# Patient Record
Sex: MALE | Race: WHITE | NOT HISPANIC OR LATINO | Employment: OTHER | URBAN - METROPOLITAN AREA
[De-identification: names, ages, dates, MRNs, and addresses within clinical notes are randomized per-mention and may not be internally consistent; named-entity substitution may affect disease eponyms.]

---

## 2017-02-23 ENCOUNTER — OFFICE VISIT (OUTPATIENT)
Dept: MEDICAL GROUP | Facility: PHYSICIAN GROUP | Age: 72
End: 2017-02-23
Payer: MEDICARE

## 2017-02-23 VITALS
BODY MASS INDEX: 22.88 KG/M2 | RESPIRATION RATE: 12 BRPM | TEMPERATURE: 98.8 F | HEART RATE: 48 BPM | OXYGEN SATURATION: 98 % | SYSTOLIC BLOOD PRESSURE: 118 MMHG | DIASTOLIC BLOOD PRESSURE: 68 MMHG | WEIGHT: 145.8 LBS | HEIGHT: 67 IN

## 2017-02-23 DIAGNOSIS — Z13.21 ENCOUNTER FOR VITAMIN DEFICIENCY SCREENING: ICD-10-CM

## 2017-02-23 DIAGNOSIS — E11.00 TYPE 2 DIABETES MELLITUS WITH HYPEROSMOLARITY WITHOUT COMA, WITHOUT LONG-TERM CURRENT USE OF INSULIN (HCC): ICD-10-CM

## 2017-02-23 DIAGNOSIS — E78.5 HYPERLIPIDEMIA, UNSPECIFIED HYPERLIPIDEMIA TYPE: Chronic | ICD-10-CM

## 2017-02-23 DIAGNOSIS — F41.9 ANXIETY: ICD-10-CM

## 2017-02-23 DIAGNOSIS — Z79.01 CHRONIC ANTICOAGULATION: ICD-10-CM

## 2017-02-23 DIAGNOSIS — G31.84 MILD COGNITIVE IMPAIRMENT: ICD-10-CM

## 2017-02-23 DIAGNOSIS — M21.961 DEFORMITY OF BOTH FEET: ICD-10-CM

## 2017-02-23 DIAGNOSIS — M21.962 DEFORMITY OF BOTH FEET: ICD-10-CM

## 2017-02-23 DIAGNOSIS — R42 VERTIGO: ICD-10-CM

## 2017-02-23 PROBLEM — E11.9 TYPE II DIABETES MELLITUS (HCC): Status: ACTIVE | Noted: 2017-02-23

## 2017-02-23 PROCEDURE — G8432 DEP SCR NOT DOC, RNG: HCPCS | Performed by: NURSE PRACTITIONER

## 2017-02-23 PROCEDURE — G8419 CALC BMI OUT NRM PARAM NOF/U: HCPCS | Performed by: NURSE PRACTITIONER

## 2017-02-23 PROCEDURE — G8484 FLU IMMUNIZE NO ADMIN: HCPCS | Performed by: NURSE PRACTITIONER

## 2017-02-23 PROCEDURE — 3017F COLORECTAL CA SCREEN DOC REV: CPT | Mod: 8P | Performed by: NURSE PRACTITIONER

## 2017-02-23 PROCEDURE — 4040F PNEUMOC VAC/ADMIN/RCVD: CPT | Mod: 8P | Performed by: NURSE PRACTITIONER

## 2017-02-23 PROCEDURE — 1036F TOBACCO NON-USER: CPT | Performed by: NURSE PRACTITIONER

## 2017-02-23 PROCEDURE — 99204 OFFICE O/P NEW MOD 45 MIN: CPT | Performed by: NURSE PRACTITIONER

## 2017-02-23 PROCEDURE — 1101F PT FALLS ASSESS-DOCD LE1/YR: CPT | Performed by: NURSE PRACTITIONER

## 2017-02-23 PROCEDURE — 3046F HEMOGLOBIN A1C LEVEL >9.0%: CPT | Mod: 8P | Performed by: NURSE PRACTITIONER

## 2017-02-23 RX ORDER — BLOOD-GLUCOSE METER
EACH MISCELLANEOUS
COMMUNITY
End: 2020-05-08

## 2017-02-23 RX ORDER — NORTRIPTYLINE HYDROCHLORIDE 10 MG/1
10 CAPSULE ORAL NIGHTLY
COMMUNITY
End: 2017-03-24 | Stop reason: SDUPTHER

## 2017-02-23 RX ORDER — FLUOROURACIL 20 MG/ML
SOLUTION TOPICAL
COMMUNITY
End: 2018-03-12 | Stop reason: CLARIF

## 2017-02-23 RX ORDER — CALCIUM CARB/VITAMIN D3/VIT K1 500-100-40
TABLET,CHEWABLE ORAL
COMMUNITY
End: 2017-07-18 | Stop reason: SDUPTHER

## 2017-02-23 RX ORDER — ATORVASTATIN CALCIUM 40 MG/1
40 TABLET, FILM COATED ORAL NIGHTLY
COMMUNITY
End: 2017-03-24 | Stop reason: SDUPTHER

## 2017-02-23 RX ORDER — LISINOPRIL 10 MG/1
10 TABLET ORAL DAILY
COMMUNITY
End: 2017-09-23

## 2017-02-23 RX ORDER — WARFARIN SODIUM 2.5 MG/1
2.5 TABLET ORAL DAILY
COMMUNITY
End: 2017-03-06 | Stop reason: SDUPTHER

## 2017-02-23 RX ORDER — GABAPENTIN 100 MG/1
100 CAPSULE ORAL 3 TIMES DAILY
COMMUNITY
End: 2017-12-19 | Stop reason: SDUPTHER

## 2017-02-23 ASSESSMENT — PATIENT HEALTH QUESTIONNAIRE - PHQ9: CLINICAL INTERPRETATION OF PHQ2 SCORE: 0

## 2017-02-23 NOTE — Clinical Note
[facility]  PRADEEP Batista  [address]  [place] [address]  Fax: [phone]   Authorization for Release/Disclosure of   Protected Health Information   Name: LESLEY HIGGINS : 1945 SSN: [id number]   Address[address] [address] Phone:    [phone] (home)    I authorize the entity listed below to release/disclose the PHI below to:   [facility]/PRADEEP Batista and PRADEEP Batista   Provider or Entity Name: [facility]:   Vadim TUCKER MD     Address   City, [facility], [facility]   Phone: [phone]      Fax:     Reason for request: continuity of care   Information to be released:    [  ] LAST COLONOSCOPY,  including any PATH REPORT and follow-up  [  ] LAST FIT/COLOGUARD RESULT [  ] LAST DEXA  [  ] LAST MAMMOGRAM  [  ] LAST PAP  [  ] LAST LABS [  ] RETINA EXAM REPORT  [  ] IMMUNIZATION RECORDS  [*] Release all info      [  ] Check here and initial the line next to each item to release ALL health information INCLUDING  _____ Care and treatment for drug and / or alcohol abuse  _____ HIV testing, infection status, or AIDS  _____ Genetic Testing    DATES OF SERVICE OR TIME PERIOD TO BE DISCLOSED: ___All Records___  I understand and acknowledge that:  * This Authorization may be revoked at any time by you in writing, except if your health information has already been used or disclosed.  * Your health information that will be used or disclosed as a result of you signing this authorization could be re-disclosed by the recipient. If this occurs, your re-disclosed health information may no longer be protected by State or Federal laws.  * You may refuse to sign this Authorization. Your refusal will not affect your ability to obtain treatment.  * This Authorization becomes effective upon signing and will  on (date) __________.      If no date is indicated, this Authorization will  one (1) year from the signature  date.    Name: Carlos Shakira    Signature:   Date:     2/23/2017       PLEASE FAX REQUESTED RECORDS BACK TO: [phone]

## 2017-02-23 NOTE — Clinical Note
[facility]  PRADEEP Batista  [address]  [place] [address]  Fax: [phone]   Authorization for Release/Disclosure of   Protected Health Information   Name: LESLEY HIGGINS : 1945 SSN: [id number]   [address] [place] [address] Phone:    [phone] (home)    I authorize the entity listed below to release/disclose the PHI below to:   [facility]/PRADEEP Batista and PRADEEP Batista   Provider or Entity Name: Meliton Palm      Address   City, [facility], [facility]   Phone:      Fax:     Reason for request: continuity of care   Information to be released:    [  ] LAST COLONOSCOPY,  including any PATH REPORT and follow-up  [  ] LAST FIT/COLOGUARD RESULT [  ] LAST DEXA  [  ] LAST MAMMOGRAM  [  ] LAST PAP  [  ] LAST LABS [  ] RETINA EXAM REPORT  [  ] IMMUNIZATION RECORDS  [*] Release all info      [  ] Check here and initial the line next to each item to release ALL health information INCLUDING  _____ Care and treatment for drug and / or alcohol abuse  _____ HIV testing, infection status, or AIDS  _____ Genetic Testing    DATES OF SERVICE OR TIME PERIOD TO BE DISCLOSED: ___All Records___  I understand and acknowledge that:  * This Authorization may be revoked at any time by you in writing, except if your health information has already been used or disclosed.  * Your health information that will be used or disclosed as a result of you signing this authorization could be re-disclosed by the recipient. If this occurs, your re-disclosed health information may no longer be protected by State or Federal laws.  * You may refuse to sign this Authorization. Your refusal will not affect your ability to obtain treatment.  * This Authorization becomes effective upon signing and will  on (date) __________.      If no date is indicated, this Authorization will  one (1) year from the signature date.    Name: Lesley Higgins Duran  #[id number]    Signature:   Date:     2/23/2017       PLEASE FAX REQUESTED RECORDS BACK TO: [phone]

## 2017-02-23 NOTE — ASSESSMENT & PLAN NOTE
Chronic Condition: Patient had type 2 diabetes mellitus for several years. He checks blood sugar at home and it averages 120's to 150's. He Denies any tremors, anxiety, weakness, blurry vision, fatigue, irritability or palpitations. Patient denies any drowsiness, dry skin, polyphagia or polydipsia, polyuria or nausea. Patient admits to headaches.  Last eye exam 2 years ago  Denies visual blurring, double vision, eye pain and floaters  Last monofilament foot exam: 5 years ago Denies foot pain, numbness, calluses, ulcers

## 2017-02-23 NOTE — ASSESSMENT & PLAN NOTE
Chronic condition: Patient is treated for chronic anxiety with citalopram. Denies any recent panic attacks, suicidal or homicidal ideation.

## 2017-02-23 NOTE — ASSESSMENT & PLAN NOTE
Difficulty with balance and walking. Patient was previously seen by podiatrist in [place]. His wife states that it was because he had poor shoes growing up and developed deformities. He is also diabetic with diabetic neuropathy.

## 2017-02-23 NOTE — ASSESSMENT & PLAN NOTE
Began in 2014. Forgets little things, losing stuff. Trouble following and taking directions. Forgetting what he's going after. Believes wife is saying things that she never said.  Episodes occur daily.  Exacerbated by stress due to recent move.

## 2017-02-23 NOTE — Clinical Note
[facility]  PRADEEP Batista  [address]  [place] [address]  Fax: [phone]   Authorization for Release/Disclosure of   Protected Health Information   Name: LESLEY HIGGINS : 1945 SSN: [id number]   Address: [address] [address] Phone:    [phone] (home)    I authorize the entity listed below to release/disclose the PHI below to:   [facility]/PRADEEP Batista and PRADEEP Batista   Provider or Entity Name: Keerthi Clark   [facility]      Address   City, [facility], [facility]   Phone:[phone]      Fax: [phone]     Reason for request: continuity of care   Information to be released:    [  ] LAST COLONOSCOPY,  including any PATH REPORT and follow-up  [  ] LAST FIT/COLOGUARD RESULT [  ] LAST DEXA  [  ] LAST MAMMOGRAM  [  ] LAST PAP  [  ] LAST LABS [  ] RETINA EXAM REPORT  [  ] IMMUNIZATION RECORDS  [*] Release all info      [  ] Check here and initial the line next to each item to release ALL health information INCLUDING  _____ Care and treatment for drug and / or alcohol abuse  _____ HIV testing, infection status, or AIDS  _____ Genetic Testing    DATES OF SERVICE OR TIME PERIOD TO BE DISCLOSED: ___All Records__________  I understand and acknowledge that:  * This Authorization may be revoked at any time by you in writing, except if your health information has already been used or disclosed.  * Your health information that will be used or disclosed as a result of you signing this authorization could be re-disclosed by the recipient. If this occurs, your re-disclosed health information may no longer be protected by State or Federal laws.  * You may refuse to sign this Authorization. Your refusal will not affect your ability to obtain treatment.  * This Authorization becomes effective upon signing and will  on (date) __________.      If no date is indicated, this Authorization will  one (1) year from the  signature date.    Name: Carlos Rosenberg    Signature:   Date:     2/23/2017       PLEASE FAX REQUESTED RECORDS BACK TO: [phone]

## 2017-02-23 NOTE — ASSESSMENT & PLAN NOTE
Chronic Condition: Patient has hyperlipidemia and is currently taking atorvastatin. He denies any chest pain, diaphoresis, shortness of breath, headaches, blurred vision or myalgias.

## 2017-02-23 NOTE — MR AVS SNAPSHOT
"        Carlos Bernadines   2017 2:40 PM   Office Visit   MRN: [id number]    Department:  [facility]   Dept Phone:  [phone]    Description:  Male : 1945   Provider:  PRADEEP Batista           Reason for Visit     Establish Care     Foot Problem           Allergies as of 2017     Allergen Noted Reactions    Vicodin [Hydrocodone-Acetaminophen] 2017   Vomiting      You were diagnosed with     Hyperlipidemia, unspecified hyperlipidemia type   [3776766]       Anxiety   [782453]       Type 2 diabetes mellitus with hyperosmolarity without coma, without long-term current use of insulin (CMS-[facility])   [3060057]       Vertigo   [648615]       Mild cognitive impairment   [748591]       Chronic anticoagulation   [376117]       Deformity of both feet   [007700]       Encounter for vitamin deficiency screening   [684345]         Vital Signs     Blood Pressure Pulse Temperature Respirations Height Weight    118/68 mmHg 48 37.1 °C (98.8 °F) 12 1.709 m (5' 7.28\") 66.134 kg (145 lb 12.8 oz)    Body Mass Index Oxygen Saturation Smoking Status             22.64 kg/m2 98% Never Smoker          Basic Information     Date Of Birth Sex Race Ethnicity Preferred Language    1945 Male White Non- English      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   2017 - Present      Health Maintenance     Patient has no pending health maintenance at this time      Current Immunizations     No immunizations on file.      Below and/or attached are the medications your provider expects you to take. Review all of your home " "medications and newly ordered medications with your provider and/or pharmacist. Follow medication instructions as directed by your provider and/or pharmacist. Please keep your medication list with you and share with your provider. Update the information when medications are discontinued, doses are changed, or new medications (including over-the-counter products) are added; and carry medication information at all times in the event of emergency situations     Allergies:  VICODIN - Vomiting               Medications  Valid as of: February 23, 2017 -  4:06 PM    Generic Name Brand Name Tablet Size Instructions for use    Atorvastatin Calcium (Tab) LIPITOR 40 MG Take 40 mg by mouth every evening.        Blood Glucose Monitoring Suppl (Kit) ONETOUCH VERIO FLEX SYSTEM W/DEVICE by Does not apply route.        Citalopram Hydrobromide   Take  by mouth.        Fluorouracil (Solution) Fluorouracil 2 % by Apply externally route.        Gabapentin (Cap) NEURONTIN 100 MG Take 100 mg by mouth 3 times a day.        Insulin Regular Human (Solution) HUMULIN R 100 Unit/mL Inject  as instructed 3 times a day before meals.        Insulin Syringe-Needle U-100 (Misc) INSULIN SYRINGE .3CC/31GX5/16\" 31G X 5/16\" 0.3 ML by Does not apply route.        Lisinopril (Tab) PRINIVIL 10 MG Take 10 mg by mouth every day.        MetFORMIN HCl (Tab) GLUCOPHAGE 500 MG Take 500 mg by mouth 2 times a day, with meals.        Nortriptyline HCl (Cap) PAMELOR 10 MG Take 10 mg by mouth every evening.        Warfarin Sodium (Tab) COUMADIN 2.5 MG Take 2.5 mg by mouth every day.        .                 Medicines prescribed today were sent to:     [facility] [address] [place] [address] AT [address] [address]    Phone: [phone] Fax: [phone]    Open 24 Hours?: No      Medication refill instructions:       If your prescription bottle indicates you have medication refills left, it is " not necessary to call your provider’s office. Please contact your pharmacy and they will refill your medication.    If your prescription bottle indicates you do not have any refills left, you may request refills at any time through one of the following ways: The online Quick TV system (except Urgent Care), by calling your provider’s office, or by asking your pharmacy to contact your provider’s office with a refill request. Medication refills are processed only during regular business hours and may not be available until the next business day. Your provider may request additional information or to have a follow-up visit with you prior to refilling your medication.   *Please Note: Medication refills are assigned a new Rx number when refilled electronically. Your pharmacy may indicate that no refills were authorized even though a new prescription for the same medication is available at the pharmacy. Please request the medicine by name with the pharmacy before contacting your provider for a refill.        Your To Do List     Future Labs/Procedures Complete By Expires    COMP METABOLIC PANEL  As directed 2/24/2018    HEMOGLOBIN A1C  As directed 2/24/2018    LIPID PROFILE  As directed 2/24/2018    VITAMIN D,25 HYDROXY  As directed 2/24/2018      Referral     A referral request has been sent to our patient care coordination department. Please allow 3-5 business days for us to process this request and contact you either by phone or mail. If you do not hear from us by the 5th business day, please call us at [phone].           Quick TV Access Code: [email or web address]  Expires: 3/25/2017  4:06 PM    Quick TV  A secure, online tool to manage your health information     [employer]’s Quick TV® is a secure, online tool that connects you to your personalized health information from the privacy of your home -- day or night - making it very easy for you to manage your healthcare. Once the activation process is completed, you can  even access your medical information using the LucidMedia shandra, which is available for free in the Apple Shandra store or Google Play store.     LucidMedia provides the following levels of access (as shown below):   My Chart Features   [facility] Primary [facility] Doctor [facility]  Specialists [facility]  Urgent  Care Non-[facility]  Primary Care  Doctor   Email your healthcare team securely and privately 24/7 X X X    Manage appointments: schedule your next appointment; view details of past/upcoming appointments X      Request prescription refills. X      View recent personal medical records, including lab and immunizations X X X X   View health record, including health history, allergies, medications X X X X   Read reports about your outpatient visits, procedures, consult and ER notes X X X X   See your discharge summary, which is a recap of your hospital and/or ER visit that includes your diagnosis, lab results, and care plan. X X       How to register for LucidMedia:  1. Go to  [email or web address].  2. Click on the Sign Up Now box, which takes you to the New Member Sign Up page. You will need to provide the following information:  a. Enter your LucidMedia Access Code exactly as it appears at the top of this page. (You will not need to use this code after you’ve completed the sign-up process. If you do not sign up before the expiration date, you must request a new code.)   b. Enter your date of birth.   c. Enter your home email address.   d. Click Submit, and follow the next screen’s instructions.  3. Create a LucidMedia ID. This will be your LucidMedia login ID and cannot be changed, so think of one that is secure and easy to remember.  4. Create a LucidMedia password. You can change your password at any time.  5. Enter your Password Reset Question and Answer. This can be used at a later time if you forget your password.   6. Enter your e-mail address. This allows you to receive e-mail notifications when new information is available in  MyCadbox.  7. Click Sign Up. You can now view your health information.    For assistance activating your MyCadbox account, call [phone]

## 2017-02-24 NOTE — PROGRESS NOTES
Chief Complaint   Patient presents with   • Establish Care   • Foot Problem       HPI:    Carlos Rosenberg is a 71 y.o. male here to establish care and to discuss the following:    Hyperlipidemia  Chronic Condition: Patient has hyperlipidemia and is currently taking atorvastatin. He denies any chest pain, diaphoresis, shortness of breath, headaches, blurred vision or myalgias.      Type II diabetes mellitus (CMS-[facility])  Chronic Condition: Patient had type 2 diabetes mellitus for several years. He checks blood sugar at home and it averages 120's to 150's. He Denies any tremors, anxiety, weakness, blurry vision, fatigue, irritability or palpitations. Patient denies any drowsiness, dry skin, polyphagia or polydipsia, polyuria or nausea. Patient admits to headaches.  Last eye exam 2 years ago  Denies visual blurring, double vision, eye pain and floaters  Last monofilament foot exam: 5 years ago Denies foot pain, numbness, calluses, ulcers        Mild cognitive impairment  Began in 2014. Forgets little things, losing stuff. Trouble following and taking directions. Forgetting what he's going after. Believes wife is saying things that she never said.  Episodes occur daily.  Exacerbated by stress due to recent move.    Anxiety  Chronic condition: Patient is treated for chronic anxiety with citalopram. Denies any recent panic attacks, suicidal or homicidal ideation.          Chronic anticoagulation  On warfarin for artificial valve.    Deformity of both feet  Difficulty with balance and walking. Patient was previously seen by podiatrist in [place]. His wife states that it was because he had poor shoes growing up and developed deformities. He is also diabetic with diabetic neuropathy.        Current medicines (including changes today)  Current Outpatient Prescriptions   Medication Sig Dispense Refill   • metformin (GLUCOPHAGE) 500 MG Tab Take 500 mg by mouth 2 times a day, with meals.     • CITALOPRAM HYDROBROMIDE PO Take  by  "mouth.     • insulin regular (HUMULIN R) 100 Unit/mL Solution Inject  as instructed 3 times a day before meals.     • atorvastatin (LIPITOR) 40 MG Tab Take 40 mg by mouth every evening.     • Fluorouracil 2 % Solution by Apply externally route.     • warfarin (COUMADIN) 2.5 MG Tab Take 2.5 mg by mouth every day.     • nortriptyline (PAMELOR) 10 MG Cap Take 10 mg by mouth every evening.     • Blood Glucose Monitoring Suppl (ONETOUCH VERIO FLEX SYSTEM) W/DEVICE Kit by Does not apply route.     • Insulin Syringe-Needle U-100 (INSULIN SYRINGE .3CC/31GX5/16\") 31G X 5/16\" 0.3 ML Misc by Does not apply route.     • lisinopril (PRINIVIL) 10 MG Tab Take 10 mg by mouth every day.     • gabapentin (NEURONTIN) 100 MG Cap Take 100 mg by mouth 3 times a day.       No current facility-administered medications for this visit.     He  has a past medical history of Neck fracture (CMS-HCC) (2016); Diverticulitis (2015); Mild cognitive impairment (2014); Anxiety (2014); Diabetes (CMS-HCC); Hypertension; Diverticulosis; Talipes cavus; Hyperlipidemia; Stroke (CMS-HCC); and Hearing loss of both ears.  He  has past surgical history that includes mitral valve replacement (1999); inguinal hernia repair (1984); and tonsillectomy.  Social History   Substance Use Topics   • Smoking status: Never Smoker    • Smokeless tobacco: Never Used   • Alcohol Use: No     Social History     Social History Narrative   • No narrative on file     History reviewed. No pertinent family history.  Family Status   Relation Status Death Age   • Mother Other          ROS    Review of Systems   Constitutional: Negative for fever, chills, weight loss and malaise/fatigue.   HENT: Negative for ear pain, nosebleeds, congestion, sore throat and neck pain.    Eyes: Negative for blurred vision.   Respiratory: Negative for cough, sputum production, shortness of breath and wheezing.    Cardiovascular: Negative for chest pain, palpitations,  and leg swelling. Positive for " "change in color lower extremities  Gastrointestinal: Negative for heartburn, nausea, vomiting, diarrhea and abdominal pain.   Genitourinary: Negative for dysuria, urgency and frequency.   Musculoskeletal: Negative for myalgias, back pain and joint pain.   Skin: Negative for rash and itching.   Neurological: Negative for dizziness, tingling, tremors,  focal weakness and headaches. Positive for tingling both feet and positive for memory loss  Endo/Heme/Allergies: Does not bruise/bleed easily.   Psychiatric/Behavioral: Negative for depression, anxiety, suicidal ideas, insomnia and memory loss.      All other systems reviewed and are negative except as in HPI.         Objective:     Blood pressure 118/68, pulse 48, temperature 37.1 °C (98.8 °F), resp. rate 12, height 1.709 m (5' 7.28\"), weight 66.134 kg (145 lb 12.8 oz), SpO2 98 %. Body mass index is 22.64 kg/(m^2).  Physical Exam:  Constitutional: Alert, no distress.  Skin: Warm, dry, good turgor, no rashes in visible areas.  Eye: Equal, round and reactive, conjunctiva clear, lids normal.  ENMT: Lips without lesions, good dentition, oropharynx clear. Ear canals are clear, TMs within normal limits bilaterally.   Neck: Trachea midline, no masses, no thyromegaly. No cervical or supraclavicular lymphadenopathy.  Respiratory: Unlabored respiratory effort, lungs clear to auscultation, no wheezes, no ronchi.  Cardiovascular: Normal S1, S2, no murmur, no edema. Both lower extremities purple in color, normal temp. 2+ pedal pulses bilaterally  Psych: Alert and oriented x3, normal affect and mood.  MS: Ambulates independently with steady gait. Has full range of motion of all extremities and spine.  Neuro: Cranial nerves intact except decreased hearing left side. Positive romberg. DTRs within normal limits.  Monofilament testing with a 10 gram force: sensation intact: decreased bilaterally  Visual Inspection: Feet without maceration, ulcers, fissures. Calluses both feet  Pedal " pulses: intact bilaterally      Assessment and Plan:   The following treatment plan was discussed   1. Hyperlipidemia, unspecified hyperlipidemia type  LIPID PROFILE    Labs ordered.   2. Anxiety      Continue current medication.   3. Type 2 diabetes mellitus with hyperosmolarity without coma, without long-term current use of insulin (HCC)  COMP METABOLIC PANEL    HEMOGLOBIN A1C    Continue current medication.   4. Vertigo      Patient declined medication   5. Mild cognitive impairment  REFERRAL TO NEUROLOGY    Referral to memory clinic   6. Chronic anticoagulation  REFERRAL TO ANTICOAGULATION MONITORING    Referral to Coumadin clinic   7. Deformity of both feet  REFERRAL TO PODIATRY    Referral to podiatry   8. Encounter for vitamin deficiency screening  VITAMIN D,25 HYDROXY    Labs ordered.     Records requested.  Followup: Return in about 4 weeks (around 3/23/2017) for Lab Review.   Please note that this dictation was created using voice recognition software. I have made every reasonable attempt to correct obvious errors, but I expect that there are errors of grammar and possibly content that I did not discover before finalizing the note.

## 2017-03-06 ENCOUNTER — TELEPHONE (OUTPATIENT)
Dept: VASCULAR LAB | Facility: MEDICAL CENTER | Age: 72
End: 2017-03-06

## 2017-03-06 DIAGNOSIS — Z79.01 CHRONIC ANTICOAGULATION: ICD-10-CM

## 2017-03-06 RX ORDER — WARFARIN SODIUM 2.5 MG/1
TABLET ORAL
Qty: 45 TAB | Refills: 3 | Status: SHIPPED | OUTPATIENT
Start: 2017-03-06 | End: 2017-03-31

## 2017-03-06 RX ORDER — WARFARIN SODIUM 2.5 MG/1
2.5 TABLET ORAL DAILY
Qty: 30 TAB | Refills: 0 | Status: CANCELLED | OUTPATIENT
Start: 2017-03-06

## 2017-03-07 RX ORDER — CITALOPRAM HYDROBROMIDE 10 MG/1
10 TABLET ORAL EVERY MORNING
Qty: 90 TAB | Refills: 1 | Status: SHIPPED | OUTPATIENT
Start: 2017-03-07 | End: 2017-07-21 | Stop reason: SDUPTHER

## 2017-03-07 NOTE — TELEPHONE ENCOUNTER
Was the patient seen in the last year in this department? Yes 02/23/2017    Does patient have an active prescription for medications requested? No     Received Request Via: Patient

## 2017-03-10 ENCOUNTER — ANTICOAGULATION VISIT (OUTPATIENT)
Dept: MEDICAL GROUP | Facility: PHYSICIAN GROUP | Age: 72
End: 2017-03-10
Payer: MEDICARE

## 2017-03-10 DIAGNOSIS — Z95.2 HISTORY OF MITRAL VALVE REPLACEMENT WITH MECHANICAL VALVE: ICD-10-CM

## 2017-03-10 LAB — INR PPP: 2.6 (ref 2–3.5)

## 2017-03-10 PROCEDURE — 99999 PR NO CHARGE: CPT | Performed by: NURSE PRACTITIONER

## 2017-03-10 PROCEDURE — 85610 PROTHROMBIN TIME: CPT | Performed by: NURSE PRACTITIONER

## 2017-03-10 PROCEDURE — 4040F PNEUMOC VAC/ADMIN/RCVD: CPT | Mod: 8P | Performed by: NURSE PRACTITIONER

## 2017-03-10 PROCEDURE — G8419 CALC BMI OUT NRM PARAM NOF/U: HCPCS | Performed by: NURSE PRACTITIONER

## 2017-03-10 NOTE — PROGRESS NOTES
"Anticoagulation Summary as of 3/10/2017     INR goal 2.5-3.5   Selected INR 2.6 (3/10/2017)   Maintenance plan 7.5 mg (2.5 mg x 3) on Wed, Sat; 5 mg (2.5 mg x 2) all other days   Weekly total 40 mg   Plan last modified Casey Birmingham PHARMD (3/10/2017)   Next INR check 3/31/2017   Target end date Indefinite    Indications   History of mitral valve replacement with mechanical valve [Z95.2] [Z95.2]         Anticoagulation Episode Summary     INR check location     Preferred lab     Send INR reminders to     Comments       Anticoagulation Care Providers     Provider Role Specialty Phone number    PRADEEP Batista Referring Family Medicine [phone]    [facility] Anticoagulation Services Responsible  [phone]        Current Outpatient Prescriptions on File Prior to Visit   Medication Sig Dispense Refill   • citalopram (CELEXA) 10 MG tablet Take 1 Tab by mouth every morning. 90 Tab 1   • warfarin (COUMADIN) 2.5 MG Tab Take 1 tab po q day or as directed by clinic 45 Tab 3   • metformin (GLUCOPHAGE) 500 MG Tab Take 500 mg by mouth 2 times a day, with meals.     • insulin regular (HUMULIN R) 100 Unit/mL Solution Inject  as instructed 3 times a day before meals.     • atorvastatin (LIPITOR) 40 MG Tab Take 40 mg by mouth every evening.     • Fluorouracil 2 % Solution by Apply externally route.     • nortriptyline (PAMELOR) 10 MG Cap Take 10 mg by mouth every evening.     • Blood Glucose Monitoring Suppl (ONETOUCH VERIO FLEX SYSTEM) W/DEVICE Kit by Does not apply route.     • Insulin Syringe-Needle U-100 (INSULIN SYRINGE .3CC/31GX5/16\") 31G X 5/16\" 0.3 ML Misc by Does not apply route.     • lisinopril (PRINIVIL) 10 MG Tab Take 10 mg by mouth every day.     • gabapentin (NEURONTIN) 100 MG Cap Take 100 mg by mouth 3 times a day.       No current facility-administered medications on file prior to visit.     Past Medical History   Diagnosis Date   • Neck fracture (CMS-HCC) 2016   • Diverticulitis 2015   • Mild " cognitive impairment 2014   • Anxiety 2014   • Diabetes (CMS-HCC)    • Hypertension    • Diverticulosis    • Talipes cavus    • Hyperlipidemia    • Stroke (CMS-[facility])    • Hearing loss of both ears      Anticoagulation Patient Findings    Pt is new to warfarin and new to RCC.  Discussed indication for warfarin therapy and INR goal range. Explained our services, hours of operation, warfarin therapy, potential SE, potential DI.. Discussed diet at length, with an emphasis on foods rich in vitamin K.  Discussed monitoring parameters, such as blood in urine, blood in stool, discussed what to do if a dose is missed, or suspected as missed.  Emphasized importance of compliance.  Discussed lifestyle choices of ETOH & smoking and its impact on therapy.    Patient new to clinic from [place], previously managed by [facility].  Request for medical records sent by PCP awaiting arrival.  History of mechanical valve in the mitral position, he is unsure of model.  Denies history of VTE, stroke, or atrial fibrillation.  Verified current warfarin regimen.  Med rec completed.  INR therapeutic today at 2.6.  Pt is to continue with current warfarin dosing regimen.  Follow up in 3 weeks.    Casey Birmingham, PHARMD     CC Dr Michael Bloch

## 2017-03-10 NOTE — MR AVS SNAPSHOT
Carlos Shakira   3/10/2017 9:15 AM   Anticoagulation Visit   MRN: [id number]    Department:  [facility]   Dept Phone:  [phone]    Description:  Male : 1945   Provider:  Casey Birmingham, MARYD           Allergies as of 3/10/2017     Allergen Noted Reactions    Vicodin [Hydrocodone-Acetaminophen] 2017   Vomiting      You were diagnosed with     History of mitral valve replacement with mechanical valve   [630572]         Vital Signs     Smoking Status                   Never Smoker            Basic Information     Date Of Birth Sex Race Ethnicity Preferred Language    1945 Male White Non- English      Your appointments     Mar 31, 2017  9:30 AM   Anti-Coag Routine with [place] PHARMACIST   [facility]    [address] [address] [phone]            May 18, 2017  9:00 AM   New Patient with Inna Cartagena M.D.   [facility] (--)    [address], [address]  [facility] [address] [phone]           Please bring Photo ID, Insurance Cards, All Medication Bottles and copies of any legal documents (such as Living Will, Power of ) If speaking a language besides English please bring an adult . Please arrive 30 minutes prior for check in and registration. You will be receiving a confirmation call a few days before your appointment from our automated call confirmation system.              Problem List              ICD-10-CM Priority Class Noted - Resolved    Hyperlipidemia E78.5   2017 - Present    Anxiety F41.9   2017 - Present    Type II diabetes mellitus (CMS-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   2017 - Present    History of mitral valve replacement with mechanical valve [Z95.2]  Z95.2   3/10/2017 - Present      Health Maintenance        Date Due Completion Dates    A1C SCREENING 4/12/1946 ---    RETINAL SCREENING 10/12/1961 ---    FASTING LIPID PROFILE 10/12/1961 ---    URINE ACR / MICROALBUMIN 10/12/1961 ---    SERUM CREATININE 10/12/1961 ---    IMM DTaP/Tdap/Td Vaccine (1 - Tdap) 10/12/1962 ---    COLONOSCOPY 10/12/1993 ---    IMM ZOSTER VACCINE 10/12/2003 ---    IMM PNEUMOCOCCAL 65+ (ADULT) LOW/MEDIUM RISK SERIES (1 of 2 - PCV13) 10/12/2008 ---    IMM INFLUENZA (1) 9/1/2016 ---    DIABETES MONOFILAMENT / LE EXAM 2/23/2018 2/23/2017            Results     POCT Protime      Component    INR    2.6    Comment:     144 580-11 1/2018 ic valid                        Current Immunizations     No immunizations on file.      Below and/or attached are the medications your provider expects you to take. Review all of your home medications and newly ordered medications with your provider and/or pharmacist. Follow medication instructions as directed by your provider and/or pharmacist. Please keep your medication list with you and share with your provider. Update the information when medications are discontinued, doses are changed, or new medications (including over-the-counter products) are added; and carry medication information at all times in the event of emergency situations     Allergies:  VICODIN - Vomiting               Medications  Valid as of: March 10, 2017 -  9:38 AM    Generic Name Brand Name Tablet Size Instructions for use    Atorvastatin Calcium (Tab) LIPITOR 40 MG Take 40 mg by mouth every evening.        Blood Glucose Monitoring Suppl (Kit) ONETOUCH VERIO FLEX SYSTEM W/DEVICE by Does not apply route.        Citalopram Hydrobromide (Tab) CELEXA 10 MG Take 1 Tab by mouth every morning.        Fluorouracil (Solution) Fluorouracil 2 % by Apply externally route.        Gabapentin (Cap) NEURONTIN 100 MG Take 100 mg by mouth 3 times a day.        Insulin Regular Human (Solution) HUMULIN R  "100 Unit/mL Inject  as instructed 3 times a day before meals.        Insulin Syringe-Needle U-100 (Misc) INSULIN SYRINGE .3CC/31GX5/16\" 31G X 5/16\" 0.3 ML by Does not apply route.        Lisinopril (Tab) PRINIVIL 10 MG Take 10 mg by mouth every day.        MetFORMIN HCl (Tab) GLUCOPHAGE 500 MG Take 500 mg by mouth 2 times a day, with meals.        Nortriptyline HCl (Cap) PAMELOR 10 MG Take 10 mg by mouth every evening.        Warfarin Sodium (Tab) COUMADIN 2.5 MG Take 1 tab po q day or as directed by clinic        .                 Medicines prescribed today were sent to:     [email or web address] [id number] [place] [address], [address] AT [address] [address]    Phone: [phone] Fax: [phone]    Open 24 Hours?: No      Medication refill instructions:       If your prescription bottle indicates you have medication refills left, it is not necessary to call your provider’s office. Please contact your pharmacy and they will refill your medication.    If your prescription bottle indicates you do not have any refills left, you may request refills at any time through one of the following ways: The online [employer] system (except Urgent Care), by calling your provider’s office, or by asking your pharmacy to contact your provider’s office with a refill request. Medication refills are processed only during regular business hours and may not be available until the next business day. Your provider may request additional information or to have a follow-up visit with you prior to refilling your medication.   *Please Note: Medication refills are assigned a new Rx number when refilled electronically. Your pharmacy may indicate that no refills were authorized even though a new prescription for the same medication is available at the pharmacy. Please request the medicine by name with the pharmacy before contacting your provider for a refill.        Warfarin Dosing Calendar   "   March 2017 Details    Sun Mon Tue Wed Thu Fri Sat        1               2               3               4                 5               6               7               8               9               10   2.6   5 mg   See details      11      7.5 mg           12      5 mg         13      5 mg         14      5 mg         15      7.5 mg         16      5 mg         17      5 mg         18      7.5 mg           19      5 mg         20      5 mg         21      5 mg         22      7.5 mg         23      5 mg         24      5 mg         25      7.5 mg           26      5 mg         27      5 mg         28      5 mg         29      7.5 mg         30      5 mg         31      5 mg           Date Details   03/10 This INR check   INR: 2.6   144 580-11 1/2018 ic valid       Date of next INR:  3/31/2017         How to take your warfarin dose     To take:  5 mg Take 2 of the 2.5 mg tablets.    To take:  7.5 mg Take 3 of the 2.5 mg tablets.              [employer] Access Code: [email or web address]  Expires: 3/25/2017  4:06 PM    [employer]  A secure, online tool to manage your health information     [employer]’s [employer]® is a secure, online tool that connects you to your personalized health information from the privacy of your home -- day or night - making it very easy for you to manage your healthcare. Once the activation process is completed, you can even access your medical information using the [employer] shandra, which is available for free in the Apple Shandra store or Google Play store.     [employer] provides the following levels of access (as shown below):   My Chart Features   [facility] Doctor [facility]  Urgent  Care Non-[facility]  Primary Care  Doctor   Email your healthcare team securely and privately 24/7 X X X    Manage appointments: schedule your next appointment; view details of past/upcoming appointments X      Request prescription refills. X      View recent personal medical records, including lab  and immunizations X X X X   View health record, including health history, allergies, medications X X X X   Read reports about your outpatient visits, procedures, consult and ER notes X X X X   See your discharge summary, which is a recap of your hospital and/or ER visit that includes your diagnosis, lab results, and care plan. X X       How to register for [employer]:  1. Go to  [email or web address].  2. Click on the Sign Up Now box, which takes you to the New Member Sign Up page. You will need to provide the following information:  a. Enter your [employer] Access Code exactly as it appears at the top of this page. (You will not need to use this code after you’ve completed the sign-up process. If you do not sign up before the expiration date, you must request a new code.)   b. Enter your date of birth.   c. Enter your home email address.   d. Click Submit, and follow the next screen’s instructions.  3. Create a [employer] ID. This will be your [employer] login ID and cannot be changed, so think of one that is secure and easy to remember.  4. Create a [employer] password. You can change your password at any time.  5. Enter your Password Reset Question and Answer. This can be used at a later time if you forget your password.   6. Enter your e-mail address. This allows you to receive e-mail notifications when new information is available in [employer].  7. Click Sign Up. You can now view your health information.    For assistance activating your [employer] account, call [phone]

## 2017-03-13 ENCOUNTER — TELEPHONE (OUTPATIENT)
Dept: VASCULAR LAB | Facility: MEDICAL CENTER | Age: 72
End: 2017-03-13

## 2017-03-13 NOTE — TELEPHONE ENCOUNTER
Initial anticoagulant clinic note reviewed.  Pending any further recommendations from cardiology or PCP we will continue with indefinite anti-coag regulation with warfarin and a goal INR 2.5-3.5 for mechanical mitral valve replacement.    We will defer all other cardiovascular care including any potential echocardiographic surveillance of valve function to PCP and/or cardiology    Michael J. Bloch, MD  Anticoagulation Center    Cc: Karina Fontaine

## 2017-03-24 RX ORDER — ATORVASTATIN CALCIUM 40 MG/1
40 TABLET, FILM COATED ORAL EVERY EVENING
Qty: 90 TAB | Refills: 0 | Status: SHIPPED | OUTPATIENT
Start: 2017-03-24 | End: 2017-07-21 | Stop reason: SDUPTHER

## 2017-03-24 RX ORDER — NORTRIPTYLINE HYDROCHLORIDE 10 MG/1
10 CAPSULE ORAL EVERY EVENING
Qty: 90 CAP | Refills: 0 | Status: SHIPPED | OUTPATIENT
Start: 2017-03-24 | End: 2017-10-26 | Stop reason: SDUPTHER

## 2017-03-31 ENCOUNTER — ANTICOAGULATION VISIT (OUTPATIENT)
Dept: MEDICAL GROUP | Facility: PHYSICIAN GROUP | Age: 72
End: 2017-03-31
Payer: MEDICARE

## 2017-03-31 VITALS — SYSTOLIC BLOOD PRESSURE: 134 MMHG | DIASTOLIC BLOOD PRESSURE: 86 MMHG | HEART RATE: 43 BPM

## 2017-03-31 DIAGNOSIS — Z95.2 HISTORY OF MITRAL VALVE REPLACEMENT WITH MECHANICAL VALVE: ICD-10-CM

## 2017-03-31 LAB — INR PPP: 3.8 (ref 2–3.5)

## 2017-03-31 PROCEDURE — 85610 PROTHROMBIN TIME: CPT | Performed by: FAMILY MEDICINE

## 2017-03-31 PROCEDURE — 4040F PNEUMOC VAC/ADMIN/RCVD: CPT | Mod: 8P | Performed by: FAMILY MEDICINE

## 2017-03-31 PROCEDURE — G8420 CALC BMI NORM PARAMETERS: HCPCS | Performed by: FAMILY MEDICINE

## 2017-03-31 PROCEDURE — 99999 PR NO CHARGE: CPT | Performed by: FAMILY MEDICINE

## 2017-03-31 RX ORDER — WARFARIN SODIUM 2.5 MG/1
TABLET ORAL
Qty: 270 TAB | Refills: 1 | Status: SHIPPED | OUTPATIENT
Start: 2017-03-31 | End: 2017-12-20 | Stop reason: SDUPTHER

## 2017-03-31 NOTE — PROGRESS NOTES
Anticoagulation Summary as of 3/31/2017     INR goal 2.5-3.5   Selected INR 3.8! (3/31/2017)   Maintenance plan 7.5 mg (2.5 mg x 3) on Wed, Sat; 5 mg (2.5 mg x 2) all other days   Weekly total 40 mg   Plan last modified aCsey Birmingham, KARO (3/10/2017)   Next INR check 4/14/2017   Target end date Indefinite    Indications   History of mitral valve replacement with mechanical valve [Z95.2] [Z95.2]         Anticoagulation Episode Summary     INR check location     Preferred lab     Send INR reminders to     Comments       Anticoagulation Care Providers     Provider Role Specialty Phone number    PRADEEP Batista Referring Family Medicine [phone]    [facility] Anticoagulation Services Responsible  [phone]        Anticoagulation Patient Findings   Negatives Missed Doses, Extra Doses, Medication Changes, Antibiotic Use, Diet Changes, Dental/Other Procedures, Hospitalization, Bleeding Gums, Nose Bleeds, Blood in Urine, Blood in Stool, Any Bruising, Other Complaints        Patient is supratherapeutic today with INR of 3.8.  Pt denies any unusual s/s of bleeding, bruising, clotting or any changes to diet or medications.  Instructed patient to take 2.5mg X 1, then continue with current warfarin dosing regimen.  Follow up in 2 weeks.    Casey Birmingham, MARYD

## 2017-03-31 NOTE — MR AVS SNAPSHOT
Carlos Bernadines   3/31/2017 9:30 AM   Anticoagulation Visit   MRN: [id number]    Department:  [facility]   Dept Phone:  [phone]    Description:  Male : 1945   Provider:  Casey Birmingham PHARMD           Allergies as of 3/31/2017     Allergen Noted Reactions    Vicodin [Hydrocodone-Acetaminophen] 2017   Vomiting      You were diagnosed with     History of mitral valve replacement with mechanical valve   [470833]         Vital Signs     Blood Pressure Pulse Smoking Status             134/86 mmHg 43 Never Smoker          Basic Information     Date Of Birth Sex Race Ethnicity Preferred Language    1945 Male White Non- English      Your appointments     Mar 31, 2017  9:30 AM   Anti-Coag Routine with Casey Birmingham PHARMD   [facility]    [address] [id number] [phone]            2017  9:15 AM   Anti-Coag Routine with [place] PHARMACIST   [facility]    [address] [id number] [phone]            May 18, 2017  9:00 AM   New Patient with Inna Cartagena M.D.   [facility] (--)    [address], [address]  [facility] [id number] [phone]           Please bring Photo ID, Insurance Cards, All Medication Bottles and copies of any legal documents (such as Living Will, Power of ) If speaking a language besides English please bring an adult . Please arrive 30 minutes prior for check in and registration. You will be receiving a confirmation call a few days before your appointment from our automated call confirmation system.              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   2/23/2017 - Present    History of mitral valve replacement with mechanical valve [Z95.2] Z95.2   3/10/2017 - Present      Health Maintenance        Date Due Completion Dates    A1C SCREENING 4/12/1946 ---    RETINAL SCREENING 10/12/1961 ---    FASTING LIPID PROFILE 10/12/1961 ---    URINE ACR / MICROALBUMIN 10/12/1961 ---    SERUM CREATININE 10/12/1961 ---    IMM DTaP/Tdap/Td Vaccine (1 - Tdap) 10/12/1962 ---    COLONOSCOPY 10/12/1993 ---    IMM ZOSTER VACCINE 10/12/2003 ---    IMM PNEUMOCOCCAL 65+ (ADULT) LOW/MEDIUM RISK SERIES (1 of 2 - PCV13) 10/12/2008 ---    IMM INFLUENZA (1) 9/1/2016 ---    DIABETES MONOFILAMENT / LE EXAM 2/23/2018 2/23/2017            Results     POCT Protime      Component    INR    3.8    Comment:     [id number] 2/2018 ic valid                        Current Immunizations     No immunizations on file.      Below and/or attached are the medications your provider expects you to take. Review all of your home medications and newly ordered medications with your provider and/or pharmacist. Follow medication instructions as directed by your provider and/or pharmacist. Please keep your medication list with you and share with your provider. Update the information when medications are discontinued, doses are changed, or new medications (including over-the-counter products) are added; and carry medication information at all times in the event of emergency situations     Allergies:  VICODIN - Vomiting               Medications  Valid as of: March 31, 2017 -  9:24 AM    Generic Name Brand Name Tablet Size Instructions for use    Atorvastatin Calcium (Tab) LIPITOR 40 MG Take 1 Tab by mouth every evening.        Blood Glucose Monitoring Suppl (Kit) ONETOUCH VERIO FLEX SYSTEM W/DEVICE by Does not apply route.        Citalopram Hydrobromide (Tab) CELEXA 10 MG Take 1 Tab by mouth  "every morning.        Fluorouracil (Solution) Fluorouracil 2 % by Apply externally route.        Gabapentin (Cap) NEURONTIN 100 MG Take 100 mg by mouth 3 times a day.        Insulin Regular Human (Solution) HUMULIN R 100 Unit/mL Injected instructed 3 times a day before meals        Insulin Syringe-Needle U-100 (Misc) INSULIN SYRINGE .3CC/31GX5/16\" 31G X 5/16\" 0.3 ML by Does not apply route.        Lisinopril (Tab) PRINIVIL 10 MG Take 10 mg by mouth every day.        MetFORMIN HCl (Tab) GLUCOPHAGE 500 MG Take 500 mg by mouth 2 times a day, with meals.        Nortriptyline HCl (Cap) PAMELOR 10 MG Take 1 Cap by mouth every evening.        Warfarin Sodium (Tab) COUMADIN 2.5 MG Take 1 tab po q day or as directed by clinic        .                 Medicines prescribed today were sent to:     [id number] - [place], [place] - [address] AT [address]    [address] [address]    Phone: [phone] Fax: [phone]    Open 24 Hours?: No    [id number] - [place], [place] - [address] [facility] [address]    [address] [facility] [place] [address]    Phone: [phone] Fax: [phone]    Open 24 Hours?: No      Medication refill instructions:       If your prescription bottle indicates you have medication refills left, it is not necessary to call your provider’s office. Please contact your pharmacy and they will refill your medication.    If your prescription bottle indicates you do not have any refills left, you may request refills at any time through one of the following ways: The online Scodix system (except Urgent Care), by calling your provider’s office, or by asking your pharmacy to contact your provider’s office with a refill request. Medication refills are processed only during regular business hours and may not be available until the next business day. Your provider may request additional information or to have a follow-up visit with you prior " to refilling your medication.   *Please Note: Medication refills are assigned a new Rx number when refilled electronically. Your pharmacy may indicate that no refills were authorized even though a new prescription for the same medication is available at the pharmacy. Please request the medicine by name with the pharmacy before contacting your provider for a refill.        Warfarin Dosing Calendar   March 2017 Details    Sun Mon Tue Wed Thu Fri Sat        1               2               3               4                 5               6               7               8               9               10               11                 12               13               14               15               16               17               18                 19               20               21               22               23               24               25                 26               27               28               29               30               31   3.8   2.5 mg   See details        Date Details   03/31 This INR check   INR: 3.8   [id number] 2/2018 ic valid               How to take your warfarin dose     To take:  2.5 mg Take 1 of the 2.5 mg tablets.           Warfarin Dosing Calendar   April 2017 Details    Sun Mon Tue Wed Thu Fri Sat           1      7.5 mg           2      5 mg         3      5 mg         4      5 mg         5      7.5 mg         6      5 mg         7      5 mg         8      7.5 mg           9      5 mg         10      5 mg         11      5 mg         12      7.5 mg         13      5 mg         14      5 mg         15                 16               17               18               19               20               21               22                 23               24               25               26               27               28               29                 30                      Date Details   No additional details    Date of next INR:  4/14/2017         How to take your  warfarin dose     To take:  5 mg Take 2 of the 2.5 mg tablets.    To take:  7.5 mg Take 3 of the 2.5 mg tablets.              Topguest Access Code: [email or web address]  Expires: 4/30/2017  9:06 AM    Topguest  A secure, online tool to manage your health information     [employer]’s Topguest® is a secure, online tool that connects you to your personalized health information from the privacy of your home -- day or night - making it very easy for you to manage your healthcare. Once the activation process is completed, you can even access your medical information using the Topguest shandra, which is available for free in the Apple Shandra store or Google Play store.     Topguest provides the following levels of access (as shown below):   My Chart Features   [facility] Doctor [facility]  Urgent  Care Non-[facility]  Primary Care  Doctor   Email your healthcare team securely and privately 24/7 X X X    Manage appointments: schedule your next appointment; view details of past/upcoming appointments X      Request prescription refills. X      View recent personal medical records, including lab and immunizations X X X X   View health record, including health history, allergies, medications X X X X   Read reports about your outpatient visits, procedures, consult and ER notes X X X X   See your discharge summary, which is a recap of your hospital and/or ER visit that includes your diagnosis, lab results, and care plan. X X       How to register for Topguest:  1. Go to  [email or web address].  2. Click on the Sign Up Now box, which takes you to the New Member Sign Up page. You will need to provide the following information:  a. Enter your Topguest Access Code exactly as it appears at the top of this page. (You will not need to use this code after you’ve completed the sign-up process. If you do not sign up before the expiration date, you must request a new code.)   b. Enter your date of birth.   c. Enter your home  email address.   d. Click Submit, and follow the next screen’s instructions.  3. Create a Preview Networkst ID. This will be your Kudan login ID and cannot be changed, so think of one that is secure and easy to remember.  4. Create a Preview Networkst password. You can change your password at any time.  5. Enter your Password Reset Question and Answer. This can be used at a later time if you forget your password.   6. Enter your e-mail address. This allows you to receive e-mail notifications when new information is available in Kudan.  7. Click Sign Up. You can now view your health information.    For assistance activating your Kudan account, call [phone]

## 2017-04-12 DIAGNOSIS — Z79.4 UNCONTROLLED TYPE 2 DIABETES MELLITUS WITH HYPEROSMOLARITY WITHOUT COMA, WITH LONG-TERM CURRENT USE OF INSULIN (HCC): ICD-10-CM

## 2017-04-12 DIAGNOSIS — E11.00 UNCONTROLLED TYPE 2 DIABETES MELLITUS WITH HYPEROSMOLARITY WITHOUT COMA, WITH LONG-TERM CURRENT USE OF INSULIN (HCC): ICD-10-CM

## 2017-04-12 RX ORDER — BLOOD-GLUCOSE METER
EACH MISCELLANEOUS
OUTPATIENT
Start: 2017-04-12

## 2017-04-12 NOTE — TELEPHONE ENCOUNTER
1. Caller Name: Comfort/wife                                         Call Back Number: [phone] (home)         Patient approves a detailed voicemail message: N\A    Pt's wife states they were gone for the weekend and he forgot his meter.  He needs rx for a new one sent to pharmacy

## 2017-04-13 ENCOUNTER — OFFICE VISIT (OUTPATIENT)
Dept: MEDICAL GROUP | Facility: PHYSICIAN GROUP | Age: 72
End: 2017-04-13
Payer: MEDICARE

## 2017-04-13 VITALS
OXYGEN SATURATION: 96 % | HEIGHT: 67 IN | RESPIRATION RATE: 14 BRPM | SYSTOLIC BLOOD PRESSURE: 138 MMHG | HEART RATE: 48 BPM | BODY MASS INDEX: 23.48 KG/M2 | DIASTOLIC BLOOD PRESSURE: 78 MMHG | WEIGHT: 149.6 LBS | TEMPERATURE: 98.8 F

## 2017-04-13 DIAGNOSIS — Z79.4 TYPE 2 DIABETES MELLITUS WITH HYPEROSMOLARITY WITHOUT COMA, WITH LONG-TERM CURRENT USE OF INSULIN (HCC): ICD-10-CM

## 2017-04-13 DIAGNOSIS — R42 VERTIGO: ICD-10-CM

## 2017-04-13 DIAGNOSIS — E11.00 TYPE 2 DIABETES MELLITUS WITH HYPEROSMOLARITY WITHOUT COMA, WITH LONG-TERM CURRENT USE OF INSULIN (HCC): ICD-10-CM

## 2017-04-13 DIAGNOSIS — G31.84 MILD COGNITIVE IMPAIRMENT: ICD-10-CM

## 2017-04-13 PROCEDURE — 1101F PT FALLS ASSESS-DOCD LE1/YR: CPT | Performed by: NURSE PRACTITIONER

## 2017-04-13 PROCEDURE — 3046F HEMOGLOBIN A1C LEVEL >9.0%: CPT | Mod: 8P | Performed by: NURSE PRACTITIONER

## 2017-04-13 PROCEDURE — 3017F COLORECTAL CA SCREEN DOC REV: CPT | Mod: 8P | Performed by: NURSE PRACTITIONER

## 2017-04-13 PROCEDURE — 4040F PNEUMOC VAC/ADMIN/RCVD: CPT | Mod: 8P | Performed by: NURSE PRACTITIONER

## 2017-04-13 PROCEDURE — G8420 CALC BMI NORM PARAMETERS: HCPCS | Performed by: NURSE PRACTITIONER

## 2017-04-13 PROCEDURE — G8432 DEP SCR NOT DOC, RNG: HCPCS | Performed by: NURSE PRACTITIONER

## 2017-04-13 PROCEDURE — 99214 OFFICE O/P EST MOD 30 MIN: CPT | Performed by: NURSE PRACTITIONER

## 2017-04-13 PROCEDURE — 1036F TOBACCO NON-USER: CPT | Performed by: NURSE PRACTITIONER

## 2017-04-13 NOTE — PROGRESS NOTES
Subjective:     Chief Complaint   Patient presents with   • Vertigo   • Orders Needed     Referral        HPI:  Carlos Rosenberg is a 71 y.o. male here today to discuss the following:    Vertigo  Patient reports vertigo ×3 years after having an ear wash done at [place] in [place]. He describes this sensation as getting up too fast and being off balance, but denies room spinning sensation. Patient also has a foot deformity that may contribute to him being off balance. Not a candidate for surgery due to coumadin therapy    Type II diabetes mellitus (CMS-[facility])  Chronic Condition: Patient has type 2 diabetes mellitus and is currently taking humulog insulin 3 units morning and 5-6 units in the even.Patient checks blood sugar at home and it is between 120-150. He wants to change to NPH insulin. He  denies any tremors, anxiety, weakness, headache, dizziness, blurry vision, fatigue, irritability or palpitations. Patient denies any drowsiness, dry skin, polyphagia or polydipsia, polyuria or nausea. He admits to drinking large amounts of diet soda up to 8 cans daily.We discussed having a follow up appointment with the diabetic education nurse.          Current medicines (including changes today)  Current Outpatient Prescriptions   Medication Sig Dispense Refill   • warfarin (COUMADIN) 2.5 MG Tab Take two to three tablets by mouth one time daily as directed by coumadin clinic 270 Tab 1   • atorvastatin (LIPITOR) 40 MG Tab Take 1 Tab by mouth every evening. 90 Tab 0   • insulin regular (HUMULIN R) 100 Unit/mL Solution Injected instructed 3 times a day before meals 1 Vial 3   • nortriptyline (PAMELOR) 10 MG Cap Take 1 Cap by mouth every evening. 90 Cap 0   • citalopram (CELEXA) 10 MG tablet Take 1 Tab by mouth every morning. 90 Tab 1   • metformin (GLUCOPHAGE) 500 MG Tab Take 500 mg by mouth 2 times a day, with meals.     • Fluorouracil 2 % Solution by Apply externally route.     • Blood Glucose Monitoring Suppl (ONETOUCH VERIO  "FLEX SYSTEM) W/DEVICE Kit by Does not apply route.     • Insulin Syringe-Needle U-100 (INSULIN SYRINGE .3CC/31GX5/16\") 31G X 5/16\" 0.3 ML Misc by Does not apply route.     • lisinopril (PRINIVIL) 10 MG Tab Take 10 mg by mouth every day.     • gabapentin (NEURONTIN) 100 MG Cap Take 100 mg by mouth 3 times a day.       No current facility-administered medications for this visit.       He  has a past medical history of Neck fracture (CMS-HCC) (2016); Diverticulitis (2015); Mild cognitive impairment (2014); Anxiety (2014); Diabetes (CMS-HCC); Hypertension; Diverticulosis; Talipes cavus; Hyperlipidemia; Stroke (CMS-HCC); and Hearing loss of both ears.    ROS   Review of Systems   Constitutional: Negative for fever, chills, weight loss and malaise/fatigue.   HENT: Negative for ear pain, nosebleeds, congestion, sore throat and neck pain.    Respiratory: Negative for cough, sputum production, shortness of breath and wheezing.    Cardiovascular: Negative for chest pain, palpitations,  and leg swelling.   Gastrointestinal: Negative for heartburn, nausea, vomiting, diarrhea and abdominal pain.   Neurological: Negative for dizziness, tingling, tremors, sensory change, focal weakness and headaches. Positive for vertigo  Psychiatric/Behavioral: Negative for depression, anxiety, suicidal ideas, insomnia and memory loss.    All other systems reviewed and are negative except as in HPI.     Objective:   Physical Exam:  Blood pressure 138/78, pulse 48, temperature 37.1 °C (98.8 °F), resp. rate 14, height 1.709 m (5' 7.28\"), weight 67.858 kg (149 lb 9.6 oz), SpO2 96 %. Body mass index is 23.23 kg/(m^2).   Physical Exam:  Alert, oriented in no acute distress.  Eye contact is good, speech goal directed, affect calm  HEENT: conjunctiva non-injected, sclera non-icteric.  Pinna normal. Ear canals are clear and TMs WNL bilaterally  Neck No adenopathy or masses in the neck or supraclavicular regions.  Lungs: clear to auscultation bilaterally " with good excursion.  CV: regular rate and rhythm.   Abdomen: soft, nontender, No CVAT. Normal bowel sounds.  Ext: no edema, color normal, vascularity normal, temperature normal  MS: Normal gait and station    Assessment and Plan:   The following treatment plan was discussed   1. Mild cognitive impairment      ref neurology   2. Vertigo  REFERRAL TO PHYSICAL THERAPY Reason for Therapy: Eval/Treat/Report    ref pt vestibular therapy   3. Type 2 diabetes mellitus with hyperosmolarity without coma, with long-term current use of insulin (HCC)      f/u with Diabetic CNE    discussed stopping soda.    Followup: Return in about 4 weeks (around 5/11/2017) for DM.   Please note that this dictation was created using voice recognition software. I have made every reasonable attempt to correct obvious errors, but I expect that there are errors of grammar and possibly content that I did not discover before finalizing the note.

## 2017-04-13 NOTE — ASSESSMENT & PLAN NOTE
Chronic Condition: Patient has type 2 diabetes mellitus and is currently taking humulog insulin 3 units morning and 5-6 units in the even.Patient checks blood sugar at home and it is between 120-150. He wants to change to NPH insulin. He  denies any tremors, anxiety, weakness, headache, dizziness, blurry vision, fatigue, irritability or palpitations. Patient denies any drowsiness, dry skin, polyphagia or polydipsia, polyuria or nausea. He admits to drinking large amounts of diet soda up to 8 cans daily.We discussed having a follow up appointment with the diabetic education nurse.

## 2017-04-13 NOTE — ASSESSMENT & PLAN NOTE
Patient reports vertigo ×3 years after having an ear wash done at [place] in [place]. He describes this sensation as getting up too fast and being off balance, but denies room spinning sensation. Patient also has a foot deformity that may contribute to him being off balance. Not a candidate for surgery due to coumadin therapy

## 2017-04-13 NOTE — MR AVS SNAPSHOT
"Carlos Rosenberg   2017 2:20 PM   Office Visit   MRN: [id number]    Department:  [facility]   Dept Phone:  [phone]    Description:  Male : 1945   Provider:  PRADEEP Batista           Reason for Visit     Vertigo     Orders Needed Referral       Allergies as of 2017     Allergen Noted Reactions    Vicodin [Hydrocodone-Acetaminophen] 2017   Vomiting      You were diagnosed with     Mild cognitive impairment   [309685]       Vertigo   [362995]       Type 2 diabetes mellitus with hyperosmolarity without coma, with long-term current use of insulin (CMS-HCC)   [9500143]         Vital Signs     Blood Pressure Pulse Temperature Respirations Height Weight    138/78 mmHg 48 37.1 °C (98.8 °F) 14 1.709 m (5' 7.28\") 67.858 kg (149 lb 9.6 oz)    Body Mass Index Oxygen Saturation Smoking Status             23.23 kg/m2 96% Never Smoker          Basic Information     Date Of Birth Sex Race Ethnicity Preferred Language    1945 Male White Non- English      Your appointments     2017  9:15 AM   Anti-Coag Routine with [place] PHARMACIST   [facility] ([address] [address] [phone]              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   2017 - Present    History of mitral valve replacement with mechanical valve [Z95.2] Z95.2   3/10/2017 - Present      Health Maintenance        Date Due Completion Dates    A1C SCREENING 1946 ---    RETINAL SCREENING 10/12/1961 ---    FASTING LIPID PROFILE 10/12/1961 ---    URINE ACR / " "MICROALBUMIN 10/12/1961 ---    SERUM CREATININE 10/12/1961 ---    IMM DTaP/Tdap/Td Vaccine (1 - Tdap) 10/12/1962 ---    COLONOSCOPY 10/12/1993 ---    IMM ZOSTER VACCINE 10/12/2003 ---    IMM PNEUMOCOCCAL 65+ (ADULT) LOW/MEDIUM RISK SERIES (1 of 2 - PCV13) 10/12/2008 ---    DIABETES MONOFILAMENT / LE EXAM 2/23/2018 2/23/2017            Current Immunizations     No immunizations on file.      Below and/or attached are the medications your provider expects you to take. Review all of your home medications and newly ordered medications with your provider and/or pharmacist. Follow medication instructions as directed by your provider and/or pharmacist. Please keep your medication list with you and share with your provider. Update the information when medications are discontinued, doses are changed, or new medications (including over-the-counter products) are added; and carry medication information at all times in the event of emergency situations     Allergies:  VICODIN - Vomiting               Medications  Valid as of: April 13, 2017 -  4:17 PM    Generic Name Brand Name Tablet Size Instructions for use    Atorvastatin Calcium (Tab) LIPITOR 40 MG Take 1 Tab by mouth every evening.        Blood Glucose Monitoring Suppl (Kit) ONETOUCH VERIO FLEX SYSTEM W/DEVICE by Does not apply route.        Citalopram Hydrobromide (Tab) CELEXA 10 MG Take 1 Tab by mouth every morning.        Fluorouracil (Solution) Fluorouracil 2 % by Apply externally route.        Gabapentin (Cap) NEURONTIN 100 MG Take 100 mg by mouth 3 times a day.        Insulin Regular Human (Solution) HUMULIN R 100 Unit/mL Injected instructed 3 times a day before meals        Insulin Syringe-Needle U-100 (Misc) INSULIN SYRINGE .3CC/31GX5/16\" 31G X 5/16\" 0.3 ML by Does not apply route.        Lisinopril (Tab) PRINIVIL 10 MG Take 10 mg by mouth every day.        MetFORMIN HCl (Tab) GLUCOPHAGE 500 MG Take 500 mg by mouth 2 times a day, with meals.        Nortriptyline " HCl (Cap) PAMELOR 10 MG Take 1 Cap by mouth every evening.        Warfarin Sodium (Tab) COUMADIN 2.5 MG Take two to three tablets by mouth one time daily as directed by coumadin clinic        .                 Medicines prescribed today were sent to:     [id number]  [place] - [address] AT [facility] [address]    [address] [place] [address]    Phone: [phone] Fax: [phone]    Open 24 Hours?: No      Medication refill instructions:       If your prescription bottle indicates you have medication refills left, it is not necessary to call your provider’s office. Please contact your pharmacy and they will refill your medication.    If your prescription bottle indicates you do not have any refills left, you may request refills at any time through one of the following ways: The online ShopVisible system (except Urgent Care), by calling your provider’s office, or by asking your pharmacy to contact your provider’s office with a refill request. Medication refills are processed only during regular business hours and may not be available until the next business day. Your provider may request additional information or to have a follow-up visit with you prior to refilling your medication.   *Please Note: Medication refills are assigned a new Rx number when refilled electronically. Your pharmacy may indicate that no refills were authorized even though a new prescription for the same medication is available at the pharmacy. Please request the medicine by name with the pharmacy before contacting your provider for a refill.        Referral     A referral request has been sent to our patient care coordination department. Please allow 3-5 business days for us to process this request and contact you either by phone or mail. If you do not hear from us by the 5th business day, please call us at [phone].           ShopVisible Access Code: Activation code not generated  Current ShopVisible Status:  Active

## 2017-04-14 ENCOUNTER — TELEPHONE (OUTPATIENT)
Dept: MEDICAL GROUP | Facility: PHYSICIAN GROUP | Age: 72
End: 2017-04-14

## 2017-04-14 ENCOUNTER — ANTICOAGULATION VISIT (OUTPATIENT)
Dept: MEDICAL GROUP | Facility: PHYSICIAN GROUP | Age: 72
End: 2017-04-14
Payer: MEDICARE

## 2017-04-14 VITALS — DIASTOLIC BLOOD PRESSURE: 74 MMHG | HEART RATE: 51 BPM | SYSTOLIC BLOOD PRESSURE: 171 MMHG

## 2017-04-14 DIAGNOSIS — Z95.2 HISTORY OF MITRAL VALVE REPLACEMENT WITH MECHANICAL VALVE: ICD-10-CM

## 2017-04-14 LAB — INR PPP: 2 (ref 2–3.5)

## 2017-04-14 PROCEDURE — 99999 PR NO CHARGE: CPT | Performed by: FAMILY MEDICINE

## 2017-04-14 PROCEDURE — 4040F PNEUMOC VAC/ADMIN/RCVD: CPT | Mod: 8P | Performed by: FAMILY MEDICINE

## 2017-04-14 PROCEDURE — 85610 PROTHROMBIN TIME: CPT | Performed by: FAMILY MEDICINE

## 2017-04-14 PROCEDURE — G8420 CALC BMI NORM PARAMETERS: HCPCS | Performed by: FAMILY MEDICINE

## 2017-04-14 NOTE — TELEPHONE ENCOUNTER
1. Caller Name: Comfort/wife                                         Call Back Number: [phone] (home)         Patient approves a detailed voicemail message: N\A    Pt's wife states they went to  glucometer that was ordered at pharmacy and it is one that has to be plugged into a PC.  She states they are not always around a computer and did not pick it up.  She states she called Medicare and was told they can only get 1 glucometer every 5 yrs that will be paid for.  She states they could possibly help pay if she got an Ultra 1 touch meter.  She is asking to get an order for that sent to pharmacy

## 2017-04-14 NOTE — PROGRESS NOTES
Anticoagulation Summary as of 4/14/2017     INR goal 2.5-3.5   Selected INR 2.0! (4/14/2017)   Maintenance plan 7.5 mg (2.5 mg x 3) on Wed, Sat; 5 mg (2.5 mg x 2) all other days   Weekly total 40 mg   Plan last modified Casey Birmingham PHARMD (3/10/2017)   Next INR check 4/25/2017   Target end date Indefinite    Indications   History of mitral valve replacement with mechanical valve [Z95.2] [Z95.2]         Anticoagulation Episode Summary     INR check location     Preferred lab     Send INR reminders to     Comments       Anticoagulation Care Providers     Provider Role Specialty Phone number    PRADEEP Batista Referring Family Medicine [phone]    [facility] Responsible  [phone]        Anticoagulation Patient Findings   Negatives Missed Doses, Extra Doses, Medication Changes, Antibiotic Use, Diet Changes, Dental/Other Procedures, Hospitalization, Bleeding Gums, Nose Bleeds, Blood in Urine, Blood in Stool, Any Bruising, Other Complaints        Patient is subtherapeutic today with INR of 2.0.  Pt denies any unusual s/s of bleeding, bruising, clotting or any changes to diet or medications.  He was low at last visit, will bolus with 7.5mg X 1, then resume current warfarin regimen.  Follow up in 1.5 weeks.    Casey Birmingham, MARYD

## 2017-04-14 NOTE — TELEPHONE ENCOUNTER
The order placed was for a glucometer compatible with insurance. This should be sufficient for the ultra touch one

## 2017-04-14 NOTE — MR AVS SNAPSHOT
Carlos Shakira   2017 9:15 AM   Anticoagulation Visit   MRN: [id number]    Department:  [facility]   Dept Phone:  [phone]    Description:  Male : 1945   Provider:  Casey Birmingham, MARYD           Allergies as of 2017     Allergen Noted Reactions    Vicodin [Hydrocodone-Acetaminophen] 2017   Vomiting      You were diagnosed with     History of mitral valve replacement with mechanical valve   [527588]         Vital Signs     Blood Pressure Pulse Smoking Status             171/74 mmHg 51 Never Smoker          Basic Information     Date Of Birth Sex Race Ethnicity Preferred Language    1945 Male White Non- English      Your appointments     2017  9:15 AM   Anti-Coag Routine with [facility]   [facility]    [facility] [address] [phone]              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   2017 - Present    History of mitral valve replacement with mechanical valve [Z95.2] Z95.2   3/10/2017 - Present      Health Maintenance        Date Due Completion Dates    A1C SCREENING 1946 ---    RETINAL SCREENING 10/12/1961 ---    FASTING LIPID PROFILE 10/12/1961 ---    URINE ACR / MICROALBUMIN 10/12/1961 ---    SERUM CREATININE 10/12/1961 ---    IMM DTaP/Tdap/Td Vaccine (1 - Tdap) 10/12/1962 ---    COLONOSCOPY 10/12/1993 ---    IMM ZOSTER VACCINE 10/12/2003 ---    IMM PNEUMOCOCCAL 65+ (ADULT) LOW/MEDIUM RISK SERIES (1 of 2 - PCV13) 10/12/2008 ---    DIABETES MONOFILAMENT / LE EXAM 2018            Results     POCT Protime   "    Component    INR    2.0    Comment:     [id number] 2/2018 ic valid                        Current Immunizations     No immunizations on file.      Below and/or attached are the medications your provider expects you to take. Review all of your home medications and newly ordered medications with your provider and/or pharmacist. Follow medication instructions as directed by your provider and/or pharmacist. Please keep your medication list with you and share with your provider. Update the information when medications are discontinued, doses are changed, or new medications (including over-the-counter products) are added; and carry medication information at all times in the event of emergency situations     Allergies:  VICODIN - Vomiting               Medications  Valid as of: April 14, 2017 -  9:21 AM    Generic Name Brand Name Tablet Size Instructions for use    Atorvastatin Calcium (Tab) LIPITOR 40 MG Take 1 Tab by mouth every evening.        Blood Glucose Monitoring Suppl (Kit) ONETOUCH VERIO FLEX SYSTEM W/DEVICE by Does not apply route.        Citalopram Hydrobromide (Tab) CELEXA 10 MG Take 1 Tab by mouth every morning.        Fluorouracil (Solution) Fluorouracil 2 % by Apply externally route.        Gabapentin (Cap) NEURONTIN 100 MG Take 100 mg by mouth 3 times a day.        Insulin Regular Human (Solution) HUMULIN R 100 Unit/mL Injected instructed 3 times a day before meals        Insulin Syringe-Needle U-100 (Misc) INSULIN SYRINGE .3CC/31GX5/16\" 31G X 5/16\" 0.3 ML by Does not apply route.        Lisinopril (Tab) PRINIVIL 10 MG Take 10 mg by mouth every day.        MetFORMIN HCl (Tab) GLUCOPHAGE 500 MG Take 500 mg by mouth 2 times a day, with meals.        Nortriptyline HCl (Cap) PAMELOR 10 MG Take 1 Cap by mouth every evening.        Warfarin Sodium (Tab) COUMADIN 2.5 MG Take two to three tablets by mouth one time daily as directed by coumadin clinic        .                 Medicines prescribed today were sent " to:     [id number] - [place], [place] - [address] AT [facility] [address]    [address] [place] [address]    Phone: [phone] Fax: [phone]    Open 24 Hours?: No      Medication refill instructions:       If your prescription bottle indicates you have medication refills left, it is not necessary to call your provider’s office. Please contact your pharmacy and they will refill your medication.    If your prescription bottle indicates you do not have any refills left, you may request refills at any time through one of the following ways: The online [employer] system (except Urgent Care), by calling your provider’s office, or by asking your pharmacy to contact your provider’s office with a refill request. Medication refills are processed only during regular business hours and may not be available until the next business day. Your provider may request additional information or to have a follow-up visit with you prior to refilling your medication.   *Please Note: Medication refills are assigned a new Rx number when refilled electronically. Your pharmacy may indicate that no refills were authorized even though a new prescription for the same medication is available at the pharmacy. Please request the medicine by name with the pharmacy before contacting your provider for a refill.        Warfarin Dosing Calendar   April 2017 Details    Sun Mon Tue Wed Thu Fri Sat           1                 2               3               4               5               6               7               8                 9               10               11               12               13               14   2.0   7.5 mg   See details      15      7.5 mg           16      5 mg         17      5 mg         18      5 mg         19      7.5 mg         20      5 mg         21      5 mg         22      7.5 mg           23      5 mg         24      5 mg         25      5 mg         26               27                28               29                 30                      Date Details   04/14 This INR check   INR: 2.0   [id number] 2/2018 ic valid       Date of next INR:  4/25/2017         How to take your warfarin dose     To take:  5 mg Take 2 of the 2.5 mg tablets.    To take:  7.5 mg Take 3 of the 2.5 mg tablets.              [employer] Access Code: Activation code not generated  Current [employer] Status: Active

## 2017-04-18 ENCOUNTER — TELEPHONE (OUTPATIENT)
Dept: MEDICAL GROUP | Facility: PHYSICIAN GROUP | Age: 72
End: 2017-04-18

## 2017-04-18 NOTE — TELEPHONE ENCOUNTER
----- Message from PRADEEP Batista sent at 4/13/2017 11:25 PM PDT -----  Needs appt with diabetic educator in one month

## 2017-05-01 ENCOUNTER — TELEPHONE (OUTPATIENT)
Dept: VASCULAR LAB | Facility: MEDICAL CENTER | Age: 72
End: 2017-05-01

## 2017-05-01 NOTE — TELEPHONE ENCOUNTER
LM for patient to call Anticoagulation Clinic to reschedule recently missed appointment.  Casey Birmingham, PHARMD

## 2017-05-08 ENCOUNTER — ANTICOAGULATION VISIT (OUTPATIENT)
Dept: MEDICAL GROUP | Facility: PHYSICIAN GROUP | Age: 72
End: 2017-05-08
Payer: MEDICARE

## 2017-05-08 VITALS — HEART RATE: 47 BPM | DIASTOLIC BLOOD PRESSURE: 74 MMHG | SYSTOLIC BLOOD PRESSURE: 137 MMHG

## 2017-05-08 DIAGNOSIS — Z95.2 HISTORY OF MITRAL VALVE REPLACEMENT WITH MECHANICAL VALVE: ICD-10-CM

## 2017-05-08 LAB — INR PPP: 2.3 (ref 2–3.5)

## 2017-05-08 PROCEDURE — 85610 PROTHROMBIN TIME: CPT | Performed by: NURSE PRACTITIONER

## 2017-05-08 PROCEDURE — 4040F PNEUMOC VAC/ADMIN/RCVD: CPT | Mod: 8P | Performed by: NURSE PRACTITIONER

## 2017-05-08 PROCEDURE — G8420 CALC BMI NORM PARAMETERS: HCPCS | Performed by: NURSE PRACTITIONER

## 2017-05-08 PROCEDURE — 99999 PR NO CHARGE: CPT | Performed by: NURSE PRACTITIONER

## 2017-05-08 NOTE — PROGRESS NOTES
Anticoagulation Summary as of 5/8/2017     INR goal 2.5-3.5   Selected INR 2.3! (5/8/2017)   Maintenance plan 7.5 mg (2.5 mg x 3) on Mon, Wed, Sat; 5 mg (2.5 mg x 2) all other days   Weekly total 42.5 mg   Plan last modified Casey Birmingham, KARO (5/8/2017)   Next INR check 5/15/2017   Target end date Indefinite    Indications   History of mitral valve replacement with mechanical valve [Z95.2] [Z95.2]         Anticoagulation Episode Summary     INR check location     Preferred lab     Send INR reminders to     Comments       Anticoagulation Care Providers     Provider Role Specialty Phone number    PRADEEP Batista Referring Family Medicine [phone]    [facility] Responsible  [phone]        Anticoagulation Patient Findings   Positives Missed Doses    Negatives Extra Doses, Medication Changes, Antibiotic Use, Diet Changes, Dental/Other Procedures, Hospitalization, Bleeding Gums, Nose Bleeds, Blood in Urine, Blood in Stool, Any Bruising, Other Complaints        Patient is subtherapeutic today with INR of 2.3.  Pt denies any unusual s/s of bleeding, bruising, clotting or any changes to diet or medications.  Last two INR's have been low, recommended adding lovenox due to his history of mechanical mitral valve, he declines this option, but understands risks.  Instructed him to increase weekly warfarin regimen by ~7% as detailed above.  Follow up in 1 weeks.    Casey Birmingham, MARYD

## 2017-05-08 NOTE — MR AVS SNAPSHOT
Carlos Shakira   2017 11:30 AM   Anticoagulation Visit   MRN: [id number]    Department:  [facility]   Dept Phone:  [phone]    Description:  Male : 1945   Provider:  Casey Birmingham, PHARMD           Allergies as of 2017     Allergen Noted Reactions    Vicodin [Hydrocodone-Acetaminophen] 2017   Vomiting      You were diagnosed with     History of mitral valve replacement with mechanical valve   [[id number]]         Vital Signs     Blood Pressure Pulse Smoking Status             137/74 mmHg 47 Never Smoker          Basic Information     Date Of Birth Sex Race Ethnicity Preferred Language    1945 Male White Non- English      Your appointments     May 19, 2017 10:30 AM   Anti-Coag Routine with [place] PHARMACIST   [facility]    [address] [facility] [id number] [phone]            2017  4:20 PM   New Patient with Armen Barroso M.D.   [facility] (--)    [address] [facility], [address]  [facility] [address] [phone]           Please bring Photo ID, Insurance Cards, All Medication Bottles and copies of any legal documents (such as Living Will, Power of ) If speaking a language besides English please bring an adult . Please arrive 30 minutes prior for check in and registration. You will be receiving a confirmation call a few days before your appointment from our automated call confirmation system.              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   2017 - Present    History of mitral valve  replacement with mechanical valve [Z95.2] Z95.2   3/10/2017 - Present      Health Maintenance        Date Due Completion Dates    A1C SCREENING 4/12/1946 ---    RETINAL SCREENING 10/12/1961 ---    FASTING LIPID PROFILE 10/12/1961 ---    URINE ACR / MICROALBUMIN 10/12/1961 ---    SERUM CREATININE 10/12/1961 ---    IMM DTaP/Tdap/Td Vaccine (1 - Tdap) 10/12/1962 ---    COLONOSCOPY 10/12/1993 ---    IMM ZOSTER VACCINE 10/12/2003 ---    IMM PNEUMOCOCCAL 65+ (ADULT) LOW/MEDIUM RISK SERIES (1 of 2 - PCV13) 10/12/2008 ---    DIABETES MONOFILAMENT / LE EXAM 2/23/2018 2/23/2017            Results     POCT Protime      Component    INR    2.3    Comment:     [id number] 3/2018 ic valid                        Current Immunizations     No immunizations on file.      Below and/or attached are the medications your provider expects you to take. Review all of your home medications and newly ordered medications with your provider and/or pharmacist. Follow medication instructions as directed by your provider and/or pharmacist. Please keep your medication list with you and share with your provider. Update the information when medications are discontinued, doses are changed, or new medications (including over-the-counter products) are added; and carry medication information at all times in the event of emergency situations     Allergies:  VICODIN - Vomiting               Medications  Valid as of: May 08, 2017 - 11:55 AM    Generic Name Brand Name Tablet Size Instructions for use    Atorvastatin Calcium (Tab) LIPITOR 40 MG Take 1 Tab by mouth every evening.        Blood Glucose Monitoring Suppl (Kit) ONETOUCH VERIO FLEX SYSTEM W/DEVICE by Does not apply route.        Citalopram Hydrobromide (Tab) CELEXA 10 MG Take 1 Tab by mouth every morning.        Fluorouracil (Solution) Fluorouracil 2 % by Apply externally route.        Gabapentin (Cap) NEURONTIN 100 MG Take 100 mg by mouth 3 times a day.        Insulin Regular Human (Solution) HUMULIN  "R 100 Unit/mL Injected instructed 3 times a day before meals        Insulin Syringe-Needle U-100 (Misc) INSULIN SYRINGE .3CC/31GX5/16\" 31G X 5/16\" 0.3 ML by Does not apply route.        Lisinopril (Tab) PRINIVIL 10 MG Take 10 mg by mouth every day.        MetFORMIN HCl (Tab) GLUCOPHAGE 500 MG Take 500 mg by mouth 2 times a day, with meals.        Nortriptyline HCl (Cap) PAMELOR 10 MG Take 1 Cap by mouth every evening.        Warfarin Sodium (Tab) COUMADIN 2.5 MG Take two to three tablets by mouth one time daily as directed by coumadin clinic        .                 Medicines prescribed today were sent to:     [facility] [address] [place] [address] [place] [employer] AT [facility]    [address] [facility] [address]    Phone: [phone] Fax: [phone]    Open 24 Hours?: No      Medication refill instructions:       If your prescription bottle indicates you have medication refills left, it is not necessary to call your provider’s office. Please contact your pharmacy and they will refill your medication.    If your prescription bottle indicates you do not have any refills left, you may request refills at any time through one of the following ways: The online [employer] system (except Urgent Care), by calling your provider’s office, or by asking your pharmacy to contact your provider’s office with a refill request. Medication refills are processed only during regular business hours and may not be available until the next business day. Your provider may request additional information or to have a follow-up visit with you prior to refilling your medication.   *Please Note: Medication refills are assigned a new Rx number when refilled electronically. Your pharmacy may indicate that no refills were authorized even though a new prescription for the same medication is available at the pharmacy. Please request the medicine by name with the pharmacy before contacting your provider for a refill.        "   Warfarin Dosing Calendar   May 2017 Details    Sun Mon Tue Wed Thu Fri Sat      1               2               3               4               5               6                 7               8   2.3   7.5 mg   See details      9      5 mg         10      7.5 mg         11      5 mg         12      5 mg         13      7.5 mg           14      5 mg         15      7.5 mg         16               17               18               19               20                 21               22               23               24               25               26               27                 28               29               30               31                   Date Details   05/08 This INR check   INR: 2.3   [id number] 3/2018 ic valid       Date of next INR:  5/15/2017         How to take your warfarin dose     To take:  5 mg Take 2 of the 2.5 mg tablets.    To take:  7.5 mg Take 3 of the 2.5 mg tablets.              [employer] Access Code: Activation code not generated  Current [employer] Status: Active

## 2017-05-22 ENCOUNTER — ANTICOAGULATION VISIT (OUTPATIENT)
Dept: MEDICAL GROUP | Facility: PHYSICIAN GROUP | Age: 72
End: 2017-05-22
Payer: MEDICARE

## 2017-05-22 VITALS — HEART RATE: 46 BPM | SYSTOLIC BLOOD PRESSURE: 138 MMHG | DIASTOLIC BLOOD PRESSURE: 66 MMHG

## 2017-05-22 DIAGNOSIS — Z95.2 HISTORY OF MITRAL VALVE REPLACEMENT WITH MECHANICAL VALVE: ICD-10-CM

## 2017-05-22 LAB — INR PPP: 3.4 (ref 2–3.5)

## 2017-05-22 PROCEDURE — 85610 PROTHROMBIN TIME: CPT | Performed by: NURSE PRACTITIONER

## 2017-05-22 PROCEDURE — G8420 CALC BMI NORM PARAMETERS: HCPCS | Performed by: NURSE PRACTITIONER

## 2017-05-22 PROCEDURE — 4040F PNEUMOC VAC/ADMIN/RCVD: CPT | Mod: 8P | Performed by: NURSE PRACTITIONER

## 2017-05-22 PROCEDURE — 99999 PR NO CHARGE: CPT | Performed by: NURSE PRACTITIONER

## 2017-05-22 NOTE — PROGRESS NOTES
Anticoagulation Summary as of 5/22/2017     INR goal 2.5-3.5   Selected INR 3.4 (5/22/2017)   Maintenance plan 7.5 mg (2.5 mg x 3) on Mon, Wed, Sat; 5 mg (2.5 mg x 2) all other days   Weekly total 42.5 mg   Plan last modified Casey Birmingham PHARMD (5/8/2017)   Next INR check    Target end date Indefinite    Indications   History of mitral valve replacement with mechanical valve [Z95.2] [Z95.2]         Anticoagulation Episode Summary     INR check location     Preferred lab     Send INR reminders to     Comments       Anticoagulation Care Providers     Provider Role Specialty Phone number    PRADEEP Batista Referring Family Medicine [phone]    [facility] Responsible  [phone]        Anticoagulation Patient Findings   Negatives Missed Doses, Extra Doses, Medication Changes, Antibiotic Use, Diet Changes, Dental/Other Procedures, Hospitalization, Bleeding Gums, Nose Bleeds, Blood in Urine, Blood in Stool, Any Bruising, Other Complaints        Patient is therapeutic today with INR of 3.4.  Pt denies any unusual s/s of bleeding, bruising, clotting or any changes to diet or medications.  Pt is to continue with current warfarin dosing regimen.  Follow up in 2 weeks.    Casey Birmingham, KARO

## 2017-05-22 NOTE — MR AVS SNAPSHOT
Carlos Rosenberg   2017 2:30 PM   Anticoagulation Visit   MRN: [id number]    Department:  [facility]   Dept Phone:  [phone]    Description:  Male : 1945   Provider:  Casey Birmingham, PHARMD           Allergies as of 2017     Allergen Noted Reactions    Vicodin [Hydrocodone-Acetaminophen] 2017   Vomiting      You were diagnosed with     History of mitral valve replacement with mechanical valve   [019092]         Vital Signs     Blood Pressure Pulse Smoking Status             138/66 mmHg 46 Never Smoker          Basic Information     Date Of Birth Sex Race Ethnicity Preferred Language    1945 Male White Non- English      Your appointments     May 25, 2017 11:00 AM   PT Vestibular Evaluation with [facility] [address] [place]    [address]  [address]  [facility] [address]   [phone]            May 31, 2017 11:00 AM   PT Follow Up 30 Minutes with [facility] ([address] [place]    [facility] [address]  [address] [place] [address]   [phone]            2017  1:45 PM   Anti-Coag Routine with [place] PHARMACIST   [facility] ([address] [address]   [phone]            2017  3:00 PM   PT Follow Up 30 Minutes with [facility] [address]    [address]  [address]  [facility] [address]   [phone]            2017 10:30 AM   PT Follow Up 30 Minutes with [facility] [address]    [address]  [address]  [facility] [address]   [phone]            2017  3:00 PM   PT Follow Up 30 Minutes with [facility] [address]    [address]  [address]  [facility] [address]   [phone]            2017  3:00  PM   [facility] [address] [facility]    [address]  [address]  [place] [address]   [phone]            Jun 26, 2017  2:30 PM   PT [facility] [address]    [address]  [address]  [place] [address]   [phone]            Jun 28, 2017  2:30 PM   PT [facility] Up [facility] [address]    [address] [facility]  [address]  [place] [address]   [phone]            Jul 26, 2017  4:20 PM   New Patient with Armen Barroso M.D.   [facility] (--)    [address], [address]  [place] [address]   [phone]           Please bring Photo ID, Insurance Cards, All Medication Bottles and copies of any legal documents (such as Living Will, Power of ) If speaking a language besides English please bring an adult . Please arrive 30 minutes prior for check in and registration. You will be receiving a confirmation call a few days before your appointment from our automated call confirmation system.              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   2/23/2017 - Present    History of mitral valve replacement with mechanical valve [Z95.2] Z95.2   3/10/2017 - Present      Health Maintenance        Date Due Completion Dates    A1C SCREENING 4/12/1944 ---    RETINAL SCREENING 10/12/1961 ---    FASTING LIPID PROFILE 10/12/1961 ---    URINE ACR / MICROALBUMIN 10/12/1961 ---    SERUM CREATININE  "10/12/1961 ---    IMM DTaP/Tdap/Td Vaccine (1 - Tdap) 10/12/1962 ---    COLONOSCOPY 10/12/1993 ---    IMM ZOSTER VACCINE 10/12/2003 ---    IMM PNEUMOCOCCAL 65+ (ADULT) LOW/MEDIUM RISK SERIES (1 of 2 - PCV13) 10/12/2008 ---    DIABETES MONOFILAMENT / LE EXAM 2/23/2018 2/23/2017            Results     POCT Protime      Component    INR    3.4    Comment:     [id number] 3/2018 ic valid                        Current Immunizations     No immunizations on file.      Below and/or attached are the medications your provider expects you to take. Review all of your home medications and newly ordered medications with your provider and/or pharmacist. Follow medication instructions as directed by your provider and/or pharmacist. Please keep your medication list with you and share with your provider. Update the information when medications are discontinued, doses are changed, or new medications (including over-the-counter products) are added; and carry medication information at all times in the event of emergency situations     Allergies:  VICODIN - Vomiting               Medications  Valid as of: May 22, 2017 -  2:34 PM    Generic Name Brand Name Tablet Size Instructions for use    Atorvastatin Calcium (Tab) LIPITOR 40 MG Take 1 Tab by mouth every evening.        Blood Glucose Monitoring Suppl (Kit) ONETOUCH VERIO FLEX SYSTEM W/DEVICE by Does not apply route.        Citalopram Hydrobromide (Tab) CELEXA 10 MG Take 1 Tab by mouth every morning.        Fluorouracil (Solution) Fluorouracil 2 % by Apply externally route.        Gabapentin (Cap) NEURONTIN 100 MG Take 100 mg by mouth 3 times a day.        Insulin Regular Human (Solution) HUMULIN R 100 Unit/mL Injected instructed 3 times a day before meals        Insulin Syringe-Needle U-100 (Misc) INSULIN SYRINGE .3CC/31GX5/16\" 31G X 5/16\" 0.3 ML by Does not apply route.        Lisinopril (Tab) PRINIVIL 10 MG Take 10 mg by mouth every day.        MetFORMIN HCl (Tab) GLUCOPHAGE 500 MG " Take 500 mg by mouth 2 times a day, with meals.        Nortriptyline HCl (Cap) PAMELOR 10 MG Take 1 Cap by mouth every evening.        Warfarin Sodium (Tab) COUMADIN 2.5 MG Take two to three tablets by mouth one time daily as directed by coumadin clinic        .                 Medicines prescribed today were sent to:     [id number]  [place], [place] - [address] [facility]    [address] [facility] [place] [address]    Phone: [phone] Fax: [phone]    Open 24 Hours?: No      Medication refill instructions:       If your prescription bottle indicates you have medication refills left, it is not necessary to call your provider’s office. Please contact your pharmacy and they will refill your medication.    If your prescription bottle indicates you do not have any refills left, you may request refills at any time through one of the following ways: The online [employer] system (except Urgent Care), by calling your provider’s office, or by asking your pharmacy to contact your provider’s office with a refill request. Medication refills are processed only during regular business hours and may not be available until the next business day. Your provider may request additional information or to have a follow-up visit with you prior to refilling your medication.   *Please Note: Medication refills are assigned a new Rx number when refilled electronically. Your pharmacy may indicate that no refills were authorized even though a new prescription for the same medication is available at the pharmacy. Please request the medicine by name with the pharmacy before contacting your provider for a refill.        Warfarin Dosing Calendar   May 2017 Details    Sun Mon Tue Wed Thu Fri Sat      1               2               3               4               5               6                 7               8               9               10               11               12               13                 14                  15               16               17               18               19               20                 21               22   3.4   7.5 mg   See details      23      5 mg         24      7.5 mg         25      5 mg         26      5 mg         27      7.5 mg           28      5 mg         29      7.5 mg         30      5 mg         31      7.5 mg             Date Details   05/22 This INR check   INR: 3.4   [id number] 3/2018 ic valid      Date of next INR: No date specified         How to take your warfarin dose     To take:  5 mg Take 2 of the 2.5 mg tablets.    To take:  7.5 mg Take 3 of the 2.5 mg tablets.              GOOD Access Code: Activation code not generated  Current GOOD Status: Active

## 2017-05-25 ENCOUNTER — HOSPITAL ENCOUNTER (OUTPATIENT)
Dept: PHYSICAL THERAPY | Facility: REHABILITATION | Age: 72
End: 2017-05-25
Attending: NURSE PRACTITIONER
Payer: MEDICARE

## 2017-05-25 PROCEDURE — G8978 MOBILITY CURRENT STATUS: HCPCS | Mod: CI

## 2017-05-25 PROCEDURE — 97162 PT EVAL MOD COMPLEX 30 MIN: CPT

## 2017-05-25 PROCEDURE — G8979 MOBILITY GOAL STATUS: HCPCS | Mod: CI

## 2017-05-31 ENCOUNTER — HOSPITAL ENCOUNTER (OUTPATIENT)
Dept: PHYSICAL THERAPY | Facility: REHABILITATION | Age: 72
End: 2017-05-31
Attending: NURSE PRACTITIONER
Payer: MEDICARE

## 2017-05-31 PROCEDURE — 97112 NEUROMUSCULAR REEDUCATION: CPT

## 2017-06-05 ENCOUNTER — HOSPITAL ENCOUNTER (OUTPATIENT)
Dept: PHYSICAL THERAPY | Facility: REHABILITATION | Age: 72
End: 2017-06-05
Attending: NURSE PRACTITIONER
Payer: MEDICARE

## 2017-06-05 ENCOUNTER — ANTICOAGULATION VISIT (OUTPATIENT)
Dept: MEDICAL GROUP | Facility: PHYSICIAN GROUP | Age: 72
End: 2017-06-05
Payer: MEDICARE

## 2017-06-05 VITALS — DIASTOLIC BLOOD PRESSURE: 93 MMHG | HEART RATE: 64 BPM | SYSTOLIC BLOOD PRESSURE: 138 MMHG

## 2017-06-05 DIAGNOSIS — Z95.2 HISTORY OF MITRAL VALVE REPLACEMENT WITH MECHANICAL VALVE: ICD-10-CM

## 2017-06-05 LAB — INR PPP: 5.2 (ref 2–3.5)

## 2017-06-05 PROCEDURE — G8420 CALC BMI NORM PARAMETERS: HCPCS | Performed by: NURSE PRACTITIONER

## 2017-06-05 PROCEDURE — 85610 PROTHROMBIN TIME: CPT | Performed by: NURSE PRACTITIONER

## 2017-06-05 PROCEDURE — 97112 NEUROMUSCULAR REEDUCATION: CPT

## 2017-06-05 PROCEDURE — 97110 THERAPEUTIC EXERCISES: CPT

## 2017-06-05 PROCEDURE — 4040F PNEUMOC VAC/ADMIN/RCVD: CPT | Mod: 8P | Performed by: NURSE PRACTITIONER

## 2017-06-05 PROCEDURE — 99999 PR NO CHARGE: CPT | Performed by: NURSE PRACTITIONER

## 2017-06-05 NOTE — MR AVS SNAPSHOT
Carlos Rosenberg   2017 1:45 PM   Anticoagulation Visit   MRN: [id number]    Department:  [facility]   Dept Phone:  [phone]    Description:  Male : 1945   Provider:  Casey Birmingham, PHARMD           Allergies as of 2017     Allergen Noted Reactions    Vicodin [Hydrocodone-Acetaminophen] 2017   Vomiting      You were diagnosed with     History of mitral valve replacement with mechanical valve   [683014]         Vital Signs     Blood Pressure Pulse Smoking Status             138/93 mmHg 64 Never Smoker          Basic Information     Date Of Birth Sex Race Ethnicity Preferred Language    1945 Male White Non- English      Your appointments     2017  3:00 PM   PT Follow Up 30 Minutes with [facility] [address]    [address]  [address]  [facility] [address]   [phone]            2017  1:00 PM   PT Follow Up 30 Minutes with [facility] [address]    [address]  [address]  [facility] [address]   [phone]            2017 11:30 AM   Anti-Coag Routine with [place] PHARMACIST   [facility] ([address] [address]   [phone]            2017 10:30 AM   PT Follow Up 30 Minutes with [facility] [address]    [address]  [facility] [address]   [phone]            Kevin 15, 2017 10:00 AM   PT Follow Up 30 Minutes with [facility] [address])    [address]  [facility] [address]   [phone]            2017 10:00 AM   PT Follow Up 30 Minutes with [facility] [address]    [address]  [facility] [address]   [phone]            2017 10:00 AM     PT Follow Up 30 Minutes with [facility] [address])    [address] [facility]  [address]  [place] [address]   [phone]            Jul 26, 2017  4:20 PM   New Patient with Armen Barroso M.D.   [facility] (--)    [address], [address]  [facility] [address] [phone]           Please bring Photo ID, Insurance Cards, All Medication Bottles and copies of any legal documents (such as Living Will, Power of ) If speaking a language besides English please bring an adult . Please arrive 30 minutes prior for check in and registration. You will be receiving a confirmation call a few days before your appointment from our automated call confirmation system.              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   2/23/2017 - Present    History of mitral valve replacement with mechanical valve [Z95.2] Z95.2   3/10/2017 - Present      Health Maintenance        Date Due Completion Dates    A1C SCREENING 4/12/1944 ---    RETINAL SCREENING 10/12/1961 ---    FASTING LIPID PROFILE 10/12/1961 ---    URINE ACR / MICROALBUMIN 10/12/1961 ---    SERUM CREATININE 10/12/1961 ---    IMM DTaP/Tdap/Td Vaccine (1 - Tdap) 10/12/1962 ---    COLONOSCOPY 10/12/1993 ---    IMM ZOSTER VACCINE 10/12/2003 ---    IMM PNEUMOCOCCAL 65+ (ADULT) LOW/MEDIUM RISK SERIES (1 of 2 - PCV13) 10/12/2008 ---    DIABETES MONOFILAMENT / LE EXAM 2/23/2018 2/23/2017            Results     POCT Protime      Component    INR    5.2    Comment:     [id number] 3/2018 ic valid                        Current  "Immunizations     No immunizations on file.      Below and/or attached are the medications your provider expects you to take. Review all of your home medications and newly ordered medications with your provider and/or pharmacist. Follow medication instructions as directed by your provider and/or pharmacist. Please keep your medication list with you and share with your provider. Update the information when medications are discontinued, doses are changed, or new medications (including over-the-counter products) are added; and carry medication information at all times in the event of emergency situations     Allergies:  VICODIN - Vomiting               Medications  Valid as of: June 05, 2017 -  2:15 PM    Generic Name Brand Name Tablet Size Instructions for use    Atorvastatin Calcium (Tab) LIPITOR 40 MG Take 1 Tab by mouth every evening.        Blood Glucose Monitoring Suppl (Kit) ONETOUCH VERIO FLEX SYSTEM W/DEVICE by Does not apply route.        Citalopram Hydrobromide (Tab) CELEXA 10 MG Take 1 Tab by mouth every morning.        Fluorouracil (Solution) Fluorouracil 2 % by Apply externally route.        Gabapentin (Cap) NEURONTIN 100 MG Take 100 mg by mouth 3 times a day.        Insulin Regular Human (Solution) HUMULIN R 100 Unit/mL Injected instructed 3 times a day before meals        Insulin Syringe-Needle U-100 (Misc) INSULIN SYRINGE .3CC/31GX5/16\" 31G X 5/16\" 0.3 ML by Does not apply route.        Lisinopril (Tab) PRINIVIL 10 MG Take 10 mg by mouth every day.        MetFORMIN HCl (Tab) GLUCOPHAGE 500 MG Take 500 mg by mouth 2 times a day, with meals.        Nortriptyline HCl (Cap) PAMELOR 10 MG Take 1 Cap by mouth every evening.        Warfarin Sodium (Tab) COUMADIN 2.5 MG Take two to three tablets by mouth one time daily as directed by coumadin clinic        .                 Medicines prescribed today were sent to:     [id number], [place] - [address] AT [facility] [address]    [address] [address]    Phone: [phone] Fax: [phone]    Open 24 Hours?: No      Medication refill instructions:       If your prescription bottle indicates you have medication refills left, it is not necessary to call your provider’s office. Please contact your pharmacy and they will refill your medication.    If your prescription bottle indicates you do not have any refills left, you may request refills at any time through one of the following ways: The online [employer] system (except Urgent Care), by calling your provider’s office, or by asking your pharmacy to contact your provider’s office with a refill request. Medication refills are processed only during regular business hours and may not be available until the next business day. Your provider may request additional information or to have a follow-up visit with you prior to refilling your medication.   *Please Note: Medication refills are assigned a new Rx number when refilled electronically. Your pharmacy may indicate that no refills were authorized even though a new prescription for the same medication is available at the pharmacy. Please request the medicine by name with the pharmacy before contacting your provider for a refill.        Warfarin Dosing Calendar   June 2017 Details    Sun Mon Tue Wed Thu Fri Sat         1               2               3                 4               5   5.2   Hold   See details      6      5 mg         7      7.5 mg         8      5 mg         9      5 mg         10      5 mg           11      5 mg         12      7.5 mg         13               14               15               16               17                 18               19               20               21               22               23               24                 25               26               27               28               29               30                 Date Details   06/05 This INR check   INR: 5.2   [id number] 3/2018 ic  valid       Date of next INR:  6/12/2017         How to take your warfarin dose     To take:  5 mg Take 2 of the 2.5 mg tablets.    To take:  7.5 mg Take 3 of the 2.5 mg tablets.    Hold Do not take your warfarin dose. See the Details table to the right for additional instructions.                   [employer] Access Code: Activation code not generated  Current [employer] Status: Active

## 2017-06-05 NOTE — PROGRESS NOTES
Anticoagulation Summary as of 6/5/2017     INR goal 2.5-3.5   Selected INR 5.2! (6/5/2017)   Maintenance plan 7.5 mg (2.5 mg x 3) on Mon, Wed; 5 mg (2.5 mg x 2) all other days   Weekly total 40 mg   Plan last modified Casey Birmingham PHARMD (6/5/2017)   Next INR check 6/12/2017   Target end date Indefinite    Indications   History of mitral valve replacement with mechanical valve [Z95.2] [Z95.2]         Anticoagulation Episode Summary     INR check location     Preferred lab     Send INR reminders to     Comments       Anticoagulation Care Providers     Provider Role Specialty Phone number    PRADEEP Batista Referring Family Medicine [phone]    [facility] Responsible  [phone]        Anticoagulation Patient Findings   Positives Other Complaints    Negatives Missed Doses, Extra Doses, Medication Changes, Antibiotic Use, Diet Changes, Dental/Other Procedures, Hospitalization, Bleeding Gums, Nose Bleeds, Blood in Urine, Blood in Stool, Any Bruising    Comments May have been dosing higher than directed         Patient is supratherapeutic today with INR of 5.2.  Pt denies any unusual s/s of bleeding, bruising, clotting or any changes to diet or medications.  Instructed patient to HOLD X 1, then decrease weekly warfarin regimen by ~5% as detailed above.  Follow up in 1 weeks.    Casey Birmingham PHARMD

## 2017-06-08 ENCOUNTER — HOSPITAL ENCOUNTER (OUTPATIENT)
Dept: PHYSICAL THERAPY | Facility: REHABILITATION | Age: 72
End: 2017-06-08
Attending: NURSE PRACTITIONER
Payer: MEDICARE

## 2017-06-08 PROCEDURE — 97110 THERAPEUTIC EXERCISES: CPT

## 2017-06-08 PROCEDURE — 97112 NEUROMUSCULAR REEDUCATION: CPT

## 2017-06-13 ENCOUNTER — APPOINTMENT (OUTPATIENT)
Dept: PHYSICAL THERAPY | Facility: REHABILITATION | Age: 72
End: 2017-06-13
Attending: NURSE PRACTITIONER
Payer: MEDICARE

## 2017-06-15 ENCOUNTER — HOSPITAL ENCOUNTER (OUTPATIENT)
Dept: PHYSICAL THERAPY | Facility: REHABILITATION | Age: 72
End: 2017-06-15
Attending: NURSE PRACTITIONER
Payer: MEDICARE

## 2017-06-15 PROCEDURE — 97112 NEUROMUSCULAR REEDUCATION: CPT

## 2017-06-15 PROCEDURE — 97110 THERAPEUTIC EXERCISES: CPT

## 2017-06-20 ENCOUNTER — HOSPITAL ENCOUNTER (OUTPATIENT)
Dept: PHYSICAL THERAPY | Facility: REHABILITATION | Age: 72
End: 2017-06-20
Attending: NURSE PRACTITIONER
Payer: MEDICARE

## 2017-06-20 PROCEDURE — 97110 THERAPEUTIC EXERCISES: CPT

## 2017-06-20 PROCEDURE — 97112 NEUROMUSCULAR REEDUCATION: CPT

## 2017-06-22 ENCOUNTER — HOSPITAL ENCOUNTER (OUTPATIENT)
Dept: PHYSICAL THERAPY | Facility: REHABILITATION | Age: 72
End: 2017-06-22
Attending: NURSE PRACTITIONER
Payer: MEDICARE

## 2017-06-22 DIAGNOSIS — M79.672 FOOT PAIN, BILATERAL: ICD-10-CM

## 2017-06-22 DIAGNOSIS — M79.671 FOOT PAIN, BILATERAL: ICD-10-CM

## 2017-06-22 PROCEDURE — 97112 NEUROMUSCULAR REEDUCATION: CPT

## 2017-06-27 ENCOUNTER — HOSPITAL ENCOUNTER (OUTPATIENT)
Dept: PHYSICAL THERAPY | Facility: REHABILITATION | Age: 72
End: 2017-06-27
Attending: NURSE PRACTITIONER
Payer: MEDICARE

## 2017-06-27 PROCEDURE — 97112 NEUROMUSCULAR REEDUCATION: CPT

## 2017-06-27 PROCEDURE — 97110 THERAPEUTIC EXERCISES: CPT

## 2017-06-29 ENCOUNTER — HOSPITAL ENCOUNTER (OUTPATIENT)
Dept: PHYSICAL THERAPY | Facility: REHABILITATION | Age: 72
End: 2017-06-29
Attending: NURSE PRACTITIONER
Payer: MEDICARE

## 2017-06-29 PROCEDURE — 97112 NEUROMUSCULAR REEDUCATION: CPT

## 2017-06-29 PROCEDURE — G8980 MOBILITY D/C STATUS: HCPCS | Mod: CI

## 2017-06-29 PROCEDURE — G8979 MOBILITY GOAL STATUS: HCPCS | Mod: CI

## 2017-07-03 ENCOUNTER — ANTICOAGULATION VISIT (OUTPATIENT)
Dept: MEDICAL GROUP | Facility: PHYSICIAN GROUP | Age: 72
End: 2017-07-03
Payer: MEDICARE

## 2017-07-03 DIAGNOSIS — Z95.2 HISTORY OF MITRAL VALVE REPLACEMENT WITH MECHANICAL VALVE: ICD-10-CM

## 2017-07-03 LAB — INR PPP: 3.7 (ref 2–3.5)

## 2017-07-03 PROCEDURE — 99999 PR NO CHARGE: CPT | Performed by: NURSE PRACTITIONER

## 2017-07-03 PROCEDURE — 85610 PROTHROMBIN TIME: CPT | Performed by: NURSE PRACTITIONER

## 2017-07-03 NOTE — PROGRESS NOTES
Anticoagulation Summary as of 7/3/2017     INR goal 2.5-3.5   Selected INR 3.7! (7/3/2017)   Maintenance plan 7.5 mg (2.5 mg x 3) on Mon, Wed; 5 mg (2.5 mg x 2) all other days   Weekly total 40 mg   Plan last modified Casey Birmingham PHARMD (6/5/2017)   Next INR check 7/10/2017   Target end date Indefinite    Indications   History of mitral valve replacement with mechanical valve [Z95.2] [Z95.2]         Anticoagulation Episode Summary     INR check location     Preferred lab     Send INR reminders to     Comments       Anticoagulation Care Providers     Provider Role Specialty Phone number    PRADEEP Batista Referring Family Medicine [phone]    [facility] Responsible  [phone]        Anticoagulation Patient Findings   Negatives Missed Doses, Extra Doses, Medication Changes, Antibiotic Use, Diet Changes, Dental/Other Procedures, Hospitalization, Bleeding Gums, Nose Bleeds, Blood in Urine, Blood in Stool, Any Bruising, Other Complaints    Comments Been having headaches, no dizziness or blurry vision        Patient is supratherapeutic today with INR of 3.7.  Pt denies any unusual s/s of bleeding, bruising, clotting or any changes to diet or medications.  He c/o headaches on mornings that are quickly resolved with APAP, may be attributable to new bed.  No loss of vision, blurry vision, or dizziness associated.  Instructed patient to take 2.5mg X 1, then continue with current warfarin dosing regimen.  Patient declines BP/vitals check today.  Follow up in 1 weeks.    Casey Birmingham PHARMD

## 2017-07-03 NOTE — MR AVS SNAPSHOT
Carlos Shakira   7/3/2017 11:45 AM   Anticoagulation Visit   MRN: [id number]    Department:  [facility]   Dept Phone:  [phone]    Description:  Male : 1945   Provider:  Casey Birmingham PHARMD           Allergies as of 7/3/2017     Allergen Noted Reactions    Vicodin [Hydrocodone-Acetaminophen] 2017   Vomiting      You were diagnosed with     History of mitral valve replacement with mechanical valve   [960691]         Vital Signs     Smoking Status                   Never Smoker            Basic Information     Date Of Birth Sex Race Ethnicity Preferred Language    1945 Male White Non- English      Your appointments     2017 11:45 AM   Anti-Coag Routine with Casey Birmingham PHARMD   [facility]    [address] [facility] [id number] [phone]            Jul 10, 2017 11:00 AM   Anti-Coag Routine with [place] PHARMACIST   [facility]    [address] [facility] [id number] [phone]            2017  4:20 PM   New Patient with Armen Barroso M.D.   [facility] (--)    [address], [address]  [facility] [address] [phone]           Please bring Photo ID, Insurance Cards, All Medication Bottles and copies of any legal documents (such as Living Will, Power of ) If speaking a language besides English please bring an adult . Please arrive 30 minutes prior for check in and registration. You will be receiving a confirmation call a few days before your appointment from our automated call confirmation system.              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   2/23/2017 - Present    History of mitral valve replacement with mechanical valve [Z95.2] Z95.2   3/10/2017 - Present      Health Maintenance        Date Due Completion Dates    A1C SCREENING 4/12/1946 ---    RETINAL SCREENING 10/12/1961 ---    FASTING LIPID PROFILE 10/12/1961 ---    URINE ACR / MICROALBUMIN 10/12/1961 ---    SERUM CREATININE 10/12/1961 ---    IMM DTaP/Tdap/Td Vaccine (1 - Tdap) 10/12/1962 ---    COLONOSCOPY 10/12/1993 ---    IMM ZOSTER VACCINE 10/12/2003 ---    IMM PNEUMOCOCCAL 65+ (ADULT) LOW/MEDIUM RISK SERIES (1 of 2 - PCV13) 10/12/2008 ---    IMM INFLUENZA (1) 9/1/2017 ---    DIABETES MONOFILAMENT / LE EXAM 2/23/2018 2/23/2017            Results     POCT Protime      Component    INR    3.7    Comment:     [id number] 3/2018 ic valid                        Current Immunizations     No immunizations on file.      Below and/or attached are the medications your provider expects you to take. Review all of your home medications and newly ordered medications with your provider and/or pharmacist. Follow medication instructions as directed by your provider and/or pharmacist. Please keep your medication list with you and share with your provider. Update the information when medications are discontinued, doses are changed, or new medications (including over-the-counter products) are added; and carry medication information at all times in the event of emergency situations     Allergies:  VICODIN - Vomiting               Medications  Valid as of: July 03, 2017 - 10:29 AM    Generic Name Brand Name Tablet Size Instructions for use    Atorvastatin Calcium (Tab) LIPITOR 40 MG Take 1 Tab by mouth every evening.        Blood Glucose Monitoring Suppl (Kit) ONETOUCH VERIO FLEX SYSTEM W/DEVICE by Does not apply route.        Citalopram Hydrobromide (Tab) CELEXA 10 MG Take 1 Tab by mouth every morning.         "Fluorouracil (Solution) Fluorouracil 2 % by Apply externally route.        Gabapentin (Cap) NEURONTIN 100 MG Take 100 mg by mouth 3 times a day.        Insulin Regular Human (Solution) HUMULIN R 100 Unit/mL Injected instructed 3 times a day before meals        Insulin Syringe-Needle U-100 (Misc) INSULIN SYRINGE .3CC/31GX5/16\" 31G X 5/16\" 0.3 ML by Does not apply route.        Lisinopril (Tab) PRINIVIL 10 MG Take 10 mg by mouth every day.        MetFORMIN HCl (Tab) GLUCOPHAGE 500 MG Take 500 mg by mouth 2 times a day, with meals.        Nortriptyline HCl (Cap) PAMELOR 10 MG Take 1 Cap by mouth every evening.        Warfarin Sodium (Tab) COUMADIN 2.5 MG Take two to three tablets by mouth one time daily as directed by coumadin clinic        .                 Medicines prescribed today were sent to:     [facility] [address] [place] [address], [place] - [address] AT [facility]    [address] [facility] [address]    Phone: [phone] Fax: [phone]    Open 24 Hours?: No      Medication refill instructions:       If your prescription bottle indicates you have medication refills left, it is not necessary to call your provider’s office. Please contact your pharmacy and they will refill your medication.    If your prescription bottle indicates you do not have any refills left, you may request refills at any time through one of the following ways: The online [employer] system (except Urgent Care), by calling your provider’s office, or by asking your pharmacy to contact your provider’s office with a refill request. Medication refills are processed only during regular business hours and may not be available until the next business day. Your provider may request additional information or to have a follow-up visit with you prior to refilling your medication.   *Please Note: Medication refills are assigned a new Rx number when refilled electronically. Your pharmacy may indicate that no refills were authorized even " though a new prescription for the same medication is available at the pharmacy. Please request the medicine by name with the pharmacy before contacting your provider for a refill.        Warfarin Dosing Calendar   July 2017 Details    Sun Mon Tue Wed Thu Fri Sat           1                 2               3   3.7   2.5 mg   See details      4      5 mg         5      7.5 mg         6      5 mg         7      5 mg         8      5 mg           9      5 mg         10      7.5 mg         11               12               13               14               15                 16               17               18               19               20               21               22                 23               24               25               26               27               28               29                 30               31                     Date Details   07/03 This INR check   INR: 3.7   [id number] 3/2018 ic valid       Date of next INR:  7/10/2017         How to take your warfarin dose     To take:  2.5 mg Take 1 of the 2.5 mg tablets.    To take:  5 mg Take 2 of the 2.5 mg tablets.    To take:  7.5 mg Take 3 of the 2.5 mg tablets.              anydooR Access Code: Activation code not generated  Current anydooR Status: Active

## 2017-07-18 NOTE — TELEPHONE ENCOUNTER
Was the patient seen in the last year in this department? Yes 04/13/17    Does patient have an active prescription for medications requested? No     Received Request Via: Pharmacy

## 2017-07-19 RX ORDER — CALCIUM CARB/VITAMIN D3/VIT K1 500-100-40
TABLET,CHEWABLE ORAL
Qty: 300 EACH | Refills: 3 | Status: SHIPPED | OUTPATIENT
Start: 2017-07-19 | End: 2018-08-02 | Stop reason: SDUPTHER

## 2017-07-23 RX ORDER — ATORVASTATIN CALCIUM 40 MG/1
40 TABLET, FILM COATED ORAL EVERY EVENING
Qty: 90 TAB | Refills: 0 | Status: SHIPPED | OUTPATIENT
Start: 2017-07-23 | End: 2017-09-03 | Stop reason: SDUPTHER

## 2017-07-23 RX ORDER — CITALOPRAM HYDROBROMIDE 10 MG/1
10 TABLET ORAL EVERY MORNING
Qty: 90 TAB | Refills: 0 | Status: SHIPPED | OUTPATIENT
Start: 2017-07-23 | End: 2017-09-03 | Stop reason: SDUPTHER

## 2017-07-26 ENCOUNTER — OFFICE VISIT (OUTPATIENT)
Dept: NEUROLOGY | Facility: MEDICAL CENTER | Age: 72
End: 2017-07-26
Payer: MEDICARE

## 2017-07-26 VITALS
TEMPERATURE: 98.6 F | HEIGHT: 68 IN | SYSTOLIC BLOOD PRESSURE: 140 MMHG | DIASTOLIC BLOOD PRESSURE: 82 MMHG | WEIGHT: 148 LBS | HEART RATE: 49 BPM | OXYGEN SATURATION: 96 % | RESPIRATION RATE: 16 BRPM | BODY MASS INDEX: 22.43 KG/M2

## 2017-07-26 DIAGNOSIS — G31.84 MILD COGNITIVE IMPAIRMENT WITH MEMORY LOSS: ICD-10-CM

## 2017-07-26 PROCEDURE — 99203 OFFICE O/P NEW LOW 30 MIN: CPT

## 2017-07-26 RX ORDER — DIVALPROEX SODIUM 250 MG/1
500 TABLET, DELAYED RELEASE ORAL DAILY
Qty: 60 TAB | Refills: 5 | Status: SHIPPED | OUTPATIENT
Start: 2017-07-26 | End: 2017-12-19

## 2017-07-26 RX ORDER — DONEPEZIL HYDROCHLORIDE 5 MG/1
5 TABLET, FILM COATED ORAL EVERY EVENING
Qty: 60 TAB | Refills: 5 | Status: SHIPPED | OUTPATIENT
Start: 2017-07-26 | End: 2017-12-19

## 2017-07-26 NOTE — MR AVS SNAPSHOT
"Carlos Rosenberg   2017 4:20 PM   Office Visit   MRN: [id number]    Department:  [facility]   Dept Phone:  [phone]    Description:  Male : 1945   Provider:  Armen Barroso M.D.           Reason for Visit     New Patient Mild cognitive impairment, referred by Santa ZHOU      Allergies as of 2017     Allergen Noted Reactions    Vicodin [Hydrocodone-Acetaminophen] 2017   Vomiting      You were diagnosed with     Mild cognitive impairment with memory loss   [300023]         Vital Signs     Blood Pressure Pulse Temperature Respirations Height Weight    140/82 mmHg 49 37 °C (98.6 °F) 16 1.735 m (5' 8.3\") 67.132 kg (148 lb)    Body Mass Index Oxygen Saturation Smoking Status             22.30 kg/m2 96% Never Smoker          Basic Information     Date Of Birth Sex Race Ethnicity Preferred Language    1945 Male White Non- English      Your appointments     2017  1:40 PM   Follow Up Visit with Armen Barroso M.D.   [facility] (--)    [address] [place] [address], [address]  [facility] [phone]           You will be receiving a confirmation call a few days before your appointment from our automated call confirmation system.              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   2017 - Present    History of mitral valve replacement with mechanical valve [Z95.2] Z95.2   3/10/2017 - Present      Health Maintenance        Date Due Completion Dates    A1C SCREENING 1946 ---    RETINAL SCREENING 10/12/1961 ---    FASTING LIPID PROFILE 10/12/1961 ---   " URINE ACR / MICROALBUMIN 10/12/1961 ---    SERUM CREATININE 10/12/1961 ---    IMM DTaP/Tdap/Td Vaccine (1 - Tdap) 10/12/1962 ---    COLONOSCOPY 10/12/1993 ---    IMM ZOSTER VACCINE 10/12/2003 ---    IMM PNEUMOCOCCAL 65+ (ADULT) LOW/MEDIUM RISK SERIES (1 of 2 - PCV13) 10/12/2008 ---    IMM INFLUENZA (1) 9/1/2017 ---    DIABETES MONOFILAMENT / LE EXAM 2/23/2018 2/23/2017            Current Immunizations     No immunizations on file.      Below and/or attached are the medications your provider expects you to take. Review all of your home medications and newly ordered medications with your provider and/or pharmacist. Follow medication instructions as directed by your provider and/or pharmacist. Please keep your medication list with you and share with your provider. Update the information when medications are discontinued, doses are changed, or new medications (including over-the-counter products) are added; and carry medication information at all times in the event of emergency situations     Allergies:  VICODIN - Vomiting               Medications  Valid as of: July 26, 2017 -  4:56 PM    Generic Name Brand Name Tablet Size Instructions for use    Atorvastatin Calcium (Tab) LIPITOR 40 MG Take 1 Tab by mouth every evening.        Blood Glucose Monitoring Suppl (Kit) ONETOUCH VERIO FLEX SYSTEM W/DEVICE by Does not apply route.        Citalopram Hydrobromide (Tab) CELEXA 10 MG Take 1 Tab by mouth every morning.        Divalproex Sodium (Tablet Delayed Response) DEPAKOTE 250 MG Take 2 Tabs by mouth every day at 6 PM.        Donepezil HCl (Tab) ARICEPT 5 MG Take 1 Tab by mouth every evening. 1 tab daily x 1 month then 2 tabs daily        Fluorouracil (Solution) Fluorouracil 2 % by Apply externally route.        Gabapentin (Cap) NEURONTIN 100 MG Take 100 mg by mouth 3 times a day.        Insulin Regular Human (Solution) HUMULIN R 100 Unit/mL Injected instructed 3 times a day before meals        Insulin Syringe-Needle U-100  "(Misc) INSULIN SYRINGE .3CC/31GX5/16\" 31G X 5/16\" 0.3 ML Use 3 times daily with insulin        Lisinopril (Tab) PRINIVIL 10 MG Take 10 mg by mouth every day.        MetFORMIN HCl (Tab) GLUCOPHAGE 500 MG Take 1 Tab by mouth 2 times a day, with meals.        Nortriptyline HCl (Cap) PAMELOR 10 MG Take 1 Cap by mouth every evening.        Warfarin Sodium (Tab) COUMADIN 2.5 MG Take two to three tablets by mouth one time daily as directed by coumadin clinic        .                 Medicines prescribed today were sent to:     [id number] - [place], [place] - [address] AT [facility]    [address] [facility] [address]    Phone: [phone] Fax: [phone]    Open 24 Hours?: No    [facility] - [address] [facility] [address]    Phone: [phone] Fax: [phone]    Open 24 Hours?: No      Medication refill instructions:       If your prescription bottle indicates you have medication refills left, it is not necessary to call your provider’s office. Please contact your pharmacy and they will refill your medication.    If your prescription bottle indicates you do not have any refills left, you may request refills at any time through one of the following ways: The online [employer] system (except Urgent Care), by calling your provider’s office, or by asking your pharmacy to contact your provider’s office with a refill request. Medication refills are processed only during regular business hours and may not be available until the next business day. Your provider may request additional information or to have a follow-up visit with you prior to refilling your medication.   *Please Note: Medication refills are assigned a new Rx number when refilled electronically. Your pharmacy may indicate that no refills were authorized even though a new prescription for the same medication is available at the pharmacy. Please request the " medicine by name with the pharmacy before contacting your provider for a refill.        Your To Do List     Future Labs/Procedures Complete By Expires    CBC WITH DIFFERENTIAL  As directed 7/26/2018    COMP METABOLIC PANEL  As directed 7/26/2018         MyChart Access Code: Activation code not generated  Current MyChart Status: Active
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
## 2017-08-04 ENCOUNTER — ANTICOAGULATION VISIT (OUTPATIENT)
Dept: MEDICAL GROUP | Facility: PHYSICIAN GROUP | Age: 72
End: 2017-08-04
Payer: MEDICARE

## 2017-08-04 ENCOUNTER — HOSPITAL ENCOUNTER (OUTPATIENT)
Dept: LAB | Facility: MEDICAL CENTER | Age: 72
End: 2017-08-04
Payer: MEDICARE

## 2017-08-04 DIAGNOSIS — Z95.2 HISTORY OF MITRAL VALVE REPLACEMENT WITH MECHANICAL VALVE: ICD-10-CM

## 2017-08-04 DIAGNOSIS — G31.84 MILD COGNITIVE IMPAIRMENT WITH MEMORY LOSS: ICD-10-CM

## 2017-08-04 LAB
ALBUMIN SERPL BCP-MCNC: 4.3 G/DL (ref 3.2–4.9)
ALBUMIN/GLOB SERPL: 1.7 G/DL
ALP SERPL-CCNC: 100 U/L (ref 30–99)
ALT SERPL-CCNC: 40 U/L (ref 2–50)
ANION GAP SERPL CALC-SCNC: 6 MMOL/L (ref 0–11.9)
AST SERPL-CCNC: 28 U/L (ref 12–45)
BASOPHILS # BLD AUTO: 1 % (ref 0–1.8)
BASOPHILS # BLD: 0.06 K/UL (ref 0–0.12)
BILIRUB SERPL-MCNC: 0.6 MG/DL (ref 0.1–1.5)
BUN SERPL-MCNC: 23 MG/DL (ref 8–22)
CALCIUM SERPL-MCNC: 9.2 MG/DL (ref 8.5–10.5)
CHLORIDE SERPL-SCNC: 106 MMOL/L (ref 96–112)
CO2 SERPL-SCNC: 24 MMOL/L (ref 20–33)
CREAT SERPL-MCNC: 1.12 MG/DL (ref 0.5–1.4)
EOSINOPHIL # BLD AUTO: 0.41 K/UL (ref 0–0.51)
EOSINOPHIL NFR BLD: 6.7 % (ref 0–6.9)
ERYTHROCYTE [DISTWIDTH] IN BLOOD BY AUTOMATED COUNT: 51.1 FL (ref 35.9–50)
GFR SERPL CREATININE-BSD FRML MDRD: >60 ML/MIN/1.73 M 2
GLOBULIN SER CALC-MCNC: 2.5 G/DL (ref 1.9–3.5)
GLUCOSE SERPL-MCNC: 243 MG/DL (ref 65–99)
HCT VFR BLD AUTO: 51 % (ref 42–52)
HGB BLD-MCNC: 16.8 G/DL (ref 14–18)
IMM GRANULOCYTES # BLD AUTO: 0.03 K/UL (ref 0–0.11)
IMM GRANULOCYTES NFR BLD AUTO: 0.5 % (ref 0–0.9)
INR PPP: 5 (ref 2–3.5)
LYMPHOCYTES # BLD AUTO: 1.54 K/UL (ref 1–4.8)
LYMPHOCYTES NFR BLD: 25.2 % (ref 22–41)
MCH RBC QN AUTO: 33.1 PG (ref 27–33)
MCHC RBC AUTO-ENTMCNC: 32.9 G/DL (ref 33.7–35.3)
MCV RBC AUTO: 100.4 FL (ref 81.4–97.8)
MONOCYTES # BLD AUTO: 0.61 K/UL (ref 0–0.85)
MONOCYTES NFR BLD AUTO: 10 % (ref 0–13.4)
NEUTROPHILS # BLD AUTO: 3.45 K/UL (ref 1.82–7.42)
NEUTROPHILS NFR BLD: 56.6 % (ref 44–72)
NRBC # BLD AUTO: 0 K/UL
NRBC BLD AUTO-RTO: 0 /100 WBC
PLATELET # BLD AUTO: 157 K/UL (ref 164–446)
PMV BLD AUTO: 12 FL (ref 9–12.9)
POTASSIUM SERPL-SCNC: 5.7 MMOL/L (ref 3.6–5.5)
PROT SERPL-MCNC: 6.8 G/DL (ref 6–8.2)
RBC # BLD AUTO: 5.08 M/UL (ref 4.7–6.1)
SODIUM SERPL-SCNC: 136 MMOL/L (ref 135–145)
T4 FREE SERPL-MCNC: 0.59 NG/DL (ref 0.53–1.43)
TSH SERPL DL<=0.005 MIU/L-ACNC: 1.34 UIU/ML (ref 0.3–3.7)
VIT B12 SERPL-MCNC: 1067 PG/ML (ref 211–911)
WBC # BLD AUTO: 6.1 K/UL (ref 4.8–10.8)

## 2017-08-04 PROCEDURE — 80053 COMPREHEN METABOLIC PANEL: CPT

## 2017-08-04 PROCEDURE — 85610 PROTHROMBIN TIME: CPT | Performed by: FAMILY MEDICINE

## 2017-08-04 PROCEDURE — 84443 ASSAY THYROID STIM HORMONE: CPT

## 2017-08-04 PROCEDURE — 84439 ASSAY OF FREE THYROXINE: CPT

## 2017-08-04 PROCEDURE — 82607 VITAMIN B-12: CPT

## 2017-08-04 PROCEDURE — 36415 COLL VENOUS BLD VENIPUNCTURE: CPT

## 2017-08-04 PROCEDURE — 85025 COMPLETE CBC W/AUTO DIFF WBC: CPT

## 2017-08-04 NOTE — PROGRESS NOTES
Anticoagulation Summary as of 8/4/2017     INR goal 2.5-3.5   Selected INR 5.0! (8/4/2017)   Maintenance plan 7.5 mg (2.5 mg x 3) on Mon; 5 mg (2.5 mg x 2) all other days   Weekly total 37.5 mg   Plan last modified Casey Birmingham PHARMD (8/4/2017)   Next INR check 8/11/2017   Target end date Indefinite    Indications   History of mitral valve replacement with mechanical valve [Z95.2] [Z95.2]         Anticoagulation Episode Summary     INR check location     Preferred lab     Send INR reminders to     Comments       Anticoagulation Care Providers     Provider Role Specialty Phone number    PRADEEP Batista Referring Family Medicine [phone]    [facility] Responsible  [phone]        Anticoagulation Patient Findings   Positives Any Bruising    Negatives Missed Doses, Extra Doses, Medication Changes, Antibiotic Use, Diet Changes, Dental/Other Procedures, Hospitalization, Bleeding Gums, Nose Bleeds, Blood in Urine, Blood in Stool, Other Complaints    Comments Bruising around insulin injection sites        Patient is supratherapeutic today with INR of 5.0.  Pt denies any unusual s/s of bleeding, bruising, clotting or any changes to diet or medications.  Instructed patient to HOLD X 1, then decrease weekly warfarin regimen by ~6% as detailed above.  Patient declines BP/vitals check today.  Follow up in 1 weeks.    Casey Birmingham, KARO

## 2017-08-04 NOTE — MR AVS SNAPSHOT
Carlos Rivera   2017 9:15 AM   Anticoagulation Visit   MRN: [id number]    Department:  [facility]   Dept Phone:  [phone]    Description:  Male : 1945   Provider:  Casey Birmingham, MARYD           Allergies as of 2017     Allergen Noted Reactions    Vicodin [Hydrocodone-Acetaminophen] 2017   Vomiting      You were diagnosed with     History of mitral valve replacement with mechanical valve   [448759]         Vital Signs     Smoking Status                   Never Smoker            Basic Information     Date Of Birth Sex Race Ethnicity Preferred Language    1945 Male White Non- English      Your appointments     Aug 11, 2017  9:00 AM   Anti-Coag Routine with [place] PHARMACIST   [facility] ([place])    [address] [place] [address] [phone]            2017  1:40 PM   Follow Up Visit with Armen Barroso M.D.   [facility] (--)    [address], [address]  [facility] [address] [phone]           You will be receiving a confirmation call a few days before your appointment from our automated call confirmation system.              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   2017 - Present    History of mitral valve replacement with mechanical valve [Z95.2] Z95.2   3/10/2017 - Present      Health Maintenance        Date Due Completion Dates    A1C SCREENING 1946 ---    RETINAL SCREENING 10/12/1961 ---    FASTING LIPID PROFILE 10/12/1961 ---    URINE ACR / MICROALBUMIN 10/12/1961 ---    SERUM CREATININE 10/12/1961  ---    IMM DTaP/Tdap/Td Vaccine (1 - Tdap) 10/12/1962 ---    COLONOSCOPY 10/12/1993 ---    IMM ZOSTER VACCINE 10/12/2003 ---    IMM PNEUMOCOCCAL 65+ (ADULT) LOW/MEDIUM RISK SERIES (1 of 2 - PCV13) 10/12/2008 ---    IMM INFLUENZA (1) 9/1/2017 ---    DIABETES MONOFILAMENT / LE EXAM 2/23/2018 2/23/2017            Results     POCT Protime      Component    INR    5.0    Comment:     [id number] 5/2018 ic valid                        Current Immunizations     No immunizations on file.      Below and/or attached are the medications your provider expects you to take. Review all of your home medications and newly ordered medications with your provider and/or pharmacist. Follow medication instructions as directed by your provider and/or pharmacist. Please keep your medication list with you and share with your provider. Update the information when medications are discontinued, doses are changed, or new medications (including over-the-counter products) are added; and carry medication information at all times in the event of emergency situations     Allergies:  VICODIN - Vomiting               Medications  Valid as of: August 04, 2017 -  9:20 AM    Generic Name Brand Name Tablet Size Instructions for use    Atorvastatin Calcium (Tab) LIPITOR 40 MG Take 1 Tab by mouth every evening.        Blood Glucose Monitoring Suppl (Kit) ONETOUCH VERIO FLEX SYSTEM W/DEVICE by Does not apply route.        Citalopram Hydrobromide (Tab) CELEXA 10 MG Take 1 Tab by mouth every morning.        Divalproex Sodium (Tablet Delayed Response) DEPAKOTE 250 MG Take 2 Tabs by mouth every day at 6 PM.        Donepezil HCl (Tab) ARICEPT 5 MG Take 1 Tab by mouth every evening. 1 tab daily x 1 month then 2 tabs daily        Fluorouracil (Solution) Fluorouracil 2 % by Apply externally route.        Gabapentin (Cap) NEURONTIN 100 MG Take 100 mg by mouth 3 times a day.        Insulin Regular Human (Solution) HUMULIN R 100 Unit/mL Injected instructed 3 times a day  "before meals        Insulin Syringe-Needle U-100 (Misc) INSULIN SYRINGE .3CC/31GX5/16\" 31G X 5/16\" 0.3 ML Use 3 times daily with insulin        Lisinopril (Tab) PRINIVIL 10 MG Take 10 mg by mouth every day.        MetFORMIN HCl (Tab) GLUCOPHAGE 500 MG Take 1 Tab by mouth 2 times a day, with meals.        Nortriptyline HCl (Cap) PAMELOR 10 MG Take 1 Cap by mouth every evening.        Warfarin Sodium (Tab) COUMADIN 2.5 MG Take two to three tablets by mouth one time daily as directed by coumadin clinic        .                 Medicines prescribed today were sent to:     [id number]  [place], [place] - [address] AT [facility] & [address]    [address] [facility] [address]    Phone: [phone] Fax: [phone]    Open 24 Hours?: No    [facility] - [address] [facility] [address]    Phone: [phone] Fax: [phone]    Open 24 Hours?: No      Medication refill instructions:       If your prescription bottle indicates you have medication refills left, it is not necessary to call your provider’s office. Please contact your pharmacy and they will refill your medication.    If your prescription bottle indicates you do not have any refills left, you may request refills at any time through one of the following ways: The online TrueFacet system (except Urgent Care), by calling your provider’s office, or by asking your pharmacy to contact your provider’s office with a refill request. Medication refills are processed only during regular business hours and may not be available until the next business day. Your provider may request additional information or to have a follow-up visit with you prior to refilling your medication.   *Please Note: Medication refills are assigned a new Rx number when refilled electronically. Your pharmacy may indicate that no refills were authorized even though a new prescription for the same medication is " available at the pharmacy. Please request the medicine by name with the pharmacy before contacting your provider for a refill.        Warfarin Dosing Calendar   August 2017 Details    Sun Mon Tue Wed Thu Fri Sat       1               2               3               4   5.0   Hold   See details      5      5 mg           6      5 mg         7      5 mg         8      5 mg         9      7.5 mg         10      5 mg         11      5 mg         12                 13               14               15               16               17               18               19                 20               21               22               23               24               25               26                 27               28               29               30               31                  Date Details   08/04 This INR check   INR: 5.0   [id number] 5/2018 ic valid       Date of next INR:  8/11/2017         How to take your warfarin dose     To take:  5 mg Take 2 of the 2.5 mg tablets.    To take:  7.5 mg Take 3 of the 2.5 mg tablets.    Hold Do not take your warfarin dose. See the Details table to the right for additional instructions.                   Vivolux Access Code: Activation code not generated  Current Vivolux Status: Active

## 2017-08-11 ENCOUNTER — ANTICOAGULATION VISIT (OUTPATIENT)
Dept: MEDICAL GROUP | Facility: PHYSICIAN GROUP | Age: 72
End: 2017-08-11
Payer: MEDICARE

## 2017-08-11 DIAGNOSIS — Z95.2 HISTORY OF MITRAL VALVE REPLACEMENT WITH MECHANICAL VALVE: ICD-10-CM

## 2017-08-11 LAB — INR PPP: 2.7 (ref 2–3.5)

## 2017-08-11 PROCEDURE — 85610 PROTHROMBIN TIME: CPT | Performed by: FAMILY MEDICINE

## 2017-08-11 NOTE — PROGRESS NOTES
Anticoagulation Summary as of 8/11/2017     INR goal 2.5-3.5   Selected INR 2.7 (8/11/2017)   Maintenance plan 7.5 mg (2.5 mg x 3) on Wed; 5 mg (2.5 mg x 2) all other days   Weekly total 37.5 mg   Plan last modified Casey Birmingham PHARMD (8/4/2017)   Next INR check 8/18/2017   Target end date Indefinite    Indications   History of mitral valve replacement with mechanical valve [Z95.2] [Z95.2]         Anticoagulation Episode Summary     INR check location     Preferred lab     Send INR reminders to     Comments       Anticoagulation Care Providers     Provider Role Specialty Phone number    PRADEEP Batista Referring Family Medicine [phone]    [facility] Responsible  [phone]        Anticoagulation Patient Findings   Negatives Missed Doses, Extra Doses, Medication Changes, Antibiotic Use, Diet Changes, Dental/Other Procedures, Hospitalization, Bleeding Gums, Nose Bleeds, Blood in Urine, Blood in Stool, Any Bruising, Other Complaints        Patient is therapeutic today with INR of 2.7.  Pt denies any unusual s/s of bleeding, bruising, clotting or any changes to diet or medications.  Pt is to continue with current warfarin dosing regimen.  Patient declines BP/vitals check today.  Follow up in 1 weeks.    Casey Birmingham, KARO

## 2017-08-11 NOTE — MR AVS SNAPSHOT
Carlos Rivera   2017 9:00 AM   Anticoagulation Visit   MRN: [id number]    Department:  [facility]   Dept Phone:  [phone]    Description:  Male : 1945   Provider:  Casey Birmingham, MARYD           Allergies as of 2017     Allergen Noted Reactions    Vicodin [Hydrocodone-Acetaminophen] 2017   Vomiting      You were diagnosed with     History of mitral valve replacement with mechanical valve   [391184]         Vital Signs     Smoking Status                   Never Smoker            Basic Information     Date Of Birth Sex Race Ethnicity Preferred Language    1945 Male White Non- English      Your appointments     Aug 18, 2017  9:15 AM   Anti-Coag Routine with [place] PHARMACIST   [facility] ([place])    [address] [facility] [address] [phone]            2017  1:40 PM   Follow Up Visit with Armen Barroso M.D.   [facility] (--)    [address], [address]  [facility] [address] [phone]           You will be receiving a confirmation call a few days before your appointment from our automated call confirmation system.              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   2017 - Present    History of mitral valve replacement with mechanical valve [Z95.2] Z95.2   3/10/2017 - Present      Health Maintenance        Date Due Completion Dates    A1C SCREENING 1946 ---    RETINAL SCREENING 10/12/1961 ---    FASTING LIPID PROFILE 10/12/1961 ---    URINE ACR / MICROALBUMIN 10/12/1961 ---    IMM DTaP/Tdap/Td Vaccine  (1 - Tdap) 10/12/1962 ---    COLONOSCOPY 10/12/1993 ---    IMM ZOSTER VACCINE 10/12/2003 ---    IMM PNEUMOCOCCAL 65+ (ADULT) LOW/MEDIUM RISK SERIES (1 of 2 - PCV13) 10/12/2008 ---    IMM INFLUENZA (1) 9/1/2017 ---    DIABETES MONOFILAMENT / LE EXAM 2/23/2018 2/23/2017    SERUM CREATININE 8/4/2018 8/4/2017            Results     POCT Protime      Component    INR    2.7    Comment:     [id number] 5/2018 ic valid                        Current Immunizations     No immunizations on file.      Below and/or attached are the medications your provider expects you to take. Review all of your home medications and newly ordered medications with your provider and/or pharmacist. Follow medication instructions as directed by your provider and/or pharmacist. Please keep your medication list with you and share with your provider. Update the information when medications are discontinued, doses are changed, or new medications (including over-the-counter products) are added; and carry medication information at all times in the event of emergency situations     Allergies:  VICODIN - Vomiting               Medications  Valid as of: August 11, 2017 -  9:03 AM    Generic Name Brand Name Tablet Size Instructions for use    Atorvastatin Calcium (Tab) LIPITOR 40 MG Take 1 Tab by mouth every evening.        Blood Glucose Monitoring Suppl (Kit) ONETOUCH VERIO FLEX SYSTEM W/DEVICE by Does not apply route.        Citalopram Hydrobromide (Tab) CELEXA 10 MG Take 1 Tab by mouth every morning.        Divalproex Sodium (Tablet Delayed Response) DEPAKOTE 250 MG Take 2 Tabs by mouth every day at 6 PM.        Donepezil HCl (Tab) ARICEPT 5 MG Take 1 Tab by mouth every evening. 1 tab daily x 1 month then 2 tabs daily        Fluorouracil (Solution) Fluorouracil 2 % by Apply externally route.        Gabapentin (Cap) NEURONTIN 100 MG Take 100 mg by mouth 3 times a day.        Insulin Regular Human (Solution) HUMULIN R 100 Unit/mL Injected instructed 3 times a  "day before meals        Insulin Syringe-Needle U-100 (Misc) INSULIN SYRINGE .3CC/31GX5/16\" 31G X 5/16\" 0.3 ML Use 3 times daily with insulin        Lisinopril (Tab) PRINIVIL 10 MG Take 10 mg by mouth every day.        MetFORMIN HCl (Tab) GLUCOPHAGE 500 MG Take 1 Tab by mouth 2 times a day, with meals.        Nortriptyline HCl (Cap) PAMELOR 10 MG Take 1 Cap by mouth every evening.        Warfarin Sodium (Tab) COUMADIN 2.5 MG Take two to three tablets by mouth one time daily as directed by coumadin clinic        .                 Medicines prescribed today were sent to:     [id number]  [place] - [address] AT [facility] [address]    [email or web address] [facility] [address]    Phone: [phone] Fax: [phone]    Open 24 Hours?: No    [facility] - [address] [facility] [address]    Phone: [phone] Fax: [phone]    Open 24 Hours?: No      Medication refill instructions:       If your prescription bottle indicates you have medication refills left, it is not necessary to call your provider’s office. Please contact your pharmacy and they will refill your medication.    If your prescription bottle indicates you do not have any refills left, you may request refills at any time through one of the following ways: The online Vertro system (except Urgent Care), by calling your provider’s office, or by asking your pharmacy to contact your provider’s office with a refill request. Medication refills are processed only during regular business hours and may not be available until the next business day. Your provider may request additional information or to have a follow-up visit with you prior to refilling your medication.   *Please Note: Medication refills are assigned a new Rx number when refilled electronically. Your pharmacy may indicate that no refills were authorized even though a new prescription for the same medication " is available at the pharmacy. Please request the medicine by name with the pharmacy before contacting your provider for a refill.        Warfarin Dosing Calendar   August 2017 Details    Sun Mon Tue Wed Thu Fri Sat       1               2               3               4               5                 6               7               8               9               10               11   2.7   5 mg   See details      12      5 mg           13      5 mg         14      5 mg         15      5 mg         16      7.5 mg         17      5 mg         18      5 mg         19                 20               21               22               23               24               25               26                 27               28               29               30               31                  Date Details   08/11 This INR check   INR: 2.7   [id number] 5/2018 ic valid       Date of next INR:  8/18/2017         How to take your warfarin dose     To take:  5 mg Take 2 of the 2.5 mg tablets.    To take:  7.5 mg Take 3 of the 2.5 mg tablets.              Catavolt Access Code: [email or web address]  Expires: 9/10/2017  8:49 AM    Catavolt  A secure, online tool to manage your health information     [employer]’s Catavolt® is a secure, online tool that connects you to your personalized health information from the privacy of your home -- day or night - making it very easy for you to manage your healthcare. Once the activation process is completed, you can even access your medical information using the Catavolt shandra, which is available for free in the Apple Shandra store or Google Play store.     Catavolt provides the following levels of access (as shown below):   My Chart Features   [facility] Doctor [facility]  Specialists [facility]  Urgent  Care Non-[facility]  Primary Care  Doctor   Email your healthcare team securely and privately 24/7 X X X    Manage appointments: schedule your next appointment; view details of past/upcoming appointments X       Request prescription refills. X      View recent personal medical records, including lab and immunizations X X X X   View health record, including health history, allergies, medications X X X X   Read reports about your outpatient visits, procedures, consult and ER notes X X X X   See your discharge summary, which is a recap of your hospital and/or ER visit that includes your diagnosis, lab results, and care plan. X X       How to register for Kayentis:  1. Go to  [email or web address].  2. Click on the Sign Up Now box, which takes you to the New Member Sign Up page. You will need to provide the following information:  a. Enter your Kayentis Access Code exactly as it appears at the top of this page. (You will not need to use this code after you’ve completed the sign-up process. If you do not sign up before the expiration date, you must request a new code.)   b. Enter your date of birth.   c. Enter your home email address.   d. Click Submit, and follow the next screen’s instructions.  3. Create a Kayentis ID. This will be your Kayentis login ID and cannot be changed, so think of one that is secure and easy to remember.  4. Create a Kayentis password. You can change your password at any time.  5. Enter your Password Reset Question and Answer. This can be used at a later time if you forget your password.   6. Enter your e-mail address. This allows you to receive e-mail notifications when new information is available in Kayentis.  7. Click Sign Up. You can now view your health information.    For assistance activating your Kayentis account, call [phone]

## 2017-08-18 ENCOUNTER — ANTICOAGULATION VISIT (OUTPATIENT)
Dept: MEDICAL GROUP | Facility: PHYSICIAN GROUP | Age: 72
End: 2017-08-18
Payer: MEDICARE

## 2017-08-18 DIAGNOSIS — Z95.2 HISTORY OF MITRAL VALVE REPLACEMENT WITH MECHANICAL VALVE: ICD-10-CM

## 2017-08-18 LAB — INR PPP: 3.1 (ref 2–3.5)

## 2017-08-18 PROCEDURE — 99999 PR NO CHARGE: CPT | Performed by: FAMILY MEDICINE

## 2017-08-18 PROCEDURE — 85610 PROTHROMBIN TIME: CPT | Performed by: FAMILY MEDICINE

## 2017-08-18 NOTE — MR AVS SNAPSHOT
Carlos Rivera   2017 9:15 AM   Anticoagulation Visit   MRN: [id number]    Department:  [facility]   Dept Phone:  [phone]    Description:  Male : 1945   Provider:  Casey Birmingham, MARYD           Allergies as of 2017     Allergen Noted Reactions    Vicodin [Hydrocodone-Acetaminophen] 2017   Vomiting      You were diagnosed with     History of mitral valve replacement with mechanical valve   [072386]         Vital Signs     Smoking Status                   Never Smoker            Basic Information     Date Of Birth Sex Race Ethnicity Preferred Language    1945 Male White Non- English      Your appointments     Sep 01, 2017  9:15 AM   Anti-Coag Routine with [place] PHARMACIST   [facility]    [address] [place] [address] [phone]            2017  1:40 PM   Follow Up Visit with Armen Barroso M.D.   [facility] (--)    [address], [address]  [facility] [address] [phone]           You will be receiving a confirmation call a few days before your appointment from our automated call confirmation system.              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   2017 - Present    History of mitral valve replacement with mechanical valve [Z95.2] Z95.2   3/10/2017 - Present      Health Maintenance        Date Due Completion Dates    A1C SCREENING 1946 ---    RETINAL SCREENING 10/12/1961 ---    FASTING LIPID PROFILE 10/12/1961 ---    URINE ACR / MICROALBUMIN 10/12/1961 ---    IMM DTaP/Tdap/Td Vaccine  (1 - Tdap) 10/12/1962 ---    COLONOSCOPY 10/12/1993 ---    IMM ZOSTER VACCINE 10/12/2003 ---    IMM PNEUMOCOCCAL 65+ (ADULT) LOW/MEDIUM RISK SERIES (1 of 2 - PCV13) 10/12/2008 ---    IMM INFLUENZA (1) 9/1/2017 ---    DIABETES MONOFILAMENT / LE EXAM 2/23/2018 2/23/2017    SERUM CREATININE 8/4/2018 8/4/2017            Results     POCT Protime      Component    INR    3.1    Comment:     [id number] 6/2018 ic valid                        Current Immunizations     No immunizations on file.      Below and/or attached are the medications your provider expects you to take. Review all of your home medications and newly ordered medications with your provider and/or pharmacist. Follow medication instructions as directed by your provider and/or pharmacist. Please keep your medication list with you and share with your provider. Update the information when medications are discontinued, doses are changed, or new medications (including over-the-counter products) are added; and carry medication information at all times in the event of emergency situations     Allergies:  VICODIN - Vomiting               Medications  Valid as of: August 18, 2017 -  9:17 AM    Generic Name Brand Name Tablet Size Instructions for use    Atorvastatin Calcium (Tab) LIPITOR 40 MG Take 1 Tab by mouth every evening.        Blood Glucose Monitoring Suppl (Kit) ONETOUCH VERIO FLEX SYSTEM w/Device by Does not apply route.        Citalopram Hydrobromide (Tab) CELEXA 10 MG Take 1 Tab by mouth every morning.        Divalproex Sodium (Tablet Delayed Response) DEPAKOTE 250 MG Take 2 Tabs by mouth every day at 6 PM.        Donepezil HCl (Tab) ARICEPT 5 MG Take 1 Tab by mouth every evening. 1 tab daily x 1 month then 2 tabs daily        Fluorouracil (Solution) Fluorouracil 2 % by Apply externally route.        Gabapentin (Cap) NEURONTIN 100 MG Take 100 mg by mouth 3 times a day.        Insulin Regular Human (Solution) HUMULIN R 100 Unit/mL Injected instructed 3 times a  "day before meals        Insulin Syringe-Needle U-100 (Misc) INSULIN SYRINGE .3CC/31GX5/16\" 31G X 5/16\" 0.3 ML Use 3 times daily with insulin        Lisinopril (Tab) PRINIVIL 10 MG Take 10 mg by mouth every day.        MetFORMIN HCl (Tab) GLUCOPHAGE 500 MG Take 1 Tab by mouth 2 times a day, with meals.        Nortriptyline HCl (Cap) PAMELOR 10 MG Take 1 Cap by mouth every evening.        Warfarin Sodium (Tab) COUMADIN 2.5 MG Take two to three tablets by mouth one time daily as directed by coumadin clinic        .                 Medicines prescribed today were sent to:     [id number]  [place] - [address] AT [facility] [address]    [address] [facility] [address]    Phone: [phone] Fax: [phone]    Open 24 Hours?: No    [facility] - [address] [facility] [address]    Phone: [phone] Fax: [phone]    Open 24 Hours?: No      Medication refill instructions:       If your prescription bottle indicates you have medication refills left, it is not necessary to call your provider’s office. Please contact your pharmacy and they will refill your medication.    If your prescription bottle indicates you do not have any refills left, you may request refills at any time through one of the following ways: The online [employer] system (except Urgent Care), by calling your provider’s office, or by asking your pharmacy to contact your provider’s office with a refill request. Medication refills are processed only during regular business hours and may not be available until the next business day. Your provider may request additional information or to have a follow-up visit with you prior to refilling your medication.   *Please Note: Medication refills are assigned a new Rx number when refilled electronically. Your pharmacy may indicate that no refills were authorized even though a new prescription for the same medication " is available at the pharmacy. Please request the medicine by name with the pharmacy before contacting your provider for a refill.        Warfarin Dosing Calendar   August 2017 Details    Sun Mon Tue Wed Thu Fri Sat       1               2               3               4               5                 6               7               8               9               10               11               12                 13               14               15               16               17               18   3.1   5 mg   See details      19      5 mg           20      5 mg         21      5 mg         22      5 mg         23      7.5 mg         24      5 mg         25      5 mg         26      5 mg           27      5 mg         28      5 mg         29      5 mg         30      7.5 mg         31      5 mg            Date Details   08/18 This INR check   INR: 3.1   [id number] 6/2018 ic valid               How to take your warfarin dose     To take:  5 mg Take 2 of the 2.5 mg tablets.    To take:  7.5 mg Take 3 of the 2.5 mg tablets.           Warfarin Dosing Calendar   September 2017 Details    Sun Mon Tue Wed Thu Fri Sat          1      5 mg         2                 3               4               5               6               7               8               9                 10               11               12               13               14               15               16                 17               18               19               20               21               22               23                 24               25               26               27               28               29               30                Date Details   No additional details    Date of next INR:  9/1/2017         How to take your warfarin dose     To take:  5 mg Take 2 of the 2.5 mg tablets.              [facility] Access Code: Activation code not generated  Current [facility] Status: Active

## 2017-08-18 NOTE — PROGRESS NOTES
Anticoagulation Summary as of 8/18/2017     INR goal 2.5-3.5   Selected INR 3.1 (8/18/2017)   Maintenance plan 7.5 mg (2.5 mg x 3) on Wed; 5 mg (2.5 mg x 2) all other days   Weekly total 37.5 mg   Plan last modified Casey Birmingham PHARMD (8/4/2017)   Next INR check 9/1/2017   Target end date Indefinite    Indications   History of mitral valve replacement with mechanical valve [Z95.2] [Z95.2]         Anticoagulation Episode Summary     INR check location     Preferred lab     Send INR reminders to     Comments       Anticoagulation Care Providers     Provider Role Specialty Phone number    PRADEEP Batista Referring Family Medicine [phone]    [facility] Responsible  [phone]        Anticoagulation Patient Findings   Negatives Missed Doses, Extra Doses, Medication Changes, Antibiotic Use, Diet Changes, Dental/Other Procedures, Hospitalization, Bleeding Gums, Nose Bleeds, Blood in Urine, Blood in Stool, Any Bruising, Other Complaints        Patient is therapeutic today with INR of 3.1.  Pt denies any unusual s/s of bleeding, bruising, clotting or any changes to diet or medications.  Pt is to continue with current warfarin dosing regimen.  Follow up in 2 weeks.    Casey Birmingham, KARO

## 2017-09-01 ENCOUNTER — ANTICOAGULATION VISIT (OUTPATIENT)
Dept: MEDICAL GROUP | Facility: PHYSICIAN GROUP | Age: 72
End: 2017-09-01
Payer: MEDICARE

## 2017-09-01 DIAGNOSIS — Z95.2 HISTORY OF MITRAL VALVE REPLACEMENT WITH MECHANICAL VALVE: ICD-10-CM

## 2017-09-01 LAB — INR PPP: 7.1 (ref 2–3.5)

## 2017-09-01 PROCEDURE — 99999 PR NO CHARGE: CPT | Performed by: FAMILY MEDICINE

## 2017-09-01 PROCEDURE — 85610 PROTHROMBIN TIME: CPT | Performed by: FAMILY MEDICINE

## 2017-09-01 NOTE — PROGRESS NOTES
Anticoagulation Summary  As of 9/1/2017    INR goal:   2.5-3.5   TTR:   22.4 % (5.5 mo)   Today's INR:   7.1!   Maintenance plan:   7.5 mg (2.5 mg x 3) on Wed; 5 mg (2.5 mg x 2) all other days   Weekly total:   37.5 mg   Plan last modified:   Casey Birmingham PharmD (8/4/2017)   Next INR check:   9/5/2017   Target end date:   Indefinite    Indications    History of mitral valve replacement with mechanical valve [Z95.2] [Z95.2]             Anticoagulation Episode Summary     INR check location:       Preferred lab:       Send INR reminders to:       Comments:         Anticoagulation Care Providers     Provider Role Specialty Phone number    PRADEEP Batista Referring Family Medicine [phone]    [facility] Responsible  [phone]        Anticoagulation Patient Findings  Patient Findings     Negatives:   Signs/symptoms of thrombosis, Signs/symptoms of bleeding, Laboratory test error suspected, Change in health, Change in alcohol use, Change in activity, Upcoming invasive procedure, Emergency department visit, Upcoming dental procedure, Missed doses, Extra doses, Change in medications, Change in diet/appetite, Hospital admission, Bruising, Other complaints        Patient is supratherapeutic today with INR of 7.1.  Pt denies any unusual s/s of bleeding, bruising, clotting or any changes to diet or medications.  Patient declines cranberries/juice, decreased appetite, extra doses, or decrease in VitK rich foods.  He has not had any bruising/bleeding out of the ordinary.  Instructed him to seek emergency care for any signs/symptoms of bleeding/bruising.   He will HOLD X 2, 2.5mg X 1, then resume current warfarin regimen.  Patient declines BP/vitals check today.  Follow up in 4 days.    Casey Birmingham, Adriane

## 2017-09-05 ENCOUNTER — ANTICOAGULATION VISIT (OUTPATIENT)
Dept: MEDICAL GROUP | Facility: PHYSICIAN GROUP | Age: 72
End: 2017-09-05
Payer: MEDICARE

## 2017-09-05 DIAGNOSIS — Z95.2 HISTORY OF MITRAL VALVE REPLACEMENT WITH MECHANICAL VALVE: ICD-10-CM

## 2017-09-05 LAB — INR PPP: 1.3 (ref 2–3.5)

## 2017-09-05 PROCEDURE — 99999 PR NO CHARGE: CPT | Performed by: INTERNAL MEDICINE

## 2017-09-05 PROCEDURE — 85610 PROTHROMBIN TIME: CPT | Performed by: INTERNAL MEDICINE

## 2017-09-05 RX ORDER — ATORVASTATIN CALCIUM 40 MG/1
TABLET, FILM COATED ORAL
Qty: 90 TAB | Refills: 0 | Status: SHIPPED | OUTPATIENT
Start: 2017-09-05 | End: 2017-11-12 | Stop reason: SDUPTHER

## 2017-09-05 RX ORDER — CITALOPRAM HYDROBROMIDE 10 MG/1
TABLET ORAL
Qty: 90 TAB | Refills: 0 | Status: SHIPPED | OUTPATIENT
Start: 2017-09-05 | End: 2017-12-14 | Stop reason: SDUPTHER

## 2017-09-05 NOTE — PROGRESS NOTES
Anticoagulation Summary  As of 9/5/2017    INR goal:   2.5-3.5   TTR:   22.3 % (5.6 mo)   Today's INR:   1.3!   Maintenance plan:   7.5 mg (2.5 mg x 3) on Wed; 5 mg (2.5 mg x 2) all other days   Weekly total:   37.5 mg   Plan last modified:   Casey Birmingham PharmD (8/4/2017)   Next INR check:   9/8/2017   Target end date:   Indefinite    Indications    History of mitral valve replacement with mechanical valve [Z95.2] [Z95.2]             Anticoagulation Episode Summary     INR check location:       Preferred lab:       Send INR reminders to:       Comments:         Anticoagulation Care Providers     Provider Role Specialty Phone number    PRADEEP Batista Referring Family Medicine [phone]    [facility] Responsible  [phone]        Anticoagulation Patient Findings  Patient Findings     Negatives:   Signs/symptoms of thrombosis, Signs/symptoms of bleeding, Laboratory test error suspected, Change in health, Change in alcohol use, Change in activity, Upcoming invasive procedure, Emergency department visit, Upcoming dental procedure, Missed doses, Extra doses, Change in medications, Change in diet/appetite, Hospital admission, Bruising, Other complaints        Patient is subtherapeutic today with INR of 1.3.  Pt denies any unusual s/s of bleeding, bruising, clotting or any changes to diet or medications.  No lovenox as INR's recently have been labile.  He held one more dose than instructed.  Will have him bolus with 7.5mg X 1, then resume current warfarin regimen.  Patient declines BP/vitals check today.  Follow up in 3 days.    Casey Birmingham PharmD

## 2017-09-08 ENCOUNTER — ANTICOAGULATION VISIT (OUTPATIENT)
Dept: MEDICAL GROUP | Facility: PHYSICIAN GROUP | Age: 72
End: 2017-09-08
Payer: MEDICARE

## 2017-09-08 DIAGNOSIS — Z95.2 HISTORY OF MITRAL VALVE REPLACEMENT WITH MECHANICAL VALVE: ICD-10-CM

## 2017-09-08 LAB — INR PPP: 5.2 (ref 2–3.5)

## 2017-09-08 PROCEDURE — 85610 PROTHROMBIN TIME: CPT | Performed by: FAMILY MEDICINE

## 2017-09-08 PROCEDURE — 99999 PR NO CHARGE: CPT | Performed by: FAMILY MEDICINE

## 2017-09-12 ENCOUNTER — TELEPHONE (OUTPATIENT)
Dept: MEDICAL GROUP | Facility: PHYSICIAN GROUP | Age: 72
End: 2017-09-12

## 2017-09-12 NOTE — TELEPHONE ENCOUNTER
ESTABLISHED PATIENT PRE-VISIT PLANNING     Note: Patient will not be contacted if there is no indication to call.     1.  Reviewed notes from the last few office visits within the medical group: Yes    2.  If any orders were placed at last visit or intended to be done for this visit (i.e. 6 mos follow-up), do we have Results/Consult Notes?        •  Labs - Labs ordered, completed and results are in chart.       •  Imaging - Imaging was not ordered at last office visit.       •  Referrals - Referral ordered, patient has NOT been seen.    3. Is this appointment scheduled as a Hospital Follow-Up? No    4.  Immunizations were updated in Epic using WebIZ?: No WebIZ record       •  Web Iz Recommendations:     5.  Patient is due for the following Health Maintenance Topics:   Health Maintenance Due   Topic Date Due   • Annual Wellness Visit  1945   • A1C SCREENING  04/12/1946   • RETINAL SCREENING  10/12/1961   • FASTING LIPID PROFILE  10/12/1961   • URINE ACR / MICROALBUMIN  10/12/1961   • IMM DTaP/Tdap/Td Vaccine (1 - Tdap) 10/12/1962   • COLONOSCOPY  10/12/1993   • IMM ZOSTER VACCINE  10/12/2003   • IMM PNEUMOCOCCAL 65+ (ADULT) LOW/MEDIUM RISK SERIES (1 of 2 - PCV13) 10/12/2008   • IMM INFLUENZA (1) 09/01/2017

## 2017-09-13 ENCOUNTER — OFFICE VISIT (OUTPATIENT)
Dept: MEDICAL GROUP | Facility: PHYSICIAN GROUP | Age: 72
End: 2017-09-13
Payer: MEDICARE

## 2017-09-13 VITALS
TEMPERATURE: 97.9 F | OXYGEN SATURATION: 96 % | HEART RATE: 61 BPM | BODY MASS INDEX: 21.49 KG/M2 | DIASTOLIC BLOOD PRESSURE: 80 MMHG | WEIGHT: 141.8 LBS | RESPIRATION RATE: 16 BRPM | SYSTOLIC BLOOD PRESSURE: 130 MMHG | HEIGHT: 68 IN

## 2017-09-13 DIAGNOSIS — Z95.2 HISTORY OF MITRAL VALVE REPLACEMENT WITH MECHANICAL VALVE: ICD-10-CM

## 2017-09-13 DIAGNOSIS — E11.00 TYPE 2 DIABETES MELLITUS WITH HYPEROSMOLARITY WITHOUT COMA, WITHOUT LONG-TERM CURRENT USE OF INSULIN (HCC): ICD-10-CM

## 2017-09-13 DIAGNOSIS — Z23 NEED FOR VACCINATION: ICD-10-CM

## 2017-09-13 DIAGNOSIS — Z12.5 SCREENING PSA (PROSTATE SPECIFIC ANTIGEN): ICD-10-CM

## 2017-09-13 PROCEDURE — 90670 PCV13 VACCINE IM: CPT | Performed by: NURSE PRACTITIONER

## 2017-09-13 PROCEDURE — 99214 OFFICE O/P EST MOD 30 MIN: CPT | Mod: 25 | Performed by: NURSE PRACTITIONER

## 2017-09-13 PROCEDURE — G0008 ADMIN INFLUENZA VIRUS VAC: HCPCS | Performed by: NURSE PRACTITIONER

## 2017-09-13 PROCEDURE — G0009 ADMIN PNEUMOCOCCAL VACCINE: HCPCS | Performed by: NURSE PRACTITIONER

## 2017-09-13 PROCEDURE — 90662 IIV NO PRSV INCREASED AG IM: CPT | Performed by: NURSE PRACTITIONER

## 2017-09-13 RX ORDER — FEXOFENADINE HCL 180 MG/1
180 TABLET ORAL DAILY
COMMUNITY
End: 2020-06-15 | Stop reason: CLARIF

## 2017-09-13 RX ORDER — GLIPIZIDE 5 MG/1
5 TABLET ORAL 2 TIMES DAILY
COMMUNITY
End: 2017-12-20 | Stop reason: SDUPTHER

## 2017-09-15 ENCOUNTER — ANTICOAGULATION VISIT (OUTPATIENT)
Dept: MEDICAL GROUP | Facility: PHYSICIAN GROUP | Age: 72
End: 2017-09-15
Payer: MEDICARE

## 2017-09-15 DIAGNOSIS — Z95.2 HISTORY OF MITRAL VALVE REPLACEMENT WITH MECHANICAL VALVE: ICD-10-CM

## 2017-09-15 LAB — INR PPP: 3.6 (ref 2–3.5)

## 2017-09-15 PROCEDURE — 85610 PROTHROMBIN TIME: CPT | Performed by: FAMILY MEDICINE

## 2017-09-15 PROCEDURE — 99211 OFF/OP EST MAY X REQ PHY/QHP: CPT | Performed by: FAMILY MEDICINE

## 2017-09-15 NOTE — PROGRESS NOTES
Anticoagulation Summary  As of 9/15/2017    INR goal:   2.5-3.5   TTR:   21.5 % (6 mo)   Today's INR:   3.6!   Maintenance plan:   5 mg (2.5 mg x 2) every day   Weekly total:   35 mg   Plan last modified:   Casey Birmingham PharmD (9/8/2017)   Next INR check:   9/25/2017   Target end date:   Indefinite    Indications    History of mitral valve replacement with mechanical valve [Z95.2] [Z95.2]             Anticoagulation Episode Summary     INR check location:       Preferred lab:       Send INR reminders to:       Comments:         Anticoagulation Care Providers     Provider Role Specialty Phone number    PRADEEP Batista Referring Family Medicine [phone]    [facility] Responsible  [phone]        Anticoagulation Patient Findings  Patient Findings     Negatives:   Signs/symptoms of thrombosis, Signs/symptoms of bleeding, Laboratory test error suspected, Change in health, Change in alcohol use, Change in activity, Upcoming invasive procedure, Emergency department visit, Upcoming dental procedure, Missed doses, Extra doses, Change in medications, Change in diet/appetite, Hospital admission, Bruising, Other complaints        HPI:   Carlos Rivera seen in clinic today, on anticoagulation therapy with warfarin for mechanical mitral valve.  Changes to current medical/health status since last appt: NONE  Denies signs/symptoms of bleeding and/or thrombosis since the last appt.    Denies any interval changes to diet  Denies any interval changes to medications since last appt.   Denies any complications or cost restrictions with current therapy.      Vitals:  Patient declines BP/vitals check today.      Asssessment:  INR slightly supratherapeutic at 3.6      Plan:  Instructed patient to take 2.5mg X 1, then resume current warfarin regimen.     Follow up in 1.5 weeks.    Casey Birmingham PharmD

## 2017-09-18 NOTE — PROGRESS NOTES
Subjective:     Chief Complaint   Patient presents with   • Follow-Up     DM/ Neurology    • Immunizations     FLU/Pneumonia        HPI:  Carlos Rivera is a 71 y.o. male here today to discuss the following:    Type II diabetes mellitus (CMS-[facility])  Chronic Condition: Patient had type 2 diabetes mellitus for several years.He checks blood sugar at home and it averages 120-150. He denies any tremors, anxiety, weakness, headache, dizziness, blurry vision, fatigue, irritability or palpitations. Patient denies any drowsiness, dry skin, polyphagia or polydipsia, polyuria or nausea. He is going to make an appointment to follow up with DM RN.      History of mitral valve replacement with mechanical valve [Z95.2]  Patient has a mechanical heart valve and is on anticoagulation therapy. He has not had a cardiology evaluation for an undetermined period of time. He denies chest pain, shortness of breath, headaches or dizziness.         Current medicines (including changes today)  Current Outpatient Prescriptions   Medication Sig Dispense Refill   • fexofenadine (ALLEGRA ALLERGY) 180 MG tablet Take 180 mg by mouth every day.     • glipiZIDE (GLUCOTROL) 5 MG Tab Take 5 mg by mouth 2 times a day.     • Homeopathic Products ([employer] LEG CRAMPS PAIN RELIEF PO) Take  by mouth.     • Blood Glucose Monitoring Suppl Supplies Misc Test strips order: Test strips compatible with meter. Sig: use daily and prn ssx high or low sugar #100 RF x 3 100 Each 3   • atorvastatin (LIPITOR) 40 MG Tab Take 1 tablet by mouth  every evening 90 Tab 0   • metformin (GLUCOPHAGE) 500 MG Tab Take 1 tablet by mouth two  times daily with meals 180 Tab 0   • citalopram (CELEXA) 10 MG tablet Take 1 tablet by mouth  every morning 90 Tab 0   • donepezil (ARICEPT) 5 MG Tab Take 1 Tab by mouth every evening. 1 tab daily x 1 month then 2 tabs daily 60 Tab 5   • divalproex (DEPAKOTE) 250 MG Tablet Delayed Response Take 2 Tabs by mouth every day at 6 PM. 60 Tab 5   • Insulin  "Syringe-Needle U-100 (INSULIN SYRINGE .3CC/31GX5/16\") 31G X 5/16\" 0.3 ML Misc Use 3 times daily with insulin 300 Each 3   • warfarin (COUMADIN) 2.5 MG Tab Take two to three tablets by mouth one time daily as directed by coumadin clinic 270 Tab 1   • insulin regular (HUMULIN R) 100 Unit/mL Solution Injected instructed 3 times a day before meals 1 Vial 3   • nortriptyline (PAMELOR) 10 MG Cap Take 1 Cap by mouth every evening. 90 Cap 0   • Fluorouracil 2 % Solution by Apply externally route.     • Blood Glucose Monitoring Suppl (ONETOUCH VERIO FLEX SYSTEM) W/DEVICE Kit by Does not apply route.     • lisinopril (PRINIVIL) 10 MG Tab Take 10 mg by mouth every day.     • gabapentin (NEURONTIN) 100 MG Cap Take 100 mg by mouth 3 times a day.       No current facility-administered medications for this visit.        He  has a past medical history of Anxiety (2014); Diabetes (CMS-HCC); Diverticulitis (2015); Diverticulosis; Hearing loss of both ears; Hyperlipidemia; Hypertension; Mild cognitive impairment (2014); Neck fracture (CMS-HCC) (2016); Stroke (CMS-HCC); and Talipes cavus.    ROS   Review of Systems   Constitutional: Negative for fever, chills, weight loss and malaise/fatigue.   HENT: Negative for ear pain, nosebleeds, congestion, sore throat and neck pain.    Respiratory: Negative for cough, sputum production, shortness of breath and wheezing.    Cardiovascular: Negative for chest pain, palpitations,  and leg swelling.   Gastrointestinal: Negative for heartburn, nausea, vomiting, diarrhea and abdominal pain.   Neurological: Negative for dizziness, tingling, tremors, sensory change, focal weakness and headaches.   Psychiatric/Behavioral: Negative for depression, anxiety, suicidal ideas, insomnia and memory loss.    All other systems reviewed and are negative except as in HPI.     Objective:   Physical Exam:  Blood pressure 130/80, pulse 61, temperature 36.6 °C (97.9 °F), resp. rate 16, height 1.735 m (5' 8.3\"), weight " 64.3 kg (141 lb 12.8 oz), SpO2 96 %. Body mass index is 21.37 kg/m².   Physical Exam:  Alert, oriented in no acute distress.  Eye contact is good, speech goal directed, affect calm  HEENT: conjunctiva non-injected, sclera non-icteric.  Pinna normal.   Neck No adenopathy or masses in the neck or supraclavicular regions.  Lungs: clear to auscultation bilaterally with good excursion.  CV: regular rate and rhythm.   Abdomen: soft, nontender, No CVAT. Normal bowel sounds.  Ext: no edema, color normal, vascularity normal, temperature normal  MS: Normal gait and station    Assessment and Plan:   The following treatment plan was discussed   1. Need for vaccination  INFLUENZA VACCINE, HIGH DOSE (65+ ONLY)    Pneumococcal Conjugate Vaccine 13-Valent <4yo IM    influenza and pneumococcal vaccine given today   2. History of mitral valve replacement with mechanical valve [Z95.2]  REFERRAL TO CARDIOLOGY    cardiology referral for evaluation   3. Type 2 diabetes mellitus with hyperosmolarity without coma, without long-term current use of insulin (CMS-[facility])  COMP METABOLIC PANEL    HEMOGLOBIN A1C    LIPID PROFILE    MICROALBUMIN CREAT RATIO URINE (LAB COLLECT)    Blood Glucose Monitoring Suppl Supplies Misc    followup with diabetic educator   4. Screening PSA (prostate specific antigen)  PROSTATE SPECIFIC AG SCREENING    labs ordered       Followup: Return in about 3 months (around 12/13/2017) for DM.   Please note that this dictation was created using voice recognition software. I have made every reasonable attempt to correct obvious errors, but I expect that there are errors of grammar and possibly content that I did not discover before finalizing the note.

## 2017-09-18 NOTE — ASSESSMENT & PLAN NOTE
Patient has a mechanical heart valve and is on anticoagulation therapy. He has not had a cardiology evaluation for an undetermined period of time. He denies chest pain, shortness of breath, headaches or dizziness.

## 2017-09-18 NOTE — ASSESSMENT & PLAN NOTE
Chronic Condition: Patient had type 2 diabetes mellitus for several years.He checks blood sugar at home and it averages 120-150. He denies any tremors, anxiety, weakness, headache, dizziness, blurry vision, fatigue, irritability or palpitations. Patient denies any drowsiness, dry skin, polyphagia or polydipsia, polyuria or nausea. He is going to make an appointment to follow up with DM RN.

## 2017-09-19 ENCOUNTER — HOSPITAL ENCOUNTER (OUTPATIENT)
Dept: LAB | Facility: MEDICAL CENTER | Age: 72
End: 2017-09-19
Payer: MEDICARE

## 2017-09-19 ENCOUNTER — HOSPITAL ENCOUNTER (OUTPATIENT)
Dept: LAB | Facility: MEDICAL CENTER | Age: 72
End: 2017-09-19
Attending: NURSE PRACTITIONER
Payer: MEDICARE

## 2017-09-19 DIAGNOSIS — E11.00 TYPE 2 DIABETES MELLITUS WITH HYPEROSMOLARITY WITHOUT COMA, WITHOUT LONG-TERM CURRENT USE OF INSULIN (HCC): ICD-10-CM

## 2017-09-19 DIAGNOSIS — Z13.21 ENCOUNTER FOR VITAMIN DEFICIENCY SCREENING: ICD-10-CM

## 2017-09-19 DIAGNOSIS — Z12.5 SCREENING PSA (PROSTATE SPECIFIC ANTIGEN): ICD-10-CM

## 2017-09-19 LAB
25(OH)D3 SERPL-MCNC: 33 NG/ML (ref 30–100)
ALBUMIN SERPL BCP-MCNC: 4.5 G/DL (ref 3.2–4.9)
ALBUMIN/GLOB SERPL: 1.6 G/DL
ALP SERPL-CCNC: 86 U/L (ref 30–99)
ALT SERPL-CCNC: 51 U/L (ref 2–50)
ANION GAP SERPL CALC-SCNC: 8 MMOL/L (ref 0–11.9)
AST SERPL-CCNC: 41 U/L (ref 12–45)
BILIRUB SERPL-MCNC: 0.8 MG/DL (ref 0.1–1.5)
BUN SERPL-MCNC: 26 MG/DL (ref 8–22)
CALCIUM SERPL-MCNC: 9.6 MG/DL (ref 8.5–10.5)
CHLORIDE SERPL-SCNC: 104 MMOL/L (ref 96–112)
CHOLEST SERPL-MCNC: 118 MG/DL (ref 100–199)
CO2 SERPL-SCNC: 26 MMOL/L (ref 20–33)
CREAT SERPL-MCNC: 1.02 MG/DL (ref 0.5–1.4)
CREAT UR-MCNC: 70.7 MG/DL
EST. AVERAGE GLUCOSE BLD GHB EST-MCNC: 203 MG/DL
GFR SERPL CREATININE-BSD FRML MDRD: >60 ML/MIN/1.73 M 2
GLOBULIN SER CALC-MCNC: 2.9 G/DL (ref 1.9–3.5)
GLUCOSE SERPL-MCNC: 75 MG/DL (ref 65–99)
HBA1C MFR BLD: 8.7 % (ref 0–5.6)
HDLC SERPL-MCNC: 46 MG/DL
LDLC SERPL CALC-MCNC: 47 MG/DL
MICROALBUMIN UR-MCNC: 35.3 MG/DL
MICROALBUMIN/CREAT UR: 499 MG/G (ref 0–30)
POTASSIUM SERPL-SCNC: 4.9 MMOL/L (ref 3.6–5.5)
PROT SERPL-MCNC: 7.4 G/DL (ref 6–8.2)
PSA SERPL-MCNC: 2.55 NG/ML (ref 0–4)
SODIUM SERPL-SCNC: 138 MMOL/L (ref 135–145)
TRIGL SERPL-MCNC: 125 MG/DL (ref 0–149)

## 2017-09-19 PROCEDURE — 82306 VITAMIN D 25 HYDROXY: CPT | Mod: GA

## 2017-09-19 PROCEDURE — 36415 COLL VENOUS BLD VENIPUNCTURE: CPT | Mod: GA

## 2017-09-19 PROCEDURE — 80061 LIPID PANEL: CPT

## 2017-09-19 PROCEDURE — 82043 UR ALBUMIN QUANTITATIVE: CPT

## 2017-09-19 PROCEDURE — 83036 HEMOGLOBIN GLYCOSYLATED A1C: CPT | Mod: GA

## 2017-09-19 PROCEDURE — 84153 ASSAY OF PSA TOTAL: CPT | Mod: GA

## 2017-09-19 PROCEDURE — 82570 ASSAY OF URINE CREATININE: CPT

## 2017-09-19 PROCEDURE — 80053 COMPREHEN METABOLIC PANEL: CPT

## 2017-09-23 DIAGNOSIS — E11.00 TYPE 2 DIABETES MELLITUS WITH HYPEROSMOLARITY WITHOUT COMA, WITHOUT LONG-TERM CURRENT USE OF INSULIN (HCC): ICD-10-CM

## 2017-09-23 RX ORDER — LISINOPRIL 20 MG/1
20 TABLET ORAL DAILY
Qty: 90 TAB | Refills: 1 | Status: SHIPPED | OUTPATIENT
Start: 2017-09-23 | End: 2017-12-20 | Stop reason: SDUPTHER

## 2017-09-25 ENCOUNTER — ANTICOAGULATION VISIT (OUTPATIENT)
Dept: MEDICAL GROUP | Facility: PHYSICIAN GROUP | Age: 72
End: 2017-09-25
Payer: MEDICARE

## 2017-09-25 VITALS — SYSTOLIC BLOOD PRESSURE: 134 MMHG | DIASTOLIC BLOOD PRESSURE: 85 MMHG | HEART RATE: 54 BPM

## 2017-09-25 DIAGNOSIS — Z95.2 HISTORY OF MITRAL VALVE REPLACEMENT WITH MECHANICAL VALVE: ICD-10-CM

## 2017-09-25 LAB — INR PPP: 3.7 (ref 2–3.5)

## 2017-09-25 PROCEDURE — 85610 PROTHROMBIN TIME: CPT | Performed by: NURSE PRACTITIONER

## 2017-09-25 NOTE — PROGRESS NOTES
Anticoagulation Summary  As of 9/25/2017    INR goal:   2.5-3.5   TTR:   20.4 % (6.3 mo)   Today's INR:   3.7!   Maintenance plan:   2.5 mg (2.5 mg x 1) on Mon; 5 mg (2.5 mg x 2) all other days   Weekly total:   32.5 mg   Plan last modified:   Casey Birmingham PharmD (9/25/2017)   Next INR check:   10/6/2017   Target end date:   Indefinite    Indications    History of mitral valve replacement with mechanical valve [Z95.2] [Z95.2]             Anticoagulation Episode Summary     INR check location:       Preferred lab:       Send INR reminders to:       Comments:         Anticoagulation Care Providers     Provider Role Specialty Phone number    PRADEEP Batista Referring Family Medicine [phone]    [facility] Responsible  [phone]        Anticoagulation Patient Findings  Patient Findings     Negatives:   Signs/symptoms of thrombosis, Signs/symptoms of bleeding, Laboratory test error suspected, Change in health, Change in alcohol use, Change in activity, Upcoming invasive procedure, Emergency department visit, Upcoming dental procedure, Missed doses, Extra doses, Change in medications, Change in diet/appetite, Hospital admission, Bruising, Other complaints        HPI:   Carlos Rivera seen in clinic today, on anticoagulation therapy with warfarin for mechanical valve replacement.  Changes to current medical/health status since last appt: NONE  Denies signs/symptoms of bleeding and/or thrombosis since the last appt.    Denies any interval changes to diet  Denies any interval changes to medications since last appt.   Denies any complications or cost restrictions with current therapy.      Vitals:  /85  HR 54      Asssessment:  INR supratherapeutic at 3.7      Plan:  Instructed patient to decrease weekly warfarin regimen by ~7% as detailed above     Follow up in 2 weeks.    Casey Birmingham, BethanyD

## 2017-09-29 ENCOUNTER — TELEPHONE (OUTPATIENT)
Dept: CARDIOLOGY | Facility: MEDICAL CENTER | Age: 72
End: 2017-09-29

## 2017-10-06 ENCOUNTER — OFFICE VISIT (OUTPATIENT)
Dept: CARDIOLOGY | Facility: MEDICAL CENTER | Age: 72
End: 2017-10-06
Payer: MEDICARE

## 2017-10-06 ENCOUNTER — ANTICOAGULATION VISIT (OUTPATIENT)
Dept: MEDICAL GROUP | Facility: PHYSICIAN GROUP | Age: 72
End: 2017-10-06
Payer: MEDICARE

## 2017-10-06 VITALS
BODY MASS INDEX: 21.37 KG/M2 | SYSTOLIC BLOOD PRESSURE: 130 MMHG | OXYGEN SATURATION: 96 % | DIASTOLIC BLOOD PRESSURE: 80 MMHG | WEIGHT: 141 LBS | HEIGHT: 68 IN | HEART RATE: 62 BPM

## 2017-10-06 DIAGNOSIS — Z95.2 HISTORY OF MITRAL VALVE REPLACEMENT WITH MECHANICAL VALVE: ICD-10-CM

## 2017-10-06 DIAGNOSIS — E78.5 HYPERLIPIDEMIA, UNSPECIFIED HYPERLIPIDEMIA TYPE: ICD-10-CM

## 2017-10-06 DIAGNOSIS — E11.00 TYPE 2 DIABETES MELLITUS WITH HYPEROSMOLARITY WITHOUT COMA, WITHOUT LONG-TERM CURRENT USE OF INSULIN (HCC): ICD-10-CM

## 2017-10-06 DIAGNOSIS — Z95.2 HISTORY OF MITRAL VALVE REPLACEMENT: ICD-10-CM

## 2017-10-06 LAB
EKG IMPRESSION: NORMAL
INR PPP: 2.4 (ref 2–3.5)

## 2017-10-06 PROCEDURE — 99204 OFFICE O/P NEW MOD 45 MIN: CPT | Performed by: INTERNAL MEDICINE

## 2017-10-06 PROCEDURE — 99999 PR NO CHARGE: CPT | Performed by: FAMILY MEDICINE

## 2017-10-06 PROCEDURE — 93000 ELECTROCARDIOGRAM COMPLETE: CPT | Performed by: INTERNAL MEDICINE

## 2017-10-06 PROCEDURE — 85610 PROTHROMBIN TIME: CPT | Performed by: FAMILY MEDICINE

## 2017-10-06 RX ORDER — AMOXICILLIN 500 MG/1
2000 TABLET, FILM COATED ORAL PRN
Qty: 8 TAB | Refills: 1 | Status: SHIPPED | OUTPATIENT
Start: 2017-10-06 | End: 2017-12-19

## 2017-10-06 ASSESSMENT — ENCOUNTER SYMPTOMS
BRUISES/BLEEDS EASILY: 1
PALPITATIONS: 0
ORTHOPNEA: 0
CHILLS: 0
IRREGULAR HEARTBEAT: 0
BACK PAIN: 1
HEMATEMESIS: 0
HEMATOCHEZIA: 0
CLAUDICATION: 0
FEVER: 0
COUGH: 0
HEMOPTYSIS: 0
NIGHT SWEATS: 0
DIZZINESS: 1
DYSPNEA ON EXERTION: 0

## 2017-10-06 NOTE — PROGRESS NOTES
Anticoagulation Summary  As of 10/6/2017    INR goal:   2.5-3.5   TTR:   23.4 % (6.7 mo)   Today's INR:   2.4!   Maintenance plan:   2.5 mg (2.5 mg x 1) on Mon; 5 mg (2.5 mg x 2) all other days   Weekly total:   32.5 mg   Plan last modified:   Casey Birmingham PharmD (9/25/2017)   Next INR check:   10/13/2017   Target end date:   Indefinite    Indications    History of mitral valve replacement with mechanical valve [Z95.2] [Z95.2]             Anticoagulation Episode Summary     INR check location:       Preferred lab:       Send INR reminders to:       Comments:         Anticoagulation Care Providers     Provider Role Specialty Phone number    PRADEEP Batista Referring Family Medicine [phone]    [facility] Responsible  [phone]        Anticoagulation Patient Findings  Patient Findings     Negatives:   Signs/symptoms of thrombosis, Signs/symptoms of bleeding, Laboratory test error suspected, Change in health, Change in alcohol use, Change in activity, Upcoming invasive procedure, Emergency department visit, Upcoming dental procedure, Missed doses, Extra doses, Change in medications, Change in diet/appetite, Hospital admission, Bruising, Other complaints        HPI:   Carlos Rivera seen in clinic today, on anticoagulation therapy with warfarin for hx of mechanical mitral valve replacement  Changes to current medical/health status since last appt: NONE  Denies signs/symptoms of bleeding and/or thrombosis since the last appt.    Denies any interval changes to diet  Denies any interval changes to medications since last appt.   Denies any complications or cost restrictions with current therapy.      Vitals:  Patient declines BP/vitals check today.      Asssessment:  INR slighly subtherapeutic at 2.4      Plan:  Instructed patient to bolus with 7.5mg X 1, then resume current warfarin regimen.     Follow up in 1 weeks.    Casey Birmingham, Adriane

## 2017-10-06 NOTE — LETTER
Stephon Ramos MD  [facility]  [facility], [address]  [address], [facility] [address]  [place] [address]  Phone: [phone]  Fax: [phone]                 To:   PRADEEP Batista  [address]  [address]  [place] [address]      Dear PRADEEP Batista,    I had the pleasure of seeing your patient Carlos Rivera ( - 1945) today in clinic.  As you may recall he is a 71-year-old male with a history of mechanical mitral valve, and hypertension.  He is currently asymptomatic and seems to be doing well from a cardiac standpoint.  I have ordered an echocardiogram to evaluate his current mitral valve function, and we'll see him back in 6 months. Thank you for your consultation, and I look forward to providing your patients with excellent cardiovascular care.  Please feel free to contact me at any time if you have any questions.      Best,  Stephon Ramos MD  [facility]

## 2017-10-06 NOTE — PROGRESS NOTES
Cardiology Initial Consultation Note    Date of note:    10/6/2017    Primary Care Provider: PRADEEP Batista  Referring Provider: Karina Fontaine A*    Patient Name: Carlos Rivera     YOB: 1945  MRN:              [id number]    Chief Complaint   Patient presents with   • Mechanical mitral valve, mitral regurgitation.         History of Present Illness: Carlos Rivera is a 71 y.o.-year-old male whose current medical problems include type II diabetes, and mechanical mitral valve placed 1999 on coumadin who is here for cardiac consultation for history of mitral valve prolapse/mitral regurgitation    Fall of 1998 was found to have murmur on exam and mitral valve prolapse with severe mitral regurgitation via Dr. Oscar.  Had valve replaced in 9/1999. Has not needed any replacement, has had no endocarditis, and no bleeding complications.  No previous TIA/CVA. He reports no history of arrhythmias.     Moved from [place] 1 year ago. Lost 10 pounds in the last year, lost appetite.      Does not walk to exercise due to bone spur on foot.    Stopped bike riding due to back and neck pain, was doing long distance bike rides up until a year ago.       Review of Systems   Constitution: Negative for chills, fever and night sweats.   Cardiovascular: Negative for chest pain, claudication, dyspnea on exertion, irregular heartbeat, leg swelling, orthopnea and palpitations.   Respiratory: Negative for cough and hemoptysis.    Hematologic/Lymphatic: Bruises/bleeds easily.   Musculoskeletal: Positive for back pain.   Gastrointestinal: Negative for hematemesis, hematochezia, hemorrhoids and melena.   Neurological: Positive for dizziness (vertigo).         All other systems reviewed and negative.      Past Medical History:   Diagnosis Date   • Neck fracture (CMS-[facility]) 2016   • Diverticulitis 2015   • Mild cognitive impairment 2014   • Anxiety 2014   • Diabetes (CMS-HCC)    • Diverticulosis   "  • Hearing loss of both ears    • Hyperlipidemia    • Hypertension    • Stroke (CMS-HCC)    • Talipes cavus          Past Surgical History:   Procedure Laterality Date   • MITRAL VALVE REPLACEMENT  1999   • INGUINAL HERNIA REPAIR  1984   • TONSILLECTOMY           Current Outpatient Prescriptions   Medication Sig Dispense Refill   • lisinopril (PRINIVIL) 20 MG Tab Take 1 Tab by mouth every day. 90 Tab 1   • fexofenadine (ALLEGRA ALLERGY) 180 MG tablet Take 180 mg by mouth every day.     • glipiZIDE (GLUCOTROL) 5 MG Tab Take 5 mg by mouth 2 times a day.     • Homeopathic Products ([employer] LEG CRAMPS PAIN RELIEF PO) Take  by mouth.     • Blood Glucose Monitoring Suppl Supplies Misc Test strips order: Test strips compatible with meter. Sig: use daily and prn ssx high or low sugar #100 RF x 3 100 Each 3   • atorvastatin (LIPITOR) 40 MG Tab Take 1 tablet by mouth  every evening 90 Tab 0   • metformin (GLUCOPHAGE) 500 MG Tab Take 1 tablet by mouth two  times daily with meals 180 Tab 0   • citalopram (CELEXA) 10 MG tablet Take 1 tablet by mouth  every morning 90 Tab 0   • donepezil (ARICEPT) 5 MG Tab Take 1 Tab by mouth every evening. 1 tab daily x 1 month then 2 tabs daily 60 Tab 5   • divalproex (DEPAKOTE) 250 MG Tablet Delayed Response Take 2 Tabs by mouth every day at 6 PM. 60 Tab 5   • Insulin Syringe-Needle U-100 (INSULIN SYRINGE .3CC/31GX5/16\") 31G X 5/16\" 0.3 ML Misc Use 3 times daily with insulin 300 Each 3   • warfarin (COUMADIN) 2.5 MG Tab Take two to three tablets by mouth one time daily as directed by coumadin clinic 270 Tab 1   • insulin regular (HUMULIN R) 100 Unit/mL Solution Injected instructed 3 times a day before meals 1 Vial 3   • nortriptyline (PAMELOR) 10 MG Cap Take 1 Cap by mouth every evening. 90 Cap 0   • Fluorouracil 2 % Solution by Apply externally route.     • Blood Glucose Monitoring Suppl (ONETOUCH VERIO FLEX SYSTEM) W/DEVICE Kit by Does not apply route.     • gabapentin (NEURONTIN) 100 MG Cap " "Take 100 mg by mouth 3 times a day.       No current facility-administered medications for this visit.          Allergies   Allergen Reactions   • Vicodin [Hydrocodone-Acetaminophen] Vomiting         Family History   Problem Relation Age of Onset   • Heart Attack Father    • Heart Attack Paternal Uncle          Social History     Social History   • Marital status:      Spouse name: N/A   • Number of children: N/A   • Years of education: N/A     Occupational History   • Not on file.     Social History Main Topics   • Smoking status: Never Smoker   • Smokeless tobacco: Never Used   • Alcohol use No   • Drug use: No   • Sexual activity: Yes      Comment:      Other Topics Concern   • Not on file     Social History Narrative   • No narrative on file         Physical Exam:  Ambulatory Vitals  Blood pressure 130/80, pulse 62, height 1.735 m (5' 8.3\"), weight 64 kg (141 lb), SpO2 96 %.   Oxygen Therapy:  Pulse Oximetry: 96 %  BP Readings from Last 4 Encounters:   10/06/17 130/80   09/25/17 134/85   09/13/17 130/80   07/26/17 140/82       Weight/BMI: Body mass index is 21.25 kg/m².  Wt Readings from Last 4 Encounters:   10/06/17 64 kg (141 lb)   09/13/17 64.3 kg (141 lb 12.8 oz)   07/26/17 67.1 kg (148 lb)   04/13/17 67.9 kg (149 lb 9.6 oz)         General: Well appearing and in no apparent distress  Eyes: nl conjunctiva  ENT: OP clear  Neck: JVP 5 cm H2O, no carotid bruits  Lungs: normal respiratory effort, CTAB  Heart: RRR, Mechanical S1, no murmurs, no rubs or gallops, no edema bilateral lower extremities. No LV/RV heave on cardiac palpatation. 2+ bilateral radial pulses.  2+ bilateral dp pulses. Well healed sternotomy scar.   Abdomen: soft, non tender, non distended, no masses, normal bowel sounds.  No HSM.  Extremities/MSK: no clubbing, no cyanosis  Neurological: No focal sensory deficits  Psychiatric: Appropriate affect, A/O x 3  Skin: Warm extremities      Lab Data Review:  Lab Results   Component Value " Date/Time    CHOLSTRLTOT 118 2017 08:39 AM    LDL 47 2017 08:39 AM    HDL 46 2017 08:39 AM    TRIGLYCERIDE 125 2017 08:39 AM       Lab Results   Component Value Date/Time    SODIUM 138 2017 08:39 AM    POTASSIUM 4.9 2017 08:39 AM    CHLORIDE 104 2017 08:39 AM    CO2 26 2017 08:39 AM    GLUCOSE 75 2017 08:39 AM    BUN 26 (H) 2017 08:39 AM    CREATININE 1.02 2017 08:39 AM     CrCl cannot be calculated (Patient's most recent lab result is older than the maximum 7 days allowed.).  Lab Results   Component Value Date/Time    ALKPHOSPHAT 86 2017 08:39 AM    ASTSGOT 41 2017 08:39 AM    ALTSGPT 51 (H) 2017 08:39 AM    TBILIRUBIN 0.8 2017 08:39 AM      Lab Results   Component Value Date/Time    WBC 6.1 2017 09:42 AM     Lab Results   Component Value Date/Time    HBA1C 8.7 (H) 2017 08:39 AM     No components found for: TROP      Cardiac Imaging and Procedures Review:    EKG dated today : My personal interpretation is NSR, non-specific IVCD, LAD, non-specific TW changes in inferior and lateral leads.      Medical Decision Makin. History of mitral valve replacement  Currently asymptomatic.   -endocarditis ppx, Rx'd amoxicillin.   -ED precautions discussed  -TTE  -continue coumadin, goal INR 2.5-3.5  -requested OSH records from previous cardiologist.       2. Hyperlipidemia, unspecified hyperlipidemia type  -continue lipitor, LDL at goal    3. Type 2 diabetes mellitus with hyperosmolarity without coma, without long-term current use of insulin (CMS-[facility])  Poorly controlled  -f/u with PCP. Goal HgbA1c at least <8      Return in about 6 months (around 2018).      Stephon Ramos MD  [facility]  [facility], [address]  [address], [place] [address]  [place] [address]  Phone: [phone]  Fax: [phone]

## 2017-10-16 ENCOUNTER — ANTICOAGULATION VISIT (OUTPATIENT)
Dept: MEDICAL GROUP | Facility: PHYSICIAN GROUP | Age: 72
End: 2017-10-16
Payer: MEDICARE

## 2017-10-16 DIAGNOSIS — Z95.2 HISTORY OF MITRAL VALVE REPLACEMENT WITH MECHANICAL VALVE: ICD-10-CM

## 2017-10-16 LAB — INR PPP: 3.7 (ref 2–3.5)

## 2017-10-16 PROCEDURE — 99999 PR NO CHARGE: CPT | Performed by: FAMILY MEDICINE

## 2017-10-16 PROCEDURE — 85610 PROTHROMBIN TIME: CPT | Performed by: FAMILY MEDICINE

## 2017-10-16 NOTE — PROGRESS NOTES
Anticoagulation Summary  As of 10/16/2017    INR goal:   2.5-3.5   TTR:   26.0 % (7 mo)   Today's INR:   3.7!   Maintenance plan:   2.5 mg (2.5 mg x 1) on Mon, Fri; 5 mg (2.5 mg x 2) all other days   Weekly total:   30 mg   Plan last modified:   Casey Birmingham PharmD (10/16/2017)   Next INR check:   10/23/2017   Target end date:   Indefinite    Indications    History of mitral valve replacement with mechanical valve [Z95.2] [Z95.2]             Anticoagulation Episode Summary     INR check location:       Preferred lab:       Send INR reminders to:       Comments:         Anticoagulation Care Providers     Provider Role Specialty Phone number    PRADEEP Batista Referring Family Medicine [phone]    [facility] Responsible  [phone]        Anticoagulation Patient Findings  Patient Findings     Negatives:   Signs/symptoms of thrombosis, Signs/symptoms of bleeding, Laboratory test error suspected, Change in health, Change in alcohol use, Change in activity, Upcoming invasive procedure, Emergency department visit, Upcoming dental procedure, Missed doses, Extra doses, Change in medications, Change in diet/appetite, Hospital admission, Bruising, Other complaints        HPI:   Carlos Rivera seen in clinic today, on anticoagulation therapy with warfarin for mechanical mitral valve replacement  Changes to current medical/health status since last appt: NONE  Denies signs/symptoms of bleeding and/or thrombosis since the last appt.    Denies any interval changes to diet  Denies any interval changes to medications since last appt.   Denies any complications or cost restrictions with current therapy.      Vitals:  Patient declines BP/vitals check today.      Asssessment:  INR slightly supratherapeutic at 3.7      Plan:  Instructed patient to decrease weekly warfarin regimen as detailed above.     Follow up in 1 weeks.    Casey Birmingham, Adriane

## 2017-10-23 ENCOUNTER — ANTICOAGULATION VISIT (OUTPATIENT)
Dept: MEDICAL GROUP | Facility: PHYSICIAN GROUP | Age: 72
End: 2017-10-23
Payer: MEDICARE

## 2017-10-23 VITALS — HEART RATE: 53 BPM | DIASTOLIC BLOOD PRESSURE: 103 MMHG | SYSTOLIC BLOOD PRESSURE: 158 MMHG

## 2017-10-23 DIAGNOSIS — Z95.2 HISTORY OF MITRAL VALVE REPLACEMENT WITH MECHANICAL VALVE: ICD-10-CM

## 2017-10-23 LAB — INR PPP: 4.7 (ref 2–3.5)

## 2017-10-23 PROCEDURE — 85610 PROTHROMBIN TIME: CPT | Performed by: NURSE PRACTITIONER

## 2017-10-23 PROCEDURE — 99211 OFF/OP EST MAY X REQ PHY/QHP: CPT | Performed by: NURSE PRACTITIONER

## 2017-10-23 NOTE — PROGRESS NOTES
Anticoagulation Summary  As of 10/23/2017    INR goal:   2.5-3.5   TTR:   25.1 % (7.2 mo)   Today's INR:   4.7!   Maintenance plan:   2.5 mg (2.5 mg x 1) on Mon, Wed; 5 mg (2.5 mg x 2) all other days   Weekly total:   30 mg   Plan last modified:   Casey Birmingham, Adriane (10/16/2017)   Next INR check:   10/30/2017   Target end date:   Indefinite    Indications    History of mitral valve replacement with mechanical valve [Z95.2] [Z95.2]             Anticoagulation Episode Summary     INR check location:       Preferred lab:       Send INR reminders to:       Comments:         Anticoagulation Care Providers     Provider Role Specialty Phone number    PRADEEP Batista Referring Family Medicine [phone]    [facility] Responsible  [phone]        Anticoagulation Patient Findings    HPI:   Carlos Rivera seen in clinic today, on anticoagulation therapy with warfarin for mechanical mitral valve  Changes to current medical/health status since last appt: NONE  Denies signs/symptoms of bleeding and/or thrombosis since the last appt.    Denies any interval changes to diet  Denies any interval changes to medications since last appt.   Denies any complications or cost restrictions with current therapy.      Vitals:  /103  HR 53      Asssessment:  INR supratherapeutic at 4.7      Plan:  Instructed patient to HOLD X 1, then resume current warfarin regimen.     Follow up in 1 weeks.    Casey Birmingham, Adriane

## 2017-10-26 NOTE — TELEPHONE ENCOUNTER
Was the patient seen in the last year in this department? Yes 09/13/17  Does patient have an active prescription for medications requested? No     Received Request Via: Pharmacy

## 2017-10-30 ENCOUNTER — ANTICOAGULATION VISIT (OUTPATIENT)
Dept: MEDICAL GROUP | Facility: PHYSICIAN GROUP | Age: 72
End: 2017-10-30
Payer: MEDICARE

## 2017-10-30 DIAGNOSIS — Z95.2 HISTORY OF MITRAL VALVE REPLACEMENT WITH MECHANICAL VALVE: ICD-10-CM

## 2017-10-30 LAB — INR PPP: 3.6 (ref 2–3.5)

## 2017-10-30 PROCEDURE — 85610 PROTHROMBIN TIME: CPT | Performed by: FAMILY MEDICINE

## 2017-10-30 RX ORDER — NORTRIPTYLINE HYDROCHLORIDE 10 MG/1
10 CAPSULE ORAL EVERY EVENING
Qty: 90 CAP | Refills: 1 | Status: SHIPPED | OUTPATIENT
Start: 2017-10-30 | End: 2018-02-26 | Stop reason: SINTOL

## 2017-10-30 NOTE — PROGRESS NOTES
Anticoagulation Summary  As of 10/30/2017    INR goal:   2.5-3.5   TTR:   24.4 % (7.5 mo)   Today's INR:   3.6!   Maintenance plan:   2.5 mg (2.5 mg x 1) on Mon, Wed; 5 mg (2.5 mg x 2) all other days   Weekly total:   30 mg   Plan last modified:   Casey Birmingham PharmD (10/16/2017)   Next INR check:   11/6/2017   Target end date:   Indefinite    Indications    History of mitral valve replacement with mechanical valve [Z95.2] [Z95.2]             Anticoagulation Episode Summary     INR check location:       Preferred lab:       Send INR reminders to:       Comments:         Anticoagulation Care Providers     Provider Role Specialty Phone number    PRADEEP Batista Referring Family Medicine [phone]    [facility] Responsible  [phone]        Anticoagulation Patient Findings  Patient Findings     Negatives:   Signs/symptoms of thrombosis, Signs/symptoms of bleeding, Laboratory test error suspected, Change in health, Change in alcohol use, Change in activity, Upcoming invasive procedure, Emergency department visit, Upcoming dental procedure, Missed doses, Extra doses, Change in medications, Change in diet/appetite, Hospital admission, Bruising, Other complaints        HPI:   Carlos Rivera seen in clinic today, on anticoagulation therapy with warfarin for hx of mechanical mitral valve  Changes to current medical/health status since last appt: NONE  Denies signs/symptoms of bleeding and/or thrombosis since the last appt.    Denies any interval changes to diet  Denies any interval changes to medications since last appt.   Denies any complications or cost restrictions with current therapy.      Vitals:  patient declines BP/vitals check today      Asssessment:  INR slightly supratherapeutic at 3.6      Plan:  Instructed patient to begin decreased weekly warfarin regimen as detailed above.     Follow up in 1 weeks.    Casey Birmingham, Adriane

## 2017-11-06 ENCOUNTER — ANTICOAGULATION VISIT (OUTPATIENT)
Dept: MEDICAL GROUP | Facility: PHYSICIAN GROUP | Age: 72
End: 2017-11-06
Payer: MEDICARE

## 2017-11-06 DIAGNOSIS — Z95.2 HISTORY OF MITRAL VALVE REPLACEMENT WITH MECHANICAL VALVE: ICD-10-CM

## 2017-11-06 LAB — INR PPP: 4.1 (ref 2–3.5)

## 2017-11-06 PROCEDURE — 99211 OFF/OP EST MAY X REQ PHY/QHP: CPT | Performed by: FAMILY MEDICINE

## 2017-11-06 PROCEDURE — 85610 PROTHROMBIN TIME: CPT | Performed by: FAMILY MEDICINE

## 2017-11-06 NOTE — PROGRESS NOTES
Anticoagulation Summary  As of 11/6/2017    INR goal:   2.5-3.5   TTR:   23.6 % (7.7 mo)   Today's INR:   4.1!   Maintenance plan:   2.5 mg (2.5 mg x 1) on Mon, Wed, Fri; 5 mg (2.5 mg x 2) all other days   Weekly total:   27.5 mg   Plan last modified:   Casey Birmingham, PharmD (11/6/2017)   Next INR check:   11/13/2017   Target end date:   Indefinite    Indications    History of mitral valve replacement with mechanical valve [Z95.2] [Z95.2]             Anticoagulation Episode Summary     INR check location:       Preferred lab:       Send INR reminders to:       Comments:         Anticoagulation Care Providers     Provider Role Specialty Phone number    PRADEEP Batista Referring Family Medicine [phone]    [facility] Responsible  [phone]        Anticoagulation Patient Findings  Patient Findings     Negatives:   Signs/symptoms of thrombosis, Signs/symptoms of bleeding, Laboratory test error suspected, Change in health, Change in alcohol use, Change in activity, Upcoming invasive procedure, Emergency department visit, Upcoming dental procedure, Missed doses, Extra doses, Change in medications, Change in diet/appetite, Hospital admission, Bruising, Other complaints        HPI:   Carlos Rivera seen in clinic today, on anticoagulation therapy with warfarin for hx of mitral mechanical valve  Changes to current medical/health status since last appt: NONE  Denies signs/symptoms of bleeding and/or thrombosis since the last appt.    Denies any interval changes to diet  Denies any interval changes to medications since last appt.   Denies any complications or cost restrictions with current therapy.      Vitals:  patient declines BP/vitals check today      Asssessment:  INR supratherapeutic at 4.1.  Thoroughly discussed diet, exercise, current medications.      Plan:  Instructed patient to HOLD X 1, then decrease weekly warfarin regimen.     Follow up in 1 weeks.    Casey Birmingham  PharmD

## 2017-11-13 RX ORDER — ATORVASTATIN CALCIUM 40 MG/1
TABLET, FILM COATED ORAL
Qty: 90 TAB | Refills: 1 | Status: SHIPPED | OUTPATIENT
Start: 2017-11-13 | End: 2018-04-24 | Stop reason: SDUPTHER

## 2017-11-13 NOTE — TELEPHONE ENCOUNTER
Was the patient seen in the last year in this department? Yes     Does patient have an active prescription for medications requested? No     Received Request Via: Pharmacy      Pt met protocol?: Yes    LAST OV 09/13/2017      Lab Results  Component Value Date/Time   HBA1C 8.7 (H) 09/19/2017 0839     Lab Results   Component Value Date/Time    HBA1C 8.7 (H) 09/19/2017 08:39 AM        Lab Results  Component Value Date/Time   AVGLUC 203 09/19/2017 0839     Glucose   Date Value Ref Range Status   09/19/2017 75 65 - 99 mg/dL Final         Lab Results  Component Value Date/Time   CHOLSTRLTOT 118 09/19/2017 0839       Lab Results  Component Value Date/Time   TRIGLYCERIDE 125 09/19/2017 0839       Lab Results  Component Value Date/Time   HDL 46 09/19/2017 0839       Lab Results  Component Value Date/Time   LDL 47 09/19/2017 0839     Lab Results   Component Value Date/Time    HDL 46 09/19/2017 08:39 AM

## 2017-11-14 ENCOUNTER — ANTICOAGULATION VISIT (OUTPATIENT)
Dept: MEDICAL GROUP | Facility: PHYSICIAN GROUP | Age: 72
End: 2017-11-14
Payer: MEDICARE

## 2017-11-14 DIAGNOSIS — Z95.2 HISTORY OF MITRAL VALVE REPLACEMENT WITH MECHANICAL VALVE: ICD-10-CM

## 2017-11-14 LAB — INR PPP: 2.6 (ref 2–3.5)

## 2017-11-14 PROCEDURE — 85610 PROTHROMBIN TIME: CPT | Performed by: INTERNAL MEDICINE

## 2017-11-14 PROCEDURE — 99999 PR NO CHARGE: CPT | Performed by: INTERNAL MEDICINE

## 2017-11-14 NOTE — TELEPHONE ENCOUNTER
Pt has upcoming appt w/ DM specialist. Will send 3 months of metformin to pharmacy.    Lab Results   Component Value Date/Time    CHOLSTRLTOT 118 09/19/2017 08:39 AM    LDL 47 09/19/2017 08:39 AM    HDL 46 09/19/2017 08:39 AM    TRIGLYCERIDE 125 09/19/2017 08:39 AM       Lab Results   Component Value Date/Time    SODIUM 138 09/19/2017 08:39 AM    POTASSIUM 4.9 09/19/2017 08:39 AM    CHLORIDE 104 09/19/2017 08:39 AM    CO2 26 09/19/2017 08:39 AM    GLUCOSE 75 09/19/2017 08:39 AM    BUN 26 (H) 09/19/2017 08:39 AM    CREATININE 1.02 09/19/2017 08:39 AM     Lab Results   Component Value Date/Time    ALKPHOSPHAT 86 09/19/2017 08:39 AM    ASTSGOT 41 09/19/2017 08:39 AM    ALTSGPT 51 (H) 09/19/2017 08:39 AM    TBILIRUBIN 0.8 09/19/2017 08:39 AM

## 2017-11-14 NOTE — PROGRESS NOTES
Anticoagulation Summary  As of 11/14/2017    INR goal:   2.5-3.5   TTR:   24.9 % (8 mo)   Today's INR:   2.6   Maintenance plan:   2.5 mg (2.5 mg x 1) on Mon, Wed, Fri; 5 mg (2.5 mg x 2) all other days   Weekly total:   27.5 mg   Plan last modified:   Casey Birmingham, PharmD (11/6/2017)   Next INR check:   11/20/2017   Target end date:   Indefinite    Indications    History of mitral valve replacement with mechanical valve [Z95.2] [Z95.2]             Anticoagulation Episode Summary     INR check location:       Preferred lab:       Send INR reminders to:       Comments:         Anticoagulation Care Providers     Provider Role Specialty Phone number    PRADEEP Batista Referring Family Medicine [phone]    [facility] Responsible  [phone]        Anticoagulation Patient Findings  Patient Findings     Negatives:   Signs/symptoms of thrombosis, Signs/symptoms of bleeding, Laboratory test error suspected, Change in health, Change in alcohol use, Change in activity, Upcoming invasive procedure, Emergency department visit, Upcoming dental procedure, Missed doses, Extra doses, Change in medications, Change in diet/appetite, Hospital admission, Bruising, Other complaints        HPI:   Carlos Rivera seen in clinic today, on anticoagulation therapy with warfarin for prevention of stroke secondary to mechanical mitral valve replacement  NO changes to current medical/health status since last appt  Denies signs/symptoms of bleeding and/or thrombosis since the last appt.    Denies any interval changes to diet or medications since last appt.   Denies any complications or cost restrictions with current therapy.      Vitals:  Patient declines BP/vitals check today     Asssessment:  INR therapeutic at 2.6, which will decrease patients risk of stroke and/or bleeding event.      Plan:  Pt is to continue with current warfarin dosing regimen in order to maintain therapeutic level.     Follow up:   Will have patient return to clinic in one week to ensure INR maintains in therapeutic range as recent INR's have been labile.    Casey Birmingham, PharmD

## 2017-11-17 ENCOUNTER — PATIENT MESSAGE (OUTPATIENT)
Dept: MEDICAL GROUP | Facility: PHYSICIAN GROUP | Age: 72
End: 2017-11-17

## 2017-11-17 DIAGNOSIS — F41.9 ANXIETY: ICD-10-CM

## 2017-11-20 ENCOUNTER — ANTICOAGULATION VISIT (OUTPATIENT)
Dept: MEDICAL GROUP | Facility: PHYSICIAN GROUP | Age: 72
End: 2017-11-20
Payer: MEDICARE

## 2017-11-20 DIAGNOSIS — Z95.2 HISTORY OF MITRAL VALVE REPLACEMENT WITH MECHANICAL VALVE: ICD-10-CM

## 2017-11-20 DIAGNOSIS — Z79.01 CHRONIC ANTICOAGULATION: ICD-10-CM

## 2017-11-20 LAB — INR PPP: 2.6 (ref 2–3.5)

## 2017-11-20 PROCEDURE — 99999 PR NO CHARGE: CPT | Performed by: FAMILY MEDICINE

## 2017-11-20 PROCEDURE — 85610 PROTHROMBIN TIME: CPT | Performed by: FAMILY MEDICINE

## 2017-11-20 NOTE — PROGRESS NOTES
Anticoagulation Summary  As of 11/20/2017    INR goal:   2.5-3.5   TTR:   26.6 % (8.2 mo)   Today's INR:   2.6   Maintenance plan:   2.5 mg (2.5 mg x 1) on Mon, Wed, Fri; 5 mg (2.5 mg x 2) all other days   Weekly total:   27.5 mg   Plan last modified:   Casey Birmingham, PharmD (11/6/2017)   Next INR check:   12/4/2017   Target end date:   Indefinite    Indications    Chronic anticoagulation [Z79.01]  History of mitral valve replacement with mechanical valve [Z95.2] [Z95.2]             Anticoagulation Episode Summary     INR check location:       Preferred lab:       Send INR reminders to:       Comments:         Anticoagulation Care Providers     Provider Role Specialty Phone number    PRADEEP Batista Referring Family Medicine [phone]    [facility] Responsible  [phone]        Anticoagulation Patient Findings  Patient Findings     Negatives:   Signs/symptoms of thrombosis, Signs/symptoms of bleeding, Laboratory test error suspected, Change in health, Change in alcohol use, Change in activity, Upcoming invasive procedure, Emergency department visit, Upcoming dental procedure, Missed doses, Extra doses, Change in medications, Change in diet/appetite, Hospital admission, Bruising, Other complaints        HPI:   Carlos Rivera seen in clinic today, on anticoagulation therapy with warfarin for stroke prevention due to history of mechanical mitral valve replacement.    Previous INR  2.6 on 11-14-17    Does patient have any changes to current medical/health status since last appt (Y/N):  NO  Does patient have any signs/symptoms of bleeding and/or thrombosis since the last appt (Y/N):  NO  Does patient have any interval changes to diet or medications since last appt (Y/N):  NO  Are there any complications or cost restrictions with current therapy (Y/N):  NO      Vitals:   Patient declines BP/vitals check at today's visit    Asssessment:      INR therapeutic at 2.6, therefore  decreasing his risk of stroke and/or bleeding event from anticoagulation.   Reason(s) for out of range INR today:  N/A      Plan:  Pt is to continue with current warfarin dosing regimen in order to maintain therapeutic INR and prevent future stroke and/or bleeding/bruising event.     Follow up:  Because warfarin is a high risk medication and current CHEST guidelines recommend regular monitoring intervals (few days up to 12 weeks), will have patient return to clinic in 2 weeks to recheck INR.    Casey Birmingham, PharmD

## 2017-11-22 ENCOUNTER — APPOINTMENT (OUTPATIENT)
Dept: NEUROLOGY | Facility: MEDICAL CENTER | Age: 72
End: 2017-11-22
Payer: MEDICARE

## 2017-11-27 ENCOUNTER — PATIENT MESSAGE (OUTPATIENT)
Dept: MEDICAL GROUP | Facility: PHYSICIAN GROUP | Age: 72
End: 2017-11-27

## 2017-11-28 ENCOUNTER — TELEPHONE (OUTPATIENT)
Dept: MEDICAL GROUP | Facility: PHYSICIAN GROUP | Age: 72
End: 2017-11-28

## 2017-11-28 NOTE — TELEPHONE ENCOUNTER
Please call wife and give her this info  [facility]   [phone]

## 2017-11-28 NOTE — TELEPHONE ENCOUNTER
From: Carlos Rivera  To: Mi Loza M.D.  Sent: 11/27/2017 7:01 PM PST  Subject: Non-Urgent Medical Question    Still haven't received referral. Could you call my wife Comfort's phone since I have hearing issues and she will make sure I get the phone. [phone]. I need to learn how to deal with my memory and other health issues. It makes me frustrated and angry. And we argue. She's getting help and I need to do the same.  ----- Message -----  From: Mi Loza M.D.  Sent: 11/22/2017 10:23 AM PST  To: Carlos Rivera  Subject: RE: Non-Urgent Medical Question  Hi Mr. Wymna. I put in the referral to behavioral health. They will be calling to get you an appointment soon. Please let us know if we can help in the mean time. Please continue the lisinopril for your blood pressure.     Mi Loza MD (working with [facility])    ----- Message -----   From: Carlos Rivera   Sent: 11/17/2017 9:18 AM PST   To: PRADEEP Batista  Subject: Non-Urgent Medical Question    Good Morning! Am I supposed to be taking lisinitol (Sp)? Also, I need a referral for mental health. I need some guidance. Thanks. [phone].

## 2017-11-28 NOTE — TELEPHONE ENCOUNTER
Regarding: RE: Non-Urgent Medical Question  Contact: [phone]  ----- Message from Mi Loza M.D. sent at 11/28/2017 12:42 PM PST -----       ----- Message sent from Mi Loza M.D. to Carlos Rivera at 11/28/2017 12:42 PM -----   Hi Mr. Wyman!  The referral has been processed and they are ready to schedule you.  You or your wife may call [facility] at  [phone].  Hope you have a great day.    Mi Loza MD    ----- Message -----     From: Carlos Rivera     Sent: 11/27/2017  7:01 PM PST       To: Mi Loza M.D.  Subject: Non-Urgent Medical Question    Still haven't received referral. Could you call my wife Comfort's phone since I have hearing issues and she will make sure I get the phone. [phone]. I need to learn how to deal with my memory and other health issues. It makes me frustrated and angry.  And we argue. She's getting help and I need to do the same.  ----- Message -----  From: Mi Loza M.D.  Sent: 11/22/2017 10:23 AM PST  To: Carlos Mooreis  Subject: RE: Non-Urgent Medical Question  Hi Mr. Wyman.  I put in the referral to behavioral health.  They will be calling to get you an appointment soon.  Please let us know if we can help in the mean time.  Please continue the lisinopril for your blood pressure.      Mi Loza MD (working with [employer])    ----- Message -----     From: Carlos Rivera     Sent: 11/17/2017  9:18 AM PST       To: PRADEEP Batista  Subject: Non-Urgent Medical Question    Good Morning! Am I supposed to be taking lisinitol (Sp)? Also, I need a referral for mental health. I need some guidance.  Thanks. [phone].

## 2017-12-04 ENCOUNTER — ANTICOAGULATION VISIT (OUTPATIENT)
Dept: MEDICAL GROUP | Facility: PHYSICIAN GROUP | Age: 72
End: 2017-12-04
Payer: MEDICARE

## 2017-12-04 DIAGNOSIS — Z95.2 HISTORY OF MITRAL VALVE REPLACEMENT WITH MECHANICAL VALVE: ICD-10-CM

## 2017-12-04 DIAGNOSIS — Z79.01 CHRONIC ANTICOAGULATION: ICD-10-CM

## 2017-12-04 LAB — INR PPP: 1.7 (ref 2–3.5)

## 2017-12-04 PROCEDURE — 99211 OFF/OP EST MAY X REQ PHY/QHP: CPT | Performed by: FAMILY MEDICINE

## 2017-12-04 PROCEDURE — 85610 PROTHROMBIN TIME: CPT | Performed by: FAMILY MEDICINE

## 2017-12-04 NOTE — PROGRESS NOTES
Anticoagulation Summary  As of 12/4/2017    INR goal:   2.5-3.5   TTR:   25.8 % (8.6 mo)   Today's INR:   1.7!   Maintenance plan:   2.5 mg (2.5 mg x 1) on Mon, Wed, Fri; 5 mg (2.5 mg x 2) all other days   Weekly total:   27.5 mg   Plan last modified:   Casey Birmingham, PharmD (11/6/2017)   Next INR check:   12/11/2017   Target end date:   Indefinite    Indications    Chronic anticoagulation [Z79.01]  History of mitral valve replacement with mechanical valve [Z95.2] [Z95.2]             Anticoagulation Episode Summary     INR check location:       Preferred lab:       Send INR reminders to:       Comments:         Anticoagulation Care Providers     Provider Role Specialty Phone number    PRADEEP Batista Referring Family Medicine [phone]    [facility] Responsible  [phone]        Anticoagulation Patient Findings  Patient Findings     Negatives:   Signs/symptoms of thrombosis, Signs/symptoms of bleeding, Laboratory test error suspected, Change in health, Change in alcohol use, Change in activity, Upcoming invasive procedure, Emergency department visit, Upcoming dental procedure, Missed doses, Extra doses, Change in medications, Change in diet/appetite, Hospital admission, Bruising, Other complaints        HPI:   Carlos Rivera seen in clinic today, on anticoagulation therapy with warfarin for stroke prevention due to history of mechanical mitral valve  CHADS-VASc = n/a  (adjusted ischemic stroke risk:  n/a)    Previous INR  2.6 on 11-20-17    Does patient have any changes to current medical/health status since last appt (Y/N):  NO  Does patient have any signs/symptoms of bleeding and/or thrombosis since the last appt (Y/N):  NO  Does patient have any interval changes to diet or medications since last appt (Y/N):  NO  Are there any complications or cost restrictions with current therapy (Y/N):  NO      Vitals:  Patient declines BP/vitals check at today's  visit.    Asssessment:      INR subtherapeutic at 1.7, therefore putting patient at higher risk of stroke due to mechanical mitral valve.   Reason(s) for out of range INR today:  No identifiable causes for low INR.      Plan:  Instructed patient to bolus with 7.5mg X 1, then resume current warfarin regimen.  His INR's have been quite labile over the past 3+ months, will possibly consider lovenox at next visit if INR is still low and patient is agreeable.     Follow up:  Because warfarin is a high risk medication and current CHEST guidelines recommend regular monitoring intervals (few days up to 12 weeks), will have patient return to clinic in 1 weeks to recheck INR.    Casey Birmingham, PharmD

## 2017-12-11 ENCOUNTER — ANTICOAGULATION VISIT (OUTPATIENT)
Dept: MEDICAL GROUP | Facility: PHYSICIAN GROUP | Age: 72
End: 2017-12-11
Payer: MEDICARE

## 2017-12-11 DIAGNOSIS — Z79.01 CHRONIC ANTICOAGULATION: ICD-10-CM

## 2017-12-11 DIAGNOSIS — Z95.2 HISTORY OF MITRAL VALVE REPLACEMENT WITH MECHANICAL VALVE: ICD-10-CM

## 2017-12-11 LAB — INR PPP: 1.8 (ref 2–3.5)

## 2017-12-11 PROCEDURE — 99211 OFF/OP EST MAY X REQ PHY/QHP: CPT | Performed by: NURSE PRACTITIONER

## 2017-12-11 PROCEDURE — 85610 PROTHROMBIN TIME: CPT | Performed by: NURSE PRACTITIONER

## 2017-12-11 NOTE — PROGRESS NOTES
Anticoagulation Summary  As of 12/11/2017    INR goal:   2.5-3.5   TTR:   25.1 % (8.9 mo)   Today's INR:   1.8!   Maintenance plan:   2.5 mg (2.5 mg x 1) on Mon, Wed, Fri; 5 mg (2.5 mg x 2) all other days   Weekly total:   27.5 mg   Plan last modified:   Casey Birmingham, PharmD (11/6/2017)   Next INR check:   12/18/2017   Target end date:   Indefinite    Indications    Chronic anticoagulation [Z79.01]  History of mitral valve replacement with mechanical valve [Z95.2] [Z95.2]             Anticoagulation Episode Summary     INR check location:       Preferred lab:       Send INR reminders to:       Comments:         Anticoagulation Care Providers     Provider Role Specialty Phone number    PRADEEP Batista Referring Family Medicine [phone]    [facility] Respons[facility]  [phone]        Anticoagulation Patient Findings  Patient Findings     Positives:   Missed doses    Negatives:   Signs/symptoms of thrombosis, Signs/symptoms of bleeding, Laboratory test error suspected, Change in health, Change in alcohol use, Change in activity, Upcoming invasive procedure, Emergency department visit, Upcoming dental procedure, Extra doses, Change in medications, Change in diet/appetite, Hospital admission, Bruising, Other complaints        HPI:   Carlos Rivera seen in clinic today, on anticoagulation therapy with warfarin for stroke prevention due to history of mechanical mitral valve replacement    Patients previous INR was subtherapeutic at 1.7 on 12-4-17, at which time a one time bolus dose of warfarin was ordered.  He is here today to recheck INR to ensure it has returned to therapeutic range, thus decreasing risk of clotting and/or bleeding complications due to warfarin.    CHADS-VASc = n/a  (unadjusted ischemic stroke risk/year:  n/a)    Does patient have any changes to current medical/health status since last appt (Y/N):  NO  Does patient have any signs/symptoms of bleeding and/or  thrombosis since the last appt (Y/N):  NO  Does patient have any interval changes to diet or medications since last appt (Y/N):  NO  Are there any complications or cost restrictions with current therapy (Y/N):  NO      Vitals:  Patient declines BP/vitals check at today's visit     Asssessment:      INR remains subtherapeutic at 1.8, therefore putting patient at higher risk of stroke secondary to mechanical mitral valve   Reason(s) for out of range INR today:  Missed dose yesterday      Plan:  Patient declines lovenox, therefore will bolus with 7.5mg X 2, then resume current warfarin regimen as detailed above in order to return INR to therapeutic range and prevent future stroke.     Follow up:  Because warfarin is a high risk medication and current CHEST guidelines recommend regular monitoring intervals (few days up to 12 weeks), will have patient return to clinic in 1 weeks to recheck INR.    Casey Birmingham, PharmD     Detail Level: Generalized Detail Level: Zone

## 2017-12-14 RX ORDER — CITALOPRAM HYDROBROMIDE 10 MG/1
TABLET ORAL
Qty: 90 TAB | Refills: 1 | Status: SHIPPED | OUTPATIENT
Start: 2017-12-14 | End: 2018-07-19 | Stop reason: SDUPTHER

## 2017-12-14 NOTE — TELEPHONE ENCOUNTER
Was the patient seen in the last year in this department? Yes     Does patient have an active prescription for medications requested? No     Received Request Via: Patient      Pt met protocol?: Yes    OV 9/17

## 2017-12-19 ENCOUNTER — OFFICE VISIT (OUTPATIENT)
Dept: MEDICAL GROUP | Facility: PHYSICIAN GROUP | Age: 72
End: 2017-12-19
Payer: MEDICARE

## 2017-12-19 VITALS
OXYGEN SATURATION: 95 % | HEIGHT: 68 IN | BODY MASS INDEX: 20.92 KG/M2 | WEIGHT: 138 LBS | DIASTOLIC BLOOD PRESSURE: 82 MMHG | TEMPERATURE: 97.6 F | HEART RATE: 82 BPM | RESPIRATION RATE: 14 BRPM | SYSTOLIC BLOOD PRESSURE: 130 MMHG

## 2017-12-19 DIAGNOSIS — H61.23 BILATERAL IMPACTED CERUMEN: ICD-10-CM

## 2017-12-19 PROCEDURE — 99999 PR NO CHARGE: CPT | Performed by: NURSE PRACTITIONER

## 2017-12-19 PROCEDURE — 99213 OFFICE O/P EST LOW 20 MIN: CPT | Performed by: NURSE PRACTITIONER

## 2017-12-19 RX ORDER — GABAPENTIN 100 MG/1
100 CAPSULE ORAL 3 TIMES DAILY
Qty: 90 CAP | Refills: 0 | Status: SHIPPED | OUTPATIENT
Start: 2017-12-19 | End: 2017-12-20 | Stop reason: SDUPTHER

## 2017-12-19 NOTE — ASSESSMENT & PLAN NOTE
Patient is accompanied by his wife who reports that he went to a  [facility] center 2 weeks ago and patient was then able to get hearing aids due to cerumen impaction.  She states that vertigo worsened when he was using ear drops provided at the hearing aid center.  She also reports she's had vertigo for 4-5 years and had a fall recently due to vertigo. Denies any head injury when he fell.

## 2017-12-20 ENCOUNTER — OFFICE VISIT (OUTPATIENT)
Dept: BEHAVIORAL HEALTH | Facility: PHYSICIAN GROUP | Age: 72
End: 2017-12-20
Payer: MEDICARE

## 2017-12-20 DIAGNOSIS — E11.00 TYPE 2 DIABETES MELLITUS WITH HYPEROSMOLARITY WITHOUT COMA, WITHOUT LONG-TERM CURRENT USE OF INSULIN (HCC): ICD-10-CM

## 2017-12-20 DIAGNOSIS — Z95.2 HISTORY OF MITRAL VALVE REPLACEMENT WITH MECHANICAL VALVE: ICD-10-CM

## 2017-12-20 DIAGNOSIS — F32.1 MODERATE SINGLE CURRENT EPISODE OF MAJOR DEPRESSIVE DISORDER (HCC): ICD-10-CM

## 2017-12-20 PROBLEM — F32.0 MILD SINGLE CURRENT EPISODE OF MAJOR DEPRESSIVE DISORDER (HCC): Status: ACTIVE | Noted: 2017-12-20

## 2017-12-20 PROCEDURE — 90791 PSYCH DIAGNOSTIC EVALUATION: CPT | Performed by: SOCIAL WORKER

## 2017-12-20 RX ORDER — GABAPENTIN 100 MG/1
100 CAPSULE ORAL 3 TIMES DAILY
Qty: 90 CAP | Refills: 1 | Status: SHIPPED | OUTPATIENT
Start: 2017-12-20 | End: 2018-02-26 | Stop reason: SINTOL

## 2017-12-20 NOTE — LETTER
"December 20, 2017        Carlos Rivera      Current Outpatient Prescriptions:   •  gabapentin, 100 mg, Oral, TID  •  citalopram, TAKE 1 TABLET BY MOUTH  EVERY MORNING, 12/19/2017  •  atorvastatin, TAKE 1 TABLET BY MOUTH  EVERY EVENING, 12/19/2017  •  metformin, TAKE 1 TABLET BY MOUTH TWO  TIMES DAILY WITH MEALS, 12/19/2017  •  nortriptyline, 10 mg, Oral, Q EVENING  •  lisinopril, 20 mg, Oral, DAILY, 12/19/2017  •  fexofenadine, 180 mg, Oral, DAILY, 12/19/2017  •  glipiZIDE, 5 mg, Oral, BID, 12/19/2017  •  Homeopathic Products ([employer] LEG CRAMPS PAIN RELIEF PO), Take  by mouth., 12/19/2017  •  Blood Glucose Monitoring Suppl, Test strips order: Test strips compatible with meter. Sig: use daily and prn ssx high or low sugar #100 RF x 3, 12/19/2017  •  INSULIN SYRINGE .3CC/31GX5/16\", Use 3 times daily with insulin, 12/19/2017  •  warfarin, Take two to three tablets by mouth one time daily as directed by coumadin clinic, 12/19/2017  •  insulin regular, Injected instructed 3 times a day before meals, 12/19/2017  •  Fluorouracil, by Apply externally route., 12/19/2017  •  ONETOUCH VERIO FLEX SYSTEM, by Does not apply route., 12/19/2017                                    "

## 2017-12-20 NOTE — TELEPHONE ENCOUNTER
Patient requests change in pharmacy.       [facility] - [place], [place]  [facility] [address]  [address]  [facility] [address]  Phone: [phone] Fax: [phone]

## 2017-12-20 NOTE — BH THERAPY
[facility]  INITIAL ASSESSMENT    Name: Carlos Rivera  MRN: [id number]  : 1945  Age: 72 y.o.  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    Carlos Rivera is a 72 y.o., White male referred for assessment by Mi Loza M.D..  Primary presenting issue includes   Chief Complaint   Patient presents with   • Depression   .     FAMILY/SOCIAL HISTORY  Current living situation/household members: Mr. Ayers lives with his wife of 48 years and their two cats.   Relevant family history/structure/dynamics: Reports having been raised by parents in a secure and nurturing environment with two sisters and two brothers, one brother is .  He has been  48 years and has three adult children  Living in [place], [place], and [place], two sons and a daughter.  Also has several grandchildren.  Current family/social stressors: Reports conflicted relationship with his wife past year since he retired. Also notes he and his wife have limited social life since moving to [place] which is quite different from their social life in [place] where they had long time friends.  Quality/quantity of current family and/or social support: Patient wife and a couple the patient has known for years are primary support.   Does patient/parent report a family history of behavioral health issues, diagnoses, or treatment? No  Family History   Problem Relation Age of Onset   • Heart Attack Father    • Heart Attack Paternal Uncle         BEHAVIORAL HEALTH TREATMENT HISTORY  Does patient/parent report a history of prior  behavioral health treatment for patient? No:    History of untreated behavioral health issues identified? No    MEDICAL HISTORY  Primary care behavioral health screenings: Patient Health Questionaire                                     If depressive symptoms identified deferred to follow up visit unless specifically addressed in assesment and plan.    Interpretation of PHQ-9 Total Score   Score Severity   1-4 No Depression   5-9 Mild Depression   10-14 Moderate Depression   15-19 Moderately Severe Depression   20-27 Severe Depression       Past medical/surgical history:   Past Medical History:   Diagnosis Date   • Anxiety 2014   • Depression    • Diabetes (CMS-HCC)    • Diverticulitis 2015   • Diverticulosis    • Hearing loss of both ears    • Hyperlipidemia    • Hypertension    • Mild cognitive impairment 2014   • Neck fracture (CMS-HCC) 2016   • Stroke (CMS-HCC)    • Talipes cavus       Past Surgical History:   Procedure Laterality Date   • MITRAL VALVE REPLACEMENT  1999   • INGUINAL HERNIA REPAIR  1984   • TONSILLECTOMY          Medication Allergies:  Vicodin [hydrocodone-acetaminophen]   Medical history provided by patient during current evaluation: Reports having fallen September 2016 incurring injury to his neck.     Patient reports last physical exam: Not reported  Does patient/parent report any history of or current developmental concerns? No  Does patient/parent report nutritional concerns? No  Does patient/parent report change in appetite or weight loss/gain? Yes  Does patient/parent report history of eating disorder symptoms? No  Does patient/parent report dental problem? No  Does patient/parent report physical pain? No   Indicate if pain is acute or chronic, and location: n/a   Pain scale rating:       Does patient/parent report functional impact of medical, developmental, or pain issues?   yes    EDUCATIONAL/LEARNING HISTORY  Is patient currently enrolled in a school/educational program?   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  School performance/functioning: No problems  History of Special Education/repeated grades/learning issues: no  Preferred learning style: not reported  Current learning needs (large print, language barrier, etc):  None      EMPLOYMENT/RESOURCES  Is the patient currently employed? No he retired one year ago.  Does the patient/parent report adequate financial resources? Yes though does add he worries about finances.   Does patient identify impact of presenting issue on work functioning? No  Work or income-related stressors:  n/a     HISTORY:  Does patient report current or past enlistment? Patient was in the [employer] for 24 years.    [If yes, complete below items]  Does patient report history of exposure to combat? No  Does patient report history of  sexual trauma? No  Does patient report other -related stressors? No    SPIRITUAL/CULTURAL/IDENTITY:  What are the patient’s/family’s spiritual beliefs or practices? He is not currently affiliated with a specific Islam though says he believes in God and would like to find a Islam he could attend here.  What is the patient’s cultural or ethnic background/identity?   How does the patient identify their sexual orientation? heterosexual  How does the patient identify their gender? male  Does the patient identify any spiritual/cultural/identity factors as relevant to the presenting issue? No    LEGAL HISTORY  Has the patient ever been involved with juvenile, adult, or family legal systems? No   [If yes, trigger section below:]  Does patient report ever being a victim of a crime?  No  Does patient report involvement in any current legal issues?  No  Does patient report ever being arrested or committing a crime? No  Does patient report any current agency (parole/probation/CPS/) involvement? No    ABUSE/NEGLECT/TRAUMA SCREENING  Does patient report feeling  “unsafe” in his/her home, or afraid of anyone? No  Does patient report any history of physical, sexual, or emotional abuse? No  Does parent or significant other report any of the above? No  Is there evidence of neglect by self? No  Is there evidence of neglect by a caregiver? No  Does the patient/parent report any history of CPS/APS/police involvement related to suspected abuse/neglect or domestic violence? No  Does the patient/parent report any other history of potentially traumatic life events? No  Based on the information provided during the current assessment, is a mandated report of suspected abuse/neglect being made?  No     SAFETY ASSESSMENT - SELF  Does patient acknowledge current or past symptoms of dangerousness to self? No  Does parent/significant other report patient has current or past symptoms of dangerousness to self? No      Recent change in frequency/specificity/intensity of suicidal thoughts or self-harm behavior? No  Current access to firearms, medications, or other identified means of suicide/self-harm? No  If yes, willing to restrict access to means of suicide/self-harm? n/a  Protective factors present: Future-oriented, Hopefulness, Optimism, Positive coping skills, Positive self-efficacy, Reasons for living identified by patient: Patient says he enjoys life and is dedicated to his wife., Spiritual beliefs/practices and Strong family connections    Current Suicide Risk: Not applicable  Crisis Safety Plan completed and copy given to patient: No    SAFETY ASSESSMENT - OTHERS  Does paor past symptoms of aggressive behavior or risk to others? No  Does parent/significant othtient acknowledge current or past symptoms of aggressive behavior or risk to others? No  Does parent/significant other report patient has current or past symptoms of aggressive behavior or risk to others? No    Recent change in frequency/specificity/intensity of thoughts or threats to harm others? No  Current access to  firearms/other identified means of harm? No  If yes, willing to restrict access to weapons/means of harm? n/a  Protective factors present: Moral/spiritual prohibition, Well-developed sense of empathy and Stable relationships    Current Homicide Risk:  Not applicable  Crisis Safety Plan completed and copy given to patient? No  Based on information provided during the current assessment, is a mandated “duty to warn” being exercised? No    SUBSTANCE USE/ADDICTION HISTORY  [] Not applicable - patient 10 years of age or younger    Is there a family history of substance use/addiction? No  Does patient acknowledge or parent/significant other report use of/dependence on substances? No  Last time patient used alcohol: n/a  Within the past week? No  Last time patient used marijuana: n/a  Within the past month? No  Any other street drugs ever tried even once? No  Any use of prescription medications/pills without a prescription, or for reasons others than originally prescribed?  No  Any other addictive behavior reported (gambling, shopping, sex)? No     Drug History:  Amphetamine:    Cannibis:      Cocaine:      Ecstasy:      Hallucinogen:      Inhalant:       Opiate:      Other:      Sedative:           What consequences does the patient associate with any of the above substance use and or addictive behaviors? None    Patient’s motivation/readiness for change: n/a     [] Patient denies use of any substance/addictive behaviors    STRENGTHS/ASSETS  Strengths Identified by interviewer: Self-awareness, Family suppport, Social support, Stable relationships and Optimism  Strengths Identified by patient: Hard worker, reliable, enjoys life    MENTAL STATUS/OBSERVATIONS   Participation: Active verbal participation and Open to feedback  Grooming: Neat  Orientation:Alert and Fully Oriented   Behavior: Calm  Eye contact: Good   Mood:Depressed  Affect:Constricted  Thought process: Logical  Thought content:  Within normal limits  Speech:  Rate within normal limits and Volume within normal limits  Perception: Within normal limits  Memory: intermittent memory lapses  Insight: Adequate  Judgment:  Adequate  Other:    Family/couple interaction observations: n/a      RESULTS OF SCREENING MEASURES:  [] Not applicable  Measure:   Score:     Measure:   Score:       CLINICAL FORMULATION:   Patient is a 72 year old male who appears to be of stated age.  Describes increased symptoms of depression past year since he retired and he and his wife moved to [place].  He also injured his neck when he had a fall September 2016 resulting is his loss of ability to engage in activities he formerly enjoyed such as bicycling and sailing.  Reports erratic sleep with decreased energy level and diminished appetite.  He has lost 15 pounds since moving to [place] a year ago, significant as he has slight build.  Reports diminished concentration with bouts of anxiety, increased agitation and symptoms of anhedonia.  Memory is also of concern to patient stating he notices increased difficulty.   Denies current and history of suicidal ideation and denies thought of self-harm.  Denies thoughts of harm to others. Stream of mental activity is logical, relevant, coherent, and is future oriented.  Mood and affect are stable.       DIAGNOSTIC IMPRESSION(S):  1. Mild single current episode of major depressive disorder (CMS-HCC)          IDENTIFIED NEEDS/PLAN:  [If any of these marked, trigger DISPOSITION list]  Mood/anxiety   Psychoeducation and practice on ACT concepts: letting go of unproductive thought fusion, defining and acting on values, mindfulness, acceptance, and cultivating psychological flexibility.  Refer to [facility]: Outpatient Therapy    Does patient express agreement with the above plan? Yes     Referral appointment(s) scheduled? Yes       Corinne G. Taylor, L.C.S.W.

## 2017-12-20 NOTE — PROGRESS NOTES
"Subjective:     Chief Complaint   Patient presents with   • Vertigo   • Cerumen Impaction   • Medication Refill     gabapentin, fluoracil       HPI:  Carlos Rivera is a 72 y.o. male here today to discuss the following:    Bilateral impacted cerumen  Patient is accompanied by his wife who reports that he went to a  Hearing Aid center 2 weeks ago and patient was then able to get hearing aids due to cerumen impaction.  She states that vertigo worsened when he was using ear drops provided at the hearing aid center.  She also reports she's had vertigo for 4-5 years and had a fall recently due to vertigo. Denies any head injury when he fell.         Current medicines (including changes today)  Current Outpatient Prescriptions   Medication Sig Dispense Refill   • citalopram (CELEXA) 10 MG tablet TAKE 1 TABLET BY MOUTH  EVERY MORNING 90 Tab 1   • atorvastatin (LIPITOR) 40 MG Tab TAKE 1 TABLET BY MOUTH  EVERY EVENING 90 Tab 1   • metformin (GLUCOPHAGE) 500 MG Tab TAKE 1 TABLET BY MOUTH TWO  TIMES DAILY WITH MEALS 180 Tab 0   • lisinopril (PRINIVIL) 20 MG Tab Take 1 Tab by mouth every day. 90 Tab 1   • fexofenadine (ALLEGRA ALLERGY) 180 MG tablet Take 180 mg by mouth every day.     • glipiZIDE (GLUCOTROL) 5 MG Tab Take 5 mg by mouth 2 times a day.     • Homeopathic Products ([employer] LEG CRAMPS PAIN RELIEF PO) Take  by mouth.     • Blood Glucose Monitoring Suppl Supplies Misc Test strips order: Test strips compatible with meter. Sig: use daily and prn ssx high or low sugar #100 RF x 3 100 Each 3   • Insulin Syringe-Needle U-100 (INSULIN SYRINGE .3CC/31GX5/16\") 31G X 5/16\" 0.3 ML Misc Use 3 times daily with insulin 300 Each 3   • warfarin (COUMADIN) 2.5 MG Tab Take two to three tablets by mouth one time daily as directed by coumadin clinic 270 Tab 1   • insulin regular (HUMULIN R) 100 Unit/mL Solution Injected instructed 3 times a day before meals 1 Vial 3   • Fluorouracil 2 % Solution by Apply externally route.     • Blood " "Glucose Monitoring Suppl (ONETOUCH VERIO FLEX SYSTEM) W/DEVICE Kit by Does not apply route.     • gabapentin (NEURONTIN) 100 MG Cap Take 100 mg by mouth 3 times a day.     • nortriptyline (PAMELOR) 10 MG Cap Take 1 Cap by mouth every evening. 90 Cap 1     No current facility-administered medications for this visit.        He  has a past medical history of Anxiety (2014); Diabetes (CMS-HCC); Diverticulitis (2015); Diverticulosis; Hearing loss of both ears; Hyperlipidemia; Hypertension; Mild cognitive impairment (2014); Neck fracture (CMS-HCC) (2016); Stroke (CMS-HCC); and Talipes cavus.    ROS  Review of Systems   Constitutional: Negative for fever, chills, weight loss and malaise/fatigue.   HENT: Negative for ear pain, nosebleeds, congestion, sore throat and neck pain.  positive for bilateral cerumen impaction and decreased hearing  Respiratory: Negative for cough, sputum production, shortness of breath and wheezing.    Cardiovascular: Negative for chest pain, palpitations,  and leg swelling.   Gastrointestinal: Negative for heartburn, nausea, vomiting, diarrhea and abdominal pain.   Neurological: Negative for tingling, tremors, sensory change, focal weakness and headaches. Positive for vertigo  Psychiatric/Behavioral: Negative for depression, anxiety, suicidal ideas, insomnia and memory loss.    All other systems reviewed and are negative except as in HPI.   Objective:   Physical Exam:  Blood pressure 130/82, pulse 82, temperature 36.4 °C (97.6 °F), resp. rate 14, height 1.735 m (5' 8.3\"), weight 62.6 kg (138 lb), SpO2 95 %. Body mass index is 20.8 kg/m².   Physical Exam:  Alert, oriented in no acute distress.  Eye contact is good, speech goal directed, affect calm  HEENT: conjunctiva non-injected, sclera non-icteric.  Pinna normal. Bilateral cerumen impaction  Neck No adenopathy or masses in the neck or supraclavicular regions.  Lungs: clear to auscultation bilaterally with good excursion.  CV: regular rate and " rhythm.   Abdomen: soft, nontender, No CVAT. Normal bowel sounds.  Ext: no edema, color normal, vascularity normal, temperature normal  MS: Normal gait and station    Health Maintenance Due   Topic Date Due   • Annual Wellness Visit  1945   • RETINAL SCREENING  10/12/1961   • IMM DTaP/Tdap/Td Vaccine (1 - Tdap) 10/12/1962   • COLONOSCOPY  10/12/1993   • IMM ZOSTER VACCINE  10/12/2003     Assessment and Plan:   The following treatment plan was discussed   1. Bilateral impacted cerumen  EAR IRRIGATION (MA ONLY)   following ear irrigation. Ear canals are clear bilaterally. TMs within normal limits bilaterally and hearing improved. Patient denied any dizziness and was advised to follow-up if vertigo reoccurs.    Followup: Return if symptoms worsen or fail to improve.   Please note that this dictation was created using voice recognition software. I have made every reasonable attempt to correct obvious errors, but I expect that there are errors of grammar and possibly content that I did not discover before finalizing the note.

## 2017-12-21 NOTE — TELEPHONE ENCOUNTER
Was the patient seen in the last year in this department? Yes     Does patient have an active prescription for medications requested? No     Received Request Via: Pharmacy      Pt met protocol?: Yes, labs 9/17 a1c 8.7, inr 12/11/17 1.8, ov 12/19/17

## 2017-12-21 NOTE — TELEPHONE ENCOUNTER
Was the patient seen in the last year in this department? Yes     Does patient have an active prescription for medications requested? No     Received Request Via: Pharmacy      Pt met protocol?: Yes, labs 9/17 a1c 8.4, ov 12/19/17 bp 130/82, and pharmacy change see ma notes

## 2017-12-22 RX ORDER — GLIPIZIDE 5 MG/1
5 TABLET ORAL 2 TIMES DAILY
Qty: 180 TAB | Refills: 0 | Status: SHIPPED | OUTPATIENT
Start: 2017-12-22 | End: 2018-02-26

## 2017-12-22 RX ORDER — LISINOPRIL 20 MG/1
20 TABLET ORAL DAILY
Qty: 90 TAB | Refills: 0 | Status: SHIPPED | OUTPATIENT
Start: 2017-12-22 | End: 2018-03-12

## 2017-12-22 RX ORDER — WARFARIN SODIUM 2.5 MG/1
TABLET ORAL
Qty: 270 TAB | Refills: 0 | Status: SHIPPED | OUTPATIENT
Start: 2017-12-22 | End: 2018-03-07 | Stop reason: SDUPTHER

## 2017-12-22 NOTE — TELEPHONE ENCOUNTER
Has upcoming appt w/ PCP. Will send 3 months of fills to pharmacy. Last [facility] through [facility] was low but pt to have upcoming appt.

## 2017-12-28 ENCOUNTER — APPOINTMENT (OUTPATIENT)
Dept: NEUROLOGY | Facility: MEDICAL CENTER | Age: 72
End: 2017-12-28
Payer: MEDICARE

## 2018-01-03 ENCOUNTER — TELEPHONE (OUTPATIENT)
Dept: MEDICAL GROUP | Facility: PHYSICIAN GROUP | Age: 73
End: 2018-01-03

## 2018-01-03 DIAGNOSIS — R42 VERTIGO: ICD-10-CM

## 2018-01-04 NOTE — TELEPHONE ENCOUNTER
1. Caller Name: pt                                         Call Back Number: [phone] (home)         Patient approves a detailed voicemail message: N\A    2. SPECIFIC Action To Be Taken: Referral pending, please sign.    3. Diagnosis/Clinical Reason for Request: Vertigo    4. Specialty & Provider Name/Lab/Imaging Location: ENT    5. Is appointment scheduled for requested order/referral: no    Patient informed they will receive a return phone call from the office ONLY if there are any questions before processing their request. Advised to call back if they haven't received a call from the referral department in 5 days.

## 2018-01-05 ENCOUNTER — ANTICOAGULATION VISIT (OUTPATIENT)
Dept: MEDICAL GROUP | Facility: PHYSICIAN GROUP | Age: 73
End: 2018-01-05
Payer: MEDICARE

## 2018-01-05 ENCOUNTER — OFFICE VISIT (OUTPATIENT)
Dept: BEHAVIORAL HEALTH | Facility: PHYSICIAN GROUP | Age: 73
End: 2018-01-05
Payer: MEDICARE

## 2018-01-05 DIAGNOSIS — Z79.01 CHRONIC ANTICOAGULATION: ICD-10-CM

## 2018-01-05 DIAGNOSIS — Z95.2 HISTORY OF MITRAL VALVE REPLACEMENT WITH MECHANICAL VALVE: ICD-10-CM

## 2018-01-05 DIAGNOSIS — F32.0 MILD SINGLE CURRENT EPISODE OF MAJOR DEPRESSIVE DISORDER (HCC): ICD-10-CM

## 2018-01-05 LAB — INR PPP: 2.1 (ref 2–3.5)

## 2018-01-05 PROCEDURE — 99999 PR NO CHARGE: CPT | Performed by: FAMILY MEDICINE

## 2018-01-05 PROCEDURE — 85610 PROTHROMBIN TIME: CPT | Performed by: FAMILY MEDICINE

## 2018-01-05 PROCEDURE — 90834 PSYTX W PT 45 MINUTES: CPT | Performed by: SOCIAL WORKER

## 2018-01-05 NOTE — PROGRESS NOTES
Anticoagulation Summary  As of 1/5/2018    INR goal:   2.5-3.5   TTR:   23.0 % (9.7 mo)   Today's INR:   2.1!   Maintenance plan:   2.5 mg (2.5 mg x 1) on Wed, Fri; 5 mg (2.5 mg x 2) all other days   Weekly total:   30 mg   Plan last modified:   Casey Birmingham, PharmD (1/5/2018)   Next INR check:   1/12/2018   Target end date:   Indefinite    Indications    Chronic anticoagulation [Z79.01]  History of mitral valve replacement with mechanical valve [Z95.2] [Z95.2]             Anticoagulation Episode Summary     INR check location:       Preferred lab:       Send INR reminders to:       Comments:         Anticoagulation Care Providers     Provider Role Specialty Phone number    PRADEEP Batista Referring Family Medicine [phone]    [facility] Responsible  [phone]        Anticoagulation Patient Findings    HPI:   Carlos Rivera seen in clinic today, on anticoagulation therapy with warfarin for stroke due to history of MVR.    Patient's previous INR was subtherapeutic at 1.8 on 12-11-17, at which time patient was instructed to bolus with two doses of warfarin, then resume current warfarin regimen.  He returns to clinic today to recheck INR to ensure it is therapeutic and thus preventing possible clotting and/or bleeding/bruising complications.  Patient missed previously scheduled appointment.    CHADS-VASc = n/a  (unadjusted ischemic stroke risk/year:  n/a)    Does patient have any changes to current medical/health status since last appt (Y/N):  NO  Does patient have any signs/symptoms of bleeding and/or thrombosis since the last appt (Y/N):  NO  Does patient have any interval changes to diet or medications since last appt (Y/N):  NO  Are there any complications or cost restrictions with current therapy (Y/N):  NO      Vitals:  Patient declines BP/vitals check at today's visit.    Asssessment:      INR remains subtherapeutic at 2.1, therefore putting patient at increased risk  of stroke due to MVR.   Reason(s) for out of range INR today:  No identifiable causes for low INR, patient able to verbalize current warfarin regimen.      Plan:  Instructed patient to bolus with 5mg X 1, then increase weekly warfarin regimen by ~9% as detailed above.  Discussed lovenox bridge due to several low INR's, patient declines at this time despite risks.     Follow up:  Because warfarin is a high risk medication and current CHEST guidelines recommend regular monitoring intervals (few days up to 12 weeks), will have patient return to clinic in 1 weeks to recheck INR.    Casey Birmingham, PharmD

## 2018-01-12 ENCOUNTER — ANTICOAGULATION VISIT (OUTPATIENT)
Dept: MEDICAL GROUP | Facility: PHYSICIAN GROUP | Age: 73
End: 2018-01-12
Payer: MEDICARE

## 2018-01-12 VITALS — DIASTOLIC BLOOD PRESSURE: 74 MMHG | HEART RATE: 41 BPM | SYSTOLIC BLOOD PRESSURE: 149 MMHG

## 2018-01-12 DIAGNOSIS — Z95.2 HISTORY OF MITRAL VALVE REPLACEMENT WITH MECHANICAL VALVE: ICD-10-CM

## 2018-01-12 DIAGNOSIS — Z79.01 CHRONIC ANTICOAGULATION: ICD-10-CM

## 2018-01-12 LAB — INR PPP: 3.1 (ref 2–3.5)

## 2018-01-12 PROCEDURE — 85610 PROTHROMBIN TIME: CPT | Performed by: FAMILY MEDICINE

## 2018-01-12 PROCEDURE — 99999 PR NO CHARGE: CPT | Performed by: FAMILY MEDICINE

## 2018-01-12 NOTE — PROGRESS NOTES
Anticoagulation Summary  As of 1/12/2018    INR goal:   2.5-3.5   TTR:   23.8 % (9.9 mo)   Today's INR:   3.1   Maintenance plan:   2.5 mg (2.5 mg x 1) on Wed, Fri; 5 mg (2.5 mg x 2) all other days   Weekly total:   30 mg   Plan last modified:   Casey Birmingham, PharmD (1/5/2018)   Next INR check:   1/19/2018   Target end date:   Indefinite    Indications    Chronic anticoagulation [Z79.01]  History of mitral valve replacement with mechanical valve [Z95.2] [Z95.2]             Anticoagulation Episode Summary     INR check location:       Preferred lab:       Send INR reminders to:       Comments:         Anticoagulation Care Providers     Provider Role Specialty Phone number    PRADEEP Batista Referring Family Medicine [phone]    [facility] Responsible  [phone]        Anticoagulation Patient Findings  Patient Findings     Negatives:   Signs/symptoms of thrombosis, Signs/symptoms of bleeding, Laboratory test error suspected, Change in health, Change in alcohol use, Change in activity, Upcoming invasive procedure, Emergency department visit, Upcoming dental procedure, Missed doses, Extra doses, Change in medications, Change in diet/appetite, Hospital admission, Bruising, Other complaints        HPI:   Carlos Rivera seen in clinic today, on anticoagulation therapy with warfarin for stroke prevention due to history of MVR.    Patient's previous INR was subtherapeutic at 2.1 on 1-5-18, at which time patient was instructed to bolus with one dose of warfarin, then increase weekly warfarin regimen.  He returns to clinic today to recheck INR to ensure it is therapeutic and thus preventing possible clotting and/or bleeding/bruising complications.    CHADS-VASc = n/a  (unadjusted ischemic stroke risk/year:  n/a)    Does patient have any changes to current medical/health status since last appt (Y/N):  NO  Does patient have any signs/symptoms of bleeding and/or thrombosis since the  "last appt (Y/N):  NO  Does patient have any interval changes to diet or medications since last appt (Y/N):  NO  Are there any complications or cost restrictions with current therapy (Y/N):  NO      Vitals:  /74  HR 41    Weight  declines   Height   5' 6\"     Asssessment:      INR therapeutic at 3.1, therefore decreasing risk of stroke and/or bleeding complications.   Reason(s) for out of range INR today:  n/a      Plan:  Pt is to continue with current warfarin dosing regimen as this dose has now returned patient's INR to therapeutic range.     Follow up:  Because warfarin is a high risk medication and current CHEST guidelines recommend regular monitoring intervals (few days up to 12 weeks), will have patient return to clinic in 1 weeks to recheck INR.    Casey Birmingham, PharmD    "

## 2018-01-16 ENCOUNTER — OFFICE VISIT (OUTPATIENT)
Dept: BEHAVIORAL HEALTH | Facility: PHYSICIAN GROUP | Age: 73
End: 2018-01-16
Payer: MEDICARE

## 2018-01-16 DIAGNOSIS — F32.A DEPRESSION, UNSPECIFIED DEPRESSION TYPE: ICD-10-CM

## 2018-01-16 PROCEDURE — 90834 PSYTX W PT 45 MINUTES: CPT | Performed by: SOCIAL WORKER

## 2018-01-17 NOTE — BH THERAPY
[facility]  Therapy Progress Note    Patient Name: Carlos Rivera  Patient MRN: [id number]  Today's Date: 1/16/2018     Type of session:Individual psychotherapy  Length of session: 45 minutes  Persons in attendance:Patient    Subjective/New Info: Patient presents with report of continued depressive symptoms including poor sleep, difficulty with concentration, and diminished energy.  He yawn intermittently during session and complains of feeling tired with no motivation.  Discussed his frustration with finances indicating his wife has taken care of their banking and bills for many years and he doesn't believe he has a grasp on what their current financial situation is.  Identifies income sources and notes when he asks for small amounts of money for miscellaneous spending he is often told they don't have the money and if he tried to pursue where the money has gone he says it becomes an argument.  Encouraged him to learn about what their financial situation is and discussed various ways he can do this.     Objective/Observations:   Participation: Active verbal participation, Attentive, Engaged and Open to feedback   Grooming: Casual   Cognition: Fully Oriented, somewhat drowsy.    Eye contact: Good   Mood: Depressed   Affect: Blunted   Thought process: Goal-directed   Speech: Rate within normal limits and Volume within normal limits   Other:     Diagnoses:   1. Mild single current episode of major depressive disorder (CMS-[facility])         Current risk:   SUICIDE: Not applicable   Homicide: Not applicable   Self-harm: Not applicable   Relapse: Not applicable   Other:    Safety Plan reviewed? Not Indicated   If evidence of imminent risk is present, intervention/plan:     Therapeutic Intervention(s): Goal-setting, Interpersonal effectiveness skills and Stressors assessed    Treatment Goal(s)/Objective(s) addressed: Identify and assign activities congruent with the client’s goals and values.   Progress toward  Treatment Goals: No change    Plan:   Process identified goals and values toward improving self-concept and resolving conflict..    - Next appointment scheduled:  1/16/2018  - Patient is in agreement with the above plan:  YES    Corinne G. Taylor, L.C.S.W.  1/16/2018

## 2018-01-17 NOTE — BH THERAPY
[facility]  Therapy Progress Note    Patient Name: Carlos Rivera  Patient MRN: [id number]  Today's Date: 1/16/2018     Type of session:Individual psychotherapy  Length of session: 45 minutes  Persons in attendance:Patient    Subjective/New Info: Patient reports feeling motivated to pursue previously discussed interest in his and wife's finances.  Says problems have recently surfaced indicating discrepancy between what he has believed was their situation and what is apparently in place.  Indicates reluctance to confront his wife as he believes it would be additionally problematic.  Discussed ways he can access the information he needs.  Discussed his brother selling him a car that patient is eager to receive.  Brother lives in [place] and they are figuring best way to get the car to the patient.  Also discussed his worry over having his sail boat still in [place] and trying to figure how to get the boat over to [place] so he may be able to use it when the weather warms.  Patient describes factors contributing to his depression since he and his wife moved to [place] and their friends and social life are back in [place].  He is encouraged to take charge of the facets he is able including going to the gym he joined and says he enjoys, communicating with his brother, and pursuing information related to finances.   Objective/Observations:   Participation: Active verbal participation, Attentive and Open to feedback   Grooming: Casual   Cognition: Drowsy/Somnolent   Eye contact: Limited   Mood: Depressed   Affect: Blunted   Thought process: Goal-directed   Speech: Rate within normal limits and Volume within normal limits   Other:     Diagnoses:   1. Depression, unspecified depression type         Current risk:   SUICIDE: Not applicable   Homicide: Not applicable   Self-harm: Not applicable   Relapse: Not applicable   Other:    Safety Plan reviewed? Not Indicated   If evidence of imminent risk is present,  intervention/plan:     Therapeutic Intervention(s): Conflict clarification, Interpersonal effectiveness skills and Problem-solving    Treatment Goal(s)/Objective(s) addressed: Discussed activities congruent with the client’s goals and values.   -esteem.    Progress toward Treatment Goals: No change    Plan:  Process identified goals and values toward improving self-concept and resolving conflict..     - Next appointment scheduled:  1/30/2018  - Patient is in agreement with the above plan:  YES    Corinne G. Taylor, L.C.SANAYA  1/16/2018

## 2018-01-19 ENCOUNTER — ANTICOAGULATION VISIT (OUTPATIENT)
Dept: MEDICAL GROUP | Facility: PHYSICIAN GROUP | Age: 73
End: 2018-01-19
Payer: MEDICARE

## 2018-01-19 VITALS — HEART RATE: 51 BPM | SYSTOLIC BLOOD PRESSURE: 154 MMHG | DIASTOLIC BLOOD PRESSURE: 74 MMHG

## 2018-01-19 DIAGNOSIS — Z79.01 CHRONIC ANTICOAGULATION: ICD-10-CM

## 2018-01-19 DIAGNOSIS — Z95.2 HISTORY OF MITRAL VALVE REPLACEMENT WITH MECHANICAL VALVE: ICD-10-CM

## 2018-01-19 LAB — INR PPP: 2.5 (ref 2–3.5)

## 2018-01-19 PROCEDURE — 99999 PR NO CHARGE: CPT | Performed by: FAMILY MEDICINE

## 2018-01-19 PROCEDURE — 85610 PROTHROMBIN TIME: CPT | Performed by: FAMILY MEDICINE

## 2018-01-19 NOTE — PROGRESS NOTES
Anticoagulation Summary  As of 1/19/2018    INR goal:   2.5-3.5   TTR:   25.6 % (10.2 mo)   Today's INR:   2.5   Maintenance plan:   2.5 mg (2.5 mg x 1) on Wed, Fri; 5 mg (2.5 mg x 2) all other days   Weekly total:   30 mg   Plan last modified:   Casey Birmingham, PharmD (1/5/2018)   Next INR check:   2/2/2018   Target end date:   Indefinite    Indications    Chronic anticoagulation [Z79.01]  History of mitral valve replacement with mechanical valve [Z95.2] [Z95.2]             Anticoagulation Episode Summary     INR check location:       Preferred lab:       Send INR reminders to:       Comments:         Anticoagulation Care Providers     Provider Role Specialty Phone number    PRADEEP Batista Referring Family Medicine [phone]    [facility] Responsible  [phone]        Anticoagulation Patient Findings     HPI:   Carlos Rivera seen in clinic today, on anticoagulation therapy with warfarin for stroke prevention due to history of mitral valve replacement with mechanical valve.    Patient's previous INR was therapeutic at 3.1 on 1/12/18, at which time patient was instructed to continue current regimen.  He returns to clinic today to recheck INR to ensure it is therapeutic and thus preventing possible clotting and/or bleeding/bruising complications.    CHADS-VASc = n/a  (unadjusted ischemic stroke risk/year:  n/a, which is n/a)    Does patient have any changes to current medical/health status since last appt (Y/N):  no  Does patient have any signs/symptoms of bleeding and/or thrombosis since the last appt (Y/N):  no  Does patient have any interval changes to diet or medications since last appt (Y/N):  no  Are there any complications or cost restrictions with current therapy (Y/N):  no      Vitals:  /77  HR 49    Second check /74 HR 51   Weight  declined   Height   5'8''     Asssessment:      INR therapeutic at 2.5, therefore decreased risk of stroke.   Reason(s)  for out of range INR today:  n/a      Plan:  Pt is to continue with current warfarin dosing regimen as this dose has now returned patient's INR to therapeutic range.     Follow up:  Because warfarin is a high risk medication and current CHEST guidelines recommend regular monitoring intervals (few days up to 12 weeks), will have patient return to clinic in 2 weeks to recheck INR.    SignedA[place], Pharmacy Intern    Casey Birmingham, PharmD

## 2018-01-30 ENCOUNTER — OFFICE VISIT (OUTPATIENT)
Dept: BEHAVIORAL HEALTH | Facility: PHYSICIAN GROUP | Age: 73
End: 2018-01-30
Payer: MEDICARE

## 2018-01-30 DIAGNOSIS — F33.0 MILD EPISODE OF RECURRENT MAJOR DEPRESSIVE DISORDER (HCC): ICD-10-CM

## 2018-01-30 PROCEDURE — 90834 PSYTX W PT 45 MINUTES: CPT | Performed by: SOCIAL WORKER

## 2018-01-31 NOTE — BH THERAPY
[facility]  Therapy Progress Note    Patient Name: Carlos Rivera  Patient MRN: [id number]  Today's Date: 1/31/2018     Type of session:Individual psychotherapy  Length of session: 45 minutes  Persons in attendance:Patient    Subjective/New Info: Patient presents with report he has taken steps to clarify his financial picture talking with his pension representative.  Says he believes his wife is on board with cutting back on spending and working on budget.  Discussed his plans for bringing belongings from [place] to [place] so he will be able to work on and use his boat and his camper. States he and wife were in a camping group in [place] and have gone to meetings in [place] with similar group and he hopes they can join the group for camping trips during spring and summer.  He has spoken of feeling sad and upset about not having access to his boat which has been a source of malik for many years.  He has the boat in storage in the [place] area and this causes him some stress in wondering how and when he can bring the boat to [place].  Discussed he and his wife formulating a plan to accomplish the task and he seems to think that would be a positive project for them to consider together.  Carlos has indicated that he and his wife have not felt settled since moving to this area and we discussed how they have left items that significantly contributed to their leisure time and pleasant life experiences and that bringing them to [place] might enhance their experience here.     Objective/Observations:   Participation: Active verbal participation, Attentive and Open to feedback   Grooming: Casual and Neat   Cognition: Fully Oriented   Eye contact: Good   Mood: Euthymic   Affect: Full range   Thought process: Goal-directed   Speech: Rate within normal limits and Volume within normal limits   Other:     Diagnoses:   1. Mild episode of recurrent major depressive disorder (CMS-HCC)         Current risk:   SUICIDE:  Low   Homicide: Not applicable   Self-harm: Not applicable   Relapse: Not applicable   Other:    Safety Plan reviewed? Not indicated   If evidence of imminent risk is present, intervention/plan:     Therapeutic Intervention(s): Goal-setting, Interpersonal effectiveness skills and Problem-solving  Treatment Goal(s)/Objective(s) addressed:     Progress toward Treatment Goals: Mild improvement   Discussed activities congruent with the client’s goals and values.       Plan:  Process identified goals and values toward improving self-concept and resolving conflict.     - Next appointment scheduled:  2/13/2018  - Patient is in agreement with the above plan:  YES    Corinne G. Taylor, L.C.S.W.  1/31/2018

## 2018-02-02 ENCOUNTER — ANTICOAGULATION VISIT (OUTPATIENT)
Dept: MEDICAL GROUP | Facility: PHYSICIAN GROUP | Age: 73
End: 2018-02-02
Payer: MEDICARE

## 2018-02-02 VITALS — SYSTOLIC BLOOD PRESSURE: 135 MMHG | HEART RATE: 47 BPM | DIASTOLIC BLOOD PRESSURE: 97 MMHG

## 2018-02-02 DIAGNOSIS — Z95.2 HISTORY OF MITRAL VALVE REPLACEMENT WITH MECHANICAL VALVE: ICD-10-CM

## 2018-02-02 DIAGNOSIS — Z79.01 CHRONIC ANTICOAGULATION: ICD-10-CM

## 2018-02-02 LAB — INR PPP: 3.4 (ref 2–3.5)

## 2018-02-02 PROCEDURE — 99999 PR NO CHARGE: CPT | Performed by: INTERNAL MEDICINE

## 2018-02-02 PROCEDURE — 85610 PROTHROMBIN TIME: CPT | Performed by: INTERNAL MEDICINE

## 2018-02-02 NOTE — PROGRESS NOTES
Anticoagulation Summary  As of 2/2/2018    INR goal:   2.5-3.5   TTR:   28.9 % (10.6 mo)   Today's INR:   3.4   Maintenance plan:   2.5 mg (2.5 mg x 1) on Wed, Fri; 5 mg (2.5 mg x 2) all other days   Weekly total:   30 mg   Plan last modified:   Casey Birmingham, PharmD (1/5/2018)   Next INR check:   2/23/2018   Target end date:   Indefinite    Indications    Chronic anticoagulation [Z79.01]  History of mitral valve replacement with mechanical valve [Z95.2] [Z95.2]             Anticoagulation Episode Summary     INR check location:       Preferred lab:       Send INR reminders to:       Comments:         Anticoagulation Care Providers     Provider Role Specialty Phone number    PRADEEP Batista Referring Family Medicine [phone]    [facility] Responsible  [phone]        Anticoagulation Patient Findings  Patient Findings     Negatives:   Signs/symptoms of thrombosis, Signs/symptoms of bleeding, Laboratory test error suspected, Change in health, Change in alcohol use, Change in activity, Upcoming invasive procedure, Emergency department visit, Upcoming dental procedure, Missed doses, Extra doses, Change in medications, Change in diet/appetite, Hospital admission, Bruising, Other complaints        HPI:   Carlos Rivera seen in clinic today, on anticoagulation therapy with warfarin for stroke due to history of MVR.    Patient's previous INR was therapeutic at 2.5 on 1-19-18, at which time patient was instructed to continue current warfarin regimen.  Carlos returns to clinic today to recheck INR to ensure it is therapeutic and thus preventing possible clotting and/or bleeding/bruising complications.    CHADS-VASc = n/a  (unadjusted ischemic stroke risk/year:  n/a)    Does patient have any changes to current medical/health status since last appt (Y/N):  No  Does patient have any signs/symptoms of bleeding and/or thrombosis since the last appt (Y/N):  NO  Does patient have any  "interval changes to diet or medications since last appt (Y/N):  NO  Are there any complications or cost restrictions with current therapy (Y/N):  NO      Vitals:  /97  HR 47    Weight  declines   Height   5' 8\"     Asssessment:      INR remains therapeutic at 3.4, therefore decreasing risk of stroke and/or bleeding complications.   Reason(s) for out of range INR today:  n/a      Plan:  Pt is to continue with current warfarin dosing regimen in order to maintain INR in therapeutic range.     Follow up:  Because warfarin is a high risk medication and current CHEST guidelines recommend regular monitoring intervals (few days up to 12 weeks), will have patient return to clinic in 3 weeks to recheck INR.    Casey Birmingham, PharmD    "

## 2018-02-13 ENCOUNTER — OFFICE VISIT (OUTPATIENT)
Dept: BEHAVIORAL HEALTH | Facility: PHYSICIAN GROUP | Age: 73
End: 2018-02-13
Payer: MEDICARE

## 2018-02-13 DIAGNOSIS — F32.A DEPRESSION, UNSPECIFIED DEPRESSION TYPE: ICD-10-CM

## 2018-02-13 PROCEDURE — 90834 PSYTX W PT 45 MINUTES: CPT | Performed by: SOCIAL WORKER

## 2018-02-14 NOTE — BH THERAPY
[facility]  Therapy Progress Note    Patient Name: Carlos Rivera  Patient MRN: [id number]  Today's Date: 2/14/2018     Type of session:Individual psychotherapy  Length of session: 45 minutes  Persons in attendance:Patient    Subjective/New Info: Met with patient who informs he and his wife have discussed going to [place] to retrieve their camper and also their sail boat noting they believe it craig be comforting to have these item where they can be used readily as theses are items that have provided numerous joyful and memorable occasions. We have discussed patient's having difficulty with embracing their move to [place] and have discussed how having these significant possessions still being stored in [place] contribute to his reluctance to engage in pleasurable activities in [place].  Discussed patient's finances and he reports having made arrangement for his [employer] pension to be deposited in an account that is in patient's name only.   Near end of session patient reports having recent episodes of anger outbursts and informs that his wife asked patient to bring up the topic in session.  Discussed distress tolerance techniques with plan to explore and practice next week.     Objective/Observations:   Participation: Active verbal participation, Engaged and Open to feedback   Grooming: Neat   Cognition: Fully Oriented   Eye contact: Good   Mood: Euthymic   Affect: Full range   Thought process: Goal-directed   Speech: Volume within normal limits   Other:     Diagnoses:   1. Depression, unspecified depression type         Current risk:   SUICIDE: Not applicable   Homicide: Not applicable   Self-harm: Not applicable   Relapse: Not applicable   Other:    Safety Plan reviewed? Not Indicated   If evidence of imminent risk is present, intervention/plan:     Therapeutic Intervention(s): Conflict resolution skills, Distress tolerance skills, Interpersonal effectiveness skills and Leisure and recreation  skills    Treatment Goal(s)/Objective(s) addressed: Identify and engage in activities that would support treatment goals by being consistent with one’s values.  Progress toward Treatment Goals: Mild improvement    Plan:  Psychoeducation and practice on utilization of mindfulness meditation to improve emotional regulation, cognition, and distress tolerance.    - Next appointment scheduled:  2/27/2018  - Patient is in agreement with the above plan:  YES    Corinne G. Taylor, L.C.SANAYA  2/14/2018

## 2018-02-23 ENCOUNTER — ANTICOAGULATION VISIT (OUTPATIENT)
Dept: MEDICAL GROUP | Facility: PHYSICIAN GROUP | Age: 73
End: 2018-02-23
Payer: MEDICARE

## 2018-02-23 DIAGNOSIS — Z79.01 CHRONIC ANTICOAGULATION: ICD-10-CM

## 2018-02-23 DIAGNOSIS — Z95.2 HISTORY OF MITRAL VALVE REPLACEMENT WITH MECHANICAL VALVE: ICD-10-CM

## 2018-02-23 DIAGNOSIS — E11.00 TYPE 2 DIABETES MELLITUS WITH HYPEROSMOLARITY WITHOUT COMA, WITHOUT LONG-TERM CURRENT USE OF INSULIN (HCC): ICD-10-CM

## 2018-02-23 LAB — INR PPP: 1.1 (ref 2–3.5)

## 2018-02-23 PROCEDURE — 99211 OFF/OP EST MAY X REQ PHY/QHP: CPT | Performed by: FAMILY MEDICINE

## 2018-02-23 PROCEDURE — 85610 PROTHROMBIN TIME: CPT | Performed by: FAMILY MEDICINE

## 2018-02-23 NOTE — TELEPHONE ENCOUNTER
Was the patient seen in the last year in this department? Yes     Does patient have an active prescription for medications requested? No     Received Request Via: Pharmacy      Pt met protocol?: Yes, 12/17

## 2018-02-23 NOTE — PROGRESS NOTES
Anticoagulation Summary  As of 2/23/2018    INR goal:   2.5-3.5   TTR:   29.5 % (11.3 mo)   Today's INR:   1.1!   Maintenance plan:   2.5 mg (2.5 mg x 1) on Wed, Fri; 5 mg (2.5 mg x 2) all other days   Weekly total:   30 mg   Plan last modified:   Casey Birmingham, PharmD (1/5/2018)   Next INR check:   2/26/2018   Target end date:   Indefinite    Indications    Chronic anticoagulation [Z79.01]  History of mitral valve replacement with mechanical valve [Z95.2] [Z95.2]             Anticoagulation Episode Summary     INR check location:       Preferred lab:       Send INR reminders to:       Comments:         Anticoagulation Care Providers     Provider Role Specialty Phone number    PRADEEP Batista Referring Family Medicine [phone]    [facility] Responsible  [phone]        Anticoagulation Patient Findings  Patient Findings     Positives:   Missed doses    Negatives:   Signs/symptoms of thrombosis, Signs/symptoms of bleeding, Laboratory test error suspected, Change in health, Change in alcohol use, Change in activity, Upcoming invasive procedure, Emergency department visit, Upcoming dental procedure, Extra doses, Change in medications, Change in diet/appetite, Hospital admission, Bruising, Other complaints        HPI:   Carlos Rivera seen in clinic today, on anticoagulation therapy with warfarin for stroke prevention due to history of mechanical MVR.    Patient's previous INR was therapeutic at 3.4 on 2-2-18, at which time patient was instructed to continue with current warfarin regimen.  He returns to clinic today to recheck INR to ensure it is therapeutic and thus preventing possible clotting and/or bleeding/bruising complications.    CHADS-VASc = n/a  (unadjusted ischemic stroke risk/year:  n/a)    Does patient have any changes to current medical/health status since last appt (Y/N):  NO  Does patient have any signs/symptoms of bleeding and/or thrombosis since the last  "appt (Y/N):  NO  Does patient have any interval changes to diet or medications since last appt (Y/N):  NO  Are there any complications or cost restrictions with current therapy (Y/N):  NO      Vitals:  /85  HR 81    Weight  declines   Height   5' 8\"     Asssessment:      INR critically subtherapeutic at 1.1, therefore increasing patient's risk of stroke due to mechanical MVR   Reason(s) for out of range INR today:  Possibly multiple missed doses      Plan:  Instructed patient to bolus with 7.5mg X 2, then resume current warfarin regimen.  He will also begin lovenox 60mg two times daily until INR >2.5.  Prescription for injections sent to preferred pharmacy.     Follow up:  Because warfarin is a high risk medication and current CHEST guidelines recommend regular monitoring intervals (few days up to 12 weeks), will have patient return to clinic in 3 days to recheck INR.    Casey Birmingham, PharmD    "

## 2018-02-23 NOTE — TELEPHONE ENCOUNTER
Was the patient seen in the last year in this department? Yes     Does patient have an active prescription for medications requested? Yes pharmacy states pt tests twice a day    Received Request Via: Pharmacy

## 2018-02-24 NOTE — TELEPHONE ENCOUNTER
Patient has recently been seen by PCP within the last 6 months per protocol (12/17). Will refill DM supplies for 12 months.  Lab Results   Component Value Date/Time    HBA1C 8.7 (H) 09/19/2017 08:39 AM      Lab Results   Component Value Date/Time    MALBCRT 499 (H) 09/19/2017 08:38 AM    MICROALBUR 35.3 09/19/2017 08:38 AM      Lab Results   Component Value Date/Time    ALKPHOSPHAT 86 09/19/2017 08:39 AM    ASTSGOT 41 09/19/2017 08:39 AM    ALTSGPT 51 (H) 09/19/2017 08:39 AM    TBILIRUBIN 0.8 09/19/2017 08:39 AM

## 2018-02-26 ENCOUNTER — OFFICE VISIT (OUTPATIENT)
Dept: MEDICAL GROUP | Facility: PHYSICIAN GROUP | Age: 73
End: 2018-02-26
Payer: MEDICARE

## 2018-02-26 ENCOUNTER — ANTICOAGULATION VISIT (OUTPATIENT)
Dept: MEDICAL GROUP | Facility: PHYSICIAN GROUP | Age: 73
End: 2018-02-26
Payer: MEDICARE

## 2018-02-26 VITALS
DIASTOLIC BLOOD PRESSURE: 72 MMHG | SYSTOLIC BLOOD PRESSURE: 100 MMHG | HEART RATE: 65 BPM | BODY MASS INDEX: 21.13 KG/M2 | RESPIRATION RATE: 14 BRPM | WEIGHT: 139.4 LBS | OXYGEN SATURATION: 95 % | TEMPERATURE: 97.9 F | HEIGHT: 68 IN

## 2018-02-26 DIAGNOSIS — M79.672 FOOT PAIN, BILATERAL: ICD-10-CM

## 2018-02-26 DIAGNOSIS — M79.671 FOOT PAIN, BILATERAL: ICD-10-CM

## 2018-02-26 DIAGNOSIS — F32.0 MILD SINGLE CURRENT EPISODE OF MAJOR DEPRESSIVE DISORDER (HCC): ICD-10-CM

## 2018-02-26 DIAGNOSIS — Z95.2 HISTORY OF MITRAL VALVE REPLACEMENT WITH MECHANICAL VALVE: ICD-10-CM

## 2018-02-26 DIAGNOSIS — Z79.01 CHRONIC ANTICOAGULATION: ICD-10-CM

## 2018-02-26 DIAGNOSIS — R26.89 SHUFFLING GAIT: ICD-10-CM

## 2018-02-26 DIAGNOSIS — E11.00 TYPE 2 DIABETES MELLITUS WITH HYPEROSMOLARITY WITHOUT COMA, WITHOUT LONG-TERM CURRENT USE OF INSULIN (HCC): ICD-10-CM

## 2018-02-26 PROBLEM — G62.9 NEUROPATHY: Status: ACTIVE | Noted: 2018-02-26

## 2018-02-26 LAB — INR PPP: 1.5 (ref 2–3.5)

## 2018-02-26 PROCEDURE — 99204 OFFICE O/P NEW MOD 45 MIN: CPT | Performed by: INTERNAL MEDICINE

## 2018-02-26 PROCEDURE — 85610 PROTHROMBIN TIME: CPT | Performed by: INTERNAL MEDICINE

## 2018-02-26 PROCEDURE — 99999 PR NO CHARGE: CPT | Performed by: INTERNAL MEDICINE

## 2018-02-26 RX ORDER — INSULIN GLARGINE 100 [IU]/ML
7 INJECTION, SOLUTION SUBCUTANEOUS
Qty: 10 ML | Refills: 2 | Status: SHIPPED | OUTPATIENT
Start: 2018-02-26 | End: 2018-03-12

## 2018-02-26 ASSESSMENT — PATIENT HEALTH QUESTIONNAIRE - PHQ9: CLINICAL INTERPRETATION OF PHQ2 SCORE: 0

## 2018-02-26 NOTE — PATIENT INSTRUCTIONS
· Please cut your atorvastatin in half from 40 to 20 mg  · Please stop the glipizide and humulin insulin and start glargine (long acting insulin) 7 units at bedtime. Follow up in 2 weeks with me and the diabetes nurse

## 2018-02-26 NOTE — PROGRESS NOTES
Anticoagulation Summary  As of 2/26/2018    INR goal:   2.5-3.5   TTR:   29.2 % (11.4 mo)   Today's INR:   1.5!   Maintenance plan:   2.5 mg (2.5 mg x 1) on Wed, Fri; 5 mg (2.5 mg x 2) all other days   Weekly total:   30 mg   Plan last modified:   Casey Birmingham, PharmD (1/5/2018)   Next INR check:   3/2/2018   Target end date:   Indefinite    Indications    Chronic anticoagulation [Z79.01]  History of mitral valve replacement with mechanical valve [Z95.2] [Z95.2]             Anticoagulation Episode Summary     INR check location:       Preferred lab:       Send INR reminders to:       Comments:         Anticoagulation Care Providers     Provider Role Specialty Phone number    PRADEEP Batista Referring Family Medicine [phone]    [facility] Responsible  [phone]        Anticoagulation Patient Findings    HPI:   Carlos Rivera seen in clinic today, on anticoagulation therapy with warfarin for stroke prevention due to history of MVR in mitral position.    Patient's previous INR was subtherapeutic at 1.1 on 2-23-18, at which time patient was instructed to bolus with two doses, then resume current warfarin regimen.  He returns to clinic today to recheck INR to ensure it is therapeutic and thus preventing possible clotting and/or bleeding/bruising complications.    CHADS-VASc = n/a  (unadjusted ischemic stroke risk/year:  n/a)    Does patient have any changes to current medical/health status since last appt (Y/N):  NO  Does patient have any signs/symptoms of bleeding and/or thrombosis since the last appt (Y/N):  NO  Does patient have any interval changes to diet or medications since last appt (Y/N):  NO  Are there any complications or cost restrictions with current therapy (Y/N):  NO      Vitals:  Patient has appt with PCP today, vitals from that visit as follows:   BP  100/72  HR 65   RR  14    Asssessment:      INR remains subtherapeutic at 1.5, therefore increasing patients  risk of stroke due to MVR   Reason(s) for out of range INR today:  Patient denies missed doses and is able to verify dosing over the last several days.  No other identifiable causes for continued low INR.      Plan:  Instructed patient to bolus with 7.5mg x 2, then 5mg daily until next INR.  He will continue with lovenox 60mg two times daily until INR >2.5 as well.     Follow up:  Because warfarin is a high risk medication and current CHEST guidelines recommend regular monitoring intervals (few days up to 12 weeks), will have patient return to clinic in 4 days to recheck INR.    Casey Birmingham, PharmD

## 2018-02-26 NOTE — PROGRESS NOTES
PRIMARY CARE CLINIC NEW PATIENT H&P  Chief Complaint   Patient presents with   • Foot Problem     Referral Needed    • Diabetes     History of Present Illness     Presents with his wife to establish care and discuss the following:     Type II diabetes mellitus (CMS-[facility])  Taking humulin 5u qHS and 2u qAM. Sometimes will take more in the mornings if he eats a bigger breakfast. Was originally started on NPH at [place] about 5-6 years ago. Also taking metformin and glipizide. His fasting blood sugars are up to 140s. Had donuts yesterday and fasting sugar today was in the 200s. After dinner blood sugars are usually 130-135. Has diabetic neuropathy.     Neuropathy (CMS-HCC)  Started on gabapentin 100 mg tid but was falling asleep and self discontinued it with improvement of mentation. Has shuffling gait. Didn't tolerate nortriptyline either. Would rather put up with the pain than deal with the side affects of gabapentin and nortriptyline. Has been having pain in his feet for a year and a half. Describes the pain as walking on hot coals.      Mild single current episode of major depressive disorder (CMS-HCC)  On Celexa 10 mg daily and seeing behavioral health.     History of mitral valve replacement with mechanical valve [Z95.2]  On coumadin.     Shuffling gait  Has a shuffling gait and has appointment to be evaluated by Dr. Cartagena in neurology next week.     Foot pain, bilateral  Has been having severe bilateral foot pain since the last 2 years per wife. Worse with ambulation and feels like he's walking on hot coals. Has inserts with podiatry but hasn't had any relief. Was treated with notriptyline and gabapentin for presumed neuropathy but didn't tolerate the side affects so he stopped both of those.       Current Outpatient Prescriptions   Medication Sig Dispense Refill   • insulin glargine (LANTUS) 100 UNIT/ML Solution Inject 7 Units as instructed every bedtime. 10 mL 2   • citalopram (CELEXA) 10 MG tablet TAKE 1 TABLET  "BY MOUTH  EVERY MORNING 90 Tab 1   • atorvastatin (LIPITOR) 40 MG Tab TAKE 1 TABLET BY MOUTH  EVERY EVENING 90 Tab 1   • fexofenadine (ALLEGRA ALLERGY) 180 MG tablet Take 180 mg by mouth every day.     • Homeopathic Products ([employer] LEG CRAMPS PAIN RELIEF PO) Take  by mouth.     • Insulin Syringe-Needle U-100 (INSULIN SYRINGE .3CC/31GX5/16\") 31G X 5/16\" 0.3 ML Misc Use 3 times daily with insulin 300 Each 3   • Fluorouracil 2 % Solution by Apply externally route.     • Blood Glucose Monitoring Suppl (ONETOUCH VERIO FLEX SYSTEM) W/DEVICE Kit by Does not apply route.     • Blood Glucose Monitoring Suppl Supplies Misc Test strips order: Test strips compatible with meter. Sig: use daily and prn ssx high or low sugar 200 Each 3   • enoxaparin (LOVENOX) 60 MG/0.6ML Solution inj Inject 60 mg as instructed every 12 hours. 10 Syringe 1   • lisinopril (PRINIVIL) 20 MG Tab Take 1 Tab by mouth every day. 90 Tab 0   • metformin (GLUCOPHAGE) 500 MG Tab TAKE 1 TABLET BY MOUTH TWO  TIMES DAILY WITH MEALS 180 Tab 0   • warfarin (COUMADIN) 2.5 MG Tab Take two to three tablets by mouth one time daily as directed by coumadin clinic 270 Tab 0     No current facility-administered medications for this visit.        Past Medical History:   Diagnosis Date   • Chronic anticoagulation 2/23/2017   • History of mitral valve replacement with mechanical valve [Z95.2] 3/10/2017   • Hyperlipidemia    • Type II diabetes mellitus (CMS-[facility]) 2/23/2017     Past Surgical History:   Procedure Laterality Date   • MITRAL VALVE REPLACEMENT  1999   • INGUINAL HERNIA REPAIR Bilateral 1984   • TONSILLECTOMY       Social History   Substance Use Topics   • Smoking status: Never Smoker   • Smokeless tobacco: Never Used   • Alcohol use No     Social History     Social History Narrative    Retired from [employer]      Family History   Problem Relation Age of Onset   • Heart Attack Father 58   • Diabetes Father    • Heart Attack Paternal Uncle      Family Status   Relation " "Status   • Mother    • Father     58   • Paternal Uncle      Allergies: Vicodin [hydrocodone-acetaminophen]    ROS  Constitutional: Negative for fatigue/generalized weakness.   HEENT: Negative for  vision changes, hearing changes    Respiratory: Negative for shortness of breath  Cardiovascular: Negative for chest pain, palpitations  Gastrointestinal: Negative for blood in stool, constipation, diarrhea  Genitourinary: Negative for dysuria, polyuria  Musculoskeletal: Negative for myalgias, back pain, and joint pain. Positive for bilateral foot pain as per HPI  Skin: Negative for rash  Neurological: Negative for numbness, tingling  Psychiatric/Behavioral: Negative for depression, anxiety     Objective   Blood pressure 100/72, pulse 65, temperature 36.6 °C (97.9 °F), resp. rate 14, height 1.735 m (5' 8.3\"), weight 63.2 kg (139 lb 6.4 oz), SpO2 95 %. Body mass index is 21.01 kg/m².    General: Alert, oriented. In no acute distress   HEET: EOMI, PERRL, conjunctiva non-injected, sclera non-icteric.  Nares patent with no significant congestion or drainage.  Elvia pinnae, external auditory canals, TM pearly gray with normal light reflex bilaterally.Oral mucous membranes pink and moist with no lesions.  Neck: supple with no cervical, subclavicular lymphadenopathy, JVD, palpable thyroid nodules   Lungs: clear to auscultation bilaterally with good excursion.  CV: regular rate and rhythm.  Abdomen soft, non-distended, non-tender with normal bowel sounds. No hepatosplenomegaly, no masses palpated  Skin: no lesions. Warm, dry   Psychiatric: appropriate mood and affect   Extremities: bluish discoloration of left foot but strong dorsalis pedis pulse (weaker on right). Bilateral feet warm and monofilament exam within normal limits    Assessment and Plan   The following treatment plan was discussed     1. Foot pain, bilateral  He's been presumed to have neuropathy in the past but I am not as convinced that he has " neuropathy has much as claudication or vascular problem. Will obtain MARGUERITE with exercise. Referred to podiatry since he's been following there for inserts. Also seeing Dr. Cartagena in neurology soon who's insight is also apprecaited.   - REFERRAL TO PODIATRY  - -MARGUERITE MULTI LEVEL WITH EXERCISE; Future    2. Type 2 diabetes mellitus with hyperosmolarity without coma, without long-term current use of insulin (CMS-HCC)  Presents for follow up of diabetes mellitus. He is on an unusual regimen with humulin insulin and remains on glipizide. Will discontinue glipizide 5 mg bid and continue metformin 500 mg bid and change his insulin to glargine starting with 7u at night. Will need follow up in 2 weeks with me and diabetes nurse to follow up.     A1c:   Lab Results   Component Value Date/Time    HBA1C 8.7 (H) 09/19/2017 08:39 AM      Glucose monitoring   Fasting sugars: up to 140s   Post-prandial sugars: 130-135  Diabetic complications: none  ACR Albumin/Creatinine Ratio goal <30; on ACEi   HTN: Blood pressure goal <130/<80. Currently Rx ACE/ARB: Yes  Hyperlipidemia: Cholesterol goal LDL <100, total/HDL <5 . Currently Rx Statin: Yes; will cut dose in half of atorvastatin from 40 mg to 20 mg daily  Last eye exam POCT retinal exam today   Last monofilament foot exam: today     - POCT Retinal Eye Exam  - insulin glargine (LANTUS) 100 UNIT/ML Solution; Inject 7 Units as instructed every bedtime.  Dispense: 10 mL; Refill: 2    3. Mild single current episode of major depressive disorder (CMS-HCC)  On celexa and following with behavioral health.     4. History of mitral valve replacement with mechanical valve [Z95.2]  Anticoagulated on coumadin.     5. Shuffling gait  Has appointment to establish care with neurologist Dr. Osiel juarez, will follow up his consultation.     6. Hyperlipidemia  Well within goal on atorvastatin 40 mg daily, will cut dose in 1/2 to 20 mg.     Lab Results   Component Value Date/Time    CHOLSTRLTOT 118 09/19/2017  08:39 AM    LDL 47 09/19/2017 08:39 AM    HDL 46 09/19/2017 08:39 AM    TRIGLYCERIDE 125 09/19/2017 08:39 AM       Return in about 2 weeks (around 3/12/2018).    Health Maintenance      Health Maintenance Due   Topic Date Due   • Annual Wellness Visit  1945   • RETINAL SCREENING  10/12/1961       Declan Eaton MD  Internal Medicine  [facility]

## 2018-02-27 ENCOUNTER — OFFICE VISIT (OUTPATIENT)
Dept: BEHAVIORAL HEALTH | Facility: PHYSICIAN GROUP | Age: 73
End: 2018-02-27
Payer: MEDICARE

## 2018-02-27 DIAGNOSIS — F32.A DEPRESSION, UNSPECIFIED DEPRESSION TYPE: ICD-10-CM

## 2018-02-27 PROCEDURE — 90834 PSYTX W PT 45 MINUTES: CPT | Performed by: SOCIAL WORKER

## 2018-02-28 NOTE — BH THERAPY
[facility]  Therapy Progress Note    Patient Name: Carlos Rivera  Patient MRN: [id number]  Today's Date: 2/28/2018     Type of session:Individual psychotherapy  Length of session: 45 minutes  Persons in attendance:Patient    Subjective/New Info: Met with patient who reports improved mood due in part to having made changes in financial access to his pension. States he feels better knowing that he will be able to have access to that money without having to go through his wife for each purchase or spending he engages in.  Also notes med changes that have him feeling less tired.  Discussed his plans for retrieving his boat and camper from [facility].  States his son has agreed to go with him and help with fixing the boat trailer prior to bringing the boat to [place].  Patient is looking forward to that meeting and bringing the boat here. Also discussed he and wife Comfort having met a couple they like at a meeting for a camping group in [place].  States they were in a similar group in [place] that would plan camping trips and he and his wife are planning to go on outings with this group.      Objective/Observations:   Participation: Active verbal participation, Attentive, Engaged and Open to feedback   Grooming: Casual and Neat   Cognition: Alert and Fully Oriented   Eye contact: Good   Mood: Euthymic   Affect: Blunted   Thought process: Goal-directed   Speech: Rate within normal limits and Volume within normal limits   Other:     Diagnoses:   1. Depression, unspecified depression type         Current risk:   SUICIDE: Not applicable   Homicide: Not applicable   Self-harm: Not applicable   Relapse: Not applicable   Other:    Safety Plan reviewed? Not Indicated   If evidence of imminent risk is present, intervention/plan:     Therapeutic Intervention(s): Conflict resolution skills, Distress tolerance skills, Goal-setting, Interpersonal effectiveness skills and Leisure and recreation skills  Therapeutic  Intervention(s): Conflict resolution skills, Distress tolerance skills, Interpersonal effectiveness skills and Leisure and recreation skills    Treatment Goal(s)/Objective(s) addressed: Identify and engage in activities that would support treatment goals by being consistent with one’s values.      Progress toward Treatment Goals: Mild improvement    Plan:  Learn and implement problem solving strategies for realistically addressing worries.  Specifically defining a problem, generating options for addressing it, evaluating pros and cons, selecting and implementing an option and then evaluating and refining the action.   - Next appointment scheduled:  3/13/2018  - Patient is in agreement with the above plan:  YES    Corinne G. Taylor, L.C.S.W.  2/28/2018

## 2018-03-05 ENCOUNTER — ANTICOAGULATION VISIT (OUTPATIENT)
Dept: MEDICAL GROUP | Facility: PHYSICIAN GROUP | Age: 73
End: 2018-03-05
Payer: MEDICARE

## 2018-03-05 VITALS
WEIGHT: 141 LBS | HEIGHT: 68 IN | HEART RATE: 60 BPM | BODY MASS INDEX: 21.37 KG/M2 | DIASTOLIC BLOOD PRESSURE: 78 MMHG | SYSTOLIC BLOOD PRESSURE: 145 MMHG

## 2018-03-05 DIAGNOSIS — Z95.2 HISTORY OF MITRAL VALVE REPLACEMENT WITH MECHANICAL VALVE: ICD-10-CM

## 2018-03-05 DIAGNOSIS — Z79.01 CHRONIC ANTICOAGULATION: ICD-10-CM

## 2018-03-05 LAB — INR PPP: 2.9 (ref 2–3.5)

## 2018-03-05 PROCEDURE — 85610 PROTHROMBIN TIME: CPT | Performed by: INTERNAL MEDICINE

## 2018-03-05 PROCEDURE — 99999 PR NO CHARGE: CPT | Performed by: INTERNAL MEDICINE

## 2018-03-05 NOTE — PROGRESS NOTES
Anticoagulation Summary  As of 3/5/2018    INR goal:   2.5-3.5   TTR:   29.2 % (11.7 mo)   Today's INR:   2.9   Maintenance plan:   2.5 mg (2.5 mg x 1) on Wed; 5 mg (2.5 mg x 2) all other days   Weekly total:   32.5 mg   Plan last modified:   Chase Baldwin, PharmD (3/5/2018)   Next INR check:   3/12/2018   Target end date:   Indefinite    Indications    Chronic anticoagulation [Z79.01]  History of mitral valve replacement with mechanical valve [Z95.2] [Z95.2]             Anticoagulation Episode Summary     INR check location:       Preferred lab:       Send INR reminders to:       Comments:         Anticoagulation Care Providers     Provider Role Specialty Phone number    PRADEEP Batista Referring Family Medicine [phone]    [facility]  [phone]        Anticoagulation Patient Findings      HPI:  Carlos Rivera seen in clinic today, on anticoagulation therapy with warfar for Mitral valve replacement.   Reason for today's visit (per our collaborative practice policy) is because their last INR was 1.5 on 2/26/18. Intervention at the last visit: Started Lovenox.   Changes to current medical/health status since last appt: none  No signs/symptoms of bleeding and/or thrombosis since the last appt.    No interval changes to diet or any interval changes to medications since last appt.   No complications or cost restrictions with current therapy.   BP recorded in vitals.  Confirmed dosing regimen.     A/P   INR  therapeutic.     Stop Lovenox,  Begin increased regimen of warfarin.     Follow up appointment in 1 weeks to reduce risk of adverse events related to this high risk medication, Warfarin.    Purpose of next visit:    They have a TTR of 30% which is not at target (TTR target/goal is 100%) and requires close follow up to prevent a adverse event (the lower the TTR the higher risk of clots, strokes, or bleeding).     Other info:  Pt educated to contact our clinic  with any changes in medications or s/s of bleeding or thrombosis  CHEST guidelines recommend frequent INR monitoring at regular intervals (a few days up to a max of 12 weeks) to ensure they are on the proper dose of warfarin and not having any complications from therapy. INRs can dramatically change over a short time period due to diet, medications, and medical conditions.     Chase Baldwin, PharmD

## 2018-03-07 DIAGNOSIS — Z95.2 HISTORY OF MITRAL VALVE REPLACEMENT WITH MECHANICAL VALVE: ICD-10-CM

## 2018-03-08 RX ORDER — WARFARIN SODIUM 2.5 MG/1
TABLET ORAL
Qty: 270 TAB | Refills: 0 | Status: SHIPPED | OUTPATIENT
Start: 2018-03-08 | End: 2018-08-15 | Stop reason: SDUPTHER

## 2018-03-12 ENCOUNTER — ANTICOAGULATION VISIT (OUTPATIENT)
Dept: MEDICAL GROUP | Facility: PHYSICIAN GROUP | Age: 73
End: 2018-03-12
Payer: MEDICARE

## 2018-03-12 ENCOUNTER — OFFICE VISIT (OUTPATIENT)
Dept: MEDICAL GROUP | Facility: PHYSICIAN GROUP | Age: 73
End: 2018-03-12
Payer: MEDICARE

## 2018-03-12 VITALS
HEART RATE: 59 BPM | BODY MASS INDEX: 20.85 KG/M2 | DIASTOLIC BLOOD PRESSURE: 66 MMHG | WEIGHT: 137.6 LBS | TEMPERATURE: 36.6 F | OXYGEN SATURATION: 95 % | SYSTOLIC BLOOD PRESSURE: 162 MMHG

## 2018-03-12 DIAGNOSIS — Z95.2 HISTORY OF MITRAL VALVE REPLACEMENT WITH MECHANICAL VALVE: ICD-10-CM

## 2018-03-12 DIAGNOSIS — Z79.01 CHRONIC ANTICOAGULATION: ICD-10-CM

## 2018-03-12 DIAGNOSIS — E11.00 TYPE 2 DIABETES MELLITUS WITH HYPEROSMOLARITY WITHOUT COMA, WITHOUT LONG-TERM CURRENT USE OF INSULIN (HCC): ICD-10-CM

## 2018-03-12 LAB
HBA1C MFR BLD: 9.7 % (ref ?–5.8)
INR PPP: 1.6 (ref 2–3.5)
INT CON NEG: NORMAL
INT CON POS: NORMAL

## 2018-03-12 PROCEDURE — 99215 OFFICE O/P EST HI 40 MIN: CPT | Performed by: INTERNAL MEDICINE

## 2018-03-12 PROCEDURE — 99999 PR NO CHARGE: CPT | Performed by: INTERNAL MEDICINE

## 2018-03-12 PROCEDURE — 83036 HEMOGLOBIN GLYCOSYLATED A1C: CPT | Performed by: INTERNAL MEDICINE

## 2018-03-12 PROCEDURE — 85610 PROTHROMBIN TIME: CPT | Performed by: INTERNAL MEDICINE

## 2018-03-12 RX ORDER — CALCIUM CARB/VITAMIN D3/VIT K1 500-100-40
TABLET,CHEWABLE ORAL
Qty: 100 EACH | Refills: 3 | Status: SHIPPED | OUTPATIENT
Start: 2018-03-12 | End: 2018-06-04

## 2018-03-12 NOTE — PROGRESS NOTES
"PRIMARY CARE CLINIC MD-DIABETES RN VISIT  Chief Complaint   Patient presents with   • Diabetes       History of Present Illness     Carlos presents with his wife for follow up of the following:     Type II diabetes mellitus (CMS-[facility])  Although I had asked him to stop the humulin at his last visit on 2/26/2018 there was some confusion and he continued this regimen. His wife does think that she has removed glipizide from his medications. For the last week and a half his fasting blood sugars have been 159-161.     Current Outpatient Prescriptions   Medication Sig Dispense Refill   • Insulin NPH, Human,, Isophane, 100 UNIT/ML Suspension Pen-injector Inject 10 Units as instructed every bedtime. 10 mL 11   • Insulin Syringe-Needle U-100 (INSULIN SYRINGE .3CC/31GX5/16\") 31G X 5/16\" 0.3 ML Misc Use daily with insulin 100 Each 3   • warfarin (COUMADIN) 2.5 MG Tab Take two to three tablets by mouth one time daily as directed by coumadin clinic 270 Tab 0   • metformin (GLUCOPHAGE) 500 MG Tab TAKE 1 TABLET BY MOUTH TWO  TIMES DAILY WITH MEALS 180 Tab 0   • citalopram (CELEXA) 10 MG tablet TAKE 1 TABLET BY MOUTH  EVERY MORNING 90 Tab 1   • atorvastatin (LIPITOR) 40 MG Tab TAKE 1 TABLET BY MOUTH  EVERY EVENING 90 Tab 1   • Homeopathic Products ([employer] LEG CRAMPS PAIN RELIEF PO) Take  by mouth.     • Blood Glucose Monitoring Suppl (ONETOUCH VERIO FLEX SYSTEM) W/DEVICE Kit by Does not apply route.     • Blood Glucose Monitoring Suppl Supplies Misc Test strips order: Test strips compatible with meter. Sig: use daily and prn ssx high or low sugar 200 Each 3   • fexofenadine (ALLEGRA ALLERGY) 180 MG tablet Take 180 mg by mouth every day.     • Insulin Syringe-Needle U-100 (INSULIN SYRINGE .3CC/31GX5/16\") 31G X 5/16\" 0.3 ML Misc Use 3 times daily with insulin 300 Each 3     No current facility-administered medications for this visit.      Past Medical History:   Diagnosis Date   • Chronic anticoagulation 2/23/2017   • History of mitral " valve replacement with mechanical valve [Z95.2] 3/10/2017   • Hyperlipidemia    • Type II diabetes mellitus (CMS-HCC) 2017     Past Surgical History:   Procedure Laterality Date   • MITRAL VALVE REPLACEMENT     • INGUINAL HERNIA REPAIR Bilateral    • TONSILLECTOMY       Social History   Substance Use Topics   • Smoking status: Never Smoker   • Smokeless tobacco: Never Used   • Alcohol use No     Social History     Social History Narrative    Retired from [employer]      Family History   Problem Relation Age of Onset   • Heart Attack Father 58   • Diabetes Father    • Heart Attack Paternal Uncle      Family Status   Relation Status   • Mother    • Father     58   • Paternal Uncle      Allergies: Vicodin [hydrocodone-acetaminophen]    ROS  As per HPI above. All other systems reviewed and negative.        Objective   Blood pressure (!) 162/66, pulse (!) 59, temperature (!) 2.6 °C (36.6 °F), weight 62.4 kg (137 lb 9.6 oz), SpO2 95 %. Body mass index is 20.85 kg/m².    General: alert and oriented, pleasant, cooperative  HEENT: Normocephalic, atraumatic.   Cardiovascular: regular rate and rhythm, normal S1/S2  Pulmonary: lungs clear to auscultation bilaterally  Psychiatric: appropriate mood and affect. Good insight and appropriate judgment     Assessment and Plan   The following treatment plan was discussed     1. Type 2 diabetes mellitus with hyperosmolarity without coma, without long-term current use of insulin (CMS-[facility])  Presents for follow up of diabetes mellitus. There seems to have been a great deal of confusion since his last visit. Today he and his wife met with the diabetes RN who asked him to stop the regular insulin and start NPH 10 units nightly. Also advised to take a protein snack at bedtime and given blood sugar log to send to her in one week.     A1c:   Lab Results   Component Value Date/Time    HBA1C 9.7 2018 01:24 PM      Glucose monitoring   Fasting sugars: 159-161  Diabetic  "complications: none  ACR Albumin/Creatinine Ratio goal <30   HTN: Blood pressure goal <130/<80. Currently Rx ACE/ARB: No. Hypertensive in office per wife report, will continue to assess   Hyperlipidemia: Cholesterol goal LDL <100, total/HDL <5. Currently Rx Statin: Yes  Last eye exam has referral pending for ophthalmology    Last monofilament foot exam: 2/26/2018    - POCT Hemoglobin A1C  - Insulin NPH, Human,, Isophane, 100 UNIT/ML Suspension Pen-injector; Inject 10 Units as instructed every bedtime.  Dispense: 10 mL; Refill: 11  - Insulin Syringe-Needle U-100 (INSULIN SYRINGE .3CC/31GX5/16\") 31G X 5/16\" 0.3 ML Misc; Use daily with insulin  Dispense: 100 Each; Refill: 3      Healthcare maintenance     Health Maintenance Due   Topic Date Due   • Annual Wellness Visit  1945   • RETINAL SCREENING  10/12/1963       Return in about 4 weeks (around 4/9/2018) for MD and diabetes RN.    Declan Eaton MD  Internal Medicine  [facility]                   "

## 2018-03-12 NOTE — PROGRESS NOTES
Anticoagulation Summary  As of 3/12/2018    INR goal:   2.5-3.5   TTR:   29.2 % (11.9 mo)   Today's INR:   1.6!   Maintenance plan:   2.5 mg (2.5 mg x 1) on Wed; 5 mg (2.5 mg x 2) all other days   Weekly total:   32.5 mg   Plan last modified:   Chase Baldwin, PharmD (3/5/2018)   Next INR check:   3/19/2018   Target end date:   Indefinite    Indications    Chronic anticoagulation [Z79.01]  History of mitral valve replacement with mechanical valve [Z95.2] [Z95.2]             Anticoagulation Episode Summary     INR check location:       Preferred lab:       Send INR reminders to:       Comments:         Anticoagulation Care Providers     Provider Role Specialty Phone number    PRADEEP Batista Referring Family Medicine [phone]    [facility] Responsible  [phone]        Anticoagulation Patient Findings  Patient Findings     Negatives:   Signs/symptoms of thrombosis, Signs/symptoms of bleeding, Laboratory test error suspected, Change in health, Change in alcohol use, Change in activity, Upcoming invasive procedure, Emergency department visit, Upcoming dental procedure, Missed doses, Extra doses, Change in medications, Change in diet/appetite, Hospital admission, Bruising, Other complaints        HPI:   Carlos Rivera seen in clinic today, on anticoagulation therapy with warfairn for stroke preventionn due to history of  mitral valve replacement.    Patient's previous INR was therapeutic at 2.9 on 3-5-18, at which time patient was instructed to increase weekly warfarin regimen.  He returns to clinic today to recheck INR to ensure it is therapeutic and thus preventing possible clotting and/or bleeding/bruising complications.    CHADS-VASc = n/a  (unadjusted ischemic stroke risk/year:  n/a)    Does patient have any changes to current medical/health status since last appt (Y/N):  NO  Does patient have any signs/symptoms of bleeding and/or thrombosis since the last appt  "(Y/N):  NO  Does patient have any interval changes to diet or medications since last appt (Y/N):  NO  Are there any complications or cost restrictions with current therapy (Y/N):  NO      Vitals:  See vitals check from PCP/Diabetic educator visit (done just prior to this encounter)    Weight  137 lbs   Height   5' 8\"     Asssessment:      INR subtherapeutic at 1.6, therefore increasing patient's risk of stroke due to mechanical mitral valve.   Reason(s) for out of range INR today:  No identifiable causes for low INR, denies missed doses, no increases in VitK intake, no changes to medications or supplements.      Plan:  Instructed patient to bolus with 7.5mg X 2, then resume current warfarin regimen.  He will also take lovenox 60mg SQ two times daily for four days.  Patient unable to return until next week, with bolus dosing, should be therapeutic by week's end.     Follow up:  Because warfarin is a high risk medication and current CHEST guidelines recommend regular monitoring intervals (few days up to 12 weeks), will have patient return to clinic in 1 weeks to recheck INR.    Casey Birmingham, PharmD    "

## 2018-03-12 NOTE — PATIENT INSTRUCTIONS
· Please start taking NPH 10 units at bedtime. Continue recording your blood sugars (fasting) and bring them to your next visit   · Continue metformin

## 2018-03-12 NOTE — PROGRESS NOTES
"RN-CDE Note    Subjective:     Health changes since last visit/interval Hx: Carlos reports he takes clear Humulin 2-3 units am and 4-5 units at bedtime.  It is clear, so likely regular insulin.  He did not know about the Lantus but is concerned about cost.  He is quite confused.  His wife is present and clarifying information    Medications (including changes made today)  Current Outpatient Prescriptions   Medication Sig Dispense Refill   • warfarin (COUMADIN) 2.5 MG Tab Take two to three tablets by mouth one time daily as directed by coumadin clinic 270 Tab 0   • metformin (GLUCOPHAGE) 500 MG Tab TAKE 1 TABLET BY MOUTH TWO  TIMES DAILY WITH MEALS 180 Tab 0   • citalopram (CELEXA) 10 MG tablet TAKE 1 TABLET BY MOUTH  EVERY MORNING 90 Tab 1   • atorvastatin (LIPITOR) 40 MG Tab TAKE 1 TABLET BY MOUTH  EVERY EVENING 90 Tab 1   • Homeopathic Products ([employer] LEG CRAMPS PAIN RELIEF PO) Take  by mouth.     • Blood Glucose Monitoring Suppl (ONETOUCH VERIO FLEX SYSTEM) W/DEVICE Kit by Does not apply route.     • insulin glargine (LANTUS) 100 UNIT/ML Solution Inject 7 Units as instructed every bedtime. 10 mL 2   • Blood Glucose Monitoring Suppl Supplies Misc Test strips order: Test strips compatible with meter. Sig: use daily and prn ssx high or low sugar 200 Each 3   • fexofenadine (ALLEGRA ALLERGY) 180 MG tablet Take 180 mg by mouth every day.     • Insulin Syringe-Needle U-100 (INSULIN SYRINGE .3CC/31GX5/16\") 31G X 5/16\" 0.3 ML Misc Use 3 times daily with insulin 300 Each 3     No current facility-administered medications for this visit.        Taking daily ASA: No  Taking above medications as prescribed: Yes  Patient Denies side effects of medication.    Exercise: sporadic irregular exercise, <half hour walking weekly  Diet: meals per day on average: 3     Health Maintenance:   Health Maintenance Topics with due status: Overdue       Topic Date Due    Annual Wellness Visit 1945    RETINAL SCREENING 10/12/1963 "         DM:   Last A1c:   Lab Results   Component Value Date/Time    HBA1C 9.7 03/12/2018 01:24 PM      A1c goal: < 8    Glucose monitoring frequency: 2-3x/day  fasting range: 160-200  patient forgot to bring HBG readings  Hypoglycemic episodes: yes - unsure     Last Retinal Exam: Due  Daily Foot Exam: yes  Routine Dental Exams: yes    Lab Results   Component Value Date/Time    MALBCRT 499 (H) 09/19/2017 08:38 AM    MICROALBUR 35.3 09/19/2017 08:38 AM        ACR Albumin/Creatinine Ratio goal <30 no    Diabetic complications: albinurea    HTN:   Blood pressure goal <140/<80 no.   Currently Rx ACE/ARB: Yes    Dyslipidemia:    Lab Results   Component Value Date/Time    CHOLSTRLTOT 118 09/19/2017 08:39 AM    LDL 47 09/19/2017 08:39 AM    HDL 46 09/19/2017 08:39 AM    TRIGLYCERIDE 125 09/19/2017 08:39 AM       Lab Results   Component Value Date/Time    SODIUM 138 09/19/2017 08:39 AM    POTASSIUM 4.9 09/19/2017 08:39 AM    CHLORIDE 104 09/19/2017 08:39 AM    CO2 26 09/19/2017 08:39 AM    GLUCOSE 75 09/19/2017 08:39 AM    BUN 26 (H) 09/19/2017 08:39 AM    CREATININE 1.02 09/19/2017 08:39 AM     Lab Results   Component Value Date/Time    ALKPHOSPHAT 86 09/19/2017 08:39 AM    ASTSGOT 41 09/19/2017 08:39 AM    ALTSGPT 51 (H) 09/19/2017 08:39 AM    TBILIRUBIN 0.8 09/19/2017 08:39 AM        Currently Rx Statin: Yes      He  reports that he has never smoked. He has never used smokeless tobacco.    Objective:     Exam:  Monofilament: not done      Plan:     Discussed All medications, side effects and compliance (discussed carefully)  Annual eye examinations at Ophthalmology  Diabetic diet discussed in detail, written exchange diet given  Glycohemoglobin and other lab monitoring  Home glucose monitoring emphasized. Alternating times  Patient educated on Interpretation of lab results, Blood Sugar Goals, Exercise, Nutrition, Site Rotation, Self-injection of insulin    Recommended medication changes: stop Regular insulin, start NPH  10 units at bedtime with a protein snack, send FSBG in 1-week for insulin dose adjustments

## 2018-03-12 NOTE — ASSESSMENT & PLAN NOTE
Although I had asked him to stop the humulin at his last visit on 2/26/2018 there was some confusion and he continued this regimen. His wife does think that she has removed glipizide from his medications. For the last week and a half his fasting blood sugars have been 159-161.

## 2018-03-13 ENCOUNTER — APPOINTMENT (OUTPATIENT)
Dept: BEHAVIORAL HEALTH | Facility: PHYSICIAN GROUP | Age: 73
End: 2018-03-13
Payer: MEDICARE

## 2018-03-14 NOTE — TELEPHONE ENCOUNTER
Patient has recently been seen by PCP within the last 6 months per protocol (3/18). Will refill medications for 6 months.  Lab Results   Component Value Date/Time    HBA1C 9.7 03/12/2018 01:24 PM      Lab Results   Component Value Date/Time    MALBCRT 499 (H) 09/19/2017 08:38 AM    MICROALBUR 35.3 09/19/2017 08:38 AM      Lab Results   Component Value Date/Time    ALKPHOSPHAT 86 09/19/2017 08:39 AM    ASTSGOT 41 09/19/2017 08:39 AM    ALTSGPT 51 (H) 09/19/2017 08:39 AM    TBILIRUBIN 0.8 09/19/2017 08:39 AM

## 2018-03-16 DIAGNOSIS — E11.00 TYPE 2 DIABETES MELLITUS WITH HYPEROSMOLARITY WITHOUT COMA, WITHOUT LONG-TERM CURRENT USE OF INSULIN (HCC): ICD-10-CM

## 2018-03-16 NOTE — TELEPHONE ENCOUNTER
Dr Eaton- This med was d/c'd a few days ago by Loida Ferrell RN per physician decision, no note listed Please refill as you see fit.

## 2018-03-19 ENCOUNTER — ANTICOAGULATION VISIT (OUTPATIENT)
Dept: MEDICAL GROUP | Facility: PHYSICIAN GROUP | Age: 73
End: 2018-03-19
Payer: MEDICARE

## 2018-03-19 ENCOUNTER — OFFICE VISIT (OUTPATIENT)
Dept: BEHAVIORAL HEALTH | Facility: PHYSICIAN GROUP | Age: 73
End: 2018-03-19
Payer: MEDICARE

## 2018-03-19 DIAGNOSIS — Z95.2 HISTORY OF MITRAL VALVE REPLACEMENT WITH MECHANICAL VALVE: ICD-10-CM

## 2018-03-19 DIAGNOSIS — Z79.01 CHRONIC ANTICOAGULATION: ICD-10-CM

## 2018-03-19 DIAGNOSIS — F33.0 MILD EPISODE OF RECURRENT MAJOR DEPRESSIVE DISORDER (HCC): ICD-10-CM

## 2018-03-19 LAB — INR PPP: 2.2 (ref 2–3.5)

## 2018-03-19 PROCEDURE — 85610 PROTHROMBIN TIME: CPT | Performed by: NURSE PRACTITIONER

## 2018-03-19 PROCEDURE — 99211 OFF/OP EST MAY X REQ PHY/QHP: CPT | Performed by: NURSE PRACTITIONER

## 2018-03-19 PROCEDURE — 90834 PSYTX W PT 45 MINUTES: CPT | Performed by: SOCIAL WORKER

## 2018-03-19 RX ORDER — LISINOPRIL 20 MG/1
20 TABLET ORAL DAILY
Qty: 90 TAB | Refills: 1 | OUTPATIENT
Start: 2018-03-19

## 2018-03-19 NOTE — PROGRESS NOTES
"Anticoagulation Summary  As of 3/19/2018    INR goal:   2.5-3.5   TTR:   28.7 % (1 y)   Today's INR:   2.2!   Maintenance plan:   7.5 mg (2.5 mg x 3) on Tue, Thu; 5 mg (2.5 mg x 2) all other days   Weekly total:   40 mg   Plan last modified:   Casey Birmingham, PharmD (3/19/2018)   Next INR check:   3/23/2018   Target end date:   Indefinite    Indications    Chronic anticoagulation [Z79.01]  History of mitral valve replacement with mechanical valve [Z95.2] [Z95.2]             Anticoagulation Episode Summary     INR check location:       Preferred lab:       Send INR reminders to:       Comments:         Anticoagulation Care Providers     Provider Role Specialty Phone number    PRADEEP Batista Referring Family Medicine [phone]    [facility] Responsible  [phone]        Anticoagulation Patient Findings    HPI:   Carlos Rivera seen in clinic today, on anticoagulation therapy with warfarin for stroke prevention due to history of mechanical mitral valve replacment.    Patient's previous INR was subtherapeutic at 1.6 on 3-12-18, at which time patient was instructed to bolus with two doses of warfarin, then resume current warfarin regimen and begin bridge with lovenox injections.  He returns to clinic today to recheck INR to ensure it is therapeutic and thus preventing possible clotting and/or bleeding/bruising complications.    CHADS-VASc = n/a  (unadjusted ischemic stroke risk/year:  n/a)    Does patient have any changes to current medical/health status since last appt (Y/N):  No  Does patient have any signs/symptoms of bleeding and/or thrombosis since the last appt (Y/N):  NO  Does patient have any interval changes to diet or medications since last appt (Y/N):  NO  Are there any complications or cost restrictions with current therapy (Y/N):  NO      Vitals:  BP check declined at today's visit    Weight  declines   Height   5' 8\"     Asssessment:      INR remains " subtherapeutic at 2.2, therefore increasing patient's risk of stroke due to mechanical mitral valve replacement.   Reason(s) for out of range INR today:  Still no cause for low INR, patient is able to verbalized instructions from the past week, also confirms use of lovenox injections.      Plan:  Instructed patient to bolus with 7.5mg X 1, then increase weekly warfarin regimen as detailed above in order to bring INR to therapeutic range.  Patient does not wish to continue with lovenox bridge, confident bolus dosing will get his INR to therapeutic range.     Follow up:  Because warfarin is a high risk medication and current CHEST guidelines recommend regular monitoring intervals (few days up to 12 weeks), will have patient return to clinic in 4 days to recheck INR.    Casey Birmingham, PharmD

## 2018-03-21 NOTE — BH THERAPY
[facility]  Therapy Progress Note    Patient Name: Carlos Rivera  Patient MRN: [id number]  Today's Date: 3/21/2018     Type of session:Individual psychotherapy  Length of session: 45 minutes  Persons in attendance:Patient    Subjective/New Info: Patient report feeling better since taking control of a partial annuity payment so he has access to some money for spending that he does not need to wonder about as wife manages their finances.  Per patient he has anxiety and frustration due to not having funds that he can rely on as his for spending.  Discussed patient's feeling isolated since his move to [place] and Carlos says he and his wife have made a couple friends and he is feeling encouraged.  Notes he has been going to the gym and feels better about himself as a result.  Continues to formulate plan to retrieve his boat and camper from [place].     Objective/Observations:   Participation: Active verbal participation, Attentive and Open to feedback   Grooming: Casual and Neat   Cognition: Alert and Fully Oriented   Eye contact: Good   Mood: Neutral   Affect: Blunted   Thought process: Goal-directed   Speech: Rate within normal limits and Volume within normal limits   Other:     Diagnoses:   1. Mild episode of recurrent major depressive disorder (CMS-HCC)         Current risk:   SUICIDE: Not applicable   Homicide: Not applicable   Self-harm: Not applicable   Relapse: Not applicable   Other:    Safety Plan reviewed? Not Indicated   If evidence of imminent risk is present, intervention/plan:     Therapeutic Intervention(s): Conflict resolution skills, Distress tolerance skills, Interpersonal effectiveness skills and Leisure and recreation skills    Treatment Goal(s)/Objective(s) addressed: Identify and engage in activities that would support treatment goals by being consistent with one’s values.       Progress toward Treatment Goals: Mild improvement    Plan:  Learn and implement problem solving  strategies for realistically addressing worries.  Specifically defining a problem, generating options for addressing it, evaluating pros and cons, selecting and implementing an option and then evaluating and refining the action.  - Next appointment scheduled:  4/4/2018  - Patient is in agreement with the above plan:  YES    Corinne G. Taylor, L.C.S.W.  3/21/2018

## 2018-03-22 ENCOUNTER — HOSPITAL ENCOUNTER (OUTPATIENT)
Dept: RADIOLOGY | Facility: MEDICAL CENTER | Age: 73
End: 2018-03-22
Attending: INTERNAL MEDICINE
Payer: MEDICARE

## 2018-03-22 DIAGNOSIS — M79.671 FOOT PAIN, BILATERAL: ICD-10-CM

## 2018-03-22 DIAGNOSIS — M79.672 FOOT PAIN, BILATERAL: ICD-10-CM

## 2018-03-22 PROCEDURE — 93922 UPR/L XTREMITY ART 2 LEVELS: CPT | Mod: 26 | Performed by: SURGERY

## 2018-03-22 PROCEDURE — 93922 UPR/L XTREMITY ART 2 LEVELS: CPT

## 2018-03-23 ENCOUNTER — TELEPHONE (OUTPATIENT)
Dept: MEDICAL GROUP | Facility: PHYSICIAN GROUP | Age: 73
End: 2018-03-23

## 2018-03-23 ENCOUNTER — ANTICOAGULATION VISIT (OUTPATIENT)
Dept: MEDICAL GROUP | Facility: PHYSICIAN GROUP | Age: 73
End: 2018-03-23
Payer: MEDICARE

## 2018-03-23 DIAGNOSIS — Z95.2 HISTORY OF MITRAL VALVE REPLACEMENT WITH MECHANICAL VALVE: ICD-10-CM

## 2018-03-23 DIAGNOSIS — I73.9 PERIPHERAL VASCULAR DISEASE (HCC): ICD-10-CM

## 2018-03-23 DIAGNOSIS — Z79.01 CHRONIC ANTICOAGULATION: ICD-10-CM

## 2018-03-23 LAB — INR PPP: 1.3 (ref 2–3.5)

## 2018-03-23 PROCEDURE — 99211 OFF/OP EST MAY X REQ PHY/QHP: CPT | Performed by: FAMILY MEDICINE

## 2018-03-23 PROCEDURE — 85610 PROTHROMBIN TIME: CPT | Performed by: FAMILY MEDICINE

## 2018-03-23 NOTE — PROGRESS NOTES
"Anticoagulation Summary  As of 3/23/2018    INR goal:   2.5-3.5   TTR:   28.4 % (1 y)   Today's INR:   1.3!   Maintenance plan:   7.5 mg (2.5 mg x 3) on Tue, Thu; 5 mg (2.5 mg x 2) all other days   Weekly total:   40 mg   Plan last modified:   Casey Birmingham, PharmD (3/19/2018)   Next INR check:   3/26/2018   Target end date:   Indefinite    Indications    Chronic anticoagulation [Z79.01]  History of mitral valve replacement with mechanical valve [Z95.2] [Z95.2]             Anticoagulation Episode Summary     INR check location:       Preferred lab:       Send INR reminders to:       Comments:         Anticoagulation Care Providers     Provider Role Specialty Phone number    PRADEEP Batista Referring Family Medicine [phone]    [facility] Responsible  [phone]        Anticoagulation Patient Findings    HPI:   Carlos Rivera seen in clinic today, on anticoagulation therapy with warfarin for stroke prevention due to history of mechanical mitral valve replacement.    Patient's previous INR was subtherapeutic at 2.2 on 3-19-18, at which time patient was instructed to bolus with one dose, then increase weekly warfarin regimen.  He returns to clinic today to recheck INR to ensure it is therapeutic and thus preventing possible clotting and/or bleeding/bruising complications.    CHADS-VASc = n/a  (unadjusted ischemic stroke risk/year:  n/a)    Does patient have any changes to current medical/health status since last appt (Y/N):  NO  Does patient have any signs/symptoms of bleeding and/or thrombosis since the last appt (Y/N):  NO  Does patient have any interval changes to diet or medications since last appt (Y/N):  NO  Are there any complications or cost restrictions with current therapy (Y/N):  NO      Vitals:  /78  HR 40  Patient declines recheck    Weight  declines   Height   5' 8\"     Asssessment:      INR remains subtherapeutic at 1.3, therefore increasing patient's " risk of stroke due to mechanical mitral valve   Reason(s) for out of range INR today:  No identifiable causes for low INR, patient is able to verify dosing from last four days, no missed doses, denies increase in VitK intake or changes to other medications      Plan:  Instructed patient to bolus with 10mg X 2, 7.5mg X 1, then recheck INR.  He will also begin lovenox 60mg two times daily until INR >2.5.     Follow up:  Because warfarin is a high risk medication and current CHEST guidelines recommend regular monitoring intervals (few days up to 12 weeks), will have patient return to clinic in 3 days to recheck INR.    Casey Birmingham, PharmD

## 2018-03-24 NOTE — TELEPHONE ENCOUNTER
----- Message from Declan Eaton M.D. sent at 3/23/2018  3:43 PM PDT -----  Please let Carlos know that his leg study shows a blood flow problem which could be explaining his foot pain. I have referred him to a vascular surgeon for further management.

## 2018-03-26 ENCOUNTER — ANTICOAGULATION VISIT (OUTPATIENT)
Dept: MEDICAL GROUP | Facility: PHYSICIAN GROUP | Age: 73
End: 2018-03-26
Payer: MEDICARE

## 2018-03-26 ENCOUNTER — TELEPHONE (OUTPATIENT)
Dept: HEALTH INFORMATION MANAGEMENT | Facility: MEDICAL CENTER | Age: 73
End: 2018-03-26

## 2018-03-26 DIAGNOSIS — Z95.2 HISTORY OF MITRAL VALVE REPLACEMENT WITH MECHANICAL VALVE: ICD-10-CM

## 2018-03-26 DIAGNOSIS — Z79.01 CHRONIC ANTICOAGULATION: ICD-10-CM

## 2018-03-26 LAB — INR PPP: 2.2 (ref 2–3.5)

## 2018-03-26 PROCEDURE — 85610 PROTHROMBIN TIME: CPT | Performed by: INTERNAL MEDICINE

## 2018-03-26 PROCEDURE — 99211 OFF/OP EST MAY X REQ PHY/QHP: CPT | Performed by: INTERNAL MEDICINE

## 2018-03-26 NOTE — TELEPHONE ENCOUNTER
"Mail received from [employer]:  \"Good Morning.  Could you call Mike using my phone when you have a chance. He's really having issues adjusting to the new insulin. He has an appointment today for an ultrasound so we will be away from the phone from 9 until about 11 a.m. [phone]. Thanks Comfort Harrington \"      Attempted to return call, no answer.  Left voice mail requesting FSBG results and details of problems  " LM for patient to call on home number.

## 2018-03-26 NOTE — PROGRESS NOTES
"Anticoagulation Summary  As of 3/26/2018    INR goal:   2.5-3.5   TTR:   28.1 % (1 y)   Today's INR:   2.2!   Maintenance plan:   7.5 mg (2.5 mg x 3) on Tue, Thu; 5 mg (2.5 mg x 2) all other days   Weekly total:   40 mg   Plan last modified:   Casey Birmingham, PharmD (3/19/2018)   Next INR check:   3/30/2018   Target end date:   Indefinite    Indications    Chronic anticoagulation [Z79.01]  History of mitral valve replacement with mechanical valve [Z95.2] [Z95.2]             Anticoagulation Episode Summary     INR check location:       Preferred lab:       Send INR reminders to:       Comments:         Anticoagulation Care Providers     Provider Role Specialty Phone number    PRADEEP Batista Referring Family Medicine [phone]    [facility] Responsible  [phone]        Anticoagulation Patient Findings    HPI:   Carlos Rivera seen in clinic today, on anticoagulation therapy with warfarin for stroke prevention due to history of mechanical mitral valve.    Patient's previous INR was subtherapeutic at 1.3 on 3-23-18, at which time patient was instructed to bolus with two doses, then increase weekly warfarin regimen.  He returns to clinic today to recheck INR to ensure it is therapeutic and thus preventing possible clotting and/or bleeding/bruising complications.    CHADS-VASc = n/a  (unadjusted ischemic stroke risk/year:  n/a)    Does patient have any changes to current medical/health status since last appt (Y/N):  NO  Does patient have any signs/symptoms of bleeding and/or thrombosis since the last appt (Y/N):  NO  Does patient have any interval changes to diet or medications since last appt (Y/N):  NO  Are there any complications or cost restrictions with current therapy (Y/N):  NO      Vitals:  BP check declined, he does not wish to remove his sweater for test    Weight  declines   Height   5' 9\"     Asssessment:      INR remains subtherapeutic at 2.2, therefore increasing " patient's risk of stroke due to mechanical mitral valve   Reason(s) for out of range INR today:  Patient took lower doses than instructed over the last three days      Plan:  Instructed patient to bolus with 7.5mg X 2, then resume current warfarin regime in order to bring INR back to therapeutic range.  He wants to bridge with lovenox for short time possible, he agrees to continue with injections for the next two days.  He understands the risks of stop if INR is still subtherapeutic.     Follow up:  Because warfarin is a high risk medication and current CHEST guidelines recommend regular monitoring intervals (few days up to 12 weeks), will have patient return to clinic in 4 days to recheck INR.    Casey Birmingham, PharmD

## 2018-03-28 DIAGNOSIS — E11.00 TYPE 2 DIABETES MELLITUS WITH HYPEROSMOLARITY WITHOUT COMA, WITHOUT LONG-TERM CURRENT USE OF INSULIN (HCC): ICD-10-CM

## 2018-03-28 NOTE — TELEPHONE ENCOUNTER
Dr Romie Claros was d/c'd during 3/12/18 OV from [facility]. Not seeing a note listing why. Please refill as you see fit.

## 2018-03-29 ENCOUNTER — OFFICE VISIT (OUTPATIENT)
Dept: NEUROLOGY | Facility: MEDICAL CENTER | Age: 73
End: 2018-03-29
Payer: MEDICARE

## 2018-03-29 VITALS
TEMPERATURE: 97.7 F | OXYGEN SATURATION: 97 % | RESPIRATION RATE: 16 BRPM | BODY MASS INDEX: 20.65 KG/M2 | HEIGHT: 68 IN | WEIGHT: 136.24 LBS | HEART RATE: 42 BPM | SYSTOLIC BLOOD PRESSURE: 158 MMHG | DIASTOLIC BLOOD PRESSURE: 80 MMHG

## 2018-03-29 DIAGNOSIS — R26.81 UNSTEADINESS: ICD-10-CM

## 2018-03-29 DIAGNOSIS — G20.A1 PARKINSON'S DISEASE: ICD-10-CM

## 2018-03-29 DIAGNOSIS — G21.4 VASCULAR PARKINSONISM (HCC): ICD-10-CM

## 2018-03-29 DIAGNOSIS — I63.50 CEREBROVASCULAR ACCIDENT (CVA) DUE TO STENOSIS OF CEREBRAL ARTERY (HCC): ICD-10-CM

## 2018-03-29 DIAGNOSIS — F02.80 DEMENTIA ASSOCIATED WITH OTHER UNDERLYING DISEASE WITHOUT BEHAVIORAL DISTURBANCE (HCC): ICD-10-CM

## 2018-03-29 DIAGNOSIS — R26.89 SHUFFLING GAIT: ICD-10-CM

## 2018-03-29 PROBLEM — G20.C PARKINSONISM (HCC): Status: ACTIVE | Noted: 2018-03-29

## 2018-03-29 PROBLEM — I63.9 STROKE (HCC): Status: ACTIVE | Noted: 2018-03-29

## 2018-03-29 PROBLEM — F03.90 DEMENTIA (HCC): Status: ACTIVE | Noted: 2018-03-29

## 2018-03-29 PROCEDURE — 99214 OFFICE O/P EST MOD 30 MIN: CPT | Performed by: PSYCHIATRY & NEUROLOGY

## 2018-03-29 RX ORDER — LISINOPRIL 20 MG/1
20 TABLET ORAL DAILY
Qty: 90 TAB | Refills: 1 | Status: SHIPPED | OUTPATIENT
Start: 2018-03-29 | End: 2018-07-19 | Stop reason: SDUPTHER

## 2018-03-29 RX ORDER — MEMANTINE HYDROCHLORIDE 5 MG/1
5 TABLET ORAL 2 TIMES DAILY
Qty: 60 TAB | Refills: 3 | Status: SHIPPED | OUTPATIENT
Start: 2018-03-29 | End: 2018-12-13 | Stop reason: SDUPTHER

## 2018-03-29 NOTE — PATIENT INSTRUCTIONS
Could not tolerate Depakote 500mg and Aricept last time-- prescribed by Dr Edwards  Recall 1/3    IMPRESSION:    1. Unsteadiness, frequent falls since 4 years ago-- since 2014-- progressive- could not tandem  2. One fall last year and cervical spine injury  3. 4 years ago, in [place], ear infection and   4. Cognitive decline for years-- Dementia  5. DM, HTN    PLAN/RECOMMENDATIONS:    The balance issue is the major issue-- which could be due to multiple factors  Such as stroke, parkinson, vascular parkinson ( not typical parkinson disease), dementia, cervical myelopathy    MRI of brain was recently ordered by by his ENT doctor  Will offer blood tests and carotid duplex  We will start Namenda 5mg two times per day    Peripheral artery duplex  Conclusions   No prior study is available for comparison.    mild-to-moderate PVD at rest in both legs based on waveforms      SIGNATURE:  Inna Cartagena    CC:  Declan Eaton M.D.
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
## 2018-03-30 ENCOUNTER — ANTICOAGULATION VISIT (OUTPATIENT)
Dept: MEDICAL GROUP | Facility: PHYSICIAN GROUP | Age: 73
End: 2018-03-30
Payer: MEDICARE

## 2018-03-30 DIAGNOSIS — Z95.2 HISTORY OF MITRAL VALVE REPLACEMENT WITH MECHANICAL VALVE: ICD-10-CM

## 2018-03-30 DIAGNOSIS — Z79.01 CHRONIC ANTICOAGULATION: ICD-10-CM

## 2018-03-30 LAB — INR PPP: 2.8 (ref 2–3.5)

## 2018-03-30 PROCEDURE — 85610 PROTHROMBIN TIME: CPT | Performed by: INTERNAL MEDICINE

## 2018-03-30 NOTE — PROGRESS NOTES
Anticoagulation Summary  As of 3/30/2018    INR goal:   2.5-3.5   TTR:   28.4 % (1 y)   Today's INR:   2.8   Maintenance plan:   7.5 mg (2.5 mg x 3) on Mon, Wed, Fri; 5 mg (2.5 mg x 2) all other days   Weekly total:   42.5 mg   Plan last modified:   Casey Birmingham, PharmD (3/30/2018)   Next INR check:   4/2/2018   Target end date:   Indefinite    Indications    Chronic anticoagulation [Z79.01]  History of mitral valve replacement with mechanical valve [Z95.2] [Z95.2]             Anticoagulation Episode Summary     INR check location:       Preferred lab:       Send INR reminders to:       Comments:         Anticoagulation Care Providers     Provider Role Specialty Phone number    PRADEEP Batista Referring Family Medicine [phone]    [facility] Responsible  [phone]        Anticoagulation Patient Findings  Patient Findings     Negatives:   Signs/symptoms of thrombosis, Signs/symptoms of bleeding, Laboratory test error suspected, Change in health, Change in alcohol use, Change in activity, Upcoming invasive procedure, Emergency department visit, Upcoming dental procedure, Missed doses, Extra doses, Change in medications, Change in diet/appetite, Hospital admission, Bruising, Other complaints        HPI:   Carlos Rivera seen in clinic today, on anticoagulation therapy with warfarin for stroke prevention due to history of mechanical mitral valve replacement.    Patient's previous INR was subtherapeutic at 2.2 on 3-26-18, at which time patient was instructed to increase weekly warfarin regimen and continue with lovenox injections.  He returns to clinic today to recheck INR to ensure it is therapeutic and thus preventing possible clotting and/or bleeding/bruising complications.    CHADS-VASc = n/a  (unadjusted ischemic stroke risk/year:  n/a)    Does patient have any changes to current medical/health status since last appt (Y/N):  NO  Does patient have any signs/symptoms of  "bleeding and/or thrombosis since the last appt (Y/N):  NO  Does patient have any interval changes to diet or medications since last appt (Y/N):  No  Are there any complications or cost restrictions with current therapy (Y/N):  NO      Vitals:  BP check declined today, patient states he is a bit agitated from a busy morning and would prefer not to see a \"bad reading\"   Weight  136 lbs   Height   5' 8\"     Asssessment:      INR therapeutic at 2.8, therefore decreasing patient's risk of stroke and/or bleeding complications.   Reason(s) for out of range INR today:  n/a      Plan:  Instructed patient to increase weekly warfarin regimen as detailed above in order to maintain INR in therapeutic range.     Follow up:  Because warfarin is a high risk medication and current CHEST guidelines recommend regular monitoring intervals (few days up to 12 weeks), will have patient return to clinic in 3 days to recheck INR.    Casey Birmingham, PharmD    "

## 2018-03-31 ENCOUNTER — PATIENT MESSAGE (OUTPATIENT)
Dept: NEUROLOGY | Facility: MEDICAL CENTER | Age: 73
End: 2018-03-31

## 2018-04-02 ENCOUNTER — ANTICOAGULATION VISIT (OUTPATIENT)
Dept: MEDICAL GROUP | Facility: PHYSICIAN GROUP | Age: 73
End: 2018-04-02
Payer: MEDICARE

## 2018-04-02 DIAGNOSIS — Z95.2 HISTORY OF MITRAL VALVE REPLACEMENT WITH MECHANICAL VALVE: ICD-10-CM

## 2018-04-02 DIAGNOSIS — Z79.01 CHRONIC ANTICOAGULATION: ICD-10-CM

## 2018-04-02 LAB — INR PPP: 4.3 (ref 2–3.5)

## 2018-04-02 PROCEDURE — 99211 OFF/OP EST MAY X REQ PHY/QHP: CPT | Performed by: INTERNAL MEDICINE

## 2018-04-02 PROCEDURE — 85610 PROTHROMBIN TIME: CPT | Performed by: INTERNAL MEDICINE

## 2018-04-02 NOTE — PROGRESS NOTES
Anticoagulation Summary  As of 4/2/2018    INR goal:   2.5-3.5   TTR:   28.5 % (1 y)   Today's INR:   4.3!   Maintenance plan:   7.5 mg (2.5 mg x 3) on Mon, Wed, Fri; 5 mg (2.5 mg x 2) all other days   Weekly total:   42.5 mg   Plan last modified:   Casey Birmingham, PharmD (3/30/2018)   Next INR check:   4/9/2018   Target end date:   Indefinite    Indications    Chronic anticoagulation [Z79.01]  History of mitral valve replacement with mechanical valve [Z95.2] [Z95.2]             Anticoagulation Episode Summary     INR check location:       Preferred lab:       Send INR reminders to:       Comments:         Anticoagulation Care Providers     Provider Role Specialty Phone number    Declan Eaton M.D. Referring Internal Medicine [phone]    [facility] Responsible  [phone]        Anticoagulation Patient Findings  Patient Findings     Negatives:   Signs/symptoms of thrombosis, Signs/symptoms of bleeding, Laboratory test error suspected, Change in health, Change in alcohol use, Change in activity, Upcoming invasive procedure, Emergency department visit, Upcoming dental procedure, Missed doses, Extra doses, Change in medications, Change in diet/appetite, Hospital admission, Bruising, Other complaints        HPI:   Carlos Rivera seen in clinic today, on anticoagulation therapy with warfarin for stroke prevention due to history of mechanical mitral valve replacement.    Patient's previous INR was therapeutic at 2.8 on 3-30-18, at which time patient was instructed to increase weekly warfarin regimen.  He returns to clinic today to recheck INR to ensure it is therapeutic and thus preventing possible clotting and/or bleeding/bruising complications.    CHADS-VASc = n/a  (unadjusted ischemic stroke risk/year:  n/a)    Does patient have any changes to current medical/health status since last appt (Y/N):  NO  Does patient have any signs/symptoms of bleeding and/or thrombosis since the last appt  "(Y/N):  NO  Does patient have any interval changes to diet or medications since last appt (Y/N):  NO  Are there any complications or cost restrictions with current therapy (Y/N):  NO      Vitals:  BP check declined, does not wish to remove layers today    Weight  138 lbs   Height   5' 8\"     Asssessment:      INR supratherapeutic at 4.3, therefore increasing patient's risk of bleeding/bruising complications   Reason(s) for out of range INR today:  Took incorrect dose of the last few days      Plan:  Instructed patient to decrease today's dose to 5mg, then resume current warfarin regimen in order to bring INR back to therapeutic range.     Follow up:  Because warfarin is a high risk medication and current CHEST guidelines recommend regular monitoring intervals (few days up to 12 weeks), will have patient return to clinic in 1 weeks to recheck INR.    Casey Birmingham, PharmD    "

## 2018-04-04 ENCOUNTER — OFFICE VISIT (OUTPATIENT)
Dept: BEHAVIORAL HEALTH | Facility: PHYSICIAN GROUP | Age: 73
End: 2018-04-04
Payer: MEDICARE

## 2018-04-04 DIAGNOSIS — F33.0 MILD EPISODE OF RECURRENT MAJOR DEPRESSIVE DISORDER (HCC): ICD-10-CM

## 2018-04-04 PROCEDURE — 90834 PSYTX W PT 45 MINUTES: CPT | Performed by: SOCIAL WORKER

## 2018-04-05 NOTE — BH THERAPY
[facility]  Therapy Progress Note    Patient Name: Carlos Rivera  Patient MRN: [id number]  Today's Date: 4/5/2018     Type of session:Individual psychotherapy  Length of session: 45 minutes  Persons in attendance:Patient    Subjective/New Info: Patient reports feeling frustrated with himself about increasingly sedentary lifestyle noting history of riding his bike 16 miles each way to [place]  Patient tends to ruminate on the past and his activities including sailing, bicycling, camping, and working.  Discussed patient being in a different phase in his life with opportunities to enjoy some same and similar activities.  Discussed his inclination to dismiss shift from riding his bike to using the stationery bikes at the gym as he reports his doctor has recommended his not riding due to balance instability.  Also discussed communicating with his wife and his adult children about his goals of retrieving his boat and his camper from [place].  Also discussed options for patient and wife to consider financial counselor to help budget and manage funds as this is also an ongoing stressor for patient.  We discussed plan of action and will see if patient follows through and or determine what the barriers might be from accomplishing or moving toward goals.      Objective/Observations:   Participation: Active verbal participation, Attentive, Engaged and Open to feedback   Grooming: Casual and Neat   Cognition: Alert and Fully Oriented   Eye contact: Good   Mood: Depressed   Affect: Blunted   Thought process: Goal-directed   Speech: Rate within normal limits and Volume within normal limits   Other:     Diagnoses:   1. Mild episode of recurrent major depressive disorder (CMS-[facility])         Current risk:   SUICIDE: Not applicable   Homicide: Not applicable   Self-harm: Not applicable   Relapse: Not applicable   Other:    Safety Plan reviewed? Not Indicated   If evidence of imminent risk is  present, intervention/plan:     Therapeutic Intervention(s): Conflict clarification, Conflict resolution skills, Goal-setting, Interpersonal effectiveness skills, Stressors assessed and Supportive psychotherapy    Treatment Goal(s)/Objective(s) addressed: Identify and engage in activities that would support treatment goals by being consistent with one’s values.     Progress toward Treatment Goals: Mild improvement    Plan:  Learn and implement problem solving strategies for realistically addressing worries.  Specifically defining a problem, generating options for addressing it, evaluating pros and cons, selecting and implementing an option and then evaluating and refining the action.       - Next appointment scheduled:  5/21/2018  - Patient is in agreement with the above plan:  YES    Corinne G. Taylor, L.C.S.W.  4/5/2018

## 2018-04-06 ENCOUNTER — TELEPHONE (OUTPATIENT)
Dept: HEALTH INFORMATION MANAGEMENT | Facility: MEDICAL CENTER | Age: 73
End: 2018-04-06

## 2018-04-06 NOTE — TELEPHONE ENCOUNTER
Patient sends [facility] results:    Fastin-264  Lunch: 174-239  Dinner: none  Bed: 290-327  Hypoglycemia: none    Current diabetes medications:  NPH 10 units bedtime, metformin    Recommendations: continue NPH 10 units at bedtime, add NPH 10 units every morning, call [facility] in 1-week

## 2018-04-09 ENCOUNTER — ANTICOAGULATION VISIT (OUTPATIENT)
Dept: MEDICAL GROUP | Facility: PHYSICIAN GROUP | Age: 73
End: 2018-04-09
Payer: MEDICARE

## 2018-04-09 VITALS — DIASTOLIC BLOOD PRESSURE: 69 MMHG | SYSTOLIC BLOOD PRESSURE: 147 MMHG | HEART RATE: 46 BPM

## 2018-04-09 DIAGNOSIS — Z79.01 CHRONIC ANTICOAGULATION: ICD-10-CM

## 2018-04-09 DIAGNOSIS — Z95.2 HISTORY OF MITRAL VALVE REPLACEMENT WITH MECHANICAL VALVE: ICD-10-CM

## 2018-04-09 LAB — INR PPP: 2.8 (ref 2–3.5)

## 2018-04-09 PROCEDURE — 85610 PROTHROMBIN TIME: CPT | Performed by: INTERNAL MEDICINE

## 2018-04-09 NOTE — PROGRESS NOTES
OP Anticoagulation Service Note    Date: 4/9/2018  Vitals:    04/09/18 1011   BP: 147/69   Pulse: (!) 46       Anticoagulation Summary  As of 4/9/2018    INR goal:   2.5-3.5   TTR:   --   Today's INR:   2.8   Maintenance plan:   7.5 mg (2.5 mg x 3) on Mon, Wed, Fri; 5 mg (2.5 mg x 2) all other days   Weekly total:   42.5 mg   Plan last modified:   Casey Birmingham, PharmD (3/30/2018)   Next INR check:   4/16/2018   Target end date:   Indefinite    Indications    Chronic anticoagulation [Z79.01]  History of mitral valve replacement with mechanical valve [Z95.2] [Z95.2]             Anticoagulation Episode Summary     INR check location:       Preferred lab:       Send INR reminders to:       Comments:         Anticoagulation Care Providers     Provider Role Specialty Phone number    Declan Eaton M.D. Referring Internal Medicine [phone]    [facility] Responsible  [phone]        Anticoagulation Patient Findings      HPI:   Carlos Rivera seen in clinic today, on anticoagulation therapy with warfarin (a high risk medication) for mitral valve replacement    Pt is here today to evaluate anticoagulation therapy    Previous INR was  4.3 on 4/2/18    Pt was instructed to decrease dose x 1 day then resume normal regimen.    Confirmed warfarin dosing regimen, denies missed or extra doses of coumadin.   Diet has been consistent with foods rich in vitamin K: Yes  Changes in ETOH:  No  Changes in smoking status: No  Changes in medication: No   Cost restriction: No  S/s of bleeding:  No  Signs/symptoms  thrombosis since the last appt: No    A/P   INR  therapeutic today. Will require close follow up as previous INR was supra-therapeutic   Will continue current regimen.     Pt educated to contact our clinic with any changes in medications or s/s of bleeding or thrombosis. Pt is aware to seek immediate medical attention for falls, head injury or deep cuts    Follow up appointment in 1 week(s) to reduce  risk of adverse events from warfarin.  Darnell Gardner Pharm.D.  Pharmacy Practice Resident, PGY1

## 2018-04-16 ENCOUNTER — ANTICOAGULATION VISIT (OUTPATIENT)
Dept: MEDICAL GROUP | Facility: PHYSICIAN GROUP | Age: 73
End: 2018-04-16
Payer: MEDICARE

## 2018-04-16 VITALS
DIASTOLIC BLOOD PRESSURE: 76 MMHG | BODY MASS INDEX: 21.19 KG/M2 | WEIGHT: 139.38 LBS | SYSTOLIC BLOOD PRESSURE: 132 MMHG | HEART RATE: 42 BPM

## 2018-04-16 DIAGNOSIS — Z79.01 CHRONIC ANTICOAGULATION: ICD-10-CM

## 2018-04-16 LAB — INR PPP: 5 (ref 2–3.5)

## 2018-04-16 PROCEDURE — 99211 OFF/OP EST MAY X REQ PHY/QHP: CPT | Performed by: INTERNAL MEDICINE

## 2018-04-16 PROCEDURE — 85610 PROTHROMBIN TIME: CPT | Performed by: INTERNAL MEDICINE

## 2018-04-16 NOTE — PROGRESS NOTES
Anticoagulation Summary  As of 4/16/2018    INR goal:   2.5-3.5   TTR:   28.9 % (1.1 y)   Today's INR:   5.0!   Maintenance plan:   7.5 mg (2.5 mg x 3) on Fri; 5 mg (2.5 mg x 2) all other days   Weekly total:   37.5 mg   Plan last modified:   Casey Birmingham, PharmD (4/16/2018)   Next INR check:   4/23/2018   Target end date:   Indefinite    Indications    Chronic anticoagulation [Z79.01]  History of mitral valve replacement with mechanical valve [Z95.2] [Z95.2]             Anticoagulation Episode Summary     INR check location:       Preferred lab:       Send INR reminders to:       Comments:         Anticoagulation Care Providers     Provider Role Specialty Phone number    Declan Eaton M.D. Referring Internal Medicine [phone]    [facility] Responsible  [phone]        Anticoagulation Patient Findings  Patient Findings     Negatives:   Signs/symptoms of thrombosis, Signs/symptoms of bleeding, Laboratory test error suspected, Change in health, Change in alcohol use, Change in activity, Upcoming invasive procedure, Emergency department visit, Upcoming dental procedure, Missed doses, Extra doses, Change in medications, Change in diet/appetite, Hospital admission, Bruising, Other complaints        HPI:   Carlos Rivera seen in clinic today, on anticoagulation therapy with warfarin for stroke prevention due to history of mechanical mitral valve.    Patient's previous INR was therapeutic at 2.8 on 4-9-18, at which time patient was instructed to continue with current warfarin regimen.  He returns to clinic today to recheck INR to ensure it is therapeutic and thus preventing possible clotting and/or bleeding/bruising complications.    CHADS-VASc = n/a  (unadjusted ischemic stroke risk/year:  n/a)    Does patient have any changes to current medical/health status since last appt (Y/N):  NO  Does patient have any signs/symptoms of bleeding and/or thrombosis since the last appt (Y/N):  NO  Does  "patient have any interval changes to diet or medications since last appt (Y/N):  NO  Are there any complications or cost restrictions with current therapy (Y/N):  NO      Vitals:  /76  HR 42    Weight  139.6 lbs   Height   5' 8\"     Asssessment:      INR supratherapeutic at 5.0, therefore increasing patient's risk of bleeding/bruising complications   Reason(s) for out of range INR today:  Current dosing regimen likely too high for patient      Plan:  Instructed patient to HOLD X 1, then decrease weekly warfarin regimen as detailed above in order to bring INR back to therapeutic range.     Follow up:  Because warfarin is a high risk medication and current CHEST guidelines recommend regular monitoring intervals (few days up to 12 weeks), will have patient return to clinic in 1 weeks to recheck INR.    Casey Birmingham, PharmD    "

## 2018-04-23 ENCOUNTER — ANTICOAGULATION VISIT (OUTPATIENT)
Dept: MEDICAL GROUP | Facility: PHYSICIAN GROUP | Age: 73
End: 2018-04-23
Payer: MEDICARE

## 2018-04-23 VITALS
BODY MASS INDEX: 20.95 KG/M2 | HEART RATE: 46 BPM | SYSTOLIC BLOOD PRESSURE: 152 MMHG | WEIGHT: 137.79 LBS | DIASTOLIC BLOOD PRESSURE: 76 MMHG

## 2018-04-23 DIAGNOSIS — Z95.2 HISTORY OF MITRAL VALVE REPLACEMENT WITH MECHANICAL VALVE: ICD-10-CM

## 2018-04-23 DIAGNOSIS — Z79.01 CHRONIC ANTICOAGULATION: ICD-10-CM

## 2018-04-23 LAB — INR PPP: 2.3 (ref 2–3.5)

## 2018-04-23 PROCEDURE — 99211 OFF/OP EST MAY X REQ PHY/QHP: CPT | Performed by: INTERNAL MEDICINE

## 2018-04-23 PROCEDURE — 85610 PROTHROMBIN TIME: CPT | Performed by: INTERNAL MEDICINE

## 2018-04-23 NOTE — PROGRESS NOTES
Anticoagulation Summary  As of 4/23/2018    INR goal:   2.5-3.5   TTR:   29.0 % (1.1 y)   Today's INR:   2.3!   Maintenance plan:   7.5 mg (2.5 mg x 3) on Fri; 5 mg (2.5 mg x 2) all other days   Weekly total:   37.5 mg   Plan last modified:   Casey Birmingham, PharmD (4/16/2018)   Next INR check:   4/30/2018   Target end date:   Indefinite    Indications    Chronic anticoagulation [Z79.01]  History of mitral valve replacement with mechanical valve [Z95.2] [Z95.2]             Anticoagulation Episode Summary     INR check location:       Preferred lab:       Send INR reminders to:       Comments:         Anticoagulation Care Providers     Provider Role Specialty Phone number    Declan Eaton M.D. Referring Internal Medicine [phone]    [facility] Responsible  [phone]        Anticoagulation Patient Findings  Patient Findings     Negatives:   Signs/symptoms of thrombosis, Signs/symptoms of bleeding, Laboratory test error suspected, Change in health, Change in alcohol use, Change in activity, Upcoming invasive procedure, Emergency department visit, Upcoming dental procedure, Missed doses, Extra doses, Change in medications, Change in diet/appetite, Hospital admission, Bruising, Other complaints        HPI:   Carlos Rivera seen in clinic today, on anticoagulation therapy with warfarin for chronic anticoagulation due to history of mitral valve replacement with mechanical valve    Patient's previous INR was supratherapeutic at 5.0 on 4/16/2018, at which time patient was instructed to HOLD one dose and then continue usual regimen.  He returns to clinic today to recheck INR to ensure it is therapeutic and thus preventing possible clotting and/or bleeding/bruising complications.    CHADS-VASc = N/A  (unadjusted ischemic stroke risk/year:  N/A)    Does patient have any changes to current medical/health status since last appt (Y/N):  N  Does patient have any signs/symptoms of bleeding and/or  thrombosis since the last appt (Y/N):  N  Does patient have any interval changes to diet or medications since last appt (Y/N):  N  Are there any complications or cost restrictions with current therapy (Y/N):  N      Vitals:  /76  HR 46    Weight  62.5 kg   Height    1.727m    Asssessment:      INR subtherapeutic at 2.3, therefore patient is at increased risk of stroke   Reason(s) for out of range INR today:  Weekly warfarin dosing is insufficient      Plan:  BOLUS today of one additional tablet for a total of 7.5 mg; if therapeutic next week, make this change permanent (7.5 mg on Monday and Friday)     Follow up:  Because warfarin is a high risk medication and current CHEST guidelines recommend regular monitoring intervals (few days up to 12 weeks), will have patient return to clinic in 1 weeks to recheck INR.    Sarah Gautam, pharmacy Intern  Casey Birmingham, PharmD

## 2018-04-24 NOTE — TELEPHONE ENCOUNTER
Was the patient seen in the last year in this department? Yes     Does patient have an active prescription for medications requested? No     Received Request Via: Pharmacy.

## 2018-04-25 NOTE — TELEPHONE ENCOUNTER
Was the patient seen in the last year in this department? Yes     Does patient have an active prescription for medications requested? No     Received Request Via: Pharmacy      Pt met protocol?: Yes    LAST OV 03/12/2018    Lab Results  Component Value Date/Time   HBA1C 9.7 03/12/2018 1324       Lab Results  Component Value Date/Time   CHOLSTRLTOT 118 09/19/2017 0839       Lab Results  Component Value Date/Time   TRIGLYCERIDE 125 09/19/2017 0839       Lab Results  Component Value Date/Time   HDL 46 09/19/2017 0839       Lab Results  Component Value Date/Time   LDL 47 09/19/2017 0839

## 2018-04-26 RX ORDER — ATORVASTATIN CALCIUM 40 MG/1
TABLET, FILM COATED ORAL
Qty: 90 TAB | Refills: 1 | Status: SHIPPED | OUTPATIENT
Start: 2018-04-26 | End: 2018-10-03 | Stop reason: SDUPTHER

## 2018-04-26 NOTE — TELEPHONE ENCOUNTER
Pt has had OV within the 12 month protocol and lipid panel is current. 6 month supply sent to pharmacy.   Lab Results   Component Value Date/Time    CHOLSTRLTOT 118 09/19/2017 08:39 AM    LDL 47 09/19/2017 08:39 AM    HDL 46 09/19/2017 08:39 AM    TRIGLYCERIDE 125 09/19/2017 08:39 AM       Lab Results   Component Value Date/Time    SODIUM 138 09/19/2017 08:39 AM    POTASSIUM 4.9 09/19/2017 08:39 AM    CHLORIDE 104 09/19/2017 08:39 AM    CO2 26 09/19/2017 08:39 AM    GLUCOSE 75 09/19/2017 08:39 AM    BUN 26 (H) 09/19/2017 08:39 AM    CREATININE 1.02 09/19/2017 08:39 AM     Lab Results   Component Value Date/Time    ALKPHOSPHAT 86 09/19/2017 08:39 AM    ASTSGOT 41 09/19/2017 08:39 AM    ALTSGPT 51 (H) 09/19/2017 08:39 AM    TBILIRUBIN 0.8 09/19/2017 08:39 AM

## 2018-05-01 ENCOUNTER — APPOINTMENT (OUTPATIENT)
Dept: MEDICAL GROUP | Facility: PHYSICIAN GROUP | Age: 73
End: 2018-05-01
Payer: MEDICARE

## 2018-05-07 ENCOUNTER — HOSPITAL ENCOUNTER (OUTPATIENT)
Dept: RADIOLOGY | Facility: MEDICAL CENTER | Age: 73
End: 2018-05-07
Attending: SURGERY
Payer: MEDICARE

## 2018-05-07 ENCOUNTER — HOSPITAL ENCOUNTER (OUTPATIENT)
Dept: RADIOLOGY | Facility: MEDICAL CENTER | Age: 73
End: 2018-05-07
Attending: PSYCHIATRY & NEUROLOGY
Payer: MEDICARE

## 2018-05-07 DIAGNOSIS — G20.A1 PARKINSON'S DISEASE: ICD-10-CM

## 2018-05-07 DIAGNOSIS — G21.4 VASCULAR PARKINSONISM (HCC): ICD-10-CM

## 2018-05-07 DIAGNOSIS — I70.211 ATHEROSCLEROSIS OF NATIVE ARTERY OF RIGHT LOWER EXTREMITY WITH INTERMITTENT CLAUDICATION (HCC): ICD-10-CM

## 2018-05-07 DIAGNOSIS — R26.89 SHUFFLING GAIT: ICD-10-CM

## 2018-05-07 DIAGNOSIS — R26.81 UNSTEADINESS: ICD-10-CM

## 2018-05-07 DIAGNOSIS — I63.50 CEREBROVASCULAR ACCIDENT (CVA) DUE TO STENOSIS OF CEREBRAL ARTERY (HCC): ICD-10-CM

## 2018-05-07 DIAGNOSIS — F02.80 DEMENTIA ASSOCIATED WITH OTHER UNDERLYING DISEASE WITHOUT BEHAVIORAL DISTURBANCE (HCC): ICD-10-CM

## 2018-05-07 PROCEDURE — 72141 MRI NECK SPINE W/O DYE: CPT

## 2018-05-07 PROCEDURE — 72148 MRI LUMBAR SPINE W/O DYE: CPT

## 2018-05-07 PROCEDURE — 70551 MRI BRAIN STEM W/O DYE: CPT

## 2018-05-08 ENCOUNTER — TELEPHONE (OUTPATIENT)
Dept: MEDICAL GROUP | Facility: PHYSICIAN GROUP | Age: 73
End: 2018-05-08

## 2018-05-08 ENCOUNTER — APPOINTMENT (OUTPATIENT)
Dept: MEDICAL GROUP | Facility: PHYSICIAN GROUP | Age: 73
End: 2018-05-08
Payer: MEDICARE

## 2018-05-08 DIAGNOSIS — Z79.4 TYPE 2 DIABETES MELLITUS WITH HYPEROSMOLARITY WITHOUT COMA, WITH LONG-TERM CURRENT USE OF INSULIN (HCC): ICD-10-CM

## 2018-05-08 DIAGNOSIS — E11.9 TYPE 2 DIABETES MELLITUS WITHOUT COMPLICATION, UNSPECIFIED WHETHER LONG TERM INSULIN USE (HCC): ICD-10-CM

## 2018-05-08 DIAGNOSIS — E11.00 TYPE 2 DIABETES MELLITUS WITH HYPEROSMOLARITY WITHOUT COMA, WITH LONG-TERM CURRENT USE OF INSULIN (HCC): ICD-10-CM

## 2018-05-08 NOTE — TELEPHONE ENCOUNTER
1. Caller Name:Carlos Rivera         Patient approves a detailed voicemail message: no, He would like a letter sent to his mailing address.    2. SPECIFIC Action To Be Taken: Referral pending, please sign.    3. Diagnosis/Clinical Reason for Request: Type 2 diabedes    4. Specialty & Provider Name/Lab/Imaging Location: [facility]    5. Is appointment scheduled for requested order/referral: no    Patient informed they will receive a return phone call from the office ONLY if there are any questions before processing their request. Advised to call back if they haven't received a call from the referral department in 5 days.

## 2018-05-15 ENCOUNTER — ANTICOAGULATION VISIT (OUTPATIENT)
Dept: MEDICAL GROUP | Facility: PHYSICIAN GROUP | Age: 73
End: 2018-05-15
Payer: MEDICARE

## 2018-05-15 DIAGNOSIS — Z95.2 HISTORY OF MITRAL VALVE REPLACEMENT WITH MECHANICAL VALVE: ICD-10-CM

## 2018-05-15 DIAGNOSIS — Z79.01 CHRONIC ANTICOAGULATION: ICD-10-CM

## 2018-05-15 LAB — INR PPP: 1.7 (ref 2–3.5)

## 2018-05-15 PROCEDURE — 99211 OFF/OP EST MAY X REQ PHY/QHP: CPT | Performed by: NURSE PRACTITIONER

## 2018-05-15 PROCEDURE — 85610 PROTHROMBIN TIME: CPT | Performed by: NURSE PRACTITIONER

## 2018-05-15 NOTE — PROGRESS NOTES
Anticoagulation Summary  As of 5/15/2018    INR goal:   2.5-3.5   TTR:   27.5 % (1.2 y)   Today's INR:   1.7!   Warfarin maintenance plan:   7.5 mg (2.5 mg x 3) on Fri; 5 mg (2.5 mg x 2) all other days   Weekly warfarin total:   37.5 mg   Plan last modified:   Casey Birmingham, PharmD (4/16/2018)   Next INR check:   5/18/2018   Target end date:   Indefinite    Indications    Chronic anticoagulation [Z79.01]  History of mitral valve replacement with mechanical valve [Z95.2] [Z95.2]             Anticoagulation Episode Summary     INR check location:       Preferred lab:       Send INR reminders to:       Comments:         Anticoagulation Care Providers     Provider Role Specialty Phone number    Declan Eaton M.D. Referring Internal Medicine [phone]    [facility] Responsible  [phone]        Anticoagulation Patient Findings  Patient Findings     Negatives:   Signs/symptoms of thrombosis, Signs/symptoms of bleeding, Laboratory test error suspected, Change in health, Change in alcohol use, Change in activity, Upcoming invasive procedure, Emergency department visit, Upcoming dental procedure, Missed doses, Extra doses, Change in medications, Change in diet/appetite, Hospital admission, Bruising, Other complaints        HPI:   Carlos Rivera seen in clinic today, on anticoagulation therapy with warfarin for stroke prevention due to history of mechanical mitral valve replacement.    Patient's previous INR was subtherapeutic at 2.3 on 4-23-18, at which time patient was instructed to increase weekly warfarin regimen.  He returns to clinic today to recheck INR to ensure it is therapeutic and thus preventing possible clotting and/or bleeding/bruising complications.    CHADS-VASc = n/a  (unadjusted ischemic stroke risk/year:  n/a)    Does patient have any changes to current medical/health status since last appt (Y/N):  NO  Does patient have any signs/symptoms of bleeding and/or thrombosis since the  "last appt (Y/N):  NO  Does patient have any interval changes to diet or medications since last appt (Y/N):  NO  Are there any complications or cost restrictions with current therapy (Y/N):  NO      Vitals:  BP check declined today    Weight  Scale unavailable   Height   5' 8\"     Asssessment:      INR subtherapeutic at 1.7, therefore increasing patient's risk of stroke due to mechanical mitral valve   Reason(s) for out of range INR today:  No identifiable causes for low INR, patient has hx of labile INR's and has not had follow up in several weeks.      Plan:  Instructed patient to bolus with 7.5mg X 2, then resume current warfarin regimen in order to bring INR back to therapeutic range.     Follow up:  Because warfarin is a high risk medication and current CHEST guidelines recommend regular monitoring intervals (few days up to 12 weeks), will have patient return to clinic in 3 days to recheck INR.    Casey Birmingham, PharmD    "

## 2018-05-18 ENCOUNTER — ANTICOAGULATION VISIT (OUTPATIENT)
Dept: MEDICAL GROUP | Facility: PHYSICIAN GROUP | Age: 73
End: 2018-05-18
Payer: MEDICARE

## 2018-05-18 DIAGNOSIS — Z95.2 HISTORY OF MITRAL VALVE REPLACEMENT WITH MECHANICAL VALVE: ICD-10-CM

## 2018-05-18 DIAGNOSIS — Z79.01 CHRONIC ANTICOAGULATION: ICD-10-CM

## 2018-05-18 LAB — INR PPP: 2.4 (ref 2–3.5)

## 2018-05-18 PROCEDURE — 85610 PROTHROMBIN TIME: CPT | Performed by: FAMILY MEDICINE

## 2018-05-18 PROCEDURE — 99211 OFF/OP EST MAY X REQ PHY/QHP: CPT | Performed by: FAMILY MEDICINE

## 2018-05-18 NOTE — PROGRESS NOTES
Anticoagulation Summary  As of 5/18/2018    INR goal:   2.5-3.5   TTR:   27.3 % (1.2 y)   Today's INR:   2.4!   Warfarin maintenance plan:   7.5 mg (2.5 mg x 3) on Mon, Wed, Fri; 5 mg (2.5 mg x 2) all other days   Weekly warfarin total:   42.5 mg   Plan last modified:   Casey Birmingham, PharmD (5/18/2018)   Next INR check:   5/25/2018   Target end date:   Indefinite    Indications    Chronic anticoagulation [Z79.01]  History of mitral valve replacement with mechanical valve [Z95.2] [Z95.2]             Anticoagulation Episode Summary     INR check location:       Preferred lab:       Send INR reminders to:       Comments:         Anticoagulation Care Providers     Provider Role Specialty Phone number    Declan Eaton M.D. Referring Internal Medicine [phone]    [facility] Responsible  [phone]        Anticoagulation Patient Findings  Patient Findings     Negatives:   Signs/symptoms of thrombosis, Signs/symptoms of bleeding, Laboratory test error suspected, Change in health, Change in alcohol use, Change in activity, Upcoming invasive procedure, Emergency department visit, Upcoming dental procedure, Missed doses, Extra doses, Change in medications, Change in diet/appetite, Hospital admission, Bruising, Other complaints        HPI:   Carlos Rivera seen in clinic today, on anticoagulation therapy with warfarin for stroke prevention due to history of mechanical mitral valve.    Patient's previous INR was subtherapeutic at 1.7 on 5-15-18, at which time patient was instructed to bolus with two doses, then resume current warfarin regimen.  He returns to clinic today to recheck INR to ensure it is therapeutic and thus preventing possible clotting and/or bleeding/bruising complications.    CHADS-VASc = n/a  (unadjusted ischemic stroke risk/year:  n/a)    Does patient have any changes to current medical/health status since last appt (Y/N):  NO  Does patient have any signs/symptoms of bleeding  "and/or thrombosis since the last appt (Y/N):  NO  Does patient have any interval changes to diet or medications since last appt (Y/N):  NO  Are there any complications or cost restrictions with current therapy (Y/N):  NO      Vitals:  BP check declined at today's visit   Weight  declines   Height   5' 8\"     Asssessment:      INR slightly subtherapeutic at 2.4, therefore increasing patient's risk of stroke due to mechanical valve.   Reason(s) for out of range INR today:  No identifiable causes.      Plan:  Instructed patient to increase weekly warfarin regimen by ~13% to account for bolus dosing this week in order to bring INR back to therapeutic range.  Patient does not wish to dose lovenox despite risks.     Follow up:  Because warfarin is a high risk medication and current CHEST guidelines recommend regular monitoring intervals (few days up to 12 weeks), will have patient return to clinic in 1 weeks to recheck INR.    Casey Birmingham, PharmD    "

## 2018-05-21 ENCOUNTER — OFFICE VISIT (OUTPATIENT)
Dept: BEHAVIORAL HEALTH | Facility: PHYSICIAN GROUP | Age: 73
End: 2018-05-21
Payer: MEDICARE

## 2018-05-21 DIAGNOSIS — F33.0 MILD EPISODE OF RECURRENT MAJOR DEPRESSIVE DISORDER (HCC): ICD-10-CM

## 2018-05-21 PROCEDURE — 90834 PSYTX W PT 45 MINUTES: CPT | Performed by: SOCIAL WORKER

## 2018-05-23 NOTE — BH THERAPY
[facility]  Therapy Progress Note    Patient Name: Carlos Rivera  Patient MRN: [id number]  Today's Date: 5/23/2018     Type of session:Individual psychotherapy  Length of session: 45 minutes  Persons in attendance:Patient    Subjective/New Info: Met with patient who continues to express his displeasure with doctor's reported recommendation that he not ride his bike due to his impaired balance.  Discussed patient's going to the gym for exercise which he has noted in the past did help his mood and he felt in overall better physical and mental status.  States his son who lives in [place] and his family are in the process of selling their home with plan to move to [facility] and patient is pleased about that.  States son had agreed to accompany patient to transport patient's boat from [facility] to [place].  Discussed patient's progress in pursuing social contacts and interests since moving here a year ago.  States he and his wife Comfort know a couple they were in a camping travel group with and have gotten together since moving.  Also discussed patient interest in contacting [employer] in [place] which he belonged to in [place].  Patient's mood does appear to be improved.  He expresses interest in working on his car for [employer] and is animated describing the vehicle his brother gave Carlos for his birthday.      Objective/Observations:   Participation: Active verbal participation, Attentive and Open to feedback   Grooming: Casual and Neat   Cognition: Fully Oriented   Eye contact: Fair   Mood: Neutral   Affect: Constricted   Thought process: Goal-directed   Speech: Rate within normal limits and Volume within normal limits   Other:     Diagnoses:   1. Mild episode of recurrent major depressive disorder (HCC)         Current risk:   SUICIDE: Low   Homicide: Low   Self-harm: Low   Relapse: Low   Other:    Safety Plan reviewed? Not Indicated   If evidence of imminent risk is present, intervention/plan:      Therapeutic Intervention(s): Distress tolerance skills, Goal-setting, Interpersonal effectiveness skills, Leisure and recreation skills and Positive behavior reinforced    Treatment Goal(s)/Objective(s) addressed: Identify and engage in activities that would support treatment goals by being consistent with one’s values.        Progress toward Treatment Goals: Mild improvement    Plan:  Identify and engage in activities that would improve self-image and help progression toward eliminating problematic behavior while being consistent with one’s values  - Next appointment scheduled:  6/13/2018  - Patient is in agreement with the above plan:  YES    Corinne G. Taylor, L.C.S.W.  5/23/2018

## 2018-05-25 ENCOUNTER — ANTICOAGULATION VISIT (OUTPATIENT)
Dept: MEDICAL GROUP | Facility: PHYSICIAN GROUP | Age: 73
End: 2018-05-25
Payer: MEDICARE

## 2018-05-25 VITALS — HEART RATE: 49 BPM | DIASTOLIC BLOOD PRESSURE: 81 MMHG | SYSTOLIC BLOOD PRESSURE: 152 MMHG

## 2018-05-25 DIAGNOSIS — Z95.2 HISTORY OF MITRAL VALVE REPLACEMENT WITH MECHANICAL VALVE: ICD-10-CM

## 2018-05-25 DIAGNOSIS — Z79.01 CHRONIC ANTICOAGULATION: Primary | ICD-10-CM

## 2018-05-25 LAB — INR PPP: 2.1 (ref 2–3.5)

## 2018-05-25 PROCEDURE — 99211 OFF/OP EST MAY X REQ PHY/QHP: CPT | Performed by: INTERNAL MEDICINE

## 2018-05-25 PROCEDURE — 85610 PROTHROMBIN TIME: CPT | Performed by: INTERNAL MEDICINE

## 2018-05-25 NOTE — PROGRESS NOTES
Anticoagulation Summary  As of 5/25/2018    INR goal:   2.5-3.5   TTR:   26.9 % (1.2 y)   Today's INR:   2.1!   Warfarin maintenance plan:   7.5 mg (2.5 mg x 3) on Mon, Fri; 5 mg (2.5 mg x 2) all other days   Weekly warfarin total:   40 mg   Plan last modified:   Casey Birmingham, PharmD (5/25/2018)   Next INR check:   5/29/2018   Target end date:   Indefinite    Indications    Chronic anticoagulation [Z79.01]  History of mitral valve replacement with mechanical valve [Z95.2] [Z95.2]             Anticoagulation Episode Summary     INR check location:       Preferred lab:       Send INR reminders to:       Comments:         Anticoagulation Care Providers     Provider Role Specialty Phone number    Declan Eaton M.D. Referring Internal Medicine [phone]    [facility] Responsible  [phone]        Anticoagulation Patient Findings  Patient Findings     Negatives:   Signs/symptoms of thrombosis, Signs/symptoms of bleeding, Laboratory test error suspected, Change in health, Change in alcohol use, Change in activity, Upcoming invasive procedure, Emergency department visit, Upcoming dental procedure, Missed doses, Extra doses, Change in medications, Change in diet/appetite, Hospital admission, Bruising, Other complaints        HPI:   Carlos Rivera seen in clinic today, on anticoagulation therapy with warfarin for stroke prevention due to history of mechanical mitral valve replacement.    Patient's previous INR was subtherapeutic at 2.4 on 5-18-18, at which time patient was instructed to continue with current warfarin regimen.  He returns to clinic today to recheck INR to ensure it is therapeutic and thus preventing possible clotting and/or bleeding/bruising complications.    CHADS-VASc = n/a  (unadjusted ischemic stroke risk/year:  n/a)    Does patient have any changes to current medical/health status since last appt (Y/N):  NO  Does patient have any signs/symptoms of bleeding and/or thrombosis  "since the last appt (Y/N):  NO  Does patient have any interval changes to diet or medications since last appt (Y/N):  NO  Are there any complications or cost restrictions with current therapy (Y/N):  NO      Vitals:  /81  HR 49  He is unsure if he is still taking lisinopril, asked that he checks home medications and reports back at next visit.    Weight  declines   Height   5' 8\"     Asssessment:      INR remains therapeutic at 2.1, therefore increasing patient's risk of stroke due to mechanical mitral valve.   Reason(s) for out of range INR today:  Did not take dosing as directed at last visit.  Patient continues to have problems with memory and dosing medications correctly.    Plan:  Instructed patient to increase weekly warfarin to previously discussed dose.  He will also begin lovenox 60mg SQ two times daily until INR >2.5.  He is familiar with these injections and comfortable dosing.     Follow up:  Because warfarin is a high risk medication and current CHEST guidelines recommend regular monitoring intervals (few days up to 12 weeks), will have patient return to clinic in 4 days to recheck INR.    Casey Birmingham, PharmD    "

## 2018-05-29 ENCOUNTER — ANTICOAGULATION VISIT (OUTPATIENT)
Dept: MEDICAL GROUP | Facility: PHYSICIAN GROUP | Age: 73
End: 2018-05-29
Payer: MEDICARE

## 2018-05-29 DIAGNOSIS — Z79.01 CHRONIC ANTICOAGULATION: ICD-10-CM

## 2018-05-29 DIAGNOSIS — Z95.2 HISTORY OF MITRAL VALVE REPLACEMENT WITH MECHANICAL VALVE: ICD-10-CM

## 2018-05-29 LAB — INR PPP: 2.8 (ref 2–3.5)

## 2018-05-29 PROCEDURE — 85610 PROTHROMBIN TIME: CPT | Performed by: NURSE PRACTITIONER

## 2018-05-30 ENCOUNTER — TELEPHONE (OUTPATIENT)
Dept: MEDICAL GROUP | Facility: PHYSICIAN GROUP | Age: 73
End: 2018-05-30

## 2018-05-30 NOTE — TELEPHONE ENCOUNTER
requested last office note regarding ER visit from date:05/30/18.  This was faxed  5/30/2018 4:17 PM to  fax #: [phone].

## 2018-06-01 ENCOUNTER — TELEPHONE (OUTPATIENT)
Dept: MEDICAL GROUP | Facility: PHYSICIAN GROUP | Age: 73
End: 2018-06-01

## 2018-06-01 ENCOUNTER — OFFICE VISIT (OUTPATIENT)
Dept: MEDICAL GROUP | Facility: PHYSICIAN GROUP | Age: 73
End: 2018-06-01
Payer: MEDICARE

## 2018-06-01 VITALS
WEIGHT: 141 LBS | RESPIRATION RATE: 16 BRPM | HEART RATE: 46 BPM | TEMPERATURE: 98.2 F | DIASTOLIC BLOOD PRESSURE: 58 MMHG | SYSTOLIC BLOOD PRESSURE: 120 MMHG | HEIGHT: 68 IN | OXYGEN SATURATION: 95 % | BODY MASS INDEX: 21.37 KG/M2

## 2018-06-01 DIAGNOSIS — S22.32XA CLOSED FRACTURE OF ONE RIB OF LEFT SIDE, INITIAL ENCOUNTER: ICD-10-CM

## 2018-06-01 PROCEDURE — 99214 OFFICE O/P EST MOD 30 MIN: CPT | Performed by: INTERNAL MEDICINE

## 2018-06-01 RX ORDER — OXYCODONE HYDROCHLORIDE AND ACETAMINOPHEN 5; 325 MG/1; MG/1
TABLET ORAL
Qty: 10 TAB | Refills: 0 | Status: SHIPPED | OUTPATIENT
Start: 2018-06-01 | End: 2018-06-18

## 2018-06-01 RX ORDER — LIDOCAINE 50 MG/G
1 PATCH TOPICAL EVERY 24 HOURS
Qty: 10 PATCH | Refills: 3 | Status: SHIPPED | OUTPATIENT
Start: 2018-06-01 | End: 2018-07-17

## 2018-06-01 RX ORDER — OXYCODONE AND ACETAMINOPHEN 2.5; 325 MG/1; MG/1
1 TABLET ORAL EVERY 4 HOURS PRN
COMMUNITY
End: 2018-06-01 | Stop reason: SDUPTHER

## 2018-06-01 RX ORDER — OXYCODONE AND ACETAMINOPHEN 2.5; 325 MG/1; MG/1
1 TABLET ORAL EVERY 6 HOURS PRN
Qty: 10 TAB | Refills: 0 | Status: SHIPPED | OUTPATIENT
Start: 2018-06-01 | End: 2018-06-01

## 2018-06-01 NOTE — ASSESSMENT & PLAN NOTE
Presented to [facility] ED on 5/30/2018 after he fell onto his left after tripping on his concrete patio. His sandal caught the edge of the concrete slab. He didn't hit his head. His wife saw the fall who corroborates that he didn't hit his head. Imaging there demonstrates a left lateral rib fracture and he was prescribed 12 tablets of percocet. He tried quarter and then a half tablet which didn't work. He's been now taking full tablets every 5.5 - 6 hours; his wife makes him wait that long because she doesn't want to increase the frequency. Having some dizziness since he hasn't been up and around. He has 4 tablets of Percocet remaining. According to his wife he is splinting and has trouble breathing without Percocet. She says that she monitors him after giving him pain medication and ensures that he is sitting or resting and not ambulating.

## 2018-06-01 NOTE — TELEPHONE ENCOUNTER
Patient's pharmacy called and stated that they cannot do the 2.5-325 mg of percocet.  They are asking for a new rx for a different dosage.  Patient will be coming over here to .  Thank you..

## 2018-06-01 NOTE — PROGRESS NOTES
PRIMARY CARE CLINIC FOLLOW UP VISIT  Chief Complaint   Patient presents with   • Rib Pain     Req Percocet Refill      History of Present Illness     Fracture of rib of left side  Presented to [facility] ED on 5/30/2018 after he fell onto his left after tripping on his concrete patio. His sandal caught the edge of the concrete slab. He didn't hit his head. His wife saw the fall who corroborates that he didn't hit his head. Imaging there demonstrates a left lateral rib fracture and he was prescribed 12 tablets of percocet. He tried quarter and then a half tablet which didn't work. He's been now taking full tablets every 5.5 - 6 hours; his wife makes him wait that long because she doesn't want to increase the frequency. Having some dizziness since he hasn't been up and around. He has 4 tablets of Percocet remaining. According to his wife he is splinting and has trouble breathing without Percocet. She says that she monitors him after giving him pain medication and ensures that he is sitting or resting and not ambulating.     Current Outpatient Prescriptions   Medication Sig Dispense Refill   • lidocaine (LIDODERM) 5 % Patch Apply 1 Patch to skin as directed every 24 hours. 10 Patch 3   • oxycodone-acetaminophen (PERCOCET) 2.5-325 MG per tablet Take 1 Tab by mouth every 6 hours as needed for Severe Pain for up to 30 days. 10 Tab 0   • atorvastatin (LIPITOR) 40 MG Tab TAKE 1 TABLET BY MOUTH  EVERY EVENING 90 Tab 1   • lisinopril (PRINIVIL) 20 MG Tab Take 1 Tab by mouth every day. 90 Tab 1   • memantine (NAMENDA) 5 MG Tab Take 1 Tab by mouth 2 times a day. 60 Tab 3   • enoxaparin (LOVENOX) 60 MG/0.6ML Solution inj Inject 60 mg as instructed every 12 hours. 10 Syringe 1   • metFORMIN (GLUCOPHAGE) 500 MG Tab TAKE 1 TABLET BY MOUTH TWO  TIMES DAILY WITH MEALS 180 Tab 1   • Insulin NPH, Human,, Isophane, 100 UNIT/ML Suspension Pen-injector Inject 10 Units as instructed every bedtime. 10 mL 11   • Insulin Syringe-Needle U-100  "(INSULIN SYRINGE .3CC/31GX5/16\") 31G X 5/16\" 0.3 ML Misc Use daily with insulin 100 Each 3   • warfarin (COUMADIN) 2.5 MG Tab Take two to three tablets by mouth one time daily as directed by coumadin clinic 270 Tab 0   • Blood Glucose Monitoring Suppl Supplies Misc Test strips order: Test strips compatible with meter. Sig: use daily and prn ssx high or low sugar 200 Each 3   • citalopram (CELEXA) 10 MG tablet TAKE 1 TABLET BY MOUTH  EVERY MORNING 90 Tab 1   • fexofenadine (ALLEGRA ALLERGY) 180 MG tablet Take 180 mg by mouth every day.     • Homeopathic Products ([employer] LEG CRAMPS PAIN RELIEF PO) Take  by mouth.     • Insulin Syringe-Needle U-100 (INSULIN SYRINGE .3CC/31GX5/16\") 31G X 5/16\" 0.3 ML Misc Use 3 times daily with insulin 300 Each 3   • Blood Glucose Monitoring Suppl (ONETOUCH VERIO FLEX SYSTEM) W/DEVICE Kit by Does not apply route.       No current facility-administered medications for this visit.      Past Medical History:   Diagnosis Date   • Chronic anticoagulation 2017   • History of mitral valve replacement with mechanical valve [Z95.2] 3/10/2017   • Hyperlipidemia    • Type II diabetes mellitus (CMS-HCC) ([facility]) 2017     Past Surgical History:   Procedure Laterality Date   • MITRAL VALVE REPLACEMENT     • INGUINAL HERNIA REPAIR Bilateral    • TONSILLECTOMY       Social History   Substance Use Topics   • Smoking status: Never Smoker   • Smokeless tobacco: Never Used   • Alcohol use No     Social History     Social History Narrative    Retired from [employer]      Family History   Problem Relation Age of Onset   • Heart Attack Father 58   • Diabetes Father    • Heart Attack Paternal Uncle      Family Status   Relation Status   • Mother    • Father     58   • Paternal Uncle      Allergies: Vicodin [hydrocodone-acetaminophen]    ROS  As per HPI above. All other systems reviewed and negative.        Objective   Blood pressure 120/58, pulse (!) 46, temperature 36.8 °C (98.2 °F), " "resp. rate 16, height 1.727 m (5' 8\"), weight 64 kg (141 lb), SpO2 95 %. Body mass index is 21.44 kg/m².    General: alert and oriented, pleasant, cooperative. Grimacing in pain occasionally   HEENT: Normocephalic, atraumatic.   MSK: pain to palpation over left lateral ribs but without bruising   Skin: warm and dry, no lesions or rashes  Psychiatric: appropriate mood and affect. Good insight and appropriate judgment     Assessment and Plan   The following treatment plan was discussed     1. Closed fracture of one rib of left side, initial encounter  Had extensive discussion with patient and his wife about the side affects of controlled pain medications especially given his dementia and being on warfarin. His wife is well aware and watches him carefully after he receives his pain medications. We also discussed localized therapy with lidocaine patches, heat, and topical pain relief to limit systemic pain medication. He and his wife are amenable to using the lidocaine patches in hopes of limiting or eliminating his use of percocet. He will follow up with me on 6/4/2018 and I will determine how he did over the weekend then.   - lidocaine (LIDODERM) 5 % Patch; Apply 1 Patch to skin as directed every 24 hours.  Dispense: 10 Patch; Refill: 3  - oxycodone-acetaminophen (PERCOCET) 2.5-325 MG per tablet; Take 1 Tab by mouth every 6 hours as needed for Severe Pain for up to 30 days.  Dispense: 10 Tab; Refill: 0  - CONSENT FOR OPIATE PRESCRIPTION      Healthcare maintenance     Health Maintenance Due   Topic Date Due   • Annual Wellness Visit  1945   • RETINAL SCREENING  10/12/1961       Return in about 3 days (around 6/4/2018).    Declan Eaton MD  Internal Medicine  [facility]                   "

## 2018-06-04 ENCOUNTER — OFFICE VISIT (OUTPATIENT)
Dept: MEDICAL GROUP | Facility: PHYSICIAN GROUP | Age: 73
End: 2018-06-04
Payer: MEDICARE

## 2018-06-04 ENCOUNTER — ANTICOAGULATION VISIT (OUTPATIENT)
Dept: MEDICAL GROUP | Facility: PHYSICIAN GROUP | Age: 73
End: 2018-06-04
Payer: MEDICARE

## 2018-06-04 ENCOUNTER — TELEPHONE (OUTPATIENT)
Dept: MEDICAL GROUP | Facility: PHYSICIAN GROUP | Age: 73
End: 2018-06-04

## 2018-06-04 VITALS
TEMPERATURE: 98.6 F | OXYGEN SATURATION: 95 % | SYSTOLIC BLOOD PRESSURE: 122 MMHG | HEIGHT: 68 IN | DIASTOLIC BLOOD PRESSURE: 70 MMHG | BODY MASS INDEX: 21.07 KG/M2 | RESPIRATION RATE: 18 BRPM | WEIGHT: 139 LBS | HEART RATE: 47 BPM

## 2018-06-04 DIAGNOSIS — Z95.2 HISTORY OF MITRAL VALVE REPLACEMENT WITH MECHANICAL VALVE: ICD-10-CM

## 2018-06-04 DIAGNOSIS — S22.32XA CLOSED FRACTURE OF ONE RIB OF LEFT SIDE, INITIAL ENCOUNTER: ICD-10-CM

## 2018-06-04 DIAGNOSIS — Z79.01 CHRONIC ANTICOAGULATION: ICD-10-CM

## 2018-06-04 DIAGNOSIS — E11.00 TYPE 2 DIABETES MELLITUS WITH HYPEROSMOLARITY WITHOUT COMA, WITHOUT LONG-TERM CURRENT USE OF INSULIN (HCC): ICD-10-CM

## 2018-06-04 LAB — INR PPP: 1.5 (ref 2–3.5)

## 2018-06-04 PROCEDURE — 99214 OFFICE O/P EST MOD 30 MIN: CPT | Performed by: INTERNAL MEDICINE

## 2018-06-04 PROCEDURE — 99999 PR NO CHARGE: CPT | Performed by: INTERNAL MEDICINE

## 2018-06-04 PROCEDURE — 85610 PROTHROMBIN TIME: CPT | Performed by: INTERNAL MEDICINE

## 2018-06-04 NOTE — PROGRESS NOTES
"RN-CDE Note    Subjective:     Health changes since last visit/interval Hx: Carlos returns to follow up with initiating NPH 10 units.  He was increased to 10 units twice daily on 4/6/18, but has not sent blood sugars since then.  It is too soon for HbA1c- ordered to have drawn next week.  He recently sustained rib fracture from a fall.    Medications (including changes made today)  Current Outpatient Prescriptions   Medication Sig Dispense Refill   • oxyCODONE-acetaminophen (PERCOCET) 5-325 MG Tab Take 1/2 tab as needed every 6 to 8 hours for severe pain 10 Tab 0   • atorvastatin (LIPITOR) 40 MG Tab TAKE 1 TABLET BY MOUTH  EVERY EVENING 90 Tab 1   • lisinopril (PRINIVIL) 20 MG Tab Take 1 Tab by mouth every day. 90 Tab 1   • enoxaparin (LOVENOX) 60 MG/0.6ML Solution inj Inject 60 mg as instructed every 12 hours. 10 Syringe 1   • metFORMIN (GLUCOPHAGE) 500 MG Tab TAKE 1 TABLET BY MOUTH TWO  TIMES DAILY WITH MEALS (Patient taking differently: 500 mg 2 times a day. TAKE 1 TABLET BY MOUTH TWO  TIMES DAILY WITH MEALS) 180 Tab 1   • Insulin NPH, Human,, Isophane, 100 UNIT/ML Suspension Pen-injector Inject 10 Units as instructed every bedtime. 10 mL 11   • warfarin (COUMADIN) 2.5 MG Tab Take two to three tablets by mouth one time daily as directed by coumadin clinic 270 Tab 0   • Blood Glucose Monitoring Suppl Supplies Misc Test strips order: Test strips compatible with meter. Sig: use daily and prn ssx high or low sugar 200 Each 3   • citalopram (CELEXA) 10 MG tablet TAKE 1 TABLET BY MOUTH  EVERY MORNING 90 Tab 1   • Insulin Syringe-Needle U-100 (INSULIN SYRINGE .3CC/31GX5/16\") 31G X 5/16\" 0.3 ML Misc Use 3 times daily with insulin 300 Each 3   • lidocaine (LIDODERM) 5 % Patch Apply 1 Patch to skin as directed every 24 hours. 10 Patch 3   • memantine (NAMENDA) 5 MG Tab Take 1 Tab by mouth 2 times a day. 60 Tab 3   • fexofenadine (ALLEGRA ALLERGY) 180 MG tablet Take 180 mg by mouth every day.     • Homeopathic Products " ([employer] LEG CRAMPS PAIN RELIEF PO) Take  by mouth.     • Blood Glucose Monitoring Suppl (ONETOUCH VERIO FLEX SYSTEM) W/DEVICE Kit by Does not apply route.       No current facility-administered medications for this visit.        Taking daily ASA: Not Indicated  Taking above medications as prescribed: Yes  Patient Denies side effects of medication.    Exercise: sporadic irregular exercise, <half hour walking weekly  Diet: meals per day on average: 1-2 + snacks    Health Maintenance:   Health Maintenance Topics with due status: Overdue       Topic Date Due    Annual Wellness Visit 1945    RETINAL SCREENING 10/12/1963         DM:   Last A1c:   Lab Results   Component Value Date/Time    HBA1C 9.7 03/12/2018 01:24 PM      A1c goal: < 7    Glucose monitoring frequency: fasting only  fasting range: 160-190's  patient forgot to bring HBG readings  Hypoglycemic episodes: no     Last Retinal Exam: having difficulty getting appointment  Daily Foot Exam: no  Routine Dental Exams: yes    Lab Results   Component Value Date/Time    MALBCRT 499 (H) 09/19/2017 08:38 AM    MICROALBUR 35.3 09/19/2017 08:38 AM        ACR Albumin/Creatinine Ratio goal <30 no    Diabetic complications: albinurea    HTN:   Blood pressure goal <140/<80 yes.   Currently Rx ACE/ARB: Yes    Dyslipidemia:    Lab Results   Component Value Date/Time    CHOLSTRLTOT 118 09/19/2017 08:39 AM    LDL 47 09/19/2017 08:39 AM    HDL 46 09/19/2017 08:39 AM    TRIGLYCERIDE 125 09/19/2017 08:39 AM       Lab Results   Component Value Date/Time    SODIUM 138 09/19/2017 08:39 AM    POTASSIUM 4.9 09/19/2017 08:39 AM    CHLORIDE 104 09/19/2017 08:39 AM    CO2 26 09/19/2017 08:39 AM    GLUCOSE 75 09/19/2017 08:39 AM    BUN 26 (H) 09/19/2017 08:39 AM    CREATININE 1.02 09/19/2017 08:39 AM     Lab Results   Component Value Date/Time    ALKPHOSPHAT 86 09/19/2017 08:39 AM    ASTSGOT 41 09/19/2017 08:39 AM    ALTSGPT 51 (H) 09/19/2017 08:39 AM    TBILIRUBIN 0.8 09/19/2017 08:39 AM         Currently Rx Statin: Yes      He  reports that he has never smoked. He has never used smokeless tobacco.    Objective:     Exam:  Monofilament: not done      Plan:     Discussed All medications, side effects and compliance (discussed carefully)  Annual eye examinations at Ophthalmology  Diabetic diet discussed in detail, written exchange diet given  Glycohemoglobin and other lab monitoring  Home glucose monitoring emphasized  Labs immediately prior to next visit. Labs -- see laboratory orders  Patient educated on Blood Sugar Goals, Complications of Diabetes Mellitus, Exercise, Nutrition, use of trulicity pen    Recommended medication changes: start Trulicity

## 2018-06-04 NOTE — PROGRESS NOTES
Anticoagulation Summary  As of 6/4/2018    INR goal:   2.5-3.5   TTR:   27.0 % (1.2 y)   Today's INR:   1.5!   Warfarin maintenance plan:   7.5 mg (2.5 mg x 3) on Mon, Fri; 5 mg (2.5 mg x 2) all other days   Weekly warfarin total:   40 mg   Plan last modified:   Casey Birmingham, PharmD (5/25/2018)   Next INR check:   6/8/2018   Target end date:   Indefinite    Indications    Chronic anticoagulation [Z79.01]  History of mitral valve replacement with mechanical valve [Z95.2] [Z95.2]             Anticoagulation Episode Summary     INR check location:       Preferred lab:       Send INR reminders to:       Comments:         Anticoagulation Care Providers     Provider Role Specialty Phone number    Declan Eaton M.D. Referring Internal Medicine [phone]    [facility] Anticoagulation Services Responsible  [phone]        Anticoagulation Patient Findings  Patient Findings     Positives:   Missed doses    Negatives:   Signs/symptoms of thrombosis, Signs/symptoms of bleeding, Laboratory test error suspected, Change in health, Change in alcohol use, Change in activity, Upcoming invasive procedure, Emergency department visit, Upcoming dental procedure, Extra doses, Change in medications, Change in diet/appetite, Hospital admission, Bruising, Other complaints    Comments:   Likely missed or under-dosed multiple days        HPI:   Carlos Rivera seen in clinic today, on anticoagulation therapy with warfarin for stroke prevention due to history of mechanical mitral valve replacement.    Patient's previous INR was therapeutic at 2.8 on 5-29-18, at which time patient was instructed to increase weekly warfarin regimen.  He returns to clinic today to recheck INR to ensure it is therapeutic and thus preventing possible clotting and/or bleeding/bruising complications.    CHADS-VASc = n/a  (unadjusted ischemic stroke risk/year:  n/a,)    Does patient have any changes to current medical/health status since last appt (Y/N):  ROBERTO  "resulting in broken ribs   Does patient have any signs/symptoms of bleeding and/or thrombosis since the last appt (Y/N):  NO  Does patient have any interval changes to diet or medications since last appt (Y/N):  NO  Are there any complications or cost restrictions with current therapy (Y/N):  NO      Vitals:  BP check declined at today's visit    Weight  declines   Height   5' 8\"     Asssessment:      INR subtherapeutic at 1.5, therefore increasing patient's risk of stroke due to mechanical valve.   Reason(s) for out of range INR today:  Patient has experienced decrease in cognitive function and is missing doses and forgetting dosing schedules.  Wife has agreed to handle warfarin dosing, seven day pill reminder given to be used solely for warfarin dosing.      Plan:  Instructed patient to bolus with 10mg X 1, 7.5mg X 1, then resume current warfarin regimen.  He will also begin lovenox 60mg injections as bridge to therapeutic INR, which he has left at home from previous subtherapeutic INR.     Follow up:  Because warfarin is a high risk medication and current CHEST guidelines recommend regular monitoring intervals (few days up to 12 weeks), will have patient return to clinic in 4 days to recheck INR.    Casey Birmingham, PharmD    "

## 2018-06-04 NOTE — TELEPHONE ENCOUNTER
DOCUMENTATION OF PAR STATUS:    1. Name of Medication & Dose: lidocaine 5% patch    2. Name of Prescription Coverage Company & phone #: [employer]      3. Date Prior Auth Submitted: 06/04/18    4. What information was given to obtain insurance decision? Cover My Meds    5. Prior Auth Status? Pending    6. Patient Notified: no

## 2018-06-04 NOTE — PROGRESS NOTES
PRIMARY CARE CLINIC FOLLOW UP VISIT  Chief Complaint   Patient presents with   • Diabetes     History of Present Illness     Presents with his wife for the following:     Type II diabetes mellitus (CMS-HCC)  Here for DM follow up with me and the diabetes RN. His NPH had been increased to 10u bid 4/2018 but he hasn't been testing his blood sugars.     Fracture of rib of left side  Last seen by me on 6/1/2018 for left sided rib fracture for which he was initially seen at [facility]. He says he is still having pain although it is improving. He is taking 1/2 percocet tablet every 6-8 hours. Says a lidocaine patch was tried but only helped a little. He has also noted which position changes help. He does have some issues with constipation which is why he's also trying to limit his percocet use.     Current Outpatient Prescriptions   Medication Sig Dispense Refill   • Dulaglutide (TRULICITY) 0.75 MG/0.5ML Solution Pen-injector Inject 0.75 mg as instructed every 7 days. 4 PEN 11   • oxyCODONE-acetaminophen (PERCOCET) 5-325 MG Tab Take 1/2 tab as needed every 6 to 8 hours for severe pain 10 Tab 0   • atorvastatin (LIPITOR) 40 MG Tab TAKE 1 TABLET BY MOUTH  EVERY EVENING 90 Tab 1   • lisinopril (PRINIVIL) 20 MG Tab Take 1 Tab by mouth every day. 90 Tab 1   • enoxaparin (LOVENOX) 60 MG/0.6ML Solution inj Inject 60 mg as instructed every 12 hours. 10 Syringe 1   • metFORMIN (GLUCOPHAGE) 500 MG Tab TAKE 1 TABLET BY MOUTH TWO  TIMES DAILY WITH MEALS (Patient taking differently: 500 mg 2 times a day. TAKE 1 TABLET BY MOUTH TWO  TIMES DAILY WITH MEALS) 180 Tab 1   • Insulin NPH, Human,, Isophane, 100 UNIT/ML Suspension Pen-injector Inject 10 Units as instructed every bedtime. 10 mL 11   • warfarin (COUMADIN) 2.5 MG Tab Take two to three tablets by mouth one time daily as directed by coumadin clinic 270 Tab 0   • Blood Glucose Monitoring Suppl Supplies Misc Test strips order: Test strips compatible with meter. Sig: use daily and prn ssx  "high or low sugar 200 Each 3   • citalopram (CELEXA) 10 MG tablet TAKE 1 TABLET BY MOUTH  EVERY MORNING 90 Tab 1   • Insulin Syringe-Needle U-100 (INSULIN SYRINGE .3CC/31GX5/16\") 31G X 5/16\" 0.3 ML Misc Use 3 times daily with insulin 300 Each 3   • lidocaine (LIDODERM) 5 % Patch Apply 1 Patch to skin as directed every 24 hours. 10 Patch 3   • memantine (NAMENDA) 5 MG Tab Take 1 Tab by mouth 2 times a day. 60 Tab 3   • fexofenadine (ALLEGRA ALLERGY) 180 MG tablet Take 180 mg by mouth every day.     • Homeopathic Products ([employer] LEG CRAMPS PAIN RELIEF PO) Take  by mouth.     • Blood Glucose Monitoring Suppl (ONETOUCH VERIO FLEX SYSTEM) W/DEVICE Kit by Does not apply route.       No current facility-administered medications for this visit.      Past Medical History:   Diagnosis Date   • Chronic anticoagulation 2017   • History of mitral valve replacement with mechanical valve [Z95.2] 3/10/2017   • Hyperlipidemia    • Type II diabetes mellitus (CMS-HCC) ([facility]) 2017     Past Surgical History:   Procedure Laterality Date   • MITRAL VALVE REPLACEMENT     • INGUINAL HERNIA REPAIR Bilateral    • TONSILLECTOMY       Social History   Substance Use Topics   • Smoking status: Never Smoker   • Smokeless tobacco: Never Used   • Alcohol use No     Social History     Social History Narrative    Retired from [employer]      Family History   Problem Relation Age of Onset   • Heart Attack Father 58   • Diabetes Father    • Heart Attack Paternal Uncle      Family Status   Relation Status   • Mother    • Father     58   • Paternal Uncle      Allergies: Vicodin [hydrocodone-acetaminophen]    ROS  As per HPI above. All other systems reviewed and negative.        Objective   Blood pressure 122/70, pulse (!) 47, temperature 37 °C (98.6 °F), resp. rate 18, height 1.727 m (5' 8\"), weight 63 kg (139 lb), SpO2 95 %. Body mass index is 21.13 kg/m².    General: alert and oriented, pleasant, cooperative  HEENT: " Normocephalic, atraumatic. No thyroid masses. Oropharynx clear without exudate or injection.   Cardiovascular: regular rate and rhythm, normal S1/S2  Pulmonary: lungs clear to auscultation bilaterally  MSK: left lower quadrant bruising and palpation over left lateral rib  Skin: warm and dry, no lesions or rashes  Psychiatric: appropriate mood and affect. Good insight and appropriate judgment     Assessment and Plan   The following treatment plan was discussed     1. Type 2 diabetes mellitus with hyperosmolarity without coma, without long-term current use of insulin (HCC)  Given his dementia it may be too complicated by Carlos to manage insulin. Diabetes RN provided him with a blood sugar log for completion and is hoping to transition him to Trulicity to simplify his regimen. She advised him not to have the Trulicity filled if it's too expensive.   - COMP METABOLIC PANEL; Future  - HEMOGLOBIN A1C; Future  - MICROALBUMIN CREAT RATIO URINE; Future  - LIPID PROFILE; Future  - REFERRAL TO OPHTHALMOLOGY    2. Closed fracture of one rib of left side, initial encounter  His pain is improving and has limited controlled substance use; using 1/2 tab of percocet about every 8 hours or so. Advised him to try doc/senna.       Healthcare maintenance     Health Maintenance Due   Topic Date Due   • Annual Wellness Visit  1945   • RETINAL SCREENING  10/12/1963       Return in about 3 months (around 9/4/2018) for diabetes RN and me .    Declan Eaton MD  Internal Medicine  [facility]

## 2018-06-04 NOTE — ASSESSMENT & PLAN NOTE
Last seen by me on 6/1/2018 for left sided rib fracture for which he was initially seen at [facility]. He says he is still having pain although it is improving. He is taking 1/2 percocet tablet every 6-8 hours. Says a lidocaine patch was tried but only helped a little. He has also noted which position changes help. He does have some issues with constipation which is why he's also trying to limit his percocet use.

## 2018-06-04 NOTE — ASSESSMENT & PLAN NOTE
Here for DM follow up with me and the diabetes RN. His NPH had been increased to 10u bid 4/2018 but he hasn't been testing his blood sugars.

## 2018-06-05 ENCOUNTER — PATIENT MESSAGE (OUTPATIENT)
Dept: MEDICAL GROUP | Facility: PHYSICIAN GROUP | Age: 73
End: 2018-06-05

## 2018-06-05 DIAGNOSIS — N28.1 RENAL CYST, RIGHT: ICD-10-CM

## 2018-06-05 NOTE — TELEPHONE ENCOUNTER
FINAL PRIOR AUTHORIZATION STATUS:    1.  Name of Medication & Dose: Lidocaine patch     2. Prior Auth Status: Denied.  Reason: the use is not supported by the FDA or by one of the Medicare approved references     3. Action Taken: Pharmacy Notified: no Patient Notified: no      Denial scanned into media

## 2018-06-07 ENCOUNTER — TELEPHONE (OUTPATIENT)
Dept: MEDICAL GROUP | Facility: PHYSICIAN GROUP | Age: 73
End: 2018-06-07

## 2018-06-07 NOTE — TELEPHONE ENCOUNTER
VOICEMAIL  1. Caller Name: Comfort/wife                      Call Back Number: [phone]      2. Message: states pt's Trulicity needs a prior auth    3. Patient approves office to leave a detailed voicemail/MyChart message: N\A

## 2018-06-08 ENCOUNTER — ANTICOAGULATION VISIT (OUTPATIENT)
Dept: VASCULAR LAB | Facility: MEDICAL CENTER | Age: 73
End: 2018-06-08
Attending: INTERNAL MEDICINE
Payer: MEDICARE

## 2018-06-08 DIAGNOSIS — Z95.2 HISTORY OF MITRAL VALVE REPLACEMENT WITH MECHANICAL VALVE: ICD-10-CM

## 2018-06-08 DIAGNOSIS — Z79.01 CHRONIC ANTICOAGULATION: ICD-10-CM

## 2018-06-08 LAB
INR BLD: 1.5 (ref 0.9–1.2)
INR PPP: 1.5 (ref 2–3.5)

## 2018-06-08 PROCEDURE — 99212 OFFICE O/P EST SF 10 MIN: CPT

## 2018-06-08 PROCEDURE — 85610 PROTHROMBIN TIME: CPT

## 2018-06-08 NOTE — PROGRESS NOTES
Anticoagulation Summary  As of 6/8/2018    INR goal:   2.5-3.5   TTR:   26.7 % (1.2 y)   Today's INR:   1.5!   Warfarin maintenance plan:   7.5 mg (2.5 mg x 3) on Mon, Fri; 5 mg (2.5 mg x 2) all other days   Weekly warfarin total:   40 mg   Plan last modified:   Casey Birmingham, PharmD (5/25/2018)   Next INR check:   6/11/2018   Target end date:   Indefinite    Indications    Chronic anticoagulation [Z79.01]  History of mitral valve replacement with mechanical valve [Z95.2] [Z95.2]             Anticoagulation Episode Summary     INR check location:       Preferred lab:       Send INR reminders to:       Comments:         Anticoagulation Care Providers     Provider Role Specialty Phone number    Declan Eaton M.D. Referring Internal Medicine [phone]    [facility] Anticoagulation Services Responsible  [phone]        Anticoagulation Patient Findings      HPI:  Carlos Rivera seen in clinic today, on anticoagulation therapy with warfarin for mechanical valve.   Changes to current medical/health status since last appt: none  Denies signs/symptoms of bleeding and/or thrombosis since the last appt.    Denies any interval changes to diet  Denies any interval changes to medications since last appt.   Denies any complications or cost restrictions with current therapy.   Confirmed dosing regimen.     A/P   INR  SUB-therapeutic.   Bolus 10 mg daily.  Continue enoxaparin.    Follow up appointment in 3 days.   Chase Baldwin, PharmD

## 2018-06-11 ENCOUNTER — APPOINTMENT (OUTPATIENT)
Dept: VASCULAR LAB | Facility: MEDICAL CENTER | Age: 73
End: 2018-06-11
Attending: INTERNAL MEDICINE
Payer: MEDICARE

## 2018-06-12 ENCOUNTER — TELEPHONE (OUTPATIENT)
Dept: HEALTH INFORMATION MANAGEMENT | Facility: MEDICAL CENTER | Age: 73
End: 2018-06-12

## 2018-06-12 ENCOUNTER — ANTICOAGULATION VISIT (OUTPATIENT)
Dept: VASCULAR LAB | Facility: MEDICAL CENTER | Age: 73
End: 2018-06-12
Attending: INTERNAL MEDICINE
Payer: MEDICARE

## 2018-06-12 VITALS — HEART RATE: 50 BPM | DIASTOLIC BLOOD PRESSURE: 71 MMHG | SYSTOLIC BLOOD PRESSURE: 140 MMHG

## 2018-06-12 DIAGNOSIS — Z95.2 HISTORY OF MITRAL VALVE REPLACEMENT WITH MECHANICAL VALVE: ICD-10-CM

## 2018-06-12 DIAGNOSIS — Z79.01 CHRONIC ANTICOAGULATION: ICD-10-CM

## 2018-06-12 LAB — INR PPP: 2.8 (ref 2–3.5)

## 2018-06-12 PROCEDURE — 85610 PROTHROMBIN TIME: CPT

## 2018-06-12 PROCEDURE — 99211 OFF/OP EST MAY X REQ PHY/QHP: CPT

## 2018-06-12 NOTE — PROGRESS NOTES
Anticoagulation Summary  As of 6/12/2018    INR goal:   2.5-3.5   TTR:   26.7 % (1.2 y)   Today's INR:   2.8   Warfarin maintenance plan:   7.5 mg (2.5 mg x 3) on Mon, Wed, Fri; 5 mg (2.5 mg x 2) all other days   Weekly warfarin total:   42.5 mg   Plan last modified:   Chase Baldwin, PharmD (6/12/2018)   Next INR check:   6/19/2018   Target end date:   Indefinite    Indications    Chronic anticoagulation [Z79.01]  History of mitral valve replacement with mechanical valve [Z95.2] [Z95.2]             Anticoagulation Episode Summary     INR check location:       Preferred lab:       Send INR reminders to:       Comments:         Anticoagulation Care Providers     Provider Role Specialty Phone number    Declan Eaton M.D. Referring Internal Medicine [phone]    [facility] Anticoagulation Services Responsible  [phone]        Anticoagulation Patient Findings      HPI:  Carlos Rivera seen in clinic today, on anticoagulation therapy with warfarin for mechanical valve.   Changes to current medical/health status since last appt: none.  Denies signs/symptoms of bleeding and/or thrombosis since the last appt.    Denies any interval changes to diet  Denies any interval changes to medications since last appt.   Denies any complications or cost restrictions with current therapy.   BP recorded in vitals.  Confirmed dosing regimen.     A/P   INR  therapeutic.   Begin increased regimen.  D/C enoxaparin.     Follow up appointment in 1 week(s).    Chase Baldwin, PharmD

## 2018-06-13 ENCOUNTER — TELEPHONE (OUTPATIENT)
Dept: VASCULAR LAB | Facility: MEDICAL CENTER | Age: 73
End: 2018-06-13

## 2018-06-13 ENCOUNTER — OFFICE VISIT (OUTPATIENT)
Dept: BEHAVIORAL HEALTH | Facility: PHYSICIAN GROUP | Age: 73
End: 2018-06-13
Payer: MEDICARE

## 2018-06-13 DIAGNOSIS — F33.0 MILD EPISODE OF RECURRENT MAJOR DEPRESSIVE DISORDER (HCC): ICD-10-CM

## 2018-06-13 LAB — INR BLD: 2.8 (ref 0.9–1.2)

## 2018-06-13 PROCEDURE — 90834 PSYTX W PT 45 MINUTES: CPT | Performed by: SOCIAL WORKER

## 2018-06-13 NOTE — BH THERAPY
[facility]  Therapy Progress Note    Patient Name: Carlos Rivera  Patient MRN: [id number]  Today's Date: 6/13/2018     Type of session:Individual psychotherapy  Length of session: 45 minutes  Persons in attendance:Patient    Subjective/New Info: Patient presents for scheduled appointment with report of having fallen recently whereby he fractured a rib and is still sore and healing from the incident.  As per previous sessions patient seems to enjoy talking about his past experiences of sailing and of riding his bike long distances.  Reviews plan to go to [place] to retrieve his boat that is being stored until he is able to go with his son to bring it to [place].  Patient ruminates about his displeasure at not being able to ride his bike and about the negative effects of his having a more sedentary lifestyle since moving to [place].  He rejects the idea of using equipment at the gym he joined stating he just does not get the same pleasure as when he rode his bike.  We discussed how things change over time and the benefits of trying to utilize current options when they will serve his purpose.  Indicates he is receptive and will focus of activities he is able to do and not focus on longing for his past abilities.     Objective/Observations:   Participation: Active verbal participation, Attentive and Open to feedback   Grooming: Casual and Neat   Cognition: Fully Oriented   Eye contact: fair   Mood: Neutral   Affect: Blunted and Congruent with content   Thought process: Goal-directed   Speech: Rate within normal limits and Volume within normal limits   Other:     Diagnoses:   1. Mild episode of recurrent major depressive disorder (HCC)         Current risk:   SUICIDE: Low   Homicide: Low   Self-harm: Low   Relapse: Low   Other:    Safety Plan reviewed? Not Indicated   If evidence of imminent risk is present, intervention/plan:     Therapeutic Intervention(s): Cognitive modification, Distress tolerance skills,  Goal-setting and Self-care skills    Treatment Goal(s)/Objective(s) addressed: Identify and engage in activities that would support treatment goals by being consistent with one’s values.         Progress toward Treatment Goals: No change    Plan:  Identify and engage in activities that would improve self-image and help progression toward eliminating problematic behavior while being consistent with one’s values  - Next appointment scheduled:  7/9/2018  - Patient is in agreement with the above plan:  YES    Corinne G. Taylor, L.C.S.W.  6/13/2018

## 2018-06-13 NOTE — TELEPHONE ENCOUNTER
[facility]    Received message from [facility] that pt has upcoming lumbar puncture.  Called pt, he states the date is not set for the procedure.  Pt will require Lovenox bridge.  [facility] will provide directions for bridging when pt notifies the clinic of the procedure date.  Pt has bridged in the past and is familiar with the process. This note was faxed to [facility] as an update to allow them to schedule pt as they see fit.      Chase Baldwin, PharmD

## 2018-06-14 ENCOUNTER — HOSPITAL ENCOUNTER (OUTPATIENT)
Dept: LAB | Facility: MEDICAL CENTER | Age: 73
End: 2018-06-14
Attending: INTERNAL MEDICINE
Payer: MEDICARE

## 2018-06-14 DIAGNOSIS — E11.00 TYPE 2 DIABETES MELLITUS WITH HYPEROSMOLARITY WITHOUT COMA, WITHOUT LONG-TERM CURRENT USE OF INSULIN (HCC): ICD-10-CM

## 2018-06-14 LAB
ALBUMIN SERPL BCP-MCNC: 4.6 G/DL (ref 3.2–4.9)
ALBUMIN/GLOB SERPL: 1.6 G/DL
ALP SERPL-CCNC: 84 U/L (ref 30–99)
ALT SERPL-CCNC: 60 U/L (ref 2–50)
ANION GAP SERPL CALC-SCNC: 10 MMOL/L (ref 0–11.9)
AST SERPL-CCNC: 37 U/L (ref 12–45)
BILIRUB SERPL-MCNC: 0.7 MG/DL (ref 0.1–1.5)
BUN SERPL-MCNC: 30 MG/DL (ref 8–22)
CALCIUM SERPL-MCNC: 9.7 MG/DL (ref 8.5–10.5)
CHLORIDE SERPL-SCNC: 102 MMOL/L (ref 96–112)
CHOLEST SERPL-MCNC: 142 MG/DL (ref 100–199)
CO2 SERPL-SCNC: 27 MMOL/L (ref 20–33)
CREAT SERPL-MCNC: 1.16 MG/DL (ref 0.5–1.4)
CREAT UR-MCNC: 79.3 MG/DL
EST. AVERAGE GLUCOSE BLD GHB EST-MCNC: 249 MG/DL
GLOBULIN SER CALC-MCNC: 2.8 G/DL (ref 1.9–3.5)
GLUCOSE SERPL-MCNC: 195 MG/DL (ref 65–99)
HBA1C MFR BLD: 10.3 % (ref 0–5.6)
HDLC SERPL-MCNC: 47 MG/DL
LDLC SERPL CALC-MCNC: 59 MG/DL
MICROALBUMIN UR-MCNC: 69.7 MG/DL
MICROALBUMIN/CREAT UR: 879 MG/G (ref 0–30)
POTASSIUM SERPL-SCNC: 4.8 MMOL/L (ref 3.6–5.5)
PROT SERPL-MCNC: 7.4 G/DL (ref 6–8.2)
SODIUM SERPL-SCNC: 139 MMOL/L (ref 135–145)
TRIGL SERPL-MCNC: 178 MG/DL (ref 0–149)

## 2018-06-14 PROCEDURE — 82043 UR ALBUMIN QUANTITATIVE: CPT

## 2018-06-14 PROCEDURE — 80061 LIPID PANEL: CPT

## 2018-06-14 PROCEDURE — 36415 COLL VENOUS BLD VENIPUNCTURE: CPT

## 2018-06-14 PROCEDURE — 80053 COMPREHEN METABOLIC PANEL: CPT

## 2018-06-14 PROCEDURE — 83036 HEMOGLOBIN GLYCOSYLATED A1C: CPT | Mod: GA

## 2018-06-14 PROCEDURE — 82570 ASSAY OF URINE CREATININE: CPT

## 2018-06-15 ENCOUNTER — TELEPHONE (OUTPATIENT)
Dept: MEDICAL GROUP | Facility: PHYSICIAN GROUP | Age: 73
End: 2018-06-15

## 2018-06-15 ENCOUNTER — TELEPHONE (OUTPATIENT)
Dept: HEALTH INFORMATION MANAGEMENT | Facility: MEDICAL CENTER | Age: 73
End: 2018-06-15

## 2018-06-15 NOTE — TELEPHONE ENCOUNTER
----- Message from Declan Eaton M.D. sent at 6/15/2018 10:19 AM PDT -----  He needs follow up with me and diabetes RN 9/2018

## 2018-06-15 NOTE — TELEPHONE ENCOUNTER
Patient sends FSBG results:    Fastin-230  Lunch: 180, 251  Dinner: 180-316  Bed: 175-220  Hypoglycemia: none  Current diabetes medications: NPH 10 units twice daily.  Patient's wife has gone back and forth with insurance to get Trulicity covered.  Carlos has not started it yet due to high cost  Recommendations: Increase NPH to 12 units twice daily and document FSBG.  I will meet with Carlos Monday and switch Trulicity to [facility] for better insurance coverage

## 2018-06-18 ENCOUNTER — OFFICE VISIT (OUTPATIENT)
Dept: MEDICAL GROUP | Facility: PHYSICIAN GROUP | Age: 73
End: 2018-06-18
Payer: MEDICARE

## 2018-06-18 VITALS
TEMPERATURE: 97.9 F | SYSTOLIC BLOOD PRESSURE: 132 MMHG | DIASTOLIC BLOOD PRESSURE: 60 MMHG | RESPIRATION RATE: 16 BRPM | WEIGHT: 135.8 LBS | BODY MASS INDEX: 20.65 KG/M2 | OXYGEN SATURATION: 95 % | HEART RATE: 47 BPM

## 2018-06-18 DIAGNOSIS — S22.32XA CLOSED FRACTURE OF ONE RIB OF LEFT SIDE, INITIAL ENCOUNTER: ICD-10-CM

## 2018-06-18 DIAGNOSIS — E11.00 TYPE 2 DIABETES MELLITUS WITH HYPEROSMOLARITY WITHOUT COMA, WITHOUT LONG-TERM CURRENT USE OF INSULIN (HCC): ICD-10-CM

## 2018-06-18 DIAGNOSIS — G31.84 MILD COGNITIVE IMPAIRMENT: ICD-10-CM

## 2018-06-18 PROCEDURE — 99214 OFFICE O/P EST MOD 30 MIN: CPT | Performed by: INTERNAL MEDICINE

## 2018-06-18 NOTE — ASSESSMENT & PLAN NOTE
His wife is with him today and quite frustrated with trying to care for Carlos given his memory issues.

## 2018-06-18 NOTE — ASSESSMENT & PLAN NOTE
He says his blood sugars (unclear if fasting or not) are high 100s to low 200s. He had recently increased his NPH 10u to 12u bid. His retinal eye exam is in 2 days. He is inquiring about calibrating his one touch ultra again since he isn't quite sure if it's working properly.

## 2018-06-18 NOTE — PROGRESS NOTES
RN-GAVIN Note    Subjective:     Health changes since last visit/interval Hx: Carlos follows up with Type II DM.  He is currently taking NPH 12 units twice daily and metformin.  His wife reports she was able to get Tulicity approved but has not filled it due to high cost.  He has failed both glipizide and metformin, so has met the requirement for step therapy.  Discussed insurance.  Trulicity and Bydureon are covered by [employer] (Trulicity 53.00/month or same for 90 days through [facility], Bydureon is 24.00/90 days through [facility].  He increased his NPH to 12 units twice daily    Medications (including changes made today)  Current Outpatient Prescriptions   Medication Sig Dispense Refill   • enoxaparin (LOVENOX) 60 MG/0.6ML Solution inj Inject 60 mg as instructed every 12 hours. 10 Syringe 1   • Dulaglutide (TRULICITY) 0.75 MG/0.5ML Solution Pen-injector Inject 0.75 mg as instructed every 7 days. 4 PEN 11   • lidocaine (LIDODERM) 5 % Patch Apply 1 Patch to skin as directed every 24 hours. 10 Patch 3   • oxyCODONE-acetaminophen (PERCOCET) 5-325 MG Tab Take 1/2 tab as needed every 6 to 8 hours for severe pain 10 Tab 0   • atorvastatin (LIPITOR) 40 MG Tab TAKE 1 TABLET BY MOUTH  EVERY EVENING 90 Tab 1   • lisinopril (PRINIVIL) 20 MG Tab Take 1 Tab by mouth every day. 90 Tab 1   • memantine (NAMENDA) 5 MG Tab Take 1 Tab by mouth 2 times a day. 60 Tab 3   • metFORMIN (GLUCOPHAGE) 500 MG Tab TAKE 1 TABLET BY MOUTH TWO  TIMES DAILY WITH MEALS (Patient taking differently: 500 mg 2 times a day. TAKE 1 TABLET BY MOUTH TWO  TIMES DAILY WITH MEALS) 180 Tab 1   • Insulin NPH, Human,, Isophane, 100 UNIT/ML Suspension Pen-injector Inject 10 Units as instructed every bedtime. 10 mL 11   • warfarin (COUMADIN) 2.5 MG Tab Take two to three tablets by mouth one time daily as directed by coumadin clinic 270 Tab 0   • Blood Glucose Monitoring Suppl Supplies Misc Test strips order: Test strips compatible with meter. Sig: use  "daily and prn ssx high or low sugar 200 Each 3   • citalopram (CELEXA) 10 MG tablet TAKE 1 TABLET BY MOUTH  EVERY MORNING 90 Tab 1   • fexofenadine (ALLEGRA ALLERGY) 180 MG tablet Take 180 mg by mouth every day.     • Homeopathic Products ([employer] LEG CRAMPS PAIN RELIEF PO) Take  by mouth.     • Insulin Syringe-Needle U-100 (INSULIN SYRINGE .3CC/31GX5/16\") 31G X 5/16\" 0.3 ML Misc Use 3 times daily with insulin 300 Each 3   • Blood Glucose Monitoring Suppl (ONETOUCH VERIO FLEX SYSTEM) W/DEVICE Kit by Does not apply route.       No current facility-administered medications for this visit.        Taking daily ASA: No  Taking above medications as prescribed: Yes  Patient Denies side effects of medication.    Exercise: sporadic irregular exercise, <half hour walking weekly  Diet: meals per day on average: 3 + snacks    Health Maintenance:   Health Maintenance Topics with due status: Overdue       Topic Date Due    Annual Wellness Visit 1945    RETINAL SCREENING 10/12/1963         DM:   Last A1c:   Lab Results   Component Value Date/Time    HBA1C 10.3 (H) 06/14/2018 06:23 AM      A1c goal: < 8    Glucose monitoring frequency: daily  fasting range: unknown  patient forgot to bring HBG readings  Hypoglycemic episodes: no     Last Retinal Exam: scheduled  Daily Foot Exam: yes  Routine Dental Exams: yes    Lab Results   Component Value Date/Time    MALBCRT 879 (H) 06/14/2018 06:22 AM    MICROALBUR 69.7 06/14/2018 06:22 AM        ACR Albumin/Creatinine Ratio goal <30 no    Diabetic complications: cerebrovascular disease    HTN:   Blood pressure goal <140/<80 yes.   Currently Rx ACE/ARB: Yes    Dyslipidemia:    Lab Results   Component Value Date/Time    CHOLSTRLTOT 142 06/14/2018 06:23 AM    LDL 59 06/14/2018 06:23 AM    HDL 47 06/14/2018 06:23 AM    TRIGLYCERIDE 178 (H) 06/14/2018 06:23 AM       Lab Results   Component Value Date/Time    SODIUM 139 06/14/2018 06:23 AM    POTASSIUM 4.8 06/14/2018 06:23 AM    CHLORIDE 102 " 06/14/2018 06:23 AM    CO2 27 06/14/2018 06:23 AM    GLUCOSE 195 (H) 06/14/2018 06:23 AM    BUN 30 (H) 06/14/2018 06:23 AM    CREATININE 1.16 06/14/2018 06:23 AM     Lab Results   Component Value Date/Time    ALKPHOSPHAT 84 06/14/2018 06:23 AM    ASTSGOT 37 06/14/2018 06:23 AM    ALTSGPT 60 (H) 06/14/2018 06:23 AM    TBILIRUBIN 0.7 06/14/2018 06:23 AM        Currently Rx Statin: Yes      He  reports that he has never smoked. He has never used smokeless tobacco.    Objective:     Exam:  Monofilament: not done      Plan:     Discussed All medications, side effects and compliance (discussed carefully)  Annual eye examinations at Ophthalmology  Diabetic diet discussed in detail, written exchange diet given  Glycohemoglobin and other lab monitoring  Home glucose monitoring emphasized. Document and send FSBG  Patient educated on Interpretation of lab results, Blood Sugar Goals, Exercise, Insulin Adjustments, Nutrition, use of Bydureon pen    Recommended medication changes: send prescriptions for both Trulicity and Bydureon so patient's wife can compare prices, increase NPH to 14 units twice daily

## 2018-06-18 NOTE — ASSESSMENT & PLAN NOTE
He says his pain is improving. He has stopped taking opiates. Carlos says that the opiates make him feel cloudy. His wife says he hasn't taken any opiates for ten days.

## 2018-06-18 NOTE — PROGRESS NOTES
PRIMARY CARE CLINIC FOLLOW UP VISIT  Chief Complaint   Patient presents with   • Diabetes     History of Present Illness     Fracture of rib of left side  He says his pain is improving. He has stopped taking opiates. Carlos says that the opiates make him feel cloudy. His wife says he hasn't taken any opiates for ten days.     Type II diabetes mellitus (CMS-HCC)  He says his blood sugars (unclear if fasting or not) are high 100s to low 200s. He had recently increased his NPH 10u to 12u bid. His retinal eye exam is in 2 days. He is inquiring about calibrating his one touch ultra again since he isn't quite sure if it's working properly.     Mild cognitive impairment  His wife is with him today and quite frustrated with trying to care for Carlos given his memory issues.     Current Outpatient Prescriptions   Medication Sig Dispense Refill   • Exenatide ER (BYDUREON BCISE) 2 MG/0.85ML Auto-injector Inject 2 mg as instructed every 7 days. 4 PEN 11   • Dulaglutide (TRULICITY) 0.75 MG/0.5ML Solution Pen-injector Inject 0.75 mg as instructed every 7 days. 4 PEN 11   • Blood Glucose Monitoring Suppl Device Meter: Dispense One Touch Ultra. Sig. Use as directed for blood sugar monitoring. #1. NR. 1 Device 0   • Blood Glucose Monitoring Suppl Supplies Misc Test strips order: Test strips for One Touch Ultra 2 meter. Sig: use twice daily and prn ssx high or low sugar #100 RF x 3 180 Strip 3   • enoxaparin (LOVENOX) 60 MG/0.6ML Solution inj Inject 60 mg as instructed every 12 hours. 10 Syringe 1   • Dulaglutide (TRULICITY) 0.75 MG/0.5ML Solution Pen-injector Inject 0.75 mg as instructed every 7 days. 4 PEN 11   • lidocaine (LIDODERM) 5 % Patch Apply 1 Patch to skin as directed every 24 hours. 10 Patch 3   • oxyCODONE-acetaminophen (PERCOCET) 5-325 MG Tab Take 1/2 tab as needed every 6 to 8 hours for severe pain 10 Tab 0   • atorvastatin (LIPITOR) 40 MG Tab TAKE 1 TABLET BY MOUTH  EVERY EVENING 90 Tab 1   • lisinopril (PRINIVIL)  "20 MG Tab Take 1 Tab by mouth every day. 90 Tab 1   • memantine (NAMENDA) 5 MG Tab Take 1 Tab by mouth 2 times a day. 60 Tab 3   • metFORMIN (GLUCOPHAGE) 500 MG Tab TAKE 1 TABLET BY MOUTH TWO  TIMES DAILY WITH MEALS (Patient taking differently: 500 mg 2 times a day. TAKE 1 TABLET BY MOUTH TWO  TIMES DAILY WITH MEALS) 180 Tab 1   • Insulin NPH, Human,, Isophane, 100 UNIT/ML Suspension Pen-injector Inject 10 Units as instructed every bedtime. 10 mL 11   • warfarin (COUMADIN) 2.5 MG Tab Take two to three tablets by mouth one time daily as directed by coumadin clinic 270 Tab 0   • Blood Glucose Monitoring Suppl Supplies Misc Test strips order: Test strips compatible with meter. Sig: use daily and prn ssx high or low sugar 200 Each 3   • citalopram (CELEXA) 10 MG tablet TAKE 1 TABLET BY MOUTH  EVERY MORNING 90 Tab 1   • fexofenadine (ALLEGRA ALLERGY) 180 MG tablet Take 180 mg by mouth every day.     • Homeopathic Products ([employer] LEG CRAMPS PAIN RELIEF PO) Take  by mouth.     • Insulin Syringe-Needle U-100 (INSULIN SYRINGE .3CC/31GX5/16\") 31G X 5/16\" 0.3 ML Misc Use 3 times daily with insulin 300 Each 3   • Blood Glucose Monitoring Suppl (ONETOUCH VERIO FLEX SYSTEM) W/DEVICE Kit by Does not apply route.       No current facility-administered medications for this visit.      Past Medical History:   Diagnosis Date   • Chronic anticoagulation 2/23/2017   • History of mitral valve replacement with mechanical valve [Z95.2] 3/10/2017   • Hyperlipidemia    • Type II diabetes mellitus (CMS-HCC) (HCC) 2/23/2017     Past Surgical History:   Procedure Laterality Date   • MITRAL VALVE REPLACEMENT  1999   • INGUINAL HERNIA REPAIR Bilateral 1984   • TONSILLECTOMY       Social History   Substance Use Topics   • Smoking status: Never Smoker   • Smokeless tobacco: Never Used   • Alcohol use No     Social History     Social History Narrative    Retired from [employer]      Family History   Problem Relation Age of Onset   • Heart Attack Father 58 "   • Diabetes Father    • Heart Attack Paternal Uncle      Family Status   Relation Status   • Mother    • Father     58   • Paternal Uncle      Allergies: Vicodin [hydrocodone-acetaminophen]    ROS  As per HPI above. All other systems reviewed and negative.        Objective   Blood pressure 132/60, pulse (!) 47, temperature 36.6 °C (97.9 °F), resp. rate 16, weight 61.6 kg (135 lb 12.8 oz), SpO2 95 %. Body mass index is 20.65 kg/m².    General: alert and oriented, pleasant, cooperative  HEENT: Normocephalic, atraumatic. No thyroid masses. Oropharynx clear without exudate or injection.   Cardiovascular: regular rate and rhythm, normal S1/S2  Pulmonary: lungs clear to auscultation bilaterally  MSK: some pain to palpation of left lower ribs   Skin: warm and dry, no lesions or rashes  Psychiatric: appropriate mood and affect. Good insight and appropriate judgment     Assessment and Plan   The following treatment plan was discussed     1. Type 2 diabetes mellitus with hyperosmolarity without coma, without long-term current use of insulin (HCC)  It is unclear what Gypsy sugars are exactly since he isn't bringing in blood sugar logs.  They discussed blood sugar logging with the diabetes RN again today but it is clear that Carlos' memory deficits are playing a huge role in difficulty trying to manage him; his wife comes to all visits but is under the strain of being his caregiver. They will discuss the cost of both Bydureon and Trulicity with their pharmacy and pick one or the other with hopes to get him off of a complicated insulin titration regimen. For now per discussion with the diabetes RN will increase his NPH from 12u bid to 14u bid and she asked him to call with his blood sugars. His retinal eye exam is in two days and I asked them to have that office fax their exam results to us.   - Exenatide ER (BYDUREON BCISE) 2 MG/0.85ML Auto-injector; Inject 2 mg as instructed every 7 days.  Dispense: 4 PEN;  Refill: 11  - Dulaglutide (TRULICITY) 0.75 MG/0.5ML Solution Pen-injector; Inject 0.75 mg as instructed every 7 days.  Dispense: 4 PEN; Refill: 11  - Blood Glucose Monitoring Suppl Device; Meter: Dispense One Touch Ultra. Sig. Use as directed for blood sugar monitoring. #1. NR.  Dispense: 1 Device; Refill: 0  - Blood Glucose Monitoring Suppl Supplies Misc; Test strips order: Test strips for One Touch Ultra 2 meter. Sig: use twice daily and prn ssx high or low sugar #100 RF x 3  Dispense: 180 Strip; Refill: 3    2. Closed fracture of one rib of left side, initial encounter  Symptoms improving, thankfully he is off of opiates now.     3. Mild cognitive impairment  Will refer to SW to see if there are any other services that can be of assistance to Carlos and his wife.   - REFERRAL TO COMPLEX CARE MANAGEMENT Services Requested: Care Manager to Evaluate and Recommend      Healthcare maintenance     Health Maintenance Due   Topic Date Due   • Annual Wellness Visit  1945   • RETINAL SCREENING  10/12/1963       Return in about 2 months (around 8/18/2018) for diabetes RN .    Declan Eaton MD  Internal Medicine  [facility]

## 2018-06-19 ENCOUNTER — PATIENT OUTREACH (OUTPATIENT)
Dept: HEALTH INFORMATION MANAGEMENT | Facility: OTHER | Age: 73
End: 2018-06-19

## 2018-06-25 ENCOUNTER — TELEPHONE (OUTPATIENT)
Dept: HEALTH INFORMATION MANAGEMENT | Facility: MEDICAL CENTER | Age: 73
End: 2018-06-25

## 2018-06-25 NOTE — TELEPHONE ENCOUNTER
Comfort sends a few days of FSBG results, scanned into encounter, on NPH 14 units.  FSBG improving.  She reports Trulicity not approved, but plans to  Bydureon.  Left V./M requesting Carlos start the Bydureon as soon as he picks it up from the pharmacy.  Reminded to take NPH even if he wakes up with FSBG=90.

## 2018-06-26 ENCOUNTER — HOSPITAL ENCOUNTER (OUTPATIENT)
Dept: RADIOLOGY | Facility: MEDICAL CENTER | Age: 73
End: 2018-06-26
Attending: INTERNAL MEDICINE
Payer: MEDICARE

## 2018-06-26 ENCOUNTER — HOSPITAL ENCOUNTER (OUTPATIENT)
Dept: RADIOLOGY | Facility: MEDICAL CENTER | Age: 73
End: 2018-06-26
Attending: PHYSICIAN ASSISTANT
Payer: MEDICARE

## 2018-06-26 DIAGNOSIS — R26.89 OTHER ABNORMALITIES OF GAIT AND MOBILITY: ICD-10-CM

## 2018-06-26 DIAGNOSIS — N28.1 RENAL CYST, RIGHT: ICD-10-CM

## 2018-06-26 PROCEDURE — 70553 MRI BRAIN STEM W/O & W/DYE: CPT

## 2018-06-26 PROCEDURE — A9579 GAD-BASE MR CONTRAST NOS,1ML: HCPCS | Performed by: PHYSICIAN ASSISTANT

## 2018-06-26 PROCEDURE — 76775 US EXAM ABDO BACK WALL LIM: CPT

## 2018-06-26 PROCEDURE — 700117 HCHG RX CONTRAST REV CODE 255: Performed by: PHYSICIAN ASSISTANT

## 2018-06-26 RX ADMIN — GADODIAMIDE 15 ML: 287 INJECTION INTRAVENOUS at 11:30

## 2018-06-29 ENCOUNTER — TELEPHONE (OUTPATIENT)
Dept: HEALTH INFORMATION MANAGEMENT | Facility: MEDICAL CENTER | Age: 73
End: 2018-06-29

## 2018-06-29 NOTE — TELEPHONE ENCOUNTER
Comfort sends email stating she picked up Bydureon Bcise from the pharmacy, but they were not going to start it until they hear from me.  I called and instructed them to start immediately today, and give a dose every Friday.  Requested they send FSBG after second dose for insulin dose adjustment

## 2018-07-02 ENCOUNTER — ANTICOAGULATION VISIT (OUTPATIENT)
Dept: VASCULAR LAB | Facility: MEDICAL CENTER | Age: 73
End: 2018-07-02
Attending: INTERNAL MEDICINE
Payer: MEDICARE

## 2018-07-02 ENCOUNTER — PATIENT OUTREACH (OUTPATIENT)
Dept: HEALTH INFORMATION MANAGEMENT | Facility: OTHER | Age: 73
End: 2018-07-02

## 2018-07-02 DIAGNOSIS — Z79.01 CHRONIC ANTICOAGULATION: ICD-10-CM

## 2018-07-02 DIAGNOSIS — Z95.2 HISTORY OF MITRAL VALVE REPLACEMENT WITH MECHANICAL VALVE: ICD-10-CM

## 2018-07-02 LAB — INR PPP: 6.6 (ref 2–3.5)

## 2018-07-02 PROCEDURE — 99212 OFFICE O/P EST SF 10 MIN: CPT

## 2018-07-02 PROCEDURE — 85610 PROTHROMBIN TIME: CPT

## 2018-07-02 NOTE — PROGRESS NOTES
Outcome: Left Message    Please transfer to Patient Outreach Team at [phone] when patient returns call.    Attempt # 1

## 2018-07-02 NOTE — PROGRESS NOTES
Anticoagulation Summary  As of 7/2/2018    INR goal:   2.5-3.5   TTR:   26.4 % (1.3 y)   Today's INR:   6.6!   Warfarin maintenance plan:   7.5 mg (2.5 mg x 3) on Mon, Wed, Fri; 5 mg (2.5 mg x 2) all other days   Weekly warfarin total:   42.5 mg   Plan last modified:   Chase Baldwin, PharmD (6/12/2018)   Next INR check:   7/5/2018   Target end date:   Indefinite    Indications    Chronic anticoagulation [Z79.01]  History of mitral valve replacement with mechanical valve [Z95.2] [Z95.2]             Anticoagulation Episode Summary     INR check location:       Preferred lab:       Send INR reminders to:       Comments:         Anticoagulation Care Providers     Provider Role Specialty Phone number    Declan Eaton M.D. Referring Internal Medicine [phone]    [facility] Responsible  [phone]        Anticoagulation Patient Findings  Patient Findings     Negatives:   Signs/symptoms of thrombosis, Signs/symptoms of bleeding, Laboratory test error suspected, Change in health, Change in alcohol use, Change in activity, Upcoming invasive procedure, Emergency department visit, Upcoming dental procedure, Missed doses, Extra doses, Change in medications, Change in diet/appetite, Hospital admission, Bruising, Other complaints          HPI:  Carlos Rivera seen in clinic today, on anticoagulation therapy with warfarin for mechanical valve.  Changes to current medical/health status since last appt: none reported, but patient seems to be a poor historian.  Patient confirmed current dosing regimen.  Denies signs/symptoms of bleeding and/or thrombosis since the last appt.    Denies any interval changes to diet.  Denies any interval changes to medications since last appt.   Denies any complications or cost restrictions with current therapy.   No missed or extra doses reported.    A/P   INR is SUPRA-therapeutic today at 6.6.    Patient will HOLD dose x 2 doses, then continue with current dosing regimen.  Patient will follow up again in 4 days.    Next appt: Thursday July 5, 2018  11:45am    Kandy Madrigal PharmD

## 2018-07-03 LAB — INR BLD: 6.6 (ref 0.9–1.2)

## 2018-07-05 ENCOUNTER — ANTICOAGULATION VISIT (OUTPATIENT)
Dept: VASCULAR LAB | Facility: MEDICAL CENTER | Age: 73
End: 2018-07-05
Attending: INTERNAL MEDICINE
Payer: MEDICARE

## 2018-07-05 VITALS — SYSTOLIC BLOOD PRESSURE: 135 MMHG | DIASTOLIC BLOOD PRESSURE: 68 MMHG | HEART RATE: 44 BPM

## 2018-07-05 DIAGNOSIS — Z79.01 CHRONIC ANTICOAGULATION: ICD-10-CM

## 2018-07-05 DIAGNOSIS — Z95.2 HISTORY OF MITRAL VALVE REPLACEMENT WITH MECHANICAL VALVE: ICD-10-CM

## 2018-07-05 LAB — INR PPP: 1.5 (ref 2–3.5)

## 2018-07-05 PROCEDURE — 99212 OFFICE O/P EST SF 10 MIN: CPT | Performed by: NURSE PRACTITIONER

## 2018-07-05 PROCEDURE — 85610 PROTHROMBIN TIME: CPT

## 2018-07-05 NOTE — PROGRESS NOTES
Anticoagulation Summary  As of 7/5/2018    INR goal:   2.5-3.5   TTR:   26.3 % (1.3 y)   Today's INR:   1.5!   Warfarin maintenance plan:   7.5 mg (2.5 mg x 3) on Mon, Wed, Fri; 5 mg (2.5 mg x 2) all other days   Weekly warfarin total:   42.5 mg   Plan last modified:   Chase Baldwin, PharmD (6/12/2018)   Next INR check:   7/9/2018   Target end date:   Indefinite    Indications    Chronic anticoagulation [Z79.01]  History of mitral valve replacement with mechanical valve [Z95.2] [Z95.2]             Anticoagulation Episode Summary     INR check location:       Preferred lab:       Send INR reminders to:       Comments:         Anticoagulation Care Providers     Provider Role Specialty Phone number    Declan Eaton M.D. Referring Internal Medicine [phone]    [facility] Responsible  [phone]        Anticoagulation Patient Findings      HPI:  Carlos Rivera seen in clinic today for follow up on anticoagulation therapy in the presence of MVR. Denies any changes to current medical/health status since last appointment. Denies any medication or diet changes. No current symptoms of bleeding or thrombosis reported. He accidentally held last night's dose instead of taking 7.5 mg.    A/P:   INR subtherapeutic. Will given a loading dose tonight then have pt resume previous regimen. BP recorded in vitals.    Follow up appointment on Monday.    Next Appointment: Monday, July 9, 2018 at 9:15 am at [place].     Susan ZHOU

## 2018-07-06 ENCOUNTER — TELEPHONE (OUTPATIENT)
Dept: MEDICAL GROUP | Facility: PHYSICIAN GROUP | Age: 73
End: 2018-07-06

## 2018-07-06 LAB — INR BLD: 1.5 (ref 0.9–1.2)

## 2018-07-06 NOTE — TELEPHONE ENCOUNTER
Future Appointments       Provider Department Center    7/9/2018 9:15 AM [facility] [place] [facility]    7/9/2018 2:00 PM [facility]    7/17/2018 1:15 PM Declan Eaton M.D.; [facility]  [place] [facility]        ANNUAL WELLNESS VISIT PRE-VISIT PLANNING WITHOUT OUTREACH    1.  Reviewed note from last office visit with PCP: YES 06/18/18    2.  If any orders were placed at last visit, do we have Results/Consult Notes?        •  Labs - Labs ordered, completed on 07/05/18 and results are in chart..       •  Imaging - Imaging ordered, completed and results are in chart.       •  Referrals - Referral ordered, patient has NOT been seen.    3.  Immunizations were updated in [employer] using Web[employer]?: Yes       •  Web[employer] Recommendations: FLU, TD and SHINGRIX (Shingles)        •  Is patient due for Tdap? NO       •  Is patient due for Shingles? YES. Patient was notified of copay/out of pocket cost.  script needed for pharmacy     4.  Patient is due for the following Health Maintenance Topics:   Health Maintenance Due   Topic Date Due   • Annual Wellness Visit  1945       5.  Reviewed/Updated the following with patient:       •   Preferred Pharmacy? YES       •   Preferred Lab? YES       •   Preferred Communication? YES       •   Allergies? YES       •   Medications? NO Pt. Could not remember        •   Social History? YES. Was Abstract Encounter opened and chart updated? YES       •   Family History (document living status of immediate family members and if + hx of  cancer, diabetes, hypertension, hyperlipidemia, heart attack, stroke) YES. Was Abstract Encounter opened and chart updated? YES    6.  Care Team Updated:       •   DME Company (gait device, O2, CPAP, etc.): YES       •   Other Specialists (eye doctor, derm, GYN, cardiology, endo, etc): YES    7.  Patient has the following Care Path diagnoses on Problem  List:  DIABETES      8.  MDX printed and highlighted for Provider? NO INS MCR     9.  Patient was advised: “This is a free wellness visit. The provider will screen for medical conditions to help you stay healthy. If you have other concerns to address you may be asked to discuss these at a separate visit or there may be an additional fee.”     10.  Patient was informed to arrive 15 min prior to their scheduled appointment and bring in their medication bottles.    Bumps on head that he would like to have looked at     Pt. Medications need to be reviewed during visit

## 2018-07-09 ENCOUNTER — ANTICOAGULATION VISIT (OUTPATIENT)
Dept: MEDICAL GROUP | Facility: PHYSICIAN GROUP | Age: 73
End: 2018-07-09
Payer: MEDICARE

## 2018-07-09 ENCOUNTER — OFFICE VISIT (OUTPATIENT)
Dept: BEHAVIORAL HEALTH | Facility: PHYSICIAN GROUP | Age: 73
End: 2018-07-09
Payer: MEDICARE

## 2018-07-09 DIAGNOSIS — F33.0 MILD EPISODE OF RECURRENT MAJOR DEPRESSIVE DISORDER (HCC): ICD-10-CM

## 2018-07-09 DIAGNOSIS — Z95.2 HISTORY OF MITRAL VALVE REPLACEMENT WITH MECHANICAL VALVE: ICD-10-CM

## 2018-07-09 DIAGNOSIS — Z79.01 CHRONIC ANTICOAGULATION: Primary | ICD-10-CM

## 2018-07-09 LAB — INR PPP: 1.1 (ref 2–3.5)

## 2018-07-09 PROCEDURE — 85610 PROTHROMBIN TIME: CPT | Performed by: INTERNAL MEDICINE

## 2018-07-09 PROCEDURE — 90834 PSYTX W PT 45 MINUTES: CPT | Performed by: SOCIAL WORKER

## 2018-07-09 PROCEDURE — 99211 OFF/OP EST MAY X REQ PHY/QHP: CPT | Performed by: INTERNAL MEDICINE

## 2018-07-09 NOTE — PROGRESS NOTES
Anticoagulation Summary  As of 7/9/2018    INR goal:   2.5-3.5   TTR:   26.1 % (1.3 y)   Today's INR:   1.1!   Warfarin maintenance plan:   7.5 mg (2.5 mg x 3) on Mon, Wed, Fri; 5 mg (2.5 mg x 2) all other days   Weekly warfarin total:   42.5 mg   Plan last modified:   Chase Baldwin, PharmD (6/12/2018)   Next INR check:   7/13/2018   Target end date:   Indefinite    Indications    Chronic anticoagulation [Z79.01]  History of mitral valve replacement with mechanical valve [Z95.2] [Z95.2]             Anticoagulation Episode Summary     INR check location:       Preferred lab:       Send INR reminders to:       Comments:         Anticoagulation Care Providers     Provider Role Specialty Phone number    Declan Eaton M.D. Referring Internal Medicine [phone]    [facility] Responsible  [phone]        Anticoagulation Patient Findings  Patient Findings     Positives:   Missed doses    Negatives:   Signs/symptoms of thrombosis, Signs/symptoms of bleeding, Laboratory test error suspected, Change in health, Change in alcohol use, Change in activity, Upcoming invasive procedure, Emergency department visit, Upcoming dental procedure, Extra doses, Change in medications, Change in diet/appetite, Hospital admission, Bruising, Other complaints        HPI:   Carlos Rivera seen in clinic today, on anticoagulation therapy with wafarin for stroke prevention due to history of mechanical mitral valve replacement.    Patient's previous INR was subtherapeutic at 1.5 on 7-5-18, at which time patient was instructed to bolus with one dose, then resume current warfarin regimen.  He returns to clinic today to recheck INR to ensure it is therapeutic and thus preventing possible clotting and/or bleeding/bruising complications.    CHADS-VASc = n/a  (unadjusted ischemic stroke risk/year:  n/a)    Does patient have any changes to current medical/health status since last appt (Y/N):  NO  Does patient have any  "signs/symptoms of bleeding and/or thrombosis since the last appt (Y/N):  NO  Does patient have any interval changes to diet or medications since last appt (Y/N):  No  Are there any complications or cost restrictions with current therapy (Y/N):  NO      Vitals:  BP declined today    Weight  declines   Height   5' 8\"     Asssessment:      INR remains subtherapeutic at 1.1, therefore increasing patient's risk of stroke due to mechanical valve.   Reason(s) for out of range INR today:  Missed doses      Plan:  Instructed patient to bolus with 7.5mg X 2, then resume current warfarin regimen.  He will also begin lovenox 60mg two times daily to protect against stroke while INR is this low.  Patient's cognitive function definitely influencing results as he is forgetting dosing and misplacing dosing calendars.  Provided pill reminder case at previous visits, encouraged him to fill this with warfarin ONLY right when he gets home based on dosing calendar so that things are set for the entire week.  He will try to implement this today.     Follow up:  Because warfarin is a high risk medication and current CHEST guidelines recommend regular monitoring intervals (few days up to 12 weeks), will have patient return to clinic in 4 days to recheck INR.    Casey Birmingham, PharmD    "

## 2018-07-11 NOTE — BH THERAPY
[facility]  Therapy Progress Note    Patient Name: Carlos Rivera  Patient MRN: [id number]  Today's Date: 7/11/2018     Type of session:Individual psychotherapy  Length of session: 45 minutes  Persons in attendance:Patient    Subjective/New Info:   Patient presents on time for scheduled appointment.   As per previous sessions patient seems to enjoy talking about his past experiences of sailing and of riding his bike long distances.  Patient ruminates about his displeasure at not being able to ride his bike and about the negative effects of his having a more sedentary lifestyle since moving to [place].  Carlos acknowledges his physical condition is not conducive to engaging in the activities he longs to recapture and agrees to work on coming to terms with current status and introducing new or modified activities.  Indicates he is receptive and will focus of activities he is able to do,    Discussed son and family moving to [facility] and he is looking forward to having them nearby.  Discussed his wife joining next session as he believes it would be beneficial for her to be included in discussion of patient’s introducing activities that would support his interest in exploring their relatively new environment having moved from the [facility].   Reports slight improvement in mood and affect supports that and is brighter.  He appears to be gaining interest in working on projects at home and in getting out in the community to explore resources.      Objective/Observations:   Participation: Active verbal participation, Engaged and Open to feedback   Grooming: Casual and Neat   Cognition: Fully Oriented   Eye contact: Fair   Mood: Depressed   Affect: Blunted and Congruent with content   Thought process: Goal-directed   Speech: Rate within normal limits and Volume within normal limits   Other:     Diagnoses:   1. Mild episode of recurrent major depressive disorder (HCC)         Current risk:   SUICIDE:  Low   Homicide: Low   Self-harm: Low   Relapse: Low   Other:    Safety Plan reviewed? Not Indicated   If evidence of imminent risk is present, intervention/plan:     Therapeutic Intervention(s): Cognitive modification, Goal-setting, Interpersonal effectiveness skills, Leisure and recreation skills and Supportive psychotherapy    Treatment Goal(s)/Objective(s) addressed: Identify and engage in activities that would support treatment goals by being consistent with one’s values.          Progress toward Treatment Goals: Mild improvement    Plan:  - Next appointment scheduled:  7/20/2018  - Patient is in agreement with the above plan:  YES    Corinne G. Taylor, L.C.SANAYA  7/11/2018

## 2018-07-13 ENCOUNTER — ANTICOAGULATION VISIT (OUTPATIENT)
Dept: MEDICAL GROUP | Facility: PHYSICIAN GROUP | Age: 73
End: 2018-07-13
Payer: MEDICARE

## 2018-07-13 VITALS — HEART RATE: 45 BPM | DIASTOLIC BLOOD PRESSURE: 67 MMHG | SYSTOLIC BLOOD PRESSURE: 138 MMHG

## 2018-07-13 DIAGNOSIS — Z95.2 HISTORY OF MITRAL VALVE REPLACEMENT WITH MECHANICAL VALVE: ICD-10-CM

## 2018-07-13 DIAGNOSIS — Z79.01 CHRONIC ANTICOAGULATION: Primary | ICD-10-CM

## 2018-07-13 LAB — INR PPP: 2.5 (ref 2–3.5)

## 2018-07-13 PROCEDURE — 85610 PROTHROMBIN TIME: CPT | Performed by: FAMILY MEDICINE

## 2018-07-13 PROCEDURE — 99999 PR NO CHARGE: CPT | Performed by: FAMILY MEDICINE

## 2018-07-13 NOTE — PROGRESS NOTES
Anticoagulation Summary  As of 7/13/2018    INR goal:   2.5-3.5   TTR:   25.9 % (1.3 y)   Today's INR:   2.5   Warfarin maintenance plan:   7.5 mg (2.5 mg x 3) on Mon, Wed, Fri; 5 mg (2.5 mg x 2) all other days   Weekly warfarin total:   42.5 mg   Plan last modified:   Chase Baldwin, PharmD (6/12/2018)   Next INR check:   7/20/2018   Target end date:   Indefinite    Indications    Chronic anticoagulation [Z79.01]  History of mitral valve replacement with mechanical valve [Z95.2] [Z95.2]             Anticoagulation Episode Summary     INR check location:       Preferred lab:       Send INR reminders to:       Comments:         Anticoagulation Care Providers     Provider Role Specialty Phone number    Declan Eaton M.D. Referring Internal Medicine [phone]    [facility] Responsible  [phone]        Anticoagulation Patient Findings  Patient Findings     Negatives:   Signs/symptoms of thrombosis, Signs/symptoms of bleeding, Laboratory test error suspected, Change in health, Change in alcohol use, Change in activity, Upcoming invasive procedure, Emergency department visit, Upcoming dental procedure, Missed doses, Extra doses, Change in medications, Change in diet/appetite, Hospital admission, Bruising, Other complaints        HPI:   Carlos Rivera seen in clinic today, on anticoagulation therapy with warfarin for stroke prevention due to history of mechanical mitral valve replacement.    Patient's previous INR was subtherapeutic at 1.1 on 7-9-18, at which time patient was instructed to bolus with two doses, then resume current warfarin regimen, as well as begin lovenox bridge due to stroke risk.  He returns to clinic today to recheck INR to ensure it is therapeutic and thus preventing possible clotting and/or bleeding/bruising complications.    CHADS-VASc = n/a  (unadjusted ischemic stroke risk/year:  n/a)    Does patient have any changes to current medical/health status since last appt  "(Y/N):  NO  Does patient have any signs/symptoms of bleeding and/or thrombosis since the last appt (Y/N):  NO  Does patient have any interval changes to diet or medications since last appt (Y/N):  NO  Are there any complications or cost restrictions with current therapy (Y/N):  NO      Vitals:  /67  HR 45    Weight  declines   Height   5' 8\"     Asssessment:      INR therapeutic at 2.5, therefore decreasing patient's risk of stroke and/or bleeding complications.   Reason(s) for out of range INR today:  n/a      Plan:  Instructed patient to restart previously instructed weekly warfarin regimen in order to maintain INR in therapeutic range.  He will also discontinue lovenox as INR is now therapeutic.     Follow up:  Because warfarin is a high risk medication and current CHEST guidelines recommend regular monitoring intervals (few days up to 12 weeks), will have patient return to clinic in 1 weeks to recheck INR.    Casey Birmingham, PharmD      "

## 2018-07-17 ENCOUNTER — OFFICE VISIT (OUTPATIENT)
Dept: MEDICAL GROUP | Facility: PHYSICIAN GROUP | Age: 73
End: 2018-07-17
Payer: MEDICARE

## 2018-07-17 VITALS
RESPIRATION RATE: 16 BRPM | BODY MASS INDEX: 21.19 KG/M2 | SYSTOLIC BLOOD PRESSURE: 132 MMHG | OXYGEN SATURATION: 98 % | TEMPERATURE: 98.5 F | WEIGHT: 135 LBS | HEART RATE: 50 BPM | HEIGHT: 67 IN | DIASTOLIC BLOOD PRESSURE: 84 MMHG

## 2018-07-17 DIAGNOSIS — F41.9 ANXIETY: ICD-10-CM

## 2018-07-17 DIAGNOSIS — Z95.2 HISTORY OF MITRAL VALVE REPLACEMENT WITH MECHANICAL VALVE: ICD-10-CM

## 2018-07-17 DIAGNOSIS — E11.00 TYPE 2 DIABETES MELLITUS WITH HYPEROSMOLARITY WITHOUT COMA, WITHOUT LONG-TERM CURRENT USE OF INSULIN (HCC): ICD-10-CM

## 2018-07-17 DIAGNOSIS — E78.5 HYPERLIPIDEMIA, UNSPECIFIED HYPERLIPIDEMIA TYPE: ICD-10-CM

## 2018-07-17 DIAGNOSIS — F02.80 DEMENTIA ASSOCIATED WITH OTHER UNDERLYING DISEASE WITHOUT BEHAVIORAL DISTURBANCE (HCC): ICD-10-CM

## 2018-07-17 PROCEDURE — G0439 PPPS, SUBSEQ VISIT: HCPCS | Performed by: INTERNAL MEDICINE

## 2018-07-17 ASSESSMENT — ENCOUNTER SYMPTOMS: GENERAL WELL-BEING: GOOD

## 2018-07-17 ASSESSMENT — PATIENT HEALTH QUESTIONNAIRE - PHQ9: CLINICAL INTERPRETATION OF PHQ2 SCORE: 0

## 2018-07-17 ASSESSMENT — ACTIVITIES OF DAILY LIVING (ADL): BATHING_REQUIRES_ASSISTANCE: 0

## 2018-07-17 NOTE — PATIENT INSTRUCTIONS
· Call Loida Ferrell (diabetes nurse) with your blood sugars so she can continue to wean your insulin

## 2018-07-17 NOTE — ASSESSMENT & PLAN NOTE
Still having issues with forgetfullness. He has had falls in the past but started working with a therapist a few days ago to address his balance issues. He has started riding his bike again but not on main streets. Rides on afternoons and weekends when traffic is less. [facility] [address]. Following with Dr. Cartagena in neurology.

## 2018-07-17 NOTE — ASSESSMENT & PLAN NOTE
Lab Results   Component Value Date/Time    CHOLSTRLTOT 142 06/14/2018 06:23 AM    LDL 59 06/14/2018 06:23 AM    HDL 47 06/14/2018 06:23 AM    TRIGLYCERIDE 178 (H) 06/14/2018 06:23 AM     Controlled on atorvastatin 40 mg daily.

## 2018-07-17 NOTE — PROGRESS NOTES
Chief Complaint   Patient presents with   • Annual Wellness Visit         HPI:  Carlos is a 72 y.o. here for Medicare Annual Wellness Visit        Patient Active Problem List    Diagnosis Date Noted   • Dementia 03/29/2018   • Stroke (HCC) 03/29/2018   • Mild single current episode of major depressive disorder (HCC) 12/20/2017   • History of mitral valve replacement with mechanical valve [Z95.2] 03/10/2017   • Hyperlipidemia 02/23/2017   • Anxiety 02/23/2017   • Type II diabetes mellitus (HCC) 02/23/2017   • Mild cognitive impairment 02/23/2017   • Chronic anticoagulation 02/23/2017       Current Outpatient Prescriptions   Medication Sig Dispense Refill   • enoxaparin (LOVENOX) 60 MG/0.6ML Solution inj Inject 60 mg as instructed every 12 hours. 10 Syringe 1   • Exenatide ER (BYDUREON BCISE) 2 MG/0.85ML Auto-injector Inject 2 mg as instructed every 7 days. 4 PEN 11   • Blood Glucose Monitoring Suppl Device Meter: Dispense One Touch Ultra. Sig. Use as directed for blood sugar monitoring. #1. NR. 1 Device 0   • Blood Glucose Monitoring Suppl Supplies Misc Test strips order: Test strips for One Touch Ultra 2 meter. Sig: use twice daily and prn ssx high or low sugar #100 RF x 3 180 Strip 3   • Dulaglutide (TRULICITY) 0.75 MG/0.5ML Solution Pen-injector Inject 0.75 mg as instructed every 7 days. 4 PEN 11   • atorvastatin (LIPITOR) 40 MG Tab TAKE 1 TABLET BY MOUTH  EVERY EVENING 90 Tab 1   • lisinopril (PRINIVIL) 20 MG Tab Take 1 Tab by mouth every day. 90 Tab 1   • memantine (NAMENDA) 5 MG Tab Take 1 Tab by mouth 2 times a day. 60 Tab 3   • metFORMIN (GLUCOPHAGE) 500 MG Tab TAKE 1 TABLET BY MOUTH TWO  TIMES DAILY WITH MEALS (Patient taking differently: 500 mg 2 times a day. TAKE 1 TABLET BY MOUTH TWO  TIMES DAILY WITH MEALS) 180 Tab 1   • Insulin NPH, Human,, Isophane, 100 UNIT/ML Suspension Pen-injector Inject 10 Units as instructed every bedtime. 10 mL 11   • warfarin (COUMADIN) 2.5 MG Tab Take two to three tablets by  "mouth one time daily as directed by coumadin clinic 270 Tab 0   • Blood Glucose Monitoring Suppl Supplies Misc Test strips order: Test strips compatible with meter. Sig: use daily and prn ssx high or low sugar 200 Each 3   • citalopram (CELEXA) 10 MG tablet TAKE 1 TABLET BY MOUTH  EVERY MORNING 90 Tab 1   • fexofenadine (ALLEGRA ALLERGY) 180 MG tablet Take 180 mg by mouth every day.     • Homeopathic Products ([employer] LEG CRAMPS PAIN RELIEF PO) Take  by mouth.     • Insulin Syringe-Needle U-100 (INSULIN SYRINGE .3CC/31GX5/16\") 31G X 5/16\" 0.3 ML Misc Use 3 times daily with insulin 300 Each 3   • Blood Glucose Monitoring Suppl (ONETOUCH VERIO FLEX SYSTEM) W/DEVICE Kit by Does not apply route.       No current facility-administered medications for this visit.         Patient is taking medications as noted in medication list.  Current supplements as per medication list.     Allergies: Vicodin [hydrocodone-acetaminophen]    Current social contact/activities: goes to the gym everyday     Is patient current with immunizations? Yes.    He  reports that he has never smoked. He has never used smokeless tobacco. He reports that he does not drink alcohol or use drugs.  Counseling given: Not Answered        DPA/Advanced directive: Patient has Advanced Directive, but it is not on file. Instructed to bring in a copy to scan into their chart.    ROS:    Gait: Uses no assistive device   Ostomy: No   Other tubes: No   Amputations: No   Chronic oxygen use No   Last eye exam 07/2017   Wears hearing aids: No   : Denies any urinary leakage during the last 6 months      Screening:        Depression Screening    Little interest or pleasure in doing things?  0 - not at all  Feeling down, depressed, or hopeless? 0 - not at all  Patient Health Questionnaire Score: 0    If depressive symptoms identified deferred to follow up visit unless specifically addressed in assessment and plan.    Interpretation of PHQ-9 Total Score   Score Severity   1-4 " No Depression   5-9 Mild Depression   10-14 Moderate Depression   15-19 Moderately Severe Depression   20-27 Severe Depression    Screening for Cognitive Impairment    Three Minute Recall (leader, season, table)  3/3    Johnny clock face with all 12 numbers and set the hands to show 10 past 11.  Yes    If cognitive concerns identified, deferred for follow up unless specifically addressed in assessment and plan.    Fall Risk Assessment    Has the patient had two or more falls in the last year or any fall with injury in the last year?  Yes  If fall risk identified, deferred for follow up unless specifically addressed in assessment and plan.    Safety Assessment    Throw rugs on floor.  No  Handrails on all stairs.  Yes  Good lighting in all hallways.  Yes  Difficulty hearing.  No  Patient counseled about all safety risks that were identified.    Functional Assessment ADLs    Are there any barriers preventing you from cooking for yourself or meeting nutritional needs?  No.    Are there any barriers preventing you from driving safely or obtaining transportation?  No.    Are there any barriers preventing you from using a telephone or calling for help?  No.    Are there any barriers preventing you from shopping?  No.    Are there any barriers preventing you from taking care of your own finances?  No.    Are there any barriers preventing you from managing your medications?  No.    Are there any barriers preventing you from showering, bathing or dressing yourself?  No.    Are you currently engaging in any exercise or physical activity?  Yes.  Goes to the gym  What is your perception of your health?  Good.    Health Maintenance Summary                Annual Wellness Visit Overdue 1945     IMM INFLUENZA Next Due 9/1/2018      Done 9/13/2017 Imm Admin: Influenza Vaccine Adult HD     Patient has more history with this topic...    A1C SCREENING Next Due 12/14/2018      Done 6/14/2018 HEMOGLOBIN A1C      Patient has more  history with this topic...    DIABETES MONOFILAMENT / LE EXAM Next Due 2/26/2019      Done 2/26/2018      Patient has more history with this topic...    FASTING LIPID PROFILE Next Due 6/14/2019      Done 6/14/2018 LIPID PROFILE      Patient has more history with this topic...    URINE ACR / MICROALBUMIN Next Due 6/14/2019      Done 6/14/2018 MICROALBUMIN CREAT RATIO URINE      Patient has more history with this topic...    SERUM CREATININE Next Due 6/14/2019      Done 6/14/2018 COMP METABOLIC PANEL      Patient has more history with this topic...    RETINAL SCREENING Next Due 6/20/2019      Done 6/20/2018 REFERRAL FOR RETINAL SCREENING EXAM    IMM DTaP/Tdap/Td Vaccine Next Due 9/19/2020      Done 9/19/2010 Imm Admin: Tdap Vaccine     Patient has more history with this topic...    COLONOSCOPY Next Due 11/11/2025      Done 11/11/2015 see Care Everywhere from [place]          Patient Care Team:  Declan Eaton M.D. as PCP - General (Internal Medicine)  Loida Ferrell R.N. as Registered Nurse (Diabetic Education)  [facility]    Social History   Substance Use Topics   • Smoking status: Never Smoker   • Smokeless tobacco: Never Used   • Alcohol use No     Family History   Problem Relation Age of Onset   • Heart Attack Father 58   • Diabetes Father    • Heart Attack Paternal Uncle    • No Known Problems Sister    • No Known Problems Brother    • No Known Problems Maternal Grandmother    • No Known Problems Maternal Grandfather    • No Known Problems Paternal Grandmother    • Heart Attack Paternal Grandfather    • No Known Problems Sister    • No Known Problems Brother      He  has a past medical history of Chronic anticoagulation (2/23/2017); History of mitral valve replacement with mechanical valve [Z95.2] (3/10/2017); Hyperlipidemia; and Type II diabetes mellitus (HCC) (2/23/2017).   Past Surgical History:   Procedure Laterality Date   • MITRAL VALVE REPLACEMENT  1999   • INGUINAL HERNIA REPAIR Bilateral  "1984   • TONSILLECTOMY             Exam:     Blood pressure 132/84, pulse (!) 50, temperature 36.9 °C (98.5 °F), resp. rate 16, height 1.702 m (5' 7\"), weight 61.2 kg (135 lb), SpO2 98 %. Body mass index is 21.14 kg/m².    Hearing fair.    Dentition fair  Alert, oriented in no acute distress.  Eye contact is good, speech goal directed, affect calm      Assessment and Plan. The following treatment and monitoring plan is recommended:    Dementia  Still having issues with forgetfullness. He has had falls in the past but started working with a therapist a few days ago to address his balance issues. He has started riding his bike again but not on main streets. Rides on afternoons and weekends when traffic is less. On [address]. Following with Dr. Cartagena in neurology.     History of mitral valve replacement with mechanical valve [Z95.2]  Following with coumadin clinic.     Type II diabetes mellitus (CMS-[facility])  Following with me and diabetes RN. We had tried to get him off of insulin and onto Trulicity or Bydureon; both prescriptions had been sent to his pharmacy to see which would be covered. He isn't sure which one he is taking; Trulicity or Bydureon. It seems from review of diabetes RN notes they picked up Bydureon. He says his fasting sugars have come down to 120 or so but he's on the metformin, Bydureon, and insulin still.     Hyperlipidemia  Lab Results   Component Value Date/Time    CHOLSTRLTOT 142 06/14/2018 06:23 AM    LDL 59 06/14/2018 06:23 AM    HDL 47 06/14/2018 06:23 AM    TRIGLYCERIDE 178 (H) 06/14/2018 06:23 AM     Controlled on atorvastatin 40 mg daily.     Anxiety  On citalopram 10 mg daily.       Services suggested: No services needed at this time  Health Care Screening recommendations as per orders if indicated.  Referrals offered: PT/OT/Nutrition counseling/Behavioral Health/Smoking cessation as per orders if indicated.    Discussion today about general wellness and lifestyle habits:    · Prevent falls and " reduce trip hazards; Cautioned about securing or removing rugs.  · Have a working fire alarm and carbon monoxide detector;   · Engage in regular physical activity and social activities       Follow-up: Return in about 3 months (around 10/17/2018) for diabetes RN visit .     Declan Eaton M.D.

## 2018-07-17 NOTE — ASSESSMENT & PLAN NOTE
Following with me and diabetes RN. We had tried to get him off of insulin and onto Trulicity or Bydureon; both prescriptions had been sent to his pharmacy to see which would be covered. He isn't sure which one he is taking; Trulicity or Bydureon. It seems from review of diabetes RN notes they picked up Bydureon. He says his fasting sugars have come down to 120 or so but he's on the metformin, Bydureon, and insulin still.

## 2018-07-19 DIAGNOSIS — E11.00 TYPE 2 DIABETES MELLITUS WITH HYPEROSMOLARITY WITHOUT COMA, WITHOUT LONG-TERM CURRENT USE OF INSULIN (HCC): ICD-10-CM

## 2018-07-19 RX ORDER — CITALOPRAM HYDROBROMIDE 10 MG/1
TABLET ORAL
Qty: 90 TAB | Refills: 1 | Status: SHIPPED | OUTPATIENT
Start: 2018-07-19 | End: 2019-02-02 | Stop reason: SDUPTHER

## 2018-07-19 RX ORDER — LISINOPRIL 20 MG/1
TABLET ORAL
Qty: 90 TAB | Refills: 1 | Status: SHIPPED | OUTPATIENT
Start: 2018-07-19 | End: 2019-02-02 | Stop reason: SDUPTHER

## 2018-07-19 NOTE — TELEPHONE ENCOUNTER
Was the patient seen in the last year in this department? Yes     Does patient have an active prescription for medications requested? No     Received Request Via: Pharmacy [facility]

## 2018-07-19 NOTE — TELEPHONE ENCOUNTER
Refill X 6 months, sent to pharmacy.Pt. Seen in the last 6 months per protocol.   Lab Results   Component Value Date/Time    SODIUM 139 06/14/2018 06:23 AM    POTASSIUM 4.8 06/14/2018 06:23 AM    CHLORIDE 102 06/14/2018 06:23 AM    CO2 27 06/14/2018 06:23 AM    GLUCOSE 195 (H) 06/14/2018 06:23 AM    BUN 30 (H) 06/14/2018 06:23 AM    CREATININE 1.16 06/14/2018 06:23 AM

## 2018-07-20 ENCOUNTER — ANTICOAGULATION VISIT (OUTPATIENT)
Dept: MEDICAL GROUP | Facility: PHYSICIAN GROUP | Age: 73
End: 2018-07-20
Payer: MEDICARE

## 2018-07-20 ENCOUNTER — APPOINTMENT (OUTPATIENT)
Dept: BEHAVIORAL HEALTH | Facility: PHYSICIAN GROUP | Age: 73
End: 2018-07-20
Payer: MEDICARE

## 2018-07-20 DIAGNOSIS — Z95.2 HISTORY OF MITRAL VALVE REPLACEMENT WITH MECHANICAL VALVE: ICD-10-CM

## 2018-07-20 DIAGNOSIS — Z79.01 CHRONIC ANTICOAGULATION: ICD-10-CM

## 2018-07-20 LAB — INR PPP: 6.5 (ref 2–3.5)

## 2018-07-20 PROCEDURE — 99211 OFF/OP EST MAY X REQ PHY/QHP: CPT | Performed by: FAMILY MEDICINE

## 2018-07-20 PROCEDURE — 85610 PROTHROMBIN TIME: CPT | Performed by: FAMILY MEDICINE

## 2018-07-20 NOTE — PROGRESS NOTES
Anticoagulation Summary  As of 7/20/2018    INR goal:   2.5-3.5   TTR:   25.8 % (1.3 y)   Today's INR:   6.5!   Warfarin maintenance plan:   7.5 mg (2.5 mg x 3) on Mon, Wed, Fri; 5 mg (2.5 mg x 2) all other days   Weekly warfarin total:   42.5 mg   Plan last modified:   Chase Baldwin, PharmD (6/12/2018)   Next INR check:   7/23/2018   Target end date:   Indefinite    Indications    Chronic anticoagulation [Z79.01]  History of mitral valve replacement with mechanical valve [Z95.2] [Z95.2]             Anticoagulation Episode Summary     INR check location:       Preferred lab:       Send INR reminders to:       Comments:         Anticoagulation Care Providers     Provider Role Specialty Phone number    Declan Eaton M.D. Referring Internal Medicine [phone]    [facility] Responsible  [phone]        Anticoagulation Patient Findings  Patient Findings     Negatives:   Signs/symptoms of thrombosis, Signs/symptoms of bleeding, Laboratory test error suspected, Change in health, Change in alcohol use, Change in activity, Upcoming invasive procedure, Emergency department visit, Upcoming dental procedure, Missed doses, Extra doses, Change in medications, Change in diet/appetite, Hospital admission, Bruising, Other complaints        HPI:   Carlos Rivera seen in clinic today, on anticoagulation therapy with warfarin for stroke prevention due to history of mechanical mitral valve replacement.    Patient's previous INR was therapeutic at 2.5 on 7-13-18, at which time patient was instructed to continue with current warfarin regimen.  He returns to clinic today to recheck INR to ensure it is therapeutic and thus preventing possible clotting and/or bleeding/bruising complications.    CHADS-VASc = n/a  (unadjusted ischemic stroke risk/year:  n/a)    Does patient have any changes to current medical/health status since last appt (Y/N):  NO  Does patient have any signs/symptoms of bleeding and/or  "thrombosis since the last appt (Y/N):  NO  Does patient have any interval changes to diet or medications since last appt (Y/N):  NO  Are there any complications or cost restrictions with current therapy (Y/N):  NO      Vitals:  BP check declined at today's visit    Weight  declines   Height   5' 7\"     Asssessment:      INR critically supratherapeutic at 6.5, therefore increasing patient's risk of bleeding complications due to warfarin.   Reason(s) for out of range INR today:  Patient has been more consistent with dosing, weekly dose may be too high.      Plan:  Instructed patient to HOLD X 2, then resume current warfarin regimen in order to maintain INR in therapeutic range.  Praised patient for starting log book for his medications, this should give us more consistent results going forward.     Follow up:  Because warfarin is a high risk medication and current CHEST guidelines recommend regular monitoring intervals (few days up to 12 weeks), will have patient return to clinic in 3 days to recheck INR.    Casey Birmingham, PharmD    "

## 2018-07-23 ENCOUNTER — ANTICOAGULATION VISIT (OUTPATIENT)
Dept: MEDICAL GROUP | Facility: PHYSICIAN GROUP | Age: 73
End: 2018-07-23
Payer: MEDICARE

## 2018-07-23 DIAGNOSIS — Z95.2 HISTORY OF MITRAL VALVE REPLACEMENT WITH MECHANICAL VALVE: ICD-10-CM

## 2018-07-23 DIAGNOSIS — Z79.01 CHRONIC ANTICOAGULATION: Primary | ICD-10-CM

## 2018-07-23 LAB — INR PPP: 1.3 (ref 2–3.5)

## 2018-07-23 PROCEDURE — 85610 PROTHROMBIN TIME: CPT | Performed by: NURSE PRACTITIONER

## 2018-07-23 PROCEDURE — 99211 OFF/OP EST MAY X REQ PHY/QHP: CPT | Performed by: NURSE PRACTITIONER

## 2018-07-23 NOTE — PROGRESS NOTES
Anticoagulation Summary  As of 7/23/2018    INR goal:   2.5-3.5   TTR:   25.8 % (1.3 y)   Today's INR:   1.3!   Warfarin maintenance plan:   7.5 mg (2.5 mg x 3) on Mon, Wed, Fri; 5 mg (2.5 mg x 2) all other days   Weekly warfarin total:   42.5 mg   Plan last modified:   Chase Baldwin, PharmD (6/12/2018)   Next INR check:   7/27/2018   Target end date:   Indefinite    Indications    Chronic anticoagulation [Z79.01]  History of mitral valve replacement with mechanical valve [Z95.2] [Z95.2]             Anticoagulation Episode Summary     INR check location:       Preferred lab:       Send INR reminders to:       Comments:         Anticoagulation Care Providers     Provider Role Specialty Phone number    Declan Eaton M.D. Referring Internal Medicine [phone]    [facility] Responsible  [phone]        Anticoagulation Patient Findings  Patient Findings     Negatives:   Signs/symptoms of thrombosis, Signs/symptoms of bleeding, Laboratory test error suspected, Change in health, Change in alcohol use, Change in activity, Upcoming invasive procedure, Emergency department visit, Upcoming dental procedure, Missed doses, Extra doses, Change in medications, Change in diet/appetite, Hospital admission, Bruising, Other complaints        HPI:   Carlos Wyman seen in clinic today, on anticoagulation therapy with warfarin for stroke prevention due to history of mechanical mitral valve replacement.    Patient's previous INR was supratherapeutic at 6.5 on 7-20-18, at which time patient was instructed to hold two doses, then resume current warfarin regimen.  He returns to clinic today to recheck INR to ensure it is therapeutic and thus preventing possible clotting and/or bleeding/bruising complications.    CHADS-VASc = n/a  (unadjusted ischemic stroke risk/year:  n/a)    Does patient have any changes to current medical/health status since last appt (Y/N):  NO  Does patient have any signs/symptoms of  "bleeding and/or thrombosis since the last appt (Y/N):  NO  Does patient have any interval changes to diet or medications since last appt (Y/N):  NO  Are there any complications or cost restrictions with current therapy (Y/N):  NO      Vitals:  BP check declined at today's visit    Weight  declines   Height   5' 7\"     Asssessment:      INR subtherapeutic at 1.3, therefore increasing patient's risk of stroke due to mechanical mitral valve.   Reason(s) for out of range INR today:  Held doses from supratherapeutic INR last week.      Plan:  Instructed patient to begin regimen of 5mg daily until next INR check in order to trend INR's to therapeutic.  Will not bridge at this point due to critically high INR just three days ago.       Follow up:  Because warfarin is a high risk medication and current CHEST guidelines recommend regular monitoring intervals (few days up to 12 weeks), will have patient return to clinic in 4 days to recheck INR.    Casey Birmingham, PharmD    "

## 2018-07-27 ENCOUNTER — ANTICOAGULATION VISIT (OUTPATIENT)
Dept: MEDICAL GROUP | Facility: PHYSICIAN GROUP | Age: 73
End: 2018-07-27
Payer: MEDICARE

## 2018-07-27 VITALS — SYSTOLIC BLOOD PRESSURE: 148 MMHG | HEART RATE: 46 BPM | DIASTOLIC BLOOD PRESSURE: 77 MMHG

## 2018-07-27 DIAGNOSIS — Z79.01 CHRONIC ANTICOAGULATION: ICD-10-CM

## 2018-07-27 DIAGNOSIS — Z95.2 HISTORY OF MITRAL VALVE REPLACEMENT WITH MECHANICAL VALVE: ICD-10-CM

## 2018-07-27 LAB — INR PPP: 1.8 (ref 2–3.5)

## 2018-07-27 PROCEDURE — 85610 PROTHROMBIN TIME: CPT | Performed by: INTERNAL MEDICINE

## 2018-07-27 PROCEDURE — 99211 OFF/OP EST MAY X REQ PHY/QHP: CPT | Performed by: INTERNAL MEDICINE

## 2018-07-27 NOTE — PROGRESS NOTES
Anticoagulation Summary  As of 7/27/2018    INR goal:   2.5-3.5   TTR:   25.6 % (1.4 y)   Today's INR:   1.8!   Warfarin maintenance plan:   7.5 mg (2.5 mg x 3) on Mon, Wed, Fri; 5 mg (2.5 mg x 2) all other days   Weekly warfarin total:   42.5 mg   Plan last modified:   Chase Baldwin, PharmD (6/12/2018)   Next INR check:   7/30/2018   Target end date:   Indefinite    Indications    Chronic anticoagulation [Z79.01]  History of mitral valve replacement with mechanical valve [Z95.2] [Z95.2]             Anticoagulation Episode Summary     INR check location:       Preferred lab:       Send INR reminders to:       Comments:         Anticoagulation Care Providers     Provider Role Specialty Phone number    Declan Eaton M.D. Referring Internal Medicine [phone]    [facility] Responsible  [phone]        Anticoagulation Patient Findings  Patient Findings     Negatives:   Signs/symptoms of thrombosis, Signs/symptoms of bleeding, Laboratory test error suspected, Change in health, Change in alcohol use, Change in activity, Upcoming invasive procedure, Emergency department visit, Upcoming dental procedure, Missed doses, Extra doses, Change in medications, Change in diet/appetite, Hospital admission, Bruising, Other complaints        HPI:   Carlos Rivera seen in clinic today, on anticoagulation therapy with warfarin for stroke prevention due to history of mechanical mitral valve replacement.    Patient's previous INR was subtherapeutic at 1.3 on 6-25-18, at which time patient was instructed to take 5mg warfarin daily.  He returns to clinic today to recheck INR to ensure it is therapeutic and thus preventing possible clotting and/or bleeding/bruising complications.    CHADS-VASc = n/a  (unadjusted ischemic stroke risk/year:  n/a)    Does patient have any changes to current medical/health status since last appt (Y/N):  NO  Does patient have any signs/symptoms of bleeding and/or thrombosis since the  "last appt (Y/N):  NO  Does patient have any interval changes to diet or medications since last appt (Y/N):  NO  Are there any complications or cost restrictions with current therapy (Y/N):  NO      Vitals:  /77  HR 46    Weight  declines   Height   5' 7\"     Asssessment:      INR subtherapeutic at 1.8, therefore increasing patient's risk of stroke due to mechanical valve.   Reason(s) for out of range INR today:  Decrease in dosing over this week      Plan:  Instructed patient to bolus with 7.5mg X 1, then resume current dose of 5mg daily in order to bring INR to therapeutic range.     Follow up:  Because warfarin is a high risk medication and current CHEST guidelines recommend regular monitoring intervals (few days up to 12 weeks), will have patient return to clinic in 3 days to recheck INR.    Casey Birmingham, PharmD    "

## 2018-07-28 ENCOUNTER — PATIENT OUTREACH (OUTPATIENT)
Dept: HEALTH INFORMATION MANAGEMENT | Facility: OTHER | Age: 73
End: 2018-07-28

## 2018-07-30 ENCOUNTER — ANTICOAGULATION VISIT (OUTPATIENT)
Dept: MEDICAL GROUP | Facility: PHYSICIAN GROUP | Age: 73
End: 2018-07-30
Payer: MEDICARE

## 2018-07-30 DIAGNOSIS — Z95.2 HISTORY OF MITRAL VALVE REPLACEMENT WITH MECHANICAL VALVE: ICD-10-CM

## 2018-07-30 DIAGNOSIS — Z79.01 CHRONIC ANTICOAGULATION: Primary | ICD-10-CM

## 2018-07-30 LAB — INR PPP: 1.6 (ref 2–3.5)

## 2018-07-30 PROCEDURE — 99211 OFF/OP EST MAY X REQ PHY/QHP: CPT | Performed by: INTERNAL MEDICINE

## 2018-07-30 PROCEDURE — 85610 PROTHROMBIN TIME: CPT | Performed by: INTERNAL MEDICINE

## 2018-07-30 NOTE — PROGRESS NOTES
Anticoagulation Summary  As of 7/30/2018    INR goal:   2.5-3.5   TTR:   25.4 % (1.4 y)   Today's INR:   1.6!   Warfarin maintenance plan:   7.5 mg (2.5 mg x 3) on Mon, Wed, Fri; 5 mg (2.5 mg x 2) all other days   Weekly warfarin total:   42.5 mg   Plan last modified:   Chase Baldwin, PharmD (6/12/2018)   Next INR check:   8/3/2018   Target end date:   Indefinite    Indications    Chronic anticoagulation [Z79.01]  History of mitral valve replacement with mechanical valve [Z95.2] [Z95.2]             Anticoagulation Episode Summary     INR check location:       Preferred lab:       Send INR reminders to:       Comments:         Anticoagulation Care Providers     Provider Role Specialty Phone number    Declan Eaton M.D. Referring Internal Medicine [phone]    [facility] Responsible  [phone]        Anticoagulation Patient Findings  Patient Findings     Negatives:   Signs/symptoms of thrombosis, Signs/symptoms of bleeding, Laboratory test error suspected, Change in health, Change in alcohol use, Change in activity, Upcoming invasive procedure, Emergency department visit, Upcoming dental procedure, Missed doses, Extra doses, Change in medications, Change in diet/appetite, Hospital admission, Bruising, Other complaints        HPI:   Carlos Rivera seen in clinic today, on anticoagulation therapy with warfairn for stroke prevention due to history of mechanical mitral valve replacement.    Patient's previous INR was subtherapeutic at 1.8 on 7-27-18, at which time patient was instructed to continue with current warfarin regimen.  He returns to clinic today to recheck INR to ensure it is therapeutic and thus preventing possible clotting and/or bleeding/bruising complications.    CHADS-VASc = n/a  (unadjusted ischemic stroke risk/year:  n/a)    Does patient have any changes to current medical/health status since last appt (Y/N):  NO  Does patient have any signs/symptoms of bleeding and/or  "thrombosis since the last appt (Y/N):  NO  Does patient have any interval changes to diet or medications since last appt (Y/N):  Started new multivitamin with 80mcg vitamin K  Are there any complications or cost restrictions with current therapy (Y/N):  NO      Vitals:  BP check declined at today's visit    Weight  declines   Height   5' 7\"     Asssessment:      INR remains subtherapeutic at 1.6, therefore increasing patient's risk of stroke   Reason(s) for out of range INR today:  Started multivitamin with K this past weekend.      Plan:  Instructed patient to increase weekly warfarin regimen as detailed in calendar in order to bring INR back to therapeutic range.    He has some lovenox injections left at home, he is unsure of amount.  Asked that he use the lovenox until next meeting when INR hopefully >2.5.     Follow up:  Because warfarin is a high risk medication and current CHEST guidelines recommend regular monitoring intervals (few days up to 12 weeks), will have patient return to clinic in 4 days to recheck INR.    Casey Birmingham, PharmD    "

## 2018-08-02 ENCOUNTER — TELEPHONE (OUTPATIENT)
Dept: MEDICAL GROUP | Facility: PHYSICIAN GROUP | Age: 73
End: 2018-08-02

## 2018-08-02 DIAGNOSIS — E11.00 TYPE 2 DIABETES MELLITUS WITH HYPEROSMOLARITY WITHOUT COMA, WITHOUT LONG-TERM CURRENT USE OF INSULIN (HCC): ICD-10-CM

## 2018-08-02 NOTE — TELEPHONE ENCOUNTER
Was the patient seen in the last year in this department? Yes    Does patient have an active prescription for medications requested? No     Received Request Via: Pharmacy      Pt met protocol?: Yes, pt asking for humulin r, rx brought up says nph didn't see r, labs 6/18 a1c  10.3 ov 7/18

## 2018-08-02 NOTE — TELEPHONE ENCOUNTER
Referral had already been placed to social work. They do not qualify for complex care management but letter was sent 6/19/2018 listing names and numbers of other groups that could help them. We can resend this letter or they can pick it up, send through [employer], etc.

## 2018-08-02 NOTE — TELEPHONE ENCOUNTER
Was the patient seen in the last year in this department? Yes    Does patient have an active prescription for medications requested? No     Received Request Via: Pharmacy      Pt met protocol?: Yes, labs 6/18 a1c 10.3, ov 7/18 no appt

## 2018-08-02 NOTE — TELEPHONE ENCOUNTER
----- Message from Carlos  Armen sent at 8/1/2018  9:44 PM PDT -----  Regarding: Non-Urgent Medical Question  Contact: [phone]  Yves Eaton. This is Comfort. Some time ago when I was in your office with Mike we discussed getting a .  I am in desperate need of help. My therapist and I are working on me learning how to deal with Mike. The more I back off the more agressive he becomes. Could you please get a referral for us? We need time in the same office. Thanks, Comfort Harrington

## 2018-08-03 ENCOUNTER — ANTICOAGULATION VISIT (OUTPATIENT)
Dept: MEDICAL GROUP | Facility: PHYSICIAN GROUP | Age: 73
End: 2018-08-03
Payer: MEDICARE

## 2018-08-03 DIAGNOSIS — Z95.2 HISTORY OF MITRAL VALVE REPLACEMENT WITH MECHANICAL VALVE: ICD-10-CM

## 2018-08-03 DIAGNOSIS — Z79.01 CHRONIC ANTICOAGULATION: ICD-10-CM

## 2018-08-03 LAB — INR PPP: 4.5 (ref 2–3.5)

## 2018-08-03 PROCEDURE — 99211 OFF/OP EST MAY X REQ PHY/QHP: CPT | Performed by: FAMILY MEDICINE

## 2018-08-03 PROCEDURE — 85610 PROTHROMBIN TIME: CPT | Performed by: FAMILY MEDICINE

## 2018-08-03 NOTE — PROGRESS NOTES
Anticoagulation Summary  As of 8/3/2018    INR goal:   2.5-3.5   TTR:   25.5 % (1.4 y)   Today's INR:   4.5!   Warfarin maintenance plan:   7.5 mg (2.5 mg x 3) on Mon, Wed, Fri; 5 mg (2.5 mg x 2) all other days   Weekly warfarin total:   42.5 mg   Plan last modified:   Chase Baldwin, PharmD (6/12/2018)   Next INR check:   8/10/2018   Target end date:   Indefinite    Indications    Chronic anticoagulation [Z79.01]  History of mitral valve replacement with mechanical valve [Z95.2] [Z95.2]             Anticoagulation Episode Summary     INR check location:       Preferred lab:       Send INR reminders to:       Comments:         Anticoagulation Care Providers     Provider Role Specialty Phone number    Declan Eaton M.D. Referring Internal Medicine [phone]    [facility] Responsible  [phone]        Anticoagulation Patient Findings  Patient Findings     Negatives:   Signs/symptoms of thrombosis, Signs/symptoms of bleeding, Laboratory test error suspected, Change in health, Change in alcohol use, Change in activity, Upcoming invasive procedure, Emergency department visit, Upcoming dental procedure, Missed doses, Extra doses, Change in medications, Change in diet/appetite, Hospital admission, Bruising, Other complaints        HPI:   Carlos Rivera seen in clinic today, on anticoagulation therapy with warfarin for stroke prevention due to history of mechanical mitral valve replacement.    Patient's previous INR was subtherapeutic at 1.6 on 7-30-18, at which time patient was instructed to bolus with one dose, then resume current warfarin regimen.  He returns to clinic today to recheck INR to ensure it is therapeutic and thus preventing possible clotting and/or bleeding/bruising complications.    CHADS-VASc = n/a  (unadjusted ischemic stroke risk/year:  n/a)    Does patient have any changes to current medical/health status since last appt (Y/N):  NO  Does patient have any signs/symptoms of  "bleeding and/or thrombosis since the last appt (Y/N):  NO  Does patient have any interval changes to diet or medications since last appt (Y/N):  NO  Are there any complications or cost restrictions with current therapy (Y/N):  NO      Vitals:  BP check declined at today's visit    Weight  declines   Height   5'7\"     Asssessment:      INR supratherapeutic at 4.5, therefore increasing patient's risk of bleeding complications due to warfarin.   Reason(s) for out of range INR today:  Bolus doses this week.      Plan:  Instructed patient to decrease today's dose to 2.5mg, then resume current warfarin regimen in order to bring INR to therapeutic range.     Follow up:  Because warfarin is a high risk medication and current CHEST guidelines recommend regular monitoring intervals (few days up to 12 weeks), will have patient return to clinic in 1 weeks to recheck INR.    Casey Birmingham, PharmD    "

## 2018-08-04 RX ORDER — CALCIUM CARB/VITAMIN D3/VIT K1 500-100-40
TABLET,CHEWABLE ORAL
Qty: 300 EACH | Refills: 3 | Status: SHIPPED | OUTPATIENT
Start: 2018-08-04 | End: 2020-05-08

## 2018-08-04 NOTE — TELEPHONE ENCOUNTER
Patient has recently been seen by PCP within the last 6 months per protocol (7/18). Will refill DM supplies for 12 months.  Lab Results   Component Value Date/Time    HBA1C 10.3 (H) 06/14/2018 06:23 AM      Lab Results   Component Value Date/Time    MALBCRT 879 (H) 06/14/2018 06:22 AM    MICROALBUR 69.7 06/14/2018 06:22 AM      Lab Results   Component Value Date/Time    ALKPHOSPHAT 84 06/14/2018 06:23 AM    ASTSGOT 37 06/14/2018 06:23 AM    ALTSGPT 60 (H) 06/14/2018 06:23 AM    TBILIRUBIN 0.7 06/14/2018 06:23 AM

## 2018-08-04 NOTE — TELEPHONE ENCOUNTER
MA's- Please advise pt that he needs to make an appt with a diabetic nurse in the next couple of months and please explain to him that he is no longer on Humulin R anymore and was discussed that he should be taking Humulin N. 3 months sent to pharmacy.

## 2018-08-08 NOTE — TELEPHONE ENCOUNTER
Well, after his most recent visit with me I had reminded him to call Loida with his blood sugars which he needs to do as she was expecting to hear from him. Once he calls her and lets her know what his blood sugars are then she will be able to help them over the phone with the diabetes medications.

## 2018-08-10 ENCOUNTER — ANTICOAGULATION VISIT (OUTPATIENT)
Dept: MEDICAL GROUP | Facility: PHYSICIAN GROUP | Age: 73
End: 2018-08-10
Payer: MEDICARE

## 2018-08-10 VITALS — SYSTOLIC BLOOD PRESSURE: 140 MMHG | DIASTOLIC BLOOD PRESSURE: 71 MMHG | HEART RATE: 54 BPM

## 2018-08-10 DIAGNOSIS — Z79.01 CHRONIC ANTICOAGULATION: ICD-10-CM

## 2018-08-10 DIAGNOSIS — Z95.2 HISTORY OF MITRAL VALVE REPLACEMENT WITH MECHANICAL VALVE: ICD-10-CM

## 2018-08-10 LAB — INR PPP: 1.4 (ref 2–3.5)

## 2018-08-10 PROCEDURE — 99211 OFF/OP EST MAY X REQ PHY/QHP: CPT | Performed by: FAMILY MEDICINE

## 2018-08-10 PROCEDURE — 85610 PROTHROMBIN TIME: CPT | Performed by: FAMILY MEDICINE

## 2018-08-10 NOTE — PROGRESS NOTES
Anticoagulation Summary  As of 8/10/2018    INR goal:   2.5-3.5   TTR:   25.6 % (1.4 y)   Today's INR:   1.4!   Warfarin maintenance plan:   7.5 mg (2.5 mg x 3) on Mon, Wed, Fri; 5 mg (2.5 mg x 2) all other days   Weekly warfarin total:   42.5 mg   Plan last modified:   Chase Baldwin, PharmD (6/12/2018)   Next INR check:   8/13/2018   Target end date:   Indefinite    Indications    Chronic anticoagulation [Z79.01]  History of mitral valve replacement with mechanical valve [Z95.2] [Z95.2]             Anticoagulation Episode Summary     INR check location:       Preferred lab:       Send INR reminders to:       Comments:         Anticoagulation Care Providers     Provider Role Specialty Phone number    Declan Eaton M.D. Referring Internal Medicine [phone]    [facility] Responsible  [phone]        Anticoagulation Patient Findings  Patient Findings     Positives:   Missed doses    Negatives:   Signs/symptoms of thrombosis, Signs/symptoms of bleeding, Laboratory test error suspected, Change in health, Change in alcohol use, Change in activity, Upcoming invasive procedure, Emergency department visit, Upcoming dental procedure, Extra doses, Change in medications, Change in diet/appetite, Hospital admission, Bruising, Other complaints        HPI:   Carlos Rivera seen in clinic today, on anticoagulation therapy with warfarin for stroke prevention due to history of mechanical mitral valve replacement.    Patient's previous INR was supratherapeutic at 4.5 on 8-3-18, at which time patient was instructed to decrease one dose, then resume current warfarin regimen.  He returns to clinic today to recheck INR to ensure it is therapeutic and thus preventing possible clotting and/or bleeding/bruising complications.    CHADS-VASc = n/a  (unadjusted ischemic stroke risk/year:  n/a)    Does patient have any changes to current medical/health status since last appt (Y/N):  No  Does patient have any  "signs/symptoms of bleeding and/or thrombosis since the last appt (Y/N):  NO  Does patient have any interval changes to diet or medications since last appt (Y/N):  Missed dose this week  Are there any complications or cost restrictions with current therapy (Y/N):  NO      Vitals:  /71  HR 54    Weight  declines   Height   5' 7\"     Asssessment:      INR subtherapeutic at 1.4, therefore increasing patient's risk of stroke due to mechanical mitral valve.   Reason(s) for out of range INR today:  Missed dose two days ago      Plan:  Instructed patient to take 7.5mg daily until next INR.  He states he has several lovenox injections left at home, which he was encouraged to dose as instructed until next INR check.     Follow up:  Because warfarin is a high risk medication and current CHEST guidelines recommend regular monitoring intervals (few days up to 12 weeks), will have patient return to clinic in 3 days to recheck INR.    Casey Birmingham, PharmD    "

## 2018-08-13 ENCOUNTER — ANTICOAGULATION VISIT (OUTPATIENT)
Dept: MEDICAL GROUP | Facility: PHYSICIAN GROUP | Age: 73
End: 2018-08-13
Payer: MEDICARE

## 2018-08-13 VITALS — DIASTOLIC BLOOD PRESSURE: 93 MMHG | HEART RATE: 54 BPM | SYSTOLIC BLOOD PRESSURE: 154 MMHG

## 2018-08-13 DIAGNOSIS — Z79.01 CHRONIC ANTICOAGULATION: Primary | ICD-10-CM

## 2018-08-13 DIAGNOSIS — Z95.2 HISTORY OF MITRAL VALVE REPLACEMENT WITH MECHANICAL VALVE: ICD-10-CM

## 2018-08-13 LAB — INR PPP: 2.8 (ref 2–3.5)

## 2018-08-13 PROCEDURE — 99999 PR NO CHARGE: CPT | Performed by: INTERNAL MEDICINE

## 2018-08-13 PROCEDURE — 85610 PROTHROMBIN TIME: CPT | Performed by: INTERNAL MEDICINE

## 2018-08-13 NOTE — PROGRESS NOTES
Anticoagulation Summary  As of 8/13/2018    INR goal:   2.5-3.5   TTR:   25.6 % (1.4 y)   Today's INR:   2.8   Warfarin maintenance plan:   7.5 mg (2.5 mg x 3) on Mon, Wed, Fri; 5 mg (2.5 mg x 2) all other days   Weekly warfarin total:   42.5 mg   Plan last modified:   Chase Baldwin, PharmD (6/12/2018)   Next INR check:   8/20/2018   Target end date:   Indefinite    Indications    Chronic anticoagulation [Z79.01]  History of mitral valve replacement with mechanical valve [Z95.2] [Z95.2]             Anticoagulation Episode Summary     INR check location:       Preferred lab:       Send INR reminders to:       Comments:         Anticoagulation Care Providers     Provider Role Specialty Phone number    Declan Eaton M.D. Referring Internal Medicine [phone]    [facility] Responsible  [phone]        Anticoagulation Patient Findings  Patient Findings     Negatives:   Signs/symptoms of thrombosis, Signs/symptoms of bleeding, Laboratory test error suspected, Change in health, Change in alcohol use, Change in activity, Upcoming invasive procedure, Emergency department visit, Upcoming dental procedure, Missed doses, Extra doses, Change in medications, Change in diet/appetite, Hospital admission, Bruising, Other complaints        HPI:   Carlos Rivera seen in clinic today, on anticoagulation therapy with warfarin for stroke prevention due to history of mechanical mitral valve replacement.    Patient's previous INR was subtherapeutic at 1.4 on 8-10-18, at which time patient was instructed to increase warfarin dosing for three days.  He returns to clinic today to recheck INR to ensure it is therapeutic and thus preventing possible clotting and/or bleeding/bruising complications.    CHADS-VASc = n/a  (unadjusted ischemic stroke risk/year:  n/a)    Does patient have any changes to current medical/health status since last appt (Y/N):  NO  Does patient have any signs/symptoms of bleeding and/or  "thrombosis since the last appt (Y/N):  NO  Does patient have any interval changes to diet or medications since last appt (Y/N):  NO  Are there any complications or cost restrictions with current therapy (Y/N):  NO      Vitals:  /93  HR 54    Weight  declines   Height   5' 7\"     Asssessment:      INR therapeutic at 2.8, therefore decreasing patient's risk of stroke and/or bleeding complications  Reason(s) for out of range INR today:  n/a      Plan:  Pt is to continue with current warfarin dosing regimen in order to maintain INR in therapeutic range.     Follow up:  Because warfarin is a high risk medication and current CHEST guidelines recommend regular monitoring intervals (few days up to 12 weeks), will have patient return to clinic in 1 weeks to recheck INR.    Casey Birmingham, PharmD    "

## 2018-08-15 DIAGNOSIS — Z95.2 HISTORY OF MITRAL VALVE REPLACEMENT WITH MECHANICAL VALVE: ICD-10-CM

## 2018-08-15 RX ORDER — WARFARIN SODIUM 2.5 MG/1
TABLET ORAL
Qty: 270 TAB | Refills: 1 | Status: SHIPPED | OUTPATIENT
Start: 2018-08-15 | End: 2019-10-03 | Stop reason: SDUPTHER

## 2018-08-15 NOTE — TELEPHONE ENCOUNTER
Was the patient seen in the last year in this department? Yes    Does patient have an active prescription for medications requested? No     Received Request Via: Pharmacy    Pt met protocol?: Yes     Last OV 07/2018

## 2018-08-15 NOTE — TELEPHONE ENCOUNTER
[facility] and was last WNL 8/18. Will send 3 months to pharmacy.

## 2018-08-17 RX ORDER — ATORVASTATIN CALCIUM 40 MG/1
TABLET, FILM COATED ORAL
OUTPATIENT
Start: 2018-08-17

## 2018-08-17 NOTE — TELEPHONE ENCOUNTER
Was the patient seen in the last year in this department? Yes    Does patient have an active prescription for medications requested? No     Received Request Via: Pharmacy      Pt met protocol?: Yes, OV last month, Atorvastatin too soon   Lab Results   Component Value Date/Time    HBA1C 10.3 (H) 06/14/2018 06:23 AM

## 2018-08-18 NOTE — TELEPHONE ENCOUNTER
Patient has recently been seen by PCP within the last 6 months per protocol (7/18). Will refill medications for 6 months.  Lab Results   Component Value Date/Time    HBA1C 10.3 (H) 06/14/2018 06:23 AM      Lab Results   Component Value Date/Time    MALBCRT 879 (H) 06/14/2018 06:22 AM    MICROALBUR 69.7 06/14/2018 06:22 AM      Lab Results   Component Value Date/Time    ALKPHOSPHAT 84 06/14/2018 06:23 AM    ASTSGOT 37 06/14/2018 06:23 AM    ALTSGPT 60 (H) 06/14/2018 06:23 AM    TBILIRUBIN 0.7 06/14/2018 06:23 AM

## 2018-08-20 ENCOUNTER — ANTICOAGULATION VISIT (OUTPATIENT)
Dept: MEDICAL GROUP | Facility: PHYSICIAN GROUP | Age: 73
End: 2018-08-20
Payer: MEDICARE

## 2018-08-20 DIAGNOSIS — Z79.01 CHRONIC ANTICOAGULATION: Primary | ICD-10-CM

## 2018-08-20 DIAGNOSIS — Z95.2 HISTORY OF MITRAL VALVE REPLACEMENT WITH MECHANICAL VALVE: ICD-10-CM

## 2018-08-20 LAB — INR PPP: 2.5 (ref 2–3.5)

## 2018-08-20 PROCEDURE — 99999 PR NO CHARGE: CPT | Performed by: INTERNAL MEDICINE

## 2018-08-20 PROCEDURE — 85610 PROTHROMBIN TIME: CPT | Performed by: INTERNAL MEDICINE

## 2018-08-20 NOTE — PROGRESS NOTES
Anticoagulation Summary  As of 8/20/2018    INR goal:   2.5-3.5   TTR:   26.6 % (1.4 y)   Today's INR:   2.5   Warfarin maintenance plan:   7.5 mg (2.5 mg x 3) on Mon, Wed, Fri; 5 mg (2.5 mg x 2) all other days   Weekly warfarin total:   42.5 mg   Plan last modified:   Chase Baldwin, PharmD (6/12/2018)   Next INR check:   8/31/2018   Target end date:   Indefinite    Indications    Chronic anticoagulation [Z79.01]  History of mitral valve replacement with mechanical valve [Z95.2] [Z95.2]             Anticoagulation Episode Summary     INR check location:       Preferred lab:       Send INR reminders to:       Comments:         Anticoagulation Care Providers     Provider Role Specialty Phone number    Declan Eaton M.D. Referring Internal Medicine [phone]    [facility] Responsible  [phone]        Anticoagulation Patient Findings  Patient Findings     Negatives:   Signs/symptoms of thrombosis, Signs/symptoms of bleeding, Laboratory test error suspected, Change in health, Change in alcohol use, Change in activity, Upcoming invasive procedure, Emergency department visit, Upcoming dental procedure, Missed doses, Extra doses, Change in medications, Change in diet/appetite, Hospital admission, Bruising, Other complaints        HPI:   Carlos Rivera seen in clinic today, on anticoagulation therapy with warfarin for stroke prevention due to history of mechanical mitral valve replacement.    Patient's previous INR was therapeutic at 2.8 on 8-13-18, at which time patient was instructed to continue with current warfarin regimen.  He returns to clinic today to recheck INR to ensure it is therapeutic and thus preventing possible clotting and/or bleeding/bruising complications.    CHADS-VASc = n/a  (unadjusted ischemic stroke risk/year:  n/a)    Does patient have any changes to current medical/health status since last appt (Y/N):  NO  Does patient have any signs/symptoms of bleeding and/or thrombosis  "since the last appt (Y/N):  NO  Does patient have any interval changes to diet or medications since last appt (Y/N):  NO  Are there any complications or cost restrictions with current therapy (Y/N):  NO      Vitals:  BP check declined at today's visit    Weight  declines   Height   5' 7\"     Asssessment:      INR remains therapeutic at 2.5, therefore decreasing patient's risk of stroke and/or bleeding complications.   Reason(s) for out of range INR today:  n/a      Plan:  Pt is to continue with current warfarin dosing regimen in order to maintain INR in therapeutic range.     Follow up:  Because warfarin is a high risk medication and current CHEST guidelines recommend regular monitoring intervals (few days up to 12 weeks), will have patient return to clinic in 1.5 weeks to recheck INR.    Casey Birmingham, PharmD    "

## 2018-08-22 ENCOUNTER — APPOINTMENT (OUTPATIENT)
Dept: BEHAVIORAL HEALTH | Facility: PHYSICIAN GROUP | Age: 73
End: 2018-08-22
Payer: MEDICARE

## 2018-08-27 ENCOUNTER — ANTICOAGULATION VISIT (OUTPATIENT)
Dept: MEDICAL GROUP | Facility: PHYSICIAN GROUP | Age: 73
End: 2018-08-27
Payer: MEDICARE

## 2018-08-27 DIAGNOSIS — Z95.2 HISTORY OF MITRAL VALVE REPLACEMENT WITH MECHANICAL VALVE: ICD-10-CM

## 2018-08-27 DIAGNOSIS — Z79.01 CHRONIC ANTICOAGULATION: Primary | ICD-10-CM

## 2018-08-27 LAB — INR PPP: 2.9 (ref 2–3.5)

## 2018-08-27 PROCEDURE — 99999 PR NO CHARGE: CPT | Performed by: NURSE PRACTITIONER

## 2018-08-27 PROCEDURE — 85610 PROTHROMBIN TIME: CPT | Performed by: NURSE PRACTITIONER

## 2018-08-27 NOTE — PROGRESS NOTES
Anticoagulation Summary  As of 8/27/2018    INR goal:   2.5-3.5   TTR:   27.5 % (1.4 y)   Today's INR:   2.9   Warfarin maintenance plan:   7.5 mg (2.5 mg x 3) on Mon, Wed, Fri; 5 mg (2.5 mg x 2) all other days   Weekly warfarin total:   42.5 mg   Plan last modified:   Chase Baldwin, PharmD (6/12/2018)   Next INR check:   9/10/2018   Target end date:   Indefinite    Indications    Chronic anticoagulation [Z79.01]  History of mitral valve replacement with mechanical valve [Z95.2] [Z95.2]             Anticoagulation Episode Summary     INR check location:       Preferred lab:       Send INR reminders to:       Comments:         Anticoagulation Care Providers     Provider Role Specialty Phone number    Declan Eaton M.D. Referring Internal Medicine [phone]    [facility] Responsible  [phone]        Anticoagulation Patient Findings  Patient Findings     Negatives:   Signs/symptoms of thrombosis, Signs/symptoms of bleeding, Laboratory test error suspected, Change in health, Change in alcohol use, Change in activity, Upcoming invasive procedure, Emergency department visit, Upcoming dental procedure, Missed doses, Extra doses, Change in medications, Change in diet/appetite, Hospital admission, Bruising, Other complaints        HPI:   Carlos Rivera seen in clinic today, on anticoagulation therapy with warfarin for stroke prevention due to history of mechanical mitral valve replacement.    Patient's previous INR was therapeutic at 2.5 on 8-20-18, at which time patient was instructed to continue with current warfarin regimen.  He returns to clinic today to recheck INR to ensure it is therapeutic and thus preventing possible clotting and/or bleeding/bruising complications.    CHADS-VASc = n/a  (unadjusted ischemic stroke risk/year:  n/a)    Does patient have any changes to current medical/health status since last appt (Y/N):  NO  Does patient have any signs/symptoms of bleeding and/or thrombosis  "since the last appt (Y/N):  NO  Does patient have any interval changes to diet or medications since last appt (Y/N):  NO  Are there any complications or cost restrictions with current therapy (Y/N):  NO      Vitals:  BP check declined, he had a couple cups of coffee before arriving and would rather not test today    Weight  declines   Height   5' 7\"     Asssessment:      INR remains therapeutic at 2.9, therefore decreasing patient's risk of stroke and/or bleeding complications.   Reason(s) for out of range INR today:  n/a      Plan:  Pt is to continue with current warfarin dosing regimen in order to maintain INR in therapeutic range.     Follow up:  Because warfarin is a high risk medication and current CHEST guidelines recommend regular monitoring intervals (few days up to 12 weeks), will have patient return to clinic in 2 weeks to recheck INR.    Casey Birmingham, PharmD    "

## 2018-09-06 NOTE — PROGRESS NOTES
Outcome: Left Message    Please transfer to Patient Outreach Team at [phone] when patient returns call.    Attempt # 1

## 2018-09-10 NOTE — PROGRESS NOTES
Outcome: Left Message    Please transfer to Patient Outreach Team at [phone] when patient returns call.    Attempt # 2

## 2018-09-11 ENCOUNTER — ANTICOAGULATION VISIT (OUTPATIENT)
Dept: MEDICAL GROUP | Facility: PHYSICIAN GROUP | Age: 73
End: 2018-09-11
Payer: MEDICARE

## 2018-09-11 DIAGNOSIS — Z79.01 CHRONIC ANTICOAGULATION: Primary | ICD-10-CM

## 2018-09-11 DIAGNOSIS — Z95.2 HISTORY OF MITRAL VALVE REPLACEMENT WITH MECHANICAL VALVE: ICD-10-CM

## 2018-09-11 LAB — INR PPP: 3.6 (ref 2–3.5)

## 2018-09-11 PROCEDURE — 85610 PROTHROMBIN TIME: CPT | Performed by: FAMILY MEDICINE

## 2018-09-11 PROCEDURE — 99211 OFF/OP EST MAY X REQ PHY/QHP: CPT | Performed by: FAMILY MEDICINE

## 2018-09-11 NOTE — PROGRESS NOTES
Anticoagulation Summary  As of 9/11/2018    INR goal:   2.5-3.5   TTR:   29.2 % (1.5 y)   Today's INR:   3.6!   Warfarin maintenance plan:   7.5 mg (2.5 mg x 3) on Mon, Wed, Fri; 5 mg (2.5 mg x 2) all other days   Weekly warfarin total:   42.5 mg   Plan last modified:   Chase Baldwin, PharmD (6/12/2018)   Next INR check:   9/17/2018   Target end date:   Indefinite    Indications    Chronic anticoagulation [Z79.01]  History of mitral valve replacement with mechanical valve [Z95.2] [Z95.2]             Anticoagulation Episode Summary     INR check location:       Preferred lab:       Send INR reminders to:       Comments:         Anticoagulation Care Providers     Provider Role Specialty Phone number    Declan Eaton M.D. Referring Internal Medicine [phone]    [facility] Responsible  [phone]        Anticoagulation Patient Findings  Patient Findings     Negatives:   Signs/symptoms of thrombosis, Signs/symptoms of bleeding, Laboratory test error suspected, Change in health, Change in alcohol use, Change in activity, Upcoming invasive procedure, Emergency department visit, Upcoming dental procedure, Missed doses, Extra doses, Change in medications, Change in diet/appetite, Hospital admission, Bruising, Other complaints        HPI:   Carlos Rivera seen in clinic today, on anticoagulation therapy with warfarin for stroke prevention due to history of mechanical mitral valve replacement.    Patient's previous INR was therapeutic at 2.9 on 8-27-18, at which time patient was instructed to continue with current warfarin regimen.  He returns to clinic today to recheck INR to ensure it is therapeutic and thus preventing possible clotting and/or bleeding/bruising complications.    CHADS-VASc = n/a  (unadjusted ischemic stroke risk/year:  n/a)    Does patient have any changes to current medical/health status since last appt (Y/N):  NO  Does patient have any signs/symptoms of bleeding and/or  "thrombosis since the last appt (Y/N):  NO  Does patient have any interval changes to diet or medications since last appt (Y/N):  NO  Are there any complications or cost restrictions with current therapy (Y/N):  NO      Vitals:  BP check declined at today's visit    Weight  Scale unavailable    Height   5' 7\"     Asssessment:      INR slightly supratherapeutic at 3.6, therefore increasing patient's risk of bleeding complications due to warfarin.   Reason(s) for out of range INR today:  No identifiable causes for high INR.      Plan:  Patient is unsure of dosing from this past week, will have him decrease today's dose to 2.5mg, then resume currently documented weekly warfarin regimen and have back for closer follow up.     Follow up:  Because warfarin is a high risk medication and current CHEST guidelines recommend regular monitoring intervals (few days up to 12 weeks), will have patient return to clinic in 1 weeks to recheck INR.    Casey Birmingham, PharmD    "

## 2018-09-12 ENCOUNTER — PATIENT OUTREACH (OUTPATIENT)
Dept: HEALTH INFORMATION MANAGEMENT | Facility: OTHER | Age: 73
End: 2018-09-12

## 2018-09-12 NOTE — PROGRESS NOTES
Received referral from pharmacist Celena for medication review/ possible confusion with diabetic regimen. Outbound call to Jevon LAWRENCE with my contact information and request for a return call. Will attempt to reach patient another date/ time.     Sabrina Lynch, BethanyD

## 2018-09-13 ENCOUNTER — OFFICE VISIT (OUTPATIENT)
Dept: BEHAVIORAL HEALTH | Facility: PHYSICIAN GROUP | Age: 73
End: 2018-09-13
Payer: MEDICARE

## 2018-09-13 DIAGNOSIS — F32.A DEPRESSION, UNSPECIFIED DEPRESSION TYPE: ICD-10-CM

## 2018-09-13 PROCEDURE — 90834 PSYTX W PT 45 MINUTES: CPT | Performed by: SOCIAL WORKER

## 2018-09-14 NOTE — BH THERAPY
[facility]  Therapy Progress Note    Patient Name: Carlos Rivera  Patient MRN: [id number]  Today's Date: 9/14/2018     Type of session:Individual psychotherapy  Length of session: 45 minutes  Persons in attendance:Patient and Spouse/Partner    Subjective/New Info: Met with patient who brought wife Comfort as we had discussed previously as means of providing support for patient and his support system.  Comfort describes her distress related to 's increased irritability and anger outbursts over the past few years.  She expressed understanding his having memory problems though that also is causing increased difficulty in their trying to problem solve or make plans to address areas of concern.  Both parties agree they would like marriage counseling for assistance with establishing a more settled home environment.  Will continue to work with patient accepting changes that he is facing due to altered physical abilities and limitations.    Objective/Observations:   Participation: Active verbal participation, Engaged and Open to feedback   Grooming: Casual and Neat   Cognition: Alert and Fully Oriented   Eye contact: Fair   Mood: Depressed and Anxious   Affect: Blunted and Congruent with content   Thought process: Goal-directed   Speech: Rate within normal limits and Volume within normal limits   Other:     Diagnoses:   1. Depression, unspecified depression type         Current risk:   SUICIDE: Low   Homicide: Low   Self-harm: Low   Relapse: Low   Other:    Safety Plan reviewed? Not Indicated   If evidence of imminent risk is present, intervention/plan:     Therapeutic Intervention(s): Cognitive modification, Conflict clarification, Goal-setting, Interpersonal effectiveness skills and Maladaptive behavior addressed    Treatment Goal(s)/Objective(s) addressed:   Identify goals/values and cultivate a meaningful life, learn to be more emotionally flexible/resilient in times of stress/challenges, process and  reduce the effects of past trauma on life, functioning, and sense of self.      Progress toward Treatment Goals: No change    Plan:  - Next appointment scheduled:  10/4/2018  - Patient is in agreement with the above plan:  YES    Corinne G. Taylor, L.C.S.W.  9/14/2018

## 2018-09-17 ENCOUNTER — ANTICOAGULATION VISIT (OUTPATIENT)
Dept: MEDICAL GROUP | Facility: PHYSICIAN GROUP | Age: 73
End: 2018-09-17
Payer: MEDICARE

## 2018-09-17 DIAGNOSIS — Z95.2 HISTORY OF MITRAL VALVE REPLACEMENT WITH MECHANICAL VALVE: ICD-10-CM

## 2018-09-17 DIAGNOSIS — Z79.01 CHRONIC ANTICOAGULATION: Primary | ICD-10-CM

## 2018-09-17 LAB — INR PPP: 3 (ref 2–3.5)

## 2018-09-17 PROCEDURE — 99999 PR NO CHARGE: CPT | Performed by: INTERNAL MEDICINE

## 2018-09-17 PROCEDURE — 85610 PROTHROMBIN TIME: CPT | Performed by: INTERNAL MEDICINE

## 2018-09-17 NOTE — PROGRESS NOTES
Anticoagulation Summary  As of 9/17/2018    INR goal:   2.5-3.5   TTR:   29.8 % (1.5 y)   Today's INR:   3.0   Warfarin maintenance plan:   7.5 mg (2.5 mg x 3) on Mon, Wed, Fri; 5 mg (2.5 mg x 2) all other days   Weekly warfarin total:   42.5 mg   Plan last modified:   Chase Baldwin, PharmD (6/12/2018)   Next INR check:   10/1/2018   Target end date:   Indefinite    Indications    Chronic anticoagulation [Z79.01]  History of mitral valve replacement with mechanical valve [Z95.2] [Z95.2]             Anticoagulation Episode Summary     INR check location:       Preferred lab:       Send INR reminders to:       Comments:         Anticoagulation Care Providers     Provider Role Specialty Phone number    Declan Eaton M.D. Referring Internal Medicine [phone]    [facility] Responsible  [phone]        Anticoagulation Patient Findings  Patient Findings     Negatives:   Signs/symptoms of thrombosis, Signs/symptoms of bleeding, Laboratory test error suspected, Change in health, Change in alcohol use, Change in activity, Upcoming invasive procedure, Emergency department visit, Upcoming dental procedure, Missed doses, Extra doses, Change in medications, Change in diet/appetite, Hospital admission, Bruising, Other complaints        HPI:   Carlos Rivera seen in clinic today, on anticoagulation therapy with warfarin for stroke prevention due to history of mechanical mitral valve replacement.    Patient's previous INR was supratherapeutic at 3.6 on 9-11-18, at which time patient was instructed to decrease one dose, then resume current warfarin regimen.  He returns to clinic today to recheck INR to ensure it is therapeutic and thus preventing possible clotting and/or bleeding/bruising complications.    CHADS-VASc = n/a  (unadjusted ischemic stroke risk/year:  n/a)    Does patient have any changes to current medical/health status since last appt (Y/N):  NO  Does patient have any signs/symptoms of  "bleeding and/or thrombosis since the last appt (Y/N):  NO  Does patient have any interval changes to diet or medications since last appt (Y/N):  NO  Are there any complications or cost restrictions with current therapy (Y/N):  NO      Vitals:  BP check declined at today's visit    Weight  declines   Height   5' 7\"     Asssessment:      INR therapeutic at 3.0, therefore decreasing patient's risk of stroke and/or bleeding complications.   Reason(s) for out of range INR today:  n/a      Plan:  Pt is to continue with current warfarin dosing regimen in order to maintain INR in therapeutic range.     Follow up:  Because warfarin is a high risk medication and current CHEST guidelines recommend regular monitoring intervals (few days up to 12 weeks), will have patient return to clinic in 2 weeks to recheck INR.    Casey Birmingham, PharmD    "

## 2018-09-19 ENCOUNTER — PATIENT OUTREACH (OUTPATIENT)
Dept: HEALTH INFORMATION MANAGEMENT | Facility: OTHER | Age: 73
End: 2018-09-19

## 2018-09-19 NOTE — PROGRESS NOTES
2nd attempt to reach Carlos for medication review. Unable to leave VM - mailbox full. Will attempt to reach patient another date/ time.     Sabrina Lynch, PharmD

## 2018-09-20 ENCOUNTER — PATIENT OUTREACH (OUTPATIENT)
Dept: HEALTH INFORMATION MANAGEMENT | Facility: OTHER | Age: 73
End: 2018-09-20

## 2018-09-20 NOTE — PROGRESS NOTES
3rd attempt to reach patient for medication review. Unable to leave VM - mailbox full. Will send a letter via "[employer] describing our services. Will follow-up when patient makes contact.     Sabrina Lynch, BethanyD    
lack of knowledge regarding managing health concern

## 2018-09-20 NOTE — LETTER
September 20, 2018        Carlos Rivera  [address] [address]        Dear Carlos:    As a clinical pharmacist with [facility] Team, I am a partner with your [facility] Providers to ensure that you are receiving optimal medication therapy and to answer any question that you have concerning your medications. This program is at no cost to you as this is a part of [facility]’s ongoing commitment to serve the people of our community.    By participating in this review, I will:     • Review your medications, vitamins and other over-the-counter items.    • Assure there are no harmful interactions with your medications.  • Lower your out-of-pocket prescription costs, if possible.   • Communicate medication concerns between your healthcare specialists.  • Provide you with wellness, healthy lifestyle, and disease prevention strategies.  • Refer you to a nurse or  if needed.  • Consider remote health monitoring if you have high blood pressure.  • Answer any questions that you may have about your medications.    Please contact me at [phone] to discuss your medications. I am available Monday through Friday from 8:00 a.m. to 5:00 p.m. I look forward to speaking with you soon.      Sincerely,        Sabrina Lynch, Adriane    Electronically Signed

## 2018-10-01 ENCOUNTER — ANTICOAGULATION VISIT (OUTPATIENT)
Dept: MEDICAL GROUP | Facility: PHYSICIAN GROUP | Age: 73
End: 2018-10-01
Payer: MEDICARE

## 2018-10-01 DIAGNOSIS — Z95.2 HISTORY OF MITRAL VALVE REPLACEMENT WITH MECHANICAL VALVE: ICD-10-CM

## 2018-10-01 DIAGNOSIS — Z79.01 CHRONIC ANTICOAGULATION: Primary | ICD-10-CM

## 2018-10-01 LAB — INR PPP: 3.5 (ref 2–3.5)

## 2018-10-01 PROCEDURE — 85610 PROTHROMBIN TIME: CPT | Performed by: INTERNAL MEDICINE

## 2018-10-01 PROCEDURE — 99999 PR NO CHARGE: CPT | Performed by: INTERNAL MEDICINE

## 2018-10-01 NOTE — PROGRESS NOTES
Anticoagulation Summary  As of 10/1/2018    INR goal:   2.5-3.5   TTR:   31.5 % (1.5 y)   Today's INR:   3.5   Warfarin maintenance plan:   7.5 mg (2.5 mg x 3) on Mon, Wed, Fri; 5 mg (2.5 mg x 2) all other days   Weekly warfarin total:   42.5 mg   Plan last modified:   Chase Baldwin, PharmD (6/12/2018)   Next INR check:   10/22/2018   Target end date:   Indefinite    Indications    Chronic anticoagulation [Z79.01]  History of mitral valve replacement with mechanical valve [Z95.2] [Z95.2]             Anticoagulation Episode Summary     INR check location:       Preferred lab:       Send INR reminders to:       Comments:         Anticoagulation Care Providers     Provider Role Specialty Phone number    Declan Eaton M.D. Referring Internal Medicine [phone]    [facility] Responsible  [phone]        Anticoagulation Patient Findings  Patient Findings     Negatives:   Signs/symptoms of thrombosis, Signs/symptoms of bleeding, Laboratory test error suspected, Change in health, Change in alcohol use, Change in activity, Upcoming invasive procedure, Emergency department visit, Upcoming dental procedure, Missed doses, Extra doses, Change in medications, Change in diet/appetite, Hospital admission, Bruising, Other complaints        HPI:   Carlos Rivera seen in clinic today, on anticoagulation therapy with warfarin for stroke prevention due to history of mechanical mitral valve replacment.    Patient's previous INR was therapeutic at 3.0 on 9-17-18, at which time patient was instructed to continue with current warfarin regimen.  He returns to clinic today to recheck INR to ensure it is therapeutic and thus preventing possible clotting and/or bleeding/bruising complications.    CHADS-VASc = n/a  (unadjusted ischemic stroke risk/year:  n/a)    Does patient have any changes to current medical/health status since last appt (Y/N):  NO  Does patient have any signs/symptoms of bleeding and/or thrombosis  "since the last appt (Y/N):  NO  Does patient have any interval changes to diet or medications since last appt (Y/N):  NO  Are there any complications or cost restrictions with current therapy (Y/N):  NO      Vitals:  BP check declined at today's visit     Weight  declines   Height   5' 7\"     Asssessment:      INR remains therapeutic at 3.5, therefore decreasing patient's risk of stroke and/or bleeding complications.   Reason(s) for out of range INR today:  n/a      Plan:  Pt is to continue with current warfarin dosing regimen in order to maintain INR in therapeutic range.       Follow up:  Because warfarin is a high risk medication and current CHEST guidelines recommend regular monitoring intervals (few days up to 12 weeks), will have patient return to clinic in 3 weeks to recheck INR.    Casey Birmingham, PharmD    "

## 2018-10-04 ENCOUNTER — APPOINTMENT (OUTPATIENT)
Dept: BEHAVIORAL HEALTH | Facility: CLINIC | Age: 73
End: 2018-10-04
Payer: MEDICARE

## 2018-10-04 RX ORDER — ATORVASTATIN CALCIUM 40 MG/1
TABLET, FILM COATED ORAL
Qty: 90 TAB | Refills: 1 | Status: SHIPPED | OUTPATIENT
Start: 2018-10-04 | End: 2019-02-25 | Stop reason: SDUPTHER

## 2018-10-04 NOTE — TELEPHONE ENCOUNTER
Pt has had OV within the 12 month protocol and lipid panel is current. 6 month supply sent to pharmacy.   Lab Results   Component Value Date/Time    CHOLSTRLTOT 142 06/14/2018 06:23 AM    LDL 59 06/14/2018 06:23 AM    HDL 47 06/14/2018 06:23 AM    TRIGLYCERIDE 178 (H) 06/14/2018 06:23 AM       Lab Results   Component Value Date/Time    SODIUM 139 06/14/2018 06:23 AM    POTASSIUM 4.8 06/14/2018 06:23 AM    CHLORIDE 102 06/14/2018 06:23 AM    CO2 27 06/14/2018 06:23 AM    GLUCOSE 195 (H) 06/14/2018 06:23 AM    BUN 30 (H) 06/14/2018 06:23 AM    CREATININE 1.16 06/14/2018 06:23 AM     Lab Results   Component Value Date/Time    ALKPHOSPHAT 84 06/14/2018 06:23 AM    ASTSGOT 37 06/14/2018 06:23 AM    ALTSGPT 60 (H) 06/14/2018 06:23 AM    TBILIRUBIN 0.7 06/14/2018 06:23 AM

## 2018-10-04 NOTE — TELEPHONE ENCOUNTER
Was the patient seen in the last year in this department? Yes    Does patient have an active prescription for medications requested? No     Received Request Via: Pharmacy    Pt met protocol?: Yes     Last OV 07/2018    Lab Results  Component Value Date/Time   CHOLSTRLTOT 142 06/14/2018 0623   TRIGLYCERIDE 178 (H) 06/14/2018 0623   HDL 47 06/14/2018 0623   LDL 59 06/14/2018 0623

## 2018-10-05 ENCOUNTER — APPOINTMENT (OUTPATIENT)
Dept: BEHAVIORAL HEALTH | Facility: CLINIC | Age: 73
End: 2018-10-05
Payer: MEDICARE

## 2018-10-18 ENCOUNTER — OFFICE VISIT (OUTPATIENT)
Dept: BEHAVIORAL HEALTH | Facility: CLINIC | Age: 73
End: 2018-10-18
Payer: MEDICARE

## 2018-10-18 ENCOUNTER — PATIENT MESSAGE (OUTPATIENT)
Dept: MEDICAL GROUP | Facility: PHYSICIAN GROUP | Age: 73
End: 2018-10-18

## 2018-10-18 DIAGNOSIS — F32.A DEPRESSION, UNSPECIFIED DEPRESSION TYPE: ICD-10-CM

## 2018-10-18 PROCEDURE — 90834 PSYTX W PT 45 MINUTES: CPT | Performed by: SOCIAL WORKER

## 2018-10-19 NOTE — BH THERAPY
[facility]  Therapy Progress Note    Patient Name: Carlos Rivera  Patient MRN: [id number]  Today's Date: 10/19/2018     Type of session:Individual psychotherapy  Length of session: 45 minutes  Persons in attendance:Patient    Subjective/New Info: Met with Carlos who presents on time for scheduled appointment.  Describes improved mood noting he has been going to the gym and doing exercises recommended by physical therapist that seem to be helping to improve balance and gait.  Also notes increased motivation to engage in other activities including the restoration of [place] car given to him by his brother.  States son has offered to go to [facility] and assess the situation for bringing patient's boat back to [place].  Discussed couples counseling appointment scheduled for Monday and encouraged patient to consider topics he and wife Comfort may want to address. Reinforced discussion with patient about adjusting and adapting activities to current abilities such as bike riding and plans for sailing his boat.      Objective/Observations:   Participation: Active verbal participation, Attentive, Engaged and Open to feedback   Grooming: Neat   Cognition: Alert and Fully Oriented   Eye contact: Good   Mood: Euthymic   Affect: Flexible and Congruent with content   Thought process: Goal-directed   Speech: Rate within normal limits and Volume within normal limits   Other:     Diagnoses:   1. Depression, unspecified depression type         Current risk:   SUICIDE: Low   Homicide: Low   Self-harm: Low   Relapse: Low   Other:    Safety Plan reviewed? Not Indicated   If evidence of imminent risk is present, intervention/plan:     Therapeutic Intervention(s): Cognitive modification, Goal-setting, Interpersonal effectiveness skills and Positive behavior reinforced    Treatment Goal(s)/Objective(s) addressed:   Identify goals/values and cultivate a meaningful life, learn to be more emotionally flexible/resilient in times of  stress/challenges, process and reduce the effects of past trauma on life, functioning, and sense of self.   Review and discuss ACT concepts identifying and engage in activities consistent with one’s values     Progress toward Treatment Goals: Mild improvement    Plan:  - Next appointment scheduled:  10/22/2018  - Patient is in agreement with the above plan:  YES    Corinne G. Taylor, L.C.S.W.  10/19/2018

## 2018-10-22 ENCOUNTER — OFFICE VISIT (OUTPATIENT)
Dept: BEHAVIORAL HEALTH | Facility: CLINIC | Age: 73
End: 2018-10-22
Payer: MEDICARE

## 2018-10-22 ENCOUNTER — ANTICOAGULATION VISIT (OUTPATIENT)
Dept: MEDICAL GROUP | Facility: PHYSICIAN GROUP | Age: 73
End: 2018-10-22
Payer: MEDICARE

## 2018-10-22 DIAGNOSIS — F32.A DEPRESSION, UNSPECIFIED DEPRESSION TYPE: ICD-10-CM

## 2018-10-22 DIAGNOSIS — Z79.01 CHRONIC ANTICOAGULATION: Primary | ICD-10-CM

## 2018-10-22 DIAGNOSIS — Z95.2 HISTORY OF MITRAL VALVE REPLACEMENT WITH MECHANICAL VALVE: ICD-10-CM

## 2018-10-22 DIAGNOSIS — Z63.0 MARITAL CONFLICT: ICD-10-CM

## 2018-10-22 LAB — INR PPP: 1.7 (ref 2–3.5)

## 2018-10-22 PROCEDURE — 99211 OFF/OP EST MAY X REQ PHY/QHP: CPT | Performed by: INTERNAL MEDICINE

## 2018-10-22 PROCEDURE — 85610 PROTHROMBIN TIME: CPT | Performed by: INTERNAL MEDICINE

## 2018-10-22 PROCEDURE — 90834 PSYTX W PT 45 MINUTES: CPT | Performed by: SOCIAL WORKER

## 2018-10-22 SDOH — SOCIAL STABILITY - SOCIAL INSECURITY: PROBLEMS IN RELATIONSHIP WITH SPOUSE OR PARTNER: Z63.0

## 2018-10-22 NOTE — BH THERAPY
" [facility]  Therapy Progress Note    Patient Name: Carlos Rivera  Patient MRN: [id number]  Today's Date: 10/22/2018     Type of session:Couples therapy  Length of session: 45 minutes  Persons in attendance:Patient and Spouse/Partner    Subjective/New Info: Pt here w/wife, Comfort for first couples session.  Both partners say they would like to argue less.  Carlos states he and Comfort argue over minor things, but they are unable to come to a resolution.  Comfort says that Carlos frequently has \"meltdowns\", where he becomes loud and angry.  She believes he expects her to resolve these issues, but says he is critical of her efforts.   She says she feels like her 's caregiver. When asked their thoughts on what has kept them  x 50 years, Carlos reminisces to a time many years ago when he would be away from the family on fishing trips, and Comfort would be happy when he returned.  Comfort says her  used to be her best friend.  Comfort identifies her goal for this therapy as \"patience.\"  Carlos believes the relationship would improve if they took more vacations.  Couple agrees to return for f/u visits as well as continue w/their individual therapies.    Objective/Observations:   Participation: Active verbal participation   Grooming: Casual and Neat   Cognition: Fully Oriented   Eye contact: Good   Mood: Euthymic-Carlos, Anxious-Comfort   Affect: Congruent with content   Thought process: Goal-directed   Speech: Rate within normal limits and Volume within normal limits   Other:     Diagnoses:   1. Depression, unspecified depression type    2. Marital conflict         Current risk:   SUICIDE: Low   Homicide: Low   Self-harm: Low   Relapse: Not applicable   Other:    Safety Plan reviewed? Not Indicated   If evidence of imminent risk is present, intervention/plan:     Therapeutic Intervention(s): Develop/modify treatment plan and Establish rapport    Treatment Goal(s)/Objective(s) addressed: Explore " goals for therapy     Progress toward Treatment Goals: n/a    Plan:  - Continue Individual therapy and Couples therapy    Avril Lao L.C.S.W.  10/22/2018

## 2018-10-22 NOTE — PROGRESS NOTES
Anticoagulation Summary  As of 10/22/2018    INR goal:   2.5-3.5   TTR:   32.4 % (1.6 y)   Today's INR:   1.7!   Warfarin maintenance plan:   7.5 mg (2.5 mg x 3) on Mon, Wed, Fri; 5 mg (2.5 mg x 2) all other days   Weekly warfarin total:   42.5 mg   Plan last modified:   Chase Baldwin, PharmD (6/12/2018)   Next INR check:   11/5/2018   Target end date:   Indefinite    Indications    Chronic anticoagulation [Z79.01]  History of mitral valve replacement with mechanical valve [Z95.2] [Z95.2]             Anticoagulation Episode Summary     INR check location:       Preferred lab:       Send INR reminders to:       Comments:         Anticoagulation Care Providers     Provider Role Specialty Phone number    Declan Eaton M.D. Referring Internal Medicine [phone]    [facility] Responsible  [phone]        Anticoagulation Patient Findings  Patient Findings     Negatives:   Signs/symptoms of thrombosis, Signs/symptoms of bleeding, Laboratory test error suspected, Change in health, Change in alcohol use, Change in activity, Upcoming invasive procedure, Emergency department visit, Upcoming dental procedure, Missed doses, Extra doses, Change in medications, Change in diet/appetite, Hospital admission, Bruising, Other complaints        HPI:   Carlos Rivera seen in clinic today, on anticoagulation therapy with warfarin for stroke prevention due to history of mechanical mitral valve replacement.    Patient's previous INR was therapeutic at 3.5 on 10-1-18, at which time patient was instructed to continue with current warfarin regimen.  He returns to clinic today to recheck INR to ensure it is therapeutic and thus preventing possible clotting and/or bleeding/bruising complications.    CHADS-VASc = n/a  (unadjusted ischemic stroke risk/year:  n/a)    Does patient have any changes to current medical/health status since last appt (Y/N):  NO  Does patient have any signs/symptoms of bleeding and/or  "thrombosis since the last appt (Y/N):  NO  Does patient have any interval changes to diet or medications since last appt (Y/N):  More greens than usual this past weekend  Are there any complications or cost restrictions with current therapy (Y/N):  NO      Vitals:  BP check declined at today's visit    Weight  declines   Height   5' 7\"     Asssessment:      INR subtherapeutic at 1.7, therefore increasing patient's risk of stroke.   Reason(s) for out of range INR today:  More greens this past week, took lower dose than normal one day       Plan:  Instructed patient to bolus with 10mg X 1, then resume current warfarin regimen in order to bring INR back to therapeutic range.     Follow up:  Because warfarin is a high risk medication and current CHEST guidelines recommend regular monitoring intervals (few days up to 12 weeks), will have patient return to clinic in 2 weeks to recheck INR.    Casey Birmingham, PharmD    "

## 2018-10-25 ENCOUNTER — TELEPHONE (OUTPATIENT)
Dept: MEDICAL GROUP | Facility: PHYSICIAN GROUP | Age: 73
End: 2018-10-25

## 2018-10-25 DIAGNOSIS — Z23 NEED FOR VACCINATION: ICD-10-CM

## 2018-10-25 NOTE — TELEPHONE ENCOUNTER
1. Caller Name:                                          Call Back Number:       Patient approves a detailed voicemail message: N\A    Patient is on the MA Schedule 10/29/18 for flu, Hep B vaccine/injection.    SPECIFIC Action To Be Taken: Orders pending, please sign.

## 2018-11-01 ENCOUNTER — OFFICE VISIT (OUTPATIENT)
Dept: BEHAVIORAL HEALTH | Facility: CLINIC | Age: 73
End: 2018-11-01
Payer: MEDICARE

## 2018-11-01 DIAGNOSIS — F33.0 MILD EPISODE OF RECURRENT MAJOR DEPRESSIVE DISORDER (HCC): ICD-10-CM

## 2018-11-01 PROCEDURE — 90834 PSYTX W PT 45 MINUTES: CPT | Performed by: SOCIAL WORKER
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
## 2018-11-05 ENCOUNTER — ANTICOAGULATION VISIT (OUTPATIENT)
Dept: MEDICAL GROUP | Facility: PHYSICIAN GROUP | Age: 73
End: 2018-11-05
Payer: MEDICARE

## 2018-11-05 DIAGNOSIS — Z95.2 HISTORY OF MITRAL VALVE REPLACEMENT WITH MECHANICAL VALVE: ICD-10-CM

## 2018-11-05 DIAGNOSIS — Z79.01 CHRONIC ANTICOAGULATION: Primary | ICD-10-CM

## 2018-11-05 DIAGNOSIS — Z23 NEED FOR VACCINATION: ICD-10-CM

## 2018-11-05 LAB — INR PPP: 1.9 (ref 2–3.5)

## 2018-11-05 PROCEDURE — 85610 PROTHROMBIN TIME: CPT | Performed by: INTERNAL MEDICINE

## 2018-11-05 PROCEDURE — 90662 IIV NO PRSV INCREASED AG IM: CPT | Performed by: INTERNAL MEDICINE

## 2018-11-05 PROCEDURE — G0008 ADMIN INFLUENZA VIRUS VAC: HCPCS | Performed by: INTERNAL MEDICINE

## 2018-11-05 NOTE — PROGRESS NOTES
Anticoagulation Summary  As of 11/5/2018    INR goal:   2.5-3.5   TTR:   31.6 % (1.6 y)   Today's INR:   1.9!   Warfarin maintenance plan:   7.5 mg (2.5 mg x 3) on Mon, Wed, Fri; 5 mg (2.5 mg x 2) all other days   Weekly warfarin total:   42.5 mg   Plan last modified:   Chase Baldwin, PharmD (6/12/2018)   Next INR check:   11/19/2018   Target end date:   Indefinite    Indications    Chronic anticoagulation [Z79.01]  History of mitral valve replacement with mechanical valve [Z95.2] [Z95.2]             Anticoagulation Episode Summary     INR check location:       Preferred lab:       Send INR reminders to:       Comments:         Anticoagulation Care Providers     Provider Role Specialty Phone number    Declan Eaton M.D. Referring Internal Medicine [phone]    [facility] Responsible  [phone]        Anticoagulation Patient Findings  Patient Findings     Positives:   Missed doses    Negatives:   Signs/symptoms of thrombosis, Signs/symptoms of bleeding, Laboratory test error suspected, Change in health, Change in alcohol use, Change in activity, Upcoming invasive procedure, Emergency department visit, Upcoming dental procedure, Extra doses, Change in medications, Change in diet/appetite, Hospital admission, Bruising, Other complaints        HPI:   Carlos Rivera seen in clinic today, on anticoagulation therapy with warfarin for stroke prevention due to history of mechanical mitral valve replacement.    Patient's previous INR was subtherapeutic at 1.7 on 10-22-18, at which time patient was instructed to bolus with one dose, then resume current warfarin regimen.  He returns to clinic today to recheck INR to ensure it is therapeutic and thus preventing possible clotting and/or bleeding/bruising complications.    CHADS-VASc = n/a  (unadjusted ischemic stroke risk/year:  n/a)    Does patient have any changes to current medical/health status since last appt (Y/N):  NO  Does patient have any  "signs/symptoms of bleeding and/or thrombosis since the last appt (Y/N):  NO  Does patient have any interval changes to diet or medications since last appt (Y/N):  Missed dose this past weekend  Are there any complications or cost restrictions with current therapy (Y/N):  NO      Vitals:  BP check declined, didn't want to have done after flu shot    Weight  declines   Height   5' 7\"     Asssessment:      INR subtherapeutic at 1.9, therefore increasing patient's risk of stroke    Reason(s) for out of range INR today:  Missed dose      Plan:  Instructed patient to bolus with 10mg X 1, 7.5mg X 1, then resume current warfarin regimen in order to bring INR back to therapeutic range.  Patient declines lovenox bridge.    Patient consented to and received influenza vaccination at today's visit.     Follow up:  Because warfarin is a high risk medication and current CHEST guidelines recommend regular monitoring intervals (few days up to 12 weeks), will have patient return to clinic in 2 weeks to recheck INR.    Casey Birmingham, PharmD    "

## 2018-11-12 ENCOUNTER — APPOINTMENT (OUTPATIENT)
Dept: BEHAVIORAL HEALTH | Facility: CLINIC | Age: 73
End: 2018-11-12
Payer: MEDICARE

## 2018-11-19 ENCOUNTER — ANTICOAGULATION VISIT (OUTPATIENT)
Dept: MEDICAL GROUP | Facility: PHYSICIAN GROUP | Age: 73
End: 2018-11-19
Payer: MEDICARE

## 2018-11-19 VITALS — DIASTOLIC BLOOD PRESSURE: 84 MMHG | HEART RATE: 42 BPM | SYSTOLIC BLOOD PRESSURE: 159 MMHG

## 2018-11-19 DIAGNOSIS — Z95.2 HISTORY OF MITRAL VALVE REPLACEMENT WITH MECHANICAL VALVE: ICD-10-CM

## 2018-11-19 DIAGNOSIS — Z79.01 CHRONIC ANTICOAGULATION: Primary | ICD-10-CM

## 2018-11-19 LAB — INR PPP: 1.2 (ref 2–3.5)

## 2018-11-19 PROCEDURE — 99211 OFF/OP EST MAY X REQ PHY/QHP: CPT | Performed by: INTERNAL MEDICINE

## 2018-11-19 PROCEDURE — 85610 PROTHROMBIN TIME: CPT | Performed by: INTERNAL MEDICINE

## 2018-11-19 NOTE — PROGRESS NOTES
Anticoagulation Summary  As of 11/19/2018    INR goal:   2.5-3.5   TTR:   30.9 % (1.7 y)   Today's INR:   1.2!   Warfarin maintenance plan:   7.5 mg (2.5 mg x 3) on Mon, Wed, Fri; 5 mg (2.5 mg x 2) all other days   Weekly warfarin total:   42.5 mg   Plan last modified:   Chase Baldwin, PharmD (6/12/2018)   Next INR check:   11/26/2018   Target end date:   Indefinite    Indications    Chronic anticoagulation [Z79.01]  History of mitral valve replacement with mechanical valve [Z95.2] [Z95.2]             Anticoagulation Episode Summary     INR check location:       Preferred lab:       Send INR reminders to:       Comments:         Anticoagulation Care Providers     Provider Role Specialty Phone number    Declan Eaton M.D. Referring Internal Medicine [phone]    [facility] Responsible  [phone]        Anticoagulation Patient Findings  Patient Findings     Positives:   Missed doses    Negatives:   Signs/symptoms of thrombosis, Signs/symptoms of bleeding, Laboratory test error suspected, Change in health, Change in alcohol use, Change in activity, Upcoming invasive procedure, Emergency department visit, Upcoming dental procedure, Extra doses, Change in medications, Change in diet/appetite, Hospital admission, Bruising, Other complaints        HPI:   Carlos Rivera seen in clinic today, on anticoagulation therapy with warfarin for stroke prevention due to history of mitral valve replacement with mechanical valve.    Patient's previous INR was subtherapeutic at 1.9 on 11-5-18, at which time patient was instructed to bolus with one dose, then resume current warfarin regimen.  He returns to clinic today to recheck INR to ensure it is therapeutic and thus preventing possible clotting and/or bleeding/bruising complications.    CHADS-VASc = n/a  (unadjusted ischemic stroke risk/year:  n/a,)    Does patient have any changes to current medical/health status since last appt (Y/N):  NO  Does patient  "have any signs/symptoms of bleeding and/or thrombosis since the last appt (Y/N):  NO  Does patient have any interval changes to diet or medications since last appt (Y/N):  Missed dose this past week  Are there any complications or cost restrictions with current therapy (Y/N):  NO      Vitals:  /84  HR 42 he is feeling ok this morning, but concerned with BP reading, he intends to purchase home monitor to follow up closer   Weight  declines   Height   5' 7\"     Asssessment:      INR subtherapeutic at 1.2, therefore increasing patient's risk of stroke due to mechanical valve.   Reason(s) for out of range INR today:  Likely due to missed doses and hx of labile INRs.      Plan:  Instructed patient to bolus with 10mg X 2, then resume current warfarin regimen in order to bring INR back to therapeutic range.  Patient declines lovenox at this time despite risks.    Follow up:  Because warfarin is a high risk medication and current CHEST guidelines recommend regular monitoring intervals (few days up to 12 weeks), will have patient return to clinic in 1 weeks to recheck INR.    Casey Birmingham, PharmD    "

## 2018-11-27 ENCOUNTER — OFFICE VISIT (OUTPATIENT)
Dept: BEHAVIORAL HEALTH | Facility: CLINIC | Age: 73
End: 2018-11-27
Payer: MEDICARE

## 2018-11-27 DIAGNOSIS — Z63.0 MARITAL CONFLICT: ICD-10-CM

## 2018-11-27 DIAGNOSIS — F32.A DEPRESSION, UNSPECIFIED DEPRESSION TYPE: ICD-10-CM

## 2018-11-27 PROCEDURE — 90834 PSYTX W PT 45 MINUTES: CPT | Performed by: SOCIAL WORKER

## 2018-11-27 SDOH — SOCIAL STABILITY - SOCIAL INSECURITY: PROBLEMS IN RELATIONSHIP WITH SPOUSE OR PARTNER: Z63.0

## 2018-11-27 NOTE — BH THERAPY
" [facility]  Therapy Progress Note    Patient Name: Carlos Rivera  Patient MRN: [id number]  Today's Date: 11/27/2018     Type of session:Couples therapy  Length of session: 45 minutes  Persons in attendance:Patient and Spouse/Partner    Subjective/New Info: Comfort and Carlos here for couples therapy.   Comfort expresses frustration that she is not treated w/respect by Carlos, that he is often short and irritable with her, and does not comply w/requests, eg,  after himself,  his feet, don't shuffle.  Carlos is noncommittal when asked how the relationship is going.  Comfort admits that Carlos has never treated her much differently, but now that both are retired, she notices his problematic behaviors more.  She says \"I knew this when I  him.\"  She feels the loss of her brother and another close male friend w/whom she confided, and would like to be able to confide in Carlos.  She also discloses that she learned that Carlos has been talking/emailing/texting secretly with a female friend for many years, after telling her the initials BG stood for \"a fishing layne.\"  Carlos says he has cut back on the phone calls, and doesn't understand why this does not satisfy Comfort's hurt feelings.   Comfort says she has suicidal thoughts \"every night\".  When this is explored, she denies she has intent to kill herself, though she thinks about taking pills.  She says she would not hurt her family, and she agrees she is safe until her next appt with Nissa Jurado LCSW.   Comfort says she is planning to start IOP.  Couple agrees to f/u appt and to continue to explore what each desires from this relationship.      Objective/Observations:   Participation: Active verbal participation (from Comfort; Carlos participates minimally)   Grooming: Casual and Neat   Cognition: Fully Oriented   Eye contact: Good   Mood: Depressed, Anxious and Angry (Comfort), mildly dysphoric-Carlos   Affect: Anxious and Tearful " (Comfort), constricted-Carlos   Thought process: Goal-directed   Speech: Rate within normal limits and Volume within normal limits (Comfort), minimal-Carlos   Other:     Diagnoses:   1. Depression, unspecified depression type    2. Marital conflict         Current risk:   SUICIDE: Low   Homicide: Low   Self-harm: Low   Relapse: Not applicable   Other:    Safety Plan reviewed? Not Indicated   If evidence of imminent risk is present, intervention/plan:     Therapeutic Intervention(s): Cognitive modification, Develop/modify treatment plan and Establish rapport    Treatment Goal(s)/Objective(s) addressed: Clarify needs/expectations in intimate relationship; address ongoing issue of Ayannas depression/SI     Progress toward Treatment Goals: No change w/couples issues; Comfort expresses intention to start IOP and is in ongoing indiv therapy    Plan:  - Continue Individual therapy and Couples therapy    Avril Lao L.C.S.W.  11/27/2018

## 2018-11-28 ENCOUNTER — OFFICE VISIT (OUTPATIENT)
Dept: BEHAVIORAL HEALTH | Facility: CLINIC | Age: 73
End: 2018-11-28
Payer: MEDICARE

## 2018-11-28 DIAGNOSIS — F33.0 MILD EPISODE OF RECURRENT MAJOR DEPRESSIVE DISORDER (HCC): ICD-10-CM

## 2018-11-28 PROCEDURE — 90834 PSYTX W PT 45 MINUTES: CPT | Performed by: SOCIAL WORKER

## 2018-11-28 NOTE — BH THERAPY
" [facility]  Therapy Progress Note    Patient Name: Carlos Rivera  Patient MRN: [id number]  Today's Date: 11/28/2018     Type of session:Individual psychotherapy  Length of session: 45 minutes  Persons in attendance:Patient    Subjective/New Info: Met with Carlos with review on patient's interests and values and how he can modify or alter activities to better suit current abilities.  He appears to be receptive in regard to implement changes that maintain the focus of his interests.  Discussed his working on exercises given to him in physical therapy.  Discussed relationship issues with focus of using CBT cognitive restructuring as means for patient to practice tempered and more thoughtful verbal interactions.  He agree to work on interpersonal communication skills using \"softer and kinder\" manner.      Objective/Observations:   Participation: Active verbal participation and Open to feedback   Grooming: Casual and Neat   Cognition: Fully Oriented   Eye contact: Fair   Mood: Depressed   Affect: Constricted and Congruent with content   Thought process: Goal-directed   Speech: Rate within normal limits and Volume within normal limits   Other:     Diagnoses:   1. Mild episode of recurrent major depressive disorder (HCC)         Current risk:   SUICIDE: Low   Homicide: Low   Self-harm: Low   Relapse: Low   Other:    Safety Plan reviewed? Not Indicated   If evidence of imminent risk is present, intervention/plan:     Therapeutic Intervention(s): Cognitive modification, Communication skills, Interpersonal effectiveness skills, Maladaptive behavior addressed and Positive behavior reinforced    Treatment Goal(s)/Objective(s) addressed:   Identify and engage in activities that would support treatment goals by being consistent with one’s values.  Psychoeducation on distortions in thinking and how to challenge and modify distorted thinking into more realistic and flexible thinking.       Progress toward Treatment " Goals: Mild improvement    Plan:  - Next appointment scheduled:  12/12/2018  - Patient is in agreement with the above plan:  YES    Corinne G. Taylor, L.C.S.W.  11/28/2018

## 2018-12-07 ENCOUNTER — ANTICOAGULATION VISIT (OUTPATIENT)
Dept: MEDICAL GROUP | Facility: PHYSICIAN GROUP | Age: 73
End: 2018-12-07
Payer: MEDICARE

## 2018-12-07 DIAGNOSIS — Z79.01 CHRONIC ANTICOAGULATION: Primary | ICD-10-CM

## 2018-12-07 DIAGNOSIS — Z95.2 HISTORY OF MITRAL VALVE REPLACEMENT WITH MECHANICAL VALVE: ICD-10-CM

## 2018-12-07 LAB — INR PPP: 2.7 (ref 2–3.5)

## 2018-12-07 PROCEDURE — 85610 PROTHROMBIN TIME: CPT | Performed by: FAMILY MEDICINE

## 2018-12-07 PROCEDURE — 99999 PR NO CHARGE: CPT | Performed by: FAMILY MEDICINE

## 2018-12-07 NOTE — PROGRESS NOTES
Anticoagulation Summary  As of 12/7/2018    INR goal:   2.5-3.5   TTR:   30.4 % (1.7 y)   Today's INR:   2.7   Warfarin maintenance plan:   7.5 mg (2.5 mg x 3) on Mon, Wed, Fri; 5 mg (2.5 mg x 2) all other days   Weekly warfarin total:   42.5 mg   Plan last modified:   Chase Baldwin, PharmD (6/12/2018)   Next INR check:   12/28/2018   Target end date:   Indefinite    Indications    Chronic anticoagulation [Z79.01]  History of mitral valve replacement with mechanical valve [Z95.2] [Z95.2]             Anticoagulation Episode Summary     INR check location:       Preferred lab:       Send INR reminders to:       Comments:         Anticoagulation Care Providers     Provider Role Specialty Phone number    Declan Eaton M.D. Referring Internal Medicine [phone]    [facility] Responsible  [phone]        Anticoagulation Patient Findings  Patient Findings     Negatives:   Signs/symptoms of thrombosis, Signs/symptoms of bleeding, Laboratory test error suspected, Change in health, Change in alcohol use, Change in activity, Upcoming invasive procedure, Emergency department visit, Upcoming dental procedure, Missed doses, Extra doses, Change in medications, Change in diet/appetite, Hospital admission, Bruising, Other complaints        HPI:   Carlos Rivera seen in clinic today, on anticoagulation therapy with warfarin for stroke prevention due to history of mitral valve replacement with mechanical valve.    Patient's previous INR was subtherapeutic at 1.2 on 11-19-18, at which time patient was instructed to bolus with two doses, then resume current warfarin regimen.  He returns to clinic today to recheck INR to ensure it is therapeutic and thus preventing possible clotting and/or bleeding/bruising complications.    CHADS-VASc = n/a  (unadjusted ischemic stroke risk/year:  n/a)    Does patient have any changes to current medical/health status since last appt (Y/N):  No  Does patient have any  "signs/symptoms of bleeding and/or thrombosis since the last appt (Y/N):  NO  Does patient have any interval changes to diet or medications since last appt (Y/N):  NO  Are there any complications or cost restrictions with current therapy (Y/N):  NO      Vitals:  /74  HR 43    Weight  declines   Height   5' 7\"     Asssessment:      INR therapeutic at 2.7, therefore decreasing patient's stroke and/or bleeding risk.   Reason(s) for out of range INR today:  n/a      Plan:  Pt is to continue with current warfarin dosing regimen in order to maintain INR in therapeutic range.     Follow up:  Because warfarin is a high risk medication and current CHEST guidelines recommend regular monitoring intervals (few days up to 12 weeks), will have patient return to clinic in 3 weeks to recheck INR.    Casey Birmingham, PharmD    "

## 2018-12-12 ENCOUNTER — OFFICE VISIT (OUTPATIENT)
Dept: BEHAVIORAL HEALTH | Facility: CLINIC | Age: 73
End: 2018-12-12
Payer: MEDICARE

## 2018-12-12 DIAGNOSIS — F33.0 MILD EPISODE OF RECURRENT MAJOR DEPRESSIVE DISORDER (HCC): ICD-10-CM

## 2018-12-12 PROCEDURE — 90834 PSYTX W PT 45 MINUTES: CPT | Performed by: SOCIAL WORKER

## 2018-12-13 ENCOUNTER — OFFICE VISIT (OUTPATIENT)
Dept: MEDICAL GROUP | Facility: PHYSICIAN GROUP | Age: 73
End: 2018-12-13
Payer: MEDICARE

## 2018-12-13 VITALS
OXYGEN SATURATION: 96 % | RESPIRATION RATE: 14 BRPM | BODY MASS INDEX: 21.03 KG/M2 | HEART RATE: 87 BPM | SYSTOLIC BLOOD PRESSURE: 144 MMHG | DIASTOLIC BLOOD PRESSURE: 88 MMHG | WEIGHT: 134 LBS | TEMPERATURE: 97.7 F | HEIGHT: 67 IN

## 2018-12-13 DIAGNOSIS — L98.9 SKIN LESIONS: ICD-10-CM

## 2018-12-13 DIAGNOSIS — E11.00 TYPE 2 DIABETES MELLITUS WITH HYPEROSMOLARITY WITHOUT COMA, WITHOUT LONG-TERM CURRENT USE OF INSULIN (HCC): ICD-10-CM

## 2018-12-13 DIAGNOSIS — F02.80 DEMENTIA ASSOCIATED WITH OTHER UNDERLYING DISEASE WITHOUT BEHAVIORAL DISTURBANCE (HCC): ICD-10-CM

## 2018-12-13 DIAGNOSIS — R26.89 LOSS OF BALANCE: ICD-10-CM

## 2018-12-13 LAB
HBA1C MFR BLD: 8.9 % (ref ?–5.8)
INT CON NEG: NEGATIVE
INT CON POS: POSITIVE

## 2018-12-13 PROCEDURE — 83036 HEMOGLOBIN GLYCOSYLATED A1C: CPT | Performed by: INTERNAL MEDICINE

## 2018-12-13 PROCEDURE — 99215 OFFICE O/P EST HI 40 MIN: CPT | Performed by: INTERNAL MEDICINE

## 2018-12-13 RX ORDER — MEMANTINE HYDROCHLORIDE 5 MG/1
5 TABLET ORAL 2 TIMES DAILY
Qty: 60 TAB | Refills: 3 | Status: SHIPPED | OUTPATIENT
Start: 2018-12-13 | End: 2019-02-25 | Stop reason: SDUPTHER

## 2018-12-14 NOTE — BH THERAPY
[facility]  Therapy Progress Note    Patient Name: Carlos Rivera  Patient MRN: [id number]  Today's Date: 12/14/2018     Type of session:Individual psychotherapy  Length of session: 45 minutes  Persons in attendance:Patient    Subjective/New Info: Met with Carlos who presents on time for scheduled appointment. Reports frustration related to diminished ability to engage in exercise and activities he enjoyed in the past. Reviewed previous session discussions and explored options he is able to do with similar experience.  Patient alludes to financial concerns related to his perception of wife spending and he is encouraged to address the issue in couples therapy and/or with .      Objective/Observations:   Participation: Active verbal participation, Attentive and Open to feedback   Grooming: Casual and Neat   Cognition: Alert and Fully Oriented   Eye contact: Good   Mood: Depressed and Irritable   Affect: Flexible and Congruent with content   Thought process: Goal-directed   Speech: Rate within normal limits and Volume within normal limits   Other:     Diagnoses:   1. Mild episode of recurrent major depressive disorder (HCC)         Current risk:   SUICIDE: Low   Homicide: Low   Self-harm: Low   Relapse: Low   Other:    Safety Plan reviewed? Not Indicated   If evidence of imminent risk is present, intervention/plan:     Therapeutic Intervention(s): Cognitive modification, Communication skills, Conflict resolution skills, Goal-setting and Interpersonal effectiveness skills    Treatment Goal(s)/Objective(s) addressed:                Utilize learned skills to manage mood and emotional suffering more effectively   Utilize learned assertiveness skills to set boundaries & get needs met   Identify goals/values and cultivate a meaningful life               Learn to ameliorate effects of anxiety/panic on life and functioning   Learn to be more emotionally flexible/resilient in times of  stress/challenges     Progress toward Treatment Goals: No change    Plan:  - Next appointment scheduled:  1/10/2019  - Patient is in agreement with the above plan:  YES    Corinne G. Taylor, L.C.S.W.  12/14/2018

## 2018-12-14 NOTE — ASSESSMENT & PLAN NOTE
Has been reporting balance issues but denies falls. Has also been shuffling his feet. His sister has Parkinson's. Was evaluated by his neurologist about 1.5 years ago and wasn't felt to have Parkinson's. He was following with [facility] but didn't want to go through their program and prefers to exercise at the gym and at home.

## 2018-12-14 NOTE — PROGRESS NOTES
PRIMARY CARE CLINIC FOLLOW UP VISIT  Chief Complaint   Patient presents with   • Other     concering areas on head    • Medication Management     Bydureon    • Memory Loss     req possible memory med    • Loss Of Balance   • Other     Wife is requesting help/support to take care of      History of Present Illness     Carlos is here with his wife for the following:     Skin lesions  Notes a lesion of his left hand and some of his scalp. The scalp lesions have gotten darker.     Type II diabetes mellitus (CMS-[facility])  Carlos cannot recall what his blood sugars are running. Wife also says that Carlos won't take his blood sugars for a few days at a time. His wife says that he's continuing to take NPH 5-6 units insulin twice daily, bydureon weekly, and metformin 500 mg twice daily. Wife says that they didn't realize he was supposed to stop the insulin or wean off of it as bydureon was started. His wife says that she's concerned about hypoglycemia events since Carlos has been a lot more sluggish. Wife says that at times Carlos will also slur his words, she hasn't checked his blood sugars at these times.     Loss of balance  Has been reporting balance issues but denies falls. Has also been shuffling his feet. His sister has Parkinson's. Was evaluated by his neurologist about 1.5 years ago and wasn't felt to have Parkinson's. He was following with [facility] but didn't want to go through their program and prefers to exercise at the gym and at home.     Dementia  Had stopped taking his namenda at some point.     Current Outpatient Prescriptions   Medication Sig Dispense Refill   • memantine (NAMENDA) 5 MG Tab Take 1 Tab by mouth 2 times a day. 60 Tab 3   • atorvastatin (LIPITOR) 40 MG Tab TAKE 1 TABLET BY MOUTH  EVERY EVENING 90 Tab 1   • metFORMIN (GLUCOPHAGE) 500 MG Tab TAKE 1 TABLET BY MOUTH TWO  TIMES DAILY WITH MEALS 180 Tab 1   • warfarin (COUMADIN) 2.5 MG Tab TAKE TWO TO THREE TABLETS  BY MOUTH ONE TIME DAILY  "AS  DIRECTED BY COUMADIN CLINIC 270 Tab 1   • Insulin Syringe-Needle U-100 (INSULIN SYRINGE .3CC/31GX5/16\") 31G X 5/16\" 0.3 ML Misc Use 3 times daily with insulin 300 Each 3   • Insulin NPH, Human,, Isophane, 100 UNIT/ML Suspension Pen-injector Inject 10 Units as instructed every bedtime. (Patient taking differently: Inject 5 Units as instructed 2 Times a Day.) 10 mL 0   • lisinopril (PRINIVIL) 20 MG Tab TAKE 1 TABLET BY MOUTH  EVERY DAY 90 Tab 1   • citalopram (CELEXA) 10 MG tablet TAKE 1 TABLET BY MOUTH  EVERY MORNING 90 Tab 1   • enoxaparin (LOVENOX) 60 MG/0.6ML Solution inj Inject 60 mg as instructed every 12 hours. 10 Syringe 1   • Exenatide ER (BYDUREON BCISE) 2 MG/0.85ML Auto-injector Inject 2 mg as instructed every 7 days. 4 PEN 11   • Blood Glucose Monitoring Suppl Device Meter: Dispense One Touch Ultra. Sig. Use as directed for blood sugar monitoring. #1. NR. 1 Device 0   • Blood Glucose Monitoring Suppl Supplies Misc Test strips order: Test strips for One Touch Ultra 2 meter. Sig: use twice daily and prn ssx high or low sugar #100 RF x 3 180 Strip 3   • Blood Glucose Monitoring Suppl Supplies Misc Test strips order: Test strips compatible with meter. Sig: use daily and prn ssx high or low sugar 200 Each 3   • fexofenadine (ALLEGRA ALLERGY) 180 MG tablet Take 180 mg by mouth every day.     • Homeopathic Products ([employer] LEG CRAMPS PAIN RELIEF PO) Take  by mouth.     • Blood Glucose Monitoring Suppl (ONETOUCH VERIO FLEX SYSTEM) W/DEVICE Kit by Does not apply route.       No current facility-administered medications for this visit.      Past Medical History:   Diagnosis Date   • Chronic anticoagulation 2/23/2017   • History of mitral valve replacement with mechanical valve [Z95.2] 3/10/2017   • Hyperlipidemia    • Type II diabetes mellitus (HCC) 2/23/2017     Past Surgical History:   Procedure Laterality Date   • MITRAL VALVE REPLACEMENT  1999   • INGUINAL HERNIA REPAIR Bilateral 1984   • TONSILLECTOMY   " "    Social History   Substance Use Topics   • Smoking status: Never Smoker   • Smokeless tobacco: Never Used   • Alcohol use No     Social History     Social History Narrative    Retired from [employer]      Family History   Problem Relation Age of Onset   • Heart Attack Father 58   • Diabetes Father    • Heart Attack Paternal Uncle    • No Known Problems Sister    • No Known Problems Brother    • No Known Problems Maternal Grandmother    • No Known Problems Maternal Grandfather    • No Known Problems Paternal Grandmother    • Heart Attack Paternal Grandfather    • No Known Problems Sister    • No Known Problems Brother      Family Status   Relation Status   • Mo    • Fa         58   • PUnc (Not Specified)   • Sis Alive   • Bro    • MGMo    • MGFa    • PGMo    • PGFa    • Sis Alive   • Bro Alive     Allergies: Vicodin [hydrocodone-acetaminophen]    ROS  As per HPI above. All other systems reviewed and negative.        Objective   Blood pressure 144/88, pulse 87, temperature 36.5 °C (97.7 °F), resp. rate 14, height 1.702 m (5' 7\"), weight 60.8 kg (134 lb), SpO2 96 %. Body mass index is 20.99 kg/m².    General: alert and oriented, pleasant, cooperative  HEENT: Normocephalic, atraumatic. Moist mucous membranes   Cardiovascular: regular rate and rhythm, normal S1/S2  Pulmonary: lungs clear to auscultation bilaterally  Neuro: well balanced gait   Skin: small lesion with whitish discoloration of dorsal aspect of left hand; flesh colored moles of scalp   Psychiatric: appropriate mood and affect. Good insight and appropriate judgment     Assessment and Plan   The following treatment plan was discussed     1. Skin lesions  - REFERRAL TO DERMATOLOGY    2. Type 2 diabetes mellitus with hyperosmolarity without coma, without long-term current use of insulin ([facility])  A1c at 8.9% today, still uncontrolled. However, improved from 10% before he started the bydureon. I think the most " important thing with Carlos is to clarify what his blood sugars are running (continuous glucose monitoring would be essential in his care) to be able to properly titrate his medications. Advised that for now they continue the NPH 5-6 units bid, bydureon weekly, and increase metformin to 1 g in the am and 500 mg at pm (presently on 500 mg bid). In terms of his sluggishness advised the wife to check his blood sugars when he's feeling this way to clarify if he's having hypoglycemic episodes. Did also discuss more frequent blood sugar monitoring and provided them with a log book. Needs follow up within one week.   - POCT  A1C    3. Loss of balance  Carlos' gait was quite stable in the office today, wife also states that Dr. Cartagena has evaluated him for Parkinson's and told them he didn't fit the picture. Will check B12 to ensure this isn't contributing to balance issues.   - VITAMIN B12; Future    4. Dementia associated with other underlying disease without behavioral disturbance  Wife says Carlos only took one dose of the Namenda and stopped so they would like to resume this.   - memantine (NAMENDA) 5 MG Tab; Take 1 Tab by mouth 2 times a day.  Dispense: 60 Tab; Refill: 3      Healthcare maintenance     Health Maintenance Due   Topic Date Due   • IMM HEP B VACCINE (1 of 3 - Risk 3-dose series) 10/12/1964   • IMM ZOSTER VACCINES (2 of 3) 05/26/2016   • A1C SCREENING  12/14/2018     40 minutes were spent face to face of which greater than 50% was spent in counseling on diabetes blood sugar monitoring and its role in helping elucidate blood sugar patterns to help us decide how to titrate his medications and also on discussion of various medication regimen changes to pursue with his diabetes medications with pros and cons of each option explained and discussed.     Return in about 1 week (around 12/20/2018).    Declan Eaton MD  Internal Medicine  [facility]

## 2018-12-14 NOTE — PATIENT INSTRUCTIONS
· Increase the metformin to two tabs in the morning and one at night   · Continue the NPH insulin at the current dosing of 5-6 units twice a day  · Continue bydureon

## 2018-12-14 NOTE — ASSESSMENT & PLAN NOTE
Carlos cannot recall what his blood sugars are running. Wife also says that Carlos won't take his blood sugars for a few days at a time. His wife says that he's continuing to take NPH 5-6 units insulin twice daily, bydureon weekly, and metformin 500 mg twice daily. Wife says that they didn't realize he was supposed to stop the insulin or wean off of it as bydureon was started. His wife says that she's concerned about hypoglycemia events since Carlos has been a lot more sluggish. Wife says that at times Carlos will also slur his words, she hasn't checked his blood sugars at these times.

## 2018-12-20 NOTE — PROGRESS NOTES
"RN-CDE Note    Subjective:     Health changes since last visit/interval Hx: Carlos is alone this visit, and has some difficulty staying on task and recalling medications. States stopped bydureon secondary to costs.  Is self adjusting NPH and states will take \"1 or 2 more extra units\" if he feels he has eaten more than usual. States that if he snacks in the middle of the night, sometimes he will take a third dose of NPH.  Denies low blood sugars, although has not checked when not feeling well. He carries glucose tabs, he took them out of his pocket and showed them.  Did not bring log today, but states last two fasting sugars were 125 and 149.  There is concern for missed doses and extra doses of medication secondary to memory issues. States he does use a pill box that his wife fills, and that he \"sometimes double checks her to count the pills.\"  Appears frustrated with his memory issues and difficulty at times with word finding.        Medications (including changes made today)  Current Outpatient Prescriptions   Medication Sig Dispense Refill   • memantine (NAMENDA) 5 MG Tab Take 1 Tab by mouth 2 times a day. 60 Tab 3   • atorvastatin (LIPITOR) 40 MG Tab TAKE 1 TABLET BY MOUTH  EVERY EVENING 90 Tab 1   • metFORMIN (GLUCOPHAGE) 500 MG Tab TAKE 1 TABLET BY MOUTH TWO  TIMES DAILY WITH MEALS 180 Tab 1   • warfarin (COUMADIN) 2.5 MG Tab TAKE TWO TO THREE TABLETS  BY MOUTH ONE TIME DAILY AS  DIRECTED BY COUMADIN CLINIC 270 Tab 1   • Insulin Syringe-Needle U-100 (INSULIN SYRINGE .3CC/31GX5/16\") 31G X 5/16\" 0.3 ML Misc Use 3 times daily with insulin 300 Each 3   • Insulin NPH, Human,, Isophane, 100 UNIT/ML Suspension Pen-injector Inject 10 Units as instructed every bedtime. (Patient taking differently: Inject 5 Units as instructed 2 Times a Day.) 10 mL 0   • lisinopril (PRINIVIL) 20 MG Tab TAKE 1 TABLET BY MOUTH  EVERY DAY 90 Tab 1   • citalopram (CELEXA) 10 MG tablet TAKE 1 TABLET BY MOUTH  EVERY MORNING 90 Tab 1   • " Blood Glucose Monitoring Suppl Device Meter: Dispense One Touch Ultra. Sig. Use as directed for blood sugar monitoring. #1. NR. 1 Device 0   • Blood Glucose Monitoring Suppl Supplies Misc Test strips order: Test strips for One Touch Ultra 2 meter. Sig: use twice daily and prn ssx high or low sugar #100 RF x 3 180 Strip 3   • Blood Glucose Monitoring Suppl Supplies Misc Test strips order: Test strips compatible with meter. Sig: use daily and prn ssx high or low sugar 200 Each 3   • fexofenadine (ALLEGRA ALLERGY) 180 MG tablet Take 180 mg by mouth every day.     • Homeopathic Products ([employer] LEG CRAMPS PAIN RELIEF PO) Take  by mouth.     • Blood Glucose Monitoring Suppl (ONETOUCH VERIO FLEX SYSTEM) W/DEVICE Kit by Does not apply route.     • Exenatide ER (BYDUREON BCISE) 2 MG/0.85ML Auto-injector Inject 2 mg as instructed every 7 days. (Patient not taking: Reported on 12/21/2018) 4 PEN 11     No current facility-administered medications for this visit.        Taking daily ASA: No  Taking above medications as prescribed: no there is concern for compliance and self adjustment of medications  SIDE EFFECTS: Patient denies side effects to medications    Exercise: plans to start riding his bike  Diet: states has been cutting out sweets and night time snacking. His diet is carbohydrate rich, counseled about keeping carbs to less than 45 per meal.     Patient's body mass index is 21.74 kg/m². Exercise and nutrition counseling were performed at this visit.      Health Maintenance:   Health Maintenance Due   Topic Date Due   • IMM HEP B VACCINE (1 of 3 - Risk 3-dose series) 10/12/1964   • IMM ZOSTER VACCINES (2 of 3) 05/26/2016       Immunizations:   PPSV23: Up-to-date  [id number]: Up-to-date  Tdap: Up-to-date  Flu: Up-to-date  Hep B: Due    DM:   Last A1c:   Lab Results   Component Value Date/Time    HBA1C 8.9 12/13/2018 04:45 PM      A1C GOAL: < 7    Glucose monitoring frequency: intermittent, states he has been taking blood  sugars and logging but does not have a log today  states 150s, but A1c does not corroborate  Hypoglycemic episodes: no    Last Retinal Exam: on file and up-to-date, Dr. Givens 06/2018  Daily Foot Exam: Yes   Routine Dental Exams: Yes    Lab Results   Component Value Date/Time    MALBCRT 879 (H) 06/14/2018 06:22 AM    MICROALBUR 69.7 06/14/2018 06:22 AM        ACR Albumin/Creatinine Ratio goal <30     HTN:   Blood pressure goal <140/<80.   Currently Rx ACE/ARB: Yes    Dyslipidemia:    Lab Results   Component Value Date/Time    CHOLSTRLTOT 142 06/14/2018 06:23 AM    LDL 59 06/14/2018 06:23 AM    HDL 47 06/14/2018 06:23 AM    TRIGLYCERIDE 178 (H) 06/14/2018 06:23 AM       Lab Results   Component Value Date/Time    SODIUM 139 06/14/2018 06:23 AM    POTASSIUM 4.8 06/14/2018 06:23 AM    CHLORIDE 102 06/14/2018 06:23 AM    CO2 27 06/14/2018 06:23 AM    GLUCOSE 195 (H) 06/14/2018 06:23 AM    BUN 30 (H) 06/14/2018 06:23 AM    CREATININE 1.16 06/14/2018 06:23 AM     Lab Results   Component Value Date/Time    ALKPHOSPHAT 84 06/14/2018 06:23 AM    ASTSGOT 37 06/14/2018 06:23 AM    ALTSGPT 60 (H) 06/14/2018 06:23 AM    TBILIRUBIN 0.7 06/14/2018 06:23 AM        Currently Rx Statin: Yes    He  reports that he has never smoked. He has never used smokeless tobacco.    Objective:     Exam:  Monofilament: not done, due 02/2019    Plan:     Discussed and educated on:   - All medications, side effects and compliance (discussed carefully)  - Annual eye examinations at Ophthalmology  - HbA1C: target and what A1C is  - Home glucose monitoring emphasized  - Reminded pt to bring in BS diary at next visit, provided with log and instructions  - Weight control and daily exercise  - Diabetes education materials from ADA provided on T2DM and Low Blood Glucose.    Recommended medication changes: Consider stopping NPH and increasing metformin to 1000 twice daily and Glipizide 5 mg twice daily with meals. See in two weeks with logs, and then  consider increasing glipizide to 10 mg if blood sugars warrant.

## 2018-12-21 ENCOUNTER — ANTICOAGULATION VISIT (OUTPATIENT)
Dept: MEDICAL GROUP | Facility: PHYSICIAN GROUP | Age: 73
End: 2018-12-21
Payer: MEDICARE

## 2018-12-21 ENCOUNTER — HOSPITAL ENCOUNTER (OUTPATIENT)
Dept: LAB | Facility: MEDICAL CENTER | Age: 73
End: 2018-12-21
Attending: INTERNAL MEDICINE
Payer: MEDICARE

## 2018-12-21 ENCOUNTER — OFFICE VISIT (OUTPATIENT)
Dept: MEDICAL GROUP | Facility: PHYSICIAN GROUP | Age: 73
End: 2018-12-21
Payer: MEDICARE

## 2018-12-21 VITALS
DIASTOLIC BLOOD PRESSURE: 78 MMHG | SYSTOLIC BLOOD PRESSURE: 126 MMHG | RESPIRATION RATE: 16 BRPM | HEART RATE: 80 BPM | OXYGEN SATURATION: 95 % | BODY MASS INDEX: 21.79 KG/M2 | TEMPERATURE: 97.7 F | WEIGHT: 138.8 LBS | HEIGHT: 67 IN

## 2018-12-21 DIAGNOSIS — E11.00 TYPE 2 DIABETES MELLITUS WITH HYPEROSMOLARITY WITHOUT COMA, WITHOUT LONG-TERM CURRENT USE OF INSULIN (HCC): ICD-10-CM

## 2018-12-21 DIAGNOSIS — Z95.2 HISTORY OF MITRAL VALVE REPLACEMENT WITH MECHANICAL VALVE: ICD-10-CM

## 2018-12-21 DIAGNOSIS — Z79.01 CHRONIC ANTICOAGULATION: Primary | ICD-10-CM

## 2018-12-21 DIAGNOSIS — R26.89 LOSS OF BALANCE: ICD-10-CM

## 2018-12-21 LAB
INR PPP: 3.6 (ref 2–3.5)
VIT B12 SERPL-MCNC: 851 PG/ML (ref 211–911)

## 2018-12-21 PROCEDURE — 99211 OFF/OP EST MAY X REQ PHY/QHP: CPT | Performed by: FAMILY MEDICINE

## 2018-12-21 PROCEDURE — 36415 COLL VENOUS BLD VENIPUNCTURE: CPT

## 2018-12-21 PROCEDURE — 82607 VITAMIN B-12: CPT

## 2018-12-21 PROCEDURE — 99213 OFFICE O/P EST LOW 20 MIN: CPT | Performed by: INTERNAL MEDICINE

## 2018-12-21 PROCEDURE — 85610 PROTHROMBIN TIME: CPT | Performed by: FAMILY MEDICINE

## 2018-12-21 RX ORDER — METFORMIN HYDROCHLORIDE 1000 MG/1
1 TABLET, FILM COATED, EXTENDED RELEASE ORAL 2 TIMES DAILY
Qty: 180 TAB | Refills: 1 | Status: SHIPPED | OUTPATIENT
Start: 2018-12-21 | End: 2019-02-25 | Stop reason: SDUPTHER

## 2018-12-21 RX ORDER — GLIPIZIDE 5 MG/1
5 TABLET, FILM COATED, EXTENDED RELEASE ORAL DAILY
Qty: 90 TAB | Refills: 1 | Status: SHIPPED | OUTPATIENT
Start: 2018-12-21 | End: 2018-12-28 | Stop reason: SDUPTHER

## 2018-12-21 NOTE — PROGRESS NOTES
Anticoagulation Summary  As of 12/21/2018    INR goal:   2.5-3.5   TTR:   31.7 % (1.8 y)   Today's INR:   3.6!   Warfarin maintenance plan:   7.5 mg (2.5 mg x 3) on Mon, Wed, Fri; 5 mg (2.5 mg x 2) all other days   Weekly warfarin total:   42.5 mg   Plan last modified:   Chase Baldwin, PharmD (6/12/2018)   Next INR check:   1/11/2019   Target end date:   Indefinite    Indications    Chronic anticoagulation [Z79.01]  History of mitral valve replacement with mechanical valve [Z95.2] [Z95.2]             Anticoagulation Episode Summary     INR check location:       Preferred lab:       Send INR reminders to:       Comments:         Anticoagulation Care Providers     Provider Role Specialty Phone number    Declan Eaton M.D. Referring Internal Medicine [phone]    [facility] Responsible  [phone]        Anticoagulation Patient Findings  Patient Findings     Negatives:   Signs/symptoms of thrombosis, Signs/symptoms of bleeding, Laboratory test error suspected, Change in health, Change in alcohol use, Change in activity, Upcoming invasive procedure, Emergency department visit, Upcoming dental procedure, Missed doses, Extra doses, Change in medications, Change in diet/appetite, Hospital admission, Bruising, Other complaints        HPI:   Carlos Rivera seen in clinic today, on anticoagulation therapy with warfarin for stroke prevention due to history of mechanical mitral valve replacement.    Patient's previous INR was therapeutic at 2.7 on 12-7-18, at which time patient was instructed to continue with current warfarin regimen.  He returns to clinic today to recheck INR to ensure it is therapeutic and thus preventing possible clotting and/or bleeding/bruising complications.    CHADS-VASc = n/a  (unadjusted ischemic stroke risk/year:  n/a)    Does patient have any changes to current medical/health status since last appt (Y/N):  NO  Does patient have any signs/symptoms of bleeding and/or  thrombosis since the last appt (Y/N):  NO  Does patient have any interval changes to diet or medications since last appt (Y/N):  NO  Are there any complications or cost restrictions with current therapy (Y/N):  NO      Vitals:  Taken during visit with PCP     Asssessment:      INR slightly supratherapeutic at 3.6, therefore increasing patient's risk of bleeding complications due to warfarin.   Reason(s) for out of range INR today:  No identifiable causes for high INR.      Plan:  Due to patient's hx of labile INR's and cognitive decline, will have him continue with current warfarin regimen as detailed above until next INR.     Follow up:  Because warfarin is a high risk medication and current CHEST guidelines recommend regular monitoring intervals (few days up to 12 weeks), will have patient return to clinic in 3 weeks to recheck INR.    Casey Birmingham, PharmD

## 2018-12-21 NOTE — PROGRESS NOTES
"PRIMARY CARE CLINIC FOLLOW UP VISIT  Chief Complaint   Patient presents with   • Diabetes     History of Present Illness     Type II diabetes mellitus (CMS-[facility])  Carlos is here for follow up on his DM. He forgot to bring his blood sugar log. He is saying he wants to increase his exercise regimen again. Is continuing to self titrate his NPH.     Current Outpatient Prescriptions   Medication Sig Dispense Refill   • metformin ER modified (GLUMETZA) 1000 MG TABLET SR 24 HR Take 1 Tab by mouth 2 times a day. 180 Tab 1   • glipiZIDE SR (GLUCOTROL) 5 MG TABLET SR 24 HR Take 1 Tab by mouth every day. 90 Tab 1   • memantine (NAMENDA) 5 MG Tab Take 1 Tab by mouth 2 times a day. 60 Tab 3   • atorvastatin (LIPITOR) 40 MG Tab TAKE 1 TABLET BY MOUTH  EVERY EVENING 90 Tab 1   • warfarin (COUMADIN) 2.5 MG Tab TAKE TWO TO THREE TABLETS  BY MOUTH ONE TIME DAILY AS  DIRECTED BY COUMADIN CLINIC 270 Tab 1   • Insulin Syringe-Needle U-100 (INSULIN SYRINGE .3CC/31GX5/16\") 31G X 5/16\" 0.3 ML Misc Use 3 times daily with insulin 300 Each 3   • lisinopril (PRINIVIL) 20 MG Tab TAKE 1 TABLET BY MOUTH  EVERY DAY 90 Tab 1   • citalopram (CELEXA) 10 MG tablet TAKE 1 TABLET BY MOUTH  EVERY MORNING 90 Tab 1   • Blood Glucose Monitoring Suppl Device Meter: Dispense One Touch Ultra. Sig. Use as directed for blood sugar monitoring. #1. NR. 1 Device 0   • Blood Glucose Monitoring Suppl Supplies Misc Test strips order: Test strips for One Touch Ultra 2 meter. Sig: use twice daily and prn ssx high or low sugar #100 RF x 3 180 Strip 3   • Blood Glucose Monitoring Suppl Supplies Misc Test strips order: Test strips compatible with meter. Sig: use daily and prn ssx high or low sugar 200 Each 3   • fexofenadine (ALLEGRA ALLERGY) 180 MG tablet Take 180 mg by mouth every day.     • Homeopathic Products ([employer] LEG CRAMPS PAIN RELIEF PO) Take  by mouth.     • Blood Glucose Monitoring Suppl (ONETOUCH VERIO FLEX SYSTEM) W/DEVICE Kit by Does not apply route.     • " "Exenatide ER (BYDUREON BCISE) 2 MG/0.85ML Auto-injector Inject 2 mg as instructed every 7 days. (Patient not taking: Reported on 2018) 4 PEN 11     No current facility-administered medications for this visit.      Past Medical History:   Diagnosis Date   • Chronic anticoagulation 2017   • History of mitral valve replacement with mechanical valve [Z95.2] 3/10/2017   • Hyperlipidemia    • Type II diabetes mellitus (HCC) 2017     Past Surgical History:   Procedure Laterality Date   • MITRAL VALVE REPLACEMENT     • INGUINAL HERNIA REPAIR Bilateral    • TONSILLECTOMY       Social History   Substance Use Topics   • Smoking status: Never Smoker   • Smokeless tobacco: Never Used   • Alcohol use No     Social History     Social History Narrative    Retired from [employer]      Family History   Problem Relation Age of Onset   • Heart Attack Father 58   • Diabetes Father    • Heart Attack Paternal Uncle    • No Known Problems Sister    • No Known Problems Brother    • No Known Problems Maternal Grandmother    • No Known Problems Maternal Grandfather    • No Known Problems Paternal Grandmother    • Heart Attack Paternal Grandfather    • No Known Problems Sister    • No Known Problems Brother      Family Status   Relation Status   • Mo    • Fa         58   • PUnc (Not Specified)   • Sis Alive   • Bro    • MGMo    • MGFa    • PGMo    • PGFa    • Sis Alive   • Bro Alive     Allergies: Vicodin [hydrocodone-acetaminophen]    ROS  As per HPI above. All other systems reviewed and negative.        Objective   Blood pressure 126/78, pulse 80, temperature 36.5 °C (97.7 °F), temperature source Temporal, resp. rate 16, height 1.702 m (5' 7\"), weight 63 kg (138 lb 12.8 oz), SpO2 95 %. Body mass index is 21.74 kg/m².    General: alert and oriented, pleasant, cooperative  HEENT: Normocephalic, atraumatic. No thyroid masses. Oropharynx clear without exudate or injection. "   Cardiovascular: regular rate and rhythm, normal S1/S2  Pulmonary: lungs clear to auscultation bilaterally    Psychiatric: appropriate mood and affect. Good insight and appropriate judgment       Assessment and Plan   The following treatment plan was discussed     1. Type 2 diabetes mellitus with hyperosmolarity without coma, without long-term current use of insulin (HCC)  Carlos met with me and the diabetes RN today. We do think it is safest just to stop the NPH as it's unclear how Carlos is self titrating the dose. It also seems that he has stopped the bydureon due to cost. Will try to simply his regimen to orals only; increase metformin to max dose and use glipizide at just 5 mg daily. We provided him with a larger blood glucose book log since he says the other one was too small for him to write in. Was instructed by the diabetes RN to record his blood sugars and bring to his next visit with both of us in 4 weeks.   - metformin ER modified (GLUMETZA) 1000 MG TABLET SR 24 HR; Take 1 Tab by mouth 2 times a day.  Dispense: 180 Tab; Refill: 1  - glipiZIDE SR (GLUCOTROL) 5 MG TABLET SR 24 HR; Take 1 Tab by mouth every day.  Dispense: 90 Tab; Refill: 1      Healthcare maintenance     Health Maintenance Due   Topic Date Due   • IMM HEP B VACCINE (1 of 3 - Risk 3-dose series) 10/12/1964       Return in about 4 weeks (around 1/18/2019) for diabetes RN and me .    Declan Eaton MD  Internal Medicine  [facility]

## 2018-12-28 DIAGNOSIS — E11.00 TYPE 2 DIABETES MELLITUS WITH HYPEROSMOLARITY WITHOUT COMA, WITHOUT LONG-TERM CURRENT USE OF INSULIN (HCC): ICD-10-CM

## 2018-12-28 NOTE — TELEPHONE ENCOUNTER
Was the patient seen in the last year in this department? Yes    Does patient have an active prescription for medications requested? No     Received Request Via: Pharmacy      Pt met protocol?: Yes, labs and ov 12/21/18 poct a1c 8.9, appt pcp 1/19, send to mail order

## 2018-12-31 RX ORDER — GLIPIZIDE 5 MG/1
5 TABLET, FILM COATED, EXTENDED RELEASE ORAL DAILY
Qty: 90 TAB | Refills: 1 | Status: SHIPPED | OUTPATIENT
Start: 2018-12-31 | End: 2019-02-25 | Stop reason: SDUPTHER

## 2018-12-31 NOTE — TELEPHONE ENCOUNTER
Patient has recently been seen by PCP within the last 6 months per protocol . Will refill medications for 6 months.  Lab Results   Component Value Date/Time    HBA1C 8.9 12/13/2018 04:45 PM      Lab Results   Component Value Date/Time    MALBCRT 879 (H) 06/14/2018 06:22 AM    MICROALBUR 69.7 06/14/2018 06:22 AM      Lab Results   Component Value Date/Time    ALKPHOSPHAT 84 06/14/2018 06:23 AM    ASTSGOT 37 06/14/2018 06:23 AM    ALTSGPT 60 (H) 06/14/2018 06:23 AM    TBILIRUBIN 0.7 06/14/2018 06:23 AM

## 2019-01-10 ENCOUNTER — APPOINTMENT (OUTPATIENT)
Dept: BEHAVIORAL HEALTH | Facility: CLINIC | Age: 74
End: 2019-01-10
Payer: MEDICARE

## 2019-01-15 ENCOUNTER — OFFICE VISIT (OUTPATIENT)
Dept: BEHAVIORAL HEALTH | Facility: CLINIC | Age: 74
End: 2019-01-15
Payer: MEDICARE

## 2019-01-15 DIAGNOSIS — F33.0 MILD EPISODE OF RECURRENT MAJOR DEPRESSIVE DISORDER (HCC): ICD-10-CM

## 2019-01-15 PROCEDURE — 90834 PSYTX W PT 45 MINUTES: CPT | Performed by: SOCIAL WORKER

## 2019-01-15 NOTE — BH THERAPY
[facility]  Therapy Progress Note    Patient Name: Carlos Rivera  Patient MRN: [id number]  Today's Date: 1/15/2019     Type of session:Individual psychotherapy  Length of session: 45 minutes  Persons in attendance:Patient    Subjective/New Info: Met with Carlos who presents on time for scheduled appointment.  Reports improved mood and affect is congruent and euthymic.  States interactions with wife remain unpredictable though notes he is spending time on enjoyable interests including reading, playing music (has multiple instrument), and attention to his clock collection.  Reports diminished foot pain and his gait appears to be slightly improved.  Discussed goals including retrieving his boat and his camper, both currently stored in [place].  Agrees to make a plan, discuss it with wife and/or son who may help and then take action as the worry over these possessions has been stressful for some time.  Will continue to work on problem solving the matters.     Objective/Observations:   Participation: Active verbal participation, Attentive, Engaged and Open to feedback   Grooming: Casual and Neat   Cognition: Fully Oriented   Eye contact: Good   Mood: Euthymic   Affect: Calm   Thought process: Logical and Goal-directed   Speech: Rate within normal limits and Volume within normal limits   Other:     Diagnoses:   1. Mild episode of recurrent major depressive disorder (HCC)         Current risk:   SUICIDE: Low   Homicide: Low   Self-harm: Low   Relapse: Low   Other:    Safety Plan reviewed? Not Indicated   If evidence of imminent risk is present, intervention/plan:     Therapeutic Intervention(s): Distress tolerance skills, Goal-setting, Interpersonal effectiveness skills, Leisure and recreation skills, Limit-setting, Maladaptive behavior addressed and Problem-solving    Treatment Goal(s)/Objective(s) addressed: Learn and implement problem solving strategies for realistically addressing worries.   Specifically defining a problem, generating options for addressing it, evaluating pros and cons, selecting and implementing an option and then evaluating and refining the action.  Identify and engage in activities that would support treatment goals by being consistent with one’s values.  Psychoeducation on distortions in thinking and how to challenge and modify distorted thinking into more realistic and flexible thinking.       Progress toward Treatment Goals: Mild improvement    Plan:  - Next appointment scheduled:  1/16/2019  - Patient is in agreement with the above plan:  YES    Corinne G. Taylor, L.C.SPipoWPipo  1/15/2019

## 2019-01-16 ENCOUNTER — OFFICE VISIT (OUTPATIENT)
Dept: BEHAVIORAL HEALTH | Facility: CLINIC | Age: 74
End: 2019-01-16
Payer: MEDICARE

## 2019-01-16 DIAGNOSIS — Z63.0 MARITAL CONFLICT: ICD-10-CM

## 2019-01-16 PROCEDURE — 90834 PSYTX W PT 45 MINUTES: CPT | Performed by: SOCIAL WORKER

## 2019-01-16 SDOH — SOCIAL STABILITY - SOCIAL INSECURITY: PROBLEMS IN RELATIONSHIP WITH SPOUSE OR PARTNER: Z63.0

## 2019-01-16 NOTE — BH THERAPY
" [facility]  Therapy Progress Note    Patient Name: Carlos Rivera  Patient MRN: [id number]  Today's Date: 1/16/2019     Type of session:Couples therapy  Length of session: 45 minutes  Persons in attendance:Patient    Subjective/New Info: Comfort started session complaining about conflict w/two of their adult children, complaining they are disrespectful to her.  Expresses anger w/Reji for not telling her that son texted him; Reji says son asked him not to tell her.  Comfort accuses Reji of \"never letting me have an opinion about anything.\"    She accuses Reji of interrupting her.  He accuses her of same. Comfort expresses concern about Reji's medical issues, eg., shuffling, falling.  He says he wants to ride his road bike vs going to the gym or physical therapy.   Complimented couple on engaging w/each other during session and interrupting less.  Encouraged couple to schedule more frequent sessions.    Objective/Observations:   Participation: Active verbal participation   Grooming: Casual and Neat   Cognition: Fully Oriented   Eye contact: Good   Mood: Euthymic-reji, anxious-Comfort   Affect: Congruent with content; Comfort-labile, tearful off/on   Thought process: Goal-directed   Speech: Rate within normal limits and Volume within normal limits   Other:     Diagnoses:   1. Marital conflict         Current risk:   SUICIDE: Low   Homicide: Low   Self-harm: Low   Relapse: Not applicable   Other:    Safety Plan reviewed? Not Indicated   If evidence of imminent risk is present, intervention/plan:     Therapeutic Intervention(s): Conflict clarification, Develop/modify treatment plan and Supportive psychotherapy    Treatment Goal(s)/Objective(s) addressed: Define goals for therapy; clarify partners' expectations in relationship     Progress toward Treatment Goals: No change    Plan:  - Continue Couples therapy    Avril Lao L.C.S.W.  1/16/2019                                 "

## 2019-02-02 DIAGNOSIS — E11.00 TYPE 2 DIABETES MELLITUS WITH HYPEROSMOLARITY WITHOUT COMA, WITHOUT LONG-TERM CURRENT USE OF INSULIN (HCC): ICD-10-CM

## 2019-02-04 RX ORDER — LISINOPRIL 20 MG/1
TABLET ORAL
Qty: 90 TAB | Refills: 0 | Status: SHIPPED | OUTPATIENT
Start: 2019-02-04 | End: 2019-02-25 | Stop reason: SDUPTHER

## 2019-02-04 RX ORDER — CITALOPRAM HYDROBROMIDE 10 MG/1
TABLET ORAL
Qty: 90 TAB | Refills: 0 | Status: SHIPPED | OUTPATIENT
Start: 2019-02-04 | End: 2019-02-25 | Stop reason: SDUPTHER

## 2019-02-04 NOTE — TELEPHONE ENCOUNTER
Was the patient seen in the last year in this department? Yes    Does patient have an active prescription for medications requested? No     Received Request Via: Pharmacy      Pt met protocol?: Yes    OV 12/18    BP Readings from Last 1 Encounters:   12/21/18 126/78

## 2019-02-07 ENCOUNTER — OFFICE VISIT (OUTPATIENT)
Dept: MEDICAL GROUP | Facility: PHYSICIAN GROUP | Age: 74
End: 2019-02-07
Payer: MEDICARE

## 2019-02-07 VITALS
HEIGHT: 67 IN | TEMPERATURE: 97.6 F | OXYGEN SATURATION: 97 % | DIASTOLIC BLOOD PRESSURE: 76 MMHG | HEART RATE: 48 BPM | BODY MASS INDEX: 21.53 KG/M2 | WEIGHT: 137.2 LBS | SYSTOLIC BLOOD PRESSURE: 132 MMHG | RESPIRATION RATE: 14 BRPM

## 2019-02-07 DIAGNOSIS — R26.89 SHUFFLING GAIT: ICD-10-CM

## 2019-02-07 DIAGNOSIS — E11.00 TYPE 2 DIABETES MELLITUS WITH HYPEROSMOLARITY WITHOUT COMA, WITHOUT LONG-TERM CURRENT USE OF INSULIN (HCC): ICD-10-CM

## 2019-02-07 PROCEDURE — 99214 OFFICE O/P EST MOD 30 MIN: CPT | Performed by: INTERNAL MEDICINE

## 2019-02-07 NOTE — ASSESSMENT & PLAN NOTE
Carlos continues to have issues with his blood sugars. His wife reports that she thinks the blood sugar log he brought in is fictitious. He takes metformin 500 mg bid and glipizide 5 mg SR daily. His blood sugar log from the past week shows fasting blood sugars from 165-212. An after dinner BS is 309 from a few days ago. With the weekly bydureon he fell into the donut hole at the end of the past year but now has  so his wife says they could try that again.

## 2019-02-07 NOTE — PROGRESS NOTES
PRIMARY CARE CLINIC FOLLOW UP VISIT  Chief Complaint   Patient presents with   • Difficulty Walking   • Fall     2/7/19      History of Present Illness     Carlos is here with his wife for the following:     Shuffling gait  Has been having issues with a shuffling gait. This is also interfering with his balance. His wife reports that he's always dizzy and she thinks that it worsened since his metformin dose was increased. She says that she did a trial of taking him off of his non-essential medications for a day or two which seemed to help with the dizziness. His wife is concerned about vertigo, strokes, or Parkinsons. Says that Carlos was evaluated by a neurologist who said he didn't have Parkinson's. Carlos says he doesn't have a sensation of the room spinning. This morning he tripped between the lawn mower wheel and another object.     Type II diabetes mellitus ([facility])  Carlos continues to have issues with his blood sugars. His wife reports that she thinks the blood sugar log he brought in is fictitious. He takes metformin 500 mg bid and glipizide 5 mg SR daily. His blood sugar log from the past week shows fasting blood sugars from 165-212. An after dinner BS is 309 from a few days ago. With the weekly bydureon he fell into the donut hole at the end of the past year but now has  so his wife says they could try that again.     Current Outpatient Prescriptions   Medication Sig Dispense Refill   • Exenatide ER (BYDUREON BCISE) 2 MG/0.85ML Auto-injector Inject 2 mg as instructed every 7 days. 4 PEN 11   • citalopram (CELEXA) 10 MG tablet TAKE 1 TABLET BY MOUTH  EVERY MORNING 90 Tab 0   • lisinopril (PRINIVIL) 20 MG Tab TAKE 1 TABLET BY MOUTH  EVERY DAY 90 Tab 0   • glipiZIDE SR (GLUCOTROL) 5 MG TABLET SR 24 HR Take 1 Tab by mouth every day. 90 Tab 1   • metformin ER modified (GLUMETZA) 1000 MG TABLET SR 24 HR Take 1 Tab by mouth 2 times a day. 180 Tab 1   • memantine (NAMENDA) 5 MG Tab Take 1 Tab by mouth  "2 times a day. (Patient not taking: Reported on 2/7/2019) 60 Tab 3   • atorvastatin (LIPITOR) 40 MG Tab TAKE 1 TABLET BY MOUTH  EVERY EVENING 90 Tab 1   • warfarin (COUMADIN) 2.5 MG Tab TAKE TWO TO THREE TABLETS  BY MOUTH ONE TIME DAILY AS  DIRECTED BY COUMADIN CLINIC 270 Tab 1   • Insulin Syringe-Needle U-100 (INSULIN SYRINGE .3CC/31GX5/16\") 31G X 5/16\" 0.3 ML Misc Use 3 times daily with insulin 300 Each 3   • Blood Glucose Monitoring Suppl Device Meter: Dispense One Touch Ultra. Sig. Use as directed for blood sugar monitoring. #1. NR. 1 Device 0   • Blood Glucose Monitoring Suppl Supplies Misc Test strips order: Test strips for One Touch Ultra 2 meter. Sig: use twice daily and prn ssx high or low sugar #100 RF x 3 180 Strip 3   • Blood Glucose Monitoring Suppl Supplies Misc Test strips order: Test strips compatible with meter. Sig: use daily and prn ssx high or low sugar 200 Each 3   • fexofenadine (ALLEGRA ALLERGY) 180 MG tablet Take 180 mg by mouth every day.     • Homeopathic Products ([employer] LEG CRAMPS PAIN RELIEF PO) Take  by mouth.     • Blood Glucose Monitoring Suppl (ONETOUCH VERIO FLEX SYSTEM) W/DEVICE Kit by Does not apply route.       No current facility-administered medications for this visit.      Past Medical History:   Diagnosis Date   • Chronic anticoagulation 2/23/2017   • History of mitral valve replacement with mechanical valve [Z95.2] 3/10/2017   • Hyperlipidemia    • Type II diabetes mellitus (HCC) 2/23/2017     Past Surgical History:   Procedure Laterality Date   • MITRAL VALVE REPLACEMENT  1999   • INGUINAL HERNIA REPAIR Bilateral 1984   • TONSILLECTOMY       Social History   Substance Use Topics   • Smoking status: Never Smoker   • Smokeless tobacco: Never Used   • Alcohol use No     Social History     Social History Narrative    Retired from [employer]      Family History   Problem Relation Age of Onset   • Heart Attack Father 58   • Diabetes Father    • Heart Attack Paternal Uncle    • No Known " "Problems Sister    • No Known Problems Brother    • No Known Problems Maternal Grandmother    • No Known Problems Maternal Grandfather    • No Known Problems Paternal Grandmother    • Heart Attack Paternal Grandfather    • No Known Problems Sister    • No Known Problems Brother      Family Status   Relation Status   • Mo    • Fa         58   • PUnc (Not Specified)   • Sis Alive   • Bro    • MGMo    • MGFa    • PGMo    • PGFa    • Sis Alive   • Bro Alive     Allergies: Vicodin [hydrocodone-acetaminophen]    ROS  As per HPI above. All other systems reviewed and negative.        Objective   Blood pressure 132/76, pulse (!) 48, temperature 36.4 °C (97.6 °F), resp. rate 14, height 1.702 m (5' 7\"), weight 62.2 kg (137 lb 3.2 oz), SpO2 97 %. Body mass index is 21.49 kg/m².    General: alert and oriented, pleasant, cooperative  HEENT: Normocephalic, atraumatic.   Gait: normal gait in office; shuffling gait into waiting room   Psychiatric: appropriate mood and affect. Good insight and appropriate judgment     Assessment and Plan   The following treatment plan was discussed     1. Shuffling gait  Interesting, Carlos has a normal gait for me in the office but did have shuffling gait when he exited the office and went back into the lobby. Intermittent shuffling doesn't seem consistent with Parkinson's but he may certainly have balance issues and compensates with a shuffling gait so he doesn't fall over. He last saw Dr. Cartagena 3/2018 and had a repeat MRI brain since then but hasn't followed up with him since; advised follow up with neurology. We could also consider slowly lowering the dose of his blood pressure medications to see if this helps with his balance issues. I checked his B12 only 2018 and it was normal.     2. Type 2 diabetes mellitus with hyperosmolarity without coma, without long-term current use of insulin (HCC)  We have a had a hard time regulating Carlos " on a diabetes regimen he can manage. It does seem he has decreased his dose of metformin to 500 mg bid and is taking glipizide 5 mg daily. Would like to resume weekly bydureon for ease of dosing as NPH insulin was too complicated from him to manage especially since he was self titrating and we couldn't track how much insulin he was taking.   - Exenatide ER (BYDUREON BCISE) 2 MG/0.85ML Auto-injector; Inject 2 mg as instructed every 7 days.  Dispense: 4 PEN; Refill: 11      Healthcare maintenance     Health Maintenance Due   Topic Date Due   • IMM HEP B VACCINE (1 of 3 - Risk 3-dose series) 10/12/1964       Return in about 3 months (around 5/7/2019).    Declan Eaton MD  Internal Medicine  [facility]

## 2019-02-07 NOTE — ASSESSMENT & PLAN NOTE
Has been having issues with a shuffling gait. This is also interfering with his balance. His wife reports that he's always dizzy and she thinks that it worsened since his metformin dose was increased. She says that she did a trial of taking him off of his non-essential medications for a day or two which seemed to help with the dizziness. His wife is concerned about vertigo, strokes, or Parkinsons. Says that Carlos was evaluated by a neurologist who said he didn't have Parkinson's. Carlos says he doesn't have a sensation of the room spinning. This morning he tripped between the  wheel and another object.

## 2019-02-11 ENCOUNTER — PATIENT MESSAGE (OUTPATIENT)
Dept: NEUROLOGY | Facility: MEDICAL CENTER | Age: 74
End: 2019-02-11

## 2019-02-12 ENCOUNTER — OFFICE VISIT (OUTPATIENT)
Dept: BEHAVIORAL HEALTH | Facility: CLINIC | Age: 74
End: 2019-02-12
Payer: MEDICARE

## 2019-02-12 DIAGNOSIS — Z63.0 MARITAL CONFLICT: ICD-10-CM

## 2019-02-12 PROCEDURE — 90834 PSYTX W PT 45 MINUTES: CPT | Performed by: SOCIAL WORKER

## 2019-02-12 SDOH — SOCIAL STABILITY - SOCIAL INSECURITY: PROBLEMS IN RELATIONSHIP WITH SPOUSE OR PARTNER: Z63.0

## 2019-02-13 NOTE — BH THERAPY
" [facility]  Therapy Progress Note    Patient Name: Carlos Rivera  Patient MRN: [id number]  Today's Date: 2/13/2019     Type of session:Couples therapy  Length of session: 45 minutes  Persons in attendance:Patient and Spouse/Partner    Subjective/New Info: Mike here w/wife Comfort.  She says she does not think therapy is helpful b/c Mike does not want to change.  She accuses Mike of taking his anger out on her.  \"I want him to acknowledge that I have feelings.  I have no right to express an opinion.\"  Mike denies angry outbursts, saying \"I get frustrated.\"  He attributes his frustration to Comfort's mood.  He says he believes Comfort needs therapy more than he does.  He says he wants Comfort to \"leave me alone\", ie, stop telling him to  his feet when he walks.    Comfort accused this writer of being \"exasperated\" and said she wanted to leave; she did stay for full session.    Objective/Observations:   Participation: Active verbal participation   Grooming: Casual and Neat   Cognition: Fully Oriented   Eye contact: Good   Mood: pt-mildly dysphoric; wife-angry   Affect: pt-flexible; wife-labile, tearful   Thought process: Goal-directed   Speech: Rate within normal limits and Volume within normal limits   Other:     Diagnoses:   1. Marital conflict         Current risk:   SUICIDE: Low   Homicide: Low   Self-harm: Low   Relapse: Not applicable   Other:    Safety Plan reviewed? Not Indicated   If evidence of imminent risk is present, intervention/plan:     Therapeutic Intervention(s): Develop/modify treatment plan and Supportive psychotherapy    Treatment Goal(s)/Objective(s) addressed: No specific treatment goal established; couple is unable to veer from accusation/defensiveness     Progress toward Treatment Goals: No change    Plan:  - Continue Individual therapy and Couples therapy    Avril Lao L.C.S.W.  2/13/2019                                 "

## 2019-02-14 ENCOUNTER — APPOINTMENT (OUTPATIENT)
Dept: BEHAVIORAL HEALTH | Facility: CLINIC | Age: 74
End: 2019-02-14
Payer: MEDICARE

## 2019-02-20 ENCOUNTER — OFFICE VISIT (OUTPATIENT)
Dept: NEUROLOGY | Facility: MEDICAL CENTER | Age: 74
End: 2019-02-20
Payer: MEDICARE

## 2019-02-20 VITALS
HEIGHT: 67 IN | WEIGHT: 137.4 LBS | BODY MASS INDEX: 21.56 KG/M2 | OXYGEN SATURATION: 93 % | RESPIRATION RATE: 16 BRPM | HEART RATE: 53 BPM | TEMPERATURE: 98.7 F | SYSTOLIC BLOOD PRESSURE: 130 MMHG | DIASTOLIC BLOOD PRESSURE: 60 MMHG

## 2019-02-20 DIAGNOSIS — R26.89 SHUFFLING GAIT: ICD-10-CM

## 2019-02-20 DIAGNOSIS — R42 DIZZINESS: ICD-10-CM

## 2019-02-20 DIAGNOSIS — I70.90 ATHEROSCLEROSIS: ICD-10-CM

## 2019-02-20 DIAGNOSIS — R56.9 SEIZURES (HCC): ICD-10-CM

## 2019-02-20 DIAGNOSIS — R79.89 OTHER SPECIFIED ABNORMAL FINDINGS OF BLOOD CHEMISTRY: ICD-10-CM

## 2019-02-20 DIAGNOSIS — G91.9 HYDROCEPHALUS (HCC): ICD-10-CM

## 2019-02-20 PROCEDURE — 99214 OFFICE O/P EST MOD 30 MIN: CPT | Performed by: PSYCHIATRY & NEUROLOGY

## 2019-02-20 RX ORDER — LEVETIRACETAM 250 MG/1
125 TABLET ORAL 2 TIMES DAILY
Qty: 30 TAB | Refills: 4 | Status: SHIPPED | OUTPATIENT
Start: 2019-02-20 | End: 2020-05-08

## 2019-02-20 NOTE — PATIENT INSTRUCTIONS
IMPRESSION:    1. Unsteadiness, frequent falls since 4 years ago-- since 2014-- progressive- could not tandem-- continue to deteriorate -- falls 2 times in one month  2. Hx of ear infection   3. Cognitive decline for years-- Dementia  4. DM, HTN, small strokes, cardiac mitral valve replacement 1997-- on prolonged coumadin    PLAN/RECOMMENDATIONS:    The balance issue is the major issue-- which must be due to multiple factors  Such as strokes, vascular parkinson ( not typical parkinson disease), dementia, cervical myelopathy, hydrocephalus, neuropathy, small seizure--- etc among these     1. Brain pathology sounds the most major problem--- such as dementia, frontal gait disorder due to dementia, vascular dementia, probable hydrocephalus, subtle seizures( many time, dizziness spells precede falls)  2. Peripheral pathology-- such as DM polyneuropathy, cervical myelopathy-- structural or medical, nutritional deficiency  3. Etc        Continue Namenda 5mg two times per day-- this is for memory  We will start therapeutic trial of low dose of Keppra  125mg two times per day to prevent dizziness spells-- ( which lead to falls)  Please stop if side effects  Please notify us if not helpful    We may need to repeat MRI of brain to monitor possible hydrocephalus  This time, we will start with EEG and blood tests first            SIGNATURE:  Inna Cartagena    CC:  Declan Eaton M.D.
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
## 2019-02-22 ENCOUNTER — HOSPITAL ENCOUNTER (OUTPATIENT)
Dept: LAB | Facility: MEDICAL CENTER | Age: 74
End: 2019-02-22
Attending: PSYCHIATRY & NEUROLOGY
Payer: MEDICARE

## 2019-02-22 DIAGNOSIS — G91.9 HYDROCEPHALUS (HCC): ICD-10-CM

## 2019-02-22 DIAGNOSIS — R56.9 SEIZURES (HCC): ICD-10-CM

## 2019-02-22 DIAGNOSIS — I70.90 ATHEROSCLEROSIS: ICD-10-CM

## 2019-02-22 DIAGNOSIS — R79.89 OTHER SPECIFIED ABNORMAL FINDINGS OF BLOOD CHEMISTRY: ICD-10-CM

## 2019-02-22 DIAGNOSIS — R42 DIZZINESS: ICD-10-CM

## 2019-02-22 DIAGNOSIS — R26.89 SHUFFLING GAIT: ICD-10-CM

## 2019-02-22 LAB
HCYS SERPL-SCNC: 14 UMOL/L
MAGNESIUM SERPL-MCNC: 2 MG/DL (ref 1.5–2.5)
THYROPEROXIDASE AB SERPL-ACNC: <0.2 IU/ML (ref 0–9)

## 2019-02-22 PROCEDURE — 84425 ASSAY OF VITAMIN B-1: CPT

## 2019-02-22 PROCEDURE — 86235 NUCLEAR ANTIGEN ANTIBODY: CPT

## 2019-02-22 PROCEDURE — 84207 ASSAY OF VITAMIN B-6: CPT

## 2019-02-22 PROCEDURE — 83735 ASSAY OF MAGNESIUM: CPT

## 2019-02-22 PROCEDURE — 86147 CARDIOLIPIN ANTIBODY EA IG: CPT | Mod: 91

## 2019-02-22 PROCEDURE — 84630 ASSAY OF ZINC: CPT

## 2019-02-22 PROCEDURE — 86038 ANTINUCLEAR ANTIBODIES: CPT

## 2019-02-22 PROCEDURE — 36415 COLL VENOUS BLD VENIPUNCTURE: CPT

## 2019-02-22 PROCEDURE — 86376 MICROSOMAL ANTIBODY EACH: CPT

## 2019-02-22 PROCEDURE — 83090 ASSAY OF HOMOCYSTEINE: CPT

## 2019-02-24 LAB
CARDIOLIPIN IGA SER IA-ACNC: 1 APL (ref 0–11)
CARDIOLIPIN IGG SER IA-ACNC: 2 GPL (ref 0–14)
CARDIOLIPIN IGM SER IA-ACNC: 7 MPL (ref 0–12)
ENA SS-B IGG SER IA-ACNC: 0 AU/ML (ref 0–40)
NUCLEAR IGG SER QL IA: NORMAL
SSA52 R0ENA AB IGG Q0420: 7 AU/ML (ref 0–40)
SSA60 R0ENA AB IGG Q0419: 1 AU/ML (ref 0–40)

## 2019-02-25 ENCOUNTER — PATIENT MESSAGE (OUTPATIENT)
Dept: MEDICAL GROUP | Facility: PHYSICIAN GROUP | Age: 74
End: 2019-02-25

## 2019-02-25 DIAGNOSIS — E11.00 TYPE 2 DIABETES MELLITUS WITH HYPEROSMOLARITY WITHOUT COMA, WITHOUT LONG-TERM CURRENT USE OF INSULIN (HCC): ICD-10-CM

## 2019-02-25 DIAGNOSIS — F02.80 DEMENTIA ASSOCIATED WITH OTHER UNDERLYING DISEASE WITHOUT BEHAVIORAL DISTURBANCE (HCC): ICD-10-CM

## 2019-02-25 DIAGNOSIS — E11.42 DIABETIC POLYNEUROPATHY ASSOCIATED WITH TYPE 2 DIABETES MELLITUS (HCC): ICD-10-CM

## 2019-02-25 NOTE — TELEPHONE ENCOUNTER
Was the patient seen in the last year in this department? Yes    Does patient have an active prescription for medications requested? Yespharmacy change    Received Request Via: Patient

## 2019-02-25 NOTE — TELEPHONE ENCOUNTER
Patient requests change in pharmacy.     [facility] - [place], [place] - [address] [place] [id number]  Phone: [phone] Fax: [phone]

## 2019-02-26 LAB
VIT B1 BLD-MCNC: 92 NMOL/L (ref 70–180)
VIT B6 SERPL-MCNC: 36.3 NMOL/L (ref 20–125)
ZINC BLD-MCNC: 778.4 UG/DL (ref 440–860)

## 2019-02-26 NOTE — TELEPHONE ENCOUNTER
Pt met protocol?: Yes pt last ov 2/2019   Lab Results  Component Value Date/Time   CHOLSTRLTOT 142 06/14/2018 0623   TRIGLYCERIDE 178 (H) 06/14/2018 0623   HDL 47 06/14/2018 0623   LDL 59 06/14/2018 0623       BP Readings from Last 1 Encounters:   02/20/19 130/60     Lab Results   Component Value Date/Time    HBA1C 8.9 12/13/2018 04:45 PM      Lab Results   Component Value Date/Time    SODIUM 139 06/14/2018 06:23 AM    POTASSIUM 4.8 06/14/2018 06:23 AM    CHLORIDE 102 06/14/2018 06:23 AM    CO2 27 06/14/2018 06:23 AM    GLUCOSE 195 (H) 06/14/2018 06:23 AM    BUN 30 (H) 06/14/2018 06:23 AM    CREATININE 1.16 06/14/2018 06:23 AM

## 2019-02-27 RX ORDER — ATORVASTATIN CALCIUM 40 MG/1
TABLET, FILM COATED ORAL
Qty: 90 TAB | Refills: 1 | Status: SHIPPED | OUTPATIENT
Start: 2019-02-27 | End: 2019-07-18 | Stop reason: SDUPTHER

## 2019-02-27 RX ORDER — GABAPENTIN 100 MG/1
100 CAPSULE ORAL 3 TIMES DAILY
Qty: 270 CAP | Refills: 0 | Status: SHIPPED | OUTPATIENT
Start: 2019-02-27 | End: 2019-04-14 | Stop reason: SDUPTHER

## 2019-02-27 RX ORDER — METFORMIN HYDROCHLORIDE 1000 MG/1
1 TABLET, FILM COATED, EXTENDED RELEASE ORAL 2 TIMES DAILY
Qty: 180 TAB | Refills: 1 | Status: SHIPPED | OUTPATIENT
Start: 2019-02-27 | End: 2019-07-10

## 2019-02-27 RX ORDER — CITALOPRAM HYDROBROMIDE 10 MG/1
TABLET ORAL
Qty: 90 TAB | Refills: 1 | Status: SHIPPED | OUTPATIENT
Start: 2019-02-27 | End: 2020-06-23

## 2019-02-27 RX ORDER — GLIPIZIDE 5 MG/1
5 TABLET, FILM COATED, EXTENDED RELEASE ORAL DAILY
Qty: 90 TAB | Refills: 1 | Status: SHIPPED | OUTPATIENT
Start: 2019-02-27 | End: 2019-07-24 | Stop reason: SDUPTHER

## 2019-02-27 RX ORDER — LISINOPRIL 20 MG/1
20 TABLET ORAL
Qty: 90 TAB | Refills: 1 | Status: SHIPPED | OUTPATIENT
Start: 2019-02-27 | End: 2019-07-18 | Stop reason: SDUPTHER

## 2019-02-27 RX ORDER — MEMANTINE HYDROCHLORIDE 5 MG/1
5 TABLET ORAL 2 TIMES DAILY
Qty: 180 TAB | Refills: 1 | Status: SHIPPED | OUTPATIENT
Start: 2019-02-27 | End: 2019-07-18 | Stop reason: SDUPTHER

## 2019-02-27 NOTE — TELEPHONE ENCOUNTER
Patient has recently been seen by PCP within the last 6 months per protocol (2/19). Will refill medications for 6 months.  Lab Results   Component Value Date/Time    HBA1C 8.9 12/13/2018 04:45 PM      Lab Results   Component Value Date/Time    MALBCRT 879 (H) 06/14/2018 06:22 AM    MICROALBUR 69.7 06/14/2018 06:22 AM      Lab Results   Component Value Date/Time    ALKPHOSPHAT 84 06/14/2018 06:23 AM    ASTSGOT 37 06/14/2018 06:23 AM    ALTSGPT 60 (H) 06/14/2018 06:23 AM    TBILIRUBIN 0.7 06/14/2018 06:23 AM

## 2019-03-07 ENCOUNTER — TELEPHONE (OUTPATIENT)
Dept: MEDICAL GROUP | Facility: PHYSICIAN GROUP | Age: 74
End: 2019-03-07

## 2019-03-07 NOTE — TELEPHONE ENCOUNTER
FINAL PRIOR AUTHORIZATION STATUS:    1.  Name of Medication & Dose: Bydureon     2. Prior Auth Status: Approved through 12/31/99     3. Action Taken: Pharmacy Notified: yes Patient Notified: yes

## 2019-03-10 ENCOUNTER — TELEPHONE (OUTPATIENT)
Dept: NEUROLOGY | Facility: MEDICAL CENTER | Age: 74
End: 2019-03-10

## 2019-03-10 NOTE — TELEPHONE ENCOUNTER
Blood tests-- not remarkable      Results for LESLEY ARROYO (MRN [id number]) as of 3/10/2019 16:40   Ref. Range 2/22/2019 15:29   Magnesium Latest Ref Range: 1.5 - 2.5 mg/dL 2.0   Zinc Serum Latest Ref Range: 440.0 - 860.0 ug/dL 778.4   Homocysteine Latest Ref Range: <11.00 umol/L 14.00 (H)   Vitamin B1 Latest Ref Range: 70 - 180 nmol/L 92   Vitamin B6 Latest Ref Range: 20.0 - 125.0 nmol/L 36.3   Anti-Cariolipin Ab Igg Latest Ref Range: 0 - 14 GPL 2   Anti-Cardiolipin Ab Iga Latest Ref Range: 0 - 11 APL 1   Anti-Cardiolipin Ab Igm Latest Ref Range: 0 - 12 MPL 7   Microsomal -Tpo- Abs Latest Ref Range: 0.0 - 9.0 IU/mL <0.2   Antinuclear Antibody Latest Ref Range: None Detected  None Detected   SSA 52 (R0)(CÉSAR) Ab, IgG Latest Ref Range: 0 - 40 AU/mL 7   SSA 60 (R0)(CÉSAR) Ab, IgG Latest Ref Range: 0 - 40 AU/mL 1   Sjogren'S Anti-Ss-B Latest Ref Range: 0 - 40 AU/mL 0

## 2019-03-11 ENCOUNTER — TELEPHONE (OUTPATIENT)
Dept: BEHAVIORAL HEALTH | Facility: CLINIC | Age: 74
End: 2019-03-11

## 2019-03-11 NOTE — PROGRESS NOTES
Anticoagulation Summary  As of 9/8/2017    INR goal:   2.5-3.5   TTR:   22.4 % (5.7 mo)   Today's INR:   5.2!   Maintenance plan:   5 mg (2.5 mg x 2) every day   Weekly total:   35 mg   Plan last modified:   Casey Birmingham, Adriane (9/8/2017)   Next INR check:   9/15/2017   Target end date:   Indefinite    Indications    History of mitral valve replacement with mechanical valve [Z95.2] [Z95.2]             Anticoagulation Episode Summary     INR check location:       Preferred lab:       Send INR reminders to:       Comments:         Anticoagulation Care Providers     Provider Role Specialty Phone number    PRADEEP Batista Referring Family Medicine [phone]    [facility]ticoagulation [facility] Responsible  [phone]        Anticoagulation Patient Findings  Patient Findings     Negatives:   Signs/symptoms of thrombosis, Signs/symptoms of bleeding, Laboratory test error suspected, Change in health, Change in alcohol use, Change in activity, Upcoming invasive procedure, Emergency department visit, Upcoming dental procedure, Missed doses, Extra doses, Change in medications, Change in diet/appetite, Hospital admission, Bruising, Other complaints        HPI:   Interval history since last visit: Pt denies any unusual s/s of bleeding, bruising, clotting or any changes to diet or medications.  Compliant with current weekly warfarin regimen as detailed above.        Vitals:  Patient declines BP/vitals check today.     Asssessment:  INR supratherapeutic at 5.2.  He misunderstood dosing instructions and took larger dose than outlined at last visit.      Plan:  Instructed patient to HOLD X 1, then decrease weekly warfarin regimen as detailed above to reduce confusion in dosing.     Follow up in 1 weeks.    Casey Birmingham, PharmD     none

## 2019-03-12 ENCOUNTER — APPOINTMENT (OUTPATIENT)
Dept: BEHAVIORAL HEALTH | Facility: CLINIC | Age: 74
End: 2019-03-12
Payer: MEDICARE

## 2019-03-13 NOTE — TELEPHONE ENCOUNTER
Called pt and no answer, I left detailed VM on documentation below and advised pt to call me back if he has any further questions or concerns.

## 2019-03-14 ENCOUNTER — OFFICE VISIT (OUTPATIENT)
Dept: BEHAVIORAL HEALTH | Facility: CLINIC | Age: 74
End: 2019-03-14
Payer: MEDICARE

## 2019-03-14 DIAGNOSIS — F33.0 MILD EPISODE OF RECURRENT MAJOR DEPRESSIVE DISORDER (HCC): ICD-10-CM

## 2019-03-14 PROCEDURE — 90834 PSYTX W PT 45 MINUTES: CPT | Performed by: SOCIAL WORKER

## 2019-03-15 NOTE — BH THERAPY
[facility]  Therapy Progress Note    Patient Name: Carlos Rivera  Patient MRN: [id number]  Today's Date: 3/15/2019     Type of session:Individual psychotherapy  Length of session: 45 minutes  Persons in attendance:Patient    Subjective/New Info: Patient describes mild improvement in mood he attributes to less tension at home noting he and his wife have been getting along better.  Discussed relationship with son living in [facility] and Carlos expresses feeling grateful for son being nearby.  Reviewed plan for Carlos to retrieve his boat from the [place] area and he agrees to write out specific plan to include his son’s assistance.  Carlos acknowledges inclination to begin multiple projects at the same time and being easily distracted interfering with their completion.  Processed focus skills to stay with one project at a time.    Objective/Observations:   Participation: Active verbal participation, Engaged and Open to feedback   Grooming: Neat   Cognition: Alert and Fully Oriented   Eye contact: Good   Mood: Neutral   Affect: Full range   Thought process: Goal-directed   Speech: Rate within normal limits and Volume within normal limits   Other:     Diagnoses:   1. Mild episode of recurrent major depressive disorder (HCC)         Current risk:   SUICIDE: Low   Homicide: Low   Self-harm: Low   Relapse: Low   Other:    Safety Plan reviewed? Not Indicated   If evidence of imminent risk is present, intervention/plan:     Therapeutic Intervention(s): Cognitive modification, Goal-setting and Stressors assessed    Treatment Goal(s)/Objective(s) addressed: Learn and implement problem solving strategies for realistically addressing worries.  Specifically defining a problem, generating options for addressing it, evaluating pros and cons, selecting and implementing an option and then evaluating and refining the action.     Progress toward Treatment Goals: Mild improvement    Plan:  - Next appointment scheduled:   3/26/2019  - Patient is in agreement with the above plan:  YES    Corinne G. Taylor, L.C.SANAYA  3/15/2019

## 2019-03-26 ENCOUNTER — OFFICE VISIT (OUTPATIENT)
Dept: BEHAVIORAL HEALTH | Facility: CLINIC | Age: 74
End: 2019-03-26
Payer: MEDICARE

## 2019-03-26 DIAGNOSIS — F33.0 MILD EPISODE OF RECURRENT MAJOR DEPRESSIVE DISORDER (HCC): ICD-10-CM

## 2019-03-26 PROCEDURE — 90834 PSYTX W PT 45 MINUTES: CPT | Performed by: SOCIAL WORKER

## 2019-03-26 NOTE — BH THERAPY
[facility]  Therapy Progress Note    Patient Name: Carlos Rivera  Patient MRN: [id number]  Today's Date: 3/26/2019     Type of session:Individual psychotherapy  Length of session: 45 minutes  Persons in attendance:Patient    Subjective/New Info: Reports improved mood he attributes in part to engaging in activities he is finding pleasurable including recently attending [employer] meeting.  Also discussed CBT cognitive restructuring and Mike acknowledges feeling better about using stationery bicycle for exercise.  He is also learning about diet as means of promoting better mental health.      Objective/Observations:   Participation: Active verbal participation, Attentive, Engaged and Open to feedback   Grooming: Neat   Cognition: Alert and Fully Oriented   Eye contact: Good   Mood: Neutral   Affect: Flexible and Congruent with content   Thought process: Logical and Goal-directed   Speech: Rate within normal limits and Volume within normal limits   Other:     Diagnoses:   1. Mild episode of recurrent major depressive disorder (HCC)         Current risk:   SUICIDE: Low   Homicide: Low   Self-harm: Low   Relapse: Low   Other:    Safety Plan reviewed? Not Indicated   If evidence of imminent risk is present, intervention/plan:     Therapeutic Intervention(s): Cognitive modification, Goal-setting and Self-care skills    Treatment Goal(s)/Objective(s) addressed: Learn and implement problem solving strategies for realistically addressing worries.  Specifically defining a problem, generating options for addressing it, evaluating pros and cons, selecting and implementing an option and then evaluating and refining the action.     Progress toward Treatment Goals: Mild improvement    Plan:  - Next appointment scheduled:  4/16/2019  - Patient is in agreement with the above plan:  YES    Corinne G. Taylor, L.C.S.W.  3/26/2019

## 2019-04-08 ENCOUNTER — ANTICOAGULATION VISIT (OUTPATIENT)
Dept: MEDICAL GROUP | Facility: PHYSICIAN GROUP | Age: 74
End: 2019-04-08
Payer: MEDICARE

## 2019-04-08 DIAGNOSIS — Z95.2 HISTORY OF MITRAL VALVE REPLACEMENT WITH MECHANICAL VALVE: ICD-10-CM

## 2019-04-08 DIAGNOSIS — Z79.01 CHRONIC ANTICOAGULATION: Primary | ICD-10-CM

## 2019-04-08 LAB — INR PPP: 4.6 (ref 2–3.5)

## 2019-04-08 PROCEDURE — 99211 OFF/OP EST MAY X REQ PHY/QHP: CPT | Performed by: INTERNAL MEDICINE

## 2019-04-08 PROCEDURE — 85610 PROTHROMBIN TIME: CPT | Performed by: INTERNAL MEDICINE

## 2019-04-08 NOTE — PROGRESS NOTES
Anticoagulation Summary  As of 2019    INR goal:   2.5-3.5   TTR:   27.1 % (2.1 y)   INR used for dosin.6! (2019)   Warfarin maintenance plan:   7.5 mg (2.5 mg x 3) every Mon, Wed, Fri; 5 mg (2.5 mg x 2) all other days   Weekly warfarin total:   42.5 mg   Plan last modified:   Chase Baldwin, PharmD (2018)   Next INR check:   2019   Target end date:   Indefinite    Indications    Chronic anticoagulation [Z79.01]  History of mitral valve replacement with mechanical valve [Z95.2] [Z95.2]             Anticoagulation Episode Summary     INR check location:       Preferred lab:       Send INR reminders to:       Comments:         Anticoagulation Care Providers     Provider Role Specialty Phone number    Declan Eaton M.D. Referring Internal Medicine [phone]    [facility]ticoagulation [facility] Responsible  [phone]        Anticoagulation Patient Findings  Patient Findings     Negatives:   Signs/symptoms of thrombosis, Signs/symptoms of bleeding, Laboratory test error suspected, Change in health, Change in alcohol use, Change in activity, Upcoming invasive procedure, Emergency department visit, Upcoming dental procedure, Missed doses, Extra doses, Change in medications, Change in diet/appetite, Hospital admission, Bruising, Other complaints        HPI:   Carlos Rivera seen in clinic today, on anticoagulation therapy with warfarin for stroke prevention due to history of mechanical mitral valve replacement.    Patient's previous INR was supratherapeutic at 3.6 on 18, at which time patient was instructed to continue with current warfarin regimen.  He has been noncompliant with follow up, but returns to clinic today to recheck INR to ensure it is therapeutic and thus preventing possible clotting and/or bleeding/bruising complications.    CHADS-VASc = n/a  (unadjusted ischemic stroke risk/year:  n/a)    Does patient have any changes to current medical/health status since last appt (Y/N):   "NO  Does patient have any signs/symptoms of bleeding and/or thrombosis since the last appt (Y/N):  No  Does patient have any interval changes to diet or medications since last appt (Y/N):  NO  Are there any complications or cost restrictions with current therapy (Y/N):  NO      Vitals:  BP check declined today      Weight  declines   Height   5' 7\"     Asssessment:      INR supratherapeutic at 4.6, therefore increasing patient's risk of bleeding complications.   Reason(s) for out of range INR today:  Took larger dose yesterday in error, noncompliant with follow up.      Plan:  Instructed patient to HOLD X 1, then resume current warfarin regimen in order to bring INR to therapeutic range.     Follow up:  Because warfarin is a high risk medication and current CHEST guidelines recommend regular monitoring intervals (few days up to 12 weeks), will have patient return to clinic in 2 weeks to recheck INR.    Casey Birmingham, PharmD    "

## 2019-04-14 DIAGNOSIS — E11.00 TYPE 2 DIABETES MELLITUS WITH HYPEROSMOLARITY WITHOUT COMA, WITHOUT LONG-TERM CURRENT USE OF INSULIN (HCC): Primary | ICD-10-CM

## 2019-04-14 DIAGNOSIS — E11.42 DIABETIC POLYNEUROPATHY ASSOCIATED WITH TYPE 2 DIABETES MELLITUS (HCC): ICD-10-CM

## 2019-04-15 RX ORDER — GABAPENTIN 100 MG/1
100 CAPSULE ORAL 3 TIMES DAILY
Qty: 270 CAP | Refills: 1 | Status: SHIPPED | OUTPATIENT
Start: 2019-04-15 | End: 2019-07-10

## 2019-04-15 NOTE — TELEPHONE ENCOUNTER
Was the patient seen in the last year in this department? Yes 2/7/19    Does patient have an active prescription for medications requested? No     [facility]

## 2019-04-15 NOTE — TELEPHONE ENCOUNTER
Patient has recently been seen by PCP within the last 6 months per protocol (2/19). Will refill DM supplies for 12 months.  Lab Results   Component Value Date/Time    HBA1C 8.9 12/13/2018 04:45 PM      Lab Results   Component Value Date/Time    MALBCRT 879 (H) 06/14/2018 06:22 AM    MICROALBUR 69.7 06/14/2018 06:22 AM      Lab Results   Component Value Date/Time    ALKPHOSPHAT 84 06/14/2018 06:23 AM    ASTSGOT 37 06/14/2018 06:23 AM    ALTSGPT 60 (H) 06/14/2018 06:23 AM    TBILIRUBIN 0.7 06/14/2018 06:23 AM

## 2019-04-15 NOTE — TELEPHONE ENCOUNTER
Pt met protocol?: Yes pt last ov 2/19   Lab Results   Component Value Date/Time    HBA1C 8.9 12/13/2018 04:45 PM

## 2019-04-15 NOTE — TELEPHONE ENCOUNTER
From: Carlos Rivera  Sent: 4/14/2019 12:06 AM PDT  Subject: Medication Renewal Request    Carlos Rivera would like a refill of the following medications:     Blood Glucose Monitoring Suppl Device [Declan Eaton M.D.]     gabapentin (NEURONTIN) 100 MG Cap [Declan Eaton M.D.]    Preferred pharmacy: [facility] - [address]

## 2019-04-16 ENCOUNTER — OFFICE VISIT (OUTPATIENT)
Dept: BEHAVIORAL HEALTH | Facility: CLINIC | Age: 74
End: 2019-04-16
Payer: MEDICARE

## 2019-04-16 DIAGNOSIS — F33.0 MILD EPISODE OF RECURRENT MAJOR DEPRESSIVE DISORDER (HCC): ICD-10-CM

## 2019-04-16 PROCEDURE — 90834 PSYTX W PT 45 MINUTES: CPT | Performed by: SOCIAL WORKER

## 2019-04-17 ENCOUNTER — APPOINTMENT (RX ONLY)
Dept: URBAN - METROPOLITAN AREA CLINIC 22 | Facility: CLINIC | Age: 74
Setting detail: DERMATOLOGY
End: 2019-04-17

## 2019-04-17 DIAGNOSIS — L57.0 ACTINIC KERATOSIS: ICD-10-CM

## 2019-04-17 DIAGNOSIS — L81.4 OTHER MELANIN HYPERPIGMENTATION: ICD-10-CM

## 2019-04-17 DIAGNOSIS — D22 MELANOCYTIC NEVI: ICD-10-CM

## 2019-04-17 DIAGNOSIS — Z71.89 OTHER SPECIFIED COUNSELING: ICD-10-CM

## 2019-04-17 DIAGNOSIS — D18.0 HEMANGIOMA: ICD-10-CM

## 2019-04-17 DIAGNOSIS — L82.1 OTHER SEBORRHEIC KERATOSIS: ICD-10-CM

## 2019-04-17 PROBLEM — D48.5 NEOPLASM OF UNCERTAIN BEHAVIOR OF SKIN: Status: ACTIVE | Noted: 2019-04-17

## 2019-04-17 PROBLEM — D18.01 HEMANGIOMA OF SKIN AND SUBCUTANEOUS TISSUE: Status: ACTIVE | Noted: 2019-04-17

## 2019-04-17 PROBLEM — D22.61 MELANOCYTIC NEVI OF RIGHT UPPER LIMB, INCLUDING SHOULDER: Status: ACTIVE | Noted: 2019-04-17

## 2019-04-17 PROBLEM — D22.62 MELANOCYTIC NEVI OF LEFT UPPER LIMB, INCLUDING SHOULDER: Status: ACTIVE | Noted: 2019-04-17

## 2019-04-17 PROBLEM — D22.5 MELANOCYTIC NEVI OF TRUNK: Status: ACTIVE | Noted: 2019-04-17

## 2019-04-17 PROCEDURE — ? BIOPSY BY SHAVE METHOD

## 2019-04-17 PROCEDURE — ? COUNSELING

## 2019-04-17 PROCEDURE — 99203 OFFICE O/P NEW LOW 30 MIN: CPT | Mod: 25

## 2019-04-17 PROCEDURE — 17004 DESTROY PREMAL LESIONS 15/>: CPT

## 2019-04-17 PROCEDURE — 11102 TANGNTL BX SKIN SINGLE LES: CPT | Mod: 59

## 2019-04-17 PROCEDURE — ? LIQUID NITROGEN

## 2019-04-17 ASSESSMENT — LOCATION DETAILED DESCRIPTION DERM
LOCATION DETAILED: INFERIOR MID FOREHEAD
LOCATION DETAILED: RIGHT SUPERIOR CRUS OF ANTIHELIX
LOCATION DETAILED: LEFT CENTRAL FRONTAL SCALP
LOCATION DETAILED: LEFT SUPERIOR PARIETAL SCALP
LOCATION DETAILED: RIGHT FOREHEAD
LOCATION DETAILED: RIGHT SUPERIOR FOREHEAD
LOCATION DETAILED: RIGHT CENTRAL FRONTAL SCALP
LOCATION DETAILED: RIGHT SUPERIOR HELIX
LOCATION DETAILED: LEFT CENTRAL TEMPLE
LOCATION DETAILED: LEFT SUPERIOR MEDIAL FOREHEAD
LOCATION DETAILED: LEFT SUPERIOR CRUS OF ANTIHELIX
LOCATION DETAILED: LEFT INFERIOR LATERAL FOREHEAD
LOCATION DETAILED: LEFT CYMBA CONCHA
LOCATION DETAILED: LEFT SUPERIOR HELIX
LOCATION DETAILED: LEFT INFERIOR FOREHEAD
LOCATION DETAILED: RIGHT PROXIMAL DORSAL FOREARM
LOCATION DETAILED: RIGHT SUPERIOR UPPER BACK
LOCATION DETAILED: LEFT SUPERIOR FOREHEAD
LOCATION DETAILED: RIGHT MEDIAL UPPER BACK
LOCATION DETAILED: LEFT MEDIAL FOREHEAD
LOCATION DETAILED: RIGHT SCAPHA
LOCATION DETAILED: LEFT CENTRAL PARIETAL SCALP
LOCATION DETAILED: LEFT PROXIMAL DORSAL FOREARM
LOCATION DETAILED: LEFT DISTAL DORSAL FOREARM
LOCATION DETAILED: RIGHT SUPERIOR PARIETAL SCALP
LOCATION DETAILED: LEFT FOREHEAD
LOCATION DETAILED: EPIGASTRIC SKIN
LOCATION DETAILED: RIGHT CENTRAL PARIETAL SCALP
LOCATION DETAILED: RIGHT SUPERIOR MEDIAL MIDBACK

## 2019-04-17 ASSESSMENT — LOCATION SIMPLE DESCRIPTION DERM
LOCATION SIMPLE: SCALP
LOCATION SIMPLE: LEFT EAR
LOCATION SIMPLE: ABDOMEN
LOCATION SIMPLE: RIGHT SCALP
LOCATION SIMPLE: LEFT FOREHEAD
LOCATION SIMPLE: RIGHT EAR
LOCATION SIMPLE: RIGHT FOREARM
LOCATION SIMPLE: RIGHT UPPER BACK
LOCATION SIMPLE: LEFT FOREARM
LOCATION SIMPLE: INFERIOR FOREHEAD
LOCATION SIMPLE: RIGHT LOWER BACK
LOCATION SIMPLE: LEFT TEMPLE
LOCATION SIMPLE: RIGHT FOREHEAD
LOCATION SIMPLE: LEFT SCALP

## 2019-04-17 ASSESSMENT — LOCATION ZONE DERM
LOCATION ZONE: ARM
LOCATION ZONE: SCALP
LOCATION ZONE: FACE
LOCATION ZONE: EAR
LOCATION ZONE: TRUNK

## 2019-04-17 NOTE — PROCEDURE: BIOPSY BY SHAVE METHOD
Was A Bandage Applied: Yes
Silver Nitrate Text: The wound bed was treated with silver nitrate after the biopsy was performed.
Post-Care Instructions: I reviewed with the patient in detail post-care instructions. Patient is to keep the biopsy site dry overnight, and then apply bacitracin twice daily until healed. Patient may apply hydrogen peroxide soaks to remove any crusting.
Dressing: bandage
Anesthesia Volume In Cc: 1
Consent: Written consent was obtained and risks were reviewed including but not limited to scarring, infection, bleeding, scabbing, incomplete removal, nerve damage and allergy to anesthesia.
Bill For Surgical Tray: no
Detail Level: Detailed
Biopsy Method: Personna blade
Lab: [id number]
Curettage Text: The wound bed was treated with curettage after the biopsy was performed.
Notification Instructions: Patient will be notified of biopsy results. However, patient instructed to call the office if not contacted within 2 weeks.
Hemostasis: Drysol
Wound Care: Vaseline
Size Of Lesion In Cm: 0
Lab Facility: 
Anesthesia Type: 1% lidocaine with 1:100,000 epinephrine and a 1:3 solution of 8.4% sodium bicarbonate
Cryotherapy Text: The wound bed was treated with cryotherapy after the biopsy was performed.
Biopsy Type: H and E
Electrodesiccation And Curettage Text: The wound bed was treated with electrodesiccation and curettage after the biopsy was performed.
Type Of Destruction Used: Curettage
Billing Type: Third-Party Bill
Electrodesiccation Text: The wound bed was treated with electrodesiccation after the biopsy was performed.
Depth Of Biopsy: dermis

## 2019-04-18 NOTE — BH THERAPY
[facility]  Therapy Progress Note    Patient Name: Carlos Rivera  Patient MRN: [id number]  Today's Date: 4/18/2019     Type of session:Individual psychotherapy  Length of session: 45 minutes  Persons in attendance:Patient    Subjective/New Info: Patient reports coming to terms with possibility of selling his boat and camper acknowledging both are costing money to store with little likeihood they he would be able to use either in the manner he has in the past and how he envisions the experiences.  Describes enjoyment when he is working on the car his brother gave him and with various projects he is working on in their home.  Also notes less discord with wife.  Discussed specific steps toward plan for boat and camper as means of bringing the task to a manageable goal.  Carlos agrees to talk with his son who has agreed to assist his father.     Objective/Observations:   Participation: Active verbal participation and Open to feedback   Grooming: Casual and Neat   Cognition: Alert and Fully Oriented   Eye contact: Good   Mood: Neutral   Affect: Full range   Thought process: Goal-directed   Speech: Rate within normal limits and Volume within normal limits   Other:     Diagnoses:   1. Mild episode of recurrent major depressive disorder (HCC)         Current risk:   SUICIDE: Low   Homicide: Low   Self-harm: Low   Relapse: Low   Other:    Safety Plan reviewed? Not Indicated   If evidence of imminent risk is present, intervention/plan:     Therapeutic Intervention(s): Goal-setting, Positive behavior reinforced, Problem-solving and Self-care skills    Treatment Goal(s)/Objective(s) addressed: Patient will assess strengths that could be enhanced during therapy to facilitate the accomplishment of therapeutic goals.     Utilize learned assertiveness skills to set boundaries and get needs met      Progress toward Treatment Goals: Mild improvement    Plan:  - Next appointment scheduled:  5/17/2019  - Patient is in  agreement with the above plan:  YES    Corinne G. Taylor, L.C.S.W.  4/18/2019

## 2019-04-30 ENCOUNTER — PATIENT MESSAGE (OUTPATIENT)
Dept: MEDICAL GROUP | Facility: PHYSICIAN GROUP | Age: 74
End: 2019-04-30

## 2019-04-30 RX ORDER — METFORMIN HYDROCHLORIDE 500 MG/1
TABLET, EXTENDED RELEASE ORAL
Qty: 360 TAB | Refills: 0 | Status: SHIPPED | OUTPATIENT
Start: 2019-04-30 | End: 2019-07-24 | Stop reason: SDUPTHER

## 2019-05-01 ENCOUNTER — TELEPHONE (OUTPATIENT)
Dept: MEDICAL GROUP | Facility: PHYSICIAN GROUP | Age: 74
End: 2019-05-01

## 2019-05-01 NOTE — TELEPHONE ENCOUNTER
1. Caller Name: Carlos                                          Call Back Number: [phone] (home)        Patient approves a detailed voicemail message: N\A    Pt called because he would like to change his stress test from a chemical stress test to a treadmill stress test. Pt adamant about not having a chemical stress test. Please advise.

## 2019-05-02 NOTE — TELEPHONE ENCOUNTER
Stress test is not a routine test. Needs to be evaluated but if he's not symptomatic then there may not be an indication for this. If he has further concerns he should make an appt

## 2019-05-02 NOTE — TELEPHONE ENCOUNTER
I have reviewed his visits with me dating back to 2018 and have never ordered a stress test for him. If he is having cardiac issues then he needs to be seen asap in the er

## 2019-05-10 ENCOUNTER — TELEPHONE (OUTPATIENT)
Dept: MEDICAL GROUP | Facility: PHYSICIAN GROUP | Age: 74
End: 2019-05-10

## 2019-05-10 NOTE — TELEPHONE ENCOUNTER
[facility] - [place], [place] - [facility] [facility]  [address] [facility] [address]  Phone: [phone] Fax: [phone]    Pharmacy received rx for Bydureon BCISE autoinj and states it is temporarily out of stock.  Bydureon vials are being discontinued.  They state a possible alternative is Bydureon Pen Injector by [employer].  For pens 2 mg dose = 0.65 ml.   If appropriate please send new rx.

## 2019-05-13 NOTE — TELEPHONE ENCOUNTER
Sent the pen injection, please inform patient as well since he sent a [employer] message regarding this

## 2019-05-17 ENCOUNTER — APPOINTMENT (OUTPATIENT)
Dept: BEHAVIORAL HEALTH | Facility: CLINIC | Age: 74
End: 2019-05-17
Payer: MEDICARE

## 2019-05-20 ENCOUNTER — APPOINTMENT (OUTPATIENT)
Dept: BEHAVIORAL HEALTH | Facility: CLINIC | Age: 74
End: 2019-05-20
Payer: MEDICARE

## 2019-05-23 DIAGNOSIS — Z79.01 CHRONIC ANTICOAGULATION: ICD-10-CM

## 2019-05-28 ENCOUNTER — OFFICE VISIT (OUTPATIENT)
Dept: MEDICAL GROUP | Facility: PHYSICIAN GROUP | Age: 74
End: 2019-05-28
Payer: MEDICARE

## 2019-05-28 VITALS
HEIGHT: 67 IN | RESPIRATION RATE: 16 BRPM | DIASTOLIC BLOOD PRESSURE: 56 MMHG | OXYGEN SATURATION: 98 % | SYSTOLIC BLOOD PRESSURE: 126 MMHG | WEIGHT: 134 LBS | TEMPERATURE: 98.4 F | HEART RATE: 50 BPM | BODY MASS INDEX: 21.03 KG/M2

## 2019-05-28 DIAGNOSIS — F02.80 DEMENTIA ASSOCIATED WITH OTHER UNDERLYING DISEASE WITHOUT BEHAVIORAL DISTURBANCE (HCC): ICD-10-CM

## 2019-05-28 DIAGNOSIS — Z71.1 PHYSICALLY WELL BUT WORRIED: ICD-10-CM

## 2019-05-28 DIAGNOSIS — Z01.84 IMMUNITY STATUS TESTING: ICD-10-CM

## 2019-05-28 DIAGNOSIS — Z91.89 OTHER SPECIFIED PERSONAL RISK FACTORS, NOT ELSEWHERE CLASSIFIED: ICD-10-CM

## 2019-05-28 DIAGNOSIS — E11.00 TYPE 2 DIABETES MELLITUS WITH HYPEROSMOLARITY WITHOUT COMA, WITHOUT LONG-TERM CURRENT USE OF INSULIN (HCC): ICD-10-CM

## 2019-05-28 PROCEDURE — 99214 OFFICE O/P EST MOD 30 MIN: CPT | Performed by: INTERNAL MEDICINE

## 2019-05-28 NOTE — PROGRESS NOTES
PRIMARY CARE CLINIC FOLLOW UP VISIT  Chief Complaint   Patient presents with   • Other     worry about health    • Other     immunity status testing      History of Present Illness     Immunity status testing  He would like titers for MMR.     Dementia  Continues to have memory issues, following with his neurologist Dr. Cartagena. Does wish to continue increasing his cardio activity to see if this helps with his memory and overall health.     Physically well but worried  Carlos is stable but worried about his health, especially since he has lost some of his friends including his closest sailing layne. He is very interested in increasing his cardiovascular activity including biking and sailing. He is requesting a stress test to help him better assess his health. Denies chest pain, shortness of breath.     Type II diabetes mellitus (CMS-[facility])  He has been compliant with his bydureon, glipizide, and metformin. His wife helps sort his oral medications.     Current Outpatient Prescriptions   Medication Sig Dispense Refill   • Exenatide ER 2 MG Pen-injector Inject 0.65 mL as instructed every 7 days. 4 Each 3   • metFORMIN ER (GLUCOPHAGE XR) 500 MG TABLET SR 24 HR Take 2 tabs at once 2 times a day 360 Tab 0   • Blood Glucose Monitoring Suppl Device Meter: Dispense One Touch Ultra. Sig. Use as directed for blood sugar monitoring. #1. NR. 1 Device 0   • gabapentin (NEURONTIN) 100 MG Cap Take 1 Cap by mouth 3 times a day. 270 Cap 1   • Blood Glucose Test Strips Test strips order: Test strips for One Touch Ultra 2 meter. Sig: use twice daily and prn ssx high or low sugar #100 RF x 3 200 Strip 3   • memantine (NAMENDA) 5 MG Tab Take 1 Tab by mouth 2 times a day. 180 Tab 1   • glipiZIDE SR (GLUCOTROL) 5 MG TABLET SR 24 HR Take 1 Tab by mouth every day. 90 Tab 1   • lisinopril (PRINIVIL) 20 MG Tab Take 1 Tab by mouth every day. 90 Tab 1   • metformin ER modified (GLUMETZA) 1000 MG TABLET SR 24 HR Take 1 Tab by mouth 2 times a day. 180  "Tab 1   • citalopram (CELEXA) 10 MG tablet TAKE 1 TABLET BY MOUTH  EVERY MORNING 90 Tab 1   • atorvastatin (LIPITOR) 40 MG Tab TAKE 1 TABLET BY MOUTH  EVERY EVENING 90 Tab 1   • levETIRAcetam (KEPPRA) 250 MG tablet Take 0.5 Tabs by mouth 2 times a day. 30 Tab 4   • warfarin (COUMADIN) 2.5 MG Tab TAKE TWO TO THREE TABLETS  BY MOUTH ONE TIME DAILY AS  DIRECTED BY COUMADIN CLINIC 270 Tab 1   • Insulin Syringe-Needle U-100 (INSULIN SYRINGE .3CC/31GX5/16\") 31G X 5/16\" 0.3 ML Misc Use 3 times daily with insulin 300 Each 3   • Blood Glucose Monitoring Suppl Supplies Misc Test strips order: Test strips compatible with meter. Sig: use daily and prn ssx high or low sugar 200 Each 3   • fexofenadine (ALLEGRA ALLERGY) 180 MG tablet Take 180 mg by mouth every day.     • Homeopathic Products ([employer] LEG CRAMPS PAIN RELIEF PO) Take  by mouth.     • Blood Glucose Monitoring Suppl (ONETOUCH VERIO FLEX SYSTEM) W/DEVICE Kit by Does not apply route.       No current facility-administered medications for this visit.      Past Medical History:   Diagnosis Date   • Chronic anticoagulation 2/23/2017   • History of mitral valve replacement with mechanical valve [Z95.2] 3/10/2017   • Hyperlipidemia    • Type II diabetes mellitus (HCC) 2/23/2017     Past Surgical History:   Procedure Laterality Date   • MITRAL VALVE REPLACEMENT  1999   • INGUINAL HERNIA REPAIR Bilateral 1984   • TONSILLECTOMY       Social History   Substance Use Topics   • Smoking status: Never Smoker   • Smokeless tobacco: Never Used   • Alcohol use No     Social History     Social History Narrative    Retired from [employer]      Family History   Problem Relation Age of Onset   • Heart Attack Father 58   • Diabetes Father    • Heart Attack Paternal Uncle    • No Known Problems Sister    • No Known Problems Brother    • No Known Problems Maternal Grandmother    • No Known Problems Maternal Grandfather    • No Known Problems Paternal Grandmother    • Heart Attack Paternal Grandfather  " "  • No Known Problems Sister    • No Known Problems Brother      Family Status   Relation Status   • Mo    • Fa         58   • PUnc (Not Specified)   • Sis Alive   • Bro    • MGMo    • MGFa    • PGMo    • PGFa    • Sis Alive   • Bro Alive     Allergies: Vicodin [hydrocodone-acetaminophen]    ROS  As per HPI above. All other systems reviewed and negative.        Objective   /56   Pulse (!) 50   Temp 36.9 °C (98.4 °F)   Resp 16   Ht 1.702 m (5' 7.01\")   Wt 60.8 kg (134 lb)   SpO2 98%  Body mass index is 20.98 kg/m².    General: alert and oriented, pleasant, cooperative  HEENT: Normocephalic, atraumatic.   Psychiatric: appropriate mood and affect. Good insight and appropriate judgment     Assessment and Plan   The following treatment plan was discussed     1. Immunity status testing  - MEASLES/MUMPS/RUBELLA IMMUNITY; Future    2. Dementia associated with other underlying disease without behavioral disturbance  Following with Dr. Cartagena.     3. Physically well but worried  Carlos is worried about his health since he lost his close sailing layne. We discussed CT cardiac scoring for risk stratification as opposed to a stress test since he is asymptomatic. He is also planning to increase his physical activity.     4. Other specified personal risk factors, not elsewhere classified  - CT-CARDIAC SCORING; Future    5. Type 2 diabetes mellitus with hyperosmolarity without coma, without long-term current use of insulin (HCC)  - Comp Metabolic Panel; Future  - CBC WITH DIFFERENTIAL; Future  - Lipid Profile; Future  - HEMOGLOBIN A1C; Future  - MICROALBUMIN CREAT RATIO URINE; Future    Healthcare maintenance     Health Maintenance Due   Topic Date Due   • IMM HEP B VACCINE (1 of 3 - Risk 3-dose series) 10/12/1962   • DIABETES MONOFILAMENT / LE EXAM  2019       No Follow-up on file.    Declan Eaton MD  Internal Medicine  [facility]                   "

## 2019-05-28 NOTE — ASSESSMENT & PLAN NOTE
Continues to have memory issues, following with his neurologist Dr. Cartagena. Does wish to continue increasing his cardio activity to see if this helps with his memory and overall health.

## 2019-05-28 NOTE — ASSESSMENT & PLAN NOTE
He has been compliant with his bydureon, glipizide, and metformin. His wife helps sort his oral medications.

## 2019-05-28 NOTE — ASSESSMENT & PLAN NOTE
Carlos is stable but worried about his health, especially since he has lost some of his friends including his closest sailing layne. He is very interested in increasing his cardiovascular activity including biking and sailing. He is requesting a stress test to help him better assess his health. Denies chest pain, shortness of breath.

## 2019-05-31 ENCOUNTER — ANTICOAGULATION VISIT (OUTPATIENT)
Dept: MEDICAL GROUP | Facility: PHYSICIAN GROUP | Age: 74
End: 2019-05-31
Payer: MEDICARE

## 2019-05-31 VITALS
BODY MASS INDEX: 20.51 KG/M2 | RESPIRATION RATE: 22 BRPM | SYSTOLIC BLOOD PRESSURE: 140 MMHG | HEART RATE: 55 BPM | WEIGHT: 131 LBS | DIASTOLIC BLOOD PRESSURE: 71 MMHG

## 2019-05-31 DIAGNOSIS — Z79.01 CHRONIC ANTICOAGULATION: ICD-10-CM

## 2019-05-31 DIAGNOSIS — Z95.2 HISTORY OF MITRAL VALVE REPLACEMENT WITH MECHANICAL VALVE: ICD-10-CM

## 2019-05-31 LAB — INR PPP: 3.8 (ref 2–3.5)

## 2019-05-31 PROCEDURE — 85610 PROTHROMBIN TIME: CPT | Performed by: INTERNAL MEDICINE

## 2019-05-31 PROCEDURE — 99211 OFF/OP EST MAY X REQ PHY/QHP: CPT | Performed by: INTERNAL MEDICINE

## 2019-05-31 NOTE — PROGRESS NOTES
Anticoagulation Summary  As of 5/31/2019    INR goal:   2.5-3.5   TTR:   25.3 % (2.2 y)   INR used for dosing:   3.80! (5/31/2019)   Warfarin maintenance plan:   7.5 mg (2.5 mg x 3) every Mon, Fri; 5 mg (2.5 mg x 2) all other days   Weekly warfarin total:   40 mg   Plan last modified:   Chase Baldwin, PharmD (5/31/2019)   Next INR check:   6/14/2019   Target end date:   Indefinite    Indications    Chronic anticoagulation [Z79.01]  History of mitral valve replacement with mechanical valve [Z95.2] [Z95.2]             Anticoagulation Episode Summary     INR check location:       Preferred lab:       Send INR reminders to:       Comments:         Anticoagulation Care Providers     Provider Role Specialty Phone number    eDclan Eaton M.D. Referring Internal Medicine [phone]    [facility] Anticoagulation Services Responsible  [phone]        Anticoagulation Patient Findings      HPI:  Carlos Rivera seen in clinic today, on anticoagulation therapy with warfarin for valve replacement.   Reason for today's visit (per our collaborative practice policy) is because their last INR was 4.6 on 4/8/19. Intervention at the last visit: none  Changes to current medical/health status since last appt: none  No signs/symptoms of bleeding and/or thrombosis since the last appt.    No interval changes to diet or any interval changes to medications since last appt.   No complications or cost restrictions with current therapy.   BP recorded in vitals.  Confirmed dosing regimen.     A/P   INR  SUPRA-therapeutic.   Possible reason(s) INR is not in range today: maintenance dose needs changing.     Begin reduced regimen.     Follow up appointment in 2 weeks to reduce risk of adverse events related to this high risk medication, Warfarin.    Purpose of next visit:  They have a TTR of 25% which is not at target (TTR target/goal is 100%) and requires close follow up to prevent a adverse event (the lower the TTR the higher risk of clots,  strokes, or bleeding).     Other info:  Pt educated to contact our clinic with any changes in medications or s/s of bleeding or thrombosis  CHEST guidelines recommend frequent INR monitoring at regular intervals (a few days up to a max of 12 weeks) to ensure they are on the proper dose of warfarin and not having any complications from therapy. INRs can dramatically change over a short time period due to diet, medications, and medical conditions.     Chase Baldwin, PharmD

## 2019-06-07 ENCOUNTER — OFFICE VISIT (OUTPATIENT)
Dept: CARDIOLOGY | Facility: MEDICAL CENTER | Age: 74
End: 2019-06-07
Payer: MEDICARE

## 2019-06-07 VITALS
HEART RATE: 54 BPM | SYSTOLIC BLOOD PRESSURE: 114 MMHG | OXYGEN SATURATION: 96 % | BODY MASS INDEX: 20.4 KG/M2 | WEIGHT: 130 LBS | HEIGHT: 67 IN | DIASTOLIC BLOOD PRESSURE: 70 MMHG

## 2019-06-07 DIAGNOSIS — E11.00 TYPE 2 DIABETES MELLITUS WITH HYPEROSMOLARITY WITHOUT COMA, WITHOUT LONG-TERM CURRENT USE OF INSULIN (HCC): ICD-10-CM

## 2019-06-07 DIAGNOSIS — Z79.01 CHRONIC ANTICOAGULATION: ICD-10-CM

## 2019-06-07 DIAGNOSIS — E78.5 HYPERLIPIDEMIA, UNSPECIFIED HYPERLIPIDEMIA TYPE: ICD-10-CM

## 2019-06-07 DIAGNOSIS — Z95.2 HISTORY OF MITRAL VALVE REPLACEMENT WITH MECHANICAL VALVE: ICD-10-CM

## 2019-06-07 PROCEDURE — 99215 OFFICE O/P EST HI 40 MIN: CPT | Performed by: INTERNAL MEDICINE

## 2019-06-07 NOTE — PROGRESS NOTES
Cardiology Follow-up Consultation Note    Date of note:    6/7/2019  Primary Care Provider: Declan Eaton M.D.    Patient Name: Carlos Rivera     YOB: 1945  MRN:              [id number]      CC: mechanical mitral valve.     History of Present Illness: Carlos Rivera is a 73 y.o.-year-old male whose current medical problems include type II diabetes, and mechanical mitral valve placed 1999 on coumadin who is here for follow-up.     At our initial visit, 10/6/2017:    Fall of 1998 was found to have murmur on exam and mitral valve prolapse with severe mitral regurgitation via Dr. Oscar.  Had valve replaced in 9/1999. Has not needed any replacement, has had no endocarditis, and no bleeding complications.  No previous TIA/CVA. He reports no history of arrhythmias.     Moved from [place] 1 year ago. Lost 10 pounds in the last year, lost appetite.      Does not walk to exercise due to bone spur on foot.    Stopped bike riding due to back and neck pain, was doing long distance bike rides up until a year ago.     Interim Events:  In terms of diabetes, not well controlled.     Seeing Dr. Cartagena for memory difficulties and imbalance. Told not to ride his bike which he hates.     In terms of MVR, no dyspnea and no bleeding issues.           Review of Systems   Constitution: Negative for chills, fever and night sweats.   HENT: Negative for nosebleeds.    Eyes: Negative for vision loss in left eye and vision loss in right eye.   Respiratory: Negative for hemoptysis.    Gastrointestinal: Negative for hematemesis, hematochezia and melena.   Genitourinary: Negative for hematuria.   Neurological: Negative for focal weakness, numbness and paresthesias.       Past Medical History:   Diagnosis Date   • Chronic anticoagulation 2/23/2017   • History of mitral valve replacement with mechanical valve [Z95.2] 3/10/2017   • Hyperlipidemia    • Type II diabetes mellitus (HCC) 2/23/2017         Past Surgical  "History:   Procedure Laterality Date   • MITRAL VALVE REPLACEMENT  1999   • INGUINAL HERNIA REPAIR Bilateral 1984   • TONSILLECTOMY           Current Outpatient Prescriptions   Medication Sig Dispense Refill   • Exenatide ER 2 MG Pen-injector Inject 0.65 mL as instructed every 7 days. 4 Each 3   • metFORMIN ER (GLUCOPHAGE XR) 500 MG TABLET SR 24 HR Take 2 tabs at once 2 times a day 360 Tab 0   • Blood Glucose Monitoring Suppl Device Meter: Dispense One Touch Ultra. Sig. Use as directed for blood sugar monitoring. #1. NR. 1 Device 0   • gabapentin (NEURONTIN) 100 MG Cap Take 1 Cap by mouth 3 times a day. 270 Cap 1   • Blood Glucose Test Strips Test strips order: Test strips for One Touch Ultra 2 meter. Sig: use twice daily and prn ssx high or low sugar #100 RF x 3 200 Strip 3   • memantine (NAMENDA) 5 MG Tab Take 1 Tab by mouth 2 times a day. 180 Tab 1   • glipiZIDE SR (GLUCOTROL) 5 MG TABLET SR 24 HR Take 1 Tab by mouth every day. 90 Tab 1   • lisinopril (PRINIVIL) 20 MG Tab Take 1 Tab by mouth every day. 90 Tab 1   • metformin ER modified (GLUMETZA) 1000 MG TABLET SR 24 HR Take 1 Tab by mouth 2 times a day. 180 Tab 1   • citalopram (CELEXA) 10 MG tablet TAKE 1 TABLET BY MOUTH  EVERY MORNING 90 Tab 1   • atorvastatin (LIPITOR) 40 MG Tab TAKE 1 TABLET BY MOUTH  EVERY EVENING 90 Tab 1   • levETIRAcetam (KEPPRA) 250 MG tablet Take 0.5 Tabs by mouth 2 times a day. 30 Tab 4   • warfarin (COUMADIN) 2.5 MG Tab TAKE TWO TO THREE TABLETS  BY MOUTH ONE TIME DAILY AS  DIRECTED BY COUMADIN CLINIC 270 Tab 1   • Insulin Syringe-Needle U-100 (INSULIN SYRINGE .3CC/31GX5/16\") 31G X 5/16\" 0.3 ML Misc Use 3 times daily with insulin 300 Each 3   • Blood Glucose Monitoring Suppl Supplies Misc Test strips order: Test strips compatible with meter. Sig: use daily and prn ssx high or low sugar 200 Each 3   • fexofenadine (ALLEGRA ALLERGY) 180 MG tablet Take 180 mg by mouth every day.     • Homeopathic Products ([employer] LEG CRAMPS PAIN RELIEF " "PO) Take  by mouth.     • Blood Glucose Monitoring Suppl (ONETOUCH VERIO FLEX SYSTEM) W/DEVICE Kit by Does not apply route.       No current facility-administered medications for this visit.          Allergies   Allergen Reactions   • Vicodin [Hydrocodone-Acetaminophen] Vomiting         Family History   Problem Relation Age of Onset   • Heart Attack Father 58   • Diabetes Father    • Heart Attack Paternal Uncle    • No Known Problems Sister    • No Known Problems Brother    • No Known Problems Maternal Grandmother    • No Known Problems Maternal Grandfather    • No Known Problems Paternal Grandmother    • Heart Attack Paternal Grandfather    • No Known Problems Sister    • No Known Problems Brother          Social History     Social History   • Marital status:      Spouse name: N/A   • Number of children: N/A   • Years of education: N/A     Occupational History   • Not on file.     Social History Main Topics   • Smoking status: Never Smoker   • Smokeless tobacco: Never Used   • Alcohol use No   • Drug use: No   • Sexual activity: Yes      Comment:      Other Topics Concern   • Not on file     Social History Narrative    Retired from [employer]          Physical Exam:  Ambulatory Vitals  /70 (BP Location: Left arm, Patient Position: Sitting, BP Cuff Size: Adult)   Pulse (!) 54   Ht 1.702 m (5' 7.01\")   Wt 59 kg (130 lb)   SpO2 96%    Oxygen Therapy:  Pulse Oximetry: 96 %  BP Readings from Last 4 Encounters:   06/07/19 114/70   05/31/19 140/71   05/28/19 126/56   02/20/19 130/60       Weight/BMI: Body mass index is 20.35 kg/m².  Wt Readings from Last 4 Encounters:   06/07/19 59 kg (130 lb)   05/31/19 59.4 kg (131 lb)   05/28/19 60.8 kg (134 lb)   02/20/19 62.3 kg (137 lb 6.4 oz)         General: No apparent distress  Eyes: nl conjunctiva  ENT: OP clear  Neck: JVP 5 cm H2O, no carotid bruits  Lungs: normal respiratory effort, CTAB  Heart: RRR, Mechanical S1, no murmurs, no rubs or gallops, no edema " bilateral lower extremities. No LV/RV heave on cardiac palpatation. 2+ bilateral radial pulses.  2+ bilateral dp pulses. Well healed sternotomy scar.   Abdomen: soft, non tender, non distended, no masses, normal bowel sounds.  No HSM.  Extremities/MSK: no clubbing, no cyanosis  Neurological: No focal sensory deficits  Psychiatric: Appropriate affect, A/O x 3  Skin: Warm extremities    Exam repeated in full and unchanged except for as noted above.        Lab Data Review:  Lab Results   Component Value Date/Time    CHOLSTRLTOT 142 06/14/2018 06:23 AM    LDL 59 06/14/2018 06:23 AM    HDL 47 06/14/2018 06:23 AM    TRIGLYCERIDE 178 (H) 06/14/2018 06:23 AM       Lab Results   Component Value Date/Time    SODIUM 139 06/14/2018 06:23 AM    POTASSIUM 4.8 06/14/2018 06:23 AM    CHLORIDE 102 06/14/2018 06:23 AM    CO2 27 06/14/2018 06:23 AM    GLUCOSE 195 (H) 06/14/2018 06:23 AM    BUN 30 (H) 06/14/2018 06:23 AM    CREATININE 1.16 06/14/2018 06:23 AM     CrCl cannot be calculated (Patient's most recent lab result is older than the maximum 7 days allowed.).  Lab Results   Component Value Date/Time    ALKPHOSPHAT 84 06/14/2018 06:23 AM    ASTSGOT 37 06/14/2018 06:23 AM    ALTSGPT 60 (H) 06/14/2018 06:23 AM    TBILIRUBIN 0.7 06/14/2018 06:23 AM      Lab Results   Component Value Date/Time    WBC 6.1 08/04/2017 09:42 AM     Lab Results   Component Value Date/Time    HBA1C 8.9 12/13/2018 04:45 PM     No components found for: TROP      Cardiac Imaging and Procedures Review:    EKG dated 10/6/2017: My personal interpretation is NSR, non-specific IVCD, LAD, non-specific TW changes in inferior and lateral leads.      Radiology test review   MRI 5/7/2018:  1.  Small areas of encephalomalacia in the RIGHT ventrolateral thalamus and RIGHT frontal parietal cortex compatible with remote ischemic insults  2.  No evidence of acute intracranial hemorrhage, mass or acute ischemia  3.  Mild atrophy without disproportionate enlargement of the  ventricular system  4.  Moderate white matter changes    Medical Decision Makin. History of mitral valve replacement  Currently asymptomatic. .   -endocarditis ppx, Rx'd amoxicillin.   -ED precautions discussed  -continue coumadin, goal INR 2.5-3.5    2. Hyperlipidemia, unspecified hyperlipidemia type  -continue lipitor, LDL at goal    3. Type 2 diabetes mellitus with hyperosmolarity without coma, without long-term current use of insulin (CMS-[facility])  Poorly controlled  -f/u with PCP. Goal HgbA1c at least <8  -can get coronary calcium score, but based on previous CVA and venous bypass, he has an indication for high dose statin regardless.     4. Atrial flutter - noted by his previous cardiologist Dr. Prosper Fierro. S/p cardioversion 2012.     5. History of myocardial infarction - per previous cardiologists notes. No known details. He reports he did have a SVG bypass. On statin. Checking lipid panel per PCP.     6. History of remote CVA - based on MRI results. Followed by .       Return in about 1 year (around 2020).      Stephon Ramos MD  [facility]  [place] [facility], [address]  [address] [address]  Phone: [phone]  Fax: [phone]

## 2019-06-07 NOTE — Clinical Note
Hi Dr. Eaton, I do think Mr. Wyman has a bypass (so he says) as well as a previous CVA, so he should be on a high dose statin regardless.  He probably doesn't need a calcium score then, but I'll leave that up to you.  Thanks and let me know if questions!

## 2019-06-14 ENCOUNTER — ANTICOAGULATION VISIT (OUTPATIENT)
Dept: MEDICAL GROUP | Facility: PHYSICIAN GROUP | Age: 74
End: 2019-06-14
Payer: MEDICARE

## 2019-06-14 DIAGNOSIS — Z95.2 HISTORY OF MITRAL VALVE REPLACEMENT WITH MECHANICAL VALVE: ICD-10-CM

## 2019-06-14 DIAGNOSIS — Z79.01 CHRONIC ANTICOAGULATION: ICD-10-CM

## 2019-06-14 LAB — INR PPP: 3 (ref 2–3.5)

## 2019-06-14 PROCEDURE — 93793 ANTICOAG MGMT PT WARFARIN: CPT | Performed by: FAMILY MEDICINE

## 2019-06-14 PROCEDURE — 85610 PROTHROMBIN TIME: CPT | Performed by: FAMILY MEDICINE

## 2019-06-14 NOTE — PROGRESS NOTES
Anticoagulation Summary  As of 6/14/2019    INR goal:   2.5-3.5   TTR:   26.0 % (2.2 y)   INR used for dosing:   3.00 (6/14/2019)   Warfarin maintenance plan:   7.5 mg (2.5 mg x 3) every Mon, Wed, Fri; 5 mg (2.5 mg x 2) all other days   Weekly warfarin total:   42.5 mg   Plan last modified:   Chase Baldwin, PharmD (6/14/2019)   Next INR check:   7/12/2019   Target end date:   Indefinite    Indications    Chronic anticoagulation [Z79.01]  History of mitral valve replacement with mechanical valve [Z95.2] [Z95.2]             Anticoagulation Episode Summary     INR check location:       Preferred lab:       Send INR reminders to:       Comments:         Anticoagulation Care Providers     Provider Role Specialty Phone number    Declan Eaton M.D. Referring Internal Medicine [phone]    [facility] Responsible  [phone]        Anticoagulation Patient Findings      HPI:  Carlos Rivera seen in clinic today, on anticoagulation therapy with warfarin for Mitral valve.   Reason for today's visit (per our collaborative practice policy) is because their last INR was 3.8 on 5/31/19. Intervention at the last visit:reduced dose.   Changes to current medical/health status since last appt: none  No signs/symptoms of bleeding and/or thrombosis since the last appt.    No interval changes to diet or any interval changes to medications since last appt.   No complications or cost restrictions with current therapy.   Declines vitals.     A/P   INR  therapeutic.     Pt is to continue with current warfarin dosing regimen.     Follow up appointment in 4 weeks , per pt, to reduce risk of adverse events related to this high risk medication, Warfarin.    Purpose of next visit:    They have a TTR of 26% which is not at target (TTR target/goal is 100%) and requires close follow up to prevent a adverse event (the lower the TTR the higher risk of clots, strokes, or bleeding).     Other info:  Pt educated to contact our  clinic with any changes in medications or s/s of bleeding or thrombosis  CHEST guidelines recommend frequent INR monitoring at regular intervals (a few days up to a max of 12 weeks) to ensure they are on the proper dose of warfarin and not having any complications from therapy. INRs can dramatically change over a short time period due to diet, medications, and medical conditions.     Chase Baldwin, PharmD

## 2019-06-20 ENCOUNTER — OFFICE VISIT (OUTPATIENT)
Dept: BEHAVIORAL HEALTH | Facility: CLINIC | Age: 74
End: 2019-06-20
Payer: MEDICARE

## 2019-06-20 DIAGNOSIS — F33.0 MILD EPISODE OF RECURRENT MAJOR DEPRESSIVE DISORDER (HCC): ICD-10-CM

## 2019-06-20 PROCEDURE — 90834 PSYTX W PT 45 MINUTES: CPT | Performed by: SOCIAL WORKER

## 2019-06-24 NOTE — BH THERAPY
[facility]  Therapy Progress Note    Patient Name: Carlos Rivera  Patient MRN: [id number]  Today's Date: 6/24/2019     Type of session:Individual psychotherapy  Length of session: 45 minutes  Persons in attendance:Patient    Subjective/New Info: Met with patient who presents on time for scheduled appointment.  Reports improved home environment noting he and wife have been getting along better noting her efforts at supporting his interests.  He signed up for [employer] class at [facility] and is enjoying the setting.  Also decided to sell his boat with son agreeing to help him with that project.  Continues to reminisce about physical abilities including sailing and riding his bike though seems more realistic about activities he will pursue in the future.     Objective/Observations:   Participation: Active verbal participation, Attentive, Engaged and Open to feedback   Grooming: Casual and Neat   Cognition: Alert and Fully Oriented   Eye contact: Good   Mood: Neutral   Affect: Flexible and Congruent with content   Thought process: Logical and Goal-directed   Speech: Rate within normal limits and Volume within normal limits   Other:     Diagnoses:   1. Mild episode of recurrent major depressive disorder (HCC)         Current risk:   SUICIDE: Low   Homicide: Low   Self-harm: Low   Relapse: Low   Other:    Safety Plan reviewed? Not Indicated   If evidence of imminent risk is present, intervention/plan:     Therapeutic Intervention(s): Cognitive modification, Goal-setting, Positive behavior reinforced and Supportive psychotherapy    Treatment Goal(s)/Objective(s) addressed: Psychoeducation on distortions in thinking and how to challenge and modify distorted thinking into more realistic and flexible thinking                 Utilize learned skills to manage mood and emotional suffering more effectively  Identify goals/values and cultivate a meaningful life     Progress toward Treatment Goals: Mild  improvement    Plan:  - Next appointment scheduled:  7/16/2019  - Patient is in agreement with the above plan:  YES    Corinne G. Taylor, L.C.SANAYA  6/24/2019

## 2019-07-10 ENCOUNTER — OFFICE VISIT (OUTPATIENT)
Dept: MEDICAL GROUP | Facility: PHYSICIAN GROUP | Age: 74
End: 2019-07-10
Payer: MEDICARE

## 2019-07-10 VITALS
RESPIRATION RATE: 14 BRPM | WEIGHT: 133.6 LBS | HEIGHT: 67 IN | BODY MASS INDEX: 20.97 KG/M2 | DIASTOLIC BLOOD PRESSURE: 68 MMHG | HEART RATE: 52 BPM | SYSTOLIC BLOOD PRESSURE: 122 MMHG | OXYGEN SATURATION: 96 % | TEMPERATURE: 98.2 F

## 2019-07-10 DIAGNOSIS — Z79.899 MEDICATION MANAGEMENT: ICD-10-CM

## 2019-07-10 DIAGNOSIS — E11.00 TYPE 2 DIABETES MELLITUS WITH HYPEROSMOLARITY WITHOUT COMA, WITHOUT LONG-TERM CURRENT USE OF INSULIN (HCC): ICD-10-CM

## 2019-07-10 PROCEDURE — 99214 OFFICE O/P EST MOD 30 MIN: CPT | Performed by: INTERNAL MEDICINE

## 2019-07-10 RX ORDER — GABAPENTIN 100 MG/1
100 CAPSULE ORAL
COMMUNITY
End: 2020-05-08

## 2019-07-10 NOTE — ASSESSMENT & PLAN NOTE
His wife says that the appearance of his medications have been changing as manufacturers changed. Additionally, his wife says that Mike gets confused and will sometimes take his pm pills in the morning. Mike also reports that his increase in naps may be due to a medication. Wife notes that gabapentin makes him sleepy.

## 2019-07-10 NOTE — PROGRESS NOTES
"PRIMARY CARE CLINIC FOLLOW UP VISIT  Chief Complaint   Patient presents with   • Medication Management   • Fatigue     from gabapentin     History of Present Illness     Here with his wife for the following:     Medication management  His wife says that the appearance of his medications have been changing as manufacturers changed. Additionally, his wife says that Mike gets confused and will sometimes take his pm pills in the morning. Mike also reports that his increase in naps may be due to a medication. Wife notes that gabapentin makes him sleepy.     Current Outpatient Prescriptions   Medication Sig Dispense Refill   • gabapentin (NEURONTIN) 100 MG Cap Take 100 mg by mouth every bedtime. Take 2-3 capsules at bedtime     • Exenatide ER 2 MG Pen-injector Inject 0.65 mL as instructed every 7 days. 4 Each 3   • metFORMIN ER (GLUCOPHAGE XR) 500 MG TABLET SR 24 HR Take 2 tabs at once 2 times a day 360 Tab 0   • Blood Glucose Monitoring Suppl Device Meter: Dispense One Touch Ultra. Sig. Use as directed for blood sugar monitoring. #1. NR. 1 Device 0   • Blood Glucose Test Strips Test strips order: Test strips for One Touch Ultra 2 meter. Sig: use twice daily and prn ssx high or low sugar #100 RF x 3 200 Strip 3   • memantine (NAMENDA) 5 MG Tab Take 1 Tab by mouth 2 times a day. 180 Tab 1   • glipiZIDE SR (GLUCOTROL) 5 MG TABLET SR 24 HR Take 1 Tab by mouth every day. 90 Tab 1   • lisinopril (PRINIVIL) 20 MG Tab Take 1 Tab by mouth every day. 90 Tab 1   • citalopram (CELEXA) 10 MG tablet TAKE 1 TABLET BY MOUTH  EVERY MORNING 90 Tab 1   • atorvastatin (LIPITOR) 40 MG Tab TAKE 1 TABLET BY MOUTH  EVERY EVENING 90 Tab 1   • levETIRAcetam (KEPPRA) 250 MG tablet Take 0.5 Tabs by mouth 2 times a day. 30 Tab 4   • warfarin (COUMADIN) 2.5 MG Tab TAKE TWO TO THREE TABLETS  BY MOUTH ONE TIME DAILY AS  DIRECTED BY COUMADIN CLINIC 270 Tab 1   • Insulin Syringe-Needle U-100 (INSULIN SYRINGE .3CC/31GX5/16\") 31G X 5/16\" 0.3 ML Misc Use " 3 times daily with insulin 300 Each 3   • Blood Glucose Monitoring Suppl Supplies Misc Test strips order: Test strips compatible with meter. Sig: use daily and prn ssx high or low sugar 200 Each 3   • fexofenadine (ALLEGRA ALLERGY) 180 MG tablet Take 180 mg by mouth every day.     • Homeopathic Products ([employer] LEG CRAMPS PAIN RELIEF PO) Take  by mouth.     • Blood Glucose Monitoring Suppl (ONETOUCH VERIO FLEX SYSTEM) W/DEVICE Kit by Does not apply route.       No current facility-administered medications for this visit.      Past Medical History:   Diagnosis Date   • Chronic anticoagulation 2017   • History of mitral valve replacement with mechanical valve [Z95.2] 3/10/2017   • Hyperlipidemia    • Type II diabetes mellitus (HCC) 2017     Past Surgical History:   Procedure Laterality Date   • MITRAL VALVE REPLACEMENT     • INGUINAL HERNIA REPAIR Bilateral    • TONSILLECTOMY       Social History   Substance Use Topics   • Smoking status: Never Smoker   • Smokeless tobacco: Never Used   • Alcohol use No     Social History     Social History Narrative    Retired from [employer]      Family History   Problem Relation Age of Onset   • Heart Attack Father 58   • Diabetes Father    • Heart Attack Paternal Uncle    • No Known Problems Sister    • No Known Problems Brother    • No Known Problems Maternal Grandmother    • No Known Problems Maternal Grandfather    • No Known Problems Paternal Grandmother    • Heart Attack Paternal Grandfather    • No Known Problems Sister    • No Known Problems Brother      Family Status   Relation Status   • Mo    • Fa         58   • PUnc (Not Specified)   • Sis Alive   • Bro    • MGMo    • MGFa    • PGMo    • PGFa    • Sis Alive   • Bro Alive     Allergies: Vicodin [hydrocodone-acetaminophen]    ROS  As per HPI above. All other systems reviewed and negative.        Objective   /68   Pulse (!) 52   Temp 36.8 °C (98.2 °F)  "  Resp 14   Ht 1.702 m (5' 7.01\")   Wt 60.6 kg (133 lb 9.6 oz)   SpO2 96%  Body mass index is 20.92 kg/m².    General: alert and oriented, pleasant, cooperative  HEENT: Normocephalic, atraumatic.   Psychiatric: appropriate mood and affect. Good insight and appropriate judgment     Assessment and Plan   The following treatment plan was discussed     1. Type 2 diabetes mellitus with hyperosmolarity without coma, without long-term current use of insulin (HCC)  Doing well on byetta, metformin, and glipizide. Recheck labs.   - Comp Metabolic Panel; Future  - CBC WITH DIFFERENTIAL; Future  - Lipid Profile; Future  - HEMOGLOBIN A1C; Future  - MICROALBUMIN CREAT RATIO URINE; Future    2. Medication management  Reconciled his pill bottles with his medication list today. Advised to move gabapentin dosing all to bedtime (up to 2-3 capsules of the 100 mg capsules) at bedtime to minimize his daytime sleepiness from his medications. Also advised him to allow his wife to help load his pill box and dispense his medications.     Healthcare maintenance     Health Maintenance Due   Topic Date Due   • IMM HEP B VACCINE (1 of 3 - Risk 3-dose series) 10/12/1964   • DIABETES MONOFILAMENT / LE EXAM  02/26/2019   • A1C SCREENING  06/13/2019   • FASTING LIPID PROFILE  06/14/2019   • URINE ACR / MICROALBUMIN  06/14/2019   • SERUM CREATININE  06/14/2019       No Follow-up on file.    Declan Eaton MD  Internal Medicine  [facility]                   "

## 2019-07-12 ENCOUNTER — ANTICOAGULATION VISIT (OUTPATIENT)
Dept: MEDICAL GROUP | Facility: PHYSICIAN GROUP | Age: 74
End: 2019-07-12
Payer: MEDICARE

## 2019-07-12 DIAGNOSIS — Z95.2 HISTORY OF MITRAL VALVE REPLACEMENT WITH MECHANICAL VALVE: ICD-10-CM

## 2019-07-12 DIAGNOSIS — Z79.01 CHRONIC ANTICOAGULATION: Primary | ICD-10-CM

## 2019-07-12 LAB — INR PPP: 1.4 (ref 2–3.5)

## 2019-07-12 PROCEDURE — 99211 OFF/OP EST MAY X REQ PHY/QHP: CPT | Performed by: FAMILY MEDICINE

## 2019-07-12 PROCEDURE — 85610 PROTHROMBIN TIME: CPT | Performed by: FAMILY MEDICINE

## 2019-07-12 NOTE — PROGRESS NOTES
Anticoagulation Summary  As of 2019    INR goal:   2.5-3.5   TTR:   26.1 % (2.3 y)   INR used for dosin.40! (2019)   Warfarin maintenance plan:   7.5 mg (2.5 mg x 3) every Mon, Wed, Fri; 5 mg (2.5 mg x 2) all other days   Weekly warfarin total:   42.5 mg   Plan last modified:   Chase Baldwin, PharmD (2019)   Next INR check:   2019   Target end date:   Indefinite    Indications    Chronic anticoagulation [Z79.01]  History of mitral valve replacement with mechanical valve [Z95.2] [Z95.2]             Anticoagulation Episode Summary     INR check location:       Preferred lab:       Send INR reminders to:       Comments:         Anticoagulation Care Providers     Provider Role Specialty Phone number    Declan Eaton M.D. Referring Internal Medicine [phone]    [facility] Responsible  [phone]        Anticoagulation Patient Findings  Patient Findings     Negatives:   Signs/symptoms of thrombosis, Signs/symptoms of bleeding, Laboratory test error suspected, Change in health, Change in alcohol use, Change in activity, Upcoming invasive procedure, Emergency department visit, Upcoming dental procedure, Missed doses, Extra doses, Change in medications, Change in diet/appetite, Hospital admission, Bruising, Other complaints        HPI:   Carlos Parker in clinic today, on anticoagulation therapy with warfarin for stroke prevention due to history of mechanical mitral valve replacement.    Patient's previous INR was therapeutic at 3.0 on 19, at which time patient was instructed to continue with current warfarin.  He returns to clinic today to recheck INR to ensure it is therapeutic and thus preventing possible clotting and/or bleeding/bruising complications.    CHADS-VASc = n/a  (unadjusted ischemic stroke risk/year:  n/a)    Does patient have any changes to current medical/health status since last appt (Y/N):  No  Does patient have any signs/symptoms of bleeding  and/or thrombosis since the last appt (Y/N):  No  Does patient have any interval changes to diet or medications since last appt (Y/N):  No  Are there any complications or cost restrictions with current therapy (Y/N):  No     Does patient have [facility] PCP? Yes, Declan Eaton MD      Vitals:  BP declines in office today    Weight  declines   Height   170 cm     Asssessment:      INR sub-therapeutic at 1.4, therefore increasing his risk for blood clots.   Reason(s) for out of range INR today:  Patient does not remember missing doses and may be eating more greens than normal.      Plan:  Pt is to bolus today and tomorrow at 10mg and then resume current warfarin dosing regimen.  Will consider Lovenox bridge if next INR is low.     Follow up:  Because warfarin is a high risk medication and current CHEST guidelines recommend regular monitoring intervals (few days up to 12 weeks), will have patient return to clinic in 1 week to recheck INR.    Felisha Cha, Pharmacy Intern    Casey Birmingham, PharmD

## 2019-07-18 DIAGNOSIS — F02.80 DEMENTIA ASSOCIATED WITH OTHER UNDERLYING DISEASE WITHOUT BEHAVIORAL DISTURBANCE (HCC): ICD-10-CM

## 2019-07-18 DIAGNOSIS — E11.00 TYPE 2 DIABETES MELLITUS WITH HYPEROSMOLARITY WITHOUT COMA, WITHOUT LONG-TERM CURRENT USE OF INSULIN (HCC): ICD-10-CM

## 2019-07-19 ENCOUNTER — ANTICOAGULATION VISIT (OUTPATIENT)
Dept: MEDICAL GROUP | Facility: PHYSICIAN GROUP | Age: 74
End: 2019-07-19
Payer: MEDICARE

## 2019-07-19 VITALS — HEART RATE: 50 BPM | SYSTOLIC BLOOD PRESSURE: 128 MMHG | DIASTOLIC BLOOD PRESSURE: 63 MMHG

## 2019-07-19 DIAGNOSIS — Z95.2 HISTORY OF MITRAL VALVE REPLACEMENT WITH MECHANICAL VALVE: ICD-10-CM

## 2019-07-19 DIAGNOSIS — Z79.01 CHRONIC ANTICOAGULATION: ICD-10-CM

## 2019-07-19 LAB — INR PPP: 2.5 (ref 2–3.5)

## 2019-07-19 PROCEDURE — 93793 ANTICOAG MGMT PT WARFARIN: CPT | Performed by: FAMILY MEDICINE

## 2019-07-19 PROCEDURE — 85610 PROTHROMBIN TIME: CPT | Performed by: INTERNAL MEDICINE

## 2019-07-19 RX ORDER — LISINOPRIL 20 MG/1
20 TABLET ORAL DAILY
Qty: 90 TAB | Refills: 0 | Status: SHIPPED | OUTPATIENT
Start: 2019-07-19 | End: 2019-09-25 | Stop reason: SDUPTHER

## 2019-07-19 RX ORDER — EXENATIDE 2 MG/.65ML
INJECTION, SUSPENSION, EXTENDED RELEASE SUBCUTANEOUS
Qty: 4 EACH | Refills: 3 | OUTPATIENT
Start: 2019-07-19

## 2019-07-19 RX ORDER — MEMANTINE HYDROCHLORIDE 5 MG/1
5 TABLET ORAL 2 TIMES DAILY
Qty: 180 TAB | Refills: 0 | Status: SHIPPED | OUTPATIENT
Start: 2019-07-19 | End: 2020-05-08

## 2019-07-19 RX ORDER — ATORVASTATIN CALCIUM 40 MG/1
40 TABLET, FILM COATED ORAL DAILY
Qty: 90 TAB | Refills: 0 | Status: SHIPPED | OUTPATIENT
Start: 2019-07-19 | End: 2019-09-25 | Stop reason: SDUPTHER

## 2019-07-19 NOTE — TELEPHONE ENCOUNTER
Was the patient seen in the last year in this department? Yes    Does patient have an active prescription for medications requested? No     Received Request Via: Pharmacy      Pt met protocol?: No due for labs   Pt last ov 7/2019 bydureon requested to soon sent 5/13/2019   BP Readings from Last 1 Encounters:   07/10/19 122/68       Lab Results  Component Value Date/Time   CHOLSTRLTOT 142 06/14/2018 0623   TRIGLYCERIDE 178 (H) 06/14/2018 0623   HDL 47 06/14/2018 0623   LDL 59 06/14/2018 0623

## 2019-07-19 NOTE — PROGRESS NOTES
Anticoagulation Summary  As of 2019    INR goal:   2.5-3.5   TTR:   25.9 % (2.3 y)   INR used for dosin.50 (2019)   Warfarin maintenance plan:   7.5 mg (2.5 mg x 3) every Mon, Wed, Fri; 5 mg (2.5 mg x 2) all other days   Weekly warfarin total:   42.5 mg   Plan last modified:   Chase Baldwin, PharmD (2019)   Next INR check:   2019   Target end date:   Indefinite    Indications    Chronic anticoagulation [Z79.01]  History of mitral valve replacement with mechanical valve [Z95.2] [Z95.2]             Anticoagulation Episode Summary     INR check location:       Preferred lab:       Send INR reminders to:       Comments:         Anticoagulation Care Providers     Provider Role Specialty Phone number    Declan Eaton M.D. Referring Internal Medicine [phone]    [facility] Responsible  [phone]        Anticoagulation Patient Findings  Patient Findings     Negatives:   Signs/symptoms of thrombosis, Signs/symptoms of bleeding, Laboratory test error suspected, Change in health, Change in alcohol use, Change in activity, Upcoming invasive procedure, Emergency department visit, Upcoming dental procedure, Missed doses, Extra doses, Change in medications, Change in diet/appetite, Hospital admission, Bruising, Other complaints        HPI:   Carlos Rivera seen in clinic today, on anticoagulation therapy with warfarin for stroke prevention due to history of mechanical mitral valve replacement.    Patient's previous INR was subtherapeutic at 1.4 on 19, at which time patient was instructed to bolus with two doses, then resume current warfarin regimen.  He returns to clinic today to recheck INR to ensure it is therapeutic and thus preventing possible clotting and/or bleeding/bruising complications.    CHADS-VASc = n/a  (unadjusted ischemic stroke risk/year:  n/a)    Does patient have any changes to current medical/health status since last appt (Y/N):  NO  Does patient have  "any signs/symptoms of bleeding and/or thrombosis since the last appt (Y/N):  NO  Does patient have any interval changes to diet or medications since last appt (Y/N):  NO  Are there any complications or cost restrictions with current therapy (Y/N):  NO     Does patient have [facility] PCP? YES, Dr Declan Eaton  (If not, please document discussion that patient must be seen at [facility])       Vitals:  /63  HR 50    Weight  declines   Height   5' 7\"     Asssessment:      INR therapeutic at 2.5, therefore decreasing patient's risk of stroke and/or bleeding complications.   Reason(s) for out of range INR today:  n/a      Plan:  Pt is to continue with current warfarin dosing regimen in order to maintain INR in therapeutic range.     Follow up:  Because warfarin is a high risk medication and current CHEST guidelines recommend regular monitoring intervals (few days up to 12 weeks), will have patient return to clinic in 2 weeks to recheck INR.    Casey Birmingham, PharmD, BCACP    "

## 2019-07-24 DIAGNOSIS — E11.00 TYPE 2 DIABETES MELLITUS WITH HYPEROSMOLARITY WITHOUT COMA, WITHOUT LONG-TERM CURRENT USE OF INSULIN (HCC): ICD-10-CM

## 2019-07-24 NOTE — TELEPHONE ENCOUNTER
Was the patient seen in the last year in this department? Yes    Does patient have an active prescription for medications requested? No     Received Request Via: Pharmacy    Pt met protocol?: Yes     Last OV 07/10/2019    Lab Results   Component Value Date/Time    HBA1C 8.9 12/13/2018 04:45 PM        Lab Results  Component Value Date/Time   CHOLSTRLTOT 142 06/14/2018 0623   TRIGLYCERIDE 178 (H) 06/14/2018 0623   HDL 47 06/14/2018 0623   LDL 59 06/14/2018 0623

## 2019-07-25 RX ORDER — METFORMIN HYDROCHLORIDE 500 MG/1
1000 TABLET, EXTENDED RELEASE ORAL 2 TIMES DAILY
Qty: 360 TAB | Refills: 0 | Status: ON HOLD | OUTPATIENT
Start: 2019-07-25 | End: 2020-05-08

## 2019-07-25 RX ORDER — GLIPIZIDE 5 MG/1
5 TABLET, FILM COATED, EXTENDED RELEASE ORAL DAILY
Qty: 90 TAB | Refills: 0 | Status: SHIPPED | OUTPATIENT
Start: 2019-07-25 | End: 2020-05-08

## 2019-08-02 ENCOUNTER — ANTICOAGULATION VISIT (OUTPATIENT)
Dept: MEDICAL GROUP | Facility: PHYSICIAN GROUP | Age: 74
End: 2019-08-02
Payer: MEDICARE

## 2019-08-02 VITALS — HEART RATE: 48 BPM | DIASTOLIC BLOOD PRESSURE: 70 MMHG | SYSTOLIC BLOOD PRESSURE: 142 MMHG

## 2019-08-02 DIAGNOSIS — Z79.01 CHRONIC ANTICOAGULATION: Primary | ICD-10-CM

## 2019-08-02 DIAGNOSIS — Z95.2 HISTORY OF MITRAL VALVE REPLACEMENT WITH MECHANICAL VALVE: ICD-10-CM

## 2019-08-02 LAB — INR PPP: 2.9 (ref 2–3.5)

## 2019-08-02 PROCEDURE — 85610 PROTHROMBIN TIME: CPT | Performed by: INTERNAL MEDICINE

## 2019-08-02 PROCEDURE — 93793 ANTICOAG MGMT PT WARFARIN: CPT | Performed by: INTERNAL MEDICINE

## 2019-08-02 NOTE — PROGRESS NOTES
Anticoagulation Summary  As of 2019    INR goal:   2.5-3.5   TTR:   27.1 % (2.4 y)   INR used for dosin.90 (2019)   Warfarin maintenance plan:   7.5 mg (2.5 mg x 3) every Mon, Wed, Fri; 5 mg (2.5 mg x 2) all other days   Weekly warfarin total:   42.5 mg   Plan last modified:   Chase Baldwin, PharmD (2019)   Next INR check:   2019   Target end date:   Indefinite    Indications    Chronic anticoagulation [Z79.01]  History of mitral valve replacement with mechanical valve [Z95.2] [Z95.2]             Anticoagulation Episode Summary     INR check location:       Preferred lab:       Send INR reminders to:       Comments:         Anticoagulation Care Providers     Provider Role Specialty Phone number    Declan Eaton M.D. Referring Internal Medicine [phone]    [facility] Responsible  [phone]        Anticoagulation Patient Findings  Patient Findings     Negatives:   Signs/symptoms of thrombosis, Signs/symptoms of bleeding, Laboratory test error suspected, Change in health, Change in alcohol use, Change in activity, Upcoming invasive procedure, Emergency department visit, Upcoming dental procedure, Missed doses, Extra doses, Change in medications, Change in diet/appetite, Hospital admission, Bruising, Other complaints        HPI:   Carlos Rivera seen in clinic today, on anticoagulation therapy with warfarin for stroke prevention due to history of mechanical mitral valve replacement.    Patient's previous INR was therapeutic at 2.5 on 19, at which time patient was instructed to continue with current warfarin regimen.  He returns to clinic today to recheck INR to ensure it is therapeutic and thus preventing possible clotting and/or bleeding/bruising complications.    CHADS-VASc = n/a  (unadjusted ischemic stroke risk/year:  n/a)    Does patient have any changes to current medical/health status since last appt (Y/N):  NO  Does patient have any signs/symptoms of  "bleeding and/or thrombosis since the last appt (Y/N):  NO  Does patient have any interval changes to diet or medications since last appt (Y/N):  NO  Are there any complications or cost restrictions with current therapy (Y/N):  NO     Does patient have [facility] PCP? YES, Dr Declan Eaton (If not, please document discussion that patient must be seen at [facility])       Vitals:  /70  HR 48    Weight  declines   Height   5' 7\"     Asssessment:      INR remains therapeutic at 2.9, therefore decreasing patient's risk of stroke and/or bleeding complications.   Reason(s) for out of range INR today:  n/a      Plan:  Pt is to continue with current warfarin dosing regimen in order to maintain INR in therapeutic range.     Follow up:  Because warfarin is a high risk medication and current CHEST guidelines recommend regular monitoring intervals (few days up to 12 weeks), will have patient return to clinic in 3 weeks to recheck INR.    Casey Birmingham, PharmD, BCACP    "

## 2019-08-23 ENCOUNTER — ANTICOAGULATION VISIT (OUTPATIENT)
Dept: MEDICAL GROUP | Facility: PHYSICIAN GROUP | Age: 74
End: 2019-08-23
Payer: MEDICARE

## 2019-08-23 VITALS — HEART RATE: 68 BPM | DIASTOLIC BLOOD PRESSURE: 78 MMHG | SYSTOLIC BLOOD PRESSURE: 129 MMHG

## 2019-08-23 DIAGNOSIS — Z95.2 HISTORY OF MITRAL VALVE REPLACEMENT WITH MECHANICAL VALVE: ICD-10-CM

## 2019-08-23 DIAGNOSIS — Z79.01 CHRONIC ANTICOAGULATION: Primary | ICD-10-CM

## 2019-08-23 LAB — INR PPP: 3.6 (ref 2–3.5)

## 2019-08-23 PROCEDURE — 85610 PROTHROMBIN TIME: CPT | Performed by: FAMILY MEDICINE

## 2019-08-23 PROCEDURE — 99211 OFF/OP EST MAY X REQ PHY/QHP: CPT | Performed by: FAMILY MEDICINE

## 2019-08-23 NOTE — PROGRESS NOTES
Anticoagulation Summary  As of 8/23/2019    INR goal:   2.5-3.5   TTR:   28.5 % (2.4 y)   INR used for dosing:   3.60! (8/23/2019)   Warfarin maintenance plan:   7.5 mg (2.5 mg x 3) every Mon, Wed, Fri; 5 mg (2.5 mg x 2) all other days   Weekly warfarin total:   42.5 mg   Plan last modified:   Chase Baldwin, PharmD (6/14/2019)   Next INR check:   9/13/2019   Target end date:   Indefinite    Indications    Chronic anticoagulation [Z79.01]  History of mitral valve replacement with mechanical valve [Z95.2] [Z95.2]             Anticoagulation Episode Summary     INR check location:       Preferred lab:       Send INR reminders to:       Comments:         Anticoagulation Care Providers     Provider Role Specialty Phone number    Declan Eaton M.D. Referring Internal Medicine [phone]    [facility] Anticoagulation Services Responsible  [phone]        Anticoagulation Patient Findings  Patient Findings     Negatives:   Signs/symptoms of thrombosis, Signs/symptoms of bleeding, Laboratory test error suspected, Change in health, Change in alcohol use, Change in activity, Upcoming invasive procedure, Emergency department visit, Upcoming dental procedure, Missed doses, Extra doses, Change in medications, Change in diet/appetite, Hospital admission, Bruising, Other complaints        HPI:   Carlos Rivera seen in clinic today, on anticoagulation therapy with warfarin for stroke prevention due to history of mechanical mitral valve replacement.    Patient's previous INR was therapeutic at 2.9 on 8-2-19, at which time patient was instructed to continue with current warfarin regimen.  He returns to clinic today to recheck INR to ensure it is therapeutic and thus preventing possible clotting and/or bleeding/bruising complications.    CHADS-VASc = n/a  (unadjusted ischemic stroke risk/year:  n/a)    Does patient have any changes to current medical/health status since last appt (Y/N):  NO  Does patient have any signs/symptoms of  "bleeding and/or thrombosis since the last appt (Y/N):  NO  Does patient have any interval changes to diet or medications since last appt (Y/N):  NO  Are there any complications or cost restrictions with current therapy (Y/N):  NO     Does patient have [facility] PCP? YES, Dr Declan Eaton (If not, please document discussion that patient must be seen at [facility])       Vitals:  /78  HR 68    Weight  declines   Height   5' 7\"     Asssessment:      INR slightly supratherapeutic at 3.6, therefore increasing patient's risk of bleeding complications.   Reason(s) for out of range INR today:  Etiology unknown.      Plan:  Instructed patient to decrease today's dose to 5mg, then resume current warfarin regimen in order to bring INR to therapeutic range.      Follow up:  Because warfarin is a high risk medication and current CHEST guidelines recommend regular monitoring intervals (few days up to 12 weeks), will have patient return to clinic in 3 weeks to recheck INR.    Casey Birmingham, PharmD, BCACP    "

## 2019-09-09 ENCOUNTER — OFFICE VISIT (OUTPATIENT)
Dept: MEDICAL GROUP | Facility: PHYSICIAN GROUP | Age: 74
End: 2019-09-09
Payer: MEDICARE

## 2019-09-09 VITALS
BODY MASS INDEX: 20.34 KG/M2 | WEIGHT: 129.6 LBS | OXYGEN SATURATION: 97 % | RESPIRATION RATE: 14 BRPM | DIASTOLIC BLOOD PRESSURE: 52 MMHG | HEIGHT: 67 IN | TEMPERATURE: 97.2 F | HEART RATE: 50 BPM | SYSTOLIC BLOOD PRESSURE: 122 MMHG

## 2019-09-09 DIAGNOSIS — E11.00 TYPE 2 DIABETES MELLITUS WITH HYPEROSMOLARITY WITHOUT COMA, WITHOUT LONG-TERM CURRENT USE OF INSULIN (HCC): ICD-10-CM

## 2019-09-09 DIAGNOSIS — F02.80 DEMENTIA ASSOCIATED WITH OTHER UNDERLYING DISEASE WITHOUT BEHAVIORAL DISTURBANCE (HCC): ICD-10-CM

## 2019-09-09 PROCEDURE — 99214 OFFICE O/P EST MOD 30 MIN: CPT | Performed by: INTERNAL MEDICINE

## 2019-09-09 NOTE — ASSESSMENT & PLAN NOTE
His wife is concerned about his polypharmacy. The neurologist increased the gabapentin from 300 to 900 mg daily. Mike was confused on this high of a dose.

## 2019-09-09 NOTE — PROGRESS NOTES
"PRIMARY CARE CLINIC FOLLOW UP VISIT  Chief Complaint   Patient presents with   • Diabetes Mellitus     History of Present Illness     Type II diabetes mellitus ([facility])  Due for his labs, hasn't completed them yet. His wife accompanies him to today's visit.     Dementia  His wife is concerned about his polypharmacy. The neurologist increased the gabapentin from 300 to 900 mg daily. Mike was confused on this high of a dose.     Current Outpatient Medications   Medication Sig Dispense Refill   • metFORMIN ER (GLUCOPHAGE XR) 500 MG TABLET SR 24 HR Take 2 Tabs by mouth 2 times a day. 360 Tab 0   • glipiZIDE SR (GLIPIZIDE XL) 5 MG TABLET SR 24 HR Take 1 Tab by mouth every day. 90 Tab 0   • memantine (NAMENDA) 5 MG Tab Take 1 Tab by mouth 2 times a day. 180 Tab 0   • atorvastatin (LIPITOR) 40 MG Tab Take 1 Tab by mouth every day. In the evening. 90 Tab 0   • lisinopril (PRINIVIL) 20 MG Tab Take 1 Tab by mouth every day. 90 Tab 0   • gabapentin (NEURONTIN) 100 MG Cap Take 100 mg by mouth every bedtime. Take 2-3 capsules at bedtime     • Exenatide ER 2 MG Pen-injector Inject 0.65 mL as instructed every 7 days. 4 Each 3   • Blood Glucose Monitoring Suppl Device Meter: Dispense One Touch Ultra. Sig. Use as directed for blood sugar monitoring. #1. NR. 1 Device 0   • Blood Glucose Test Strips Test strips order: Test strips for One Touch Ultra 2 meter. Sig: use twice daily and prn ssx high or low sugar #100 RF x 3 200 Strip 3   • citalopram (CELEXA) 10 MG tablet TAKE 1 TABLET BY MOUTH  EVERY MORNING 90 Tab 1   • levETIRAcetam (KEPPRA) 250 MG tablet Take 0.5 Tabs by mouth 2 times a day. 30 Tab 4   • warfarin (COUMADIN) 2.5 MG Tab TAKE TWO TO THREE TABLETS  BY MOUTH ONE TIME DAILY AS  DIRECTED BY COUMADIN CLINIC 270 Tab 1   • Insulin Syringe-Needle U-100 (INSULIN SYRINGE .3CC/31GX5/16\") 31G X 5/16\" 0.3 ML Misc Use 3 times daily with insulin 300 Each 3   • Blood Glucose Monitoring Suppl Supplies Misc Test strips order: Test " "strips compatible with meter. Sig: use daily and prn ssx high or low sugar 200 Each 3   • fexofenadine (ALLEGRA ALLERGY) 180 MG tablet Take 180 mg by mouth every day.     • Homeopathic Products ([employer] LEG CRAMPS PAIN RELIEF PO) Take  by mouth.     • Blood Glucose Monitoring Suppl (ONETOUCH VERIO FLEX SYSTEM) W/DEVICE Kit by Does not apply route.       No current facility-administered medications for this visit.      Past Medical History:   Diagnosis Date   • Chronic anticoagulation 2017   • History of mitral valve replacement with mechanical valve [Z95.2] 3/10/2017   • Hyperlipidemia    • Type II diabetes mellitus (HCC) 2017     Past Surgical History:   Procedure Laterality Date   • MITRAL VALVE REPLACEMENT     • INGUINAL HERNIA REPAIR Bilateral    • TONSILLECTOMY       Social History     Tobacco Use   • Smoking status: Never Smoker   • Smokeless tobacco: Never Used   Substance Use Topics   • Alcohol use: No     Alcohol/week: 0.0 oz   • Drug use: No     Social History     Social History Narrative    Retired from [employer]      Family History   Problem Relation Age of Onset   • Heart Attack Father 58   • Diabetes Father    • Heart Attack Paternal Uncle    • No Known Problems Sister    • No Known Problems Brother    • No Known Problems Maternal Grandmother    • No Known Problems Maternal Grandfather    • No Known Problems Paternal Grandmother    • Heart Attack Paternal Grandfather    • No Known Problems Sister    • No Known Problems Brother      Family Status   Relation Name Status   • Mo     • Fa          56   • PUnc  (Not Specified)   • Sis  Alive   • Bro     • MGMo     • MGFa     • PGMo     • PGFa     • Sis  Alive   • Bro  Alive     Allergies: Vicodin [hydrocodone-acetaminophen]    ROS  As per HPI above. All other systems reviewed and negative.        Objective   /52   Pulse (!) 50   Temp 36.2 °C (97.2 °F)   Resp 14   Ht 1.702 m (5' 7.01\") "   Wt 58.8 kg (129 lb 9.6 oz)   SpO2 97%  Body mass index is 20.29 kg/m².    General: alert and oriented, pleasant, cooperative  HEENT: Normocephalic, atraumatic.   Psychiatric: appropriate mood and affect. Good insight and appropriate judgment     Assessment and Plan   The following treatment plan was discussed     1. Type 2 diabetes mellitus with hyperosmolarity without coma, without long-term current use of insulin (HCC)  Due for labs, will obtain fasting labs.    2. Dementia associated with other underlying disease without behavioral disturbance  We discussed limiting polypharmacy and how a geriatrician may be the best option for Mike to pursue. I am moving and he won't be able to establish with a new PCP for months but a geriatrician may serve him well with the polypharmacy and also helping him and his wife navigate his memory issues and their impact on their relationship. We also discussed aquatic therapy since Mike loves being active but is limiting with bicycling and other activities due to his neuropathy. Both Mike and his wife were very interested in these programs and are hopeful that this will prove to be the help and resources they are seeking.     Healthcare maintenance     Health Maintenance Due   Topic Date Due   • IMM HEP B VACCINE (1 of 3 - Risk 3-dose series) 10/12/1964   • DIABETES MONOFILAMENT / LE EXAM  02/26/2019   • A1C SCREENING  06/13/2019   • FASTING LIPID PROFILE  06/14/2019   • URINE ACR / MICROALBUMIN  06/14/2019   • SERUM CREATININE  06/14/2019   • Annual Wellness Visit  07/18/2019   • IMM INFLUENZA (1) 09/01/2019     Return if symptoms worsen or fail to improve.    Declan Eaton MD  Internal Medicine  [facility]

## 2019-09-10 ENCOUNTER — PATIENT MESSAGE (OUTPATIENT)
Dept: MEDICAL GROUP | Facility: PHYSICIAN GROUP | Age: 74
End: 2019-09-10

## 2019-09-10 DIAGNOSIS — Z79.899 POLYPHARMACY: ICD-10-CM

## 2019-09-12 ENCOUNTER — APPOINTMENT (OUTPATIENT)
Dept: MEDICAL GROUP | Facility: PHYSICIAN GROUP | Age: 74
End: 2019-09-12
Payer: MEDICARE

## 2019-09-13 ENCOUNTER — ANTICOAGULATION VISIT (OUTPATIENT)
Dept: MEDICAL GROUP | Facility: PHYSICIAN GROUP | Age: 74
End: 2019-09-13
Payer: MEDICARE

## 2019-09-13 VITALS — DIASTOLIC BLOOD PRESSURE: 66 MMHG | HEART RATE: 45 BPM | SYSTOLIC BLOOD PRESSURE: 125 MMHG

## 2019-09-13 DIAGNOSIS — Z95.2 HISTORY OF MITRAL VALVE REPLACEMENT WITH MECHANICAL VALVE: ICD-10-CM

## 2019-09-13 DIAGNOSIS — Z79.01 CHRONIC ANTICOAGULATION: Primary | ICD-10-CM

## 2019-09-13 LAB — INR PPP: 1.2 (ref 2–3.5)

## 2019-09-13 PROCEDURE — 85610 PROTHROMBIN TIME: CPT | Performed by: INTERNAL MEDICINE

## 2019-09-13 PROCEDURE — 99211 OFF/OP EST MAY X REQ PHY/QHP: CPT | Performed by: INTERNAL MEDICINE

## 2019-09-13 NOTE — PROGRESS NOTES
Anticoagulation Summary  As of 2019    INR goal:   2.5-3.5   TTR:   28.8 % (2.5 y)   INR used for dosin.20! (2019)   Warfarin maintenance plan:   7.5 mg (2.5 mg x 3) every Mon, Wed, Fri; 5 mg (2.5 mg x 2) all other days   Weekly warfarin total:   42.5 mg   Plan last modified:   Chase Baldwin, PharmD (2019)   Next INR check:   2019   Target end date:   Indefinite    Indications    Chronic anticoagulation [Z79.01]  History of mitral valve replacement with mechanical valve [Z95.2] [Z95.2]             Anticoagulation Episode Summary     INR check location:       Preferred lab:       Send INR reminders to:       Comments:         Anticoagulation Care Providers     Provider Role Specialty Phone number    Declan Eaton M.D. Referring Internal Medicine [phone]    [facility]ticoagulation [facility] Responsible  [phone]        Anticoagulation Patient Findings  Patient Findings     Positives:   Missed doses    Negatives:   Signs/symptoms of thrombosis, Signs/symptoms of bleeding, Laboratory test error suspected, Change in health, Change in alcohol use, Change in activity, Upcoming invasive procedure, Emergency department visit, Upcoming dental procedure, Extra doses, Change in medications, Change in diet/appetite, Hospital admission, Bruising, Other complaints        HPI:   Carlos Rivera seen in clinic today, on anticoagulation therapy with warfarin for stroke prevention due to history of mechanical mitral valve replacement.    Patient's previous INR was supratherapeutic at 3.6 on 19, at which time patient was instructed to continue with current warfarin regimen.  He returns to clinic today to recheck INR to ensure it is therapeutic and thus preventing possible clotting and/or bleeding/bruising complications.    CHADS-VASc = n/a  (unadjusted ischemic stroke risk/year:  n/a)    Does patient have any changes to current medical/health status since last appt (Y/N):  NO  Does patient have any  signs/symptoms of bleeding and/or thrombosis since the last appt (Y/N):  NO  Does patient have any interval changes to diet or medications since last appt (Y/N):  NO  Are there any complications or cost restrictions with current therapy (Y/N):  NO     Does patient have [facility] PCP? YES, Dr Declan Eaton  (If not, please document discussion that patient must be seen at [facility])       Vitals:      Vitals:    09/13/19 1021   BP: 125/66   BP Location: Left arm   Patient Position: Sitting   BP Cuff Size: Adult   Pulse: (!) 45        Asssessment:      INR subtherapeutic at 1.2, therefore increasing patient's risk of stroke.   Reason(s) for out of range INR today:  Missed and decreased doses.      Plan:  Instructed patient to bolus with 10mg X 2, then resume current warfarin regimen in order to bring INR to therapeutic range.     Follow up:  Because warfarin is a high risk medication and current CHEST guidelines recommend regular monitoring intervals (few days up to 12 weeks), will have patient return to clinic in 3 days to recheck INR.    Casey Birmingham, PharmD, BCACP

## 2019-09-16 ENCOUNTER — ANTICOAGULATION VISIT (OUTPATIENT)
Dept: MEDICAL GROUP | Facility: PHYSICIAN GROUP | Age: 74
End: 2019-09-16
Payer: MEDICARE

## 2019-09-16 VITALS — DIASTOLIC BLOOD PRESSURE: 70 MMHG | HEART RATE: 45 BPM | SYSTOLIC BLOOD PRESSURE: 129 MMHG

## 2019-09-16 DIAGNOSIS — Z95.2 HISTORY OF MITRAL VALVE REPLACEMENT WITH MECHANICAL VALVE: ICD-10-CM

## 2019-09-16 DIAGNOSIS — Z79.01 CHRONIC ANTICOAGULATION: Primary | ICD-10-CM

## 2019-09-16 LAB — INR PPP: 2.8 (ref 2–3.5)

## 2019-09-16 PROCEDURE — 93793 ANTICOAG MGMT PT WARFARIN: CPT | Performed by: INTERNAL MEDICINE

## 2019-09-16 PROCEDURE — 85610 PROTHROMBIN TIME: CPT | Performed by: INTERNAL MEDICINE

## 2019-09-16 NOTE — PROGRESS NOTES
Anticoagulation Summary  As of 2019    INR goal:   2.5-3.5   TTR:   28.8 % (2.5 y)   INR used for dosin.80 (2019)   Warfarin maintenance plan:   7.5 mg (2.5 mg x 3) every Mon, Wed, Fri; 5 mg (2.5 mg x 2) all other days   Weekly warfarin total:   42.5 mg   Plan last modified:   Chase Baldwin, PharmD (2019)   Next INR check:   2019   Target end date:   Indefinite    Indications    Chronic anticoagulation [Z79.01]  History of mitral valve replacement with mechanical valve [Z95.2] [Z95.2]             Anticoagulation Episode Summary     INR check location:       Preferred lab:       Send INR reminders to:       Comments:         Anticoagulation Care Providers     Provider Role Specialty Phone number    Declan Eaton M.D. Referring Internal Medicine [phone]    [facility] Anticoagulation [facility] Responsible  [phone]        Anticoagulation Patient Findings  Patient Findings     Negatives:   Signs/symptoms of thrombosis, Signs/symptoms of bleeding, Laboratory test error suspected, Change in health, Change in alcohol use, Change in activity, Upcoming invasive procedure, Emergency department visit, Upcoming dental procedure, Missed doses, Extra doses, Change in medications, Change in diet/appetite, Hospital admission, Bruising, Other complaints        HPI:   Carlos Rivera seen in clinic today, on anticoagulation therapy with warfarin for stroke prevention due to history of mechanical mitral valve replacement.    Patient's previous INR was subtherapeutic at 1.2 on 19, at which time patient was instructed to bolus with two doses, then resume current warfarin regimen.  He returns to clinic today to recheck INR to ensure it is therapeutic and thus preventing possible clotting and/or bleeding/bruising complications.    CHADS-VASc = n/a  (unadjusted ischemic stroke risk/year:  n/a)    Does patient have any changes to current medical/health status since last appt (Y/N):  NO  Does patient have  any signs/symptoms of bleeding and/or thrombosis since the last appt (Y/N):  NO  Does patient have any interval changes to diet or medications since last appt (Y/N):  NO  Are there any complications or cost restrictions with current therapy (Y/N):  NO     Does patient have [facility] PCP? YES, Dr Declan Eaton (If not, please document discussion that patient must be seen at [facility])       Vitals:      Vitals:    09/16/19 0947   BP: 129/70   BP Location: Left arm   Patient Position: Sitting   BP Cuff Size: Adult   Pulse: (!) 45        Asssessment:      INR therapeutic at 2.8, therefore decreasing patient's risk of stroke and/or bleeding complications.   Reason(s) for out of range INR today:  n/a      Plan:  Pt is to continue with current warfarin dosing regimen in order to maintain INR in therapeutic range.     Follow up:  Because warfarin is a high risk medication and current CHEST guidelines recommend regular monitoring intervals (few days up to 12 weeks), will have patient return to clinic in 1 weeks to recheck INR.    Casey Birmingham, PharmD, BCACP

## 2019-09-25 DIAGNOSIS — E11.00 TYPE 2 DIABETES MELLITUS WITH HYPEROSMOLARITY WITHOUT COMA, WITHOUT LONG-TERM CURRENT USE OF INSULIN (HCC): ICD-10-CM

## 2019-09-25 RX ORDER — ATORVASTATIN CALCIUM 40 MG/1
TABLET, FILM COATED ORAL
Qty: 90 TAB | Refills: 0 | Status: ON HOLD | OUTPATIENT
Start: 2019-09-25 | End: 2020-06-25

## 2019-09-25 RX ORDER — LISINOPRIL 20 MG/1
TABLET ORAL
Qty: 90 TAB | Refills: 0 | Status: ON HOLD | OUTPATIENT
Start: 2019-09-25 | End: 2020-05-08

## 2019-09-25 NOTE — TELEPHONE ENCOUNTER
Was the patient seen in the last year in this department? Yes    Does patient have an active prescription for medications requested? No     Received Request Via: Pharmacy      Pt met protocol?: Yes, OV earlier this month,out next month   BP Readings from Last 1 Encounters:   09/16/19 129/70     Lab Results   Component Value Date/Time    CHOLSTRLTOT 142 06/14/2018 06:23 AM    LDL 59 06/14/2018 06:23 AM    HDL 47 06/14/2018 06:23 AM    TRIGLYCERIDE 178 (H) 06/14/2018 06:23 AM       Lab Results   Component Value Date/Time    SODIUM 139 06/14/2018 06:23 AM    POTASSIUM 4.8 06/14/2018 06:23 AM    CHLORIDE 102 06/14/2018 06:23 AM    CO2 27 06/14/2018 06:23 AM    GLUCOSE 195 (H) 06/14/2018 06:23 AM    BUN 30 (H) 06/14/2018 06:23 AM    CREATININE 1.16 06/14/2018 06:23 AM     Lab Results   Component Value Date/Time    ALKPHOSPHAT 84 06/14/2018 06:23 AM    ASTSGOT 37 06/14/2018 06:23 AM    ALTSGPT 60 (H) 06/14/2018 06:23 AM    TBILIRUBIN 0.7 06/14/2018 06:23 AM

## 2019-09-30 RX ORDER — EXENATIDE 2 MG/.85ML
INJECTION, SUSPENSION, EXTENDED RELEASE SUBCUTANEOUS
Qty: 12 PEN | Refills: 1 | Status: SHIPPED | OUTPATIENT
Start: 2019-09-30 | End: 2020-05-08

## 2019-09-30 NOTE — TELEPHONE ENCOUNTER
Was the patient seen in the last year in this department? Yes    Does patient have an active prescription for medications requested? No     Received Request Via: Pharmacy      Pt met protocol?: Yes   Pt last ov 9/2019   Lab Results   Component Value Date/Time    SODIUM 139 06/14/2018 06:23 AM    POTASSIUM 4.8 06/14/2018 06:23 AM    CHLORIDE 102 06/14/2018 06:23 AM    CO2 27 06/14/2018 06:23 AM    GLUCOSE 195 (H) 06/14/2018 06:23 AM    BUN 30 (H) 06/14/2018 06:23 AM    CREATININE 1.16 06/14/2018 06:23 AM      Lab Results   Component Value Date/Time    HBA1C 8.9 12/13/2018 04:45 PM

## 2019-09-30 NOTE — TELEPHONE ENCOUNTER
Patient has recently been seen by PCP within the last 6 months per protocol (9/19). Will refill medications for 6 months.  Lab Results   Component Value Date/Time    HBA1C 8.9 12/13/2018 04:45 PM      Lab Results   Component Value Date/Time    MALBCRT 879 (H) 06/14/2018 06:22 AM    MICROALBUR 69.7 06/14/2018 06:22 AM      Lab Results   Component Value Date/Time    ALKPHOSPHAT 84 06/14/2018 06:23 AM    ASTSGOT 37 06/14/2018 06:23 AM    ALTSGPT 60 (H) 06/14/2018 06:23 AM    TBILIRUBIN 0.7 06/14/2018 06:23 AM

## 2019-10-01 ENCOUNTER — TELEPHONE (OUTPATIENT)
Dept: VASCULAR LAB | Facility: MEDICAL CENTER | Age: 74
End: 2019-10-01

## 2019-10-01 NOTE — TELEPHONE ENCOUNTER
LM for patient to call back to clinic to reschedule recently missed appointment.  Casey Birmingham, BethanyD, BCACP

## 2019-10-03 ENCOUNTER — APPOINTMENT (OUTPATIENT)
Dept: MEDICAL GROUP | Facility: PHYSICIAN GROUP | Age: 74
End: 2019-10-03
Payer: MEDICARE

## 2019-10-03 DIAGNOSIS — Z95.2 HISTORY OF MITRAL VALVE REPLACEMENT WITH MECHANICAL VALVE: ICD-10-CM

## 2019-10-03 NOTE — TELEPHONE ENCOUNTER
Pt came into office and is requesting refill of this medication, he originally made an appointment when he walked in today and stated he is just needing this medication refilled. He states he has maybe 3 days left and is requesting the fill to be sent as urgent(as per a note his wife wrote) when looking at the bottle the fill date was 3/2018 and discard date was 3/2019.   I was attempting to communicate the refills will come from the Coumadin clinic, and I will send the message but I believe there is some confusion.     Was the patient seen in the last year in this department? Yes 9/16/19    Does patient have an active prescription for medications requested? No  Unknown     Received Request Via: Patient

## 2019-10-04 DIAGNOSIS — Z95.2 HISTORY OF MITRAL VALVE REPLACEMENT WITH MECHANICAL VALVE: ICD-10-CM

## 2019-10-04 RX ORDER — WARFARIN SODIUM 2.5 MG/1
TABLET ORAL
Qty: 90 TAB | Refills: 0 | Status: SHIPPED | OUTPATIENT
Start: 2019-10-04 | End: 2020-03-10 | Stop reason: SDUPTHER

## 2019-10-04 RX ORDER — WARFARIN SODIUM 2.5 MG/1
TABLET ORAL
Qty: 270 TAB | Refills: 1 | Status: SHIPPED | OUTPATIENT
Start: 2019-10-04 | End: 2019-10-04 | Stop reason: SDUPTHER

## 2019-10-04 NOTE — TELEPHONE ENCOUNTER
Was the patient seen in the last year in this department? Yes    Does patient have an active prescription for medications requested? Yes    Received Request Via: Patient       Rx script is being processed at [facility] but will take at least a week to two weeks for pt to received and they are completely out. Pt needs a temp (30 day) fill locally until rx comes in

## 2019-11-01 ENCOUNTER — ANTICOAGULATION VISIT (OUTPATIENT)
Dept: MEDICAL GROUP | Facility: PHYSICIAN GROUP | Age: 74
End: 2019-11-01
Payer: MEDICARE

## 2019-11-01 VITALS — SYSTOLIC BLOOD PRESSURE: 161 MMHG | HEART RATE: 45 BPM | DIASTOLIC BLOOD PRESSURE: 87 MMHG

## 2019-11-01 DIAGNOSIS — Z95.2 HISTORY OF MITRAL VALVE REPLACEMENT WITH MECHANICAL VALVE: ICD-10-CM

## 2019-11-01 DIAGNOSIS — Z79.01 CHRONIC ANTICOAGULATION: Primary | ICD-10-CM

## 2019-11-01 LAB — INR PPP: 2.8 (ref 2–3.5)

## 2019-11-01 PROCEDURE — 85610 PROTHROMBIN TIME: CPT | Performed by: FAMILY MEDICINE

## 2019-11-01 PROCEDURE — 93793 ANTICOAG MGMT PT WARFARIN: CPT | Performed by: FAMILY MEDICINE

## 2019-11-01 NOTE — PROGRESS NOTES
Anticoagulation Summary  As of 2019    INR goal:   2.5-3.5   TTR:   32.2 % (2.6 y)   INR used for dosin.80 (2019)   Warfarin maintenance plan:   7.5 mg (2.5 mg x 3) every Mon, Wed, Fri; 5 mg (2.5 mg x 2) all other days   Weekly warfarin total:   42.5 mg   Plan last modified:   Chase Baldwin, PharmD (2019)   Next INR check:   2019   Target end date:   Indefinite    Indications    Chronic anticoagulation [Z79.01]  History of mitral valve replacement with mechanical valve [Z95.2] [Z95.2]             Anticoagulation Episode Summary     INR check location:       Preferred lab:       Send INR reminders to:       Comments:         Anticoagulation Care Providers     Provider Role Specialty Phone number    Declan Eaton M.D. Referring Internal Medicine [phone]    [facility] Responsible  [phone]        Anticoagulation Patient Findings  Patient Findings     Negatives:   Signs/symptoms of thrombosis, Signs/symptoms of bleeding, Laboratory test error suspected, Change in health, Change in alcohol use, Change in activity, Upcoming invasive procedure, Emergency department visit, Upcoming dental procedure, Missed doses, Extra doses, Change in medications, Change in diet/appetite, Hospital admission, Bruising, Other complaints        HPI:   Carlos Rivera seen in clinic today, on anticoagulation therapy with warfarin for stroke prevention due to history of mechanical mitral valve replacement.    Patient's previous INR was therapeutic at 2.8 on 19, at which time patient was instructed to continue with current warfarin regimen.  He returns to clinic today to recheck INR to ensure it is therapeutic and thus preventing possible clotting and/or bleeding/bruising complications.    CHADS-VASc = n/a  (unadjusted ischemic stroke risk/year:  n/a)    Does patient have any changes to current medical/health status since last appt (Y/N):  NO  Does patient have any signs/symptoms of  bleeding and/or thrombosis since the last appt (Y/N):  NO  Does patient have any interval changes to diet or medications since last appt (Y/N):  NO  Are there any complications or cost restrictions with current therapy (Y/N):  NO     Does patient have [facility] PCP? YES, Dr Declan Eaton, set to establish with Dr Rojas now that Dr Eaton has left (If not, please document discussion that patient must be seen at [facility])       Vitals:      Vitals:    11/01/19 0912   BP: (!) 161/87   BP Location: Left arm   Patient Position: Sitting   BP Cuff Size: Adult   Pulse: (!) 45        Asssessment:      INR remains therapeutic at 2.8, therefore decreasing patient's risk of stroke and/or bleeding complications.   Reason(s) for out of range INR today:  n/a      Plan:  Pt is to continue with current warfarin dosing regimen in order to maintain INR in therapeutic range.     Follow up:  Because warfarin is a high risk medication and current CHEST guidelines recommend regular monitoring intervals (few days up to 12 weeks), will have patient return to clinic in 5 weeks to recheck INR.    Casey Birmingham, PharmD, BCACP

## 2019-11-15 ENCOUNTER — HOSPITAL ENCOUNTER (OUTPATIENT)
Dept: RADIOLOGY | Facility: MEDICAL CENTER | Age: 74
End: 2019-11-15
Attending: STUDENT IN AN ORGANIZED HEALTH CARE EDUCATION/TRAINING PROGRAM
Payer: MEDICARE

## 2019-11-15 ENCOUNTER — HOSPITAL ENCOUNTER (OUTPATIENT)
Dept: LAB | Facility: MEDICAL CENTER | Age: 74
End: 2019-11-15
Attending: FAMILY MEDICINE
Payer: MEDICARE

## 2019-11-15 DIAGNOSIS — R26.89 SHUFFLING GAIT: ICD-10-CM

## 2019-11-15 DIAGNOSIS — I67.9 CEREBROVASCULAR DISEASE, UNSPECIFIED: ICD-10-CM

## 2019-11-15 DIAGNOSIS — R29.6 REPEATED FALLS: ICD-10-CM

## 2019-11-15 LAB
25(OH)D3 SERPL-MCNC: 30 NG/ML (ref 30–100)
ALBUMIN SERPL BCP-MCNC: 4.7 G/DL (ref 3.2–4.9)
ALBUMIN/GLOB SERPL: 2.6 G/DL
ALP SERPL-CCNC: 83 U/L (ref 30–99)
ALT SERPL-CCNC: 30 U/L (ref 2–50)
ANION GAP SERPL CALC-SCNC: 5 MMOL/L (ref 0–11.9)
AST SERPL-CCNC: 21 U/L (ref 12–45)
BASOPHILS # BLD AUTO: 1.3 % (ref 0–1.8)
BASOPHILS # BLD: 0.08 K/UL (ref 0–0.12)
BILIRUB SERPL-MCNC: 0.7 MG/DL (ref 0.1–1.5)
BUN SERPL-MCNC: 28 MG/DL (ref 8–22)
CALCIUM SERPL-MCNC: 9.2 MG/DL (ref 8.5–10.5)
CHLORIDE SERPL-SCNC: 103 MMOL/L (ref 96–112)
CHOLEST SERPL-MCNC: 147 MG/DL (ref 100–199)
CO2 SERPL-SCNC: 29 MMOL/L (ref 20–33)
CREAT SERPL-MCNC: 1.07 MG/DL (ref 0.5–1.4)
EOSINOPHIL # BLD AUTO: 0.34 K/UL (ref 0–0.51)
EOSINOPHIL NFR BLD: 5.5 % (ref 0–6.9)
ERYTHROCYTE [DISTWIDTH] IN BLOOD BY AUTOMATED COUNT: 46.5 FL (ref 35.9–50)
EST. AVERAGE GLUCOSE BLD GHB EST-MCNC: 166 MG/DL
FASTING STATUS PATIENT QL REPORTED: NORMAL
FOLATE SERPL-MCNC: 17.4 NG/ML
GLOBULIN SER CALC-MCNC: 1.8 G/DL (ref 1.9–3.5)
GLUCOSE SERPL-MCNC: 179 MG/DL (ref 65–99)
HBA1C MFR BLD: 7.4 % (ref 0–5.6)
HCT VFR BLD AUTO: 51 % (ref 42–52)
HDLC SERPL-MCNC: 47 MG/DL
HGB BLD-MCNC: 17 G/DL (ref 14–18)
IMM GRANULOCYTES # BLD AUTO: 0.02 K/UL (ref 0–0.11)
IMM GRANULOCYTES NFR BLD AUTO: 0.3 % (ref 0–0.9)
LDLC SERPL CALC-MCNC: 79 MG/DL
LYMPHOCYTES # BLD AUTO: 1.28 K/UL (ref 1–4.8)
LYMPHOCYTES NFR BLD: 20.6 % (ref 22–41)
MCH RBC QN AUTO: 33.7 PG (ref 27–33)
MCHC RBC AUTO-ENTMCNC: 33.3 G/DL (ref 33.7–35.3)
MCV RBC AUTO: 101 FL (ref 81.4–97.8)
MONOCYTES # BLD AUTO: 0.53 K/UL (ref 0–0.85)
MONOCYTES NFR BLD AUTO: 8.5 % (ref 0–13.4)
NEUTROPHILS # BLD AUTO: 3.96 K/UL (ref 1.82–7.42)
NEUTROPHILS NFR BLD: 63.8 % (ref 44–72)
NRBC # BLD AUTO: 0 K/UL
NRBC BLD-RTO: 0 /100 WBC
PLATELET # BLD AUTO: 168 K/UL (ref 164–446)
PMV BLD AUTO: 10.6 FL (ref 9–12.9)
POTASSIUM SERPL-SCNC: 4.8 MMOL/L (ref 3.6–5.5)
PROT SERPL-MCNC: 6.5 G/DL (ref 6–8.2)
RBC # BLD AUTO: 5.05 M/UL (ref 4.7–6.1)
SODIUM SERPL-SCNC: 137 MMOL/L (ref 135–145)
TRIGL SERPL-MCNC: 103 MG/DL (ref 0–149)
TSH SERPL DL<=0.005 MIU/L-ACNC: 1.4 UIU/ML (ref 0.38–5.33)
VIT B12 SERPL-MCNC: 1293 PG/ML (ref 211–911)
WBC # BLD AUTO: 6.2 K/UL (ref 4.8–10.8)

## 2019-11-15 PROCEDURE — 70450 CT HEAD/BRAIN W/O DYE: CPT

## 2019-11-15 PROCEDURE — 36415 COLL VENOUS BLD VENIPUNCTURE: CPT | Mod: GA

## 2019-11-15 PROCEDURE — 84443 ASSAY THYROID STIM HORMONE: CPT | Mod: GA

## 2019-11-15 PROCEDURE — 80053 COMPREHEN METABOLIC PANEL: CPT

## 2019-11-15 PROCEDURE — 82746 ASSAY OF FOLIC ACID SERUM: CPT

## 2019-11-15 PROCEDURE — 80061 LIPID PANEL: CPT | Mod: GA

## 2019-11-15 PROCEDURE — 83036 HEMOGLOBIN GLYCOSYLATED A1C: CPT | Mod: GA

## 2019-11-15 PROCEDURE — 82607 VITAMIN B-12: CPT

## 2019-11-15 PROCEDURE — 85025 COMPLETE CBC W/AUTO DIFF WBC: CPT

## 2019-11-15 PROCEDURE — 82306 VITAMIN D 25 HYDROXY: CPT | Mod: GA

## 2019-12-02 ENCOUNTER — APPOINTMENT (OUTPATIENT)
Dept: OCCUPATIONAL THERAPY | Facility: REHABILITATION | Age: 74
End: 2019-12-02
Payer: MEDICARE

## 2019-12-05 ENCOUNTER — APPOINTMENT (OUTPATIENT)
Dept: OCCUPATIONAL THERAPY | Facility: REHABILITATION | Age: 74
End: 2019-12-05
Payer: MEDICARE

## 2019-12-06 ENCOUNTER — ANTICOAGULATION VISIT (OUTPATIENT)
Dept: MEDICAL GROUP | Facility: PHYSICIAN GROUP | Age: 74
End: 2019-12-06
Payer: MEDICARE

## 2019-12-06 DIAGNOSIS — Z79.01 CHRONIC ANTICOAGULATION: Primary | ICD-10-CM

## 2019-12-06 DIAGNOSIS — Z95.2 HISTORY OF MITRAL VALVE REPLACEMENT WITH MECHANICAL VALVE: ICD-10-CM

## 2019-12-06 LAB — INR PPP: 1.9 (ref 2–3.5)

## 2019-12-06 PROCEDURE — 99211 OFF/OP EST MAY X REQ PHY/QHP: CPT | Performed by: FAMILY MEDICINE

## 2019-12-06 PROCEDURE — 85610 PROTHROMBIN TIME: CPT | Performed by: FAMILY MEDICINE

## 2019-12-06 NOTE — PROGRESS NOTES
Anticoagulation Summary  As of 2019    INR goal:   2.5-3.5   TTR:   32.2 % (2.7 y)   INR used for dosin.90! (2019)   Warfarin maintenance plan:   7.5 mg (2.5 mg x 3) every Mon, Wed, Fri; 5 mg (2.5 mg x 2) all other days   Weekly warfarin total:   42.5 mg   Plan last modified:   Chase Baldwin, PharmD (2019)   Next INR check:   2019   Target end date:   Indefinite    Indications    Chronic anticoagulation [Z79.01]  History of mitral valve replacement with mechanical valve [Z95.2] [Z95.2]             Anticoagulation Episode Summary     INR check location:       Preferred lab:       Send INR reminders to:       Comments:         Anticoagulation Care Providers     Provider Role Specialty Phone number    Declan Eaton M.D. Referring Internal Medicine [phone]    [facility] Responsible  [phone]        Anticoagulation Patient Findings  Patient Findings     Positives:   Signs/symptoms of bleeding, Missed doses    Negatives:   Signs/symptoms of thrombosis, Laboratory test error suspected, Change in health, Change in alcohol use, Change in activity, Upcoming invasive procedure, Emergency department visit, Upcoming dental procedure, Extra doses, Change in medications, Change in diet/appetite, Hospital admission, Bruising, Other complaints    Comments:   Epistaxis last week, held a dose as a precaution        HPI:   Carlos Rivera seen in clinic today, on anticoagulation therapy with warfarin for stroke prevention due to history of mechanical mitral valve replacement.    Patient's previous INR was therapeutic at 2.8 on 19, at which time patient was instructed to continue with current warfarin regimen.  He returns to clinic today to recheck INR to ensure it is therapeutic and thus preventing possible clotting and/or bleeding/bruising complications.    CHADS-VASc = n/a  (unadjusted ischemic stroke risk/year:  n/a)    Does patient have any changes to current  medical/health status since last appt (Y/N):  NO  Does patient have any signs/symptoms of bleeding and/or thrombosis since the last appt (Y/N):  Mild epistaxis last week, no events before or since that time  Does patient have any interval changes to diet or medications since last appt (Y/N):  Held dose day of epistaxis   Are there any complications or cost restrictions with current therapy (Y/N):  NO     Does patient have [facility] PCP? YES, Dr Angelica Rojas (If not, please document discussion that patient must be seen at [facility])       Vitals:  declined at today's visit.    There were no vitals filed for this visit.     Asssessment:      INR subtherapeutic at 1.9, therefore increasing patient's stroke risk.   Reason(s) for out of range INR today:  Missed dose last week.      Plan:  Instructed patient to bolus with 10mg X 1, then resume current warfarin regimen in order to bring INR back to therapeutic range.     Follow up:  Because warfarin is a high risk medication and current CHEST guidelines recommend regular monitoring intervals (few days up to 12 weeks), will have patient return to clinic in 2 weeks to recheck INR.    Casey Birmingham, PharmD, BCACP

## 2019-12-09 ENCOUNTER — APPOINTMENT (OUTPATIENT)
Dept: OCCUPATIONAL THERAPY | Facility: REHABILITATION | Age: 74
End: 2019-12-09
Payer: MEDICARE

## 2019-12-11 ENCOUNTER — APPOINTMENT (OUTPATIENT)
Dept: OCCUPATIONAL THERAPY | Facility: REHABILITATION | Age: 74
End: 2019-12-11
Payer: MEDICARE

## 2019-12-12 ENCOUNTER — APPOINTMENT (OUTPATIENT)
Dept: OCCUPATIONAL THERAPY | Facility: REHABILITATION | Age: 74
End: 2019-12-12
Payer: MEDICARE

## 2019-12-16 ENCOUNTER — APPOINTMENT (OUTPATIENT)
Dept: OCCUPATIONAL THERAPY | Facility: REHABILITATION | Age: 74
End: 2019-12-16
Payer: MEDICARE

## 2019-12-18 ENCOUNTER — APPOINTMENT (OUTPATIENT)
Dept: OCCUPATIONAL THERAPY | Facility: REHABILITATION | Age: 74
End: 2019-12-18
Payer: MEDICARE

## 2019-12-19 ENCOUNTER — APPOINTMENT (OUTPATIENT)
Dept: OCCUPATIONAL THERAPY | Facility: REHABILITATION | Age: 74
End: 2019-12-19
Payer: MEDICARE

## 2019-12-23 ENCOUNTER — APPOINTMENT (OUTPATIENT)
Dept: OCCUPATIONAL THERAPY | Facility: REHABILITATION | Age: 74
End: 2019-12-23
Payer: MEDICARE

## 2019-12-26 ENCOUNTER — APPOINTMENT (OUTPATIENT)
Dept: OCCUPATIONAL THERAPY | Facility: REHABILITATION | Age: 74
End: 2019-12-26
Payer: MEDICARE

## 2019-12-31 ENCOUNTER — APPOINTMENT (OUTPATIENT)
Dept: OCCUPATIONAL THERAPY | Facility: REHABILITATION | Age: 74
End: 2019-12-31
Payer: MEDICARE

## 2019-12-31 ENCOUNTER — TELEPHONE (OUTPATIENT)
Dept: MEDICAL GROUP | Facility: PHYSICIAN GROUP | Age: 74
End: 2019-12-31

## 2019-12-31 NOTE — TELEPHONE ENCOUNTER
LM for patient to call back and reschedule recently missed anticoagulation clinic appointment.  Casey Birmingham, BethanyD, BCACP

## 2020-01-03 ENCOUNTER — ANTICOAGULATION VISIT (OUTPATIENT)
Dept: MEDICAL GROUP | Facility: PHYSICIAN GROUP | Age: 75
End: 2020-01-03
Payer: MEDICARE

## 2020-01-03 DIAGNOSIS — Z95.2 HISTORY OF MITRAL VALVE REPLACEMENT WITH MECHANICAL VALVE: ICD-10-CM

## 2020-01-03 DIAGNOSIS — Z79.01 CHRONIC ANTICOAGULATION: Primary | ICD-10-CM

## 2020-01-03 LAB — INR PPP: 2.4 (ref 2–3.5)

## 2020-01-03 PROCEDURE — 85610 PROTHROMBIN TIME: CPT | Performed by: FAMILY MEDICINE

## 2020-01-03 PROCEDURE — 99211 OFF/OP EST MAY X REQ PHY/QHP: CPT | Performed by: FAMILY MEDICINE

## 2020-01-03 NOTE — PROGRESS NOTES
Anticoagulation Summary  As of 1/3/2020    INR goal:   2.5-3.5   TTR:   31.4 % (2.8 y)   INR used for dosin.40! (1/3/2020)   Warfarin maintenance plan:   7.5 mg (2.5 mg x 3) every Mon, Wed, Fri; 5 mg (2.5 mg x 2) all other days   Weekly warfarin total:   42.5 mg   Plan last modified:   Chase Baldwin, PharmD (2019)   Next INR check:   2020   Target end date:   Indefinite    Indications    Chronic anticoagulation [Z79.01]  History of mitral valve replacement with mechanical valve [Z95.2] [Z95.2]             Anticoagulation Episode Summary     INR check location:       Preferred lab:       Send INR reminders to:       Comments:         Anticoagulation Care Providers     Provider Role Specialty Phone number    Declan Eaton M.D. Referring Internal Medicine [phone]    [facility] Anticoagulation Services Responsible  [phone]        Anticoagulation Patient Findings  Patient Findings     Negatives:   Signs/symptoms of thrombosis, Signs/symptoms of bleeding, Laboratory test error suspected, Change in health, Change in alcohol use, Change in activity, Upcoming invasive procedure, Emergency department visit, Upcoming dental procedure, Missed doses, Extra doses, Change in medications, Change in diet/appetite, Hospital admission, Bruising, Other complaints        HPI:   Carlos Rivera seen in clinic today, on anticoagulation therapy with warfarin for stroke prevention due to history of mechanical mitral valve.    Patient's previous INR was subtherapeutic at 1.9 on 19, at which time patient was instructed to bolus with one dose, then resume current warfarin regimen.  He returns to clinic today to recheck INR to ensure it is therapeutic and thus preventing possible clotting and/or bleeding/bruising complications.    CHADS-VASc = n/a  (unadjusted ischemic stroke risk/year:  n/a)    Does patient have any changes to current medical/health status since last appt (Y/N):  NO  Does patient have any  signs/symptoms of bleeding and/or thrombosis since the last appt (Y/N):  NO  Does patient have any interval changes to diet or medications since last appt (Y/N):  NO  Are there any complications or cost restrictions with current therapy (Y/N):  NO     Does patient have [facility] PCP? YES, Dr Angelica Rojas (If not, please document discussion that patient must be seen at [facility])       Vitals:  machine malfunctioned on multiple occasions, MA not present for manual test.    There were no vitals filed for this visit.     Asssessment:      INR slightly subtherapeutic at 2.4, therefore increasing patient's risk of stroke.   Reason(s) for out of range INR today:  Etiology unknown.      Plan:  Instructed patient to continue with current warfarin regimen as INR within 0.1 of goal and patient has history of labile INR's.     Follow up:  Because warfarin is a high risk medication and current CHEST guidelines recommend regular monitoring intervals (few days up to 12 weeks), will have patient return to clinic in 2 weeks to recheck INR.    Casey Birmingham, PharmD, BCACP

## 2020-01-17 ENCOUNTER — ANTICOAGULATION VISIT (OUTPATIENT)
Dept: MEDICAL GROUP | Facility: PHYSICIAN GROUP | Age: 75
End: 2020-01-17
Payer: MEDICARE

## 2020-01-17 DIAGNOSIS — Z79.01 CHRONIC ANTICOAGULATION: ICD-10-CM

## 2020-01-17 DIAGNOSIS — Z95.2 HISTORY OF MITRAL VALVE REPLACEMENT WITH MECHANICAL VALVE: ICD-10-CM

## 2020-01-17 LAB — INR PPP: 3.7 (ref 2–3.5)

## 2020-01-17 NOTE — PROGRESS NOTES
Anticoagulation Summary  As of 1/17/2020    INR goal:   2.5-3.5   TTR:   32.0 % (2.8 y)   INR used for dosing:   3.70! (1/17/2020)   Warfarin maintenance plan:   7.5 mg (2.5 mg x 3) every Mon, Wed, Fri; 5 mg (2.5 mg x 2) all other days   Weekly warfarin total:   42.5 mg   Plan last modified:   Chase Baldwin, PharmD (6/14/2019)   Next INR check:   1/31/2020   Target end date:   Indefinite    Indications    Chronic anticoagulation [Z79.01]  History of mitral valve replacement with mechanical valve [Z95.2] [Z95.2]             Anticoagulation Episode Summary     INR check location:       Preferred lab:       Send INR reminders to:       Comments:         Anticoagulation Care Providers     Provider Role Specialty Phone number    Declan Eaton M.D. Referring Internal Medicine [phone]    [facility] Anticoagulation Services Responsible  [phone]        Anticoagulation Patient Findings  Patient Findings     Negatives:   Signs/symptoms of thrombosis, Signs/symptoms of bleeding, Laboratory test error suspected, Change in health, Change in alcohol use, Change in activity, Upcoming invasive procedure, Emergency department visit, Upcoming dental procedure, Missed doses, Extra doses, Change in medications, Change in diet/appetite, Hospital admission, Bruising, Other complaints         HPI:   Carlos Rivera seen in clinic today, on anticoagulation therapy with warfarin for stroke prevention due to history of mechanical mitral valve.    Patient's previous INR was subtherapeutic at 2.4 on 1-3-20, at which time patient was instructed to continue with current warfarin regimen.  He returns to clinic today to recheck INR to ensure it is therapeutic and thus preventing possible clotting and/or bleeding/bruising complications.    CHADS-VASc = n/a  (unadjusted ischemic stroke risk/year:  n/a)    Does patient have any changes to current medical/health status since last appt (Y/N):  NO  Does patient have any signs/symptoms of bleeding  and/or thrombosis since the last appt (Y/N):  NO  Does patient have any interval changes to diet or medications since last appt (Y/N):  NO  Are there any complications or cost restrictions with current therapy (Y/N):  NO     Does patient have [facility] PCP? YES, Dr Angelica Rojas (If not, please document discussion that patient must be seen at [facility])       Vitals:  declined by patient today    There were no vitals filed for this visit.     Asssessment:      INR supratherapeutic at 3.7, therefore increasing patient's risk of bleeding complications.   Reason(s) for out of range INR today:  Etiology unknown      Plan:  Instructed patient decrease today's dose to 5mg, then resume current warfarin regimen in order to bring INR back to therapeutic range.     Follow up:  Because warfarin is a high risk medication and current CHEST guidelines recommend regular monitoring intervals (few days up to 12 weeks), will have patient return to clinic in 2 weeks to recheck INR.    Casey Birmingham, PharmD, BCACP

## 2020-01-29 ENCOUNTER — HOSPITAL ENCOUNTER (OUTPATIENT)
Dept: RADIOLOGY | Facility: MEDICAL CENTER | Age: 75
End: 2020-01-29
Attending: FAMILY MEDICINE
Payer: MEDICARE

## 2020-01-29 ENCOUNTER — OFFICE VISIT (OUTPATIENT)
Dept: URGENT CARE | Facility: PHYSICIAN GROUP | Age: 75
End: 2020-01-29
Payer: MEDICARE

## 2020-01-29 VITALS
BODY MASS INDEX: 20.88 KG/M2 | RESPIRATION RATE: 18 BRPM | DIASTOLIC BLOOD PRESSURE: 68 MMHG | SYSTOLIC BLOOD PRESSURE: 118 MMHG | HEART RATE: 65 BPM | WEIGHT: 133 LBS | HEIGHT: 67 IN | OXYGEN SATURATION: 98 % | TEMPERATURE: 98.5 F

## 2020-01-29 DIAGNOSIS — S28.0XXA CRUSHED CHEST, INITIAL ENCOUNTER: ICD-10-CM

## 2020-01-29 PROCEDURE — 99214 OFFICE O/P EST MOD 30 MIN: CPT | Performed by: FAMILY MEDICINE

## 2020-01-29 PROCEDURE — 71046 X-RAY EXAM CHEST 2 VIEWS: CPT

## 2020-01-29 ASSESSMENT — ENCOUNTER SYMPTOMS
SHORTNESS OF BREATH: 0
VOMITING: 0
FEVER: 0
COUGH: 0
SORE THROAT: 0
MYALGIAS: 0
EYE PAIN: 0
DIZZINESS: 0
CHILLS: 0
NAUSEA: 0

## 2020-01-29 ASSESSMENT — PAIN SCALES - GENERAL: PAINLEVEL: 5=MODERATE PAIN

## 2020-01-29 NOTE — PROGRESS NOTES
Subjective:   Carlos Rivera is a 74 y.o. male who presents for Rib Pain ((L) pt states he tripped and fell x4 days ago)        74-year-old male status post mitral valve replacement on chronic anticoagulation with Coumadin presents to the urgent care with a chief complaint of left-sided anterior chest wall and rib pain.  The patient tripped at a gas station on a mat and fell directly onto the anterior chest wall 4 days prior.  Patient denies bruising in the chest.  Patient complains of intermittent worsening of the chest pain with deep inspiration.  The patient experiences anterior chest wall pain at night while sleeping.  Denies new cough.  Denies head trauma or face trauma during the fall.    Patient Active Problem List:     Hyperlipidemia     Anxiety     Type II diabetes mellitus (HCC)     Mild cognitive impairment     Chronic anticoagulation     History of mitral valve replacement with mechanical valve (Z95.2)     Mild single current episode of major depressive disorder (HCC)     Dementia (HCC)     Stroke (HCC)     Loss of balance     Shuffling gait     Seizures (HCC)     Hydrocephalus (HCC)     Medication management        Chest Injury   This is a new problem. Episode onset: 4 days prior. Pertinent negatives include no chest pain (Anterior chest wall pain as noted), chills, coughing, fever, myalgias, nausea, rash, sore throat or vomiting. Exacerbated by: Inspiration.     Review of Systems   Constitutional: Negative for chills and fever.   HENT: Negative for nosebleeds and sore throat.    Eyes: Negative for pain.   Respiratory: Negative for cough and shortness of breath.    Cardiovascular: Negative for chest pain (Anterior chest wall pain as noted).   Gastrointestinal: Negative for nausea and vomiting.   Genitourinary: Negative for hematuria.   Musculoskeletal: Negative for myalgias.   Skin: Negative for rash.   Neurological: Negative for dizziness.     Allergies   Allergen Reactions   • [place] Vomiting   •  "Vicodin [Hydrocodone-Acetaminophen] Vomiting      Objective:   /68 (BP Location: Left arm, Patient Position: Sitting, BP Cuff Size: Adult)   Pulse 65   Temp 36.9 °C (98.5 °F) (Temporal)   Resp 18   Ht 1.702 m (5' 7\")   Wt 60.3 kg (133 lb)   SpO2 98%   BMI 20.83 kg/m²   Physical Exam  Vitals signs and nursing note reviewed.   Constitutional:       General: He is not in acute distress.     Appearance: He is well-developed.   HENT:      Head: Normocephalic and atraumatic.      Right Ear: External ear normal.      Left Ear: External ear normal.      Nose: Nose normal.      Mouth/Throat:      Mouth: Mucous membranes are moist.   Eyes:      Conjunctiva/sclera: Conjunctivae normal.      Pupils: Pupils are equal, round, and reactive to light.   Cardiovascular:      Rate and Rhythm: Normal rate and regular rhythm.      Heart sounds: Murmur (consistent with mechanical mitral valve) present.   Pulmonary:      Effort: Pulmonary effort is normal. No respiratory distress.      Breath sounds: Normal breath sounds. No wheezing or rhonchi.   Chest:      Chest wall: Tenderness present.       Abdominal:      General: There is no distension.      Palpations: Abdomen is soft.      Tenderness: There is no tenderness.   Musculoskeletal: Normal range of motion.   Skin:     General: Skin is warm and dry.   Neurological:      General: No focal deficit present.      Mental Status: He is alert and oriented to person, place, and time. Mental status is at baseline.      Gait: Gait (gait at baseline) normal.   Psychiatric:         Judgment: Judgment normal.     Chest x-ray:  DX-CHEST-2 VIEWS   Order: [id number]   Status:  Final result   Visible to patient:  No (Not Released) Next appt:  01/31/2020 at 09:30 AM in [facility] ([facility]) Dx:  Crushed chest, initial encounter   Details     Reading Physician Reading Date Result Priority   Morro Lawrence M.D. 1/29/2020 Urgent Care      Narrative & Impression        1/29/2020 " 10:49 AM     HISTORY/REASON FOR EXAM:  Pain Following Trauma  GLF x 6 days ago.  Anterior chest pain just to the left of the sternum     TECHNIQUE/EXAM DESCRIPTION AND NUMBER OF VIEWS:  Two views of the chest.     COMPARISON:  None.     FINDINGS:  Median sternotomy wires. Valve prosthesis.  No pulmonary infiltrates or consolidations are noted.  No pleural effusions, no pneumothorax are appreciated.  Normal cardiopericardial silhouette.        IMPRESSION:        1. No active cardiopulmonary abnormalities are identified.             Last Resulted: 01/29/20 11:03 AM           Medical decision-making/course: The patient remained afebrile, hemodynamically and neurologically stable with no evidence of respiratory compromise throughout the urgent care course.  There was no immediate clinical indication for the necessity of emergency department evaluation or inpatient admission and the patient requested a trial of outpatient management.          Assessment/Plan:   1. Crushed chest, initial encounter  - DX-CHEST-2 VIEWS; Future    Reassurance and symptomatic management      Discussed close monitoring, return precautions, and supportive measures including maintaining adequate fluid hydration and caloric intake, relative rest and OTC symptom management including acetaminophen as needed for pain and/or fever.    Differential diagnosis, natural history, supportive care, and indications for immediate follow-up discussed.     Advised the patient to follow-up with the primary care physician for recheck, reevaluation, and consideration of further management.

## 2020-01-31 ENCOUNTER — ANTICOAGULATION VISIT (OUTPATIENT)
Dept: MEDICAL GROUP | Facility: PHYSICIAN GROUP | Age: 75
End: 2020-01-31
Payer: MEDICARE

## 2020-01-31 DIAGNOSIS — Z79.01 CHRONIC ANTICOAGULATION: Primary | ICD-10-CM

## 2020-01-31 DIAGNOSIS — Z95.2 HISTORY OF MITRAL VALVE REPLACEMENT WITH MECHANICAL VALVE: ICD-10-CM

## 2020-01-31 LAB — INR PPP: 2.9 (ref 2–3.5)

## 2020-01-31 PROCEDURE — 93793 ANTICOAG MGMT PT WARFARIN: CPT | Performed by: NURSE PRACTITIONER

## 2020-01-31 PROCEDURE — 85610 PROTHROMBIN TIME: CPT | Performed by: NURSE PRACTITIONER

## 2020-01-31 NOTE — PROGRESS NOTES
Anticoagulation Summary  As of 2020    INR goal:   2.5-3.5   TTR:   32.6 % (2.9 y)   INR used for dosin.90 (2020)   Warfarin maintenance plan:   7.5 mg (2.5 mg x 3) every Mon, Wed, Fri; 5 mg (2.5 mg x 2) all other days   Weekly warfarin total:   42.5 mg   Plan last modified:   Chase Baldwin, PharmD (2019)   Next INR check:   2020   Target end date:   Indefinite    Indications    Chronic anticoagulation [Z79.01]  History of mitral valve replacement with mechanical valve [Z95.2] [Z95.2]             Anticoagulation Episode Summary     INR check location:       Preferred lab:       Send INR reminders to:       Comments:         Anticoagulation Care Providers     Provider Role Specialty Phone number    Declan Eaton M.D. Referring Internal Medicine [phone]    [facility]tico[facility] Responsible  [phone]        Anticoagulation Patient Findings  Patient Findings     Negatives:   Signs/symptoms of thrombosis, Signs/symptoms of bleeding, Laboratory test error suspected, Change in health, Change in alcohol use, Change in activity, Upcoming invasive procedure, Emergency department visit, Upcoming dental procedure, Missed doses, Extra doses, Change in medications, Change in diet/appetite, Hospital admission, Bruising, Other complaints        HPI:   Carlos Rivera seen in clinic today, on anticoagulation therapy with warfarin for stroke prevention due to history of mechanical mitral valve replacement.    Patient's previous INR was supratherapeutic at 3.7 on 20, at which time patient was instructed to decrease one dose, then resume current warfarin reigmen.  He returns to clinic today to recheck INR to ensure it is therapeutic and thus preventing possible clotting and/or bleeding/bruising complications.    CHADS-VASc = n/a  (unadjusted ischemic stroke risk/year:  n/a    Does patient have any changes to current medical/health status since last appt (Y/N):  NO  Does patient have any  signs/symptoms of bleeding and/or thrombosis since the last appt (Y/N):  NO  Does patient have any interval changes to diet or medications since last appt (Y/N):  NO  Are there any complications or cost restrictions with current therapy (Y/N):  NO     Does patient have [facility] PCP? YES, set to establish with Dr Angelica Rojas (If not, please document discussion that patient must be seen at [facility])       Vitals:  declined by patient at today's visit.    There were no vitals filed for this visit.     Asssessment:      INR therapeutic at 2.9, therefore decreasing patient's risk of stroke and/or bleeding complications.   Reason(s) for out of range INR today:  n/a      Plan:  Pt is to continue with current warfarin dosing regimen in order to maintain INR in therapeutic range.     Follow up:  Because warfarin is a high risk medication and current CHEST guidelines recommend regular monitoring intervals (few days up to 12 weeks), will have patient return to clinic in 3 weeks to recheck INR.    Casey Birmingham, PharmD, BCACP

## 2020-02-06 ENCOUNTER — OFFICE VISIT (OUTPATIENT)
Dept: NEUROLOGY | Facility: MEDICAL CENTER | Age: 75
End: 2020-02-06
Payer: MEDICARE

## 2020-02-06 VITALS
OXYGEN SATURATION: 95 % | HEIGHT: 67 IN | HEART RATE: 54 BPM | DIASTOLIC BLOOD PRESSURE: 68 MMHG | BODY MASS INDEX: 20.88 KG/M2 | RESPIRATION RATE: 16 BRPM | WEIGHT: 133 LBS | TEMPERATURE: 98.7 F | SYSTOLIC BLOOD PRESSURE: 120 MMHG

## 2020-02-06 DIAGNOSIS — G62.89 OTHER POLYNEUROPATHY: ICD-10-CM

## 2020-02-06 PROCEDURE — 99213 OFFICE O/P EST LOW 20 MIN: CPT

## 2020-02-06 RX ORDER — PREGABALIN 25 MG/1
25 CAPSULE ORAL 2 TIMES DAILY
Qty: 60 CAP | Refills: 2 | Status: SHIPPED | OUTPATIENT
Start: 2020-02-06 | End: 2020-05-06

## 2020-02-06 NOTE — PROGRESS NOTES
Follow up appointment for balance problems and memory loss    Used to follow with Dr Osiel HERMAN  Mr Rivera is a 75 yo male with DM,.peripheral neuropathy,  balance problem and frequent falls, memory loss and depression.  He has mechanical mitral valve and is on coumadin daily.  Was on donepezil and memantine for presumed MCI and there was a question of possible seizure like episodes with dizziness (never had convulsions or tongue biting or urinary or bowel incontinence) for which he was on LEV and gabapentin however he had stopped both medications due to them not helping and symptoms had resolved,  Review of Systems   Constitutional: Negative.    HENT: Negative.    Eyes: Negative.    Respiratory: Negative.    Cardiovascular: Negative.    Gastrointestinal: Negative.    Genitourinary: Negative.    Musculoskeletal: Positive for back pain, falls and joint pain.   Neurological: Positive for dizziness, sensory change and weakness.   Psychiatric/Behavioral: Negative.      Past Medical History:   Diagnosis Date   • Chronic anticoagulation 2/23/2017   • History of mitral valve replacement with mechanical valve [Z95.2] 3/10/2017   • Hyperlipidemia    • Type II diabetes mellitus (HCC) 2/23/2017     Past Surgical History:   Procedure Laterality Date   • MITRAL VALVE REPLACEMENT  1999   • INGUINAL HERNIA REPAIR Bilateral 1984   • TONSILLECTOMY       Family History   Problem Relation Age of Onset   • Heart Attack Father 58   • Diabetes Father    • Heart Attack Paternal Uncle    • No Known Problems Sister    • No Known Problems Brother    • No Known Problems Maternal Grandmother    • No Known Problems Maternal Grandfather    • No Known Problems Paternal Grandmother    • Heart Attack Paternal Grandfather    • No Known Problems Sister    • No Known Problems Brother      Social History     Socioeconomic History   • Marital status:      Spouse name: Not on file   • Number of children: Not on file   • Years of education:  "Not on file   • Highest education level: Not on file   Occupational History   • Not on file   Social Needs   • Financial resource strain: Not on file   • Food insecurity     Worry: Not on file     Inability: Not on file   • Transportation needs     Medical: Not on file     Non-medical: Not on file   Tobacco Use   • Smoking status: Never Smoker   • Smokeless tobacco: Never Used   Substance and Sexual Activity   • Alcohol use: No     Alcohol/week: 0.0 oz   • Drug use: No   • Sexual activity: Yes     Comment:    Lifestyle   • Physical activity     Days per week: Not on file     Minutes per session: Not on file   • Stress: Not on file   Relationships   • Social connections     Talks on phone: Not on file     Gets together: Not on file     Attends [employer] service: Not on file     Active member of club or organization: Not on file     Attends meetings of clubs or organizations: Not on file     Relationship status: Not on file   • Intimate partner violence     Fear of current or ex partner: Not on file     Emotionally abused: Not on file     Physically abused: Not on file     Forced sexual activity: Not on file   Other Topics Concern   • Not on file   Social History Narrative    Retired from [employer]      Current Outpatient Medications on File Prior to Visit   Medication Sig Dispense Refill   • warfarin (COUMADIN) 2.5 MG Tab TAKE TWO TO THREE TABLETS  BY MOUTH ONE TIME DAILY AS  DIRECTED BY COUMADIN CLINIC 90 Tab 0   • atorvastatin (LIPITOR) 40 MG Tab TAKE 1 TABLET DAILY IN THE EVENING 90 Tab 0   • lisinopril (PRINIVIL) 20 MG Tab TAKE 1 TABLET DAILY 90 Tab 0   • metFORMIN ER (GLUCOPHAGE XR) 500 MG TABLET SR 24 HR Take 2 Tabs by mouth 2 times a day. 360 Tab 0   • citalopram (CELEXA) 10 MG tablet TAKE 1 TABLET BY MOUTH  EVERY MORNING 90 Tab 1   • Insulin Syringe-Needle U-100 (INSULIN SYRINGE .3CC/31GX5/16\") 31G X 5/16\" 0.3 ML Misc Use 3 times daily with insulin 300 Each 3   • fexofenadine (ALLEGRA ALLERGY) 180 MG tablet " Take 180 mg by mouth every day.     • BYDUREON BCISE 2 MG/0.85ML Auto-injector INJECT 2 MG AS INSTRUCTED EVERY 7 DAYS (Patient not taking: Reported on 2/6/2020) 12 PEN 1   • glipiZIDE SR (GLIPIZIDE XL) 5 MG TABLET SR 24 HR Take 1 Tab by mouth every day. (Patient not taking: Reported on 2/6/2020) 90 Tab 0   • memantine (NAMENDA) 5 MG Tab Take 1 Tab by mouth 2 times a day. (Patient not taking: Reported on 2/6/2020) 180 Tab 0   • gabapentin (NEURONTIN) 100 MG Cap Take 100 mg by mouth every bedtime. Take 2-3 capsules at bedtime     • Blood Glucose Monitoring Suppl Device Meter: Dispense One Touch Ultra. Sig. Use as directed for blood sugar monitoring. #1. NR. 1 Device 0   • Blood Glucose Test Strips Test strips order: Test strips for One Touch Ultra 2 meter. Sig: use twice daily and prn ssx high or low sugar #100 RF x 3 200 Strip 3   • levETIRAcetam (KEPPRA) 250 MG tablet Take 0.5 Tabs by mouth 2 times a day. (Patient not taking: Reported on 2/6/2020) 30 Tab 4   • Blood Glucose Monitoring Suppl Supplies Misc Test strips order: Test strips compatible with meter. Sig: use daily and prn ssx high or low sugar 200 Each 3   • Homeopathic Products ([employer] LEG CRAMPS PAIN RELIEF PO) Take  by mouth.     • Blood Glucose Monitoring Suppl (ONETOUCH VERIO FLEX SYSTEM) W/DEVICE Kit by Does not apply route.       No current facility-administered medications on file prior to visit.      MRI brain   Extensive white matter disease and lacunar infarcts     IMPRESSION:     1.  No acute abnormality.  2.  Moderate cerebral atrophy.  3.  Moderate chronic microvascular ischemic disease.  4.  Chronic infarcts in the right thalamus and right posterior frontal lobe.  5.  There has been no significant interval change.  6.  There is no MR evidence of normal pressure hydrocephalus.  7.  Mucous retention cyst in the left maxillary sinus.    MOCA 22/30     Recall 0/5  Orientation 4/6    Memory loss  Balance    Off keppra now     Possible vascular  "dementia   Vs ad    Physical therapy with gait training   A1C 7.4    Hearing aids  Sleep therapy     Diabetic neuropathy     Current Outpatient Medications on File Prior to Visit   Medication Sig Dispense Refill   • warfarin (COUMADIN) 2.5 MG Tab TAKE TWO TO THREE TABLETS  BY MOUTH ONE TIME DAILY AS  DIRECTED BY COUMADIN CLINIC 90 Tab 0   • atorvastatin (LIPITOR) 40 MG Tab TAKE 1 TABLET DAILY IN THE EVENING 90 Tab 0   • lisinopril (PRINIVIL) 20 MG Tab TAKE 1 TABLET DAILY 90 Tab 0   • metFORMIN ER (GLUCOPHAGE XR) 500 MG TABLET SR 24 HR Take 2 Tabs by mouth 2 times a day. 360 Tab 0   • citalopram (CELEXA) 10 MG tablet TAKE 1 TABLET BY MOUTH  EVERY MORNING 90 Tab 1   • Insulin Syringe-Needle U-100 (INSULIN SYRINGE .3CC/31GX5/16\") 31G X 5/16\" 0.3 ML Misc Use 3 times daily with insulin 300 Each 3   • fexofenadine (ALLEGRA ALLERGY) 180 MG tablet Take 180 mg by mouth every day.     • BYDUREON BCISE 2 MG/0.85ML Auto-injector INJECT 2 MG AS INSTRUCTED EVERY 7 DAYS (Patient not taking: Reported on 2/6/2020) 12 PEN 1   • glipiZIDE SR (GLIPIZIDE XL) 5 MG TABLET SR 24 HR Take 1 Tab by mouth every day. (Patient not taking: Reported on 2/6/2020) 90 Tab 0   • memantine (NAMENDA) 5 MG Tab Take 1 Tab by mouth 2 times a day. (Patient not taking: Reported on 2/6/2020) 180 Tab 0   • gabapentin (NEURONTIN) 100 MG Cap Take 100 mg by mouth every bedtime. Take 2-3 capsules at bedtime     • Blood Glucose Monitoring Suppl Device Meter: Dispense One Touch Ultra. Sig. Use as directed for blood sugar monitoring. #1. NR. 1 Device 0   • Blood Glucose Test Strips Test strips order: Test strips for One Touch Ultra 2 meter. Sig: use twice daily and prn ssx high or low sugar #100 RF x 3 200 Strip 3   • levETIRAcetam (KEPPRA) 250 MG tablet Take 0.5 Tabs by mouth 2 times a day. (Patient not taking: Reported on 2/6/2020) 30 Tab 4   • Blood Glucose Monitoring Suppl Supplies Misc Test strips order: Test strips compatible with meter. Sig: use daily and prn " "ssx high or low sugar 200 Each 3   • Homeopathic Products ([employer] LEG CRAMPS PAIN RELIEF PO) Take  by mouth.     • Blood Glucose Monitoring Suppl (ONETOUCH VERIO FLEX SYSTEM) W/DEVICE Kit by Does not apply route.       No current facility-administered medications on file prior to visit.      Encounter Vitals  Standard Vitals  Vitals  Blood Pressure : 120/68  Temperature: 37.1 °C (98.7 °F)  Temp src: Temporal  Pulse: (!) 54  Respiration: 16  Pulse Oximetry: 95 %  Height: 170.2 cm (5' 7\")  Weight: 60.3 kg (133 lb)  Encounter Vitals  Temperature: 37.1 °C (98.7 °F)  Temp src: Temporal  Blood Pressure : 120/68  Pulse: (!) 54  Respiration: 16  Pulse Oximetry: 95 %  Weight: 60.3 kg (133 lb)  Height: 170.2 cm (5' 7\")  BMI (Calculated): 20.83  Pulmonary-Specific Vitals     Durable Medical Equipment-Specific Vitals   aao x 3  Cn 2-12 intact   Motor at least 4+/5 in all extremities   Sensory decreased to PP and V in stockings and gloves  Gait wide based  Slow  Uses walker or cane at home   dtrs +  Coordination some tremor on FTN    Impression and plan   Vascular dementia   Gait instability and falls   Multiple small lacunar strokes bilaterally contributing to cognitive dysfunction and gait problems   DM periphral neuropathy also contributing to his gait and falls  Plan  Start Lyrica 25 mg bid for neuropathy   He is off gabapentin and LEV I do not feels he needs these medications   MOCA 22/30 suggestive of cognitive impairment  Physical therapy   Keep BP <130  A1C <6.5  LDL <70 and HDL >50  No furtrer dizzy spells and cautipon when walking   No falls   PT to assess safety at home   Discussed brain activities  Gave [employer] contact   Counseled on diet exercise   Supplements for healthy brain     RTC 6 months         Vascular dementia     "

## 2020-02-15 ASSESSMENT — ENCOUNTER SYMPTOMS
WEAKNESS: 1
CARDIOVASCULAR NEGATIVE: 1
PSYCHIATRIC NEGATIVE: 1
BACK PAIN: 1
CONSTITUTIONAL NEGATIVE: 1
GASTROINTESTINAL NEGATIVE: 1
SENSORY CHANGE: 1
DIZZINESS: 1
EYES NEGATIVE: 1
FALLS: 1
RESPIRATORY NEGATIVE: 1

## 2020-02-17 ENCOUNTER — PHYSICAL THERAPY (OUTPATIENT)
Dept: PHYSICAL THERAPY | Facility: REHABILITATION | Age: 75
End: 2020-02-17
Attending: INTERNAL MEDICINE
Payer: MEDICARE

## 2020-02-17 DIAGNOSIS — H55.09 OTHER FORMS OF NYSTAGMUS: ICD-10-CM

## 2020-02-17 PROCEDURE — 97162 PT EVAL MOD COMPLEX 30 MIN: CPT

## 2020-02-17 PROCEDURE — 97535 SELF CARE MNGMENT TRAINING: CPT

## 2020-02-17 ASSESSMENT — BALANCE ASSESSMENTS
STANDING UNSUPPORTED WITH EYES CLOSED: 3
TRANSFERS: 3
STANDING UNSUPPORTED WITH FEET TOGETHER: 3
REACHING FORWARD WITH OUTSTRETCHED ARM WHILE STANDING: 2
LOOK OVER LEFT AND RIGHT SHOULDERS WHILE STANDING: 3
TURN 360 DEGREES: 2
STANDING TO SITTING: 3
SITTING TO STANDING: 3
LONG VERSION TOTAL SCORE (MAX 56): 38
LONG VERSION TOTAL SCORE (MAX 56): 38
PICK UP OBJECT FROM THE FLOOR FROM A STANDING POSITION: 3
PLACE ALTERNATE FOOT ON STEP OR STOOL WHILE STANDING UNSUPPORTED: 3
STANDING ON ONE LEG: 1
STANDING UNSUPPORTED ONE FOOT IN FRONT: 2
STANDING UNSUPPORTED: 3
SITTING UNSUPPORTED: 4

## 2020-02-17 ASSESSMENT — ENCOUNTER SYMPTOMS
PAIN SCALE AT LOWEST: 1
PAIN TIMING: IN THE EVENING
PAIN SCALE: 1
PAIN SCALE AT HIGHEST: 7

## 2020-02-17 NOTE — OP THERAPY EVALUATION
"  Outpatient Physical Therapy  VESTIBULAR EVALUATION    [facility] Physical Therapy [facility]  [address]  [address]  [place] [address]  Phone:  [phone]  Fax:  [phone]    Date of Evaluation: 02/17/2020    Patient: Mike Rivera  YOB: 1945  MRN: [id number]     Referring Provider: Bouchra Rodriguez M.D.  [address]  [address]  [place] [address]   Referring Diagnosis: Other forms of nystagmus [H55.09]     Time Calculation  Start time: 0805  Stop time: 0900 Time Calculation (min): 55 minutes       Physical Therapy Occurrence Codes    Date of onset of impairment:  6/17/16   Date physical therapy care plan established or reviewed:  2/17/20   Date physical therapy treatment started:  2/17/20          Chief Complaint: Loss Of Balance and Vertigo    Visit Diagnoses     ICD-10-CM   1. Other forms of nystagmus H55.09         History of Present Illness:     Mechanism of injury:  Pt presents to PT with complaint of imbalance and dizziness. Medical HX notable for DMII, peripheral neuropathy,  balance problem and frequent falls, memory loss and depression.  States this problem started in the summer of 2016.  Had his ears cleaned out [place] an [place] in another state, about 1 week later began to have intermittent spinning vertigo.  Later in that year had a LOB posterior, had time to cover the back of his head but fractured his cervical spine.  Was able to be treated with bracing.     Current complaints: Cont spinning dizziness with transitions out of bed, lasting a couple mins.  No nausea and no vomiting. Can feel dizzy also with standing and walking.  Improves with sitting still.  Imbalance to a varying degree.  \"Some days I get around ok and others its terrible.\" N/T in B LEs starting in the feet and traveling upwards the longer he walks.  Has been using a walking stick and recently obtained a 4WW.      Prior level of function:  Retired  ([employer]).  Previously very active " with sailing and biking     Headaches: no headaches      Ear problems:  Hearing loss (believes to be chronic)    Sleep disturbance:  Not disrupted  Symptoms:     Current symptom ratin    At best symptom ratin    At worst symptom ratin    Symptom timing:  In the evening    Progression:  Worsening  Social Support:     Lives in:  One-story house    Lives with:  Spouse  Diagnostic Tests:     MRI studies: abnormal (Extensive white matter disease and lacunar infarcts, see scans for details)    Treatments:     No prior treatments received    Patient goals:     Patient goals for therapy:  Improved balance    Other patient goals:  Resolve vertigo      Past Medical History:   Diagnosis Date   • Chronic anticoagulation 2017   • History of mitral valve replacement with mechanical valve [Z95.2] 3/10/2017   • Hyperlipidemia    • Type II diabetes mellitus (HCC) 2017     Past Surgical History:   Procedure Laterality Date   • MITRAL VALVE REPLACEMENT     • INGUINAL HERNIA REPAIR Bilateral    • TONSILLECTOMY       Social History     Tobacco Use   • Smoking status: Never Smoker   • Smokeless tobacco: Never Used   Substance Use Topics   • Alcohol use: No     Alcohol/week: 0.0 oz     Family and Occupational History     Socioeconomic History   • Marital status:      Spouse name: Not on file   • Number of children: Not on file   • Years of education: Not on file   • Highest education level: Not on file   Occupational History   • Not on file       Objective:    Gait:     Assessment: difficulty with concurrent head rotation and wide-based gait    Comments: Shuffled, ataxic, festinating pattern intermittently  Vestibulospinal Exam:     Fall Risk: yes (see fernandez)  Oculomotor Exam:         Details: No spontaneous nystagmus central gaze          Details: No spontaneous nystagmus eccentric gaze    No saccadic eye movements    Smooth pursuit present    Convergence:        Normal convergence    No skew     "Comments: Retropulsion noted in seated during oculomotor testing, having to reset to upright several times.   Active Range of Motion:     Within functional limits  Limb Ataxia Exam:     Finger-to-Nose:         Intention tremor: none    Heel-to-Shin:         Overshooting: right    Dysdiadochokinesia: none.    Comments: Reports finding it hard to initiate gait at times, \"Its like I tell myself to take a step, but my brain doesn't listen. I have to take one big march to start it off.\"  Strength Exam:     Comments: B LE strength grossly assessed at 3+/5  Reflex Exam:     Babinski sign: absent      Deep Tendon Reflexes:         Left L4 (Patellar): trace (1+)        Right L4 (Patellar): trace (1+)    Comments: Clonus neg  Sensation Tests:     Comments: Diminished distally in sock distribution.   Vestibulo-Ocular Exam:     Abnormal head thrust test        Details: corrective saccade on head moving right (2 or 4 attempts)    Comments: VORx1 is asx  BPPV Exam:     Normal Val-Hallpike    Normal straight head-hanging test    Normal roll test    Comments: Dizziness upon sitting up from R hallpike (modified to sidelying). NEG for nystagmus with and without frenzels.   Breathing-Related Tests:     Normal hyperventilation test    Normal Valsalva test        Therapeutic Treatments and Modalities:     1. Functional Training, Self Care (CPT 83498), Education: sources of dizziness (central vs peripheral), testing results, goals of PT for general strengthening and balance.  HEP: to begin stationary biking daily x 5-10 min    Time-based treatments/modalities:  Functional training, self care minutes (CPT 25772): 20 minutes         Assessment:     Functional impairments:  Decreased utilization of VIS/vest/somato, gait abnormality/instability, decreased postural stability, decreased gaze stabilization, decreased limits of stability and decreased range of motion/strength    Assessment details:  Mr. Rivera is a 74 y.o male who presents to " PT with complaint of dizziness and imbalance.  Vestibular exam was unremarkable with positional exam NEG and VOR control WFL.  He does demonstrate notable imbalance with increased risk of falls noted on the APPIAH, decreased LE strength, decreased LE sensation to LT and an almost parkinsonian type gait with shuffled pattern and intermittent festination observed.  Likely multiple factors contributing, including the central changes noted on MRI.  Skilled PT services are indicated to address the mentioned functional limitations and enhance QOL.       Prognosis: good    Goals:     Short term goals:  - Able to STS x 10 reps in 30 sec  - Able to bridge to full height x 10 reps without pain  - Able to transition from R SL without dizziness    Short term goal time span:  1-2 weeks    Long term goals:  - Improve APPIAH to at least 45/56  - Able to ambulate 500 feet in 6 min with SPC and no LOB  - Indep with HEP    Long term goal time span:  6-8 weeks  Plan:     Therapy options:  Physical therapy treatment to continue    Planned therapy interventions:  Functional Training, Self Care (CPT 63141), E Stim Unattended (CPT 63771), Gait Training (CPT 18925), Neuromuscular Re-education (CPT 78826), Manual Therapy (CPT 63768), Therapeutic Activities (CPT 64285), Therapeutic Exercise (CPT 67125) and Canalith Repositioning (CPT 16072)    Frequency:  2x week    Duration in weeks:  8    Discussed with:  Patient      Functional Assessment Used  Appiah Balance Total Score (0-56): 38  Dizziness Handicap Inventory - Physical Items Score: 18  Dizziness Handicap Inventory - Emotional Items Score: 16  Dizziness Handicap Inventory - Functional Items Score: 12  Dizziness Handicap Inventory - Total Score: 46       Referring provider co-signature:  I have reviewed this plan of care and my co-signature certifies the need for services.  Certification Dates:   From 02/17/20     To 04/13/20    Physician Signature: ________________________________ Date:  ______________

## 2020-02-19 ENCOUNTER — APPOINTMENT (OUTPATIENT)
Dept: PHYSICAL THERAPY | Facility: REHABILITATION | Age: 75
End: 2020-02-19
Attending: INTERNAL MEDICINE
Payer: MEDICARE

## 2020-02-20 ENCOUNTER — APPOINTMENT (OUTPATIENT)
Dept: PHYSICAL THERAPY | Facility: REHABILITATION | Age: 75
End: 2020-02-20
Attending: INTERNAL MEDICINE
Payer: MEDICARE

## 2020-02-20 ENCOUNTER — TELEPHONE (OUTPATIENT)
Dept: NEUROLOGY | Facility: MEDICAL CENTER | Age: 75
End: 2020-02-20

## 2020-02-20 NOTE — OP THERAPY DAILY TREATMENT
"  Outpatient Physical Therapy  DAILY TREATMENT     [facility] [address]  [address]  [place] [address]  Phone:  [phone]  Fax:  [phone]    Date: 02/20/2020    Patient: Mike Rivera  YOB: 1945  MRN: [id number]     Time Calculation  {Time Calculation:37623}    Chief Complaint: No chief complaint on file.    Visit #: 2    SUBJECTIVE:  Follow up on stationary biking     OBJECTIVE:  Current objective measures:           Therapeutic Exercises (CPT 91660):     1. NuStep     2. Bridging     3. SLR     4. Hip abduction     5. Hip adduction     6. Sit<>stand     Therapeutic Treatments and Modalities:     1. Gait Training (CPT 10241)    Time-based treatments/modalities:  {Timed Charges:26919}    ASSESSMENT:   Response to treatment: ***    PLAN/RECOMMENDATIONS:   Plan for treatment: {AMB OP THERAPY - THERAPY PLAN:[id number]::\"therapy treatment to continue next visit\"}.  Planned interventions for next visit: continue with current treatment.       "

## 2020-02-20 NOTE — TELEPHONE ENCOUNTER
Processed Prior Authorization through CoverMyMeds , medication was approved as soon as prior auth was submitted . Approval Scanned into .

## 2020-02-21 ENCOUNTER — ANTICOAGULATION VISIT (OUTPATIENT)
Dept: MEDICAL GROUP | Facility: PHYSICIAN GROUP | Age: 75
End: 2020-02-21
Payer: MEDICARE

## 2020-02-21 DIAGNOSIS — Z95.2 HISTORY OF MITRAL VALVE REPLACEMENT WITH MECHANICAL VALVE: ICD-10-CM

## 2020-02-21 DIAGNOSIS — Z79.01 CHRONIC ANTICOAGULATION: ICD-10-CM

## 2020-02-21 LAB — INR PPP: 7 (ref 2–3.5)

## 2020-02-21 PROCEDURE — 99211 OFF/OP EST MAY X REQ PHY/QHP: CPT | Performed by: INTERNAL MEDICINE

## 2020-02-21 PROCEDURE — 85610 PROTHROMBIN TIME: CPT | Performed by: INTERNAL MEDICINE

## 2020-02-21 NOTE — PROGRESS NOTES
Anticoagulation Summary  As of 2020    INR goal:   2.5-3.5   TTR:   32.2 % (2.9 y)   INR used for dosin.00! (2020)   Warfarin maintenance plan:   7.5 mg (2.5 mg x 3) every Mon, Wed, Fri; 5 mg (2.5 mg x 2) all other days   Weekly warfarin total:   42.5 mg   Plan last modified:   Chase Baldwin, PharmD (2019)   Next INR check:   2020   Target end date:   Indefinite    Indications    Chronic anticoagulation [Z79.01]  History of mitral valve replacement with mechanical valve [Z95.2] [Z95.2]             Anticoagulation Episode Summary     INR check location:       Preferred lab:       Send INR reminders to:       Comments:         Anticoagulation Care Providers     Provider Role Specialty Phone number    Declan Eaton M.D. Referring Internal Medicine [phone]    [facility] Anticoagulation Services Responsible  [phone]        Anticoagulation Patient Findings  Patient Findings     Negatives:   Signs/symptoms of thrombosis, Signs/symptoms of bleeding, Laboratory test error suspected, Change in health, Change in alcohol use, Change in activity, Upcoming invasive procedure, Emergency department visit, Upcoming dental procedure, Missed doses, Extra doses, Change in medications, Change in diet/appetite, Hospital admission, Bruising, Other complaints        HPI:   Mike Rivera seen in clinic today, on anticoagulation therapy with warfarin for stroke prevention due to history of mechanical mitral valve replacement.    Patient's previous INR was therapeutic at 2.9 on 20, at which time patient was instructed to continue with current warfarin regimen.  He returns to clinic today to recheck INR to ensure it is therapeutic and thus preventing possible clotting and/or bleeding/bruising complications.    CHADS-VASc = n/a  (unadjusted ischemic stroke risk/year:  n/a)    Does patient have any changes to current medical/health status since last appt (Y/N):  NO  Does patient have any signs/symptoms of  bleeding and/or thrombosis since the last appt (Y/N):  NO  Does patient have any interval changes to diet or medications since last appt (Y/N):  NO  Are there any complications or cost restrictions with current therapy (Y/N):  NO     Does patient have [facility] PCP? Yes, set to establish with Dr. Shine @ [place] due to Dr Rojas's departure (If not, please document discussion that patient must be seen at [facility])       Vitals:  declined today.    There were no vitals filed for this visit.     Asssessment:      INR supratherapeutic at 7.0, therefore increasing patient's bleeding risk.   Reason(s) for out of range INR today:  Etiology unknown.      Plan:  Instructed patient to HOLD X 3, then resume current warfarin regimen in order to bring INR to therapeutic range.     Follow up:  Because warfarin is a high risk medication and current CHEST guidelines recommend regular monitoring intervals (few days up to 12 weeks), will have patient return to clinic in 1 weeks to recheck INR (per patient preference).    Casey Birmingham, PharmD, BCACP

## 2020-02-24 ENCOUNTER — PHYSICAL THERAPY (OUTPATIENT)
Dept: PHYSICAL THERAPY | Facility: REHABILITATION | Age: 75
End: 2020-02-24
Attending: INTERNAL MEDICINE
Payer: MEDICARE

## 2020-02-24 DIAGNOSIS — H55.09 OTHER FORMS OF NYSTAGMUS: ICD-10-CM

## 2020-02-24 PROCEDURE — 97110 THERAPEUTIC EXERCISES: CPT

## 2020-02-24 NOTE — OP THERAPY DAILY TREATMENT
"  Outpatient Physical Therapy  DAILY TREATMENT     [facility] [address]  [address]  [place] [address]  Phone:  [phone]  Fax:  [phone]    Date: 02/24/2020    Patient: Mike Rivera  YOB: 1945  MRN: [id number]     Time Calculation  Start time: 0817  Stop time: 0850 Time Calculation (min): 33 minutes       Chief Complaint: Vertigo and Loss Of Balance    Visit #: 2    SUBJECTIVE:  No new complaints. States he has a stationary bike that he doesn't use bc it is outside.     OBJECTIVE:      Therapeutic Exercises (CPT 19320):     1. NuStep, L3 x 5 min    2. Ball bridge, 5\" x 15    3. Ball roll, x 20    4. LTR with feet on ball, x 8 ea side    5. STS, x 15 with cues for fwd weight shift    6. Ankle sway, x 2 min    7. [place] sway, x 1 min      Time-based treatments/modalities:  Therapeutic exercise minutes (CPT 04291): 33 minutes       ASSESSMENT:   Response to treatment: Needing frequent redirection.  Poor STM, does best with 1-2 step commands.  Will need written instruction for HEP, for now to get bike in a space he would be able to use it daily.     PLAN/RECOMMENDATIONS:   Plan for treatment: therapy treatment to continue next visit.  Planned interventions for next visit: continue with current treatment.       "

## 2020-02-25 ENCOUNTER — TELEPHONE (OUTPATIENT)
Dept: NEUROLOGY | Facility: MEDICAL CENTER | Age: 75
End: 2020-02-25

## 2020-02-27 ENCOUNTER — PHYSICAL THERAPY (OUTPATIENT)
Dept: PHYSICAL THERAPY | Facility: REHABILITATION | Age: 75
End: 2020-02-27
Attending: INTERNAL MEDICINE
Payer: MEDICARE

## 2020-02-27 DIAGNOSIS — H55.09 OTHER FORMS OF NYSTAGMUS: ICD-10-CM

## 2020-02-27 PROCEDURE — 97112 NEUROMUSCULAR REEDUCATION: CPT

## 2020-02-27 PROCEDURE — 97110 THERAPEUTIC EXERCISES: CPT

## 2020-02-27 NOTE — OP THERAPY DAILY TREATMENT
Outpatient Physical Therapy  DAILY TREATMENT     [facility] Physical [address]  [address]  [place] [address]  Phone:  [phone]  Fax:  [phone]    Date: 02/27/2020    Patient: Mike Rivera  YOB: 1945  MRN: [id number]     Time Calculation  Start time: 0930  Stop time: 1000 Time Calculation (min): 30 minutes       Chief Complaint: Difficulty Walking    Visit #: 3    SUBJECTIVE:  Patient states that he has not put the stationary bike in a place where he can use it regularly. Needing to wait to put it outside once the weather turns. States that his dizziness continues to be bothersome.     OBJECTIVE:  Current objective measures:           Therapeutic Exercises (CPT 86691):     1. NuStep , L5 x 5 minutes     2. STS , 2 x 10 , Cues for anterior weight shift and slow controlled descent. Re-printed written instructions for performing this activity as part of HEP.      Therapeutic Treatments and Modalities:     1. Neuromuscular Re-education (CPT 15961), Romberg stance > accepting perturbations > bilateral shoulder flexion > trunk rotation , Added romberg stance in corner with shoulder flexion and trunk rotation to HEP.     Time-based treatments/modalities:  Therapeutic exercise minutes (CPT 49614): 15 minutes  Neuromusc re-ed, balance, coor, post minutes (CPT 26038): 15 minutes         ASSESSMENT:   Response to treatment: Patient presents with impaired balance and difficulty with walking. He has difficulty attending to tasks and required frequent redirection throughout session.     PLAN/RECOMMENDATIONS:   Plan for treatment: therapy treatment to continue next visit.  Planned interventions for next visit: continue with current treatment.

## 2020-02-28 ENCOUNTER — ANTICOAGULATION VISIT (OUTPATIENT)
Dept: MEDICAL GROUP | Facility: PHYSICIAN GROUP | Age: 75
End: 2020-02-28
Payer: MEDICARE

## 2020-02-28 DIAGNOSIS — Z95.2 HISTORY OF MITRAL VALVE REPLACEMENT WITH MECHANICAL VALVE: ICD-10-CM

## 2020-02-28 DIAGNOSIS — Z79.01 CHRONIC ANTICOAGULATION: Primary | ICD-10-CM

## 2020-02-28 LAB — INR PPP: 5.2 (ref 2–3.5)

## 2020-02-28 PROCEDURE — 99211 OFF/OP EST MAY X REQ PHY/QHP: CPT | Performed by: INTERNAL MEDICINE

## 2020-02-28 PROCEDURE — 85610 PROTHROMBIN TIME: CPT | Performed by: INTERNAL MEDICINE

## 2020-02-28 NOTE — PROGRESS NOTES
Anticoagulation Summary  As of 2020    INR goal:   2.5-3.5   TTR:   32.0 % (2.9 y)   INR used for dosin.20! (2020)   Warfarin maintenance plan:   7.5 mg (2.5 mg x 3) every Mon, Wed, Fri; 5 mg (2.5 mg x 2) all other days   Weekly warfarin total:   42.5 mg   Plan last modified:   Chase Baldwin, PharmD (2019)   Next INR check:   3/2/2020   Target end date:   Indefinite    Indications    Chronic anticoagulation [Z79.01]  History of mitral valve replacement with mechanical valve [Z95.2] [Z95.2]             Anticoagulation Episode Summary     INR check location:       Preferred lab:       Send INR reminders to:       Comments:         Anticoagulation Care Providers     Provider Role Specialty Phone number    Declan Eaton M.D. Referring Internal Medicine [phone]    [facility] Responsible  [phone]        Anticoagulation Patient Findings  Patient Findings     Negatives:   Signs/symptoms of thrombosis, Signs/symptoms of bleeding, Laboratory test error suspected, Change in health, Change in alcohol use, Change in activity, Upcoming invasive procedure, Emergency department visit, Upcoming dental procedure, Missed doses, Extra doses, Change in medications, Change in diet/appetite, Hospital admission, Bruising, Other complaints         HPI:   Mike Rivera seen in clinic today, on anticoagulation therapy with warfarin for stroke prevention due to history of mechanical mitral valve.    Patient's previous INR was supratherapeutic at 7.0 on 20, at which time patient was instructed to hold three doses, then return to clinic.  He rescheduled previous appointment, but returns to clinic today to recheck INR to ensure it is therapeutic and thus preventing possible clotting and/or bleeding/bruising complications.    CHADS-VASc = n/a  (unadjusted ischemic stroke risk/year:  n/a)    Does patient have any changes to current medical/health status since last appt (Y/N):  NO  Does  patient have any signs/symptoms of bleeding and/or thrombosis since the last appt (Y/N):  NO  Does patient have any interval changes to diet or medications since last appt (Y/N):  NO  Are there any complications or cost restrictions with current therapy (Y/N):  NO     Does patient have [facility] PCP? NO, had appt to establish with Dr. Shine but cancelled (If not, please document discussion that patient must be seen at [facility])       Vitals:  declined today.    There were no vitals filed for this visit.     Asssessment:      INR remains supratherapeutic at 5.2, therefore increasing patient's bleeding risk.   Reason(s) for out of range INR today:  My clinical suspicion that patient did not hold three doses as instructed at last visit.       Plan:  Instructed patient to HOLD X 1, decrease next dose to 2.5mg, then resume current warfarin regimen    Patient will need to be seen at [facility] as he no longer has [facility] PCP and cancelled appt to establish @ [place] with Dr. Shine.     Follow up:  Because warfarin is a high risk medication and current CHEST guidelines recommend regular monitoring intervals (few days up to 12 weeks), will have patient return to clinic in 3 days to recheck INR.    Casey Birmingham, PharmD, BCACP

## 2020-03-02 ENCOUNTER — APPOINTMENT (OUTPATIENT)
Dept: PHYSICAL THERAPY | Facility: REHABILITATION | Age: 75
End: 2020-03-02
Attending: INTERNAL MEDICINE
Payer: MEDICARE

## 2020-03-02 NOTE — OP THERAPY DAILY TREATMENT
"  Outpatient Physical Therapy  DAILY TREATMENT     [facility] Physical Therapy [address].  [address]  [place] [address]  Phone:  [phone]  Fax:  [phone]    Date: 03/02/2020    Patient: Carlos Rivera  YOB: 1945  MRN: [id number]     Time Calculation               Chief Complaint: No chief complaint on file.    Visit #: 4    SUBJECTIVE:  ***    OBJECTIVE:      Therapeutic Exercises (CPT 21966):     1. NuStep, L3 x 5 min    2. Ball bridge, 5\" x 15    3. Ball roll, x 20    4. LTR with feet on ball, x 8 ea side    5. STS, x 15 with cues for fwd weight shift    6. Ankle sway, x 2 min    7. [place] sway, x 1 min      Time-based treatments/modalities:          ASSESSMENT:   Response to treatment: ***    PLAN/RECOMMENDATIONS:   Plan for treatment: therapy treatment to continue next visit.  Planned interventions for next visit: continue with current treatment.       "

## 2020-03-05 ENCOUNTER — PHYSICAL THERAPY (OUTPATIENT)
Dept: PHYSICAL THERAPY | Facility: REHABILITATION | Age: 75
End: 2020-03-05
Attending: INTERNAL MEDICINE
Payer: MEDICARE

## 2020-03-05 DIAGNOSIS — H55.09 OTHER FORMS OF NYSTAGMUS: ICD-10-CM

## 2020-03-05 PROCEDURE — 97110 THERAPEUTIC EXERCISES: CPT

## 2020-03-05 PROCEDURE — 97530 THERAPEUTIC ACTIVITIES: CPT

## 2020-03-05 NOTE — OP THERAPY DAILY TREATMENT
Outpatient Physical Therapy  DAILY TREATMENT     [facility] Physical Therapy [address]  [address]  [place] [address]  Phone:  [phone]  Fax:  [phone]    Date: 03/05/2020    Patient: Carlos Rivera  YOB: 1945  MRN: [id number]     Time Calculation  Start time: 0958  Stop time: 1030 Time Calculation (min): 32 minutes       Chief Complaint: Difficulty Walking and Loss Of Balance    Visit #: 4    SUBJECTIVE:  Had a flare up of the vertigo x 2 days early this week.      OBJECTIVE:        Therapeutic Exercises (CPT 52057):     1. NuStep , L5 x 10 minutes , discussing recent dizziness episode during    Therapeutic Treatments and Modalities:     1. Therapeutic Activities (CPT 45493)    Therapeutic Treatment and Modalities Summary:   - Reassessment:  Presents with walking cane.  Wide PAVAN.  Shuffled pattern, fwd lean, slow jayro  Unable to elicit B UE or LE reflexes  Muscle wasting noted R>L hand intrinsics.  Lumbrical strength 2/5, power = 18#, L= 35#  Diminished sensation to LT B UE and LE distally only.   Coordination intact  Clonus Neg  Hoffmans Neg      Time-based treatments/modalities:  Therapeutic exercise minutes (CPT 52220): 10 minutes  Therapeutic activity minutes (CPT 15597): 20 minutes       ASSESSMENT:   Response to treatment: Vertigo episode is consistent with vestibular dysfunction and likely flared due to the severe change in weather/pressure we experienced at that time.  Otherwise, reassessment is indicating weakness and imbalance may be related to c/s stenosis.  Notable narrowing shown on MRI in 2018.  In this case, PT would be of little value.  Will reach out to referring MD to discuss POC options. On hold with PT for now.     PLAN/RECOMMENDATIONS:   Plan for treatment: Hold, consider ortho consult?.

## 2020-03-09 ENCOUNTER — APPOINTMENT (OUTPATIENT)
Dept: PHYSICAL THERAPY | Facility: REHABILITATION | Age: 75
End: 2020-03-09
Attending: INTERNAL MEDICINE
Payer: MEDICARE

## 2020-03-10 DIAGNOSIS — Z95.2 HISTORY OF MITRAL VALVE REPLACEMENT WITH MECHANICAL VALVE: ICD-10-CM

## 2020-03-10 RX ORDER — WARFARIN SODIUM 2.5 MG/1
TABLET ORAL
Qty: 270 TAB | Refills: 0 | Status: ON HOLD | OUTPATIENT
Start: 2020-03-10 | End: 2020-05-08

## 2020-03-17 ENCOUNTER — TELEPHONE (OUTPATIENT)
Dept: VASCULAR LAB | Facility: MEDICAL CENTER | Age: 75
End: 2020-03-17

## 2020-03-19 ENCOUNTER — TELEPHONE (OUTPATIENT)
Dept: VASCULAR LAB | Facility: MEDICAL CENTER | Age: 75
End: 2020-03-19

## 2020-03-19 ENCOUNTER — APPOINTMENT (OUTPATIENT)
Dept: VASCULAR LAB | Facility: MEDICAL CENTER | Age: 75
End: 2020-03-19
Attending: INTERNAL MEDICINE
Payer: MEDICARE

## 2020-03-19 NOTE — TELEPHONE ENCOUNTER
[facility]     Pt did not come to appt that was scheduled for today. Will forward to MA to call to reschedule missed appt.     Mi Miles, PharmD

## 2020-03-23 ENCOUNTER — ANTICOAGULATION VISIT (OUTPATIENT)
Dept: VASCULAR LAB | Facility: MEDICAL CENTER | Age: 75
End: 2020-03-23
Attending: INTERNAL MEDICINE
Payer: MEDICARE

## 2020-03-23 VITALS — SYSTOLIC BLOOD PRESSURE: 125 MMHG | DIASTOLIC BLOOD PRESSURE: 86 MMHG | HEART RATE: 51 BPM

## 2020-03-23 DIAGNOSIS — Z79.01 CHRONIC ANTICOAGULATION: ICD-10-CM

## 2020-03-23 DIAGNOSIS — Z95.2 HISTORY OF MITRAL VALVE REPLACEMENT WITH MECHANICAL VALVE: ICD-10-CM

## 2020-03-23 LAB
INR BLD: 7 (ref 0.9–1.2)
INR PPP: 7 (ref 2–3.5)

## 2020-03-23 PROCEDURE — 85610 PROTHROMBIN TIME: CPT

## 2020-03-23 PROCEDURE — 99212 OFFICE O/P EST SF 10 MIN: CPT | Performed by: NURSE PRACTITIONER

## 2020-03-23 NOTE — PROGRESS NOTES
Anticoagulation Summary  As of 3/23/2020    INR goal:   2.5-3.5   TTR:   31.3 % (3 y)   INR used for dosin.00! (3/23/2020)   Warfarin maintenance plan:   5 mg (2.5 mg x 2) every day   Weekly warfarin total:   35 mg   Plan last modified:   ALRENE Alberts (3/23/2020)   Next INR check:   3/27/2020   Target end date:   Indefinite    Indications    Chronic anticoagulation [Z79.01]  History of mitral valve replacement with mechanical valve [Z95.2] [Z95.2]             Anticoagulation Episode Summary     INR check location:       Preferred lab:       Send INR reminders to:       Comments:         Anticoagulation Care Providers     Provider Role Specialty Phone number    Declan Eaton M.D. Referring Internal Medicine [phone]    [facility] Responsible  [phone]        Anticoagulation Patient Findings      HPI:  Carlos Rivera seen in clinic today for follow up on anticoagulation therapy in the presence of mMVR.   He missed his appt with us. Had 2 high INRs previously.  Denies any medication or diet changes.   No current symptoms of bleeding or thrombosis reported.    A/P:   INR supratherapeutic. Will HOLD three doses then began reduced regimen. Cautioned about avoiding falls and monitoring for bleeding. F/u on Friday. He states he will be here.  BP recorded in vitals.    Follow up appointment on Friday.    Next Appointment: 2020 at 10:30 am @ [place].    Susan ZHOU

## 2020-03-27 ENCOUNTER — ANTICOAGULATION VISIT (OUTPATIENT)
Dept: MEDICAL GROUP | Facility: PHYSICIAN GROUP | Age: 75
End: 2020-03-27
Payer: MEDICARE

## 2020-03-27 DIAGNOSIS — Z95.2 HISTORY OF MITRAL VALVE REPLACEMENT WITH MECHANICAL VALVE: ICD-10-CM

## 2020-03-27 DIAGNOSIS — Z79.01 CHRONIC ANTICOAGULATION: Primary | ICD-10-CM

## 2020-03-27 LAB — INR PPP: 1.8 (ref 2–3.5)

## 2020-03-27 PROCEDURE — 85610 PROTHROMBIN TIME: CPT | Performed by: INTERNAL MEDICINE

## 2020-03-27 PROCEDURE — 99211 OFF/OP EST MAY X REQ PHY/QHP: CPT | Performed by: INTERNAL MEDICINE

## 2020-03-27 NOTE — PROGRESS NOTES
Anticoagulation Summary  As of 3/27/2020    INR goal:   2.5-3.5   TTR:   31.3 % (3 y)   INR used for dosin.80! (3/27/2020)   Warfarin maintenance plan:   7.5 mg (2.5 mg x 3) every Mon, Wed, Fri; 5 mg (2.5 mg x 2) all other days   Weekly warfarin total:   42.5 mg   Plan last modified:   Casey Birmingham, PharmD (3/27/2020)   Next INR check:   4/3/2020   Target end date:   Indefinite    Indications    Chronic anticoagulation [Z79.01]  History of mitral valve replacement with mechanical valve [Z95.2] [Z95.2]             Anticoagulation Episode Summary     INR check location:       Preferred lab:       Send INR reminders to:       Comments:         Anticoagulation Care Providers     Provider Role Specialty Phone number    Declan Eaton M.D. Referring Internal Medicine [phone]    [facility] Responsible  [phone]        Anticoagulation Patient Findings  Patient Findings     Negatives:   Signs/symptoms of thrombosis, Signs/symptoms of bleeding, Laboratory test error suspected, Change in health, Change in alcohol use, Change in activity, Upcoming invasive procedure, Emergency department visit, Upcoming dental procedure, Missed doses, Extra doses, Change in medications, Change in diet/appetite, Hospital admission, Bruising, Other complaints        HPI:   Carlos Rivera seen in clinic today, on anticoagulation therapy with warfarin for stroke prevention due to history of mechanical mitral valve replacement.    Patient's previous INR was supratherapeutic at 7.0 on 3-23-20, at which time patient was instructed to hold three doses, then decrease weekly warfarin regimen.  He returns to clinic today to recheck INR to ensure it is therapeutic and thus preventing possible clotting and/or bleeding/bruising complications.    CHADS-VASc = n/a  (unadjusted ischemic stroke risk/year:  n/a)    Does patient have any changes to current medical/health status since last appt (Y/N):  NO  Does patient have any  signs/symptoms of bleeding and/or thrombosis since the last appt (Y/N):  NO  Does patient have any interval changes to diet or medications since last appt (Y/N):  NO  Are there any complications or cost restrictions with current therapy (Y/N):  NO     Does patient have [facility] PCP? YES, transitioning from Dr Rojas to Dr Shine now that appt's available (If not, please document discussion that patient must be seen at [facility])       Vitals:  declined today.    There were no vitals filed for this visit.     Asssessment:      INR subtherapeutic at 1.8, therefore increasing patient's stroke risk.   Reason(s) for out of range INR today:  Held doses this week.      Plan:  Instructed patient to increase weekly warfarin regimen back to previously stable dosing as detailed above.     Follow up:  Because warfarin is a high risk medication and current CHEST guidelines recommend regular monitoring intervals (few days up to 12 weeks), will have patient return to clinic in 1 weeks to recheck INR.    Casey Birmingham, PharmD, BCACP

## 2020-04-03 ENCOUNTER — ANTICOAGULATION VISIT (OUTPATIENT)
Dept: MEDICAL GROUP | Facility: PHYSICIAN GROUP | Age: 75
End: 2020-04-03
Payer: MEDICARE

## 2020-04-03 DIAGNOSIS — Z79.01 CHRONIC ANTICOAGULATION: ICD-10-CM

## 2020-04-03 DIAGNOSIS — Z95.2 HISTORY OF MITRAL VALVE REPLACEMENT WITH MECHANICAL VALVE: ICD-10-CM

## 2020-04-03 LAB — INR PPP: 3 (ref 2–3.5)

## 2020-04-03 PROCEDURE — 93793 ANTICOAG MGMT PT WARFARIN: CPT | Performed by: FAMILY MEDICINE

## 2020-04-03 PROCEDURE — 85610 PROTHROMBIN TIME: CPT | Performed by: FAMILY MEDICINE

## 2020-04-03 NOTE — PROGRESS NOTES
Anticoagulation Summary  As of 4/3/2020    INR goal:   2.5-3.5   TTR:   31.3 % (3 y)   INR used for dosing:   3.00 (4/3/2020)   Warfarin maintenance plan:   7.5 mg (2.5 mg x 3) every Mon, Wed, Fri; 5 mg (2.5 mg x 2) all other days   Weekly warfarin total:   42.5 mg   Plan last modified:   Casey Birmingham, PharmD (3/27/2020)   Next INR check:   4/17/2020   Target end date:   Indefinite    Indications    Chronic anticoagulation [Z79.01]  History of mitral valve replacement with mechanical valve [Z95.2] [Z95.2]             Anticoagulation Episode Summary     INR check location:       Preferred lab:       Send INR reminders to:       Comments:         Anticoagulation Care Providers     Provider Role Specialty Phone number    Declan Eaton M.D. Referring Internal Medicine [phone]    [facility] Responsible  [phone]        Anticoagulation Patient Findings  Patient Findings     Negatives:   Signs/symptoms of thrombosis, Signs/symptoms of bleeding, Laboratory test error suspected, Change in health, Change in alcohol use, Change in activity, Upcoming invasive procedure, Emergency department visit, Upcoming dental procedure, Missed doses, Extra doses, Change in medications, Change in diet/appetite, Hospital admission, Bruising, Other complaints        HPI:   Carlos Rivera seen in clinic today, on anticoagulation therapy with warfarin for stroke prevention due to history of mechanical mitral valve.    Patient's previous INR was subtherapeutic at 1.8 on 3-27-20, at which time patient was instructed to increase weekly warfarin regimen.  He returns to clinic today to recheck INR to ensure it is therapeutic and thus preventing possible clotting and/or bleeding/bruising complications.    CHADS-VASc = n/a  (unadjusted ischemic stroke risk/year:  n/a)    Does patient have any changes to current medical/health status since last appt (Y/N):  NO  Does patient have any signs/symptoms of bleeding and/or  thrombosis since the last appt (Y/N):  NO  Does patient have any interval changes to diet or medications since last appt (Y/N):  NO  Are there any complications or cost restrictions with current therapy (Y/N):  NO     Does patient have [facility] PCP? YES, Dr Rufino Shine (If not, please document discussion that patient must be seen at [facility])       Vitals:  declined today.    There were no vitals filed for this visit.     Asssessment:      INR therapeutic at 3.0, therefore decreasing patient's risk of stroke and/or bleeding complications.   Reason(s) for out of range INR today:  n/a      Plan:  Pt is to continue with current warfarin dosing regimen in order to maintain INR in therapeutic range.     Follow up:  Because warfarin is a high risk medication and current CHEST guidelines recommend regular monitoring intervals (few days up to 12 weeks), will have patient return to clinic in 2 weeks to recheck INR.    Casey Birmingham, PharmD, BCACP

## 2020-04-17 ENCOUNTER — ANTICOAGULATION VISIT (OUTPATIENT)
Dept: MEDICAL GROUP | Facility: PHYSICIAN GROUP | Age: 75
End: 2020-04-17
Payer: MEDICARE

## 2020-04-17 DIAGNOSIS — Z95.2 HISTORY OF MITRAL VALVE REPLACEMENT WITH MECHANICAL VALVE: ICD-10-CM

## 2020-04-17 DIAGNOSIS — Z79.01 CHRONIC ANTICOAGULATION: Primary | ICD-10-CM

## 2020-04-17 LAB — INR PPP: 5.1 (ref 2–3.5)

## 2020-04-17 PROCEDURE — 85610 PROTHROMBIN TIME: CPT | Performed by: FAMILY MEDICINE

## 2020-04-17 PROCEDURE — 99211 OFF/OP EST MAY X REQ PHY/QHP: CPT | Performed by: FAMILY MEDICINE

## 2020-04-17 NOTE — PROGRESS NOTES
Anticoagulation Summary  As of 2020    INR goal:   2.5-3.5   TTR:   31.2 % (3.1 y)   INR used for dosin.10! (2020)   Warfarin maintenance plan:   7.5 mg (2.5 mg x 3) every Mon, Fri; 5 mg (2.5 mg x 2) all other days   Weekly warfarin total:   40 mg   Plan last modified:   Casey Birmingham, PharmD (2020)   Next INR check:   2020   Target end date:   Indefinite    Indications    Chronic anticoagulation [Z79.01]  History of mitral valve replacement with mechanical valve [Z95.2] [Z95.2]             Anticoagulation Episode Summary     INR check location:       Preferred lab:       Send INR reminders to:       Comments:         Anticoagulation Care Providers     Provider Role Specialty Phone number    Declan Eaton M.D. Referring Internal Medicine [phone]    [facility] Responsible  [phone]        Anticoagulation Patient Findings  Patient Findings     Negatives:   Signs/symptoms of thrombosis, Signs/symptoms of bleeding, Laboratory test error suspected, Change in health, Change in alcohol use, Change in activity, Upcoming invasive procedure, Emergency department visit, Upcoming dental procedure, Missed doses, Extra doses, Change in medications, Change in diet/appetite, Hospital admission, Bruising, Other complaints        HPI:   Carlos Rivera seen in clinic today, on anticoagulation therapy with warfarin for stroke prevention due to history of mechanical mitral valve replacement.    Patient's previous INR was therapeutic at 3.0 on 4-3-20, at which time patient was instructed to continue with current warfarin regimen.  He returns to clinic today to recheck INR to ensure it is therapeutic and thus preventing possible clotting and/or bleeding/bruising complications.    CHADS-VASc = n/a  (unadjusted ischemic stroke risk/year:  n/a)    Does patient have any changes to current medical/health status since last appt (Y/N):  NO  Does patient have any signs/symptoms of bleeding  and/or thrombosis since the last appt (Y/N):  NO  Does patient have any interval changes to diet or medications since last appt (Y/N):  NO  Are there any complications or cost restrictions with current therapy (Y/N):  NO     Does patient have [facility] PCP? YES, Dr Rufino Shine (If not, please document discussion that patient must be seen at [facility])       Vitals:  declined today, vehicle visit.    There were no vitals filed for this visit.     Asssessment:      INR supratherapeutic at 5.1, therefore increasing patient's bleeding risk.   Reason(s) for out of range INR today:  Dose too high?      Plan:  Instructed patient to HOLD X 1, then decrease weekly warfarin regimen as detailed above in order to bring INR to therapeutic range.     Follow up:  Because warfarin is a high risk medication and current CHEST guidelines recommend regular monitoring intervals (few days up to 12 weeks), will have patient return to clinic in 1 weeks to recheck INR.    Casey Birmingham, PharmD, BCACP

## 2020-04-24 ENCOUNTER — ANTICOAGULATION VISIT (OUTPATIENT)
Dept: MEDICAL GROUP | Facility: PHYSICIAN GROUP | Age: 75
End: 2020-04-24
Payer: MEDICARE

## 2020-04-24 DIAGNOSIS — Z79.01 CHRONIC ANTICOAGULATION: Primary | ICD-10-CM

## 2020-04-24 DIAGNOSIS — Z95.2 HISTORY OF MITRAL VALVE REPLACEMENT WITH MECHANICAL VALVE: ICD-10-CM

## 2020-04-24 LAB — INR PPP: 3.2 (ref 2–3.5)

## 2020-04-24 PROCEDURE — 85610 PROTHROMBIN TIME: CPT | Performed by: FAMILY MEDICINE

## 2020-04-24 PROCEDURE — 93793 ANTICOAG MGMT PT WARFARIN: CPT | Performed by: FAMILY MEDICINE

## 2020-04-24 NOTE — PROGRESS NOTES
Anticoagulation Summary  As of 4/24/2020    INR goal:   2.5-3.5   TTR:   31.1 % (3.1 y)   INR used for dosing:   3.20 (4/24/2020)   Warfarin maintenance plan:   7.5 mg (2.5 mg x 3) every Mon; 5 mg (2.5 mg x 2) all other days   Weekly warfarin total:   37.5 mg   Plan last modified:   Casey Birmingham, PharmD (4/24/2020)   Next INR check:   5/1/2020   Target end date:   Indefinite    Indications    Chronic anticoagulation [Z79.01]  History of mitral valve replacement with mechanical valve [Z95.2] [Z95.2]             Anticoagulation Episode Summary     INR check location:       Preferred lab:       Send INR reminders to:       Comments:         Anticoagulation Care Providers     Provider Role Specialty Phone number    Declan Eaton M.D. Referring Internal Medicine [phone]    [facility] Responsible  [phone]        Anticoagulation Patient Findings  Patient Findings     Negatives:   Signs/symptoms of thrombosis, Signs/symptoms of bleeding, Laboratory test error suspected, Change in health, Change in alcohol use, Change in activity, Upcoming invasive procedure, Emergency department visit, Upcoming dental procedure, Missed doses, Extra doses, Change in medications, Change in diet/appetite, Hospital admission, Bruising, Other complaints         HPI:   Carlos Rivera seen in clinic today, on anticoagulation therapy with warfarin for stroke prevention due to history of mechanical mitral valve.    Patient's previous INR was supratherapeutic at 5.1 on 4-17-20, at which time patient was instructed to hold one dose, then decrease weekly warfarin regimen.  He returns to clinic today to recheck INR to ensure it is therapeutic and thus preventing possible clotting and/or bleeding/bruising complications.    CHADS-VASc = n/a  (unadjusted ischemic stroke risk/year:  n/a)    Does patient have any changes to current medical/health status since last appt (Y/N):  NO  Does patient have any signs/symptoms of  bleeding and/or thrombosis since the last appt (Y/N):  NO  Does patient have any interval changes to diet or medications since last appt (Y/N):  No  Are there any complications or cost restrictions with current therapy (Y/N):  NO     Does patient have [facility] PCP? YES, Dr Rufino Shine (If not, please document discussion that patient must be seen at [facility])       Vitals:  declined today, vehicle visit.    There were no vitals filed for this visit.     Asssessment:      INR therapeutic at 3.2, therefore decreasing patient's stroke and/or bleeding risk.   Reason(s) for out of range INR today:  n/a      Plan:  Pt is to continue with current warfarin dosing regimen in order to maintain INR in therapeutic range.     Follow up:  Because warfarin is a high risk medication and current CHEST guidelines recommend regular monitoring intervals (few days up to 12 weeks), will have patient return to clinic in 1 weeks to recheck INR.    Casey Birmingham, PharmD, BCACP

## 2020-05-08 ENCOUNTER — APPOINTMENT (OUTPATIENT)
Dept: RADIOLOGY | Facility: MEDICAL CENTER | Age: 75
DRG: 377 | End: 2020-05-08
Attending: EMERGENCY MEDICINE
Payer: MEDICARE

## 2020-05-08 ENCOUNTER — HOSPITAL ENCOUNTER (INPATIENT)
Facility: MEDICAL CENTER | Age: 75
LOS: 15 days | DRG: 377 | End: 2020-05-23
Attending: EMERGENCY MEDICINE | Admitting: INTERNAL MEDICINE
Payer: MEDICARE

## 2020-05-08 DIAGNOSIS — F02.80 DEMENTIA ASSOCIATED WITH OTHER UNDERLYING DISEASE WITHOUT BEHAVIORAL DISTURBANCE (HCC): ICD-10-CM

## 2020-05-08 DIAGNOSIS — K92.2 GASTROINTESTINAL HEMORRHAGE, UNSPECIFIED GASTROINTESTINAL HEMORRHAGE TYPE: ICD-10-CM

## 2020-05-08 DIAGNOSIS — K92.2 LOWER GI BLEED: ICD-10-CM

## 2020-05-08 PROBLEM — I25.810 CORONARY ARTERY DISEASE INVOLVING CORONARY BYPASS GRAFT: Status: ACTIVE | Noted: 2020-05-08

## 2020-05-08 LAB
ALBUMIN SERPL BCP-MCNC: 3.9 G/DL (ref 3.2–4.9)
ALBUMIN/GLOB SERPL: 1.7 G/DL
ALP SERPL-CCNC: 104 U/L (ref 30–99)
ALT SERPL-CCNC: 36 U/L (ref 2–50)
ANION GAP SERPL CALC-SCNC: 13 MMOL/L (ref 7–16)
APTT PPP: 38.6 SEC (ref 24.7–36)
AST SERPL-CCNC: 23 U/L (ref 12–45)
BASOPHILS # BLD AUTO: 0.7 % (ref 0–1.8)
BASOPHILS # BLD: 0.11 K/UL (ref 0–0.12)
BILIRUB SERPL-MCNC: 0.5 MG/DL (ref 0.1–1.5)
BUN SERPL-MCNC: 37 MG/DL (ref 8–22)
CALCIUM SERPL-MCNC: 9 MG/DL (ref 8.5–10.5)
CHLORIDE SERPL-SCNC: 94 MMOL/L (ref 96–112)
CO2 SERPL-SCNC: 22 MMOL/L (ref 20–33)
CREAT SERPL-MCNC: 1.21 MG/DL (ref 0.5–1.4)
EOSINOPHIL # BLD AUTO: 0.38 K/UL (ref 0–0.51)
EOSINOPHIL NFR BLD: 2.4 % (ref 0–6.9)
ERYTHROCYTE [DISTWIDTH] IN BLOOD BY AUTOMATED COUNT: 46.5 FL (ref 35.9–50)
GLOBULIN SER CALC-MCNC: 2.3 G/DL (ref 1.9–3.5)
GLUCOSE BLD-MCNC: 449 MG/DL (ref 65–99)
GLUCOSE SERPL-MCNC: 504 MG/DL (ref 65–99)
HCT VFR BLD AUTO: 29.6 % (ref 42–52)
HCT VFR BLD AUTO: 44.1 % (ref 42–52)
HGB BLD-MCNC: 14.8 G/DL (ref 14–18)
HGB BLD-MCNC: 9.7 G/DL (ref 14–18)
IMM GRANULOCYTES # BLD AUTO: 0.11 K/UL (ref 0–0.11)
IMM GRANULOCYTES NFR BLD AUTO: 0.7 % (ref 0–0.9)
INR PPP: 3.89 (ref 0.87–1.13)
LYMPHOCYTES # BLD AUTO: 1.03 K/UL (ref 1–4.8)
LYMPHOCYTES NFR BLD: 6.5 % (ref 22–41)
MCH RBC QN AUTO: 33.4 PG (ref 27–33)
MCHC RBC AUTO-ENTMCNC: 33.6 G/DL (ref 33.7–35.3)
MCV RBC AUTO: 99.5 FL (ref 81.4–97.8)
MONOCYTES # BLD AUTO: 0.84 K/UL (ref 0–0.85)
MONOCYTES NFR BLD AUTO: 5.3 % (ref 0–13.4)
NEUTROPHILS # BLD AUTO: 13.31 K/UL (ref 1.82–7.42)
NEUTROPHILS NFR BLD: 84.4 % (ref 44–72)
NRBC # BLD AUTO: 0 K/UL
NRBC BLD-RTO: 0 /100 WBC
PLATELET # BLD AUTO: 227 K/UL (ref 164–446)
PMV BLD AUTO: 11.1 FL (ref 9–12.9)
POTASSIUM SERPL-SCNC: 5.4 MMOL/L (ref 3.6–5.5)
PROT SERPL-MCNC: 6.2 G/DL (ref 6–8.2)
PROTHROMBIN TIME: 39.7 SEC (ref 12–14.6)
RBC # BLD AUTO: 4.43 M/UL (ref 4.7–6.1)
SODIUM SERPL-SCNC: 129 MMOL/L (ref 135–145)
WBC # BLD AUTO: 15.8 K/UL (ref 4.8–10.8)

## 2020-05-08 PROCEDURE — C9113 INJ PANTOPRAZOLE SODIUM, VIA: HCPCS | Performed by: EMERGENCY MEDICINE

## 2020-05-08 PROCEDURE — 99223 1ST HOSP IP/OBS HIGH 75: CPT | Mod: AI | Performed by: HOSPITALIST

## 2020-05-08 PROCEDURE — 700105 HCHG RX REV CODE 258: Performed by: HOSPITALIST

## 2020-05-08 PROCEDURE — A9270 NON-COVERED ITEM OR SERVICE: HCPCS | Performed by: HOSPITALIST

## 2020-05-08 PROCEDURE — 85014 HEMATOCRIT: CPT

## 2020-05-08 PROCEDURE — 85610 PROTHROMBIN TIME: CPT

## 2020-05-08 PROCEDURE — 82962 GLUCOSE BLOOD TEST: CPT

## 2020-05-08 PROCEDURE — 85025 COMPLETE CBC W/AUTO DIFF WBC: CPT

## 2020-05-08 PROCEDURE — 700111 HCHG RX REV CODE 636 W/ 250 OVERRIDE (IP): Performed by: EMERGENCY MEDICINE

## 2020-05-08 PROCEDURE — 770020 HCHG ROOM/CARE - TELE (206)

## 2020-05-08 PROCEDURE — 700111 HCHG RX REV CODE 636 W/ 250 OVERRIDE (IP): Performed by: HOSPITALIST

## 2020-05-08 PROCEDURE — 93005 ELECTROCARDIOGRAM TRACING: CPT | Performed by: INTERNAL MEDICINE

## 2020-05-08 PROCEDURE — 700105 HCHG RX REV CODE 258: Performed by: EMERGENCY MEDICINE

## 2020-05-08 PROCEDURE — 700101 HCHG RX REV CODE 250: Performed by: HOSPITALIST

## 2020-05-08 PROCEDURE — 700117 HCHG RX CONTRAST REV CODE 255: Performed by: EMERGENCY MEDICINE

## 2020-05-08 PROCEDURE — 85730 THROMBOPLASTIN TIME PARTIAL: CPT

## 2020-05-08 PROCEDURE — 96365 THER/PROPH/DIAG IV INF INIT: CPT

## 2020-05-08 PROCEDURE — 96375 TX/PRO/DX INJ NEW DRUG ADDON: CPT

## 2020-05-08 PROCEDURE — 700102 HCHG RX REV CODE 250 W/ 637 OVERRIDE(OP): Performed by: HOSPITALIST

## 2020-05-08 PROCEDURE — 36415 COLL VENOUS BLD VENIPUNCTURE: CPT

## 2020-05-08 PROCEDURE — 74177 CT ABD & PELVIS W/CONTRAST: CPT

## 2020-05-08 PROCEDURE — 85018 HEMOGLOBIN: CPT

## 2020-05-08 PROCEDURE — 96366 THER/PROPH/DIAG IV INF ADDON: CPT

## 2020-05-08 PROCEDURE — 93010 ELECTROCARDIOGRAM REPORT: CPT | Performed by: INTERNAL MEDICINE

## 2020-05-08 PROCEDURE — 99285 EMERGENCY DEPT VISIT HI MDM: CPT

## 2020-05-08 PROCEDURE — 80053 COMPREHEN METABOLIC PANEL: CPT

## 2020-05-08 RX ORDER — INSULIN GLARGINE 100 [IU]/ML
20 INJECTION, SOLUTION SUBCUTANEOUS ONCE
Status: COMPLETED | OUTPATIENT
Start: 2020-05-08 | End: 2020-05-08

## 2020-05-08 RX ORDER — POLYETHYLENE GLYCOL 3350 17 G/17G
1 POWDER, FOR SOLUTION ORAL
Status: DISCONTINUED | OUTPATIENT
Start: 2020-05-08 | End: 2020-05-23 | Stop reason: HOSPADM

## 2020-05-08 RX ORDER — BISACODYL 10 MG
10 SUPPOSITORY, RECTAL RECTAL
Status: DISCONTINUED | OUTPATIENT
Start: 2020-05-08 | End: 2020-05-23 | Stop reason: HOSPADM

## 2020-05-08 RX ORDER — ATORVASTATIN CALCIUM 40 MG/1
40 TABLET, FILM COATED ORAL EVERY EVENING
Status: DISCONTINUED | OUTPATIENT
Start: 2020-05-08 | End: 2020-05-23 | Stop reason: HOSPADM

## 2020-05-08 RX ORDER — ONDANSETRON 2 MG/ML
4 INJECTION INTRAMUSCULAR; INTRAVENOUS EVERY 4 HOURS PRN
Status: DISCONTINUED | OUTPATIENT
Start: 2020-05-08 | End: 2020-05-23 | Stop reason: HOSPADM

## 2020-05-08 RX ORDER — ONDANSETRON 4 MG/1
4 TABLET, ORALLY DISINTEGRATING ORAL EVERY 4 HOURS PRN
Status: DISCONTINUED | OUTPATIENT
Start: 2020-05-08 | End: 2020-05-23 | Stop reason: HOSPADM

## 2020-05-08 RX ORDER — ACETAMINOPHEN 325 MG/1
650 TABLET ORAL EVERY 6 HOURS PRN
Status: DISCONTINUED | OUTPATIENT
Start: 2020-05-08 | End: 2020-05-23 | Stop reason: HOSPADM

## 2020-05-08 RX ORDER — AMOXICILLIN 250 MG
2 CAPSULE ORAL 2 TIMES DAILY
Status: DISCONTINUED | OUTPATIENT
Start: 2020-05-08 | End: 2020-05-23 | Stop reason: HOSPADM

## 2020-05-08 RX ORDER — INSULIN GLARGINE 100 [IU]/ML
INJECTION, SOLUTION SUBCUTANEOUS EVERY EVENING
Status: ON HOLD | COMMUNITY
Start: 2019-12-03 | End: 2020-05-22

## 2020-05-08 RX ORDER — SODIUM CHLORIDE, SODIUM LACTATE, POTASSIUM CHLORIDE, CALCIUM CHLORIDE 600; 310; 30; 20 MG/100ML; MG/100ML; MG/100ML; MG/100ML
INJECTION, SOLUTION INTRAVENOUS CONTINUOUS
Status: DISCONTINUED | OUTPATIENT
Start: 2020-05-08 | End: 2020-05-12

## 2020-05-08 RX ORDER — CITALOPRAM 20 MG/1
10 TABLET ORAL EVERY EVENING
Status: DISCONTINUED | OUTPATIENT
Start: 2020-05-08 | End: 2020-05-13

## 2020-05-08 RX ORDER — SODIUM CHLORIDE 9 MG/ML
1000 INJECTION, SOLUTION INTRAVENOUS ONCE
Status: COMPLETED | OUTPATIENT
Start: 2020-05-08 | End: 2020-05-08

## 2020-05-08 RX ORDER — DEXTROSE MONOHYDRATE 25 G/50ML
50 INJECTION, SOLUTION INTRAVENOUS
Status: DISCONTINUED | OUTPATIENT
Start: 2020-05-08 | End: 2020-05-23 | Stop reason: HOSPADM

## 2020-05-08 RX ADMIN — INSULIN GLARGINE 20 UNITS: 100 INJECTION, SOLUTION SUBCUTANEOUS at 19:53

## 2020-05-08 RX ADMIN — ONDANSETRON 4 MG: 2 INJECTION INTRAMUSCULAR; INTRAVENOUS at 19:44

## 2020-05-08 RX ADMIN — PHYTONADIONE 10 MG: 10 INJECTION, EMULSION INTRAMUSCULAR; INTRAVENOUS; SUBCUTANEOUS at 20:05

## 2020-05-08 RX ADMIN — ACETAMINOPHEN 650 MG: 325 TABLET, FILM COATED ORAL at 22:38

## 2020-05-08 RX ADMIN — ATORVASTATIN CALCIUM 40 MG: 40 TABLET, FILM COATED ORAL at 20:27

## 2020-05-08 RX ADMIN — SODIUM CHLORIDE 1000 ML: 9 INJECTION, SOLUTION INTRAVENOUS at 12:59

## 2020-05-08 RX ADMIN — POLYETHYLENE GLYCOL 3350, SODIUM SULFATE ANHYDROUS, SODIUM BICARBONATE, SODIUM CHLORIDE, POTASSIUM CHLORIDE 4 L: 236; 22.74; 6.74; 5.86; 2.97 POWDER, FOR SOLUTION ORAL at 18:00

## 2020-05-08 RX ADMIN — SODIUM CHLORIDE 80 MG: 9 INJECTION, SOLUTION INTRAVENOUS at 13:58

## 2020-05-08 RX ADMIN — IOHEXOL 80 ML: 350 INJECTION, SOLUTION INTRAVENOUS at 13:45

## 2020-05-08 RX ADMIN — INSULIN HUMAN 14 UNITS: 100 INJECTION, SOLUTION PARENTERAL at 19:52

## 2020-05-08 RX ADMIN — INSULIN HUMAN 14 UNITS: 100 INJECTION, SOLUTION PARENTERAL at 22:23

## 2020-05-08 RX ADMIN — SODIUM CHLORIDE 8 MG/HR: 9 INJECTION, SOLUTION INTRAVENOUS at 14:03

## 2020-05-08 RX ADMIN — SODIUM CHLORIDE, POTASSIUM CHLORIDE, SODIUM LACTATE AND CALCIUM CHLORIDE: 600; 310; 30; 20 INJECTION, SOLUTION INTRAVENOUS at 20:06

## 2020-05-08 RX ADMIN — CITALOPRAM HYDROBROMIDE 10 MG: 20 TABLET ORAL at 20:26

## 2020-05-08 ASSESSMENT — ENCOUNTER SYMPTOMS
COUGH: 0
CHILLS: 0
FEVER: 0
BLOOD IN STOOL: 1
SHORTNESS OF BREATH: 0

## 2020-05-08 ASSESSMENT — COGNITIVE AND FUNCTIONAL STATUS - GENERAL
SUGGESTED CMS G CODE MODIFIER DAILY ACTIVITY: CH
DAILY ACTIVITIY SCORE: 24
MOBILITY SCORE: 24
SUGGESTED CMS G CODE MODIFIER MOBILITY: CH

## 2020-05-08 ASSESSMENT — LIFESTYLE VARIABLES
ON A TYPICAL DAY WHEN YOU DRINK ALCOHOL HOW MANY DRINKS DO YOU HAVE: 1
TOTAL SCORE: 0
HAVE YOU EVER FELT YOU SHOULD CUT DOWN ON YOUR DRINKING: NO
TOTAL SCORE: 0
HOW MANY TIMES IN THE PAST YEAR HAVE YOU HAD 5 OR MORE DRINKS IN A DAY: 0
AVERAGE NUMBER OF DAYS PER WEEK YOU HAVE A DRINK CONTAINING ALCOHOL: 1
EVER_SMOKED: NEVER
DOES PATIENT WANT TO STOP DRINKING: NO
HAVE PEOPLE ANNOYED YOU BY CRITICIZING YOUR DRINKING: NO
CONSUMPTION TOTAL: NEGATIVE
TOTAL SCORE: 0
ALCOHOL_USE: YES
EVER HAD A DRINK FIRST THING IN THE MORNING TO STEADY YOUR NERVES TO GET RID OF A HANGOVER: NO
EVER FELT BAD OR GUILTY ABOUT YOUR DRINKING: NO

## 2020-05-08 ASSESSMENT — PATIENT HEALTH QUESTIONNAIRE - PHQ9
1. LITTLE INTEREST OR PLEASURE IN DOING THINGS: NOT AT ALL
SUM OF ALL RESPONSES TO PHQ9 QUESTIONS 1 AND 2: 0
2. FEELING DOWN, DEPRESSED, IRRITABLE, OR HOPELESS: NOT AT ALL

## 2020-05-08 ASSESSMENT — PAIN SCALES - WONG BAKER: WONGBAKER_NUMERICALRESPONSE: DOESN'T HURT AT ALL

## 2020-05-08 ASSESSMENT — FIBROSIS 4 INDEX
FIB4 SCORE: 1.73
FIB4 SCORE: 1.28

## 2020-05-08 NOTE — ED PROVIDER NOTES
ED Provider Note    CHIEF COMPLAINT  Chief Complaint   Patient presents with   • Diarrhea     last night   • Bloody Stools   • Nausea       HPI  Carlos Rivera is a 74 y.o. male who presents with bloody stools.  He had one episode of diarrhea last night that was bloody.  He has had 2 bloody stools today.  Some bright red blood.  Mild nausea.  Associated cramping pain that is worse in the left lower quadrant.  No fevers.  No vomiting.  No abnormal foods or known ill exposure.  No cough or URI symptoms.  He has a history of mitral valve replacement on chronic warfarin.  Unsure of most recent INR.    REVIEW OF SYSTEMS  As per HPI, otherwise a 10 point review of systems is negative    PAST MEDICAL HISTORY  Past Medical History:   Diagnosis Date   • Chronic anticoagulation 2/23/2017   • History of mitral valve replacement with mechanical valve [Z95.2] 3/10/2017   • Hyperlipidemia    • Type II diabetes mellitus (HCC) 2/23/2017       SOCIAL HISTORY  Social History     Tobacco Use   • Smoking status: Never Smoker   • Smokeless tobacco: Never Used   Substance Use Topics   • Alcohol use: No     Alcohol/week: 0.0 oz   • Drug use: No       SURGICAL HISTORY  Past Surgical History:   Procedure Laterality Date   • MITRAL VALVE REPLACEMENT  1999   • INGUINAL HERNIA REPAIR Bilateral 1984   • TONSILLECTOMY         CURRENT MEDICATIONS  Home Medications     Reviewed by Tereza Boggs (Pharmacy Tech) on 05/08/20 at 1312  Med List Status: Partial   Medication Last Dose Status   atorvastatin (LIPITOR) 40 MG Tab 5/7/2020 Active   citalopram (CELEXA) 10 MG tablet 5/7/2020 Active   fexofenadine (ALLEGRA ALLERGY) 180 MG tablet 5/7/2020 Active   insulin glargine (LANTUS SOLOSTAR) 100 UNIT/ML Solution Pen-injector injection 5/7/2020 Active   lisinopril (PRINIVIL) 20 MG Tab 5/7/2020 Active   metFORMIN ER (GLUCOPHAGE XR) 500 MG TABLET SR 24 HR 5/7/2020 Active   warfarin (COUMADIN) 2.5 MG Tab  Active           "      ALLERGIES  Allergies   Allergen Reactions   • Okra Vomiting   • Vicodin [Hydrocodone-Acetaminophen] Vomiting       PHYSICAL EXAM  VITAL SIGNS: /59   Pulse (!) 56   Temp 35.8 °C (96.5 °F) (Temporal)   Resp 15   Ht 1.727 m (5' 8\")   Wt 58 kg (127 lb 13.9 oz)   SpO2 94%   BMI 19.44 kg/m²    Constitutional: Awake and alert  HENT:  Atraumatic, Normocephalic.Oropharynx dry mucus membranes, Nose normal inspection.   Eyes: Normal inspection  Neck: Supple  Cardiovascular: Normal heart rate, Normal rhythm.  Symmetric peripheral pulses.   Thorax & Lungs: No respiratory distress, No wheezing, No rales, No rhonchi, No chest tenderness.   Abdomen: Bowel sounds normal, soft, non-distended, mild tenderness left abdomen  Skin: Warm, Dry, No rash.   Back: No tenderness, No CVA tenderness.   Extremities: No clubbing, cyanosis, edema, no Homans or cords   Neurologic: Grossly normal   Psychiatric: Anxious appearing    RADIOLOGY/PROCEDURES  CT-ABDOMEN-PELVIS WITH   Final Result      1.  No evidence of abdominal or pelvic mass, adenopathy, or inflammatory change.   2.  7 mm metallic foreign body in the descending colon, etiology uncertain. It is not clear whether this is ingested material or from a prior procedure.   3.  Colonic diverticulosis   4.  Atherosclerosis   5.  RIGHT inguinal hernia containing fat            Findings were discussed with ROGELIO HENLEY on 5/8/2020 1:58 PM.                    Imaging is interpreted by radiologist    Labs:  Results for orders placed or performed during the hospital encounter of 05/08/20   CBC WITH DIFFERENTIAL   Result Value Ref Range    WBC 15.8 (H) 4.8 - 10.8 K/uL    RBC 4.43 (L) 4.70 - 6.10 M/uL    Hemoglobin 14.8 14.0 - 18.0 g/dL    Hematocrit 44.1 42.0 - 52.0 %    MCV 99.5 (H) 81.4 - 97.8 fL    MCH 33.4 (H) 27.0 - 33.0 pg    MCHC 33.6 (L) 33.7 - 35.3 g/dL    RDW 46.5 35.9 - 50.0 fL    Platelet Count 227 164 - 446 K/uL    MPV 11.1 9.0 - 12.9 fL    Neutrophils-Polys 84.40 (H) " 44.00 - 72.00 %    Lymphocytes 6.50 (L) 22.00 - 41.00 %    Monocytes 5.30 0.00 - 13.40 %    Eosinophils 2.40 0.00 - 6.90 %    Basophils 0.70 0.00 - 1.80 %    Immature Granulocytes 0.70 0.00 - 0.90 %    Nucleated RBC 0.00 /100 WBC    Neutrophils (Absolute) 13.31 (H) 1.82 - 7.42 K/uL    Lymphs (Absolute) 1.03 1.00 - 4.80 K/uL    Monos (Absolute) 0.84 0.00 - 0.85 K/uL    Eos (Absolute) 0.38 0.00 - 0.51 K/uL    Baso (Absolute) 0.11 0.00 - 0.12 K/uL    Immature Granulocytes (abs) 0.11 0.00 - 0.11 K/uL    NRBC (Absolute) 0.00 K/uL   COMP METABOLIC PANEL   Result Value Ref Range    Sodium 129 (L) 135 - 145 mmol/L    Potassium 5.4 3.6 - 5.5 mmol/L    Chloride 94 (L) 96 - 112 mmol/L    Co2 22 20 - 33 mmol/L    Anion Gap 13.0 7.0 - 16.0    Glucose 504 (HH) 65 - 99 mg/dL    Bun 37 (H) 8 - 22 mg/dL    Creatinine 1.21 0.50 - 1.40 mg/dL    Calcium 9.0 8.5 - 10.5 mg/dL    AST(SGOT) 23 12 - 45 U/L    ALT(SGPT) 36 2 - 50 U/L    Alkaline Phosphatase 104 (H) 30 - 99 U/L    Total Bilirubin 0.5 0.1 - 1.5 mg/dL    Albumin 3.9 3.2 - 4.9 g/dL    Total Protein 6.2 6.0 - 8.2 g/dL    Globulin 2.3 1.9 - 3.5 g/dL    A-G Ratio 1.7 g/dL   PROTHROMBIN TIME (INR)   Result Value Ref Range    PT 39.7 (H) 12.0 - 14.6 sec    INR 3.89 (H) 0.87 - 1.13   APTT   Result Value Ref Range    APTT 38.6 (H) 24.7 - 36.0 sec   ESTIMATED GFR   Result Value Ref Range    GFR If African American >60 >60 mL/min/1.73 m 2    GFR If Non African American 58 (A) >60 mL/min/1.73 m 2       Medications   pantoprazole (PROTONIX) 80 mg in  mL IVPB (80 mg Intravenous New Bag 5/8/20 1358)   pantoprazole (PROTONIX) 80 mg in  mL Infusion (8 mg/hr Intravenous New Bag 5/8/20 1403)   NS infusion 1,000 mL (1,000 mL Intravenous New Bag 5/8/20 1259)   iohexol (OMNIPAQUE) 350 mg/mL (80 mL Intravenous Given 5/8/20 1345)       HYDRATION: Based on the patient's presentation of Hyperglycemia the patient was given IV fluids. IV Hydration was used because oral hydration was not  adequate alone. Upon recheck following hydration, the patient was Stable.    COURSE & MEDICAL DECISION MAKING  Patient presents with hematochezia.  Vital signs are stable.  Obtain laboratory data.  Ordered CT scan because of tenderness in the left lower quadrant.    Data returns as above.  Elevated BUN.  Suspect likely from dehydration with his hyperglycemia however possibility exists that this is brisk upper GI bleeding.  Started on Protonix infusion after bolus.  With his hyperglycemia was given a liter of saline.  This will need to be monitored.  CT scan without inflammatory change.  There is a metallic material on the left colon of unclear significance.    I consulted Dr. Reed who will evaluate the patient.    I consulted Dr. Mcnulty for admission.    Patient referred to primary provider for blood pressure management    FINAL IMPRESSION  1.  Hematochezia  2.  Anticoagulation with warfarin  3.  Hyperglycemia  4.  Possible colonic foreign body      This dictation was created using voice recognition software. The accuracy of the dictation is limited to the abilities of the software.  The nursing notes were reviewed and certain aspects of this information were incorporated into this note.      Electronically signed by: Solitario Huggins M.D., 5/8/2020 2:09 PM

## 2020-05-08 NOTE — ED NOTES
Assist RN note: Pt in the bathroom, large amount of blood in toilet. Pt states he feels a little whoozy. Tele RN here for transfer.

## 2020-05-08 NOTE — PROGRESS NOTES
Received bedside report from Cooper MARINELLI in the ED. Transported pt back to unit via stretcher with ACLS RN.

## 2020-05-08 NOTE — PROGRESS NOTES
Med rec partially complete per Wife with Pt's phone.   Wife unsure of Lantus dosage or Warfarin dosage.   Anticoag Calendar not updated and per Wife Pt has no primary Physician.  Per wife Pharmacy will not know medication dosages , but Pt will.   Wife concerned Pt took too much Coumadin.  I will call RN to review with Pt.   Unable to review allergies with wife as she is at the grocery store and had to get off phone.

## 2020-05-08 NOTE — PROGRESS NOTES
TRI[facility] OFFICER ADMISSION ACCEPTANCE NOTE:    - I spoke and discussed the case with the ER physician, Dr. Huggins.  - This is a 74 y.o. male with diabetes, dementia, on chronic anticaogulation for mechanical MVR, who presented to the ED with bloody stools x 2 episodes, with nausea. Hgb was 14.8 (from 17 from 11/2019). WBC 06825. Na 129. BUN 37, and crea 1.21. Glucose was 504. INR supratherapeutic at 3.89. Otherwise VSS. Patient is started on protonix gtt, and IVF.  - GI (Dr. Madera) was consulted.  - Patient now being admitted to the hospitalist service for further management.   - will place inpatient admission order to telemetry. Patient will not require isolation. Dr. Andrew Ramires will be admitting the patient.   - Please call admitting hospitalist for full admission orders, and cross-coverage issues on patient's arrival to the unit.

## 2020-05-08 NOTE — CONSULTS
Date of Service:5/8/20    Consult Requested By: No att. providers found    Reason for Consultation: hematochezia    History of Present Illness:   Carlos Rivera is a 74 y.o. gentleman who presents himself today for further evaluation regarding rectal bleeding.  Apparently earlier this morning he had a bout of bright red blood per rectum which was then subsequently followed by at least 3-4 more episodes.  His last colonoscopy was greater than 5 years ago somewhere in [place].  He denies any type of knowledge of any type of abnormalities on the colonoscopies in the past.  He does have some left lower quadrant abdominal pain gassy bloated-like sensation does not radiate comes and goes and improves with bowel movements.  He denies any significant tobacco or alcohol abuse history.  The pain may be addressed anywhere from a 3-4 out of 10 in severity.  Review Of Systems:  He does state he has some headaches denies any type of visual changes admits to some lightheadedness, denies any type of auditory abnormalities, denies any type of upper GI symptoms dysphagia odynophagia hematemesis coffee-ground emesis heartburn or indigestion.  Denies any type of shortness of breath dyspnea on exertion, denies any type of chest pain.  See above for the abdominal pain.  Denies any type of genitourinary abnormalities, denies any type of lower extremity swelling rashes numbness tingling weakness.    PMH:   Past Medical History:   Diagnosis Date   • Chronic anticoagulation 2/23/2017   • History of mitral valve replacement with mechanical valve [Z95.2] 3/10/2017   • Hyperlipidemia    • Type II diabetes mellitus (HCC) 2/23/2017         PSH:  Past Surgical History:   Procedure Laterality Date   • MITRAL VALVE REPLACEMENT  1999   • INGUINAL HERNIA REPAIR Bilateral 1984   • TONSILLECTOMY         FAMILY HX:  Family History   Problem Relation Age of Onset   • Heart Attack Father 58   • Diabetes Father    • Heart Attack Paternal Uncle     • No Known Problems Sister    • No Known Problems Brother    • No Known Problems Maternal Grandmother    • No Known Problems Maternal Grandfather    • No Known Problems Paternal Grandmother    • Heart Attack Paternal Grandfather    • No Known Problems Sister    • No Known Problems Brother        SOCIAL HX:  Social History     Socioeconomic History   • Marital status:      Spouse name: Not on file   • Number of children: Not on file   • Years of education: Not on file   • Highest education level: Not on file   Occupational History   • Not on file   Social Needs   • Financial resource strain: Not on file   • Food insecurity     Worry: Not on file     Inability: Not on file   • Transportation needs     Medical: Not on file     Non-medical: Not on file   Tobacco Use   • Smoking status: Never Smoker   • Smokeless tobacco: Never Used   Substance and Sexual Activity   • Alcohol use: No     Alcohol/week: 0.0 oz   • Drug use: No   • Sexual activity: Yes     Comment:    Lifestyle   • Physical activity     Days per week: Not on file     Minutes per session: Not on file   • Stress: Not on file   Relationships   • Social connections     Talks on phone: Not on file     Gets together: Not on file     Attends [employer] service: Not on file     Active member of club or organization: Not on file     Attends meetings of clubs or organizations: Not on file     Relationship status: Not on file   • Intimate partner violence     Fear of current or ex partner: Not on file     Emotionally abused: Not on file     Physically abused: Not on file     Forced sexual activity: Not on file   Other Topics Concern   • Not on file   Social History Narrative    Retired from [employer]      Social History     Tobacco Use   Smoking Status Never Smoker   Smokeless Tobacco Never Used     Social History     Substance and Sexual Activity   Alcohol Use No   • Alcohol/week: 0.0 oz       Allergies/Intolerances:  Allergies   Allergen Reactions   • Chris  "Vomiting   • Vicodin [Hydrocodone-Acetaminophen] Vomiting       History reviewed with the patient    Other Current Medications:    Current Facility-Administered Medications:   •  pantoprazole (PROTONIX) 80 mg in  mL Infusion, 8 mg/hr, Intravenous, Continuous, Solitario Huggins M.D., Last Rate: 25 mL/hr at 20 1403, 8 mg/hr at 20 1403  [unfilled]    Most Recent Vital Signs:  /55   Pulse (!) 53   Temp 35.8 °C (96.5 °F) (Temporal)   Resp 13   Ht 1.727 m (5' 8\")   Wt 58 kg (127 lb 13.9 oz)   SpO2 96%   BMI 19.44 kg/m²   Temp  Av.8 °C (96.5 °F)  Min: 35.8 °C (96.5 °F)  Max: 35.8 °C (96.5 °F)    Physical Exam:  General: Nontoxic, no acute distress  HEENT: sclera anicteric, PERRL, EOMI, MMM, no oral lesions  Neck: supple, no lymphadenopathy  Chest: CTAB, no r/r/w, normal work of breathing.  Cardiac: Regular, no murmurs no gallops heard  Abdomen: + bowel sounds, soft, non-tender, non-distended, no HSM  Extremities: No edema. No joint swelling.  Skin: no rashes or erythema  Neuro: Alert and oriented times 3, non-focal exam    Pertinent Lab Results:  Recent Labs     20  1140   WBC 15.8*      Recent Labs     20  1140   HEMOGLOBIN 14.8   HEMATOCRIT 44.1   MCV 99.5*   MCH 33.4*   PLATELETCT 227         Recent Labs     20  1140   SODIUM 129*   POTASSIUM 5.4   CHLORIDE 94*   CO2 22   CREATININE 1.21        Recent Labs     20  1140   ALBUMIN 3.9      Recent Labs     20  1140   ASTSGOT 23   ALTSGPT 36   TBILIRUBIN 0.5   ALKPHOSPHAT 104*   GLOBULIN 2.3   INR 3.89*       [unfilled]      Pertinent Micro:  Results     ** No results found for the last 168 hours. **        No results found for: BLOODCULTU, BLDCULT, BCHOLD     Studies:              Results for orders placed during the hospital encounter of 20   DX-CHEST-2 VIEWS    Impression 1. No active cardiopulmonary abnormalities are identified.                                                               "                    IMPRESSION:     Hematochezia  Anticoagulation  Mitral valve replacement  Hyperglycemia        PLAN:   Carlos Rivera is a 74 y.o. plan to present time would be to monitor his underlying rectal bleeding by doing serial H&H's every 8 hours and keeping greater than 7/21 with blood transfusions.  He would benefit from a colonoscopy but given that he is on anticoagulation would require that to be corrected by the primary team.  Once this has been achieved I did recommend prepping him for colonoscopy of the [facility] make him n.p.o. after midnight and consented for the procedure.  Have informed of the risks and benefits of the procedure.  Along with that he does have a mitral valve replacement seems to be functioning well per his recollection.  Hyperglycemia primary team should try to correct his underlying hyperglycemia he is diabetic and once his electrolyte abnormalities have been corrected as this will prompt us for further assessment of his call rectal bleeding.  Possible sources given that his symptoms as presenting are suggestive mostly of diverticular in origin if the bleeding becomes significant recommend CTA.  We will follow along.  callif question [phone]    Discussed with IM. Will continue to follow    Jatinder Madera M.D.

## 2020-05-08 NOTE — ED TRIAGE NOTES
Pt amb to triage with wife. Wearing a mask.  Chief Complaint   Patient presents with   • Diarrhea     last night   • Bloody Stools   • Nausea     Pt takes coumadin. Concerned about INR.    Pt denies cough, fever, sob or any known contact with a person that has tested positive for covid.

## 2020-05-09 ENCOUNTER — ANESTHESIA (OUTPATIENT)
Dept: SURGERY | Facility: MEDICAL CENTER | Age: 75
DRG: 377 | End: 2020-05-09
Payer: MEDICARE

## 2020-05-09 ENCOUNTER — ANESTHESIA EVENT (OUTPATIENT)
Dept: SURGERY | Facility: MEDICAL CENTER | Age: 75
DRG: 377 | End: 2020-05-09
Payer: MEDICARE

## 2020-05-09 LAB
ABO + RH BLD: NORMAL
ABO GROUP BLD: NORMAL
BARCODED ABORH UBTYP: 1700
BARCODED ABORH UBTYP: 7300
BARCODED PRD CODE UBPRD: NORMAL
BARCODED UNIT NUM UBUNT: NORMAL
BLD GP AB SCN SERPL QL: NORMAL
COMPONENT F 8504F: NORMAL
COMPONENT F 8504F: NORMAL
COMPONENT R 8504R: NORMAL
COVID ORDER STATUS COVID19: NORMAL
EKG IMPRESSION: NORMAL
GLUCOSE BLD-MCNC: 110 MG/DL (ref 65–99)
GLUCOSE BLD-MCNC: 145 MG/DL (ref 65–99)
GLUCOSE BLD-MCNC: 206 MG/DL (ref 65–99)
GLUCOSE BLD-MCNC: 395 MG/DL (ref 65–99)
GLUCOSE BLD-MCNC: 43 MG/DL (ref 65–99)
GLUCOSE BLD-MCNC: 467 MG/DL (ref 65–99)
GLUCOSE BLD-MCNC: 479 MG/DL (ref 65–99)
HCT VFR BLD AUTO: 18.6 % (ref 42–52)
HCT VFR BLD AUTO: 20.6 % (ref 42–52)
HCT VFR BLD AUTO: 21.2 % (ref 42–52)
HCT VFR BLD AUTO: 23.9 % (ref 42–52)
HGB BLD-MCNC: 6.3 G/DL (ref 14–18)
HGB BLD-MCNC: 7.1 G/DL (ref 14–18)
HGB BLD-MCNC: 7.3 G/DL (ref 14–18)
HGB BLD-MCNC: 8.2 G/DL (ref 14–18)
HGB BLD-MCNC: 8.2 G/DL (ref 14–18)
INR PPP: 2.69 (ref 0.87–1.13)
PRODUCT TYPE UPROD: NORMAL
PROTHROMBIN TIME: 29.6 SEC (ref 12–14.6)
RH BLD: NORMAL
SARS-COV-2 RNA RESP QL NAA+PROBE: NOTDETECTED
SPECIMEN SOURCE: NORMAL
UNIT STATUS USTAT: NORMAL

## 2020-05-09 PROCEDURE — 36415 COLL VENOUS BLD VENIPUNCTURE: CPT

## 2020-05-09 PROCEDURE — 82962 GLUCOSE BLOOD TEST: CPT | Mod: 91

## 2020-05-09 PROCEDURE — 700102 HCHG RX REV CODE 250 W/ 637 OVERRIDE(OP): Performed by: INTERNAL MEDICINE

## 2020-05-09 PROCEDURE — 160028 HCHG SURGERY MINUTES - 1ST 30 MINS LEVEL 3: Performed by: INTERNAL MEDICINE

## 2020-05-09 PROCEDURE — P9017 PLASMA 1 DONOR FRZ W/IN 8 HR: HCPCS

## 2020-05-09 PROCEDURE — 700105 HCHG RX REV CODE 258

## 2020-05-09 PROCEDURE — 160048 HCHG OR STATISTICAL LEVEL 1-5: Performed by: INTERNAL MEDICINE

## 2020-05-09 PROCEDURE — 700111 HCHG RX REV CODE 636 W/ 250 OVERRIDE (IP): Performed by: ANESTHESIOLOGY

## 2020-05-09 PROCEDURE — 700101 HCHG RX REV CODE 250: Performed by: HOSPITALIST

## 2020-05-09 PROCEDURE — 85014 HEMATOCRIT: CPT | Mod: 91

## 2020-05-09 PROCEDURE — 306565 RIGID MIT RESTRAINT(PAIR): Performed by: INTERNAL MEDICINE

## 2020-05-09 PROCEDURE — U0004 COV-19 TEST NON-CDC HGH THRU: HCPCS

## 2020-05-09 PROCEDURE — 770020 HCHG ROOM/CARE - TELE (206)

## 2020-05-09 PROCEDURE — 30233N1 TRANSFUSION OF NONAUTOLOGOUS RED BLOOD CELLS INTO PERIPHERAL VEIN, PERCUTANEOUS APPROACH: ICD-10-PCS | Performed by: INTERNAL MEDICINE

## 2020-05-09 PROCEDURE — 0DJD8ZZ INSPECTION OF LOWER INTESTINAL TRACT, VIA NATURAL OR ARTIFICIAL OPENING ENDOSCOPIC: ICD-10-PCS | Performed by: INTERNAL MEDICINE

## 2020-05-09 PROCEDURE — 700105 HCHG RX REV CODE 258: Performed by: HOSPITALIST

## 2020-05-09 PROCEDURE — 160036 HCHG PACU - EA ADDL 30 MINS PHASE I: Performed by: INTERNAL MEDICINE

## 2020-05-09 PROCEDURE — G2023 SPECIMEN COLLECT COVID-19: HCPCS | Performed by: HOSPITALIST

## 2020-05-09 PROCEDURE — 160039 HCHG SURGERY MINUTES - EA ADDL 1 MIN LEVEL 3: Performed by: INTERNAL MEDICINE

## 2020-05-09 PROCEDURE — 85018 HEMOGLOBIN: CPT | Mod: 91

## 2020-05-09 PROCEDURE — 36430 TRANSFUSION BLD/BLD COMPNT: CPT

## 2020-05-09 PROCEDURE — 86901 BLOOD TYPING SEROLOGIC RH(D): CPT

## 2020-05-09 PROCEDURE — 51798 US URINE CAPACITY MEASURE: CPT

## 2020-05-09 PROCEDURE — 99291 CRITICAL CARE FIRST HOUR: CPT | Performed by: INTERNAL MEDICINE

## 2020-05-09 PROCEDURE — A9270 NON-COVERED ITEM OR SERVICE: HCPCS | Performed by: HOSPITALIST

## 2020-05-09 PROCEDURE — 86850 RBC ANTIBODY SCREEN: CPT

## 2020-05-09 PROCEDURE — 86900 BLOOD TYPING SEROLOGIC ABO: CPT

## 2020-05-09 PROCEDURE — 30233K1 TRANSFUSION OF NONAUTOLOGOUS FROZEN PLASMA INTO PERIPHERAL VEIN, PERCUTANEOUS APPROACH: ICD-10-PCS | Performed by: INTERNAL MEDICINE

## 2020-05-09 PROCEDURE — 86923 COMPATIBILITY TEST ELECTRIC: CPT | Mod: 91

## 2020-05-09 PROCEDURE — 160009 HCHG ANES TIME/MIN: Performed by: INTERNAL MEDICINE

## 2020-05-09 PROCEDURE — 160035 HCHG PACU - 1ST 60 MINS PHASE I: Performed by: INTERNAL MEDICINE

## 2020-05-09 PROCEDURE — P9016 RBC LEUKOCYTES REDUCED: HCPCS | Mod: 91

## 2020-05-09 PROCEDURE — 700102 HCHG RX REV CODE 250 W/ 637 OVERRIDE(OP): Performed by: HOSPITALIST

## 2020-05-09 PROCEDURE — 85610 PROTHROMBIN TIME: CPT

## 2020-05-09 PROCEDURE — 160002 HCHG RECOVERY MINUTES (STAT): Performed by: INTERNAL MEDICINE

## 2020-05-09 RX ORDER — OXYCODONE HCL 5 MG/5 ML
10 SOLUTION, ORAL ORAL
Status: DISCONTINUED | OUTPATIENT
Start: 2020-05-09 | End: 2020-05-09 | Stop reason: HOSPADM

## 2020-05-09 RX ORDER — INSULIN GLARGINE 100 [IU]/ML
20 INJECTION, SOLUTION SUBCUTANEOUS
Status: DISCONTINUED | OUTPATIENT
Start: 2020-05-09 | End: 2020-05-10

## 2020-05-09 RX ORDER — DIPHENHYDRAMINE HYDROCHLORIDE 50 MG/ML
12.5 INJECTION INTRAMUSCULAR; INTRAVENOUS
Status: DISCONTINUED | OUTPATIENT
Start: 2020-05-09 | End: 2020-05-09 | Stop reason: HOSPADM

## 2020-05-09 RX ORDER — SODIUM CHLORIDE 9 MG/ML
INJECTION, SOLUTION INTRAVENOUS
Status: COMPLETED
Start: 2020-05-09 | End: 2020-05-10

## 2020-05-09 RX ORDER — PHENYLEPHRINE HCL IN 0.9% NACL 0.5 MG/5ML
SYRINGE (ML) INTRAVENOUS PRN
Status: DISCONTINUED | OUTPATIENT
Start: 2020-05-09 | End: 2020-05-09 | Stop reason: SURG

## 2020-05-09 RX ORDER — ONDANSETRON 2 MG/ML
4 INJECTION INTRAMUSCULAR; INTRAVENOUS
Status: DISCONTINUED | OUTPATIENT
Start: 2020-05-09 | End: 2020-05-09 | Stop reason: HOSPADM

## 2020-05-09 RX ORDER — MEPERIDINE HYDROCHLORIDE 25 MG/ML
12.5 INJECTION INTRAMUSCULAR; INTRAVENOUS; SUBCUTANEOUS
Status: DISCONTINUED | OUTPATIENT
Start: 2020-05-09 | End: 2020-05-09 | Stop reason: HOSPADM

## 2020-05-09 RX ORDER — SODIUM CHLORIDE, SODIUM LACTATE, POTASSIUM CHLORIDE, CALCIUM CHLORIDE 600; 310; 30; 20 MG/100ML; MG/100ML; MG/100ML; MG/100ML
INJECTION, SOLUTION INTRAVENOUS CONTINUOUS
Status: DISCONTINUED | OUTPATIENT
Start: 2020-05-09 | End: 2020-05-09 | Stop reason: HOSPADM

## 2020-05-09 RX ORDER — MIDAZOLAM HYDROCHLORIDE 1 MG/ML
1 INJECTION INTRAMUSCULAR; INTRAVENOUS
Status: DISCONTINUED | OUTPATIENT
Start: 2020-05-09 | End: 2020-05-09 | Stop reason: HOSPADM

## 2020-05-09 RX ORDER — HYDRALAZINE HYDROCHLORIDE 20 MG/ML
5 INJECTION INTRAMUSCULAR; INTRAVENOUS
Status: DISCONTINUED | OUTPATIENT
Start: 2020-05-09 | End: 2020-05-09 | Stop reason: HOSPADM

## 2020-05-09 RX ORDER — OXYCODONE HCL 5 MG/5 ML
5 SOLUTION, ORAL ORAL
Status: DISCONTINUED | OUTPATIENT
Start: 2020-05-09 | End: 2020-05-09 | Stop reason: HOSPADM

## 2020-05-09 RX ORDER — HALOPERIDOL 5 MG/ML
1 INJECTION INTRAMUSCULAR
Status: DISCONTINUED | OUTPATIENT
Start: 2020-05-09 | End: 2020-05-09 | Stop reason: HOSPADM

## 2020-05-09 RX ADMIN — Medication 100 MCG: at 15:23

## 2020-05-09 RX ADMIN — Medication 100 MCG: at 15:11

## 2020-05-09 RX ADMIN — SODIUM CHLORIDE, POTASSIUM CHLORIDE, SODIUM LACTATE AND CALCIUM CHLORIDE: 600; 310; 30; 20 INJECTION, SOLUTION INTRAVENOUS at 03:50

## 2020-05-09 RX ADMIN — INSULIN HUMAN 4 UNITS: 100 INJECTION, SOLUTION PARENTERAL at 07:00

## 2020-05-09 RX ADMIN — SODIUM CHLORIDE 500 ML: 9 INJECTION, SOLUTION INTRAVENOUS at 11:47

## 2020-05-09 RX ADMIN — Medication 100 MCG: at 15:06

## 2020-05-09 RX ADMIN — Medication 100 MCG: at 15:18

## 2020-05-09 RX ADMIN — PROPOFOL 100 MCG/KG/MIN: 10 INJECTION, EMULSION INTRAVENOUS at 14:59

## 2020-05-09 RX ADMIN — SODIUM CHLORIDE, POTASSIUM CHLORIDE, SODIUM LACTATE AND CALCIUM CHLORIDE: 600; 310; 30; 20 INJECTION, SOLUTION INTRAVENOUS at 17:27

## 2020-05-09 RX ADMIN — Medication 100 MCG: at 14:59

## 2020-05-09 RX ADMIN — INSULIN GLARGINE 20 UNITS: 100 INJECTION, SOLUTION SUBCUTANEOUS at 10:13

## 2020-05-09 RX ADMIN — DEXTROSE MONOHYDRATE 50 ML: 25 INJECTION, SOLUTION INTRAVENOUS at 17:33

## 2020-05-09 RX ADMIN — SODIUM CHLORIDE: 9 INJECTION, SOLUTION INTRAVENOUS at 13:15

## 2020-05-09 RX ADMIN — CITALOPRAM HYDROBROMIDE 10 MG: 20 TABLET ORAL at 17:28

## 2020-05-09 RX ADMIN — PROPOFOL 70 MG: 10 INJECTION, EMULSION INTRAVENOUS at 14:58

## 2020-05-09 RX ADMIN — ATORVASTATIN CALCIUM 40 MG: 40 TABLET, FILM COATED ORAL at 17:28

## 2020-05-09 RX ADMIN — SODIUM CHLORIDE 500 ML: 9 INJECTION, SOLUTION INTRAVENOUS at 18:11

## 2020-05-09 ASSESSMENT — COGNITIVE AND FUNCTIONAL STATUS - GENERAL
CLIMB 3 TO 5 STEPS WITH RAILING: TOTAL
PERSONAL GROOMING: A LITTLE
SUGGESTED CMS G CODE MODIFIER DAILY ACTIVITY: CK
SUGGESTED CMS G CODE MODIFIER MOBILITY: CK
DRESSING REGULAR LOWER BODY CLOTHING: A LOT
MOVING FROM LYING ON BACK TO SITTING ON SIDE OF FLAT BED: A LITTLE
DAILY ACTIVITIY SCORE: 18
STANDING UP FROM CHAIR USING ARMS: A LOT
MOBILITY SCORE: 16
HELP NEEDED FOR BATHING: A LITTLE
WALKING IN HOSPITAL ROOM: A LOT
TOILETING: A LOT

## 2020-05-09 ASSESSMENT — FIBROSIS 4 INDEX: FIB4 SCORE: 1.28

## 2020-05-09 ASSESSMENT — PATIENT HEALTH QUESTIONNAIRE - PHQ9
SUM OF ALL RESPONSES TO PHQ9 QUESTIONS 1 AND 2: 0
2. FEELING DOWN, DEPRESSED, IRRITABLE, OR HOPELESS: NOT AT ALL
1. LITTLE INTEREST OR PLEASURE IN DOING THINGS: NOT AT ALL

## 2020-05-09 ASSESSMENT — PAIN SCALES - GENERAL: PAIN_LEVEL: 0

## 2020-05-09 NOTE — H&P
"Hospital Medicine History & Physical Note    Date of Service  5/8/2020    Primary Care Physician  Rufino Shine M.D.    Code Status  Full Code    Chief Complaint  Chief Complaint   Patient presents with   • Diarrhea     last night   • Bloody Stools   • Nausea       History of Presenting Illness  74 y.o. male who presented 5/8/2020 with rectal bleeding.  Mr. Rivera a past medical history of coronary artery bypass graft, mechanical mitral valve replacement on chronic Coumadin therapy, and insulin-dependent diabetes mellitus that was in his usual state of health until this morning about 6:00 in the morning he developed rectal bleeding.  He states \"it was blood and guts and marshmallow sized stool\".  This was followed by 4 more episodes of bright red blood per rectum.  He denies any abdominal pain he is unaware of having symptoms like this before.  His INR in the emergency room is 3.89 due to his Coumadin use.  Patient states he has had a colonoscopy before but he cannot remember when it was it was when he lived in [place] and perhaps 5 to 10 years ago.  Overall patient is a somewhat moderate historian.  Spoken at length with Dr. Madera, gastroenterology, and he is willing to perform a colonoscopy once patient's INR is down as well as his sugars.  In the emergency room, his glucose was 500.  Patient does not know his dose of insulin.    Patient denies any known contact with persons that are positive for COVID, he denies fever, he denies shortness of breath, he denies cough, denies body aches, he denies changes in taste or smell    Review of Systems  Review of Systems   Constitutional: Negative for chills and fever.   Respiratory: Negative for cough and shortness of breath.    Cardiovascular: Negative for chest pain and leg swelling.   Gastrointestinal: Positive for blood in stool.   All other systems reviewed and are negative.      Past Medical History   has a past medical history of Chronic anticoagulation " (2/23/2017), History of mitral valve replacement with mechanical valve [Z95.2] (3/10/2017), Hyperlipidemia, and Type II diabetes mellitus (HCC) (2/23/2017).    Surgical History   has a past surgical history that includes mitral valve replacement (1999); inguinal hernia repair (Bilateral, 1984); and tonsillectomy. CABG    Family History  family history includes Diabetes in his father; Heart Attack in his paternal grandfather and paternal uncle; Heart Attack (age of onset: 58) in his father; No Known Problems in his brother, brother, maternal grandfather, maternal grandmother, paternal grandmother, sister, and sister.     Social History   reports that he has never smoked. He has never used smokeless tobacco. He reports that he does not drink alcohol or use drugs.he lives with his wife    Allergies  Allergies   Allergen Reactions   • Okra Vomiting   • Vicodin [Hydrocodone-Acetaminophen] Vomiting       Medications  Prior to Admission Medications   Prescriptions Last Dose Informant Patient Reported? Taking?   atorvastatin (LIPITOR) 40 MG Tab 5/7/2020 at Unknown time  No No   Sig: TAKE 1 TABLET DAILY IN THE EVENING   citalopram (CELEXA) 10 MG tablet 5/7/2020 at 2000  No No   Sig: TAKE 1 TABLET BY MOUTH  EVERY MORNING   fexofenadine (ALLEGRA ALLERGY) 180 MG tablet 5/7/2020 at 2000  Yes No   Sig: Take 180 mg by mouth every day.   insulin glargine (LANTUS SOLOSTAR) 100 UNIT/ML Solution Pen-injector injection 5/7/2020 at 2000  Yes Yes   Sig: Inject  as instructed every evening.      Facility-Administered Medications: None       Physical Exam  Temp:  [35.8 °C (96.5 °F)] 35.8 °C (96.5 °F)  Pulse:  [] 53  Resp:  [13-18] 13  BP: (112-124)/(55-81) 112/55  SpO2:  [94 %-97 %] 96 %    Physical Exam  Vitals signs and nursing note reviewed.   Constitutional:       Appearance: He is not ill-appearing or toxic-appearing.      Comments: thin   HENT:      Head: Normocephalic and atraumatic.      Mouth/Throat:      Mouth: Mucous  "membranes are dry.      Pharynx: Oropharynx is clear.   Eyes:      General: No scleral icterus.     Conjunctiva/sclera: Conjunctivae normal.   Neck:      Musculoskeletal: Normal range of motion and neck supple.   Cardiovascular:      Rate and Rhythm: Normal rate and regular rhythm.      Comments: \"click\" left sternal border  Pulmonary:      Effort: Pulmonary effort is normal. No respiratory distress.      Breath sounds: Normal breath sounds.   Abdominal:      General: Abdomen is flat. There is no distension.      Palpations: Abdomen is soft.      Tenderness: There is no abdominal tenderness.   Musculoskeletal:      Right lower leg: No edema.      Left lower leg: No edema.   Skin:     General: Skin is warm and dry.   Neurological:      General: No focal deficit present.      Mental Status: Mental status is at baseline.   Psychiatric:         Mood and Affect: Mood normal.         Behavior: Behavior normal.      Comments: Quite talkative          Laboratory:  Recent Labs     05/08/20  1140   WBC 15.8*   RBC 4.43*   HEMOGLOBIN 14.8   HEMATOCRIT 44.1   MCV 99.5*   MCH 33.4*   MCHC 33.6*   RDW 46.5   PLATELETCT 227   MPV 11.1     Recent Labs     05/08/20  1140   SODIUM 129*   POTASSIUM 5.4   CHLORIDE 94*   CO2 22   GLUCOSE 504*   BUN 37*   CREATININE 1.21   CALCIUM 9.0     Recent Labs     05/08/20  1140   ALTSGPT 36   ASTSGOT 23   ALKPHOSPHAT 104*   TBILIRUBIN 0.5   GLUCOSE 504*     Recent Labs     05/08/20  1140   APTT 38.6*   INR 3.89*     No results for input(s): NTPROBNP in the last 72 hours.      No results for input(s): TROPONINT in the last 72 hours.    Imaging:  CT-ABDOMEN-PELVIS WITH   Final Result      1.  No evidence of abdominal or pelvic mass, adenopathy, or inflammatory change.   2.  7 mm metallic foreign body in the descending colon, etiology uncertain. It is not clear whether this is ingested material or from a prior procedure.   3.  Colonic diverticulosis   4.  Atherosclerosis   5.  RIGHT inguinal hernia " containing fat            Findings were discussed with ROGELIO HENLEY on 5/8/2020 1:58 PM.                     Assessment/Plan:      * Lower GI bleed- (present on admission)  Assessment & Plan  Painless bright red blood per rectum  Consistent with a diverticular bleed  Serial Hb levels  4 liters Go Lytely ordered for prep  Colonoscopy on 5/8  CT scan reveals a metallic object in the colon of unclear etiology        Coronary artery disease involving coronary bypass graft- (present on admission)  Assessment & Plan  History of CABG  Continue Lipitor    History of mitral valve replacement with mechanical valve [Z95.2]- (present on admission)  Assessment & Plan  He will have INR reversed with 10 mg IV vitamin K  INR ordered for the morning.  If his INR is over 2 on the morning draw, 2 units FFP will be given followed by a repeat INR    Chronic anticoagulation- (present on admission)  Assessment & Plan  Due to coumadin  INR 3.89    Type II diabetes mellitus (HCC)- (present on admission)  Assessment & Plan  With severe hyperglycemia present upon admit  Glucose 504 on admit  IV fluids, sliding scale insulin, one dose of long-acting Lantus 20 units

## 2020-05-09 NOTE — ASSESSMENT & PLAN NOTE
Have been restarted on AC   Follow-up echocardiography shows that of the mechanical mitral valve appears to be functioning well.

## 2020-05-09 NOTE — OR SURGEON
Post OP Note    PreOp Diagnosis: Hematochezia    PostOp Diagnosis:    1/diverticulosis appreciated within the sigmoid colon for approximately 40 cm, there is residual blood that is appreciated in this area that extends to approximately 25 cm.  The diverticulosis area significant.  Possible bleeding source.  No identifiable active bleeding visible vessel was noted or ulceration.   2/the rest of the colonoscopy was normal normal-appearing colonic mucosa, no residual blood proximal to the 40 cm sho was appreciated.   3/normal ileum    Procedure(s):  COLONOSCOPY - Wound Class: Clean Contaminated    Surgeon(s):  Jatinder Madera M.D.    Anesthesiologist/Type of Anesthesia:  Anesthesiologist: Omid Brown M.D./MAC    Surgical Staff:  Endoscopy Technician: Bridget Brenner  Endoscopy Nurse: Sundar Castorena R.N.    Specimens removed if any:  * No specimens in log *    Estimated Blood Loss: none    Findings:     Prior to the procedure the patient's wife was identified and called we discussed the risks and benefits with the procedure and she agreed to pursuing on with the procedure.  We informed her of the risk being bleeding infection possible perforation and cardiopulmonary compromise she was agreeable.  Once adequate sedation was achieved he was placed in left lateral decubitus position and digital rectal exam revealed nontender no internal or external hemorrhoids were appreciated but kirk blood was noted.  Using a standard variable stiffness pediatric colonoscope was introduced under manual insertion and passed the cecum identified by appendiceal orifice ileocecal valve and cecal cap.  Copious amounts of irrigation was used throughout the procedure.  The ileocecal valve was intubated ileum appeared normal.  The ascending transverse and descending colons appeared normal no residual blood no active bleeding were noted within this area.  At approximately 40 cm consistent with the sigmoid colon there is old residual  blood that is noted with throughout this area along with significant diverticulosis noted here.  No active bleeding visible vessel was appreciated.  Despite using copious amounts of irrigation no bleeding site could be identified.  The rectum appeared normal retroflexion was performed the rectum to evaluate the rectal anal verge no overt mucosal abnormalities were noted.  Colonoscope was straightened excess air was removed patient tolerated the procedure well.    Complications: none    Recommendations  Clear liquid diet if he tolerates this and no further active bleeding is appreciated consider advancing to full liquids  If he rebleeds would recommend CTA to try to identify bleeding site  Continue to monitor H&H every 6 hours and keep greater than 7/21  Continue IV hydration and supportive care  Findings were discussed with the primary team  If there are any further questions please feel free to give us call at [phone].        5/9/2020 3:25 PM Jatinder Madera M.D.

## 2020-05-09 NOTE — CARE PLAN
Problem: Safety  Goal: Will remain free from injury  Outcome: PROGRESSING AS EXPECTED  Note: Fall precautions in place. Bed in lowest position. Non-skid socks in place. Personal possessions within reach. Mobility sign on door. Bed-alarm on. Call light within reach. Pt educated regarding fall prevention and states understanding.        Problem: Knowledge Deficit  Goal: Knowledge of disease process/condition, treatment plan, diagnostic tests, and medications will improve  Outcome: PROGRESSING AS EXPECTED  Note: Pt educated regarding plan of care and medications. All questions answered.

## 2020-05-09 NOTE — ASSESSMENT & PLAN NOTE
Used to be on Coumadin for his mechanical mitral valve   Bridged on heparin drip while having procedure, back on Coumadin with heparin drip bridging on 5/13   Heparin drip bridging transition to Lovenox bridging with Coumadin on 5/17 5/17-18, INR still subtherapeutic

## 2020-05-09 NOTE — ASSESSMENT & PLAN NOTE
H/H increasing.  Hb dropped and RN reporting continued bloody bowel movements  CTA A/P ordered per GI recommendations  - no evidence of bleeding seen  Consistent with a diverticular bleed.     Scope with large amount of diverticuli, but no specific area of bleed found, which is common for this disease  Monitor closely, high risk for further decompensation.

## 2020-05-09 NOTE — ANESTHESIA QCDR
2019 [facility] Clinical Data Registry (for Quality Improvement)     Postoperative nausea/vomiting risk protocol (Adult = 18 yrs and Pediatric 3-17 yrs)- (430 and 463)  General inhalation anesthetic (NOT TIVA) with PONV risk factors: Yes  Provision of anti-emetic therapy with at least 2 different classes of agents: Yes   Patient DID NOT receive anti-emetic therapy and reason is documented in Medical Record:  N/A    Multimodal Pain Management- (477)  Non-emergent surgery AND patient age >= 18: Yes  Use of Multimodal Pain Management, two or more drugs and/or interventions, NOT including systemic opioids: No  Exception: Documented allergy to multiple classes of analgesics: No       Smoking Abstinence (404)  Patient is current smoker (cigarette, pipe, e-cig, marijuanna): No  Elective Surgery:   Abstinence instructions provided prior to day of surgery:   Patient abstained from smoking on day of surgery:     Pre-Op Beta-Blocker in Isolated CABG (44)  Isolated CABG AND patient age >= 18: No  Beta-blocker admin within 24 hours of surgical incision:   Exception:of medical reason(s) for not administering beta blocker within 24 hours prior to surgical incision (e.g., not  indicated,other medical reason):     PACU assessment of acute postoperative pain prior to Anesthesia Care End- Applies to Patients Age = 18- (ABG7)  Initial PACU pain score is which of the following: < 7/10  Patient unable to report pain score: N/A    Post-anesthetic transfer of care checklist/protocol to PACU/ICU- (426 and 427)  Upon conclusion of case, patient transferred to which of the following locations: PACU/Non-ICU  Use of transfer checklist/protocol: Yes  Exclusion: Service Performed in Patient Hospital Room (and thus did not require transfer): N/A  Unplanned admission to ICU related to anesthesia service up through end of PACU care- (MD51)  Unplanned admission to ICU (not initially anticipated at anesthesia start time): No

## 2020-05-09 NOTE — OR NURSING
Pt oriented to self only. VSS. Pt denies pain or need for pain medications at this time. Pt denies numbness or tingling. No nausea or vomiting. Pt voided 225ml yellow urine using the urinal. SCDs in place.    Report given to YVES Watters. Pt's wife, Comfort, updated regarding plan of care.    Pt via bed, accompanied by CNA and ACLS RN, was transferred to [facility] at 1635. All personal belongings sent with Pt.

## 2020-05-09 NOTE — ASSESSMENT & PLAN NOTE
With hyperglycemia  Last glycated hemoglobin 7.4%   Short acting insulin, glargine started 5/17   Accu-Checks, hypoglycemia protocol

## 2020-05-09 NOTE — ANESTHESIA PREPROCEDURE EVALUATION
Relevant Problems   NEURO   (+) Seizures (HCC)   (+) Stroke (HCC)      CARDIAC   (+) Coronary artery disease involving coronary bypass graft      ENDO   (+) Type II diabetes mellitus (HCC)       Physical Exam    Airway   Mallampati: II  TM distance: >3 FB  Neck ROM: full       Cardiovascular - normal exam  Rhythm: regular  Rate: normal  (-) murmur     Dental - normal exam           Pulmonary - normal exam  Breath sounds clear to auscultation     Abdominal    Neurological - normal exam                 Anesthesia Plan    ASA 3 (MVR)   ASA physical status 3 criteria: diabetes - poorly controlled and CAD/stents (> 3 months)    Plan - general       Airway plan will be natural airway        Induction: intravenous    Postoperative Plan: Postoperative administration of opioids is intended.    Pertinent diagnostic labs and testing reviewed    Informed Consent:    Anesthetic plan and risks discussed with patient.    Use of blood products discussed with: patient whom consented to blood products.

## 2020-05-09 NOTE — PROGRESS NOTES
Hospital Medicine Daily Progress Note    Date of Service  5/9/2020    Chief Complaint  74 y.o. male admitted 5/8/2020 with GIB and h/o mechanical mitral valve on coumadin.    Hospital Course    see below      Interval Problem Update  GIB-massive drop in Hb with low blood pressures and confusion. Patient had multiple grossly bloody bowel movements. He is confused on exam and denies any odd recent ingestions.    INR-down-trending, but still at 2.7 Adding FFP    Confusion, unknown his baseline. Impulsive in lap belt.    Consultants/Specialty  GI    Code Status  fcfc    Disposition  TELE    Review of Systems  Review of Systems   Unable to perform ROS: Acuity of condition        Physical Exam  Temp:  [36 °C (96.8 °F)-37.4 °C (99.3 °F)] 37.4 °C (99.3 °F)  Pulse:  [] 87  Resp:  [13-18] 16  BP: ()/(6-66) 110/6  SpO2:  [91 %-100 %] 91 %    Physical Exam  Vitals signs and nursing note reviewed.   Constitutional:       General: He is not in acute distress.     Appearance: He is well-developed.      Comments: Confused, pale, in lap belt   HENT:      Head: Normocephalic and atraumatic.      Mouth/Throat:      Pharynx: No oropharyngeal exudate.   Eyes:      Pupils: Pupils are equal, round, and reactive to light.   Neck:      Musculoskeletal: Normal range of motion and neck supple.      Thyroid: No thyromegaly.   Cardiovascular:      Rate and Rhythm: Normal rate and regular rhythm.      Heart sounds: Murmur present.   Pulmonary:      Effort: Pulmonary effort is normal. No respiratory distress.      Breath sounds: Normal breath sounds. No wheezing.   Abdominal:      General: Bowel sounds are normal. There is no distension.      Palpations: Abdomen is soft.      Tenderness: There is no abdominal tenderness.   Musculoskeletal: Normal range of motion.         General: No tenderness.   Skin:     General: Skin is warm and dry.      Coloration: Skin is pale.      Findings: No rash.   Neurological:      Mental Status: He is  alert.      Cranial Nerves: No cranial nerve deficit.      Comments: A&OX1-2         Fluids    Intake/Output Summary (Last 24 hours) at 5/9/2020 1306  Last data filed at 5/9/2020 1145  Gross per 24 hour   Intake 2910.83 ml   Output 1150 ml   Net 1760.83 ml       Laboratory  Recent Labs     05/08/20  1140  05/09/20  0000 05/09/20  0552 05/09/20  1159   WBC 15.8*  --   --   --   --    RBC 4.43*  --   --   --   --    HEMOGLOBIN 14.8   < > 8.2*  8.2* 6.3* 7.3*   HEMATOCRIT 44.1   < > 23.9* 18.6* 21.2*   MCV 99.5*  --   --   --   --    MCH 33.4*  --   --   --   --    MCHC 33.6*  --   --   --   --    RDW 46.5  --   --   --   --    PLATELETCT 227  --   --   --   --    MPV 11.1  --   --   --   --     < > = values in this interval not displayed.     Recent Labs     05/08/20  1140   SODIUM 129*   POTASSIUM 5.4   CHLORIDE 94*   CO2 22   GLUCOSE 504*   BUN 37*   CREATININE 1.21   CALCIUM 9.0     Recent Labs     05/08/20  1140 05/09/20  0000   APTT 38.6*  --    INR 3.89* 2.69*               Imaging  CT-ABDOMEN-PELVIS WITH   Final Result      1.  No evidence of abdominal or pelvic mass, adenopathy, or inflammatory change.   2.  7 mm metallic foreign body in the descending colon, etiology uncertain. It is not clear whether this is ingested material or from a prior procedure.   3.  Colonic diverticulosis   4.  Atherosclerosis   5.  RIGHT inguinal hernia containing fat            Findings were discussed with ROGELIO HENLEY on 5/8/2020 1:58 PM.                    Assessment/Plan  * Lower GI bleed- (present on admission)  Assessment & Plan  Painless bright red blood per rectum  Consistent with a diverticular bleed  -large drop, mild hemorrhagic shock  -2  U PRBC's and 2 U FFP  -low threshold to the ICU  -COLO this PM, NG tube for prep  -if unrevealing and continues to bleed, stat CTA A/P  CT scan reveals a metallic object in the colon of unclear etiology        Coronary artery disease involving coronary bypass graft- (present on  admission)  Assessment & Plan  History of CABG  Continue Lipitor    History of mitral valve replacement with mechanical valve [Z95.2]- (present on admission)  Assessment & Plan  He will have INR reversed with 10 mg IV vitamin K  -2 UNITS FFP ordered, repeat INR before procedure    Chronic anticoagulation- (present on admission)  Assessment & Plan  Due to coumadin  INR still high, reversing as outlined above    Type II diabetes mellitus (HCC)- (present on admission)  Assessment & Plan  With severe hyperglycemia present upon admit  -remains hyperglycemic, no e/o DKA  -increase lantus to 20 units and maintain ISS aggressive  -watch closely for need for Insulin 180 protocol         VTE prophylaxis: NONE, SCD's    31 minutes of critical care time spent on the patient excluding any procedures or overlap.

## 2020-05-09 NOTE — PROGRESS NOTES
Bedside report received from previous nurse regarding prior 12 hours.  Pt confused.  Pulling at lines and increasingly noncompliant.  Up to the commode.  Denies pain.  Fall precautions in place.  White board updated.  Call light within reach.

## 2020-05-09 NOTE — PROGRESS NOTES
Paged Dr. Renae.   Updated on blood sugar levels, pauses on cardiac monitor, HBG drop from 14.9 - 9.   No new orders

## 2020-05-09 NOTE — CARE PLAN
Problem: Bowel/Gastric:  Goal: Normal bowel function is maintained or improved  Outcome: NOT MET     Problem: Knowledge Deficit  Goal: Knowledge of disease process/condition, treatment plan, diagnostic tests, and medications will improve  Outcome: NOT MET     Problem: Communication  Goal: The ability to communicate needs accurately and effectively will improve  Outcome: PROGRESSING AS EXPECTED     Problem: Safety  Goal: Will remain free from injury  Outcome: PROGRESSING AS EXPECTED

## 2020-05-10 ENCOUNTER — APPOINTMENT (OUTPATIENT)
Dept: RADIOLOGY | Facility: MEDICAL CENTER | Age: 75
DRG: 377 | End: 2020-05-10
Attending: INTERNAL MEDICINE
Payer: MEDICARE

## 2020-05-10 ENCOUNTER — APPOINTMENT (OUTPATIENT)
Dept: RADIOLOGY | Facility: MEDICAL CENTER | Age: 75
DRG: 377 | End: 2020-05-10
Attending: PSYCHIATRY & NEUROLOGY
Payer: MEDICARE

## 2020-05-10 PROBLEM — R00.1 BRADYCARDIA: Status: ACTIVE | Noted: 2020-05-10

## 2020-05-10 PROBLEM — R29.818 NEUROLOGICAL ABNORMALITY: Status: ACTIVE | Noted: 2020-05-10

## 2020-05-10 LAB
ALBUMIN SERPL BCP-MCNC: 2.5 G/DL (ref 3.2–4.9)
ALBUMIN/GLOB SERPL: 1.6 G/DL
ALP SERPL-CCNC: 53 U/L (ref 30–99)
ALT SERPL-CCNC: 26 U/L (ref 2–50)
AMMONIA PLAS-SCNC: 13 UMOL/L (ref 11–45)
ANION GAP SERPL CALC-SCNC: 10 MMOL/L (ref 7–16)
ANION GAP SERPL CALC-SCNC: 9 MMOL/L (ref 7–16)
APPEARANCE UR: CLEAR
AST SERPL-CCNC: 39 U/L (ref 12–45)
BASOPHILS # BLD AUTO: 0.2 % (ref 0–1.8)
BASOPHILS # BLD: 0.02 K/UL (ref 0–0.12)
BILIRUB SERPL-MCNC: 0.8 MG/DL (ref 0.1–1.5)
BILIRUB UR QL STRIP.AUTO: NEGATIVE
BUN SERPL-MCNC: 18 MG/DL (ref 8–22)
BUN SERPL-MCNC: 24 MG/DL (ref 8–22)
CALCIUM SERPL-MCNC: 7.6 MG/DL (ref 8.5–10.5)
CALCIUM SERPL-MCNC: 7.9 MG/DL (ref 8.5–10.5)
CHLORIDE SERPL-SCNC: 101 MMOL/L (ref 96–112)
CHLORIDE SERPL-SCNC: 104 MMOL/L (ref 96–112)
CO2 SERPL-SCNC: 25 MMOL/L (ref 20–33)
CO2 SERPL-SCNC: 26 MMOL/L (ref 20–33)
COLOR UR: YELLOW
CREAT SERPL-MCNC: 1.03 MG/DL (ref 0.5–1.4)
CREAT SERPL-MCNC: 1.13 MG/DL (ref 0.5–1.4)
EKG IMPRESSION: NORMAL
EOSINOPHIL # BLD AUTO: 0.07 K/UL (ref 0–0.51)
EOSINOPHIL NFR BLD: 0.6 % (ref 0–6.9)
ERYTHROCYTE [DISTWIDTH] IN BLOOD BY AUTOMATED COUNT: 55.2 FL (ref 35.9–50)
GLOBULIN SER CALC-MCNC: 1.6 G/DL (ref 1.9–3.5)
GLUCOSE BLD-MCNC: 111 MG/DL (ref 65–99)
GLUCOSE BLD-MCNC: 129 MG/DL (ref 65–99)
GLUCOSE BLD-MCNC: 73 MG/DL (ref 65–99)
GLUCOSE BLD-MCNC: 81 MG/DL (ref 65–99)
GLUCOSE BLD-MCNC: 82 MG/DL (ref 65–99)
GLUCOSE SERPL-MCNC: 76 MG/DL (ref 65–99)
GLUCOSE SERPL-MCNC: 98 MG/DL (ref 65–99)
GLUCOSE UR STRIP.AUTO-MCNC: NEGATIVE MG/DL
HCT VFR BLD AUTO: 20 % (ref 42–52)
HCT VFR BLD AUTO: 20 % (ref 42–52)
HCT VFR BLD AUTO: 21.3 % (ref 42–52)
HCT VFR BLD AUTO: 22.2 % (ref 42–52)
HCT VFR BLD AUTO: 23.8 % (ref 42–52)
HGB BLD-MCNC: 6.9 G/DL (ref 14–18)
HGB BLD-MCNC: 6.9 G/DL (ref 14–18)
HGB BLD-MCNC: 7.2 G/DL (ref 14–18)
HGB BLD-MCNC: 7.8 G/DL (ref 14–18)
HGB BLD-MCNC: 8.1 G/DL (ref 14–18)
HIV 1+2 AB+HIV1 P24 AG SERPL QL IA: NORMAL
IMM GRANULOCYTES # BLD AUTO: 0.07 K/UL (ref 0–0.11)
IMM GRANULOCYTES NFR BLD AUTO: 0.6 % (ref 0–0.9)
INR PPP: 1.24 (ref 0.87–1.13)
KETONES UR STRIP.AUTO-MCNC: NEGATIVE MG/DL
LEUKOCYTE ESTERASE UR QL STRIP.AUTO: NEGATIVE
LYMPHOCYTES # BLD AUTO: 1.62 K/UL (ref 1–4.8)
LYMPHOCYTES NFR BLD: 14.8 % (ref 22–41)
MCH RBC QN AUTO: 32.1 PG (ref 27–33)
MCHC RBC AUTO-ENTMCNC: 34.5 G/DL (ref 33.7–35.3)
MCV RBC AUTO: 93 FL (ref 81.4–97.8)
MICRO URNS: NORMAL
MONOCYTES # BLD AUTO: 0.94 K/UL (ref 0–0.85)
MONOCYTES NFR BLD AUTO: 8.6 % (ref 0–13.4)
NEUTROPHILS # BLD AUTO: 8.23 K/UL (ref 1.82–7.42)
NEUTROPHILS NFR BLD: 75.2 % (ref 44–72)
NITRITE UR QL STRIP.AUTO: NEGATIVE
NRBC # BLD AUTO: 0 K/UL
NRBC BLD-RTO: 0 /100 WBC
PH UR STRIP.AUTO: 7.5 [PH] (ref 5–8)
PLATELET # BLD AUTO: 121 K/UL (ref 164–446)
PMV BLD AUTO: 10.8 FL (ref 9–12.9)
POTASSIUM SERPL-SCNC: 3.7 MMOL/L (ref 3.6–5.5)
POTASSIUM SERPL-SCNC: 4.2 MMOL/L (ref 3.6–5.5)
PROT SERPL-MCNC: 4.1 G/DL (ref 6–8.2)
PROT UR QL STRIP: NEGATIVE MG/DL
PROTHROMBIN TIME: 15.9 SEC (ref 12–14.6)
RBC # BLD AUTO: 2.15 M/UL (ref 4.7–6.1)
RBC UR QL AUTO: NEGATIVE
SODIUM SERPL-SCNC: 137 MMOL/L (ref 135–145)
SODIUM SERPL-SCNC: 138 MMOL/L (ref 135–145)
SP GR UR STRIP.AUTO: 1.01
TREPONEMA PALLIDUM IGG+IGM AB [PRESENCE] IN SERUM OR PLASMA BY IMMUNOASSAY: NORMAL
UROBILINOGEN UR STRIP.AUTO-MCNC: 1 MG/DL
WBC # BLD AUTO: 11 K/UL (ref 4.8–10.8)

## 2020-05-10 PROCEDURE — 87389 HIV-1 AG W/HIV-1&-2 AB AG IA: CPT

## 2020-05-10 PROCEDURE — 0042T CT-CEREBRAL PERFUSION ANALYSIS: CPT

## 2020-05-10 PROCEDURE — 86780 TREPONEMA PALLIDUM: CPT

## 2020-05-10 PROCEDURE — 82140 ASSAY OF AMMONIA: CPT

## 2020-05-10 PROCEDURE — 700117 HCHG RX CONTRAST REV CODE 255: Performed by: INTERNAL MEDICINE

## 2020-05-10 PROCEDURE — 36415 COLL VENOUS BLD VENIPUNCTURE: CPT

## 2020-05-10 PROCEDURE — 700102 HCHG RX REV CODE 250 W/ 637 OVERRIDE(OP): Performed by: HOSPITALIST

## 2020-05-10 PROCEDURE — 85014 HEMATOCRIT: CPT

## 2020-05-10 PROCEDURE — 99223 1ST HOSP IP/OBS HIGH 75: CPT | Performed by: INTERNAL MEDICINE

## 2020-05-10 PROCEDURE — 700105 HCHG RX REV CODE 258: Performed by: INTERNAL MEDICINE

## 2020-05-10 PROCEDURE — 700111 HCHG RX REV CODE 636 W/ 250 OVERRIDE (IP): Performed by: HOSPITALIST

## 2020-05-10 PROCEDURE — 80048 BASIC METABOLIC PNL TOTAL CA: CPT

## 2020-05-10 PROCEDURE — 81003 URINALYSIS AUTO W/O SCOPE: CPT

## 2020-05-10 PROCEDURE — 85025 COMPLETE CBC W/AUTO DIFF WBC: CPT

## 2020-05-10 PROCEDURE — 85610 PROTHROMBIN TIME: CPT

## 2020-05-10 PROCEDURE — 84425 ASSAY OF VITAMIN B-1: CPT

## 2020-05-10 PROCEDURE — 700102 HCHG RX REV CODE 250 W/ 637 OVERRIDE(OP): Performed by: INTERNAL MEDICINE

## 2020-05-10 PROCEDURE — 85018 HEMOGLOBIN: CPT | Mod: 91

## 2020-05-10 PROCEDURE — A9270 NON-COVERED ITEM OR SERVICE: HCPCS | Performed by: PSYCHIATRY & NEUROLOGY

## 2020-05-10 PROCEDURE — 70496 CT ANGIOGRAPHY HEAD: CPT

## 2020-05-10 PROCEDURE — 700111 HCHG RX REV CODE 636 W/ 250 OVERRIDE (IP): Performed by: INTERNAL MEDICINE

## 2020-05-10 PROCEDURE — 70498 CT ANGIOGRAPHY NECK: CPT

## 2020-05-10 PROCEDURE — 93005 ELECTROCARDIOGRAM TRACING: CPT | Performed by: INTERNAL MEDICINE

## 2020-05-10 PROCEDURE — 93010 ELECTROCARDIOGRAM REPORT: CPT | Performed by: INTERNAL MEDICINE

## 2020-05-10 PROCEDURE — 303746 HCHG EXTERNAL PACEMAKER PAD

## 2020-05-10 PROCEDURE — A9270 NON-COVERED ITEM OR SERVICE: HCPCS | Performed by: HOSPITALIST

## 2020-05-10 PROCEDURE — 770020 HCHG ROOM/CARE - TELE (206)

## 2020-05-10 PROCEDURE — 36430 TRANSFUSION BLD/BLD COMPNT: CPT

## 2020-05-10 PROCEDURE — 99291 CRITICAL CARE FIRST HOUR: CPT | Performed by: INTERNAL MEDICINE

## 2020-05-10 PROCEDURE — 82962 GLUCOSE BLOOD TEST: CPT | Mod: 91

## 2020-05-10 PROCEDURE — A9270 NON-COVERED ITEM OR SERVICE: HCPCS | Performed by: INTERNAL MEDICINE

## 2020-05-10 PROCEDURE — 700102 HCHG RX REV CODE 250 W/ 637 OVERRIDE(OP): Performed by: PSYCHIATRY & NEUROLOGY

## 2020-05-10 PROCEDURE — 99221 1ST HOSP IP/OBS SF/LOW 40: CPT | Performed by: PSYCHIATRY & NEUROLOGY

## 2020-05-10 PROCEDURE — P9016 RBC LEUKOCYTES REDUCED: HCPCS

## 2020-05-10 PROCEDURE — 80053 COMPREHEN METABOLIC PANEL: CPT

## 2020-05-10 PROCEDURE — 71045 X-RAY EXAM CHEST 1 VIEW: CPT

## 2020-05-10 PROCEDURE — 86923 COMPATIBILITY TEST ELECTRIC: CPT

## 2020-05-10 RX ORDER — HALOPERIDOL 5 MG/ML
2 INJECTION INTRAMUSCULAR ONCE
Status: COMPLETED | OUTPATIENT
Start: 2020-05-10 | End: 2020-05-10

## 2020-05-10 RX ORDER — QUETIAPINE FUMARATE 25 MG/1
50 TABLET, FILM COATED ORAL EVERY EVENING
Status: DISCONTINUED | OUTPATIENT
Start: 2020-05-10 | End: 2020-05-23 | Stop reason: HOSPADM

## 2020-05-10 RX ORDER — SODIUM CHLORIDE 9 MG/ML
INJECTION, SOLUTION INTRAVENOUS
Status: ACTIVE
Start: 2020-05-10 | End: 2020-05-10

## 2020-05-10 RX ORDER — LORAZEPAM 2 MG/ML
0.5 INJECTION INTRAMUSCULAR ONCE
Status: COMPLETED | OUTPATIENT
Start: 2020-05-10 | End: 2020-05-10

## 2020-05-10 RX ORDER — RISPERIDONE 0.5 MG/1
0.5 TABLET ORAL 2 TIMES DAILY
Status: DISCONTINUED | OUTPATIENT
Start: 2020-05-10 | End: 2020-05-10

## 2020-05-10 RX ADMIN — IOHEXOL 80 ML: 350 INJECTION, SOLUTION INTRAVENOUS at 15:06

## 2020-05-10 RX ADMIN — CARBIDOPA AND LEVODOPA 1 TABLET: 10; 100 TABLET ORAL at 17:43

## 2020-05-10 RX ADMIN — CITALOPRAM HYDROBROMIDE 10 MG: 20 TABLET ORAL at 17:43

## 2020-05-10 RX ADMIN — ATORVASTATIN CALCIUM 40 MG: 40 TABLET, FILM COATED ORAL at 17:43

## 2020-05-10 RX ADMIN — HALOPERIDOL LACTATE 2 MG: 5 INJECTION, SOLUTION INTRAMUSCULAR at 06:22

## 2020-05-10 RX ADMIN — QUETIAPINE FUMARATE 50 MG: 25 TABLET ORAL at 17:43

## 2020-05-10 RX ADMIN — IOHEXOL 40 ML: 350 INJECTION, SOLUTION INTRAVENOUS at 15:08

## 2020-05-10 RX ADMIN — LORAZEPAM 0.5 MG: 2 INJECTION INTRAMUSCULAR; INTRAVENOUS at 15:15

## 2020-05-10 RX ADMIN — SODIUM CHLORIDE, POTASSIUM CHLORIDE, SODIUM LACTATE AND CALCIUM CHLORIDE: 600; 310; 30; 20 INJECTION, SOLUTION INTRAVENOUS at 19:31

## 2020-05-10 RX ADMIN — RISPERIDONE 0.5 MG: 0.5 TABLET ORAL at 11:57

## 2020-05-10 ASSESSMENT — PATIENT HEALTH QUESTIONNAIRE - PHQ9
2. FEELING DOWN, DEPRESSED, IRRITABLE, OR HOPELESS: NOT AT ALL
1. LITTLE INTEREST OR PLEASURE IN DOING THINGS: NOT AT ALL
SUM OF ALL RESPONSES TO PHQ9 QUESTIONS 1 AND 2: 0

## 2020-05-10 NOTE — CARE PLAN
Problem: Communication  Goal: The ability to communicate needs accurately and effectively will improve  Outcome: PROGRESSING AS EXPECTED     Problem: Safety  Goal: Will remain free from injury  Outcome: PROGRESSING AS EXPECTED  Goal: Will remain free from falls  Outcome: PROGRESSING AS EXPECTED     Problem: Infection  Goal: Will remain free from infection  Outcome: PROGRESSING AS EXPECTED     Problem: Venous Thromboembolism (VTW)/Deep Vein Thrombosis (DVT) Prevention:  Goal: Patient will participate in Venous Thrombosis (VTE)/Deep Vein Thrombosis (DVT)Prevention Measures  Outcome: PROGRESSING AS EXPECTED     Problem: Bowel/Gastric:  Goal: Normal bowel function is maintained or improved  Outcome: PROGRESSING AS EXPECTED  Goal: Will not experience complications related to bowel motility  Outcome: PROGRESSING AS EXPECTED     Problem: Knowledge Deficit  Goal: Knowledge of disease process/condition, treatment plan, diagnostic tests, and medications will improve  Outcome: PROGRESSING AS EXPECTED  Goal: Knowledge of the prescribed therapeutic regimen will improve  Outcome: PROGRESSING AS EXPECTED     Problem: Discharge Barriers/Planning  Goal: Patient's continuum of care needs will be met  Outcome: PROGRESSING AS EXPECTED     Problem: Skin Integrity  Goal: Risk for impaired skin integrity will decrease  Outcome: PROGRESSING AS EXPECTED     Problem: Psychosocial Needs:  Goal: Level of anxiety will decrease  Outcome: PROGRESSING AS EXPECTED     Problem: Safety - Medical Restraint  Goal: Remains free of injury from restraints (Restraint for Interference with Medical Device)  Description: INTERVENTIONS:  1. Determine that other, less restrictive measures have been tried or would not be effective before applying the restraint  2. Evaluate the patient's condition at the time of restraint application  3. Inform patient/family regarding the reason for restraint  4. Q2H: Monitor safety, psychosocial status, comfort, nutrition and  hydration  Outcome: PROGRESSING AS EXPECTED  Goal: Free from restraint(s) (Restraint for Interference with Medical Device)  Description: INTERVENTIONS:  1. ONCE/SHIFT or MINIMUM Q12H: Assess and document the continuing need for restraints  2. Q24H: Continued use of restraint requires LIP to perform face to face examination and written order  3. Identify and implement measures to help patient regain control  Outcome: PROGRESSING AS EXPECTED

## 2020-05-10 NOTE — ASSESSMENT & PLAN NOTE
Has baseline dementia, made worse with anemia, GI bleeding, change in environment.  Neuro consulted, recommended a trial of Sinemet 100mg TID, and Avoid risperdal.  Patient slightly got worse with Sinemet, discontinued   Improved, now back to baseline. Agitation better controlled with Seroquel

## 2020-05-10 NOTE — CONSULTS
"Neurology STROKE CODE H&P  Neurohospitalist Service, [facility]    Referring Physician: Vaughn Munoz M.D.    STROKE CODE:   Chief Complaint   Patient presents with   • Diarrhea     last night   • Bloody Stools   • Nausea       To obtain the most accurate data regarding the time called, and time patient seen, refer to the stroke run-sheet and chart.  For time of CT, refer to the radiology report. See A&P below for TPA Decision and door to needle time if and when applicable.    HPI: Carlos Rivera is a 74 y.o. man for whom an inpatient code stroke was called for \"disconjugate gaze\" and increased jocularity.  Patient unable to provide history as he is currently encephalopathic and s/p 0.5mg IV Ativan.  Per chart review, as documented by Dr Mcnulty, 5/8/20: \"...diabetes, dementia, on chronic anticaogulation for mechanical MVR, who presented to the ED with bloody stools x 2 episodes, with nausea. Hgb was 14.8 (from 17 from 11/2019).\"  LKN 1445.  Patient is currently off AC (prescribed for mechanical mitral valve), s/p Vitamin K for active GI bleed and receiving packed RBCs this admission.  Was given a dose of risperdal earlier today 5/10/20 prior to the code stroke for agitation.    Review of systems: In addition to what is detailed in the HPI above, (and scanned into the chart if and when applicable), all other systems reviewed and are negative.    Past Medical History:    has a past medical history of Chronic anticoagulation (2/23/2017), History of mitral valve replacement with mechanical valve [Z95.2] (3/10/2017), Hyperlipidemia, and Type II diabetes mellitus (HCC) (2/23/2017).    FHx:  family history includes Diabetes in his father; Heart Attack in his paternal grandfather and paternal uncle; Heart Attack (age of onset: 58) in his father; No Known Problems in his brother, brother, maternal grandfather, maternal grandmother, paternal grandmother, sister, and sister.    SHx:   reports " that he has never smoked. He has never used smokeless tobacco. He reports that he does not drink alcohol or use drugs.    Allergies:  Allergies   Allergen Reactions   • [place] Vomiting   • Vicodin [Hydrocodone-Acetaminophen] Vomiting       Medications:    Current Facility-Administered Medications:   •  LORazepam (ATIVAN) injection 0.5 mg, 0.5 mg, Intravenous, Once, Vaughn Munoz M.D.  •  insulin glargine (LANTUS) injection 20 Units, 20 Units, Subcutaneous, QAM INSULIN, Vaughn Munoz M.D., Stopped at 05/10/20 0700  •  atorvastatin (LIPITOR) tablet 40 mg, 40 mg, Oral, Q EVENING, Andrew Ramires M.D., 40 mg at 05/09/20 1728  •  citalopram (CELEXA) tablet 10 mg, 10 mg, Oral, Q EVENING, Andrew Ramires M.D., 10 mg at 05/09/20 1728  •  senna-docusate (PERICOLACE or SENOKOT S) 8.6-50 MG per tablet 2 Tab, 2 Tab, Oral, BID, Stopped at 05/08/20 1800 **AND** polyethylene glycol/lytes (MIRALAX) PACKET 1 Packet, 1 Packet, Oral, QDAY PRN **AND** magnesium hydroxide (MILK OF MAGNESIA) suspension 30 mL, 30 mL, Oral, QDAY PRN **AND** bisacodyl (DULCOLAX) suppository 10 mg, 10 mg, Rectal, QDAY PRN, Andrew Ramires M.D.  •  lactated ringers infusion, , Intravenous, Continuous, Vaughn Munoz M.D., Last Rate: 75 mL/hr at 05/10/20 1030  •  acetaminophen (TYLENOL) tablet 650 mg, 650 mg, Oral, Q6HRS PRN, Andrew Ramires M.D., 650 mg at 05/08/20 2238  •  ondansetron (ZOFRAN) syringe/vial injection 4 mg, 4 mg, Intravenous, Q4HRS PRN, Andrew Ramires M.D., 4 mg at 05/08/20 1944  •  ondansetron (ZOFRAN ODT) dispertab 4 mg, 4 mg, Oral, Q4HRS PRN, Andrew Ramires M.D.  •  insulin regular (HUMULIN R) injection 3-14 Units, 3-14 Units, Subcutaneous, 4X/DAY ACHS, Stopped at 05/09/20 1100 **AND** POC Blood Glucose, , , Q AC AND BEDTIME(S) **AND** NOTIFY MD and PharmD, , , Once **AND** glucose 4 g chewable tablet 16 g, 16 g, Oral, Q15 MIN PRN **AND** dextrose 50% (D50W) injection 50 mL, 50 mL, Intravenous, Q15 MIN PRN, Andrew Ramires M.D., 50 mL  "at 05/09/20 1733    Physical Examination:    Vitals:    05/10/20 0051 05/10/20 0300 05/10/20 0820 05/10/20 1456   BP: 116/48 112/51 124/49    Pulse: 79 82 71 96   Resp: 16 18 20 20   Temp: 37.6 °C (99.6 °F) 36.8 °C (98.2 °F) 37.8 °C (100 °F)    TempSrc: Temporal Temporal Temporal    SpO2: 93% 94% 91%    Weight:       Height:           General: eyes closed, agitated  Eyes: examination of optic disks not indicated at this time  CV: RRR    NEUROLOGICAL EXAM:     Mental status: eyes open to tactile stim, requires repeated stimulation to attend, disoriented, follows 1/3 simple commands  Speech and language: speech output is limited, hypophonic. The patient is repeat \"mama\" and makes phonemic paraphasic errors on naming. Bradyphrenic.  Cranial nerve exam: Pupils are equal, round and reactive to light bilaterally. Diminished blink to threat b/l. extraocular muscles are intact to VOR. GAZE CONJUGATE. Sensation in the face is intact to light touch. Face is symmetric. Hearing to finger rub equal. Palate elevates symmetrically. Shoulder shrug is full. Tongue is midline.+ masked facies and bradykinetic  Motor exam: Non-cooperative with formal strength testing but at least antigravity in all four extremities and moving symmetrically. Tone is notable for SEVERE rigidity x4 extremities. Equivocal tremor  Sensory exam: symmetric withdrawal to noxious stim   Deep tendon reflexes:  1+ and symmetric. Toes down-going bilaterally.  Coordination: noncooperative  Gait: deferred as pt. Refused  Other: + glabellar, snout, palmomental, and grasp    NIH Stroke Scale:    1a. Level of Consciousness (Alert, drowsy, etc): 2= Stuporous    1b. LOC Questions (Month, age): 2= Incorrect    1c. LOC Commands (Open/close eyes make fist/let go): 2= Incorrect    2.   Best Gaze (Eyes open - patient follows examiner's finger on face): 0= Normal    3.   Visual Fields (introduce visual stimulus/threat to patient's field quadrants): 0= No visual loss  4.   " Facial Paresis (Show teeth, raise eyebrows and squeeze eyes shut): 0= Normal     5a. Motor Arm - Left (Elevate arm to 90 degrees if patient is sitting, 45 degrees if  supine): 1= Drift    5b. Motor Arm - Right (Elevate arm to 90 degrees if patient is sitting, 45 degrees if supine): 1= Drift    6a. Motor Leg - Left (Elevate leg 30 degrees with patient supine): 2= Can't resist gravity    6b. Motor Leg - Right  (Elevate leg 30 degrees with patient supine): 2= Can't resist gravity    7.   Limb Ataxia (Finger-nose, heel down shin): 0= No ataxia    8.   Sensory (Pin prick to face, arm, trunk and leg - compare side to side): 0= Normal    9.  Best Language (Name item, describe a picture and read sentences): 2= Severe aphasia    10. Dysarthria (Evaluate speech clarity by patient repeating listed words): 2= Near to unintelligible or worse    11. Extinction and Inattention (Use information from prior testing to identify neglect or  double simultaneous stimuli testing): 0= No neglect    Total NIH Score: 16    Objective Data:    Labs:  Lab Results   Component Value Date/Time    PROTHROMBTM 15.9 (H) 05/10/2020 12:00 AM    INR 1.24 (H) 05/10/2020 12:00 AM      Lab Results   Component Value Date/Time    WBC 11.0 (H) 05/10/2020 12:00 AM    RBC 2.15 (L) 05/10/2020 12:00 AM    HEMOGLOBIN 7.2 (L) 05/10/2020 12:21 PM    HEMATOCRIT 21.3 (L) 05/10/2020 12:21 PM    MCV 93.0 05/10/2020 12:00 AM    MCH 32.1 05/10/2020 12:00 AM    MCHC 34.5 05/10/2020 12:00 AM    MPV 10.8 05/10/2020 12:00 AM    NEUTSPOLYS 75.20 (H) 05/10/2020 12:00 AM    LYMPHOCYTES 14.80 (L) 05/10/2020 12:00 AM    MONOCYTES 8.60 05/10/2020 12:00 AM    EOSINOPHILS 0.60 05/10/2020 12:00 AM    BASOPHILS 0.20 05/10/2020 12:00 AM      Lab Results   Component Value Date/Time    SODIUM 137 05/10/2020 12:00 AM    POTASSIUM 3.7 05/10/2020 12:00 AM    CHLORIDE 101 05/10/2020 12:00 AM    CO2 26 05/10/2020 12:00 AM    GLUCOSE 76 05/10/2020 12:00 AM    BUN 24 (H) 05/10/2020 12:00 AM     CREATININE 1.13 05/10/2020 12:00 AM      Lab Results   Component Value Date/Time    CHOLSTRLTOT 147 11/15/2019 08:53 AM    LDL 79 11/15/2019 08:53 AM    HDL 47 11/15/2019 08:53 AM    TRIGLYCERIDE 103 11/15/2019 08:53 AM       Lab Results   Component Value Date/Time    ALKPHOSPHAT 104 (H) 05/08/2020 11:40 AM    ASTSGOT 23 05/08/2020 11:40 AM    ALTSGPT 36 05/08/2020 11:40 AM    TBILIRUBIN 0.5 05/08/2020 11:40 AM        Imaging/Testing:    I interpreted and/or reviewed the patient's neuroimaging    CT-CEREBRAL PERFUSION ANALYSIS   Final Result      1.  Cerebral blood flow less than 30% likely representing completed infarct = 0 mL.      2.  T Max more than 6 seconds likely representing combination of completed infarct and ischemia = 0 mL.      3.  Mismatched volume likely representing ischemic brain/penumbra = None      4.  Please note that the cerebral perfusion was performed on the limited brain tissue around the basal ganglia region. Infarct/ischemia outside the CT perfusion sections can be missed in this study.      CT-CTA NECK WITH & W/O-POST PROCESSING   Final Result      1.  Atherosclerosis at the internal carotid artery origins without significant stenosis.   2.  Patent vertebral arteries.   3.  Bilateral pleural effusions and probable mild pulmonary edema.      CT-CTA HEAD WITH & W/O-POST PROCESS   Final Result      CT angiogram of the [place] within normal limits.      Generalized atrophy and chronic ischemic changes.      No acute intracranial abnormality is seen.      CT-ABDOMEN-PELVIS WITH   Final Result      1.  No evidence of abdominal or pelvic mass, adenopathy, or inflammatory change.   2.  7 mm metallic foreign body in the descending colon, etiology uncertain. It is not clear whether this is ingested material or from a prior procedure.   3.  Colonic diverticulosis   4.  Atherosclerosis   5.  RIGHT inguinal hernia containing fat            Findings were discussed with ROGELIO HENLEY on  5/8/2020 1:58 PM.               DX-CHEST-PORTABLE (1 VIEW)    (Results Pending)   MR-BRAIN-W/O    (Results Pending)       Assessment and Plan:    Carlos Rivera is a 74 y.o. man for whom a code stroke was called for acute change in mental status with associated disconjugate gaze that has since resolved; pt. Remains encephalopathic and bradyphrenic.  Pt. Is not a candidate for tPA given he has an active GI bleed.  Not a candidate for IR intervention given there is no evidence of LVO on CTA.  Ddx less likely to include stroke given lack of focality however he has a history of mechanical mitral value for which AC was held in the setting of the GI bleed.  Examination and history suggest etiology may be more consistent with toxic metabolic encephalopathy vs. seizure.  Patient also has frontal lobe release signs on examination.  Thus differential diagnosis also includes polypharmacy vs. medication side effect given Parkinsonism features on examination s/p dose of Risperdal.    Plan:    - routine brain MRI to identify presence of any underlying central pathology  - maintain permissive HTN for 24-48hr, will treatment sooner if brain MRI is negative for infarct  - avoid antidopaminergic agents; use Seroquel 50mg qhs with titration up as needed  - complete toxic metabolic workup: TSH and B12 WNL, UA negative; orders placed for thiamine, RPR, HIV, ammonia, CMP, and chest radiograph  - routine EEG to complete workup; identify presence of triphasic waves vs. Seizure to further guide management and potential workup  - trial of Sinemet 100mg TID, give first dose now  - attempt to minimize risk of delirium such as avoid day time napping and promote night time sleep, monitor for constipation, remove lines/tubing that is not needed, avoid early lab draws and vital checks, limit polypharmacy as able, and keep close to the window    The evaluation of the patient, and recommended management, was discussed with Dr. Mendes  Alexander at bedside.    Davide Sanders MD  Director, Comprehensive Stroke Center, [facility]  Neurohospitalist, [facility]  Clinical  of Neurology, [facility] [employer] [facility] [employer]  t) [phone] (f) [phone]

## 2020-05-10 NOTE — ANESTHESIA POSTPROCEDURE EVALUATION
Patient: Carlos Rivera    Procedure Summary     Date:  05/09/20 Room / Location:  [facility] 09 / SURGERY [facility]    Anesthesia Start:  1454 Anesthesia Stop:  1536    Procedure:  COLONOSCOPY (N/A Anus) Diagnosis:  (diverticulosis)    Surgeon:  Jatinder Madera M.D. Responsible Provider:  Omid Brown M.D.    Anesthesia Type:  general ASA Status:  3          Final Anesthesia Type: general  Last vitals  BP   Blood Pressure : 124/59    Temp   37.2 °C (99 °F)    Pulse   Pulse: 86   Resp   16    SpO2   92 %      Anesthesia Post Evaluation    Patient location during evaluation: PACU  Patient participation: complete - patient participated  Level of consciousness: awake and alert  Pain score: 0    Airway patency: patent  Anesthetic complications: no  Cardiovascular status: hemodynamically stable  Respiratory status: acceptable  Hydration status: euvolemic    PONV: none           Nurse Pain Score: 0 (NPRS)

## 2020-05-10 NOTE — PROGRESS NOTES
Hospital Medicine Daily Progress Note    Date of Service  5/10/2020    Chief Complaint  74 y.o. male admitted 5/8/2020 with GIB and h/o mechanical mitral valve on coumadin.    Hospital Course    see below      Interval Problem Update  GIB-s/p COLO day #1, extensive diverticuli noted but no specific bleeding area found. Patient with down-trending Hb today but no clear BM's noted.    Increasing confusion-possible neurological changes. Patient did receive 0.5 mg risperdal with possible associated bradycardia and disconjugate gaze. Stroke CODE called. CTA head/neck negative for large thrombi. Still remains confused.     INR-is down to 1.26    Remains very confused and agitated    Consultants/Specialty  GI  Cards  Neuro    Code Status  fcfc    Disposition  TELE    Review of Systems  Review of Systems   Unable to perform ROS: Acuity of condition        Physical Exam  Temp:  [36.8 °C (98.2 °F)-37.8 °C (100 °F)] 37.8 °C (100 °F)  Pulse:  [70-98] 96  Resp:  [14-20] 20  BP: (106-134)/(44-59) 124/49  SpO2:  [88 %-100 %] 91 %    Physical Exam  Vitals signs and nursing note reviewed.   Constitutional:       General: He is not in acute distress.     Appearance: He is well-developed.      Comments: Remains confused  Somewhat agitated   HENT:      Head: Normocephalic and atraumatic.      Mouth/Throat:      Pharynx: No oropharyngeal exudate.   Eyes:      Pupils: Pupils are equal, round, and reactive to light.      Comments: Possible disconjugate gaze   Neck:      Musculoskeletal: Normal range of motion and neck supple.      Thyroid: No thyromegaly.   Cardiovascular:      Rate and Rhythm: Normal rate and regular rhythm.      Heart sounds: Murmur present.   Pulmonary:      Effort: Pulmonary effort is normal. No respiratory distress.      Breath sounds: Normal breath sounds. No wheezing.   Abdominal:      General: Bowel sounds are normal. There is no distension.      Palpations: Abdomen is soft.      Tenderness: There is no abdominal  tenderness.   Musculoskeletal: Normal range of motion.         General: No tenderness.   Skin:     General: Skin is warm and dry.      Coloration: Skin is pale.      Findings: No rash.   Neurological:      General: No focal deficit present.      Mental Status: He is alert.      Cranial Nerves: No cranial nerve deficit.      Comments: [id number]  Agitated, possible rigidity noted on exam         Fluids    Intake/Output Summary (Last 24 hours) at 5/10/2020 1557  Last data filed at 5/10/2020 1030  Gross per 24 hour   Intake 892.58 ml   Output 525 ml   Net 367.58 ml       Laboratory  Recent Labs     05/08/20  1140  05/10/20  0000 05/10/20  0621 05/10/20  1221   WBC 15.8*  --  11.0*  --   --    RBC 4.43*  --  2.15*  --   --    HEMOGLOBIN 14.8   < > 6.9*  6.9* 7.8* 7.2*   HEMATOCRIT 44.1   < > 20.0*  20.0* 22.2* 21.3*   MCV 99.5*  --  93.0  --   --    MCH 33.4*  --  32.1  --   --    MCHC 33.6*  --  34.5  --   --    RDW 46.5  --  55.2*  --   --    PLATELETCT 227  --  121*  --   --    MPV 11.1  --  10.8  --   --     < > = values in this interval not displayed.     Recent Labs     05/08/20  1140 05/10/20  0000   SODIUM 129* 137   POTASSIUM 5.4 3.7   CHLORIDE 94* 101   CO2 22 26   GLUCOSE 504* 76   BUN 37* 24*   CREATININE 1.21 1.13   CALCIUM 9.0 7.9*     Recent Labs     05/08/20  1140 05/09/20  0000 05/10/20  0000   APTT 38.6*  --   --    INR 3.89* 2.69* 1.24*               Imaging  CT-CEREBRAL PERFUSION ANALYSIS   Final Result      1.  Cerebral blood flow less than 30% likely representing completed infarct = 0 mL.      2.  T Max more than 6 seconds likely representing combination of completed infarct and ischemia = 0 mL.      3.  Mismatched volume likely representing ischemic brain/penumbra = None      4.  Please note that the cerebral perfusion was performed on the limited brain tissue around the basal ganglia region. Infarct/ischemia outside the CT perfusion sections can be missed in this study.      CT-CTA NECK  WITH & W/O-POST PROCESSING   Final Result      1.  Atherosclerosis at the internal carotid artery origins without significant stenosis.   2.  Patent vertebral arteries.   3.  Bilateral pleural effusions and probable mild pulmonary edema.      CT-CTA HEAD WITH & W/O-POST PROCESS   Final Result      CT angiogram of the [place] within normal limits.      Generalized atrophy and chronic ischemic changes.      No acute intracranial abnormality is seen.      CT-ABDOMEN-PELVIS WITH   Final Result      1.  No evidence of abdominal or pelvic mass, adenopathy, or inflammatory change.   2.  7 mm metallic foreign body in the descending colon, etiology uncertain. It is not clear whether this is ingested material or from a prior procedure.   3.  Colonic diverticulosis   4.  Atherosclerosis   5.  RIGHT inguinal hernia containing fat            Findings were discussed with ROGELIO HENLEY on 5/8/2020 1:58 PM.               DX-CHEST-PORTABLE (1 VIEW)    (Results Pending)   MR-BRAIN-W/O    (Results Pending)        Assessment/Plan  * Lower GI bleed- (present on admission)  Assessment & Plan  Painless bright red blood per rectum-no bloody stools per nursing staff since COLO  Consistent with a diverticular bleed  -COLO with large amount of diverticuli, but no specific area of bleed found, which is common for this disease  -CTA A/P when stable from neuro stand point  -transfuse for less than 7/21, Q6 hour CBC  -if drops again, and CTA negative, consider another COLO, if that is unrevealing as well, might need to get colo-rectal surgery involved for partial colectomy?  -2  U PRBC's and 2 U FFP done on 5/9/2020  -low threshold to the ICU  -monitor closely, high risk for further decompensation        Bradycardia  Assessment & Plan  -12- lead EKG personally reviewed by me shows SB, HR 41, no apparent pauses on 12-lead  -?related to risperdal SE  -consulted cards  -no other obvious offending medications at this time  -monitor on  tele, ZOLL pads in place    Neurological abnormality  Assessment & Plan  -possible disconjugate gaze and worsening AMS on exam  -patient is very high risk of stroke given mechanical mitral valve and no AC given ongoing GIB issues  -neuro consulted, CODE STROKE called  -CTA head/neck negative for large thrombi, not a TPA candidate  -presumptive stroke at this time, permissive HTN to  for 24 hours  -No AC for now  -EEG and attempt to get MRI brain  -monitor on tele  -neuro Checks Q4 hours  -avoid risperdal, consider seroquel    Coronary artery disease involving coronary bypass graft- (present on admission)  Assessment & Plan  History of CABG  Continue Lipitor    History of mitral valve replacement with mechanical valve [Z95.2]- (present on admission)  Assessment & Plan  -need to hold AC for ongoing GIB    Chronic anticoagulation- (present on admission)  Assessment & Plan  Due to coumadin, INR has been fully reversed at this point    Type II diabetes mellitus (HCC)- (present on admission)  Assessment & Plan  With severe hyperglycemia present upon admit now with hypoglycemia  -stop lantus, maintain ISS aggressive         VTE prophylaxis: NONE, SCD's    32 minutes of critical care time spent on the patient excluding any procedures or overlap.

## 2020-05-10 NOTE — RESPIRATORY CARE
Respiratory Rapid Response Note    Symptoms altered mental status/confused.      Breath Sounds  RUL Breath Sounds: Clear (05/10/20 1456)  RML Breath Sounds: Clear (05/10/20 1456)  RLL Breath Sounds: Clear (05/10/20 1456)  LYNDSEY Breath Sounds: Clear (05/10/20 1456)  LLL Breath Sounds: Clear (05/10/20 1456)                   O2 (LPM): 0 (05/09/20 2300)

## 2020-05-10 NOTE — ANESTHESIA TIME REPORT
Anesthesia Start and Stop Event Times     Date Time Event    5/9/2020 1438 Ready for Procedure     1454 Anesthesia Start     1536 Anesthesia Stop        Responsible Staff  05/09/20    Name Role Begin End    Omid Brown M.D. Anesth 1454 1536        Preop Diagnosis (Free Text):  Pre-op Diagnosis     gi bleed        Preop Diagnosis (Codes):    Post op Diagnosis  GI bleeding      Premium Reason  E. Weekend    Comments:

## 2020-05-10 NOTE — PROGRESS NOTES
RRT and code stroke activated by RN and Dr Munoz for possible increase in confusion and disconjugate gaze, pt currently not receiving warfarin secondary to poss GIB, and has mechanical valve in place.  Pt taken to CT with ACLS RN, Dr Sanders at pt bedside during CT, updated on pt status.  Pt back to tele [id number] without issue, report given to bedside RN.

## 2020-05-10 NOTE — CONSULTS
Cardiology Initial Consultation    Date of Service  5/10/2020    Referring Physician  Vaughn Munoz M.D.    Reason for Consultation  Bradycardia    History of Presenting Illness  Carlos Rivera is a 74 y.o. male with a past medical history of mechanical mitral valve replacement in September, 1999 who presented 5/8/2020, with profound GI bleeding.  He underwent colonoscopy today and was found to have diverticulosis.  No active bleeding site was found.    I discussed the patient's history with his wife and she recalls that about 6 years ago he had episode very similar to this with bleeding and anemia requiring blood transfusion.  Reconsult at this time because of asymptomatic bradycardia which occurred this afternoon after the patient received a dose of Risperdal.    Mr. Wyman has been evaluated previously in our office.  He was seen in October 2017.  The patient underwent mitral valve replacement in 1999 for mitral valve prolapse and associated severe regurgitation.  He was again seen in our office last June and at that time was doing well.  The patient also has a remote history of atrial flutter and was cardioverted in 2012 per notes.  He also has a history of coronary disease and apparently an incidental bypass at the time of his mitral valve replacement.    This afternoon, the patient became very agitated and combative.  He was treated with Risperdal at an appropriate dose.  Currently the patient is restrained somewhat combative and can give no review of systems or history.  Review of Systems  Review of Systems   Unable to perform ROS: Psychiatric disorder       Past Medical History   has a past medical history of Chronic anticoagulation (2/23/2017), History of mitral valve replacement with mechanical valve [Z95.2] (3/10/2017), Hyperlipidemia, and Type II diabetes mellitus (HCC) (2/23/2017).    Surgical History   has a past surgical history that includes mitral valve replacement (1999); inguinal  hernia repair (Bilateral, 1984); and tonsillectomy.    Family History  family history includes Diabetes in his father; Heart Attack in his paternal grandfather and paternal uncle; Heart Attack (age of onset: 58) in his father; No Known Problems in his brother, brother, maternal grandfather, maternal grandmother, paternal grandmother, sister, and sister.    Social History   reports that he has never smoked. He has never used smokeless tobacco. He reports that he does not drink alcohol or use drugs.    Medications  Prior to Admission Medications   Prescriptions Last Dose Informant Patient Reported? Taking?   atorvastatin (LIPITOR) 40 MG Tab 5/7/2020 at Unknown time  No No   Sig: TAKE 1 TABLET DAILY IN THE EVENING   citalopram (CELEXA) 10 MG tablet 5/7/2020 at 2000  No No   Sig: TAKE 1 TABLET BY MOUTH  EVERY MORNING   fexofenadine (ALLEGRA ALLERGY) 180 MG tablet 5/7/2020 at 2000  Yes No   Sig: Take 180 mg by mouth every day.   insulin glargine (LANTUS SOLOSTAR) 100 UNIT/ML Solution Pen-injector injection 5/7/2020 at 2000  Yes Yes   Sig: Inject  as instructed every evening.      Facility-Administered Medications: None       Allergies  Allergies   Allergen Reactions   • [place] Vomiting   • Vicodin [Hydrocodone-Acetaminophen] Vomiting       Vital signs in last 24 hours  Temp:  [36.8 °C (98.2 °F)-37.8 °C (100 °F)] 37.8 °C (100 °F)  Pulse:  [70-98] 96  Resp:  [12-20] 20  BP: ()/(30-59) 124/49  SpO2:  [88 %-100 %] 91 %    Physical Exam  Physical Exam  Constitutional:       Interventions: He is sedated and restrained.      Comments: The patient is moderately combative cannot follow any commands.  Refuses to open his eyes.  Is pushing my hands away during the physical examination.   HENT:      Head: Atraumatic.   Cardiovascular:      Rate and Rhythm: Normal rate and regular rhythm.      Heart sounds: Murmur present. Systolic murmur present with a grade of 2/6.      Comments: Apical systolic murmur.  S1 is crisp and  mechanical.  I do not hear diastolic murmur  Abdominal:      General: Abdomen is flat. There is no distension.      Palpations: Abdomen is soft.   Musculoskeletal:      Right lower leg: No edema.      Left lower leg: No edema.   Skin:     General: Skin is warm and dry.   Neurological:      Comments: Patient has disconjugate gaze   Psychiatric:         Behavior: Behavior is agitated and aggressive.      Comments: As noted above the patient has been restrained and sedated for his own safety.  He cannot follow any commands at this time and pushes my hands away when trying to examine patient         Lab Review  Lab Results   Component Value Date/Time    WBC 11.0 (H) 05/10/2020 12:00 AM    RBC 2.15 (L) 05/10/2020 12:00 AM    HEMOGLOBIN 7.2 (L) 05/10/2020 12:21 PM    HEMATOCRIT 21.3 (L) 05/10/2020 12:21 PM    MCV 93.0 05/10/2020 12:00 AM    MCH 32.1 05/10/2020 12:00 AM    MCHC 34.5 05/10/2020 12:00 AM    MPV 10.8 05/10/2020 12:00 AM      Lab Results   Component Value Date/Time    SODIUM 137 05/10/2020 12:00 AM    POTASSIUM 3.7 05/10/2020 12:00 AM    CHLORIDE 101 05/10/2020 12:00 AM    CO2 26 05/10/2020 12:00 AM    GLUCOSE 76 05/10/2020 12:00 AM    BUN 24 (H) 05/10/2020 12:00 AM    CREATININE 1.13 05/10/2020 12:00 AM      Lab Results   Component Value Date/Time    ASTSGOT 23 05/08/2020 11:40 AM    ALTSGPT 36 05/08/2020 11:40 AM     Lab Results   Component Value Date/Time    CHOLSTRLTOT 147 11/15/2019 08:53 AM    LDL 79 11/15/2019 08:53 AM    HDL 47 11/15/2019 08:53 AM    TRIGLYCERIDE 103 11/15/2019 08:53 AM       No results for input(s): NTPROBNP in the last 72 hours.    Bradycardia: Patient is in sinus rhythm and has had sinus pauses.  Etiology is uncertain.  Sinus bradycardia can occur about 4% of the time if patient is given  Risperdal.  The patient is currently asymptomatic.  There is no indication for permanent pacer we will continue to monitor rhythm.  Arrhythmias associated QT prolongation can occasionally be seen  with risperidone.  The monitor there was no evidence of QT prolongation at this time.    Status post mitral valve replacement.  On exam the valve sounds are crisp and appropriate.  There is no evidence of volume overload on exam or dyspnea.    Coronary disease: The patient apparently had a incidental CABG at the time of his valve replacement surgery.    Acute delirium: Etiology is uncertain.  The patient is quite combative and is difficult to examine.  I did find that he had disconjugate gaze and I discussed this with Dr. Munoz and the nurse.  The patient will be sent for neuroimaging.    Acute GI bleeding: The patient has had significant GI blood loss based on his labs and hemoglobin is again falling.  He cannot be adequately anticoagulated at this point.  There is certainly risk of embolic stroke with a mechanical valve, particularly in the mitral position.  He this was discussed with the hospitalist team he will be started on heparin after his neurologic imaging and GI concurs.    ACC guidelines recommend addition of aspirin to Coumadin in the setting of a mechanical mitral valve however with a history of 2 significant GI bleeds in the past the risk of recurrent bleeding very likely outweighs the risk of a thromboembolism from the valve.    Paul Yung M.D.   Cardiologist, [facility]  [phone]

## 2020-05-10 NOTE — PROGRESS NOTES
Tele      SR [id number]  occas PVC in couplets and trigem  1.6-1.8 sec pauses    0.20/0.10/0.42      12 hr cc

## 2020-05-11 ENCOUNTER — APPOINTMENT (OUTPATIENT)
Dept: RADIOLOGY | Facility: MEDICAL CENTER | Age: 75
DRG: 377 | End: 2020-05-11
Attending: INTERNAL MEDICINE
Payer: MEDICARE

## 2020-05-11 LAB
ANION GAP SERPL CALC-SCNC: 12 MMOL/L (ref 7–16)
APTT PPP: 25 SEC (ref 24.7–36)
APTT PPP: 58.8 SEC (ref 24.7–36)
BASOPHILS # BLD AUTO: 0.2 % (ref 0–1.8)
BASOPHILS # BLD: 0.02 K/UL (ref 0–0.12)
BUN SERPL-MCNC: 17 MG/DL (ref 8–22)
CALCIUM SERPL-MCNC: 7.9 MG/DL (ref 8.5–10.5)
CHLORIDE SERPL-SCNC: 105 MMOL/L (ref 96–112)
CO2 SERPL-SCNC: 23 MMOL/L (ref 20–33)
CREAT SERPL-MCNC: 1 MG/DL (ref 0.5–1.4)
EOSINOPHIL # BLD AUTO: 0.2 K/UL (ref 0–0.51)
EOSINOPHIL NFR BLD: 2.3 % (ref 0–6.9)
ERYTHROCYTE [DISTWIDTH] IN BLOOD BY AUTOMATED COUNT: 56.2 FL (ref 35.9–50)
GLUCOSE BLD-MCNC: 120 MG/DL (ref 65–99)
GLUCOSE BLD-MCNC: 137 MG/DL (ref 65–99)
GLUCOSE BLD-MCNC: 213 MG/DL (ref 65–99)
GLUCOSE SERPL-MCNC: 110 MG/DL (ref 65–99)
HCT VFR BLD AUTO: 23 % (ref 42–52)
HCT VFR BLD AUTO: 23.1 % (ref 42–52)
HCT VFR BLD AUTO: 24.3 % (ref 42–52)
HCT VFR BLD AUTO: 25.4 % (ref 42–52)
HGB BLD-MCNC: 8 G/DL (ref 14–18)
HGB BLD-MCNC: 8 G/DL (ref 14–18)
HGB BLD-MCNC: 8.3 G/DL (ref 14–18)
HGB BLD-MCNC: 8.6 G/DL (ref 14–18)
IMM GRANULOCYTES # BLD AUTO: 0.07 K/UL (ref 0–0.11)
IMM GRANULOCYTES NFR BLD AUTO: 0.8 % (ref 0–0.9)
INR PPP: 1.17 (ref 0.87–1.13)
LYMPHOCYTES # BLD AUTO: 1.23 K/UL (ref 1–4.8)
LYMPHOCYTES NFR BLD: 14.4 % (ref 22–41)
MAGNESIUM SERPL-MCNC: 1.7 MG/DL (ref 1.5–2.5)
MCH RBC QN AUTO: 32 PG (ref 27–33)
MCHC RBC AUTO-ENTMCNC: 34.6 G/DL (ref 33.7–35.3)
MCV RBC AUTO: 92.4 FL (ref 81.4–97.8)
MONOCYTES # BLD AUTO: 0.96 K/UL (ref 0–0.85)
MONOCYTES NFR BLD AUTO: 11.2 % (ref 0–13.4)
NEUTROPHILS # BLD AUTO: 6.06 K/UL (ref 1.82–7.42)
NEUTROPHILS NFR BLD: 71.1 % (ref 44–72)
NRBC # BLD AUTO: 0 K/UL
NRBC BLD-RTO: 0 /100 WBC
PLATELET # BLD AUTO: 120 K/UL (ref 164–446)
PMV BLD AUTO: 11 FL (ref 9–12.9)
POTASSIUM SERPL-SCNC: 4.1 MMOL/L (ref 3.6–5.5)
PROTHROMBIN TIME: 15.2 SEC (ref 12–14.6)
RBC # BLD AUTO: 2.5 M/UL (ref 4.7–6.1)
SODIUM SERPL-SCNC: 140 MMOL/L (ref 135–145)
WBC # BLD AUTO: 8.5 K/UL (ref 4.8–10.8)

## 2020-05-11 PROCEDURE — 99232 SBSQ HOSP IP/OBS MODERATE 35: CPT | Performed by: INTERNAL MEDICINE

## 2020-05-11 PROCEDURE — 700105 HCHG RX REV CODE 258: Performed by: INTERNAL MEDICINE

## 2020-05-11 PROCEDURE — 95816 EEG AWAKE AND DROWSY: CPT | Mod: 26 | Performed by: PSYCHIATRY & NEUROLOGY

## 2020-05-11 PROCEDURE — 80048 BASIC METABOLIC PNL TOTAL CA: CPT

## 2020-05-11 PROCEDURE — 99232 SBSQ HOSP IP/OBS MODERATE 35: CPT | Performed by: PSYCHIATRY & NEUROLOGY

## 2020-05-11 PROCEDURE — 85610 PROTHROMBIN TIME: CPT

## 2020-05-11 PROCEDURE — 700102 HCHG RX REV CODE 250 W/ 637 OVERRIDE(OP): Performed by: PSYCHIATRY & NEUROLOGY

## 2020-05-11 PROCEDURE — 700111 HCHG RX REV CODE 636 W/ 250 OVERRIDE (IP): Performed by: INTERNAL MEDICINE

## 2020-05-11 PROCEDURE — 85025 COMPLETE CBC W/AUTO DIFF WBC: CPT

## 2020-05-11 PROCEDURE — 36415 COLL VENOUS BLD VENIPUNCTURE: CPT

## 2020-05-11 PROCEDURE — 4A10X4Z MONITORING OF CENTRAL NERVOUS ELECTRICAL ACTIVITY, EXTERNAL APPROACH: ICD-10-PCS | Performed by: PSYCHIATRY & NEUROLOGY

## 2020-05-11 PROCEDURE — 85018 HEMOGLOBIN: CPT | Mod: 91

## 2020-05-11 PROCEDURE — 95819 EEG AWAKE AND ASLEEP: CPT | Performed by: PSYCHIATRY & NEUROLOGY

## 2020-05-11 PROCEDURE — 82962 GLUCOSE BLOOD TEST: CPT | Mod: 91

## 2020-05-11 PROCEDURE — 770020 HCHG ROOM/CARE - TELE (206)

## 2020-05-11 PROCEDURE — 95816 EEG AWAKE AND DROWSY: CPT | Performed by: PSYCHIATRY & NEUROLOGY

## 2020-05-11 PROCEDURE — 83735 ASSAY OF MAGNESIUM: CPT

## 2020-05-11 PROCEDURE — 85730 THROMBOPLASTIN TIME PARTIAL: CPT | Mod: 91

## 2020-05-11 PROCEDURE — 85014 HEMATOCRIT: CPT | Mod: 91

## 2020-05-11 PROCEDURE — A9270 NON-COVERED ITEM OR SERVICE: HCPCS | Performed by: PSYCHIATRY & NEUROLOGY

## 2020-05-11 PROCEDURE — A9270 NON-COVERED ITEM OR SERVICE: HCPCS | Performed by: HOSPITALIST

## 2020-05-11 PROCEDURE — 700102 HCHG RX REV CODE 250 W/ 637 OVERRIDE(OP): Performed by: HOSPITALIST

## 2020-05-11 PROCEDURE — 99233 SBSQ HOSP IP/OBS HIGH 50: CPT | Performed by: INTERNAL MEDICINE

## 2020-05-11 RX ORDER — HEPARIN SODIUM 5000 [USP'U]/100ML
INJECTION, SOLUTION INTRAVENOUS CONTINUOUS
Status: DISCONTINUED | OUTPATIENT
Start: 2020-05-11 | End: 2020-05-17 | Stop reason: ALTCHOICE

## 2020-05-11 RX ORDER — LORAZEPAM 2 MG/ML
0.5 INJECTION INTRAMUSCULAR ONCE
Status: ACTIVE | OUTPATIENT
Start: 2020-05-11 | End: 2020-05-12

## 2020-05-11 RX ADMIN — ATORVASTATIN CALCIUM 40 MG: 40 TABLET, FILM COATED ORAL at 18:26

## 2020-05-11 RX ADMIN — HEPARIN SODIUM 900 UNITS/HR: 5000 INJECTION, SOLUTION INTRAVENOUS at 12:01

## 2020-05-11 RX ADMIN — SODIUM CHLORIDE, POTASSIUM CHLORIDE, SODIUM LACTATE AND CALCIUM CHLORIDE: 600; 310; 30; 20 INJECTION, SOLUTION INTRAVENOUS at 23:36

## 2020-05-11 RX ADMIN — CARBIDOPA AND LEVODOPA 1 TABLET: 10; 100 TABLET ORAL at 07:54

## 2020-05-11 RX ADMIN — SODIUM CHLORIDE, POTASSIUM CHLORIDE, SODIUM LACTATE AND CALCIUM CHLORIDE: 600; 310; 30; 20 INJECTION, SOLUTION INTRAVENOUS at 11:16

## 2020-05-11 RX ADMIN — CARBIDOPA AND LEVODOPA 1 TABLET: 10; 100 TABLET ORAL at 15:00

## 2020-05-11 RX ADMIN — CARBIDOPA AND LEVODOPA 1 TABLET: 10; 100 TABLET ORAL at 21:33

## 2020-05-11 RX ADMIN — QUETIAPINE FUMARATE 50 MG: 25 TABLET ORAL at 18:26

## 2020-05-11 RX ADMIN — INSULIN HUMAN 4 UNITS: 100 INJECTION, SOLUTION PARENTERAL at 21:45

## 2020-05-11 RX ADMIN — CITALOPRAM HYDROBROMIDE 10 MG: 20 TABLET ORAL at 18:00

## 2020-05-11 ASSESSMENT — ENCOUNTER SYMPTOMS
FEVER: 0
COUGH: 0
PALPITATIONS: 0
SHORTNESS OF BREATH: 0
NAUSEA: 0
CHILLS: 0
ABDOMINAL PAIN: 0
VOMITING: 0

## 2020-05-11 ASSESSMENT — FIBROSIS 4 INDEX: FIB4 SCORE: 4.84

## 2020-05-11 NOTE — PROCEDURES
ROUTINE ELECTROENCEPHALOGRAM REPORT      Referring provider: Dr. Sanders.     DOS: 5/11/2020 (total recording of 25 minutes)    INDICATION:  Carlos Rivera 74 y.o. male presenting with altered mental status.     CURRENT ANTIEPILEPTIC REGIMEN: None.     TECHNIQUE: 30 channel routine electroencephalogram (EEG) was performed in accordance with the international 10-20 system. The study was reviewed in bipolar and referential montages. The recording examined the patient during wakeful state.     DESCRIPTION OF THE RECORD:  During the wakefulness, the background showed a symmetrical 6 Hz alpha activity posteriorly with amplitude of 70 mV.  There was reactivity to eye closure/opening.  A normal anterior-posterior gradient was noted with faster beta frequencies seen anteriorly.      ACTIVATION PROCEDURES:   Intermittent Photic stimulation was performed in a stepwise fashion from 1 to 30 Hz and elicited a normal response (photic driving), most noticeable in the posterior leads.      ICTAL AND/OR INTERICTAL FINDINGS:   No focal or generalized epileptiform activity noted. No regional slowing was seen during this routine study.  No seizures were reported or recorded during the study.     EKG: sampling of the EKG recording demonstrated sinus rhythm.       INTERPRETATION:  This is an abnormal routine EEG recording in the awake state.  A mild to moderate encephalopathy is suggested. This is non specific. No seizures captured during the study. Clinical correlation is recommended.    Note: this EEG does not rule out epilepsy.  If the clinical suspicion remains high for seizures, a prolonged recording to capture clinical or subclinical events may be helpful.        Jewel Galeana MD   Epilepsy and Neurodiagnostics.   Clinical  of Neurology [facility].   Diplomate in Neurology, Epilepsy, and Electrodiagnostic Medicine.   Office: [phone]  Fax: [phone]

## 2020-05-11 NOTE — CARE PLAN
Problem: Communication  Goal: The ability to communicate needs accurately and effectively will improve  Outcome: PROGRESSING AS EXPECTED  Intervention: [place] patient and significant other/support system to call light to alert staff of needs  Oriented to:: All of the Following : Location of Bathroom, Visiting Policy, Unit Routine, Call Light and Bedside Controls, Bedside Rail Policy, Smoking Policy, Rights and Responsibilities, Bedside Report, and Patient Education Notebook     Problem: Safety  Goal: Will remain free from falls  Outcome: PROGRESSING AS EXPECTED  Intervention: Implement fall precautions  Flowsheets (Taken 5/10/2020 1100 by Symone Guajardo RPipoNPipo)  Environmental Precautions:   Treaded Slipper Socks on Patient   Personal Belongings, Wastebasket, Call Bell etc. in Easy Reach   Transferred to Stronger Side   Report Given to Other Health Care Providers Regarding Fall Risk   Bed in Low Position, Bed alarm on    Communication Sign for Patients & Families   Mobility Assessed & Appropriate Sign Placed

## 2020-05-11 NOTE — PROGRESS NOTES
Hospital Medicine Daily Progress Note    Date of Service  5/11/2020    Chief Complaint  74 y.o. male admitted 5/8/2020 with GIB and h/o mechanical mitral valve on coumadin.    Hospital Course    see below      Interval Problem Update  GIB-s/p COLO day #2, Hb has improved, no bloody bowel movements and no clear abdominal pain noted this AM.     Increasing confusion-much clearer today. EEG being done. Minimal behavioral issues overnight. Remains in bilateral wrist restraints. Spoke with neuro no need for mRI    INR-remains low    NO bradycardia issues overnight.     Consultants/Specialty  GI  Cards  Neuro    Code Status  fcfc    Disposition  TELE    Review of Systems  Review of Systems   Constitutional: Positive for malaise/fatigue. Negative for chills and fever.   Respiratory: Negative for cough and shortness of breath.    Cardiovascular: Negative for chest pain and palpitations.   Gastrointestinal: Negative for abdominal pain, nausea and vomiting.   All other systems reviewed and are negative.       Physical Exam  Temp:  [36.7 °C (98 °F)-37.4 °C (99.3 °F)] 37.1 °C (98.7 °F)  Pulse:  [56-96] 56  Resp:  [15-20] 15  BP: (121-131)/(48-60) 124/48  SpO2:  [90 %-92 %] 92 %    Physical Exam  Vitals signs and nursing note reviewed.   Constitutional:       General: He is not in acute distress.     Appearance: He is well-developed.      Comments: Much more coherent today  In 2 point wrist restrains, more cooperative   HENT:      Head: Normocephalic and atraumatic.      Mouth/Throat:      Pharynx: No oropharyngeal exudate.   Eyes:      Pupils: Pupils are equal, round, and reactive to light.      Comments: Possible disconjugate gaze   Neck:      Musculoskeletal: Normal range of motion and neck supple.      Thyroid: No thyromegaly.   Cardiovascular:      Rate and Rhythm: Normal rate and regular rhythm.      Heart sounds: Murmur present.   Pulmonary:      Effort: Pulmonary effort is normal. No respiratory distress.      Breath  sounds: Normal breath sounds. No wheezing.   Abdominal:      General: Bowel sounds are normal.      Palpations: Abdomen is soft.      Tenderness: There is no abdominal tenderness.   Musculoskeletal: Normal range of motion.         General: No tenderness.   Skin:     General: Skin is warm and dry.      Coloration: Skin is pale.      Findings: No rash.   Neurological:      General: No focal deficit present.      Mental Status: He is alert.      Cranial Nerves: No cranial nerve deficit.      Comments: A&XOX2-3  Less agitated         Fluids    Intake/Output Summary (Last 24 hours) at 5/11/2020 1143  Last data filed at 5/11/2020 0900  Gross per 24 hour   Intake 240 ml   Output 675 ml   Net -435 ml       Laboratory  Recent Labs     05/10/20  0000  05/10/20  1750 05/11/20  0003 05/11/20  0604   WBC 11.0*  --   --  8.5  --    RBC 2.15*  --   --  2.50*  --    HEMOGLOBIN 6.9*  6.9*   < > 8.1* 8.0* 8.3*   HEMATOCRIT 20.0*  20.0*   < > 23.8* 23.1* 24.3*   MCV 93.0  --   --  92.4  --    MCH 32.1  --   --  32.0  --    MCHC 34.5  --   --  34.6  --    RDW 55.2*  --   --  56.2*  --    PLATELETCT 121*  --   --  120*  --    MPV 10.8  --   --  11.0  --     < > = values in this interval not displayed.     Recent Labs     05/10/20  0000 05/10/20  1536 05/11/20  0003   SODIUM 137 138 140   POTASSIUM 3.7 4.2 4.1   CHLORIDE 101 104 105   CO2 26 25 23   GLUCOSE 76 98 110*   BUN 24* 18 17   CREATININE 1.13 1.03 1.00   CALCIUM 7.9* 7.6* 7.9*     Recent Labs     05/09/20  0000 05/10/20  0000   INR 2.69* 1.24*               Imaging  DX-CHEST-PORTABLE (1 VIEW)   Final Result      1.  Interstitial and hazy airspace opacities and trace left pleural effusion. This may be related to pulmonary edema, however infection should be excluded clinically.   2.  Mild cardiomegaly. Valve prosthesis.      CT-CEREBRAL PERFUSION ANALYSIS   Final Result      1.  Cerebral blood flow less than 30% likely representing completed infarct = 0 mL.      2.  T Max more  than 6 seconds likely representing combination of completed infarct and ischemia = 0 mL.      3.  Mismatched volume likely representing ischemic brain/penumbra = None      4.  Please note that the cerebral perfusion was performed on the limited brain tissue around the basal ganglia region. Infarct/ischemia outside the CT perfusion sections can be missed in this study.      CT-CTA NECK WITH & W/O-POST PROCESSING   Final Result      1.  Atherosclerosis at the internal carotid artery origins without significant stenosis.   2.  Patent vertebral arteries.   3.  Bilateral pleural effusions and probable mild pulmonary edema.      CT-CTA HEAD WITH & W/O-POST PROCESS   Final Result      CT angiogram of the [place] within normal limits.      Generalized atrophy and chronic ischemic changes.      No acute intracranial abnormality is seen.      CT-ABDOMEN-PELVIS WITH   Final Result      1.  No evidence of abdominal or pelvic mass, adenopathy, or inflammatory change.   2.  7 mm metallic foreign body in the descending colon, etiology uncertain. It is not clear whether this is ingested material or from a prior procedure.   3.  Colonic diverticulosis   4.  Atherosclerosis   5.  RIGHT inguinal hernia containing fat            Findings were discussed with ROGELIO HENLEY on 5/8/2020 1:58 PM.                    Assessment/Plan  * Lower GI bleed- (present on admission)  Assessment & Plan  -Hb has improved, benign abdominal exam, no clear bloody bowel movements over the past 24 hours  Consistent with a diverticular bleed  -COLO with large amount of diverticuli, but no specific area of bleed found, which is common for this disease  -CTA A/P if bleeds again or Hb dropping  -transfuse for less than 7/21, Q6 hour CBC  -given high risk of CVA with mechanical mitral valve, will start weight based heparin gtt with NO initial bolus and NO rebolus, goal PTT 60-90, adjust PRN, check PTT Q6 hours  -2  U PRBC's and 2 U FFP done on  5/9/2020  -low threshold to the ICU  -monitor closely, high risk for further decompensation        Bradycardia  Assessment & Plan  -appears to have self resolved, given temporality with administration of risperdal yesterday, suspect this was cause  -eventual limited ECHO  -monitor lytes and TELE    Neurological abnormality  Assessment & Plan  -neurological status is greatly improved today, no clear rigidity on exam  -AVOID risperdal going forward  -EEG being done, results pending at this time.   -NEURO following  -Q4 hour neuro checks  -no need for MRI at this time  -possible underlying dementia and will need outpatient neuro-psychological testing  -CTA head/neck negative for large thrombi, not a TPA candidate    Coronary artery disease involving coronary bypass graft- (present on admission)  Assessment & Plan  History of CABG  Continue Lipitor    History of mitral valve replacement with mechanical valve [Z95.2]- (present on admission)  Assessment & Plan  -restart AC as noted above  -will eventually do limited ECHO    Chronic anticoagulation- (present on admission)  Assessment & Plan  -hold coumadin  -start weight based heparin gtt    Type II diabetes mellitus (HCC)- (present on admission)  Assessment & Plan  With severe hyperglycemia present upon admit now with hypoglycemia that has improved over the last 24 hours, continue below regimen  -stop lantus, maintain ISS aggressive         VTE prophylaxis: weight based heparin gtt

## 2020-05-11 NOTE — PROGRESS NOTES
Assumed care of patient, bedside report received from Symone MARINELLI. Updated on POC, call light within reach and fall precautions in place. Bed locked and in lowest position. Patient instructed to call for assistance before getting out of bed. All questions answered, no other needs at this time. Patient has bilateral soft wrist restraints and a safety sitter.

## 2020-05-11 NOTE — DIETARY
"Nutrition services: Day 3 of admit.  Carlos Rivera is a 74 y.o. male with admitting DX of acute GI bleeding. Dx includes neurological abnormality with EEG done and abnormal results with mild to moderate encephalopathy noted, CAD and hx of CABG, hx of mitral valve replacement, type 2 diabetes mellitus.     Consult received for MST of 2 on nutrition screen due to report of 14-23 lb wt loss x 6 months. No report of poor PO.      Assessment:  Height: 172.7 cm (5' 8\")  Weight: 58 kg (127 lb 13.9 oz)  Body mass index is 19.44 kg/m²., BMI classification: normal; however, BMI > 23 recommended in the elderly.  Diet/Intake: clear liquids, no red foods/% of most meals    Evaluation:   1. NPO/clear liquids x 3 days.   2. Pt with encephalopathy. Weight hx reviewed in Epic: 2/6/20=60.3 kg and 9/9/19=58.8 kg. Weight loss noted x 3 months of 2.3 kg (3.8%). Weight loss is not significant.    3. Labs: Accucheck glucose: . Lipid panel WNL.  4. Meds:  SSI  5. IV fluids: LR @ 75 ml/hour    Malnutrition Risk: Criteria not met    Recommendations/Plan:  1. Advance diet beyond clears when medically feasible.    2. Encourage intake of >50%  3. Document intake of all meals as % taken in ADL's to provide interdisciplinary communication across all shifts.   4. Monitor weight.  5. Nutrition rep will continue to see patient for ongoing meal and snack preferences.     RD will monitor.  "

## 2020-05-11 NOTE — PROGRESS NOTES
[facility] Cardiology Follow-up Note    Date of Service:    5/11/2020      Name:   Carlos Rivera   YOB: 1945  Age:   74 y.o.  male   MRN:   [id number]      Chief Complaint: Mechanical MV with GIB    HPI:  Mr Rivera is a 73 y/o fellow presenting with acute GIB and AMS.  He has hx of mechanical mitral valve replacement in 1999.  INR was 3.89 on admission, hgb dropped to 6.3, s/p 3 U PRBC transfusion on 5/9.  hgb improved.  Also underwent colonoscopy on 5/9/20 finding diverticulosis with residual blood, concerning for source of bleed.     Had some bradycardia and disconjugate gaze after administration of risperdal.  CT head negative for bleed.      Interim Events:  His mental status has improved today per nurse  Patient is oriented to person, place and somewhat to situation.    Denies abd pain.   No chest pain.  Denies shortness of breath.  No LE swelling or palpitations.      ROS  Limited but was able to obtain information above.     All other review of systems reviewed and negative.    Past medical, surgical, social, and family history reviewed and unchanged from admission except as noted in assessment and plan.    Medications: Reviewed in MAR  Current Facility-Administered Medications   Medication Dose Frequency Provider Last Rate Last Dose   • LORazepam (ATIVAN) injection 0.5 mg  0.5 mg Once Vaughn Munoz M.D.       • heparin infusion 25,000 units in 500 mL 0.45% NACL   Continuous Vaughn Munoz M.D.       • carbidopa-levodopa (SINEMET)  MG tablet 1 Tab  1 Tab Q8HRS Davide Sanders M.D.   1 Tab at 05/11/20 0754   • QUEtiapine (SEROQUEL) tablet 50 mg  50 mg Q EVENING Davide Sanders M.D.   50 mg at 05/10/20 1743   • atorvastatin (LIPITOR) tablet 40 mg  40 mg Q EVENING Andrew Ramires M.D.   40 mg at 05/10/20 1743   • citalopram (CELEXA) tablet 10 mg  10 mg Q EVENING Andrew Ramires M.D.   10 mg at 05/10/20 1743   • senna-docusate (PERICOLACE or SENOKOT S) 8.6-50 MG per  "tablet 2 Tab  2 Tab BID Andrew Ramires M.D.   Stopped at 05/08/20 1800    And   • polyethylene glycol/lytes (MIRALAX) PACKET 1 Packet  1 Packet QDAY PRN Andrew Ramires M.D.        And   • magnesium hydroxide (MILK OF MAGNESIA) suspension 30 mL  30 mL QDAY PRN Andrew Ramires M.D.        And   • bisacodyl (DULCOLAX) suppository 10 mg  10 mg QDAY PRN nAdrew Ramires M.D.       • lactated ringers infusion   Continuous Vaughn Munoz M.D. 75 mL/hr at 05/10/20 1931     • acetaminophen (TYLENOL) tablet 650 mg  650 mg Q6HRS PRN Andrew Ramires M.D.   650 mg at 05/08/20 2238   • ondansetron (ZOFRAN) syringe/vial injection 4 mg  4 mg Q4HRS PRN Andrew Ramires M.D.   4 mg at 05/08/20 1944   • ondansetron (ZOFRAN ODT) dispertab 4 mg  4 mg Q4HRS PRN Andrew Ramires M.D.       • insulin regular (HUMULIN R) injection 3-14 Units  3-14 Units 4X/DAY ACHS Andrew Ramires M.D.   Stopped at 05/09/20 1100    And   • glucose 4 g chewable tablet 16 g  16 g Q15 MIN PRN Andrew Ramires M.D.        And   • dextrose 50% (D50W) injection 50 mL  50 mL Q15 MIN PRN Andrew Ramires M.D.   50 mL at 05/09/20 1733   Last reviewed on 5/9/2020  2:37 PM by Omid Brown M.D.    Allergies   Allergen Reactions   • [place] Vomiting   • Vicodin [Hydrocodone-Acetaminophen] Vomiting       Physical Exam  Body mass index is 19.44 kg/m². /48   Pulse (!) 56   Temp 37.1 °C (98.7 °F) (Temporal)   Resp 15   Ht 1.727 m (5' 8\")   Wt 58 kg (127 lb 13.9 oz)   SpO2 92%    Vitals:    05/10/20 1923 05/10/20 2335 05/11/20 0344 05/11/20 0939   BP: 129/57 128/51 131/58 124/48   Pulse: 68 93 72 (!) 56   Resp: 19 18 18 15   Temp: 37.1 °C (98.7 °F) 37.3 °C (99.2 °F) 37.4 °C (99.3 °F) 37.1 °C (98.7 °F)   TempSrc: Temporal Temporal Temporal Temporal   SpO2: 92% 92% 90% 92%   Weight:       Height:        Oxygen Therapy:  Pulse Oximetry: 92 %, O2 (LPM): 0, O2 Delivery Device: None - Room Air    General: no apparent distress, thin.  Eyes: normal conjunctiva  ENT: OP " clear  Neck: no JVD   Lungs: normal respiratory effort, without crackles, no wheezing or rhonchi.  Heart: normal rate,  regular rhythm, systolic click present, no rubs or gallops,   EXT: no edema bilateral lower extremities. + bilateral pedal pulses. no cyanosis  Abdomen: soft, non tender, non distended,  Neurological: No focal deficits, no facial asymmetry.  Normal speech.  Psychiatric: Appropriate affect, alert, oriented to person, place.   Skin: Warm extremities, no rash.    Labs (personally reviewed):     Lab Results   Component Value Date/Time    SODIUM 140 2020 12:03 AM    POTASSIUM 4.1 2020 12:03 AM    CHLORIDE 105 2020 12:03 AM    CO2 23 2020 12:03 AM    GLUCOSE 110 (H) 2020 12:03 AM    BUN 17 2020 12:03 AM    CREATININE 1.00 2020 12:03 AM     Lab Results   Component Value Date/Time    ALKPHOSPHAT 53 05/10/2020 03:36 PM    ASTSGOT 39 05/10/2020 03:36 PM    ALTSGPT 26 05/10/2020 03:36 PM    TBILIRUBIN 0.8 05/10/2020 03:36 PM      Lab Results   Component Value Date/Time    CHOLSTRLTOT 147 11/15/2019 08:53 AM    LDL 79 11/15/2019 08:53 AM    HDL 47 11/15/2019 08:53 AM    TRIGLYCERIDE 103 11/15/2019 08:53 AM     No results found for: BNPBTYPENAT      Cardiac Imaging and Procedures Review:      Personal Telemetry Review:  NS in the 50-60s.  Occasional PVC with compensatory pause - spoke with tele tech, will personally go down to 7 to review and addend note with any concerns.      Assessment and Medical Decision Makin   Acute blood loss anemia  S/p 3 u PRBC , hgb improving.  Stable in the 8 range.      2   Acute lower GIB - potentially related to diverticulosis.  S/p colonoscopy 20.    3   Mechanical mitral valve.  Plans to resume heparin ggt today with bridge to warfarin.  ACC also recommends ASA in combination with warfarin - though in this case risks may outweigh benefit in the setting of recurrent GIB.    -  Goal INR 3.5  -  Should follow with anticoag  clinic out patient.    4   Bradycardia.   Improved.  HR in the 50s.  No high grade block.      5   CAD hx of CABG.    6   AMS.  Improving today.  Neurology consulted.        Cardiology will sign off at this time.  Please contact our service directly with further questions/concerns.      Future Appointments   Date Time Provider Department Center   6/15/2020  7:20 AM Rufino Shine M.D. [facility]   8/25/2020  4:00 PM Stephon Ramos M.D. [facility] None         Daisy Drew PA-C  [facility]

## 2020-05-11 NOTE — PROGRESS NOTES
Monitor Summary    SB/SR 47-81  Freq pvc, coup, triplets  1.8-2.2 second pauses  Ruperto down to 35 NS  .18/.22/.44

## 2020-05-11 NOTE — CARE PLAN
Problem: Nutritional:  Goal: Achieve adequate nutritional intake  Description: Advance diet beyond clears when medically feasible. Patient will consume >50% of meals   Outcome: NOT MET

## 2020-05-11 NOTE — PROGRESS NOTES
Neurology Progress Note  Neurohospitalist Service, [facility]    Referring Physician: Vaughn Munoz M.D.    Chief Complaint   Patient presents with   • Diarrhea     last night   • Bloody Stools   • Nausea       HPI: Refer to initial documented Neurology H&P, as detailed in the patient's chart.    Interval History 5/11/20: No acute events overnight.  Remains with bilateral wrist restraints.  Status post vascular work-up as recommended in documentation by neurology yesterday.  Patient is back to his baseline.  Patient has no complaints.    Review of systems: In addition to what is detailed in the HPI and/or updated in the interval history, all other systems reviewed and are negative.    Past Medical History:    has a past medical history of Chronic anticoagulation (2/23/2017), History of mitral valve replacement with mechanical valve [Z95.2] (3/10/2017), Hyperlipidemia, and Type II diabetes mellitus (HCC) (2/23/2017).    FHx:  family history includes Diabetes in his father; Heart Attack in his paternal grandfather and paternal uncle; Heart Attack (age of onset: 58) in his father; No Known Problems in his brother, brother, maternal grandfather, maternal grandmother, paternal grandmother, sister, and sister.    SHx:   reports that he has never smoked. He has never used smokeless tobacco. He reports that he does not drink alcohol or use drugs.    Medications:    Current Facility-Administered Medications:   •  carbidopa-levodopa (SINEMET)  MG tablet 1 Tab, 1 Tab, Oral, Q8HRS, Davide Sanders M.D., 1 Tab at 05/10/20 1743  •  QUEtiapine (SEROQUEL) tablet 50 mg, 50 mg, Oral, Q EVENING, Davide Sanders M.D., 50 mg at 05/10/20 1743  •  atorvastatin (LIPITOR) tablet 40 mg, 40 mg, Oral, Q EVENING, Andrew Ramires M.D., 40 mg at 05/10/20 1743  •  citalopram (CELEXA) tablet 10 mg, 10 mg, Oral, Q EVENING, Andrew Ramires M.D., 10 mg at 05/10/20 1743  •  senna-docusate (PERICOLACE or SENOKOT S)  8.6-50 MG per tablet 2 Tab, 2 Tab, Oral, BID, Stopped at 05/08/20 1800 **AND** polyethylene glycol/lytes (MIRALAX) PACKET 1 Packet, 1 Packet, Oral, QDAY PRN **AND** magnesium hydroxide (MILK OF MAGNESIA) suspension 30 mL, 30 mL, Oral, QDAY PRN **AND** bisacodyl (DULCOLAX) suppository 10 mg, 10 mg, Rectal, QDAY PRN, Andrew Ramires M.D.  •  lactated ringers infusion, , Intravenous, Continuous, Vaughn Munoz M.D., Last Rate: 75 mL/hr at 05/10/20 1931  •  acetaminophen (TYLENOL) tablet 650 mg, 650 mg, Oral, Q6HRS PRN, Andrew Ramires M.D., 650 mg at 05/08/20 2238  •  ondansetron (ZOFRAN) syringe/vial injection 4 mg, 4 mg, Intravenous, Q4HRS PRN, Andrew Ramires M.D., 4 mg at 05/08/20 1944  •  ondansetron (ZOFRAN ODT) dispertab 4 mg, 4 mg, Oral, Q4HRS PRN, Andrew Ramires M.D.  •  insulin regular (HUMULIN R) injection 3-14 Units, 3-14 Units, Subcutaneous, 4X/DAY ACHS, Stopped at 05/09/20 1100 **AND** POC Blood Glucose, , , Q AC AND BEDTIME(S) **AND** NOTIFY MD and PharmD, , , Once **AND** glucose 4 g chewable tablet 16 g, 16 g, Oral, Q15 MIN PRN **AND** dextrose 50% (D50W) injection 50 mL, 50 mL, Intravenous, Q15 MIN PRN, Andrew Ramires M.D., 50 mL at 05/09/20 1733    Physical Examination:     Vitals:    05/10/20 1635 05/10/20 1923 05/10/20 2335 05/11/20 0344   BP: 121/60 129/57 128/51 131/58   Pulse: 72 68 93 72   Resp: 15 19 18 18   Temp: 36.7 °C (98 °F) 37.1 °C (98.7 °F) 37.3 °C (99.2 °F) 37.4 °C (99.3 °F)   TempSrc: Temporal Temporal Temporal Temporal   SpO2: 92% 92% 92% 90%   Weight:       Height:           General: Patient is awake and in no acute distress  Eyes: examination of optic disks not indicated at this time  CV: RRR    NEUROLOGICAL EXAM:     Mental status: eyes open, oriented, follows simple commands  Speech and language: mildly dysarthric. The patient can name and repeat with semantic paraphasic errors  Cranial nerve exam: Pupils are equal, round and reactive to light bilaterally. Diminished blink to  threat b/l. extraocular muscles are intact. Sensation in the face is intact to light touch. Face is symmetric. Hearing to finger rub equal. Palate elevates symmetrically. Shoulder shrug is full. Tongue is midline.+ masked facies and bradykinetic  Motor exam: 5/5 strength throughout. Tone is notable for greatly improved rigidity x4 extremities compared to 5/10. No tremor.  Slowing of rapid alternating movements with decrement.  Sensory exam: symmetric withdrawal to noxious stim   Deep tendon reflexes:  1+ and symmetric. Toes down-going bilaterally  Coordination: no ataxia  Gait: deferred per patient request    Objective Data:    Labs:  Lab Results   Component Value Date/Time    PROTHROMBTM 15.9 (H) 05/10/2020 12:00 AM    INR 1.24 (H) 05/10/2020 12:00 AM      Lab Results   Component Value Date/Time    WBC 8.5 05/11/2020 12:03 AM    RBC 2.50 (L) 05/11/2020 12:03 AM    HEMOGLOBIN 8.3 (L) 05/11/2020 06:04 AM    HEMATOCRIT 24.3 (L) 05/11/2020 06:04 AM    MCV 92.4 05/11/2020 12:03 AM    MCH 32.0 05/11/2020 12:03 AM    MCHC 34.6 05/11/2020 12:03 AM    MPV 11.0 05/11/2020 12:03 AM    NEUTSPOLYS 71.10 05/11/2020 12:03 AM    LYMPHOCYTES 14.40 (L) 05/11/2020 12:03 AM    MONOCYTES 11.20 05/11/2020 12:03 AM    EOSINOPHILS 2.30 05/11/2020 12:03 AM    BASOPHILS 0.20 05/11/2020 12:03 AM      Lab Results   Component Value Date/Time    SODIUM 140 05/11/2020 12:03 AM    POTASSIUM 4.1 05/11/2020 12:03 AM    CHLORIDE 105 05/11/2020 12:03 AM    CO2 23 05/11/2020 12:03 AM    GLUCOSE 110 (H) 05/11/2020 12:03 AM    BUN 17 05/11/2020 12:03 AM    CREATININE 1.00 05/11/2020 12:03 AM      Lab Results   Component Value Date/Time    CHOLSTRLTOT 147 11/15/2019 08:53 AM    LDL 79 11/15/2019 08:53 AM    HDL 47 11/15/2019 08:53 AM    TRIGLYCERIDE 103 11/15/2019 08:53 AM       Lab Results   Component Value Date/Time    ALKPHOSPHAT 53 05/10/2020 03:36 PM    ASTSGOT 39 05/10/2020 03:36 PM    ALTSGPT 26 05/10/2020 03:36 PM    TBILIRUBIN 0.8 05/10/2020  03:36 PM        Imaging/Testing:    I interpreted and/or reviewed the patient's neuroimaging    DX-CHEST-PORTABLE (1 VIEW)   Final Result      1.  Interstitial and hazy airspace opacities and trace left pleural effusion. This may be related to pulmonary edema, however infection should be excluded clinically.   2.  Mild cardiomegaly. Valve prosthesis.      CT-CEREBRAL PERFUSION ANALYSIS   Final Result      1.  Cerebral blood flow less than 30% likely representing completed infarct = 0 mL.      2.  T Max more than 6 seconds likely representing combination of completed infarct and ischemia = 0 mL.      3.  Mismatched volume likely representing ischemic brain/penumbra = None      4.  Please note that the cerebral perfusion was performed on the limited brain tissue around the basal ganglia region. Infarct/ischemia outside the CT perfusion sections can be missed in this study.      CT-CTA NECK WITH & W/O-POST PROCESSING   Final Result      1.  Atherosclerosis at the internal carotid artery origins without significant stenosis.   2.  Patent vertebral arteries.   3.  Bilateral pleural effusions and probable mild pulmonary edema.      CT-CTA HEAD WITH & W/O-POST PROCESS   Final Result      CT angiogram of the [place] within normal limits.      Generalized atrophy and chronic ischemic changes.      No acute intracranial abnormality is seen.      CT-ABDOMEN-PELVIS WITH   Final Result      1.  No evidence of abdominal or pelvic mass, adenopathy, or inflammatory change.   2.  7 mm metallic foreign body in the descending colon, etiology uncertain. It is not clear whether this is ingested material or from a prior procedure.   3.  Colonic diverticulosis   4.  Atherosclerosis   5.  RIGHT inguinal hernia containing fat            Findings were discussed with ROGELIO HENLEY on 5/8/2020 1:58 PM.               MR-BRAIN-W/O    (Results Pending)       Assessment and Plan:    Carlos Rivera is a 74 y.o. man for whom a  code stroke was called 5/10/20  for acute change in mental status with associated disconjugate gaze that has since resolved. Ddx less likely to include stroke given lack of focality however he has a history of mechanical mitral value for which AC was held in the setting of the GI bleed.  Examination and history suggest etiology may be more consistent with toxic metabolic encephalopathy vs. seizure.  Differential diagnosis also includes polypharmacy vs. medication side effect given Parkinsonism features on examination s/p dose of Risperdal.     Plan:     - MRI brain cancelled as patient has returned to his pre-CODE STROKE baseline  - goal BP is normotension  - avoid antidopaminergic agents; use Seroquel 50mg qhs with titration up as needed  - routine EEG to complete workup; identify presence of triphasic waves vs. Seizure to further guide management and potential workup  - trial of Sinemet 100mg TID, started 5/10/20  - attempt to minimize risk of delirium such as avoid day time napping and promote night time sleep, monitor for constipation, remove lines/tubing that is not needed, avoid early lab draws and vital checks, limit polypharmacy as able, and keep close to the window    No further recommendations or further studies from a neurological standpoint at this time. Please re-consult if you have further questions or there is a change in status.    The evaluation of the patient, and recommended management, was discussed with the resident staff. I have performed a physical exam and reviewed and updated ROS and Plan today (5/11/2020). In review of yesterday's note (5/10/2020), there are no changes except as documented above.    Davide Sanders MD  Director, Comprehensive Stroke Center, [facility]  Neurohospitalist, [facility]  Clinical  of Neurology, [employer] [facility] [employer]  t) [phone] (f) [phone]

## 2020-05-12 ENCOUNTER — ANTICOAGULATION MONITORING (OUTPATIENT)
Dept: VASCULAR LAB | Facility: MEDICAL CENTER | Age: 75
End: 2020-05-12

## 2020-05-12 DIAGNOSIS — Z95.2 HISTORY OF MITRAL VALVE REPLACEMENT WITH MECHANICAL VALVE: ICD-10-CM

## 2020-05-12 DIAGNOSIS — Z79.01 CHRONIC ANTICOAGULATION: ICD-10-CM

## 2020-05-12 PROBLEM — G93.40 ENCEPHALOPATHY: Status: ACTIVE | Noted: 2020-05-10

## 2020-05-12 LAB
ANION GAP SERPL CALC-SCNC: 10 MMOL/L (ref 7–16)
APTT PPP: 57.7 SEC (ref 24.7–36)
APTT PPP: 61.9 SEC (ref 24.7–36)
BASOPHILS # BLD AUTO: 0.4 % (ref 0–1.8)
BASOPHILS # BLD: 0.04 K/UL (ref 0–0.12)
BUN SERPL-MCNC: 12 MG/DL (ref 8–22)
CALCIUM SERPL-MCNC: 8 MG/DL (ref 8.5–10.5)
CHLORIDE SERPL-SCNC: 106 MMOL/L (ref 96–112)
CO2 SERPL-SCNC: 25 MMOL/L (ref 20–33)
CREAT SERPL-MCNC: 0.88 MG/DL (ref 0.5–1.4)
EOSINOPHIL # BLD AUTO: 0.51 K/UL (ref 0–0.51)
EOSINOPHIL NFR BLD: 5.6 % (ref 0–6.9)
ERYTHROCYTE [DISTWIDTH] IN BLOOD BY AUTOMATED COUNT: 51.4 FL (ref 35.9–50)
GLUCOSE BLD-MCNC: 121 MG/DL (ref 65–99)
GLUCOSE BLD-MCNC: 140 MG/DL (ref 65–99)
GLUCOSE BLD-MCNC: 150 MG/DL (ref 65–99)
GLUCOSE BLD-MCNC: 151 MG/DL (ref 65–99)
GLUCOSE BLD-MCNC: 157 MG/DL (ref 65–99)
GLUCOSE BLD-MCNC: 185 MG/DL (ref 65–99)
GLUCOSE SERPL-MCNC: 135 MG/DL (ref 65–99)
HCT VFR BLD AUTO: 22.6 % (ref 42–52)
HCT VFR BLD AUTO: 24.4 % (ref 42–52)
HCT VFR BLD AUTO: 25.4 % (ref 42–52)
HCT VFR BLD AUTO: 26.4 % (ref 42–52)
HGB BLD-MCNC: 8 G/DL (ref 14–18)
HGB BLD-MCNC: 8.3 G/DL (ref 14–18)
HGB BLD-MCNC: 8.9 G/DL (ref 14–18)
HGB BLD-MCNC: 9 G/DL (ref 14–18)
IMM GRANULOCYTES # BLD AUTO: 0.05 K/UL (ref 0–0.11)
IMM GRANULOCYTES NFR BLD AUTO: 0.6 % (ref 0–0.9)
LYMPHOCYTES # BLD AUTO: 0.98 K/UL (ref 1–4.8)
LYMPHOCYTES NFR BLD: 10.8 % (ref 22–41)
MCH RBC QN AUTO: 32.7 PG (ref 27–33)
MCHC RBC AUTO-ENTMCNC: 35.4 G/DL (ref 33.7–35.3)
MCV RBC AUTO: 92.4 FL (ref 81.4–97.8)
MONOCYTES # BLD AUTO: 0.72 K/UL (ref 0–0.85)
MONOCYTES NFR BLD AUTO: 7.9 % (ref 0–13.4)
NEUTROPHILS # BLD AUTO: 6.77 K/UL (ref 1.82–7.42)
NEUTROPHILS NFR BLD: 74.7 % (ref 44–72)
NRBC # BLD AUTO: 0 K/UL
NRBC BLD-RTO: 0 /100 WBC
PLATELET # BLD AUTO: 127 K/UL (ref 164–446)
PMV BLD AUTO: 10.3 FL (ref 9–12.9)
POTASSIUM SERPL-SCNC: 3.4 MMOL/L (ref 3.6–5.5)
RBC # BLD AUTO: 2.75 M/UL (ref 4.7–6.1)
SODIUM SERPL-SCNC: 141 MMOL/L (ref 135–145)
WBC # BLD AUTO: 9.1 K/UL (ref 4.8–10.8)

## 2020-05-12 PROCEDURE — 97161 PT EVAL LOW COMPLEX 20 MIN: CPT

## 2020-05-12 PROCEDURE — 85014 HEMATOCRIT: CPT

## 2020-05-12 PROCEDURE — 700102 HCHG RX REV CODE 250 W/ 637 OVERRIDE(OP): Performed by: PSYCHIATRY & NEUROLOGY

## 2020-05-12 PROCEDURE — 700111 HCHG RX REV CODE 636 W/ 250 OVERRIDE (IP): Performed by: HOSPITALIST

## 2020-05-12 PROCEDURE — 700111 HCHG RX REV CODE 636 W/ 250 OVERRIDE (IP): Performed by: INTERNAL MEDICINE

## 2020-05-12 PROCEDURE — 770020 HCHG ROOM/CARE - TELE (206)

## 2020-05-12 PROCEDURE — 82962 GLUCOSE BLOOD TEST: CPT | Mod: 91

## 2020-05-12 PROCEDURE — A9270 NON-COVERED ITEM OR SERVICE: HCPCS | Performed by: HOSPITALIST

## 2020-05-12 PROCEDURE — 99233 SBSQ HOSP IP/OBS HIGH 50: CPT | Performed by: HOSPITALIST

## 2020-05-12 PROCEDURE — 700102 HCHG RX REV CODE 250 W/ 637 OVERRIDE(OP): Performed by: HOSPITALIST

## 2020-05-12 PROCEDURE — 85730 THROMBOPLASTIN TIME PARTIAL: CPT

## 2020-05-12 PROCEDURE — 85018 HEMOGLOBIN: CPT

## 2020-05-12 PROCEDURE — A9270 NON-COVERED ITEM OR SERVICE: HCPCS | Performed by: PSYCHIATRY & NEUROLOGY

## 2020-05-12 PROCEDURE — 97166 OT EVAL MOD COMPLEX 45 MIN: CPT

## 2020-05-12 PROCEDURE — 36415 COLL VENOUS BLD VENIPUNCTURE: CPT

## 2020-05-12 PROCEDURE — 99497 ADVNCD CARE PLAN 30 MIN: CPT | Performed by: HOSPITALIST

## 2020-05-12 RX ORDER — LORAZEPAM 2 MG/ML
.5-1 INJECTION INTRAMUSCULAR EVERY 6 HOURS PRN
Status: DISCONTINUED | OUTPATIENT
Start: 2020-05-12 | End: 2020-05-23 | Stop reason: HOSPADM

## 2020-05-12 RX ADMIN — QUETIAPINE FUMARATE 50 MG: 25 TABLET ORAL at 17:18

## 2020-05-12 RX ADMIN — LORAZEPAM 1 MG: 2 INJECTION INTRAMUSCULAR; INTRAVENOUS at 13:08

## 2020-05-12 RX ADMIN — INSULIN HUMAN 3 UNITS: 100 INJECTION, SOLUTION PARENTERAL at 08:19

## 2020-05-12 RX ADMIN — ATORVASTATIN CALCIUM 40 MG: 40 TABLET, FILM COATED ORAL at 17:13

## 2020-05-12 RX ADMIN — CARBIDOPA AND LEVODOPA 1 TABLET: 10; 100 TABLET ORAL at 22:12

## 2020-05-12 RX ADMIN — CARBIDOPA AND LEVODOPA 1 TABLET: 10; 100 TABLET ORAL at 14:38

## 2020-05-12 RX ADMIN — HEPARIN SODIUM 900 UNITS: 5000 INJECTION, SOLUTION INTRAVENOUS at 17:07

## 2020-05-12 RX ADMIN — CITALOPRAM HYDROBROMIDE 10 MG: 20 TABLET ORAL at 17:18

## 2020-05-12 RX ADMIN — CARBIDOPA AND LEVODOPA 1 TABLET: 10; 100 TABLET ORAL at 05:26

## 2020-05-12 ASSESSMENT — COGNITIVE AND FUNCTIONAL STATUS - GENERAL
HELP NEEDED FOR BATHING: A LOT
SUGGESTED CMS G CODE MODIFIER MOBILITY: CM
MOBILITY SCORE: 9
WALKING IN HOSPITAL ROOM: A LOT
PERSONAL GROOMING: A LOT
EATING MEALS: A LITTLE
CLIMB 3 TO 5 STEPS WITH RAILING: TOTAL
STANDING UP FROM CHAIR USING ARMS: A LOT
SUGGESTED CMS G CODE MODIFIER DAILY ACTIVITY: CL
DRESSING REGULAR UPPER BODY CLOTHING: A LOT
MOVING FROM LYING ON BACK TO SITTING ON SIDE OF FLAT BED: UNABLE
MOVING TO AND FROM BED TO CHAIR: UNABLE
DRESSING REGULAR LOWER BODY CLOTHING: A LOT
TOILETING: A LOT
DAILY ACTIVITIY SCORE: 13
TURNING FROM BACK TO SIDE WHILE IN FLAT BAD: A LOT

## 2020-05-12 ASSESSMENT — ACTIVITIES OF DAILY LIVING (ADL): TOILETING: UNABLE TO DETERMINE AT THIS TIME

## 2020-05-12 ASSESSMENT — FIBROSIS 4 INDEX: FIB4 SCORE: 4.58

## 2020-05-12 ASSESSMENT — GAIT ASSESSMENTS: GAIT LEVEL OF ASSIST: UNABLE TO PARTICIPATE

## 2020-05-12 NOTE — THERAPY
"Physical Therapy Evaluation completed.   Bed Mobility:  Supine to Sit: Maximal Assist  Transfers: Sit to Stand: Moderate Assist (when patient initiated, attempts to facilitate required max A)  Gait: Level Of Assist: Unable to Participate       Plan of Care: Will benefit from Physical Therapy 1 times per week  Discharge Recommendations: Equipment: Will Continue to Assess for Equipment Needs. Post-acute therapy: Recommend post-acute placement for continued physical therapy services prior to discharge home.       See \"Rehab Therapy-Acute\" Patient Summary Report for complete documentation.    Patient is a 73 YO male that presented to acute with GIB, he is s/p colonoscopy. PMHx significant for mechanical mitral valve on coumadin, CAD and CABG, DM2. He presented to PT with impaired cognition and communication, impaired balance and coordination, functional weakness, and decreased activity tolerance which are limiting his ability to safely perform mobility at [facility]. He was most limited by cognition with little ability to follow commands; required facilitation for all OOB activity. He was able to stand with mod A x2 once patient initiated movement; otherwise he required max A. Recommend post acute placement given current findings. Will follow while in house.  "

## 2020-05-12 NOTE — CARE PLAN
Problem: Safety  Goal: Will remain free from injury  Outcome: PROGRESSING AS EXPECTED    Fall precautions in place. Bed in lowest position. Non-skid socks in place. Personal possessions within reach. Mobility sign on door. Bed-alarm on. Call light within reach. Pt educated regarding fall prevention and states understanding.       Problem: Knowledge Deficit  Goal: Knowledge of disease process/condition, treatment plan, diagnostic tests, and medications will improve  Outcome: PROGRESSING AS EXPECTED    Pt educated regarding plan of care and medications. All questions answered.

## 2020-05-12 NOTE — PROGRESS NOTES
Assumed care at 0715. Bedside report received from Night RN. Fall precautions in place. Call light, phone and personal belongings within reach. Bed alarm on and working appropriately. .

## 2020-05-12 NOTE — PROGRESS NOTES
Received report from Symone MARINELLI, assumed patient care. Patient is alert to self and does intermittently follow commands. Safety sitter is at the bedside. Patient remains in bilateral soft wrist restraints. Respirations regular and non-labored on room air. Patient denies any pain at this time. Tele monitor on and verified. POC reviewed. All appropriate fall precautions in place. Bed in low position, side rails up x3, bed alarm on and call light within reach. Hourly rounding initiated.

## 2020-05-12 NOTE — PROGRESS NOTES
Hospital Medicine Daily Progress Note    Date of Service  5/12/2020    Chief Complaint  Gastrointestinal bleeding    Hospital Course      74 y.o. male admitted 5/8/2020 with GIB and h/o mechanical mitral valve on coumadin. GI consulted, pt got scope, Scope with large amount of diverticuli, but no specific area of bleed found. Bleeding thought to be 2/2 diverticular bleed.   Given high risk of CVA with mechanical mitral valve, he was re-start weight based            Interval Problem Update  Hb 9, down to 8.9, continue to monitor   Intermittently agitated, lethargic  With rate 80s-117, blood pressure 160s-170s over 60s-100s, saturating well on room air.  Intermittently agitated has as needed Seroquel  Overall poor prognosis, I had a prolonged discussion with the patient's wife over the phone, the patient was not able to participate in discussion.  The patient has multiple comorbid conditions including a mechanical mitral valve on anticoagulation, dementia, chronic encephalopathy, coronary artery disease, and his functional performance is poor.  He will be at a higher very high risk for stroke without anticoagulation and he will be at high risk for recurrence of diverticular bleeds.  I discussed colectomy however will be very risky given his coronary artery disease and mitral valve disease and chronic anticoagulation.  I discussed considering changing his code to DNAR/DNI, I discussed considering hospice.  Patient wife does not want to change code and she is not ready for hospice at this point.  She understands that he is at a very high risk for decompensation and even death.  She wants him to have a full code for now, and hopes to have him come back home if he does not have recurrence of bleeding. I spent a total of 35 minutes of non face to face time reviewing medical records, advance care planning, and discussing plan of care with patient wife over the phone, Start time: 4:10 End time: 4:45  Discussed with patient's  nurse and with multidisciplinary team during rounds including , pharmacist and charge nurse.      Consultants/Specialty  GI  Cards  Neuro    Code Status  FULL     Disposition  Tele     Review of Systems  Review of Systems   Unable to perform ROS: Mental status change      Physical Exam  Temp:  [37.2 °C (99 °F)-37.4 °C (99.3 °F)] 37.2 °C (99 °F)  Pulse:  [] 81  Resp:  [16-19] 18  BP: (134-172)/() 172/100  SpO2:  [92 %-94 %] 94 %    Physical Exam  Vitals signs and nursing note reviewed.   Constitutional:       General: He is not in acute distress.     Appearance: He is well-developed.      Comments: Lethargic, not easily awakable    HENT:      Head: Normocephalic and atraumatic.      Nose: No rhinorrhea.      Mouth/Throat:      Pharynx: No oropharyngeal exudate.   Eyes:      General:         Right eye: No discharge.         Left eye: No discharge.      Pupils: Pupils are equal, round, and reactive to light.   Neck:      Musculoskeletal: Normal range of motion and neck supple.      Thyroid: No thyromegaly.   Cardiovascular:      Rate and Rhythm: Normal rate and regular rhythm.      Heart sounds: Murmur present.   Pulmonary:      Effort: Pulmonary effort is normal. No respiratory distress.      Breath sounds: Normal breath sounds. No stridor. No wheezing.   Abdominal:      General: Bowel sounds are normal.      Palpations: Abdomen is soft.      Tenderness: There is no abdominal tenderness.   Musculoskeletal: Normal range of motion.         General: No tenderness.   Skin:     General: Skin is warm and dry.      Coloration: Skin is pale.      Findings: No rash.   Neurological:      Mental Status: He is alert.      Cranial Nerves: No cranial nerve deficit.      Comments: Lethargic, not easily awakable    Intermittently agitated    Psychiatric:      Comments: Unable to assess reliably        Fluids    Intake/Output Summary (Last 24 hours) at 5/12/2020 1648  Last data filed at 5/12/2020 1125  Gross  per 24 hour   Intake 120 ml   Output 2150 ml   Net -2030 ml       Laboratory  Recent Labs     05/10/20  0000  05/11/20  0003  05/11/20  2347 05/12/20  0543 05/12/20  1137   WBC 11.0*  --  8.5  --  9.1  --   --    RBC 2.15*  --  2.50*  --  2.75*  --   --    HEMOGLOBIN 6.9*  6.9*   < > 8.0*   < > 9.0* 8.9* 8.0*   HEMATOCRIT 20.0*  20.0*   < > 23.1*   < > 25.4* 26.4* 22.6*   MCV 93.0  --  92.4  --  92.4  --   --    MCH 32.1  --  32.0  --  32.7  --   --    MCHC 34.5  --  34.6  --  35.4*  --   --    RDW 55.2*  --  56.2*  --  51.4*  --   --    PLATELETCT 121*  --  120*  --  127*  --   --    MPV 10.8  --  11.0  --  10.3  --   --     < > = values in this interval not displayed.     Recent Labs     05/10/20  1536 05/11/20 0003 05/11/20  2347   SODIUM 138 140 141   POTASSIUM 4.2 4.1 3.4*   CHLORIDE 104 105 106   CO2 25 23 25   GLUCOSE 98 110* 135*   BUN 18 17 12   CREATININE 1.03 1.00 0.88   CALCIUM 7.6* 7.9* 8.0*     Recent Labs     05/10/20  0000  05/11/20  1146 05/11/20  1745 05/11/20  2347 05/12/20  0543   APTT  --    < > 25.0 58.8* 61.9* 57.7*   INR 1.24*  --  1.17*  --   --   --     < > = values in this interval not displayed.               Imaging  DX-CHEST-PORTABLE (1 VIEW)   Final Result      1.  Interstitial and hazy airspace opacities and trace left pleural effusion. This may be related to pulmonary edema, however infection should be excluded clinically.   2.  Mild cardiomegaly. Valve prosthesis.      CT-CEREBRAL PERFUSION ANALYSIS   Final Result      1.  Cerebral blood flow less than 30% likely representing completed infarct = 0 mL.      2.  T Max more than 6 seconds likely representing combination of completed infarct and ischemia = 0 mL.      3.  Mismatched volume likely representing ischemic brain/penumbra = None      4.  Please note that the cerebral perfusion was performed on the limited brain tissue around the basal ganglia region. Infarct/ischemia outside the CT perfusion sections can be missed in this  study.      CT-CTA NECK WITH & W/O-POST PROCESSING   Final Result      1.  Atherosclerosis at the internal carotid artery origins without significant stenosis.   2.  Patent vertebral arteries.   3.  Bilateral pleural effusions and probable mild pulmonary edema.      CT-CTA HEAD WITH & W/O-POST PROCESS   Final Result      CT angiogram of the [place] within normal limits.      Generalized atrophy and chronic ischemic changes.      No acute intracranial abnormality is seen.      CT-ABDOMEN-PELVIS WITH   Final Result      1.  No evidence of abdominal or pelvic mass, adenopathy, or inflammatory change.   2.  7 mm metallic foreign body in the descending colon, etiology uncertain. It is not clear whether this is ingested material or from a prior procedure.   3.  Colonic diverticulosis   4.  Atherosclerosis   5.  RIGHT inguinal hernia containing fat            Findings were discussed with ROGELIO HENLEY on 5/8/2020 1:58 PM.                  Assessment/Plan  * Lower GI bleed- (present on admission)  Assessment & Plan  Hb has improved, benign abdominal exam, no clear bloody bowel movements over the past 24 hours  Consistent with a diverticular bleed.     Scope with large amount of diverticuli, but no specific area of bleed found, which is common for this disease  Gi recommends CTA A/P if bleeds again or Hb dropping    Given high risk of CVA with mechanical mitral valve, he was re-start weight based heparin gtt with NO initial bolus and NO rebolus, goal PTT 60-90, adjust PRN, check PTT Q6 hours   Monitor closely, high risk for further decompensation.       Coronary artery disease involving coronary bypass graft- (present on admission)  Assessment & Plan  History of CABG  Continue Lipitor    History of mitral valve replacement with mechanical valve [Z95.2]- (present on admission)  Assessment & Plan  Have been restarted on AC   Will eventually need an echo prior to discharge     Chronic anticoagulation- (present on  admission)  Assessment & Plan  Used to be on Coumadin for his mechanical mitral valve     Bradycardia  Assessment & Plan  Resolved    Acute on Chronic Encephalopathy  Assessment & Plan  Has baseline dementia, made worse with anemia, GI bleeding, change in environment.  Neuro consulted, recommended a trial of Sinemet 100mg TID, and Avoid risperdal   Quetiapine for behavioral disturbances     Type II diabetes mellitus (HCC)- (present on admission)  Assessment & Plan  With hyperglycemia  Last glycated hemoglobin 7.4%   Short acting insulin  Accu-Checks, hypoglycemia protocol       VTE prophylaxis: weight based heparin gtt

## 2020-05-12 NOTE — PROGRESS NOTES
Patient was returning a call, she did not get the name or number of the person who called her.  Her  is currently admitted.  I do not see any recent notes that would elucidate who called and for what reason.

## 2020-05-13 PROBLEM — R53.81 DEBILITY: Status: ACTIVE | Noted: 2020-05-13

## 2020-05-13 LAB
ALBUMIN SERPL BCP-MCNC: 3 G/DL (ref 3.2–4.9)
ALBUMIN/GLOB SERPL: 1.5 G/DL
ALP SERPL-CCNC: 75 U/L (ref 30–99)
ALT SERPL-CCNC: 6 U/L (ref 2–50)
ANION GAP SERPL CALC-SCNC: 11 MMOL/L (ref 7–16)
APTT PPP: 93.4 SEC (ref 24.7–36)
AST SERPL-CCNC: 35 U/L (ref 12–45)
BASOPHILS # BLD AUTO: 0.5 % (ref 0–1.8)
BASOPHILS # BLD: 0.03 K/UL (ref 0–0.12)
BILIRUB SERPL-MCNC: 0.9 MG/DL (ref 0.1–1.5)
BUN SERPL-MCNC: 11 MG/DL (ref 8–22)
CALCIUM SERPL-MCNC: 7.8 MG/DL (ref 8.5–10.5)
CHLORIDE SERPL-SCNC: 104 MMOL/L (ref 96–112)
CO2 SERPL-SCNC: 24 MMOL/L (ref 20–33)
CREAT SERPL-MCNC: 0.89 MG/DL (ref 0.5–1.4)
EOSINOPHIL # BLD AUTO: 0.69 K/UL (ref 0–0.51)
EOSINOPHIL NFR BLD: 11.8 % (ref 0–6.9)
ERYTHROCYTE [DISTWIDTH] IN BLOOD BY AUTOMATED COUNT: 53 FL (ref 35.9–50)
GLOBULIN SER CALC-MCNC: 2 G/DL (ref 1.9–3.5)
GLUCOSE BLD-MCNC: 124 MG/DL (ref 65–99)
GLUCOSE BLD-MCNC: 207 MG/DL (ref 65–99)
GLUCOSE SERPL-MCNC: 147 MG/DL (ref 65–99)
HCT VFR BLD AUTO: 24.7 % (ref 42–52)
HCT VFR BLD AUTO: 25.6 % (ref 42–52)
HCT VFR BLD AUTO: 27.7 % (ref 42–52)
HGB BLD-MCNC: 8.5 G/DL (ref 14–18)
HGB BLD-MCNC: 8.6 G/DL (ref 14–18)
HGB BLD-MCNC: 9.2 G/DL (ref 14–18)
IMM GRANULOCYTES # BLD AUTO: 0.03 K/UL (ref 0–0.11)
IMM GRANULOCYTES NFR BLD AUTO: 0.5 % (ref 0–0.9)
LYMPHOCYTES # BLD AUTO: 0.86 K/UL (ref 1–4.8)
LYMPHOCYTES NFR BLD: 14.7 % (ref 22–41)
MAGNESIUM SERPL-MCNC: 1.7 MG/DL (ref 1.5–2.5)
MCH RBC QN AUTO: 32.1 PG (ref 27–33)
MCHC RBC AUTO-ENTMCNC: 33.6 G/DL (ref 33.7–35.3)
MCV RBC AUTO: 95.5 FL (ref 81.4–97.8)
MONOCYTES # BLD AUTO: 0.54 K/UL (ref 0–0.85)
MONOCYTES NFR BLD AUTO: 9.2 % (ref 0–13.4)
NEUTROPHILS # BLD AUTO: 3.7 K/UL (ref 1.82–7.42)
NEUTROPHILS NFR BLD: 63.3 % (ref 44–72)
NRBC # BLD AUTO: 0 K/UL
NRBC BLD-RTO: 0 /100 WBC
PLATELET # BLD AUTO: 137 K/UL (ref 164–446)
PMV BLD AUTO: 10.3 FL (ref 9–12.9)
POTASSIUM SERPL-SCNC: 3.5 MMOL/L (ref 3.6–5.5)
PROT SERPL-MCNC: 5 G/DL (ref 6–8.2)
RBC # BLD AUTO: 2.68 M/UL (ref 4.7–6.1)
SODIUM SERPL-SCNC: 139 MMOL/L (ref 135–145)
WBC # BLD AUTO: 5.9 K/UL (ref 4.8–10.8)

## 2020-05-13 PROCEDURE — 700102 HCHG RX REV CODE 250 W/ 637 OVERRIDE(OP): Performed by: PSYCHIATRY & NEUROLOGY

## 2020-05-13 PROCEDURE — 700111 HCHG RX REV CODE 636 W/ 250 OVERRIDE (IP): Performed by: HOSPITALIST

## 2020-05-13 PROCEDURE — A9270 NON-COVERED ITEM OR SERVICE: HCPCS | Performed by: PSYCHIATRY & NEUROLOGY

## 2020-05-13 PROCEDURE — 770020 HCHG ROOM/CARE - TELE (206)

## 2020-05-13 PROCEDURE — 36415 COLL VENOUS BLD VENIPUNCTURE: CPT

## 2020-05-13 PROCEDURE — 83735 ASSAY OF MAGNESIUM: CPT

## 2020-05-13 PROCEDURE — 85025 COMPLETE CBC W/AUTO DIFF WBC: CPT

## 2020-05-13 PROCEDURE — 80053 COMPREHEN METABOLIC PANEL: CPT

## 2020-05-13 PROCEDURE — 700102 HCHG RX REV CODE 250 W/ 637 OVERRIDE(OP): Performed by: HOSPITALIST

## 2020-05-13 PROCEDURE — 85014 HEMATOCRIT: CPT

## 2020-05-13 PROCEDURE — A9270 NON-COVERED ITEM OR SERVICE: HCPCS | Performed by: HOSPITALIST

## 2020-05-13 PROCEDURE — 85018 HEMOGLOBIN: CPT

## 2020-05-13 PROCEDURE — 700111 HCHG RX REV CODE 636 W/ 250 OVERRIDE (IP): Performed by: INTERNAL MEDICINE

## 2020-05-13 PROCEDURE — 82962 GLUCOSE BLOOD TEST: CPT | Mod: 91

## 2020-05-13 PROCEDURE — 85730 THROMBOPLASTIN TIME PARTIAL: CPT

## 2020-05-13 PROCEDURE — 99233 SBSQ HOSP IP/OBS HIGH 50: CPT | Performed by: HOSPITALIST

## 2020-05-13 RX ORDER — CITALOPRAM 20 MG/1
20 TABLET ORAL EVERY EVENING
Status: DISCONTINUED | OUTPATIENT
Start: 2020-05-13 | End: 2020-05-23 | Stop reason: HOSPADM

## 2020-05-13 RX ORDER — POTASSIUM CHLORIDE 20 MEQ/1
40 TABLET, EXTENDED RELEASE ORAL ONCE
Status: COMPLETED | OUTPATIENT
Start: 2020-05-13 | End: 2020-05-13

## 2020-05-13 RX ADMIN — DOCUSATE SODIUM 50 MG AND SENNOSIDES 8.6 MG 2 TABLET: 8.6; 5 TABLET, FILM COATED ORAL at 06:17

## 2020-05-13 RX ADMIN — QUETIAPINE FUMARATE 50 MG: 25 TABLET ORAL at 17:27

## 2020-05-13 RX ADMIN — ATORVASTATIN CALCIUM 40 MG: 40 TABLET, FILM COATED ORAL at 17:27

## 2020-05-13 RX ADMIN — INSULIN HUMAN 3 UNITS: 100 INJECTION, SOLUTION PARENTERAL at 17:28

## 2020-05-13 RX ADMIN — INSULIN HUMAN 4 UNITS: 100 INJECTION, SOLUTION PARENTERAL at 20:03

## 2020-05-13 RX ADMIN — INSULIN HUMAN 12 UNITS: 100 INJECTION, SOLUTION PARENTERAL at 12:01

## 2020-05-13 RX ADMIN — POTASSIUM CHLORIDE 40 MEQ: 1500 TABLET, EXTENDED RELEASE ORAL at 09:01

## 2020-05-13 RX ADMIN — CITALOPRAM HYDROBROMIDE 20 MG: 20 TABLET ORAL at 17:27

## 2020-05-13 RX ADMIN — LORAZEPAM 0.5 MG: 2 INJECTION INTRAMUSCULAR; INTRAVENOUS at 00:56

## 2020-05-13 RX ADMIN — HEPARIN SODIUM 900 UNITS/HR: 5000 INJECTION, SOLUTION INTRAVENOUS at 22:14

## 2020-05-13 RX ADMIN — CARBIDOPA AND LEVODOPA 1 TABLET: 10; 100 TABLET ORAL at 06:14

## 2020-05-13 ASSESSMENT — ENCOUNTER SYMPTOMS
EYE PAIN: 0
BLOOD IN STOOL: 1
EYE DISCHARGE: 0
STRIDOR: 0
MEMORY LOSS: 1
ABDOMINAL PAIN: 0
SORE THROAT: 0
LOSS OF CONSCIOUSNESS: 0
VOMITING: 0
FEVER: 0
SINUS PAIN: 0
FLANK PAIN: 0
EYE REDNESS: 0

## 2020-05-13 ASSESSMENT — FIBROSIS 4 INDEX: FIB4 SCORE: 7.93

## 2020-05-13 NOTE — PROGRESS NOTES
Monitor summary:    SB-SR 45-75  .20 / .10 / .42  Frequent PAC / PVC  14 bundle branch beats  Multiple pauses, longest pause 1.8 seconds      12 hour CC

## 2020-05-13 NOTE — PROGRESS NOTES
Received report from Nehal MARINELLI, assumed patient care. Patient is alert to self. Bilateral soft wrist restraints remain in place. Safety sitter at the bedside. Respirations regular and non-labored on room air. Patient denies any pain at this time. Tele monitor on and verified. POC reviewed. All appropriate fall precautions in place. Bed in low position, side rails up x3, bed alarm on and call light within reach. Hourly rounding initiated.

## 2020-05-13 NOTE — CARE PLAN
Problem: Nutritional:  Goal: Achieve adequate nutritional intake  Description: Advance diet beyond clears when medically feasible. Patient will consume >50% of meals   Outcome: MET     Pt is now receiving a low fiber (GI soft) diet with adequate PO intake recorded at % of meals. RD will follow weekly or PRN.

## 2020-05-13 NOTE — CARE PLAN
Problem: Safety  Goal: Will remain free from injury  Outcome: PROGRESSING AS EXPECTED     Problem: Knowledge Deficit  Goal: Knowledge of the prescribed therapeutic regimen will improve  Outcome: PROGRESSING SLOWER THAN EXPECTED     Problem: Urinary Elimination:  Goal: Ability to reestablish a normal urinary elimination pattern will improve  Outcome: PROGRESSING SLOWER THAN EXPECTED

## 2020-05-13 NOTE — DISCHARGE PLANNING
Anticipated Discharge Disposition: SNF    Action: Pt discussed during rounds and per Dr. Kelley pt is confused and suggested LSW reach out to spouse to discuss SNF.     LSW tc spouse, Comfort ([phone]) to discuss SNF. Comfort provided verbal consent for [facility] to [facility]   LSW faxed to [facility].     LSW able to complete [facility]- [phone]    Barriers to Discharge: None    Plan: LSW to f/u with medical clearance, Await SNF acceptance, LSW to assist as needed           Care Transition Team Assessment  The information provided for this assessment was provided by pt's spouse and chart review. Spouse confirmed the information on facesheet to be accurate. Pt lives in a one story house with spouse. Pt is retired. Prior to admit pt independent with ADL's/IADL's. Pt's insurance covers medications. Pt gets medications delivered in the [facility]. However, also uses [facility] pharmacy off of [address]. Spouse stated that AD was filled out prior to admit. However, they haven't been able to get it notarized. Spouse states she is POA for healthcare and 2nd POA is son, Jw Wyman ([phone]). LSW to update emergency contacts.       Information Source  Orientation : Disoriented to Place  Information Given By: Spouse    Elopement Risk  Legal Hold: No  Ambulatory or Self Mobile in Wheelchair: No-Not an Elopement Risk  Disoriented: No  Psychiatric Symptoms: None  History of Wandering: No  Elopement this Admit: No  Vocalizing Wanting to Leave: No  Displays Behaviors, Body Language Wanting to Leave: No-Not at Risk for Elopement  Elopement Risk: Not at Risk for Elopement  Wanderguard On: Yes    Interdisciplinary Discharge Planning  Lives with - Patient's Self Care Capacity: Unable To Determine At This Time  Patient or legal guardian wants to designate a caregiver (see row info): Yes  Caregiver name: Comfort Rivera   Caregiver relationship to patient: Wife   Caregiver contact  info: [phone]  ([facility] [place]) Authorization for Release of Health Information has been completed: Yes  Housing / Facility: Unable To Determine At This Time  Prior Services: Unable To Determine At This Time    Discharge Preparedness  What is your plan after discharge?: Skilled nursing facility  What are your discharge supports?: Spouse  Prior Functional Level: Independent with Activities of Daily Living, Independent with Medication Management  Difficulity with ADLs: None  Difficulity with IADLs: None    Functional Assesment  Prior Functional Level: Independent with Activities of Daily Living, Independent with Medication Management    Finances  Financial Barriers to Discharge: No  Prescription Coverage: Yes    Vision / Hearing Impairment  Vision Impairment : No  Hearing Impairment : No    Domestic Abuse  Have you ever been the victim of abuse or violence?: No  Physical Abuse or Sexual Abuse: No  Verbal Abuse or Emotional Abuse: No  Possible Abuse Reported to:: Not Applicable    Discharge Risks or Barriers  Discharge risks or barriers?: No  Patient risk factors: Vulnerable adult    Anticipated Discharge Information  Anticipated discharge disposition: SNF

## 2020-05-13 NOTE — PROGRESS NOTES
Hospital Medicine Daily Progress Note    Date of Service  5/13/2020    Chief Complaint  Gastrointestinal bleeding    Hospital Course      74 y.o. male admitted 5/8/2020 with GIB and h/o mechanical mitral valve on coumadin. GI consulted, pt got scope, Scope with large amount of diverticuli, but no specific area of bleed found. Bleeding thought to be 2/2 diverticular bleed.   Given high risk of CVA with mechanical mitral valve, he was re-start weight based         Interval Problem Update  Heart rate 60s-80s, blood pressure within normal limits.  Saturating well on room air.  Blood in bowel movement today, but blood pressure stable, and Hemoglobin trending 8.3, then 8.6, 9.2  I am checking iron studies.  Continue to monitor hemoglobin, transfuse for hemoglobin of less than or equal to 7.   Hypokalemia I am replacing.   Patient is more alert and interactive today  He is oriented intermittently x2-x3.  No longer agitated today  Very weak needs skilled nursing facility, I am placing a referral, discussed with care coordination  I discussed with the son, Roger [phone], who wanted to know more about discharge planning to facilitate the process, discussed with nursing to pass to care coordination   Discussed with patient's son, patient's nurse and with multidisciplinary team during rounds including , pharmacist and charge nurse.      Consultants/Specialty  GI  Cards  Neuro    Code Status  FULL     Disposition  Needs skilled nursing facility   Tele     Review of Systems  Review of Systems   Unable to perform ROS: Mental status change (Limited)   Constitutional: Positive for malaise/fatigue. Negative for fever.   HENT: Negative for sinus pain and sore throat.    Eyes: Negative for pain, discharge and redness.   Respiratory: Negative for stridor.    Gastrointestinal: Positive for blood in stool. Negative for abdominal pain and vomiting.   Genitourinary: Negative for flank pain and hematuria.   Neurological:  Negative for loss of consciousness (Improved).   Psychiatric/Behavioral: Positive for memory loss.      Physical Exam  Temp:  [36.2 °C (97.1 °F)-37.4 °C (99.3 °F)] 36.8 °C (98.3 °F)  Pulse:  [61-80] 80  Resp:  [16-20] 20  BP: (126-152)/(56-79) 141/79  SpO2:  [91 %-99 %] 99 %    Physical Exam  Vitals signs and nursing note reviewed.   Constitutional:       General: He is not in acute distress.     Appearance: He is well-developed.   HENT:      Head: Normocephalic and atraumatic.      Nose: No rhinorrhea.      Mouth/Throat:      Pharynx: No oropharyngeal exudate.   Eyes:      General:         Right eye: No discharge.         Left eye: No discharge.      Pupils: Pupils are equal, round, and reactive to light.   Neck:      Musculoskeletal: Normal range of motion and neck supple.      Thyroid: No thyromegaly.   Cardiovascular:      Rate and Rhythm: Normal rate and regular rhythm.      Heart sounds: Murmur present.   Pulmonary:      Effort: Pulmonary effort is normal. No respiratory distress.      Breath sounds: Normal breath sounds. No stridor. No wheezing.   Abdominal:      General: Bowel sounds are normal.      Palpations: Abdomen is soft.      Tenderness: There is no abdominal tenderness.   Musculoskeletal: Normal range of motion.         General: No tenderness.   Skin:     General: Skin is warm and dry.      Coloration: Skin is pale.      Findings: No rash.   Neurological:      Mental Status: He is alert.      Cranial Nerves: No cranial nerve deficit.      Comments: Alert, oriented intermittently and variably   Psychiatric:         Mood and Affect: Mood normal.      Comments: Impaired judgment       Fluids    Intake/Output Summary (Last 24 hours) at 5/13/2020 1704  Last data filed at 5/13/2020 1000  Gross per 24 hour   Intake 120 ml   Output 1525 ml   Net -1405 ml       Laboratory  Recent Labs     05/11/20  0003  05/11/20  2347  05/12/20  1941 05/13/20  0101 05/13/20  0839   WBC 8.5  --  9.1  --   --  5.9  --    RBC  2.50*  --  2.75*  --   --  2.68*  --    HEMOGLOBIN 8.0*   < > 9.0*   < > 8.3* 8.6* 9.2*   HEMATOCRIT 23.1*   < > 25.4*   < > 24.4* 25.6* 27.7*   MCV 92.4  --  92.4  --   --  95.5  --    MCH 32.0  --  32.7  --   --  32.1  --    MCHC 34.6  --  35.4*  --   --  33.6*  --    RDW 56.2*  --  51.4*  --   --  53.0*  --    PLATELETCT 120*  --  127*  --   --  137*  --    MPV 11.0  --  10.3  --   --  10.3  --     < > = values in this interval not displayed.     Recent Labs     05/11/20  0003 05/11/20  2347 05/13/20  0101   SODIUM 140 141 139   POTASSIUM 4.1 3.4* 3.5*   CHLORIDE 105 106 104   CO2 23 25 24   GLUCOSE 110* 135* 147*   BUN 17 12 11   CREATININE 1.00 0.88 0.89   CALCIUM 7.9* 8.0* 7.8*     Recent Labs     05/11/20  1146  05/11/20  2347 05/12/20  0543 05/13/20  0607   APTT 25.0   < > 61.9* 57.7* 93.4*   INR 1.17*  --   --   --   --     < > = values in this interval not displayed.               Imaging  DX-CHEST-PORTABLE (1 VIEW)   Final Result      1.  Interstitial and hazy airspace opacities and trace left pleural effusion. This may be related to pulmonary edema, however infection should be excluded clinically.   2.  Mild cardiomegaly. Valve prosthesis.      CT-CEREBRAL PERFUSION ANALYSIS   Final Result      1.  Cerebral blood flow less than 30% likely representing completed infarct = 0 mL.      2.  T Max more than 6 seconds likely representing combination of completed infarct and ischemia = 0 mL.      3.  Mismatched volume likely representing ischemic brain/penumbra = None      4.  Please note that the cerebral perfusion was performed on the limited brain tissue around the basal ganglia region. Infarct/ischemia outside the CT perfusion sections can be missed in this study.      CT-CTA NECK WITH & W/O-POST PROCESSING   Final Result      1.  Atherosclerosis at the internal carotid artery origins without significant stenosis.   2.  Patent vertebral arteries.   3.  Bilateral pleural effusions and probable mild pulmonary  edema.      CT-CTA HEAD WITH & W/O-POST PROCESS   Final Result      CT angiogram of the [place] within normal limits.      Generalized atrophy and chronic ischemic changes.      No acute intracranial abnormality is seen.      CT-ABDOMEN-PELVIS WITH   Final Result      1.  No evidence of abdominal or pelvic mass, adenopathy, or inflammatory change.   2.  7 mm metallic foreign body in the descending colon, etiology uncertain. It is not clear whether this is ingested material or from a prior procedure.   3.  Colonic diverticulosis   4.  Atherosclerosis   5.  RIGHT inguinal hernia containing fat            Findings were discussed with ROGELIO HENLEY on 5/8/2020 1:58 PM.                  Assessment/Plan  * Lower GI bleed- (present on admission)  Assessment & Plan  Hb has improved, benign abdominal exam, no clear bloody bowel movements over the past 24 hours  Consistent with a diverticular bleed.     Scope with large amount of diverticuli, but no specific area of bleed found, which is common for this disease  Gi recommends CTA A/P if bleeds again or Hb dropping    Monitor closely, high risk for further decompensation.        Coronary artery disease involving coronary bypass graft- (present on admission)  Assessment & Plan  History of CABG  Continue Lipitor    History of mitral valve replacement with mechanical valve [Z95.2]- (present on admission)  Assessment & Plan  Have been restarted on AC   Will eventually need an echo prior to discharge     Chronic anticoagulation- (present on admission)  Assessment & Plan  Used to be on Coumadin for his mechanical mitral valve   Bridged on heparin drip while having procedure, back on Coumadin on 5/13     Bradycardia  Assessment & Plan  Resolved    Acute on Chronic Encephalopathy  Assessment & Plan  Has baseline dementia, made worse with anemia, GI bleeding, change in environment.  Neuro consulted, recommended a trial of Sinemet 100mg TID, and Avoid risperdal.  Patient  slightly got worse with Sinemet, discontinued  5/13, encephalopathy improved    Quetiapine for behavioral disturbances     Type II diabetes mellitus (HCC)- (present on admission)  Assessment & Plan  With hyperglycemia  Last glycated hemoglobin 7.4%   Short acting insulin  Accu-Checks, hypoglycemia protocol      Debility  Assessment & Plan  Multifactorial, age, recent GI bleeding, hospitalization    Physical and occupational therapy evaluation      VTE prophylaxis: on Coumadin

## 2020-05-13 NOTE — PROGRESS NOTES
Assumed care at 0715. Bedside report received from Night RN. Fall precautions in place Call light, phone and personal belongings within reach. Bed alarm on and working appropriately. Vital signs stable.

## 2020-05-13 NOTE — DISCHARGE PLANNING
Received [facility] form at 0297  Agency/Facility Name: [facility]  Referral sent per Choice form @ [phone]

## 2020-05-14 ENCOUNTER — APPOINTMENT (OUTPATIENT)
Dept: RADIOLOGY | Facility: MEDICAL CENTER | Age: 75
DRG: 377 | End: 2020-05-14
Attending: HOSPITALIST
Payer: MEDICARE

## 2020-05-14 PROBLEM — Z71.89 ACP (ADVANCE CARE PLANNING): Status: ACTIVE | Noted: 2020-05-14

## 2020-05-14 LAB
ALBUMIN SERPL BCP-MCNC: 2.9 G/DL (ref 3.2–4.9)
ALBUMIN/GLOB SERPL: 1.5 G/DL
ALP SERPL-CCNC: 74 U/L (ref 30–99)
ALT SERPL-CCNC: 29 U/L (ref 2–50)
ANION GAP SERPL CALC-SCNC: 10 MMOL/L (ref 7–16)
APTT PPP: 68.9 SEC (ref 24.7–36)
AST SERPL-CCNC: 28 U/L (ref 12–45)
BASOPHILS # BLD AUTO: 0.6 % (ref 0–1.8)
BASOPHILS # BLD: 0.03 K/UL (ref 0–0.12)
BILIRUB SERPL-MCNC: 0.7 MG/DL (ref 0.1–1.5)
BUN SERPL-MCNC: 15 MG/DL (ref 8–22)
CALCIUM SERPL-MCNC: 8.2 MG/DL (ref 8.5–10.5)
CHLORIDE SERPL-SCNC: 101 MMOL/L (ref 96–112)
CO2 SERPL-SCNC: 25 MMOL/L (ref 20–33)
CREAT SERPL-MCNC: 1.05 MG/DL (ref 0.5–1.4)
EOSINOPHIL # BLD AUTO: 0.67 K/UL (ref 0–0.51)
EOSINOPHIL NFR BLD: 12.8 % (ref 0–6.9)
ERYTHROCYTE [DISTWIDTH] IN BLOOD BY AUTOMATED COUNT: 51.8 FL (ref 35.9–50)
FERRITIN SERPL-MCNC: 281 NG/ML (ref 22–322)
FOLATE SERPL-MCNC: 14.5 NG/ML
GLOBULIN SER CALC-MCNC: 2 G/DL (ref 1.9–3.5)
GLUCOSE BLD-MCNC: 175 MG/DL (ref 65–99)
GLUCOSE BLD-MCNC: 181 MG/DL (ref 65–99)
GLUCOSE BLD-MCNC: 231 MG/DL (ref 65–99)
GLUCOSE BLD-MCNC: 374 MG/DL (ref 65–99)
GLUCOSE BLD-MCNC: 375 MG/DL (ref 65–99)
GLUCOSE BLD-MCNC: 387 MG/DL (ref 65–99)
GLUCOSE SERPL-MCNC: 233 MG/DL (ref 65–99)
HCT VFR BLD AUTO: 25.1 % (ref 42–52)
HGB BLD-MCNC: 8.5 G/DL (ref 14–18)
IMM GRANULOCYTES # BLD AUTO: 0.03 K/UL (ref 0–0.11)
IMM GRANULOCYTES NFR BLD AUTO: 0.6 % (ref 0–0.9)
IRON SATN MFR SERPL: 20 % (ref 15–55)
IRON SERPL-MCNC: 37 UG/DL (ref 50–180)
LYMPHOCYTES # BLD AUTO: 0.99 K/UL (ref 1–4.8)
LYMPHOCYTES NFR BLD: 19 % (ref 22–41)
MAGNESIUM SERPL-MCNC: 1.8 MG/DL (ref 1.5–2.5)
MCH RBC QN AUTO: 32.1 PG (ref 27–33)
MCHC RBC AUTO-ENTMCNC: 33.9 G/DL (ref 33.7–35.3)
MCV RBC AUTO: 94.7 FL (ref 81.4–97.8)
MONOCYTES # BLD AUTO: 0.51 K/UL (ref 0–0.85)
MONOCYTES NFR BLD AUTO: 9.8 % (ref 0–13.4)
NEUTROPHILS # BLD AUTO: 2.99 K/UL (ref 1.82–7.42)
NEUTROPHILS NFR BLD: 57.2 % (ref 44–72)
NRBC # BLD AUTO: 0 K/UL
NRBC BLD-RTO: 0 /100 WBC
PLATELET # BLD AUTO: 163 K/UL (ref 164–446)
PMV BLD AUTO: 10.7 FL (ref 9–12.9)
POTASSIUM SERPL-SCNC: 3.9 MMOL/L (ref 3.6–5.5)
PROT SERPL-MCNC: 4.9 G/DL (ref 6–8.2)
RBC # BLD AUTO: 2.65 M/UL (ref 4.7–6.1)
SODIUM SERPL-SCNC: 136 MMOL/L (ref 135–145)
TIBC SERPL-MCNC: 188 UG/DL (ref 250–450)
UIBC SERPL-MCNC: 151 UG/DL (ref 110–370)
VIT B1 BLD-MCNC: 106 NMOL/L (ref 70–180)
VIT B12 SERPL-MCNC: 2562 PG/ML (ref 211–911)
WBC # BLD AUTO: 5.2 K/UL (ref 4.8–10.8)

## 2020-05-14 PROCEDURE — 83540 ASSAY OF IRON: CPT

## 2020-05-14 PROCEDURE — 82962 GLUCOSE BLOOD TEST: CPT | Mod: 91

## 2020-05-14 PROCEDURE — 700102 HCHG RX REV CODE 250 W/ 637 OVERRIDE(OP): Performed by: PSYCHIATRY & NEUROLOGY

## 2020-05-14 PROCEDURE — 99497 ADVNCD CARE PLAN 30 MIN: CPT | Performed by: HOSPITALIST

## 2020-05-14 PROCEDURE — 700102 HCHG RX REV CODE 250 W/ 637 OVERRIDE(OP): Performed by: HOSPITALIST

## 2020-05-14 PROCEDURE — A9270 NON-COVERED ITEM OR SERVICE: HCPCS | Performed by: HOSPITALIST

## 2020-05-14 PROCEDURE — 70450 CT HEAD/BRAIN W/O DYE: CPT

## 2020-05-14 PROCEDURE — 82607 VITAMIN B-12: CPT

## 2020-05-14 PROCEDURE — 82746 ASSAY OF FOLIC ACID SERUM: CPT

## 2020-05-14 PROCEDURE — 770020 HCHG ROOM/CARE - TELE (206)

## 2020-05-14 PROCEDURE — 83550 IRON BINDING TEST: CPT

## 2020-05-14 PROCEDURE — 80053 COMPREHEN METABOLIC PANEL: CPT

## 2020-05-14 PROCEDURE — 85025 COMPLETE CBC W/AUTO DIFF WBC: CPT

## 2020-05-14 PROCEDURE — 36415 COLL VENOUS BLD VENIPUNCTURE: CPT

## 2020-05-14 PROCEDURE — 83735 ASSAY OF MAGNESIUM: CPT

## 2020-05-14 PROCEDURE — A9270 NON-COVERED ITEM OR SERVICE: HCPCS | Performed by: PSYCHIATRY & NEUROLOGY

## 2020-05-14 PROCEDURE — 99232 SBSQ HOSP IP/OBS MODERATE 35: CPT | Performed by: HOSPITALIST

## 2020-05-14 PROCEDURE — 85730 THROMBOPLASTIN TIME PARTIAL: CPT

## 2020-05-14 PROCEDURE — 82728 ASSAY OF FERRITIN: CPT

## 2020-05-14 RX ORDER — QUETIAPINE FUMARATE 25 MG/1
25 TABLET, FILM COATED ORAL 2 TIMES DAILY PRN
Status: DISCONTINUED | OUTPATIENT
Start: 2020-05-14 | End: 2020-05-23 | Stop reason: HOSPADM

## 2020-05-14 RX ORDER — FERROUS SULFATE 325(65) MG
325 TABLET ORAL 2 TIMES DAILY WITH MEALS
Status: DISCONTINUED | OUTPATIENT
Start: 2020-05-14 | End: 2020-05-23 | Stop reason: HOSPADM

## 2020-05-14 RX ORDER — WARFARIN SODIUM 7.5 MG/1
7.5 TABLET ORAL
Status: DISCONTINUED | OUTPATIENT
Start: 2020-05-18 | End: 2020-05-15

## 2020-05-14 RX ORDER — WARFARIN SODIUM 5 MG/1
5 TABLET ORAL
Status: DISCONTINUED | OUTPATIENT
Start: 2020-05-14 | End: 2020-05-15

## 2020-05-14 RX ADMIN — WARFARIN SODIUM 5 MG: 5 TABLET ORAL at 17:38

## 2020-05-14 RX ADMIN — ATORVASTATIN CALCIUM 40 MG: 40 TABLET, FILM COATED ORAL at 17:37

## 2020-05-14 RX ADMIN — INSULIN HUMAN 12 UNITS: 100 INJECTION, SOLUTION PARENTERAL at 17:39

## 2020-05-14 RX ADMIN — INSULIN HUMAN 12 UNITS: 100 INJECTION, SOLUTION PARENTERAL at 20:36

## 2020-05-14 RX ADMIN — DOCUSATE SODIUM 50 MG AND SENNOSIDES 8.6 MG 2 TABLET: 8.6; 5 TABLET, FILM COATED ORAL at 04:01

## 2020-05-14 RX ADMIN — CITALOPRAM HYDROBROMIDE 20 MG: 20 TABLET ORAL at 17:37

## 2020-05-14 RX ADMIN — INSULIN HUMAN 4 UNITS: 100 INJECTION, SOLUTION PARENTERAL at 11:46

## 2020-05-14 RX ADMIN — INSULIN HUMAN 3 UNITS: 100 INJECTION, SOLUTION PARENTERAL at 08:49

## 2020-05-14 RX ADMIN — QUETIAPINE FUMARATE 50 MG: 25 TABLET ORAL at 17:37

## 2020-05-14 RX ADMIN — FERROUS SULFATE TAB 325 MG (65 MG ELEMENTAL FE) 325 MG: 325 (65 FE) TAB at 17:37

## 2020-05-14 ASSESSMENT — ENCOUNTER SYMPTOMS
FLANK PAIN: 0
EYE REDNESS: 0
MEMORY LOSS: 1
ABDOMINAL PAIN: 0
FEVER: 0
SINUS PAIN: 0
BLOOD IN STOOL: 1
SORE THROAT: 0
EYE DISCHARGE: 0
EYE PAIN: 0
STRIDOR: 0
VOMITING: 0
LOSS OF CONSCIOUSNESS: 0

## 2020-05-14 ASSESSMENT — PATIENT HEALTH QUESTIONNAIRE - PHQ9
SUM OF ALL RESPONSES TO PHQ9 QUESTIONS 1 AND 2: 0
1. LITTLE INTEREST OR PLEASURE IN DOING THINGS: NOT AT ALL
2. FEELING DOWN, DEPRESSED, IRRITABLE, OR HOPELESS: NOT AT ALL

## 2020-05-14 NOTE — PROGRESS NOTES
Inpatient Anticoagulation Service Note    Date: 5/14/2020    Reason for Anticoagulation: Mechanical Mitral Valve Replacement     Target INR: 2.5 to 3.5         Hemoglobin Value: (!) 8.5  Hematocrit Value: (!) 25.1  Lab Platelet Value: (!) 163    INR from last 7 days     Date/Time INR Value    05/11/20 1146  (!) 1.17    05/10/20 0000  (!) 1.24    05/09/20 0000  (!) 2.69    05/08/20 1140  (!) 3.89        Dose from last 7 days     Date/Time Dose (mg)    05/14/20 0817  5        Average Dose (mg): (Home dose: Warfarin 7.5 mg Mondays and 5 mg all other days)  Significant Interactions: Statin  Bridge Therapy: Yes(HWBP)     Reversal Agent Administered: Vitamin K  Intravenous(on 5/8/20)    Comments: Pt on warfarin at home for hx of mechanical mitral valve with goal INR of 2.5-3.5 and is followed by [facility]. Pt was admited for GIB. GI performed an EGD, but did not find an obvious bleed. GIB was attributed to a diverticular bleed. Due to high risk of clots with mechanical valve the pt was started on heparin drip on 5/11 and has been watched for rebleeding. Pt has not rebleed and warfarin now ordered to restart. Per Cards note on 5/11, if pt rebleeds, then tagged RBCs will need to be utilized to find source and consideration of IR embolization vs hemicolectomy would need to be discussed. Cards did not mention decreasing INR goal. Will resume the pt's home warfarin dosing of 7.5 mg on Mondays and 5 mg all other days and trend the INR. Pt to continued to be bridged with HWBP.    Education Material Provided?: No(chronic warfarin therapy)  Pharmacist suggested discharge dosing: likely pt's home dose of warfarin 7.5 mg on Mondays and 5 mg all other days and a follow up INR with [facility] within 2-3 days of discharge.     Carlo Bermudez, PharmD

## 2020-05-14 NOTE — PROGRESS NOTES
VSS. Pt lying in bed resting. No signs of distress noted. PERRL, confused, alert and Oriented x3, disoriented to time. Follows commands. Respirations equal and unlabored on RA. Denies pain or needs at this time. Sitter by the bedside. Bed locked, in the low position, call light within reach, will continue to monitor

## 2020-05-14 NOTE — DISCHARGE PLANNING
Anticipated Discharge Disposition: SNF     Action: RN CM spoke with patient's spouse, Comfort, as she had further questions regarding skilled nursing facility choices. She has decided she does NOT want patient to go to [facility] or [facility] ([facility] notified to cancel referrals), but she would like ref[facility] to [facility], [place], [facility]. Choice faxed to [facility].  Dr. Kelley notified this RN CM that patient's son, Shamir, called and wanted to discuss SNF choices. RN JARED informed Comfort of this who states that she does not want her children involved in the SNF choice, she wants to be the only one deciding on that.  Per Dr. Kelley, patient is not medically cleared, it may be 1-2 days.    Barriers to Discharge: placement pending medical clearance    Plan: Case coordination to f/u with treatment team for medical clearance, f/u with SNF referrals for acceptance and bed availability

## 2020-05-14 NOTE — ASSESSMENT & PLAN NOTE
Multifactorial, age, recent GI bleeding, hospitalization    Physical and occupational therapy evaluation

## 2020-05-14 NOTE — PROGRESS NOTES
Monitor Summary:    .18/.08/.38  Rhythm: SB/SR  Rate: 46-84, tach up to 103  Ectopy: R PVC, PAC, big, coup, BBB   1.5-1.8 sp

## 2020-05-14 NOTE — CARE PLAN
Problem: Communication  Goal: The ability to communicate needs accurately and effectively will improve  Outcome: PROGRESSING AS EXPECTED     Problem: Safety  Goal: Will remain free from injury  Outcome: PROGRESSING AS EXPECTED  Goal: Will remain free from falls  Outcome: PROGRESSING AS EXPECTED     Problem: Infection  Goal: Will remain free from infection  Outcome: PROGRESSING AS EXPECTED     Problem: Venous Thromboembolism (VTW)/Deep Vein Thrombosis (DVT) Prevention:  Goal: Patient will participate in Venous Thrombosis (VTE)/Deep Vein Thrombosis (DVT)Prevention Measures  Outcome: PROGRESSING AS EXPECTED     Problem: Bowel/Gastric:  Goal: Normal bowel function is maintained or improved  Outcome: PROGRESSING AS EXPECTED  Goal: Will not experience complications related to bowel motility  Outcome: PROGRESSING AS EXPECTED     Problem: Knowledge Deficit  Goal: Knowledge of disease process/condition, treatment plan, diagnostic tests, and medications will improve  Outcome: PROGRESSING AS EXPECTED  Goal: Knowledge of the prescribed therapeutic regimen will improve  Outcome: PROGRESSING AS EXPECTED     Problem: Discharge Barriers/Planning  Goal: Patient's continuum of care needs will be met  Outcome: PROGRESSING AS EXPECTED     Problem: Skin Integrity  Goal: Risk for impaired skin integrity will decrease  Outcome: PROGRESSING AS EXPECTED     Problem: Psychosocial Needs:  Goal: Level of anxiety will decrease  Outcome: PROGRESSING AS EXPECTED     Problem: Safety - Medical Restraint  Goal: Remains free of injury from restraints (Restraint for Interference with Medical Device)  Description: INTERVENTIONS:  1. Determine that other, less restrictive measures have been tried or would not be effective before applying the restraint  2. Evaluate the patient's condition at the time of restraint application  3. Inform patient/family regarding the reason for restraint  4. Q2H: Monitor safety, psychosocial status, comfort, nutrition and  hydration  Outcome: PROGRESSING AS EXPECTED  Goal: Free from restraint(s) (Restraint for Interference with Medical Device)  Description: INTERVENTIONS:  1. ONCE/SHIFT or MINIMUM Q12H: Assess and document the continuing need for restraints  2. Q24H: Continued use of restraint requires LIP to perform face to face examination and written order  3. Identify and implement measures to help patient regain control  Outcome: PROGRESSING AS EXPECTED     Problem: Respiratory:  Goal: Respiratory status will improve  Outcome: PROGRESSING AS EXPECTED     Problem: Urinary Elimination:  Goal: Ability to reestablish a normal urinary elimination pattern will improve  Outcome: PROGRESSING AS EXPECTED     Problem: Pain Management  Goal: Pain level will decrease to patient's comfort goal  Outcome: PROGRESSING AS EXPECTED

## 2020-05-14 NOTE — CARE PLAN
Once medically cleared pt will go to rehab. Pt has become a lot less confused. Pt is currently following most commands and needs reoriented less frequently then when RN previously had him this stay.

## 2020-05-14 NOTE — DISCHARGE PLANNING
Agency/Facility Name: [facility]  Spoke To: Isabella  Outcome: Per Isabella referral accepted. Bed available today.

## 2020-05-14 NOTE — DISCHARGE PLANNING
Received Choice form at [phone]  Agency/Facility Name: [facility]. [facility]. [facility] 3. [facility]  Referral sent per Choice form @ [phone]

## 2020-05-14 NOTE — PROGRESS NOTES
Hospital Medicine Daily Progress Note    Date of Service  5/14/2020    Chief Complaint  Gastrointestinal bleeding      Hospital Course      74 y.o. male admitted 5/8/2020 with GIB and h/o mechanical mitral valve on coumadin. GI consulted, pt got scope, Scope with large amount of diverticuli, but no specific area of bleed found. Bleeding thought to be 2/2 diverticular bleed.   Given high risk of CVA with mechanical mitral valve, he was re-start weight based.            Interval Problem Update  Heart rate 70s, blood pressure within normal limits this morning.      Saturating well on room air.    Patient is more alert and interactive.  He is oriented to self and place.  He was able to talk with his wife Comfort, I discussed with her and she think he is back to his baseline.  Hb has been relatively stable around 8.5, will check once daily from now  Iron studies does not warrant intravenous iron replacement I will start oral iron replacement. I had a prolonged discussion with the patient who was not able to participate much and wife Comfort, regarding goals of care, diagnoses, prognosis, and CODE STATUS.  The patient has advanced aged and poor functional performance. He has multiple comorbid conditions including diabetes, diverticulosis, mechanical mitral valve on chronic anticoagulation, and coronary artery disease. Admitted with GI bleeding. I encourage to consider changing code to DNAR/DNI, however Comfort and patient want to maintain FULL code. I spent 26 minutes on advanced care planning in addition to the time spent managing the other medical problems. Discussed with patient's son, patient's nurse and with multidisciplinary team during rounds including , pharmacist and charge nurse.       Consultants/Specialty  GI   Cards   Neuro     Code Status  FULL     Disposition  Needs skilled nursing facility   Tele     Review of Systems  Review of Systems   Unable to perform ROS: Mental status change (Limited)    Constitutional: Positive for malaise/fatigue. Negative for fever.   HENT: Negative for sinus pain and sore throat.    Eyes: Negative for pain, discharge and redness.   Respiratory: Negative for stridor.    Gastrointestinal: Positive for blood in stool. Negative for abdominal pain and vomiting.   Genitourinary: Negative for flank pain and hematuria.   Neurological: Negative for loss of consciousness (Improved).   Psychiatric/Behavioral: Positive for memory loss.      Physical Exam  Temp:  [36.7 °C (98 °F)-37 °C (98.6 °F)] 37 °C (98.6 °F)  Pulse:  [71-80] 71  Resp:  [16-20] 16  BP: (121-141)/(62-82) 137/72  SpO2:  [90 %-99 %] 95 %    Physical Exam  Vitals signs and nursing note reviewed.   Constitutional:       General: He is not in acute distress.     Appearance: He is well-developed.   HENT:      Head: Normocephalic and atraumatic.      Nose: No rhinorrhea.      Mouth/Throat:      Pharynx: No oropharyngeal exudate.   Eyes:      General:         Right eye: No discharge.         Left eye: No discharge.      Pupils: Pupils are equal, round, and reactive to light.   Neck:      Musculoskeletal: Normal range of motion and neck supple.      Thyroid: No thyromegaly.   Cardiovascular:      Rate and Rhythm: Normal rate and regular rhythm.      Heart sounds: Murmur present.   Pulmonary:      Effort: Pulmonary effort is normal. No respiratory distress.      Breath sounds: Normal breath sounds. No stridor. No wheezing.   Abdominal:      General: Bowel sounds are normal.      Palpations: Abdomen is soft.      Tenderness: There is no abdominal tenderness.   Musculoskeletal: Normal range of motion.         General: No tenderness.   Skin:     General: Skin is warm and dry.      Coloration: Skin is pale.      Findings: No rash.   Neurological:      Mental Status: He is alert.      Cranial Nerves: No cranial nerve deficit.      Comments: Alert, oriented intermittently and variably   Psychiatric:         Mood and Affect: Mood normal.       Comments: Impaired judgment       Fluids    Intake/Output Summary (Last 24 hours) at 5/14/2020 1207  Last data filed at 5/14/2020 0800  Gross per 24 hour   Intake 470 ml   Output 660 ml   Net -190 ml     Laboratory  Recent Labs     05/11/20  2347  05/13/20  0101 05/13/20  0839 05/13/20 2020 05/14/20  0553   WBC 9.1  --  5.9  --   --  5.2   RBC 2.75*  --  2.68*  --   --  2.65*   HEMOGLOBIN 9.0*   < > 8.6* 9.2* 8.5* 8.5*   HEMATOCRIT 25.4*   < > 25.6* 27.7* 24.7* 25.1*   MCV 92.4  --  95.5  --   --  94.7   MCH 32.7  --  32.1  --   --  32.1   MCHC 35.4*  --  33.6*  --   --  33.9   RDW 51.4*  --  53.0*  --   --  51.8*   PLATELETCT 127*  --  137*  --   --  163*   MPV 10.3  --  10.3  --   --  10.7    < > = values in this interval not displayed.     Recent Labs     05/11/20  2347 05/13/20 0101 05/14/20  0553   SODIUM 141 139 136   POTASSIUM 3.4* 3.5* 3.9   CHLORIDE 106 104 101   CO2 25 24 25   GLUCOSE 135* 147* 233*   BUN 12 11 15   CREATININE 0.88 0.89 1.05   CALCIUM 8.0* 7.8* 8.2*     Recent Labs     05/12/20  0543 05/13/20  0607 05/14/20  0553   APTT 57.7* 93.4* 68.9*               Imaging  DX-CHEST-PORTABLE (1 VIEW)   Final Result      1.  Interstitial and hazy airspace opacities and trace left pleural effusion. This may be related to pulmonary edema, however infection should be excluded clinically.   2.  Mild cardiomegaly. Valve prosthesis.      CT-CEREBRAL PERFUSION ANALYSIS   Final Result      1.  Cerebral blood flow less than 30% likely representing completed infarct = 0 mL.      2.  T Max more than 6 seconds likely representing combination of completed infarct and ischemia = 0 mL.      3.  Mismatched volume likely representing ischemic brain/penumbra = None      4.  Please note that the cerebral perfusion was performed on the limited brain tissue around the basal ganglia region. Infarct/ischemia outside the CT perfusion sections can be missed in this study.      CT-CTA NECK WITH & W/O-POST PROCESSING    Final Result      1.  Atherosclerosis at the internal carotid artery origins without significant stenosis.   2.  Patent vertebral arteries.   3.  Bilateral pleural effusions and probable mild pulmonary edema.      CT-CTA HEAD WITH & W/O-POST PROCESS   Final Result      CT angiogram of the [place] within normal limits.      Generalized atrophy and chronic ischemic changes.      No acute intracranial abnormality is seen.      CT-ABDOMEN-PELVIS WITH   Final Result      1.  No evidence of abdominal or pelvic mass, adenopathy, or inflammatory change.   2.  7 mm metallic foreign body in the descending colon, etiology uncertain. It is not clear whether this is ingested material or from a prior procedure.   3.  Colonic diverticulosis   4.  Atherosclerosis   5.  RIGHT inguinal hernia containing fat            Findings were discussed with ROGELIO HENLEY on 5/8/2020 1:58 PM.                  Assessment/Plan  * Lower GI bleed- (present on admission)  Assessment & Plan  Hb has improved, benign abdominal exam, no clear bloody bowel movements over the past 24 hours  Consistent with a diverticular bleed.     Scope with large amount of diverticuli, but no specific area of bleed found, which is common for this disease  Gi recommends CTA A/P if bleeds again or Hb dropping    Monitor closely, high risk for further decompensation.        Coronary artery disease involving coronary bypass graft- (present on admission)  Assessment & Plan  History of CABG  Continue Lipitor    History of mitral valve replacement with mechanical valve [Z95.2]- (present on admission)  Assessment & Plan  Have been restarted on AC   Will eventually need an echo prior to discharge     Chronic anticoagulation- (present on admission)  Assessment & Plan  Used to be on Coumadin for his mechanical mitral valve   Bridged on heparin drip while having procedure, back on Coumadin on 5/13     ACP (advance care planning)  Assessment & Plan  On 5/14/2020, I had a  prolonged discussion with the patient who was not able to participate much and wife Comfort, regarding goals of care, diagnoses, prognosis, and CODE STATUS.  The patient has advanced aged and poor functional performance. He has multiple comorbid conditions including diabetes, diverticulosis, mechanical mitral valve on chronic anticoagulation, and coronary artery disease. Admitted with GI bleeding. I encourage to consider changing code to DNAR/DNI, however Comfort and patient want to maintain FULL code.     Debility  Assessment & Plan  Multifactorial, age, recent GI bleeding, hospitalization    Physical and occupational therapy evaluation     Bradycardia  Assessment & Plan  Resolved    Acute on Chronic Encephalopathy  Assessment & Plan  Has baseline dementia, made worse with anemia, GI bleeding, change in environment.  Neuro consulted, recommended a trial of Sinemet 100mg TID, and Avoid risperdal.  Patient slightly got worse with Sinemet, discontinued  5/13, encephalopathy improved    Quetiapine for behavioral disturbances     Type II diabetes mellitus (HCC)- (present on admission)  Assessment & Plan  With hyperglycemia  Last glycated hemoglobin 7.4%   Short acting insulin  Accu-Checks, hypoglycemia protocol       VTE prophylaxis: on Coumadin

## 2020-05-14 NOTE — ASSESSMENT & PLAN NOTE
On 5/14/2020, Dr Kelley had a prolonged discussion with the patient who was not able to participate much and wife Comfort:  regarding goals of care, diagnoses, prognosis, and CODE STATUS.  The patient has advanced aged and poor functional performance. He has multiple comorbid conditions including diabetes, diverticulosis, mechanical mitral valve on chronic anticoagulation, and coronary artery disease. Admitted with GI bleeding. I encourage to consider changing code to DNAR/DNI, however Comfort and patient want to maintain FULL code.

## 2020-05-15 ENCOUNTER — APPOINTMENT (OUTPATIENT)
Dept: CARDIOLOGY | Facility: MEDICAL CENTER | Age: 75
DRG: 377 | End: 2020-05-15
Attending: HOSPITALIST
Payer: MEDICARE

## 2020-05-15 DIAGNOSIS — Z79.01 CHRONIC ANTICOAGULATION: ICD-10-CM

## 2020-05-15 PROBLEM — F03.918 DEMENTIA WITH BEHAVIORAL DISTURBANCE (HCC): Status: ACTIVE | Noted: 2020-05-15

## 2020-05-15 LAB
ALBUMIN SERPL BCP-MCNC: 3.2 G/DL (ref 3.2–4.9)
ALBUMIN/GLOB SERPL: 1.6 G/DL
ALP SERPL-CCNC: 86 U/L (ref 30–99)
ALT SERPL-CCNC: 43 U/L (ref 2–50)
ANION GAP SERPL CALC-SCNC: 12 MMOL/L (ref 7–16)
APTT PPP: 58.1 SEC (ref 24.7–36)
AST SERPL-CCNC: 38 U/L (ref 12–45)
BASOPHILS # BLD AUTO: 0.7 % (ref 0–1.8)
BASOPHILS # BLD: 0.04 K/UL (ref 0–0.12)
BILIRUB SERPL-MCNC: 0.4 MG/DL (ref 0.1–1.5)
BUN SERPL-MCNC: 16 MG/DL (ref 8–22)
CALCIUM SERPL-MCNC: 8.2 MG/DL (ref 8.5–10.5)
CHLORIDE SERPL-SCNC: 105 MMOL/L (ref 96–112)
CO2 SERPL-SCNC: 23 MMOL/L (ref 20–33)
CREAT SERPL-MCNC: 0.97 MG/DL (ref 0.5–1.4)
EOSINOPHIL # BLD AUTO: 0.54 K/UL (ref 0–0.51)
EOSINOPHIL NFR BLD: 9.7 % (ref 0–6.9)
ERYTHROCYTE [DISTWIDTH] IN BLOOD BY AUTOMATED COUNT: 54.1 FL (ref 35.9–50)
GLOBULIN SER CALC-MCNC: 2 G/DL (ref 1.9–3.5)
GLUCOSE BLD-MCNC: 192 MG/DL (ref 65–99)
GLUCOSE BLD-MCNC: 226 MG/DL (ref 65–99)
GLUCOSE BLD-MCNC: 326 MG/DL (ref 65–99)
GLUCOSE BLD-MCNC: 326 MG/DL (ref 65–99)
GLUCOSE SERPL-MCNC: 180 MG/DL (ref 65–99)
HCT VFR BLD AUTO: 24.6 % (ref 42–52)
HGB BLD-MCNC: 8.2 G/DL (ref 14–18)
IMM GRANULOCYTES # BLD AUTO: 0.04 K/UL (ref 0–0.11)
IMM GRANULOCYTES NFR BLD AUTO: 0.7 % (ref 0–0.9)
INR PPP: 1.06 (ref 0.87–1.13)
LYMPHOCYTES # BLD AUTO: 1.04 K/UL (ref 1–4.8)
LYMPHOCYTES NFR BLD: 18.7 % (ref 22–41)
MAGNESIUM SERPL-MCNC: 1.9 MG/DL (ref 1.5–2.5)
MCH RBC QN AUTO: 31.9 PG (ref 27–33)
MCHC RBC AUTO-ENTMCNC: 33.3 G/DL (ref 33.7–35.3)
MCV RBC AUTO: 95.7 FL (ref 81.4–97.8)
MONOCYTES # BLD AUTO: 0.51 K/UL (ref 0–0.85)
MONOCYTES NFR BLD AUTO: 9.2 % (ref 0–13.4)
NEUTROPHILS # BLD AUTO: 3.38 K/UL (ref 1.82–7.42)
NEUTROPHILS NFR BLD: 61 % (ref 44–72)
NRBC # BLD AUTO: 0 K/UL
NRBC BLD-RTO: 0 /100 WBC
PLATELET # BLD AUTO: 176 K/UL (ref 164–446)
PMV BLD AUTO: 10.6 FL (ref 9–12.9)
POTASSIUM SERPL-SCNC: 4.1 MMOL/L (ref 3.6–5.5)
PROT SERPL-MCNC: 5.2 G/DL (ref 6–8.2)
PROTHROMBIN TIME: 14 SEC (ref 12–14.6)
RBC # BLD AUTO: 2.57 M/UL (ref 4.7–6.1)
SODIUM SERPL-SCNC: 140 MMOL/L (ref 135–145)
WBC # BLD AUTO: 5.6 K/UL (ref 4.8–10.8)

## 2020-05-15 PROCEDURE — 700102 HCHG RX REV CODE 250 W/ 637 OVERRIDE(OP): Performed by: HOSPITALIST

## 2020-05-15 PROCEDURE — 770020 HCHG ROOM/CARE - TELE (206)

## 2020-05-15 PROCEDURE — A9270 NON-COVERED ITEM OR SERVICE: HCPCS | Performed by: PSYCHIATRY & NEUROLOGY

## 2020-05-15 PROCEDURE — 85025 COMPLETE CBC W/AUTO DIFF WBC: CPT

## 2020-05-15 PROCEDURE — 85610 PROTHROMBIN TIME: CPT

## 2020-05-15 PROCEDURE — 36415 COLL VENOUS BLD VENIPUNCTURE: CPT

## 2020-05-15 PROCEDURE — 700102 HCHG RX REV CODE 250 W/ 637 OVERRIDE(OP): Performed by: PSYCHIATRY & NEUROLOGY

## 2020-05-15 PROCEDURE — 85730 THROMBOPLASTIN TIME PARTIAL: CPT

## 2020-05-15 PROCEDURE — 700111 HCHG RX REV CODE 636 W/ 250 OVERRIDE (IP): Performed by: INTERNAL MEDICINE

## 2020-05-15 PROCEDURE — 83735 ASSAY OF MAGNESIUM: CPT

## 2020-05-15 PROCEDURE — 80053 COMPREHEN METABOLIC PANEL: CPT

## 2020-05-15 PROCEDURE — 82962 GLUCOSE BLOOD TEST: CPT

## 2020-05-15 PROCEDURE — A9270 NON-COVERED ITEM OR SERVICE: HCPCS | Performed by: HOSPITALIST

## 2020-05-15 PROCEDURE — 99232 SBSQ HOSP IP/OBS MODERATE 35: CPT | Performed by: HOSPITALIST

## 2020-05-15 RX ORDER — WARFARIN SODIUM 7.5 MG/1
7.5 TABLET ORAL
Status: DISCONTINUED | OUTPATIENT
Start: 2020-05-18 | End: 2020-05-17

## 2020-05-15 RX ORDER — WARFARIN SODIUM 7.5 MG/1
7.5 TABLET ORAL
Status: COMPLETED | OUTPATIENT
Start: 2020-05-15 | End: 2020-05-15

## 2020-05-15 RX ORDER — WARFARIN SODIUM 5 MG/1
5 TABLET ORAL
Status: DISCONTINUED | OUTPATIENT
Start: 2020-05-16 | End: 2020-05-17

## 2020-05-15 RX ORDER — INSULIN GLARGINE 100 [IU]/ML
10 INJECTION, SOLUTION SUBCUTANEOUS EVERY EVENING
Status: DISCONTINUED | OUTPATIENT
Start: 2020-05-15 | End: 2020-05-16

## 2020-05-15 RX ADMIN — HEPARIN SODIUM 900 UNITS/HR: 5000 INJECTION, SOLUTION INTRAVENOUS at 01:58

## 2020-05-15 RX ADMIN — INSULIN HUMAN 4 UNITS: 100 INJECTION, SOLUTION PARENTERAL at 07:41

## 2020-05-15 RX ADMIN — QUETIAPINE FUMARATE 50 MG: 25 TABLET ORAL at 16:28

## 2020-05-15 RX ADMIN — FERROUS SULFATE TAB 325 MG (65 MG ELEMENTAL FE) 325 MG: 325 (65 FE) TAB at 07:41

## 2020-05-15 RX ADMIN — CITALOPRAM HYDROBROMIDE 20 MG: 20 TABLET ORAL at 16:28

## 2020-05-15 RX ADMIN — INSULIN HUMAN 10 UNITS: 100 INJECTION, SOLUTION PARENTERAL at 21:06

## 2020-05-15 RX ADMIN — INSULIN GLARGINE 10 UNITS: 100 INJECTION, SOLUTION SUBCUTANEOUS at 16:29

## 2020-05-15 RX ADMIN — INSULIN HUMAN 3 UNITS: 100 INJECTION, SOLUTION PARENTERAL at 16:29

## 2020-05-15 RX ADMIN — WARFARIN SODIUM 7.5 MG: 7.5 TABLET ORAL at 16:31

## 2020-05-15 RX ADMIN — FERROUS SULFATE TAB 325 MG (65 MG ELEMENTAL FE) 325 MG: 325 (65 FE) TAB at 16:28

## 2020-05-15 RX ADMIN — ATORVASTATIN CALCIUM 40 MG: 40 TABLET, FILM COATED ORAL at 16:28

## 2020-05-15 RX ADMIN — INSULIN HUMAN 10 UNITS: 100 INJECTION, SOLUTION PARENTERAL at 11:28

## 2020-05-15 ASSESSMENT — ENCOUNTER SYMPTOMS
EYE REDNESS: 0
MEMORY LOSS: 1
STRIDOR: 0
LOSS OF CONSCIOUSNESS: 0
HEMOPTYSIS: 0
EYE PAIN: 0
VOMITING: 0
SHORTNESS OF BREATH: 0
SINUS PAIN: 0
BLOOD IN STOOL: 1
EYE DISCHARGE: 0
FLANK PAIN: 0
ABDOMINAL PAIN: 0
NECK PAIN: 0
SORE THROAT: 0
BACK PAIN: 0
FEVER: 0

## 2020-05-15 NOTE — CARE PLAN
Problem: Communication  Goal: The ability to communicate needs accurately and effectively will improve  Outcome: PROGRESSING AS EXPECTED  Intervention: [place] patient and significant other/support system to call light to alert staff of needs  Flowsheets (Taken 5/10/2020 2239 by Amanda Reyes, R.N.)  Oriented to:: All of the Following : Location of Bathroom, Visiting Policy, Unit Routine, Call Light and Bedside Controls, Bedside Rail Policy, Smoking Policy, Rights and Responsibilities, Bedside Report, and Patient Education Notebook  Note: Pt educated on POC and medications. Pt verbalized understanding.      Problem: Safety  Goal: Will remain free from injury  Outcome: PROGRESSING AS EXPECTED  Intervention: Provide assistance with mobility  Flowsheets (Taken 5/14/2020 0800 by Symone Guajardo RPipoNPipo)  Assistance: Assistance of One  Ambulation Tolerance: Tolerates Well  Note: Pt bedside table and call-light are within reach, bed in lowest position and locked. Treaded socks on and pt has been educated on call light use and asked to call before getting up.

## 2020-05-15 NOTE — DISCHARGE PLANNING
Anticipated Discharge Disposition: SNF    Action: Per Dr. Kelley, patient is not medically cleared for discharge to SNF, he would like to continue to monitor patient and patient has a safety sitter at bedside.  RN CM spoke with patient's spouse, Comfort, who has spoke with staff at the [facility] and would like patient to go there. She does need to find his , so she will be looking for that today and will reach out if able to find. She also states that if there is an issue with trying to obtain document she would be okay with [facility] going to [facility] as well.  RN CM explained to her that the weekend has different staff coverage so if she needs to reach our department to call the BS RN who can find someone.    Barriers to Discharge: placement pending medical clearance    Plan: Case coordination to f/u with treatment team for medical clearance, f/u with spouse for

## 2020-05-15 NOTE — ASSESSMENT & PLAN NOTE
Maintain awake daytime state.    Continue frequent re-orientation, encourage activity.   Quetiapine (Seroquel) as needed for agitation    Try to avoid sedating medications as much as possible, reserve only for extreme agitation posing threat to self.

## 2020-05-15 NOTE — PROGRESS NOTES
Inpatient Anticoagulation Service Note    Date: 5/15/2020    Reason for Anticoagulation: Mechanical Mitral Valve Replacement     Target INR: 2.5 to 3.5         Hemoglobin Value: (!) 8.2  Hematocrit Value: (!) 24.6  Lab Platelet Value: 176    INR from last 7 days     Date/Time INR Value    05/15/20 0505  1.06    05/11/20 1146  (!) 1.17    05/10/20 0000  (!) 1.24    05/09/20 0000  (!) 2.69    05/08/20 1140  (!) 3.89        Dose from last 7 days     Date/Time Dose (mg)    05/15/20 1059  7.5    05/14/20 0817  5        Average Dose (mg): (Home dose: Warfarin 7.5 mg Mondays and 5 mg all other days)  Significant Interactions: Statin  Bridge Therapy: Yes(HWBP)     Reversal Agent Administered: Vitamin K  Intravenous(on 5/8/20)    Comments: Warfarin restarted yesterday with a 5 mg dose. Pt's home dose is warfarin 5 mg daily except 7.5 mg on Mondays. Will give a increased dose of warfarin 7.5 mg today and then resume the pt's home warfarin dosing tomorrow. Will trend the INR. Pt is being bridged on heparin drip. No s/s of bleeding noted. Pt had recent GIB (attributed to a diverticular bleed by GI), but presence of mechanical mitral valve and the risk it presents prompted start of anticoagulation.    Education Material Provided?: No(chronic warfarin therapy)  Pharmacist suggested discharge dosing: likely pt's home dose of warfarin 7.5 mg on Mondays and 5 mg all other days and [facility]-3 days of discharge.     Carlo Bermudez, PharmD

## 2020-05-15 NOTE — PROGRESS NOTES
Hospital Medicine Daily Progress Note    Date of Service  5/15/2020    Chief Complaint  Gastrointestinal bleeding      Hospital Course      74 y.o. male admitted 5/8/2020 with GIB and h/o mechanical mitral valve on coumadin. GI consulted, pt got scope, Scope with large amount of diverticuli, but no specific area of bleed found. Bleeding thought to be 2/2 diverticular bleed.   Given high risk of CVA with mechanical mitral valve, he was re-start weight based.             Interval Problem Update  Heart rate 60s, blood pressure within normal limits this morning  Blood sugar running high 200s-300s, I am starting glargine.  Alert, intermittently and variably oriented x1-x3.  However, impulsive, intermittently agitated, requiring one-to-one sitter.  INR still subtherapeutic, discussed with pharmacy  Cardiology recommended follow-up echocardiogram prior to discharge, I will order  Hemoglobin 8.2, down from 8.5, had a bloody bowel movement questionable old blood.  Continue to monitor hemoglobin, transfuse for Hb less than or equal to 7.  Discussed with patient's nurse and with multidisciplinary team during rounds including , pharmacist and charge nurse.         Consultants/Specialty  GI   Cards   Neuro     Code Status  FULL     Disposition  Needs skilled nursing facility   Tele     Review of Systems  Review of Systems   Constitutional: Positive for malaise/fatigue. Negative for fever.   HENT: Negative for sinus pain and sore throat.    Eyes: Negative for pain, discharge and redness.   Respiratory: Negative for hemoptysis, shortness of breath and stridor.    Cardiovascular: Negative for chest pain.   Gastrointestinal: Positive for blood in stool. Negative for abdominal pain and vomiting.   Genitourinary: Negative for flank pain and hematuria.   Musculoskeletal: Negative for back pain and neck pain.   Neurological: Negative for loss of consciousness (Improved).   Psychiatric/Behavioral: Positive for memory loss.       Physical Exam  Temp:  [36.7 °C (98.1 °F)-37.6 °C (99.6 °F)] 37.2 °C (98.9 °F)  Pulse:  [64-84] 67  Resp:  [16] 16  BP: (126-144)/(60-68) 134/68  SpO2:  [91 %-98 %] 97 %    Physical Exam  Vitals signs and nursing note reviewed.   Constitutional:       General: He is not in acute distress.     Appearance: He is well-developed.   HENT:      Head: Normocephalic and atraumatic.      Nose: No rhinorrhea.      Mouth/Throat:      Pharynx: No oropharyngeal exudate.   Eyes:      General:         Right eye: No discharge.         Left eye: No discharge.      Pupils: Pupils are equal, round, and reactive to light.   Neck:      Musculoskeletal: Normal range of motion and neck supple.      Thyroid: No thyromegaly.   Cardiovascular:      Rate and Rhythm: Normal rate and regular rhythm.      Heart sounds: Murmur present.   Pulmonary:      Effort: Pulmonary effort is normal. No respiratory distress.      Breath sounds: Normal breath sounds. No stridor. No wheezing.   Abdominal:      General: Bowel sounds are normal.      Palpations: Abdomen is soft.      Tenderness: There is no abdominal tenderness.   Musculoskeletal: Normal range of motion.         General: No tenderness.   Skin:     General: Skin is warm and dry.      Coloration: Skin is pale.      Findings: No rash.   Neurological:      Mental Status: He is alert.      Cranial Nerves: No cranial nerve deficit.      Comments: Alert, oriented intermittently and variably   Psychiatric:         Mood and Affect: Mood normal.      Comments: Impaired judgment       Fluids  No intake or output data in the 24 hours ending 05/15/20 1458  Laboratory  Recent Labs     05/13/20  0101  05/13/20 2020 05/14/20  0553 05/15/20  0505   WBC 5.9  --   --  5.2 5.6   RBC 2.68*  --   --  2.65* 2.57*   HEMOGLOBIN 8.6*   < > 8.5* 8.5* 8.2*   HEMATOCRIT 25.6*   < > 24.7* 25.1* 24.6*   MCV 95.5  --   --  94.7 95.7   MCH 32.1  --   --  32.1 31.9   MCHC 33.6*  --   --  33.9 33.3*   RDW 53.0*  --   --  51.8*  54.1*   PLATELETCT 137*  --   --  163* 176   MPV 10.3  --   --  10.7 10.6    < > = values in this interval not displayed.     Recent Labs     05/13/20  0101 05/14/20  0553 05/15/20  0505   SODIUM 139 136 140   POTASSIUM 3.5* 3.9 4.1   CHLORIDE 104 101 105   CO2 24 25 23   GLUCOSE 147* 233* 180*   BUN 11 15 16   CREATININE 0.89 1.05 0.97   CALCIUM 7.8* 8.2* 8.2*     Recent Labs     05/13/20  0607 05/14/20  0553 05/15/20  0505   APTT 93.4* 68.9* 58.1*   INR  --   --  1.06               Imaging  CT-HEAD W/O   Final Result      No acute intracranial abnormality      DX-CHEST-PORTABLE (1 VIEW)   Final Result      1.  Interstitial and hazy airspace opacities and trace left pleural effusion. This may be related to pulmonary edema, however infection should be excluded clinically.   2.  Mild cardiomegaly. Valve prosthesis.      CT-CEREBRAL PERFUSION ANALYSIS   Final Result      1.  Cerebral blood flow less than 30% likely representing completed infarct = 0 mL.      2.  T Max more than 6 seconds likely representing combination of completed infarct and ischemia = 0 mL.      3.  Mismatched volume likely representing ischemic brain/penumbra = None      4.  Please note that the cerebral perfusion was performed on the limited brain tissue around the basal ganglia region. Infarct/ischemia outside the CT perfusion sections can be missed in this study.      CT-CTA NECK WITH & W/O-POST PROCESSING   Final Result      1.  Atherosclerosis at the internal carotid artery origins without significant stenosis.   2.  Patent vertebral arteries.   3.  Bilateral pleural effusions and probable mild pulmonary edema.      CT-CTA HEAD WITH & W/O-POST PROCESS   Final Result      CT angiogram of the [place] within normal limits.      Generalized atrophy and chronic ischemic changes.      No acute intracranial abnormality is seen.      CT-ABDOMEN-PELVIS WITH   Final Result      1.  No evidence of abdominal or pelvic mass, adenopathy, or  inflammatory change.   2.  7 mm metallic foreign body in the descending colon, etiology uncertain. It is not clear whether this is ingested material or from a prior procedure.   3.  Colonic diverticulosis   4.  Atherosclerosis   5.  RIGHT inguinal hernia containing fat            Findings were discussed with ROGELIO HENLEY on 5/8/2020 1:58 PM.               EC-ECHOCARDIOGRAM LTD W/O CONT    (Results Pending)      Assessment/Plan  * Lower GI bleed- (present on admission)  Assessment & Plan  Hb has improved, benign abdominal exam, no clear bloody bowel movements over the past 24 hours  Consistent with a diverticular bleed.     Scope with large amount of diverticuli, but no specific area of bleed found, which is common for this disease  Gi recommends CTA A/P if bleeds again   Monitor closely, high risk for further decompensation.          Coronary artery disease involving coronary bypass graft- (present on admission)  Assessment & Plan  History of CABG  Continue Lipitor    History of mitral valve replacement with mechanical valve [Z95.2]- (present on admission)  Assessment & Plan  Have been restarted on AC   Will eventually need an echo prior to discharge     Chronic anticoagulation- (present on admission)  Assessment & Plan  Used to be on Coumadin for his mechanical mitral valve   Bridged on heparin drip while having procedure, back on Coumadin with heparin drip bridging on 5/13     Dementia with behavioral disturbance (HCC)- (present on admission)  Assessment & Plan  Try to get window bed, maintain awake daytime state.    Continue frequent re-orientation, encourage activity.   Quetiapine (Seroquel) as needed for agitation   Try to avoid sedating medications as much as possible, reserve only for extreme agitation posing threat to self.       ACP (advance care planning)  Assessment & Plan  On 5/14/2020, I had a prolonged discussion with the patient who was not able to participate much and wife Comfort, regarding goals  of care, diagnoses, prognosis, and CODE STATUS.  The patient has advanced aged and poor functional performance. He has multiple comorbid conditions including diabetes, diverticulosis, mechanical mitral valve on chronic anticoagulation, and coronary artery disease. Admitted with GI bleeding. I encourage to consider changing code to DNAR/DNI, however Comfort and patient want to maintain FULL code.     Debility  Assessment & Plan  Multifactorial, age, recent GI bleeding, hospitalization    Physical and occupational therapy evaluation     Bradycardia  Assessment & Plan  Resolved    Acute on Chronic Encephalopathy  Assessment & Plan  Has baseline dementia, made worse with anemia, GI bleeding, change in environment.  Neuro consulted, recommended a trial of Sinemet 100mg TID, and Avoid risperdal.  Patient slightly got worse with Sinemet, discontinued    Type II diabetes mellitus (HCC)- (present on admission)  Assessment & Plan  With hyperglycemia  Last glycated hemoglobin 7.4%   Short acting insulin, glargine started 5/15  Accu-Checks, hypoglycemia protocol       VTE prophylaxis: on Coumadin, with heparin drip bridging

## 2020-05-15 NOTE — PROGRESS NOTES
Assumed care of pt, beside report received from YVES Ashby. Updated on POC, call light within reach all fall precautions within place. Bed locked and lowered. Pt instructed to call for assistance before getting up. All questions answered, no other needs at this time.

## 2020-05-16 ENCOUNTER — APPOINTMENT (OUTPATIENT)
Dept: CARDIOLOGY | Facility: MEDICAL CENTER | Age: 75
DRG: 377 | End: 2020-05-16
Attending: HOSPITALIST
Payer: MEDICARE

## 2020-05-16 LAB
ALBUMIN SERPL BCP-MCNC: 3.3 G/DL (ref 3.2–4.9)
ALBUMIN/GLOB SERPL: 1.7 G/DL
ALP SERPL-CCNC: 84 U/L (ref 30–99)
ALT SERPL-CCNC: 52 U/L (ref 2–50)
ANION GAP SERPL CALC-SCNC: 11 MMOL/L (ref 7–16)
APTT PPP: 52.8 SEC (ref 24.7–36)
APTT PPP: 79.8 SEC (ref 24.7–36)
APTT PPP: 89.2 SEC (ref 24.7–36)
AST SERPL-CCNC: 39 U/L (ref 12–45)
BASOPHILS # BLD AUTO: 0.7 % (ref 0–1.8)
BASOPHILS # BLD: 0.04 K/UL (ref 0–0.12)
BILIRUB SERPL-MCNC: 0.3 MG/DL (ref 0.1–1.5)
BUN SERPL-MCNC: 18 MG/DL (ref 8–22)
CALCIUM SERPL-MCNC: 8.4 MG/DL (ref 8.5–10.5)
CHLORIDE SERPL-SCNC: 104 MMOL/L (ref 96–112)
CO2 SERPL-SCNC: 25 MMOL/L (ref 20–33)
CREAT SERPL-MCNC: 1.13 MG/DL (ref 0.5–1.4)
EOSINOPHIL # BLD AUTO: 0.55 K/UL (ref 0–0.51)
EOSINOPHIL NFR BLD: 9.5 % (ref 0–6.9)
ERYTHROCYTE [DISTWIDTH] IN BLOOD BY AUTOMATED COUNT: 58.1 FL (ref 35.9–50)
GLOBULIN SER CALC-MCNC: 2 G/DL (ref 1.9–3.5)
GLUCOSE BLD-MCNC: 236 MG/DL (ref 65–99)
GLUCOSE BLD-MCNC: 249 MG/DL (ref 65–99)
GLUCOSE BLD-MCNC: 296 MG/DL (ref 65–99)
GLUCOSE BLD-MCNC: 298 MG/DL (ref 65–99)
GLUCOSE SERPL-MCNC: 236 MG/DL (ref 65–99)
HCT VFR BLD AUTO: 25.9 % (ref 42–52)
HGB BLD-MCNC: 8.2 G/DL (ref 14–18)
IMM GRANULOCYTES # BLD AUTO: 0.08 K/UL (ref 0–0.11)
IMM GRANULOCYTES NFR BLD AUTO: 1.4 % (ref 0–0.9)
INR PPP: 1.22 (ref 0.87–1.13)
LV EJECT FRACT  99904: 45
LV EJECT FRACT MOD 2C 99903: 46.1
LV EJECT FRACT MOD 4C 99902: 49.36
LV EJECT FRACT MOD BP 99901: 49.48
LYMPHOCYTES # BLD AUTO: 1 K/UL (ref 1–4.8)
LYMPHOCYTES NFR BLD: 17.2 % (ref 22–41)
MAGNESIUM SERPL-MCNC: 2 MG/DL (ref 1.5–2.5)
MCH RBC QN AUTO: 31.8 PG (ref 27–33)
MCHC RBC AUTO-ENTMCNC: 31.7 G/DL (ref 33.7–35.3)
MCV RBC AUTO: 100.4 FL (ref 81.4–97.8)
MONOCYTES # BLD AUTO: 0.66 K/UL (ref 0–0.85)
MONOCYTES NFR BLD AUTO: 11.4 % (ref 0–13.4)
NEUTROPHILS # BLD AUTO: 3.47 K/UL (ref 1.82–7.42)
NEUTROPHILS NFR BLD: 59.8 % (ref 44–72)
NRBC # BLD AUTO: 0 K/UL
NRBC BLD-RTO: 0 /100 WBC
PLATELET # BLD AUTO: 180 K/UL (ref 164–446)
PMV BLD AUTO: 10.6 FL (ref 9–12.9)
POTASSIUM SERPL-SCNC: 4.2 MMOL/L (ref 3.6–5.5)
PROT SERPL-MCNC: 5.3 G/DL (ref 6–8.2)
PROTHROMBIN TIME: 15.7 SEC (ref 12–14.6)
RBC # BLD AUTO: 2.58 M/UL (ref 4.7–6.1)
SODIUM SERPL-SCNC: 140 MMOL/L (ref 135–145)
WBC # BLD AUTO: 5.8 K/UL (ref 4.8–10.8)

## 2020-05-16 PROCEDURE — 99232 SBSQ HOSP IP/OBS MODERATE 35: CPT | Performed by: HOSPITALIST

## 2020-05-16 PROCEDURE — 93306 TTE W/DOPPLER COMPLETE: CPT | Mod: 26 | Performed by: INTERNAL MEDICINE

## 2020-05-16 PROCEDURE — A9270 NON-COVERED ITEM OR SERVICE: HCPCS | Performed by: HOSPITALIST

## 2020-05-16 PROCEDURE — 36415 COLL VENOUS BLD VENIPUNCTURE: CPT

## 2020-05-16 PROCEDURE — 85610 PROTHROMBIN TIME: CPT

## 2020-05-16 PROCEDURE — 700111 HCHG RX REV CODE 636 W/ 250 OVERRIDE (IP): Performed by: INTERNAL MEDICINE

## 2020-05-16 PROCEDURE — 93306 TTE W/DOPPLER COMPLETE: CPT

## 2020-05-16 PROCEDURE — 83735 ASSAY OF MAGNESIUM: CPT

## 2020-05-16 PROCEDURE — 85025 COMPLETE CBC W/AUTO DIFF WBC: CPT

## 2020-05-16 PROCEDURE — 85730 THROMBOPLASTIN TIME PARTIAL: CPT

## 2020-05-16 PROCEDURE — A9270 NON-COVERED ITEM OR SERVICE: HCPCS | Performed by: PSYCHIATRY & NEUROLOGY

## 2020-05-16 PROCEDURE — 770020 HCHG ROOM/CARE - TELE (206)

## 2020-05-16 PROCEDURE — 700102 HCHG RX REV CODE 250 W/ 637 OVERRIDE(OP): Performed by: HOSPITALIST

## 2020-05-16 PROCEDURE — 82962 GLUCOSE BLOOD TEST: CPT | Mod: 91

## 2020-05-16 PROCEDURE — 700102 HCHG RX REV CODE 250 W/ 637 OVERRIDE(OP): Performed by: PSYCHIATRY & NEUROLOGY

## 2020-05-16 PROCEDURE — 80053 COMPREHEN METABOLIC PANEL: CPT

## 2020-05-16 RX ORDER — INSULIN GLARGINE 100 [IU]/ML
14 INJECTION, SOLUTION SUBCUTANEOUS EVERY EVENING
Status: DISCONTINUED | OUTPATIENT
Start: 2020-05-16 | End: 2020-05-18

## 2020-05-16 RX ORDER — INSULIN GLARGINE 100 [IU]/ML
5 INJECTION, SOLUTION SUBCUTANEOUS ONCE
Status: COMPLETED | OUTPATIENT
Start: 2020-05-16 | End: 2020-05-16

## 2020-05-16 RX ADMIN — HEPARIN SODIUM 1000 UNITS/HR: 5000 INJECTION, SOLUTION INTRAVENOUS at 07:53

## 2020-05-16 RX ADMIN — FERROUS SULFATE TAB 325 MG (65 MG ELEMENTAL FE) 325 MG: 325 (65 FE) TAB at 16:11

## 2020-05-16 RX ADMIN — INSULIN HUMAN 4 UNITS: 100 INJECTION, SOLUTION PARENTERAL at 16:11

## 2020-05-16 RX ADMIN — INSULIN HUMAN 4 UNITS: 100 INJECTION, SOLUTION PARENTERAL at 08:25

## 2020-05-16 RX ADMIN — CITALOPRAM HYDROBROMIDE 20 MG: 20 TABLET ORAL at 16:11

## 2020-05-16 RX ADMIN — ATORVASTATIN CALCIUM 40 MG: 40 TABLET, FILM COATED ORAL at 16:11

## 2020-05-16 RX ADMIN — DOCUSATE SODIUM 50 MG AND SENNOSIDES 8.6 MG 2 TABLET: 8.6; 5 TABLET, FILM COATED ORAL at 05:46

## 2020-05-16 RX ADMIN — INSULIN GLARGINE 14 UNITS: 100 INJECTION, SOLUTION SUBCUTANEOUS at 16:11

## 2020-05-16 RX ADMIN — WARFARIN SODIUM 5 MG: 5 TABLET ORAL at 16:15

## 2020-05-16 RX ADMIN — INSULIN HUMAN 7 UNITS: 100 INJECTION, SOLUTION PARENTERAL at 12:14

## 2020-05-16 RX ADMIN — INSULIN HUMAN 7 UNITS: 100 INJECTION, SOLUTION PARENTERAL at 19:57

## 2020-05-16 RX ADMIN — INSULIN GLARGINE 5 UNITS: 100 INJECTION, SOLUTION SUBCUTANEOUS at 08:26

## 2020-05-16 RX ADMIN — FERROUS SULFATE TAB 325 MG (65 MG ELEMENTAL FE) 325 MG: 325 (65 FE) TAB at 05:46

## 2020-05-16 RX ADMIN — QUETIAPINE FUMARATE 50 MG: 25 TABLET ORAL at 16:11

## 2020-05-16 ASSESSMENT — ENCOUNTER SYMPTOMS
EYE DISCHARGE: 0
EYE PAIN: 0
BLOOD IN STOOL: 1
STRIDOR: 0
VOMITING: 0
BACK PAIN: 0
FLANK PAIN: 0
SINUS PAIN: 0
MEMORY LOSS: 1
ABDOMINAL PAIN: 0
NECK PAIN: 0
EYE REDNESS: 0
SORE THROAT: 0
SHORTNESS OF BREATH: 0
FEVER: 0
LOSS OF CONSCIOUSNESS: 0
HEMOPTYSIS: 0

## 2020-05-16 ASSESSMENT — FIBROSIS 4 INDEX: FIB4 SCORE: 2.28

## 2020-05-16 NOTE — PROGRESS NOTES
Inpatient Anticoagulation Service Note    Date: 5/16/2020    Reason for Anticoagulation: Mechanical Mitral Valve Replacement     Target INR: 2.5 to 3.5         Hemoglobin Value: (!) 8.2  Hematocrit Value: (!) 25.9  Lab Platelet Value: 180    INR from last 7 days     Date/Time INR Value    05/16/20 0555  (!) 1.22    05/15/20 0505  1.06    05/11/20 1146  (!) 1.17    05/10/20 0000  (!) 1.24        Dose from last 7 days     Date/Time Dose (mg)    05/16/20 1321  5    05/15/20 1059  7.5    05/14/20 0817  5        Average Dose (mg): (Home dose: Warfarin 7.5 mg Mondays and 5 mg all other days)  Significant Interactions: Statin  Bridge Therapy: Yes(HWBP)     Reversal Agent Administered: Vitamin K  Intravenous(on 5/8/20)    Comments: INR improving. Pt had larger warfarin dose yesterday and is now on his home warfarin dosing. Will continue to trend the INR. Pt is being bridged on heparin drip. No s/s of bleeding noted. Pt had recent GIB (attributed to a diverticular bleed by GI), but presence of mechanical mitral valve and the risk it presents prompted start of anticoagulation.    Education Material Provided?: No(chronic warfarin therapy)  Pharmacist suggested discharge dosing:  likely pt's home dose of warfarin 7.5 mg on Mondays and 5 mg all other days and a follow up INR with [facility] within 2-3 days of discharge.     Carlo Bermudez, PharmD

## 2020-05-16 NOTE — PROGRESS NOTES
VSS. Pt lying up in chair. No signs of distress noted. PERRL, alert and oriented x4. Easily confused and disoriented. Follows commands. Respirations equal and unlabored on RA. Denies pain or needs at this time. Sitter by the bedside. Bed locked, in the low position, call light within reach, will continue to monitor

## 2020-05-16 NOTE — PROGRESS NOTES
Hospital Medicine Daily Progress Note    Date of Service  5/16/2020    Chief Complaint  Gastrointestinal bleeding      Hospital Course      74 y.o. male admitted 5/8/2020 with GIB and h/o mechanical mitral valve on coumadin. GI consulted, pt got scope, Scope with large amount of diverticuli, but no specific area of bleed found. Bleeding thought to be 2/2 diverticular bleed.   Given high risk of CVA with mechanical mitral valve, he was re-start weight based.             Interval Problem Update  Heart rate 60s-80s.  Saturating well on room air.  Blood sugars 250s-300s, I will increase long-acting insulin.  Follow-up echocardiography shows that of the mechanical mitral valve appears to be functioning well.    Hemoglobin 8.2, unchanged from yesterday, continue to monitor  INR still subtherapeutic, discussed with pharmacy, will increase warfarin dose continue bridging with heparin continue to monitor for bleeding.  I discussed with patient's nurse pharmacist and charge nurse.         Consultants/Specialty  GI   Cards   Neuro     Code Status  FULL     Disposition  Needs skilled nursing facility   Tele, still requiring a sitter     Review of Systems  Review of Systems   Constitutional: Positive for malaise/fatigue. Negative for fever.   HENT: Negative for sinus pain and sore throat.    Eyes: Negative for pain, discharge and redness.   Respiratory: Negative for hemoptysis, shortness of breath and stridor.    Cardiovascular: Negative for chest pain.   Gastrointestinal: Positive for blood in stool. Negative for abdominal pain and vomiting.   Genitourinary: Negative for flank pain and hematuria.   Musculoskeletal: Negative for back pain and neck pain.   Neurological: Negative for loss of consciousness (Improved).   Psychiatric/Behavioral: Positive for memory loss.      Physical Exam  Temp:  [36.7 °C (98.1 °F)-37.3 °C (99.1 °F)] 36.7 °C (98.1 °F)  Pulse:  [60-88] 76  Resp:  [16-20] 18  BP: (133-150)/(62-70) 146/69  SpO2:  [93  %-98 %] 97 %    Physical Exam  Vitals signs and nursing note reviewed.   Constitutional:       General: He is not in acute distress.     Appearance: He is well-developed.   HENT:      Head: Normocephalic and atraumatic.      Nose: No rhinorrhea.      Mouth/Throat:      Pharynx: No oropharyngeal exudate.   Eyes:      General:         Right eye: No discharge.         Left eye: No discharge.      Pupils: Pupils are equal, round, and reactive to light.   Neck:      Musculoskeletal: Normal range of motion and neck supple.      Thyroid: No thyromegaly.   Cardiovascular:      Rate and Rhythm: Normal rate and regular rhythm.      Heart sounds: Murmur present.   Pulmonary:      Effort: Pulmonary effort is normal. No respiratory distress.      Breath sounds: Normal breath sounds. No stridor. No wheezing.   Abdominal:      General: Bowel sounds are normal.      Palpations: Abdomen is soft.      Tenderness: There is no abdominal tenderness.   Musculoskeletal: Normal range of motion.         General: No tenderness.   Skin:     General: Skin is warm and dry.      Coloration: Skin is pale.      Findings: No rash.   Neurological:      Mental Status: He is alert.      Cranial Nerves: No cranial nerve deficit.      Comments: Alert, oriented intermittently and variably   Psychiatric:         Mood and Affect: Mood normal.      Comments: Impaired judgment       Fluids    Intake/Output Summary (Last 24 hours) at 5/16/2020 1645  Last data filed at 5/16/2020 0300  Gross per 24 hour   Intake --   Output 200 ml   Net -200 ml     Laboratory  Recent Labs     05/14/20  0553 05/15/20  0505 05/16/20  0555   WBC 5.2 5.6 5.8   RBC 2.65* 2.57* 2.58*   HEMOGLOBIN 8.5* 8.2* 8.2*   HEMATOCRIT 25.1* 24.6* 25.9*   MCV 94.7 95.7 100.4*   MCH 32.1 31.9 31.8   MCHC 33.9 33.3* 31.7*   RDW 51.8* 54.1* 58.1*   PLATELETCT 163* 176 180   MPV 10.7 10.6 10.6     Recent Labs     05/14/20  0553 05/15/20  0505 05/16/20  0555   SODIUM 136 140 140   POTASSIUM 3.9 4.1  4.2   CHLORIDE 101 105 104   CO2 25 23 25   GLUCOSE 233* 180* 236*   BUN 15 16 18   CREATININE 1.05 0.97 1.13   CALCIUM 8.2* 8.2* 8.4*     Recent Labs     05/15/20  0505 05/16/20  0555 05/16/20  1358   APTT 58.1* 52.8* 79.8*   INR 1.06 1.22*  --                Imaging  EC-ECHOCARDIOGRAM COMPLETE W/O CONT   Final Result      CT-HEAD W/O   Final Result      No acute intracranial abnormality      DX-CHEST-PORTABLE (1 VIEW)   Final Result      1.  Interstitial and hazy airspace opacities and trace left pleural effusion. This may be related to pulmonary edema, however infection should be excluded clinically.   2.  Mild cardiomegaly. Valve prosthesis.      CT-CEREBRAL PERFUSION ANALYSIS   Final Result      1.  Cerebral blood flow less than 30% likely representing completed infarct = 0 mL.      2.  T Max more than 6 seconds likely representing combination of completed infarct and ischemia = 0 mL.      3.  Mismatched volume likely representing ischemic brain/penumbra = None      4.  Please note that the cerebral perfusion was performed on the limited brain tissue around the basal ganglia region. Infarct/ischemia outside the CT perfusion sections can be missed in this study.      CT-CTA NECK WITH & W/O-POST PROCESSING   Final Result      1.  Atherosclerosis at the internal carotid artery origins without significant stenosis.   2.  Patent vertebral arteries.   3.  Bilateral pleural effusions and probable mild pulmonary edema.      CT-CTA HEAD WITH & W/O-POST PROCESS   Final Result      CT angiogram of the [place] within normal limits.      Generalized atrophy and chronic ischemic changes.      No acute intracranial abnormality is seen.      CT-ABDOMEN-PELVIS WITH   Final Result      1.  No evidence of abdominal or pelvic mass, adenopathy, or inflammatory change.   2.  7 mm metallic foreign body in the descending colon, etiology uncertain. It is not clear whether this is ingested material or from a prior procedure.    3.  Colonic diverticulosis   4.  Atherosclerosis   5.  RIGHT inguinal hernia containing fat            Findings were discussed with ROGELIO HENLEY on 5/8/2020 1:58 PM.                  Assessment/Plan  * Lower GI bleed- (present on admission)  Assessment & Plan  Hb has improved, benign abdominal exam, no clear bloody bowel movements over the past 24 hours  Consistent with a diverticular bleed.     Scope with large amount of diverticuli, but no specific area of bleed found, which is common for this disease  Gi recommends CTA A/P if bleeds again   Monitor closely, high risk for further decompensation.          Acute on Chronic Encephalopathy  Assessment & Plan  Has baseline dementia, made worse with anemia, GI bleeding, change in environment.  Neuro consulted, recommended a trial of Sinemet 100mg TID, and Avoid risperdal.  Patient slightly got worse with Sinemet, discontinued   Improved, now back to baseline     Coronary artery disease involving coronary bypass graft- (present on admission)  Assessment & Plan  History of CABG  Continue Lipitor    History of mitral valve replacement with mechanical valve [Z95.2]- (present on admission)  Assessment & Plan  Have been restarted on AC   Will eventually need an echo prior to discharge     Chronic anticoagulation- (present on admission)  Assessment & Plan  Used to be on Coumadin for his mechanical mitral valve   Bridged on heparin drip while having procedure, back on Coumadin with heparin drip bridging on 5/13 5/16, INR still subtherapeutic     Dementia with behavioral disturbance (HCC)- (present on admission)  Assessment & Plan  Try to get window bed, maintain awake daytime state.    Continue frequent re-orientation, encourage activity.   Quetiapine (Seroquel) as needed for agitation   Try to avoid sedating medications as much as possible, reserve only for extreme agitation posing threat to self.       ACP (advance care planning)  Assessment & Plan  On 5/14/2020, I had  a prolonged discussion with the patient who was not able to participate much and wife Comfort, regarding goals of care, diagnoses, prognosis, and CODE STATUS.  The patient has advanced aged and poor functional performance. He has multiple comorbid conditions including diabetes, diverticulosis, mechanical mitral valve on chronic anticoagulation, and coronary artery disease. Admitted with GI bleeding. I encourage to consider changing code to DNAR/DNI, however Comfort and patient want to maintain FULL code.     Debility  Assessment & Plan  Multifactorial, age, recent GI bleeding, hospitalization    Physical and occupational therapy evaluation     Bradycardia  Assessment & Plan  Resolved    Type II diabetes mellitus (HCC)- (present on admission)  Assessment & Plan  With hyperglycemia  Last glycated hemoglobin 7.4%   Short acting insulin, glargine started 5/15  Accu-Checks, hypoglycemia protocol       VTE prophylaxis: on Coumadin, with heparin drip bridging

## 2020-05-17 LAB
ALBUMIN SERPL BCP-MCNC: 3.3 G/DL (ref 3.2–4.9)
ALBUMIN/GLOB SERPL: 1.7 G/DL
ALP SERPL-CCNC: 86 U/L (ref 30–99)
ALT SERPL-CCNC: 49 U/L (ref 2–50)
ANION GAP SERPL CALC-SCNC: 9 MMOL/L (ref 7–16)
APTT PPP: 38.1 SEC (ref 24.7–36)
AST SERPL-CCNC: 31 U/L (ref 12–45)
BASOPHILS # BLD AUTO: 1 % (ref 0–1.8)
BASOPHILS # BLD: 0.07 K/UL (ref 0–0.12)
BILIRUB SERPL-MCNC: 0.3 MG/DL (ref 0.1–1.5)
BUN SERPL-MCNC: 15 MG/DL (ref 8–22)
CALCIUM SERPL-MCNC: 8.5 MG/DL (ref 8.5–10.5)
CHLORIDE SERPL-SCNC: 104 MMOL/L (ref 96–112)
CO2 SERPL-SCNC: 25 MMOL/L (ref 20–33)
CREAT SERPL-MCNC: 1.04 MG/DL (ref 0.5–1.4)
EOSINOPHIL # BLD AUTO: 0.61 K/UL (ref 0–0.51)
EOSINOPHIL NFR BLD: 8.9 % (ref 0–6.9)
ERYTHROCYTE [DISTWIDTH] IN BLOOD BY AUTOMATED COUNT: 60 FL (ref 35.9–50)
GLOBULIN SER CALC-MCNC: 2 G/DL (ref 1.9–3.5)
GLUCOSE BLD-MCNC: 189 MG/DL (ref 65–99)
GLUCOSE BLD-MCNC: 191 MG/DL (ref 65–99)
GLUCOSE BLD-MCNC: 207 MG/DL (ref 65–99)
GLUCOSE BLD-MCNC: 261 MG/DL (ref 65–99)
GLUCOSE SERPL-MCNC: 200 MG/DL (ref 65–99)
HCT VFR BLD AUTO: 26.8 % (ref 42–52)
HGB BLD-MCNC: 8.4 G/DL (ref 14–18)
IMM GRANULOCYTES # BLD AUTO: 0.18 K/UL (ref 0–0.11)
IMM GRANULOCYTES NFR BLD AUTO: 2.6 % (ref 0–0.9)
INR PPP: 1.71 (ref 0.87–1.13)
LYMPHOCYTES # BLD AUTO: 1.28 K/UL (ref 1–4.8)
LYMPHOCYTES NFR BLD: 18.6 % (ref 22–41)
MAGNESIUM SERPL-MCNC: 2.1 MG/DL (ref 1.5–2.5)
MCH RBC QN AUTO: 31.6 PG (ref 27–33)
MCHC RBC AUTO-ENTMCNC: 31.3 G/DL (ref 33.7–35.3)
MCV RBC AUTO: 100.8 FL (ref 81.4–97.8)
MONOCYTES # BLD AUTO: 0.73 K/UL (ref 0–0.85)
MONOCYTES NFR BLD AUTO: 10.6 % (ref 0–13.4)
NEUTROPHILS # BLD AUTO: 4 K/UL (ref 1.82–7.42)
NEUTROPHILS NFR BLD: 58.3 % (ref 44–72)
NRBC # BLD AUTO: 0 K/UL
NRBC BLD-RTO: 0 /100 WBC
PLATELET # BLD AUTO: 206 K/UL (ref 164–446)
PMV BLD AUTO: 10.9 FL (ref 9–12.9)
POTASSIUM SERPL-SCNC: 4.5 MMOL/L (ref 3.6–5.5)
PROT SERPL-MCNC: 5.3 G/DL (ref 6–8.2)
PROTHROMBIN TIME: 20.6 SEC (ref 12–14.6)
RBC # BLD AUTO: 2.66 M/UL (ref 4.7–6.1)
SODIUM SERPL-SCNC: 138 MMOL/L (ref 135–145)
WBC # BLD AUTO: 6.9 K/UL (ref 4.8–10.8)

## 2020-05-17 PROCEDURE — 85610 PROTHROMBIN TIME: CPT

## 2020-05-17 PROCEDURE — 36415 COLL VENOUS BLD VENIPUNCTURE: CPT

## 2020-05-17 PROCEDURE — 82962 GLUCOSE BLOOD TEST: CPT | Mod: 91

## 2020-05-17 PROCEDURE — 700111 HCHG RX REV CODE 636 W/ 250 OVERRIDE (IP): Performed by: HOSPITALIST

## 2020-05-17 PROCEDURE — 85730 THROMBOPLASTIN TIME PARTIAL: CPT

## 2020-05-17 PROCEDURE — 770020 HCHG ROOM/CARE - TELE (206)

## 2020-05-17 PROCEDURE — A9270 NON-COVERED ITEM OR SERVICE: HCPCS | Performed by: HOSPITALIST

## 2020-05-17 PROCEDURE — 80053 COMPREHEN METABOLIC PANEL: CPT

## 2020-05-17 PROCEDURE — 99232 SBSQ HOSP IP/OBS MODERATE 35: CPT | Performed by: HOSPITALIST

## 2020-05-17 PROCEDURE — 85025 COMPLETE CBC W/AUTO DIFF WBC: CPT

## 2020-05-17 PROCEDURE — 700102 HCHG RX REV CODE 250 W/ 637 OVERRIDE(OP): Performed by: HOSPITALIST

## 2020-05-17 PROCEDURE — 700102 HCHG RX REV CODE 250 W/ 637 OVERRIDE(OP): Performed by: PSYCHIATRY & NEUROLOGY

## 2020-05-17 PROCEDURE — 83735 ASSAY OF MAGNESIUM: CPT

## 2020-05-17 PROCEDURE — A9270 NON-COVERED ITEM OR SERVICE: HCPCS | Performed by: PSYCHIATRY & NEUROLOGY

## 2020-05-17 RX ORDER — WARFARIN SODIUM 7.5 MG/1
7.5 TABLET ORAL
Status: DISCONTINUED | OUTPATIENT
Start: 2020-05-18 | End: 2020-05-18

## 2020-05-17 RX ORDER — WARFARIN SODIUM 5 MG/1
5 TABLET ORAL
Status: DISCONTINUED | OUTPATIENT
Start: 2020-05-17 | End: 2020-05-20

## 2020-05-17 RX ADMIN — FERROUS SULFATE TAB 325 MG (65 MG ELEMENTAL FE) 325 MG: 325 (65 FE) TAB at 17:25

## 2020-05-17 RX ADMIN — FERROUS SULFATE TAB 325 MG (65 MG ELEMENTAL FE) 325 MG: 325 (65 FE) TAB at 08:45

## 2020-05-17 RX ADMIN — INSULIN HUMAN 3 UNITS: 100 INJECTION, SOLUTION PARENTERAL at 13:07

## 2020-05-17 RX ADMIN — CITALOPRAM HYDROBROMIDE 20 MG: 20 TABLET ORAL at 17:25

## 2020-05-17 RX ADMIN — INSULIN HUMAN 7 UNITS: 100 INJECTION, SOLUTION PARENTERAL at 20:47

## 2020-05-17 RX ADMIN — INSULIN HUMAN 3 UNITS: 100 INJECTION, SOLUTION PARENTERAL at 17:31

## 2020-05-17 RX ADMIN — INSULIN HUMAN 4 UNITS: 100 INJECTION, SOLUTION PARENTERAL at 08:46

## 2020-05-17 RX ADMIN — ENOXAPARIN SODIUM 60 MG: 100 INJECTION SUBCUTANEOUS at 20:42

## 2020-05-17 RX ADMIN — QUETIAPINE FUMARATE 50 MG: 25 TABLET ORAL at 17:26

## 2020-05-17 RX ADMIN — ENOXAPARIN SODIUM 60 MG: 100 INJECTION SUBCUTANEOUS at 13:05

## 2020-05-17 RX ADMIN — INSULIN GLARGINE 14 UNITS: 100 INJECTION, SOLUTION SUBCUTANEOUS at 17:31

## 2020-05-17 RX ADMIN — WARFARIN SODIUM 5 MG: 5 TABLET ORAL at 17:24

## 2020-05-17 RX ADMIN — ATORVASTATIN CALCIUM 40 MG: 40 TABLET, FILM COATED ORAL at 17:27

## 2020-05-17 ASSESSMENT — ENCOUNTER SYMPTOMS
NECK PAIN: 0
FLANK PAIN: 0
BLOOD IN STOOL: 1
SHORTNESS OF BREATH: 0
HEMOPTYSIS: 0
EYE PAIN: 0
SORE THROAT: 0
BACK PAIN: 0
SINUS PAIN: 0
VOMITING: 0
EYE REDNESS: 0
EYE DISCHARGE: 0
ABDOMINAL PAIN: 0
STRIDOR: 0
LOSS OF CONSCIOUSNESS: 0
FEVER: 0
MEMORY LOSS: 1

## 2020-05-17 ASSESSMENT — FIBROSIS 4 INDEX: FIB4 SCORE: 1.63

## 2020-05-17 NOTE — CARE PLAN
Problem: Communication  Goal: The ability to communicate needs accurately and effectively will improve  Outcome: PROGRESSING AS EXPECTED  Intervention: [place] patient and significant other/support system to call light to alert staff of needs  Flowsheets (Taken 5/10/2020 1998 by Amanda Reyes, R.N.)  Oriented to:: All of the Following : Location of Bathroom, Visiting Policy, Unit Routine, Call Light and Bedside Controls, Bedside Rail Policy, Smoking Policy, Rights and Responsibilities, Bedside Report, and Patient Education Notebook  Note: Pt educated on POC and medications. Pt verbalized understanding.      Problem: Safety  Goal: Will remain free from injury  Outcome: PROGRESSING AS EXPECTED  Intervention: Provide assistance with mobility  Flowsheets (Taken 5/16/2020 1700 by Ryann Fritz)  Assistance: Assistance of One  Ambulation Tolerance: Tolerates Well  Note: Pt bedside table and call-light are within reach, bed in lowest position and locked. Treaded socks on and pt has been educated on call light use and asked to call before getting up.

## 2020-05-17 NOTE — PROGRESS NOTES
Assumed care of pt, beside report received from YVES Nation. Updated on POC, call light within reach all fall precautions within place. Bed locked and lowered. Pt instructed to call for assistance before getting up. All questions answered, no other needs at this time.

## 2020-05-17 NOTE — PROGRESS NOTES
Hospital Medicine Daily Progress Note    Date of Service  5/17/2020    Chief Complaint  Gastrointestinal bleeding .       Hospital Course      74 y.o. male admitted 5/8/2020 with GIB and h/o mechanical mitral valve on coumadin. GI consulted, pt got scope, Scope with large amount of diverticuli, but no specific area of bleed found. Bleeding thought to be 2/2 diverticular bleed.   Given high risk of CVA with mechanical mitral valve, he was re-start weight based.       Follow-up echocardiography shows that of the mechanical mitral valve appears to be functioning well.             Interval Problem Update  Blood sugars elevated 190s-210, will increase glargine dose.  Hemoglobin has been stable 8.4, up from 8.2, will switch heparin drip to Lovenox, INR still subtherapeutic  Hemoglobin 8.2, unchanged from yesterday, continue to monitor  Heart rate 50s, blood pressure within normal limits, saturating well on room air.  I discussed with patient's nurse pharmacist and charge nurse.         Consultants/Specialty  GI   Cards   Neuro     Code Status  FULL     Disposition  Needs skilled nursing facility  Tele, still requiring a sitter      Review of Systems  Review of Systems   Constitutional: Positive for malaise/fatigue. Negative for fever.   HENT: Negative for sinus pain and sore throat.    Eyes: Negative for pain, discharge and redness.   Respiratory: Negative for hemoptysis, shortness of breath and stridor.    Cardiovascular: Negative for chest pain.   Gastrointestinal: Positive for blood in stool. Negative for abdominal pain and vomiting.   Genitourinary: Negative for flank pain and hematuria.   Musculoskeletal: Negative for back pain and neck pain.   Neurological: Negative for loss of consciousness (Improved).   Psychiatric/Behavioral: Positive for memory loss.      Physical Exam  Temp:  [36.9 °C (98.4 °F)-37.3 °C (99.2 °F)] 37.1 °C (98.8 °F)  Pulse:  [52-72] 56  Resp:  [16-20] 16  BP: (120-176)/(54-74) 145/64  SpO2:  [95  %-98 %] 97 %    Physical Exam  Vitals signs and nursing note reviewed.   Constitutional:       General: He is not in acute distress.     Appearance: He is well-developed.   HENT:      Head: Normocephalic and atraumatic.      Nose: No rhinorrhea.      Mouth/Throat:      Pharynx: No oropharyngeal exudate.   Eyes:      General:         Right eye: No discharge.         Left eye: No discharge.      Pupils: Pupils are equal, round, and reactive to light.   Neck:      Musculoskeletal: Normal range of motion and neck supple.      Thyroid: No thyromegaly.   Cardiovascular:      Rate and Rhythm: Normal rate and regular rhythm.      Heart sounds: Murmur present.   Pulmonary:      Effort: Pulmonary effort is normal. No respiratory distress.      Breath sounds: Normal breath sounds. No stridor. No wheezing.   Abdominal:      General: Bowel sounds are normal.      Palpations: Abdomen is soft.      Tenderness: There is no abdominal tenderness.   Musculoskeletal: Normal range of motion.         General: No tenderness.   Skin:     General: Skin is warm and dry.      Coloration: Skin is pale.      Findings: No rash.   Neurological:      Mental Status: He is alert.      Cranial Nerves: No cranial nerve deficit.      Comments: Alert, oriented intermittently and variably   Psychiatric:         Mood and Affect: Mood normal.      Comments: Impaired judgment       Fluids    Intake/Output Summary (Last 24 hours) at 5/17/2020 1341  Last data filed at 5/17/2020 0800  Gross per 24 hour   Intake 600 ml   Output --   Net 600 ml     Laboratory  Recent Labs     05/15/20  0505 05/16/20  0555 05/17/20  0246   WBC 5.6 5.8 6.9   RBC 2.57* 2.58* 2.66*   HEMOGLOBIN 8.2* 8.2* 8.4*   HEMATOCRIT 24.6* 25.9* 26.8*   MCV 95.7 100.4* 100.8*   MCH 31.9 31.8 31.6   MCHC 33.3* 31.7* 31.3*   RDW 54.1* 58.1* 60.0*   PLATELETCT 176 180 206   MPV 10.6 10.6 10.9     Recent Labs     05/15/20  0505 05/16/20  0555 05/17/20  0246   SODIUM 140 140 138   POTASSIUM 4.1 4.2  4.5   CHLORIDE 105 104 104   CO2 23 25 25   GLUCOSE 180* 236* 200*   BUN 16 18 15   CREATININE 0.97 1.13 1.04   CALCIUM 8.2* 8.4* 8.5     Recent Labs     05/15/20  0505 05/16/20  0555 05/16/20  1358 05/16/20 2032 05/17/20  0246   APTT 58.1* 52.8* 79.8* 89.2*  --    INR 1.06 1.22*  --   --  1.71*               Imaging  EC-ECHOCARDIOGRAM COMPLETE W/O CONT   Final Result      CT-HEAD W/O   Final Result      No acute intracranial abnormality      DX-CHEST-PORTABLE (1 VIEW)   Final Result      1.  Interstitial and hazy airspace opacities and trace left pleural effusion. This may be related to pulmonary edema, however infection should be excluded clinically.   2.  Mild cardiomegaly. Valve prosthesis.      CT-CEREBRAL PERFUSION ANALYSIS   Final Result      1.  Cerebral blood flow less than 30% likely representing completed infarct = 0 mL.      2.  T Max more than 6 seconds likely representing combination of completed infarct and ischemia = 0 mL.      3.  Mismatched volume likely representing ischemic brain/penumbra = None      4.  Please note that the cerebral perfusion was performed on the limited brain tissue around the basal ganglia region. Infarct/ischemia outside the CT perfusion sections can be missed in this study.      CT-CTA NECK WITH & W/O-POST PROCESSING   Final Result      1.  Atherosclerosis at the internal carotid artery origins without significant stenosis.   2.  Patent vertebral arteries.   3.  Bilateral pleural effusions and probable mild pulmonary edema.      CT-CTA HEAD WITH & W/O-POST PROCESS   Final Result      CT angiogram of the [place] within normal limits.      Generalized atrophy and chronic ischemic changes.      No acute intracranial abnormality is seen.      CT-ABDOMEN-PELVIS WITH   Final Result      1.  No evidence of abdominal or pelvic mass, adenopathy, or inflammatory change.   2.  7 mm metallic foreign body in the descending colon, etiology uncertain. It is not clear whether this  is ingested material or from a prior procedure.   3.  Colonic diverticulosis   4.  Atherosclerosis   5.  RIGHT inguinal hernia containing fat            Findings were discussed with ROGELIO HENLEY on 5/8/2020 1:58 PM.                  Assessment/Plan  * Lower GI bleed- (present on admission)  Assessment & Plan  Hb has improved, benign abdominal exam, no clear bloody bowel movements over the past 24 hours  Consistent with a diverticular bleed.     Scope with large amount of diverticuli, but no specific area of bleed found, which is common for this disease  Gi recommends CTA A/P if bleeds again   Monitor closely, high risk for further decompensation.          Acute on Chronic Encephalopathy  Assessment & Plan  Has baseline dementia, made worse with anemia, GI bleeding, change in environment.  Neuro consulted, recommended a trial of Sinemet 100mg TID, and Avoid risperdal.  Patient slightly got worse with Sinemet, discontinued   Improved, now back to baseline      Coronary artery disease involving coronary bypass graft- (present on admission)  Assessment & Plan  History of CABG  Continue Lipitor    History of mitral valve replacement with mechanical valve [Z95.2]- (present on admission)  Assessment & Plan  Have been restarted on AC   Follow-up echocardiography shows that of the mechanical mitral valve appears to be functioning well.       Chronic anticoagulation- (present on admission)  Assessment & Plan  Used to be on Coumadin for his mechanical mitral valve   Bridged on heparin drip while having procedure, back on Coumadin with heparin drip bridging on 5/13   Heparin drip bridging transition to Lovenox bridging with Coumadin on 5/17 5/17, INR still subtherapeutic       Dementia with behavioral disturbance (HCC)- (present on admission)  Assessment & Plan  Try to get window bed, maintain awake daytime state.    Continue frequent re-orientation, encourage activity.   Quetiapine (Seroquel) as needed for agitation   Try  to avoid sedating medications as much as possible, reserve only for extreme agitation posing threat to self.       ACP (advance care planning)  Assessment & Plan  On 5/14/2020, I had a prolonged discussion with the patient who was not able to participate much and wife Comfort, regarding goals of care, diagnoses, prognosis, and CODE STATUS.  The patient has advanced aged and poor functional performance. He has multiple comorbid conditions including diabetes, diverticulosis, mechanical mitral valve on chronic anticoagulation, and coronary artery disease. Admitted with GI bleeding. I encourage to consider changing code to DNAR/DNI, however Comfort and patient want to maintain FULL code.     Debility  Assessment & Plan  Multifactorial, age, recent GI bleeding, hospitalization    Physical and occupational therapy evaluation     Bradycardia  Assessment & Plan  Resolved    Type II diabetes mellitus (HCC)- (present on admission)  Assessment & Plan  With hyperglycemia  Last glycated hemoglobin 7.4%   Short acting insulin, glargine started 5/17   Accu-Checks, hypoglycemia protocol       VTE prophylaxis: on Coumadin, with heparin drip bridging

## 2020-05-17 NOTE — PROGRESS NOTES
Inpatient Anticoagulation Service Note  Date: 5/17/2020  Reason for Anticoagulation: Mechanical Mitral Valve Replacement     Hemoglobin Value: (!) 8.4  Hematocrit Value: (!) 26.8  Lab Platelet Value: 206  Target INR: 2.5 to 3.5  INR from last 7 days     Date/Time INR Value    05/17/20 0246  (!) 1.71    05/16/20 0555  (!) 1.22    05/15/20 0505  1.06    05/11/20 1146  (!) 1.17        Dose from last 7 days     Date/Time Dose (mg)    05/17/20 1500  5    05/16/20 1321  5    05/15/20 1059  7.5    05/14/20 0817  5        Average Dose (mg): TBD (Home Dose: 7.5 mg Mo and 5 mg all other days)  Significant Interactions: Statin, Other (Comments)(SSRI, PO Fe)  Bridge Therapy: Yes  Bridge Therapy Start Date: 05/14/20  Days of Overlap Therapy: 4  INR Value Greater than 2 Prior to Discontinuation of Parenteral Anticoagulation: Not Applicable  Reversal Agent Administered: Not Applicable  Comments: INR improved today but below goal. H/H low. PLT improved. No overt bleeding noted per chart review. Warfarin interactions noted. Concerns for GI bleeding this admission noted. Reasonable transition from HWBP to enoxaparin (day #4 overlap) noted. Will give warfarin 5 mg today. INR with AM labs. May need acute dose adjustments.    Plan:  Warfarin 5 mg 5/17/20  Education Material Provided?: No (chronic warfarin therapy)  Pharmacist suggested discharge dosing: warfarin 7.5 mg Mo and 5 mg all other days    Shamir Bermudez, PharmD

## 2020-05-18 LAB
ALBUMIN SERPL BCP-MCNC: 4 G/DL (ref 3.2–4.9)
ALBUMIN/GLOB SERPL: 1.7 G/DL
ALP SERPL-CCNC: 90 U/L (ref 30–99)
ALT SERPL-CCNC: 56 U/L (ref 2–50)
ANION GAP SERPL CALC-SCNC: 11 MMOL/L (ref 7–16)
AST SERPL-CCNC: 34 U/L (ref 12–45)
BASOPHILS # BLD AUTO: 1.1 % (ref 0–1.8)
BASOPHILS # BLD: 0.08 K/UL (ref 0–0.12)
BILIRUB SERPL-MCNC: 0.4 MG/DL (ref 0.1–1.5)
BUN SERPL-MCNC: 18 MG/DL (ref 8–22)
CALCIUM SERPL-MCNC: 9 MG/DL (ref 8.5–10.5)
CHLORIDE SERPL-SCNC: 103 MMOL/L (ref 96–112)
CO2 SERPL-SCNC: 27 MMOL/L (ref 20–33)
CREAT SERPL-MCNC: 1.1 MG/DL (ref 0.5–1.4)
EOSINOPHIL # BLD AUTO: 0.6 K/UL (ref 0–0.51)
EOSINOPHIL NFR BLD: 8.1 % (ref 0–6.9)
ERYTHROCYTE [DISTWIDTH] IN BLOOD BY AUTOMATED COUNT: 64.2 FL (ref 35.9–50)
GLOBULIN SER CALC-MCNC: 2.4 G/DL (ref 1.9–3.5)
GLUCOSE BLD-MCNC: 149 MG/DL (ref 65–99)
GLUCOSE BLD-MCNC: 207 MG/DL (ref 65–99)
GLUCOSE BLD-MCNC: 243 MG/DL (ref 65–99)
GLUCOSE BLD-MCNC: 321 MG/DL (ref 65–99)
GLUCOSE SERPL-MCNC: 111 MG/DL (ref 65–99)
HCT VFR BLD AUTO: 31.6 % (ref 42–52)
HGB BLD-MCNC: 10 G/DL (ref 14–18)
IMM GRANULOCYTES # BLD AUTO: 0.13 K/UL (ref 0–0.11)
IMM GRANULOCYTES NFR BLD AUTO: 1.8 % (ref 0–0.9)
INR PPP: 1.88 (ref 0.87–1.13)
LYMPHOCYTES # BLD AUTO: 1.67 K/UL (ref 1–4.8)
LYMPHOCYTES NFR BLD: 22.6 % (ref 22–41)
MAGNESIUM SERPL-MCNC: 2.2 MG/DL (ref 1.5–2.5)
MCH RBC QN AUTO: 32.5 PG (ref 27–33)
MCHC RBC AUTO-ENTMCNC: 31.6 G/DL (ref 33.7–35.3)
MCV RBC AUTO: 102.6 FL (ref 81.4–97.8)
MONOCYTES # BLD AUTO: 0.8 K/UL (ref 0–0.85)
MONOCYTES NFR BLD AUTO: 10.8 % (ref 0–13.4)
NEUTROPHILS # BLD AUTO: 4.11 K/UL (ref 1.82–7.42)
NEUTROPHILS NFR BLD: 55.6 % (ref 44–72)
NRBC # BLD AUTO: 0 K/UL
NRBC BLD-RTO: 0 /100 WBC
PLATELET # BLD AUTO: 247 K/UL (ref 164–446)
PMV BLD AUTO: 10.8 FL (ref 9–12.9)
POTASSIUM SERPL-SCNC: 4.4 MMOL/L (ref 3.6–5.5)
PROT SERPL-MCNC: 6.4 G/DL (ref 6–8.2)
PROTHROMBIN TIME: 22.2 SEC (ref 12–14.6)
RBC # BLD AUTO: 3.08 M/UL (ref 4.7–6.1)
SODIUM SERPL-SCNC: 141 MMOL/L (ref 135–145)
WBC # BLD AUTO: 7.4 K/UL (ref 4.8–10.8)

## 2020-05-18 PROCEDURE — 85610 PROTHROMBIN TIME: CPT

## 2020-05-18 PROCEDURE — A9270 NON-COVERED ITEM OR SERVICE: HCPCS | Performed by: HOSPITALIST

## 2020-05-18 PROCEDURE — 97535 SELF CARE MNGMENT TRAINING: CPT

## 2020-05-18 PROCEDURE — 99232 SBSQ HOSP IP/OBS MODERATE 35: CPT | Performed by: HOSPITALIST

## 2020-05-18 PROCEDURE — 97530 THERAPEUTIC ACTIVITIES: CPT

## 2020-05-18 PROCEDURE — 700102 HCHG RX REV CODE 250 W/ 637 OVERRIDE(OP): Performed by: HOSPITALIST

## 2020-05-18 PROCEDURE — 36415 COLL VENOUS BLD VENIPUNCTURE: CPT

## 2020-05-18 PROCEDURE — 80053 COMPREHEN METABOLIC PANEL: CPT

## 2020-05-18 PROCEDURE — 700111 HCHG RX REV CODE 636 W/ 250 OVERRIDE (IP): Performed by: HOSPITALIST

## 2020-05-18 PROCEDURE — 97110 THERAPEUTIC EXERCISES: CPT

## 2020-05-18 PROCEDURE — 770020 HCHG ROOM/CARE - TELE (206)

## 2020-05-18 PROCEDURE — 85025 COMPLETE CBC W/AUTO DIFF WBC: CPT

## 2020-05-18 PROCEDURE — 82962 GLUCOSE BLOOD TEST: CPT | Mod: 91

## 2020-05-18 PROCEDURE — 700102 HCHG RX REV CODE 250 W/ 637 OVERRIDE(OP): Performed by: PSYCHIATRY & NEUROLOGY

## 2020-05-18 PROCEDURE — 97116 GAIT TRAINING THERAPY: CPT

## 2020-05-18 PROCEDURE — 83735 ASSAY OF MAGNESIUM: CPT

## 2020-05-18 PROCEDURE — A9270 NON-COVERED ITEM OR SERVICE: HCPCS | Performed by: PSYCHIATRY & NEUROLOGY

## 2020-05-18 RX ORDER — INSULIN GLARGINE 100 [IU]/ML
15 INJECTION, SOLUTION SUBCUTANEOUS EVERY EVENING
Status: DISCONTINUED | OUTPATIENT
Start: 2020-05-18 | End: 2020-05-23 | Stop reason: HOSPADM

## 2020-05-18 RX ORDER — WARFARIN SODIUM 7.5 MG/1
7.5 TABLET ORAL
Status: DISCONTINUED | OUTPATIENT
Start: 2020-05-25 | End: 2020-05-20

## 2020-05-18 RX ORDER — WARFARIN SODIUM 10 MG/1
10 TABLET ORAL
Status: COMPLETED | OUTPATIENT
Start: 2020-05-18 | End: 2020-05-18

## 2020-05-18 RX ADMIN — CITALOPRAM HYDROBROMIDE 20 MG: 20 TABLET ORAL at 17:10

## 2020-05-18 RX ADMIN — WARFARIN SODIUM 10 MG: 10 TABLET ORAL at 17:23

## 2020-05-18 RX ADMIN — INSULIN HUMAN 4 UNITS: 100 INJECTION, SOLUTION PARENTERAL at 17:27

## 2020-05-18 RX ADMIN — QUETIAPINE FUMARATE 50 MG: 25 TABLET ORAL at 17:10

## 2020-05-18 RX ADMIN — ENOXAPARIN SODIUM 60 MG: 100 INJECTION SUBCUTANEOUS at 05:10

## 2020-05-18 RX ADMIN — ATORVASTATIN CALCIUM 40 MG: 40 TABLET, FILM COATED ORAL at 17:10

## 2020-05-18 RX ADMIN — FERROUS SULFATE TAB 325 MG (65 MG ELEMENTAL FE) 325 MG: 325 (65 FE) TAB at 17:09

## 2020-05-18 RX ADMIN — DOCUSATE SODIUM 50 MG AND SENNOSIDES 8.6 MG 2 TABLET: 8.6; 5 TABLET, FILM COATED ORAL at 17:09

## 2020-05-18 RX ADMIN — ENOXAPARIN SODIUM 60 MG: 100 INJECTION SUBCUTANEOUS at 17:34

## 2020-05-18 RX ADMIN — INSULIN HUMAN 4 UNITS: 100 INJECTION, SOLUTION PARENTERAL at 12:48

## 2020-05-18 RX ADMIN — INSULIN HUMAN 10 UNITS: 100 INJECTION, SOLUTION PARENTERAL at 20:39

## 2020-05-18 RX ADMIN — FERROUS SULFATE TAB 325 MG (65 MG ELEMENTAL FE) 325 MG: 325 (65 FE) TAB at 09:14

## 2020-05-18 RX ADMIN — DOCUSATE SODIUM 50 MG AND SENNOSIDES 8.6 MG 2 TABLET: 8.6; 5 TABLET, FILM COATED ORAL at 05:10

## 2020-05-18 ASSESSMENT — ENCOUNTER SYMPTOMS
BLOOD IN STOOL: 0
EYE REDNESS: 0
FLANK PAIN: 0
SHORTNESS OF BREATH: 0
BACK PAIN: 0
ABDOMINAL PAIN: 0
EYE DISCHARGE: 0
SINUS PAIN: 0
NECK PAIN: 0
MEMORY LOSS: 1
VOMITING: 0
STRIDOR: 0
HEMOPTYSIS: 0
EYE PAIN: 0
FEVER: 0
LOSS OF CONSCIOUSNESS: 0
SORE THROAT: 0

## 2020-05-18 ASSESSMENT — COGNITIVE AND FUNCTIONAL STATUS - GENERAL
SUGGESTED CMS G CODE MODIFIER MOBILITY: CI
DRESSING REGULAR LOWER BODY CLOTHING: A LITTLE
HELP NEEDED FOR BATHING: A LITTLE
MOBILITY SCORE: 23
DAILY ACTIVITIY SCORE: 19
DRESSING REGULAR UPPER BODY CLOTHING: A LITTLE
TOILETING: A LITTLE
PERSONAL GROOMING: A LITTLE
SUGGESTED CMS G CODE MODIFIER DAILY ACTIVITY: CK
WALKING IN HOSPITAL ROOM: A LITTLE

## 2020-05-18 ASSESSMENT — GAIT ASSESSMENTS
DEVIATION: SHUFFLED GAIT
DISTANCE (FEET): 200
GAIT LEVEL OF ASSIST: SUPERVISED

## 2020-05-18 ASSESSMENT — FIBROSIS 4 INDEX: FIB4 SCORE: 1.397981880772681397

## 2020-05-18 NOTE — PROGRESS NOTES
Hospital Medicine Daily Progress Note    Date of Service  5/18/2020    Chief Complaint  Gastrointestinal bleeding .       Hospital Course      74 y.o. male admitted 5/8/2020 with GIB and h/o mechanical mitral valve on coumadin. GI consulted, pt got scope, Scope with large amount of diverticuli, but no specific area of bleed found. Bleeding thought to be 2/2 diverticular bleed.   Given high risk of CVA with mechanical mitral valve, he was re-start weight based.      Restarted on warfarin 5/14 w heparin drip, then switched to lovenox bridging.    Needs SNF, but has been needing a sitter.  Follow-up echocardiography shows that of the mechanical mitral valve appears to be functioning well.                 Interval Problem Update  HR 50's-60's   BP within normal limits this morning   No recurrence of bleeding  Hb stable and improving, 8.4 > 8.4 > 10.0 today   INR still not Rxic, 1.88, continue bridging, I discussed with pharmacy to consider increasing warfarin dose.    Blood sugars elevated 150s-210, will increase glargine dose.   Discussed with patient, patient's nurse and with multidisciplinary team during rounds including , pharmacist and charge nurse.      Consultants/Specialty  GI   Cards   Neuro     Code Status  FULL     Disposition  Needs skilled nursing facility  Tele, still requiring a sitter      Review of Systems  Review of Systems   Constitutional: Positive for malaise/fatigue. Negative for fever.   HENT: Negative for sinus pain and sore throat.    Eyes: Negative for pain, discharge and redness.   Respiratory: Negative for hemoptysis, shortness of breath and stridor.    Cardiovascular: Negative for chest pain.   Gastrointestinal: Negative for abdominal pain, blood in stool (resolved ) and vomiting.   Genitourinary: Negative for flank pain and hematuria.   Musculoskeletal: Negative for back pain and neck pain.   Skin: Negative for itching.   Neurological: Negative for loss of consciousness  (Improved).   Psychiatric/Behavioral: Positive for memory loss.      Physical Exam  Temp:  [36.2 °C (97.2 °F)-36.6 °C (97.9 °F)] 36.6 °C (97.9 °F)  Pulse:  [50-60] 60  Resp:  [16-17] 17  BP: (133-156)/(51-78) 151/78  SpO2:  [95 %-99 %] 96 %    Physical Exam  Vitals signs and nursing note reviewed.   Constitutional:       General: He is not in acute distress.     Appearance: He is well-developed.   HENT:      Head: Normocephalic and atraumatic.      Nose: No rhinorrhea.      Mouth/Throat:      Pharynx: No oropharyngeal exudate.   Eyes:      General:         Right eye: No discharge.         Left eye: No discharge.      Pupils: Pupils are equal, round, and reactive to light.   Neck:      Musculoskeletal: Normal range of motion and neck supple.      Thyroid: No thyromegaly.   Cardiovascular:      Rate and Rhythm: Normal rate and regular rhythm.      Heart sounds: Murmur present.   Pulmonary:      Effort: Pulmonary effort is normal. No respiratory distress.      Breath sounds: Normal breath sounds. No stridor. No wheezing.   Abdominal:      General: Bowel sounds are normal.      Palpations: Abdomen is soft.      Tenderness: There is no abdominal tenderness.   Musculoskeletal: Normal range of motion.         General: No tenderness.   Skin:     General: Skin is warm and dry.      Coloration: Skin is pale.      Findings: No rash.   Neurological:      Mental Status: He is alert.      Cranial Nerves: No cranial nerve deficit.      Comments: Alert, oriented intermittently and variably   Psychiatric:         Mood and Affect: Mood normal.      Comments: Impaired judgment       Fluids    Intake/Output Summary (Last 24 hours) at 5/18/2020 1321  Last data filed at 5/17/2020 1500  Gross per 24 hour   Intake 340 ml   Output --   Net 340 ml     Laboratory  Recent Labs     05/16/20  0555 05/17/20  0246 05/18/20  0335   WBC 5.8 6.9 7.4   RBC 2.58* 2.66* 3.08*   HEMOGLOBIN 8.2* 8.4* 10.0*   HEMATOCRIT 25.9* 26.8* 31.6*   .4*  100.8* 102.6*   MCH 31.8 31.6 32.5   MCHC 31.7* 31.3* 31.6*   RDW 58.1* 60.0* 64.2*   PLATELETCT 180 206 247   MPV 10.6 10.9 10.8     Recent Labs     05/16/20  0555 05/17/20  0246 05/18/20  0335   SODIUM 140 138 141   POTASSIUM 4.2 4.5 4.4   CHLORIDE 104 104 103   CO2 25 25 27   GLUCOSE 236* 200* 111*   BUN 18 15 18   CREATININE 1.13 1.04 1.10   CALCIUM 8.4* 8.5 9.0     Recent Labs     05/16/20  0555 05/16/20  1358 05/16/20 2032 05/17/20 0246 05/17/20 2054 05/18/20  0335   APTT 52.8* 79.8* 89.2*  --  38.1*  --    INR 1.22*  --   --  1.71*  --  1.88*               Imaging  EC-ECHOCARDIOGRAM COMPLETE W/O CONT   Final Result      CT-HEAD W/O   Final Result      No acute intracranial abnormality      DX-CHEST-PORTABLE (1 VIEW)   Final Result      1.  Interstitial and hazy airspace opacities and trace left pleural effusion. This may be related to pulmonary edema, however infection should be excluded clinically.   2.  Mild cardiomegaly. Valve prosthesis.      CT-CEREBRAL PERFUSION ANALYSIS   Final Result      1.  Cerebral blood flow less than 30% likely representing completed infarct = 0 mL.      2.  T Max more than 6 seconds likely representing combination of completed infarct and ischemia = 0 mL.      3.  Mismatched volume likely representing ischemic brain/penumbra = None      4.  Please note that the cerebral perfusion was performed on the limited brain tissue around the basal ganglia region. Infarct/ischemia outside the CT perfusion sections can be missed in this study.      CT-CTA NECK WITH & W/O-POST PROCESSING   Final Result      1.  Atherosclerosis at the internal carotid artery origins without significant stenosis.   2.  Patent vertebral arteries.   3.  Bilateral pleural effusions and probable mild pulmonary edema.      CT-CTA HEAD WITH & W/O-POST PROCESS   Final Result      CT angiogram of the [place] within normal limits.      Generalized atrophy and chronic ischemic changes.      No acute  intracranial abnormality is seen.      CT-ABDOMEN-PELVIS WITH   Final Result      1.  No evidence of abdominal or pelvic mass, adenopathy, or inflammatory change.   2.  7 mm metallic foreign body in the descending colon, etiology uncertain. It is not clear whether this is ingested material or from a prior procedure.   3.  Colonic diverticulosis   4.  Atherosclerosis   5.  RIGHT inguinal hernia containing fat            Findings were discussed with ROGELIO HENLEY on 5/8/2020 1:58 PM.                  Assessment/Plan  * Lower GI bleed- (present on admission)  Assessment & Plan  Hb has improved, benign abdominal exam, no clear bloody bowel movements over the past 24 hours  Consistent with a diverticular bleed.     Scope with large amount of diverticuli, but no specific area of bleed found, which is common for this disease  Gi recommends CTA A/P if bleeds again   Monitor closely, high risk for further decompensation.          Acute on Chronic Encephalopathy  Assessment & Plan  Has baseline dementia, made worse with anemia, GI bleeding, change in environment.  Neuro consulted, recommended a trial of Sinemet 100mg TID, and Avoid risperdal.  Patient slightly got worse with Sinemet, discontinued   Improved, now back to baseline. Agitation better controlled with Seroquel        Coronary artery disease involving coronary bypass graft- (present on admission)  Assessment & Plan  History of CABG  Continue Lipitor    History of mitral valve replacement with mechanical valve [Z95.2]- (present on admission)  Assessment & Plan  Have been restarted on AC   Follow-up echocardiography shows that of the mechanical mitral valve appears to be functioning well.       Chronic anticoagulation- (present on admission)  Assessment & Plan  Used to be on Coumadin for his mechanical mitral valve   Bridged on heparin drip while having procedure, back on Coumadin with heparin drip bridging on 5/13   Heparin drip bridging transition to Lovenox  bridging with Coumadin on 5/17 5/17-18, INR still subtherapeutic        Dementia with behavioral disturbance (HCC)- (present on admission)  Assessment & Plan  Try to get window bed, maintain awake daytime state.    Continue frequent re-orientation, encourage activity.   Quetiapine (Seroquel) as needed for agitation    Try to avoid sedating medications as much as possible, reserve only for extreme agitation posing threat to self.       ACP (advance care planning)  Assessment & Plan  On 5/14/2020, I had a prolonged discussion with the patient who was not able to participate much and wife Comfort, regarding goals of care, diagnoses, prognosis, and CODE STATUS.  The patient has advanced aged and poor functional performance. He has multiple comorbid conditions including diabetes, diverticulosis, mechanical mitral valve on chronic anticoagulation, and coronary artery disease. Admitted with GI bleeding. I encourage to consider changing code to DNAR/DNI, however Comfort and patient want to maintain FULL code.     Debility  Assessment & Plan  Multifactorial, age, recent GI bleeding, hospitalization    Physical and occupational therapy evaluation     Bradycardia  Assessment & Plan  Resolved    Type II diabetes mellitus (HCC)- (present on admission)  Assessment & Plan  With hyperglycemia  Last glycated hemoglobin 7.4%   Short acting insulin, glargine started 5/17   Accu-Checks, hypoglycemia protocol       VTE prophylaxis: on Coumadin, with Lovenox bridging

## 2020-05-18 NOTE — PROGRESS NOTES
Bedside report received, assumed pt care@[id number]. Pt A&Ox3. Pt denies any pain. POC discussed, he verbalized understanding. Bed in lowest position with bed alarm on. Pt impulsive with safety sitter at bedside. Call light within reach.

## 2020-05-18 NOTE — THERAPY
Physical Therapy   Daily Treatment     Patient Name: Carlos Rivera  Age:  74 y.o., Sex:  male  Medical Record #: [id number]  Today's Date: 5/18/2020     Precautions  Precautions: Fall Risk  Comments: decreased cognition                Assessment    Pt not wanting to use the fww for ambulation.  He has a shuffling gait pattern, causing one episode of loss of balance, needing min assist to correct.  PT will increase frequency to 3x/wk and continue to address goals set at initial eval.       05/18/20 0732   Gait Analysis   Gait Level Of Assist Supervised   Assistive Device None   Distance (Feet) 200   Deviation Shuffled Gait   Skilled Intervention Verbal Cuing;Tactile Cuing;Facilitation   Comments Pt w/ one episode loss of balance, needing min assist to correct.   Functional Mobility   Sit to Stand Supervised   Bed, Chair, Wheelchair Transfer Supervised   Short Term Goals    Short Term Goal # 1 Patient will be able to move supine<>sitting EOB without bed features with supervision within 6 tx in order to get out of bed   Goal Outcome # 1 goal not met   Short Term Goal # 2 Patient will be able to move sitting<>standing with supervision within 6 tx in order to initiate gait   Goal Outcome # 2 Goal met   Short Term Goal # 3 Patient will be able to ambulate 50ft with supervision within 6 tx in order to access environment   Goal Outcome # 3 Goal met, new goal added   Short Term Goal # 3 B Pt to ambulate 150 ft w/ spv w/o AD in 6 visits   Anticipated Discharge Equipment   DC Equipment Unable To Determine At This Time       Plan    Continue current treatment plan. increase frequency to 3x/wk    Discharge recommendations:  Recommend post-acute placement for continued physical therapy services prior to discharge home.

## 2020-05-18 NOTE — PROGRESS NOTES
Inpatient Anticoagulation Service Note  Date: 5/18/2020  Reason for Anticoagulation: Mechanical Mitral Valve Replacement     Hemoglobin Value: (!) 10  Hematocrit Value: (!) 31.6  Lab Platelet Value: 247  Target INR: 2.5 to 3.5  INR from last 7 days     Date/Time INR Value    05/18/20 0335  (!) 1.88    05/17/20 0246  (!) 1.71    05/16/20 0555  (!) 1.22    05/15/20 0505  1.06        Dose from last 7 days     Date/Time Dose (mg)    05/18/20 1330  10    05/17/20 1500  5    05/16/20 1321  5    05/15/20 1059  7.5    05/14/20 0817  5        Average Dose (mg): TBD (Home Dose: 7.5 mg Mo and 5 mg all other days)  Significant Interactions: Statin, Other (Comments)(SSRI, PO Fe)  Bridge Therapy: Yes  Bridge Therapy Start Date: 05/14/20  Days of Overlap Therapy: 5  INR Value Greater than 2 Prior to Discontinuation of Parenteral Anticoagulation: Not Applicable   Reversal Agent Administered: Not Applicable  Comments: INR improved slightly but below goal. H/H improved. PLT improved. No overt bleeding noted per chart review. Warfarin interactions noted. Per discussion with provider will give modest dose increase today to mobilize INR. Will give warfarin 10 mg today (planned dose of 7.5 mg prior). INR with AM labs. May need acute dose adjustments.    Plan:  Warfarin 10 mg 5/18/20  Education Material Provided?: No (chronic warfarin patient)  Pharmacist suggested discharge dosing: warfarin 7.5 mg Mo and 5 mg all other days    Shamir Bermudez, PharmD

## 2020-05-18 NOTE — PROGRESS NOTES
Monitor Summary :     0.18/0.10/0.42      SB/SR       47 - 96     Rare PAC's/Occasional PVC's/Rarel Trigeminy

## 2020-05-18 NOTE — DISCHARGE PLANNING
Anticipated Discharge Disposition: SNF    Action: LSW received tc from pt's spouse, Comfort ([phone]) and was [facility] that [facility]   Comfort explained that she has been in touch with David with the [facility] and will be reaching out to provide this document to him.     Barriers to Discharge: None    Plan: Await SNF acceptance, LSW to assist as needed       Addendum 0907  LSW received tc from David with [facility]. David explained that he did not receive referral. LSW staffed pt with Berenice THOPMSON and requested for referral to be sent to [facility].   Per David they are at bed capacity. David informed LSW that they could follow pt to the SNF they d/c to and transfer to [facility] when bed available.     Berenice THOMPSON to f[facility]u with [facility] for bed availability.     Addendum 2721  Berenice THOMPSON informed LSW that [facility] can potentially take pt tomorrow. [facility] wanting to know if pt will be d/c on seroquel. LSW to f/u during rounds.   Pt also accepted by [facility] and [facility]. Per Nikky ROCHA pt's spouse refusing [facility]. LSW staffed pt with Berenice THOMPSON to see if any beds available at [facility].       Addendum 1241  Pt discussed during rounds and per Dr. Kelley pt will continue seroquel. LSW informed that pt has a sitter. Sitter may be a barrier for pt to transfer to SNF.   Berenice THOMPSON to f/u with [facility] to inform that pt will continue seroquel and see if sitter barrier.   Await bed availability for [facility].   LSW to f/u with pt's spouse when update available for [facility] and [facility].     Addendum 0547  [facility] can accept when pt off sitter for 24 hours.

## 2020-05-18 NOTE — PROGRESS NOTES
Bedside report received, assumed pt care@5646. Pt A&Ox3. Pt confused at times. Pt denies any pain. POC discussed, pt verbalized understanding. Pt impulsive with safety sitter at bedside. Bed in lowest position with bed alarm on. Call light within reach.

## 2020-05-18 NOTE — DISCHARGE PLANNING
Agency/Facility Name: [facility]  Spoke To: Tiny   Outcome: Per Tiny she will have a bed available tomorrow.    Agency/Facility Name: [facility]   Spoke To: Nany  Outcome: Per Nany [facility] can take patient ones sitter is removed for 24 hours.

## 2020-05-18 NOTE — PROGRESS NOTES
Received report from day shift RNPauline. Assumed care of pt. Pt reports no pain at this time. Updated pt on plan of care. Pt resting comfortably in bed and in no distress. Patient with safety sitter at bedside and sitting in chair. Educated on use of call light. Hourly rounding and continuous monitoring in place.

## 2020-05-19 ENCOUNTER — TELEPHONE (OUTPATIENT)
Dept: MEDICAL GROUP | Facility: PHYSICIAN GROUP | Age: 75
End: 2020-05-19

## 2020-05-19 LAB
ALBUMIN SERPL BCP-MCNC: 3.8 G/DL (ref 3.2–4.9)
ALBUMIN/GLOB SERPL: 1.8 G/DL
ALP SERPL-CCNC: 83 U/L (ref 30–99)
ALT SERPL-CCNC: 45 U/L (ref 2–50)
ANION GAP SERPL CALC-SCNC: 11 MMOL/L (ref 7–16)
AST SERPL-CCNC: 25 U/L (ref 12–45)
BASOPHILS # BLD AUTO: 1.3 % (ref 0–1.8)
BASOPHILS # BLD: 0.08 K/UL (ref 0–0.12)
BILIRUB SERPL-MCNC: 0.3 MG/DL (ref 0.1–1.5)
BUN SERPL-MCNC: 21 MG/DL (ref 8–22)
CALCIUM SERPL-MCNC: 8.8 MG/DL (ref 8.5–10.5)
CHLORIDE SERPL-SCNC: 103 MMOL/L (ref 96–112)
CO2 SERPL-SCNC: 26 MMOL/L (ref 20–33)
CREAT SERPL-MCNC: 1.16 MG/DL (ref 0.5–1.4)
EOSINOPHIL # BLD AUTO: 0.46 K/UL (ref 0–0.51)
EOSINOPHIL NFR BLD: 7.7 % (ref 0–6.9)
ERYTHROCYTE [DISTWIDTH] IN BLOOD BY AUTOMATED COUNT: 63.7 FL (ref 35.9–50)
FERRITIN SERPL-MCNC: 172 NG/ML (ref 22–322)
GLOBULIN SER CALC-MCNC: 2.1 G/DL (ref 1.9–3.5)
GLUCOSE BLD-MCNC: 224 MG/DL (ref 65–99)
GLUCOSE BLD-MCNC: 232 MG/DL (ref 65–99)
GLUCOSE BLD-MCNC: 311 MG/DL (ref 65–99)
GLUCOSE BLD-MCNC: 362 MG/DL (ref 65–99)
GLUCOSE SERPL-MCNC: 101 MG/DL (ref 65–99)
HCT VFR BLD AUTO: 30.8 % (ref 42–52)
HGB BLD-MCNC: 9.8 G/DL (ref 14–18)
HGB RETIC QN AUTO: 34.8 PG/CELL (ref 29–35)
IMM GRANULOCYTES # BLD AUTO: 0.08 K/UL (ref 0–0.11)
IMM GRANULOCYTES NFR BLD AUTO: 1.3 % (ref 0–0.9)
IMM RETICS NFR: 35.6 % (ref 9.3–17.4)
INR PPP: 2.28 (ref 0.87–1.13)
IRON SATN MFR SERPL: 36 % (ref 15–55)
IRON SERPL-MCNC: 93 UG/DL (ref 50–180)
LYMPHOCYTES # BLD AUTO: 1.4 K/UL (ref 1–4.8)
LYMPHOCYTES NFR BLD: 23.4 % (ref 22–41)
MAGNESIUM SERPL-MCNC: 2.3 MG/DL (ref 1.5–2.5)
MCH RBC QN AUTO: 32.2 PG (ref 27–33)
MCHC RBC AUTO-ENTMCNC: 31.8 G/DL (ref 33.7–35.3)
MCV RBC AUTO: 101.3 FL (ref 81.4–97.8)
MONOCYTES # BLD AUTO: 0.76 K/UL (ref 0–0.85)
MONOCYTES NFR BLD AUTO: 12.7 % (ref 0–13.4)
NEUTROPHILS # BLD AUTO: 3.2 K/UL (ref 1.82–7.42)
NEUTROPHILS NFR BLD: 53.6 % (ref 44–72)
NRBC # BLD AUTO: 0 K/UL
NRBC BLD-RTO: 0 /100 WBC
PLATELET # BLD AUTO: 233 K/UL (ref 164–446)
PMV BLD AUTO: 10.4 FL (ref 9–12.9)
POTASSIUM SERPL-SCNC: 4.7 MMOL/L (ref 3.6–5.5)
PROT SERPL-MCNC: 5.9 G/DL (ref 6–8.2)
PROTHROMBIN TIME: 25.9 SEC (ref 12–14.6)
RBC # BLD AUTO: 3.04 M/UL (ref 4.7–6.1)
RETICS # AUTO: 0.23 M/UL (ref 0.04–0.06)
RETICS/RBC NFR: 9.3 % (ref 0.8–2.1)
SODIUM SERPL-SCNC: 140 MMOL/L (ref 135–145)
TIBC SERPL-MCNC: 257 UG/DL (ref 250–450)
UIBC SERPL-MCNC: 164 UG/DL (ref 110–370)
WBC # BLD AUTO: 6 K/UL (ref 4.8–10.8)

## 2020-05-19 PROCEDURE — 80053 COMPREHEN METABOLIC PANEL: CPT

## 2020-05-19 PROCEDURE — 36415 COLL VENOUS BLD VENIPUNCTURE: CPT

## 2020-05-19 PROCEDURE — 82728 ASSAY OF FERRITIN: CPT

## 2020-05-19 PROCEDURE — 83550 IRON BINDING TEST: CPT

## 2020-05-19 PROCEDURE — A9270 NON-COVERED ITEM OR SERVICE: HCPCS | Performed by: HOSPITALIST

## 2020-05-19 PROCEDURE — 85046 RETICYTE/HGB CONCENTRATE: CPT

## 2020-05-19 PROCEDURE — 85610 PROTHROMBIN TIME: CPT

## 2020-05-19 PROCEDURE — 700102 HCHG RX REV CODE 250 W/ 637 OVERRIDE(OP): Performed by: PSYCHIATRY & NEUROLOGY

## 2020-05-19 PROCEDURE — 700102 HCHG RX REV CODE 250 W/ 637 OVERRIDE(OP): Performed by: HOSPITALIST

## 2020-05-19 PROCEDURE — 83540 ASSAY OF IRON: CPT

## 2020-05-19 PROCEDURE — 83735 ASSAY OF MAGNESIUM: CPT

## 2020-05-19 PROCEDURE — 99233 SBSQ HOSP IP/OBS HIGH 50: CPT | Performed by: INTERNAL MEDICINE

## 2020-05-19 PROCEDURE — 85025 COMPLETE CBC W/AUTO DIFF WBC: CPT

## 2020-05-19 PROCEDURE — 82962 GLUCOSE BLOOD TEST: CPT

## 2020-05-19 PROCEDURE — A9270 NON-COVERED ITEM OR SERVICE: HCPCS | Performed by: PSYCHIATRY & NEUROLOGY

## 2020-05-19 PROCEDURE — 770020 HCHG ROOM/CARE - TELE (206)

## 2020-05-19 RX ADMIN — WARFARIN SODIUM 5 MG: 5 TABLET ORAL at 17:23

## 2020-05-19 RX ADMIN — ATORVASTATIN CALCIUM 40 MG: 40 TABLET, FILM COATED ORAL at 17:22

## 2020-05-19 RX ADMIN — FERROUS SULFATE TAB 325 MG (65 MG ELEMENTAL FE) 325 MG: 325 (65 FE) TAB at 17:24

## 2020-05-19 RX ADMIN — QUETIAPINE FUMARATE 50 MG: 25 TABLET ORAL at 17:22

## 2020-05-19 RX ADMIN — FERROUS SULFATE TAB 325 MG (65 MG ELEMENTAL FE) 325 MG: 325 (65 FE) TAB at 08:08

## 2020-05-19 RX ADMIN — CITALOPRAM HYDROBROMIDE 20 MG: 20 TABLET ORAL at 17:23

## 2020-05-19 RX ADMIN — INSULIN HUMAN 4 UNITS: 100 INJECTION, SOLUTION PARENTERAL at 08:03

## 2020-05-19 RX ADMIN — ACETAMINOPHEN 650 MG: 325 TABLET, FILM COATED ORAL at 07:58

## 2020-05-19 RX ADMIN — INSULIN HUMAN 12 UNITS: 100 INJECTION, SOLUTION PARENTERAL at 20:19

## 2020-05-19 RX ADMIN — INSULIN HUMAN 4 UNITS: 100 INJECTION, SOLUTION PARENTERAL at 12:16

## 2020-05-19 RX ADMIN — INSULIN GLARGINE 15 UNITS: 100 INJECTION, SOLUTION SUBCUTANEOUS at 17:32

## 2020-05-19 RX ADMIN — INSULIN HUMAN 10 UNITS: 100 INJECTION, SOLUTION PARENTERAL at 17:30

## 2020-05-19 ASSESSMENT — ENCOUNTER SYMPTOMS
NAUSEA: 0
NERVOUS/ANXIOUS: 0
SENSORY CHANGE: 0
FEVER: 0
DEPRESSION: 0
DIARRHEA: 0
DIZZINESS: 0
CHILLS: 0
BLOOD IN STOOL: 1
PHOTOPHOBIA: 0
CLAUDICATION: 0
BLURRED VISION: 0
VOMITING: 0
ABDOMINAL PAIN: 0
WEAKNESS: 0
INSOMNIA: 0
HEADACHES: 0
COUGH: 0
SHORTNESS OF BREATH: 0
CONSTIPATION: 0
HEARTBURN: 0
MYALGIAS: 0
SPEECH CHANGE: 0

## 2020-05-19 ASSESSMENT — FIBROSIS 4 INDEX: FIB4 SCORE: 1.22

## 2020-05-19 NOTE — PROGRESS NOTES
Spoke with Dr. AGGIE Richard regarding patient update. Informed physician of reason of stay at hospital and that patient has mitral valve. Informed physician that patient had large, bloody stool this morning for the first time in a few days and that patient is being bridged to coumadin due to mitral valve. Asked physician if Lovenox injection should be given this morning and writer was instructed to hold it. Will continue to monitor patient.

## 2020-05-19 NOTE — TELEPHONE ENCOUNTER
Carlos Rivera [facility]    Phone Number: [phone]               This message is being sent by Comfort Rivera on behalf of Carlos Rivera.     My name is Comfort NavarroArmen and I'm getting in touch with you regarding my  Carlos Rivera [birth year]. It's URGENT that you contact me as soon as possible. Carlos has been a Inpatient in [facility] for almost 2 weeks. You are listed as his primary physician.  And he is in great need.  Please call me asap [phone]. Trying to figure out who his primary has been a nightmare

## 2020-05-19 NOTE — PROGRESS NOTES
Inpatient Anticoagulation Service Note  Date: 5/19/2020  Reason for Anticoagulation: Mechanical Mitral Valve Replacement     Hemoglobin Value: (!) 9.8  Hematocrit Value: (!) 30.8  Lab Platelet Value: 233  Target INR: 2.5 to 3.5  INR from last 7 days     Date/Time INR Value    05/19/20 0218  (!) 2.28    05/18/20 0335  (!) 1.88    05/17/20 0246  (!) 1.71    05/16/20 0555  (!) 1.22    05/15/20 0505  1.06        Dose from last 7 days     Date/Time Dose (mg)    05/19/20 1605  5    05/18/20 1330  10    05/17/20 1500  5    05/16/20 1321  5    05/15/20 1059  7.5    05/14/20 0817  5        Average Dose (mg): TBD (Home Dose: 7.5 mg Mo and 5 mg all other days)  Significant Interactions: Statin, Other (Comments)  Bridge Therapy: Yes  Bridge Therapy Start Date: 05/14/20  Days of Overlap Therapy: 6   INR Value Greater than 2 Prior to Discontinuation of Parenteral Anticoagulation: No (Must Comment)(due to bleed risk concern and rising INR trend)  Reversal Agent Administered: Not Applicable  Comments: INR improved slightly but below goal. H/H low. PLT down slightly. Concerns for possible blood in stool overnight. No other overt bleeding noted. Warfarin interactions noted. Per discussion with provider will give modest dose increase today to mobilize INR. Will give warfarin 5 mg today. INR with AM labs. May need acute dose adjustments.    Plan:  Warfarin 5 mg 5/19/20  Education Material Provided?: No (chronic warfarin patient)  Pharmacist suggested discharge dosing: warfarin 7.5 mg Mo and 5 mg all other days    Shamir Bermudez, PharmD

## 2020-05-19 NOTE — PROGRESS NOTES
Received report from day shift RNPauline. Assumed care of pt. Pt reports no pain at this time. Updated pt on plan of care. Pt resting comfortably in chair and in no distress. Educated on use of call light. Hourly rounding and continuous monitoring in place. Safety sitter at bedside.

## 2020-05-19 NOTE — PROGRESS NOTES
Hospital Medicine Daily Progress Note    Date of Service  5/19/2020    Chief Complaint  74 y.o. male admitted 5/8/2020 with GI bleed.    Hospital Course    Patient presented from home with multiple red bloody bowel movements and slightly supratherapeutic INR, he is on coumadin secondary to mechanical mitral valve presence.  GI consulted and he had colonoscopy which showed diverticuloses without obvious area for the bleed, there was significant residual blood appreciated at time of evaluation.  Patient had issues with agitation during hospitalization and a fall, he had a safety sitter placed at bedside and started on seroquel with significant improvement in his mentation.  He had resolution of active bleeding following colonoscopy and anticoagulation transitioned from heparin drip back to coumadin with goal 2.5-3.5 secondary to mechanical heart valve.       Interval Problem Update  Patient states he is feeling okay, wants to do more and get out of bed and ambulate.  INR 2.28 today but patient had bloody bm overnight reported.  H/h remains stable despite this report.  Will conitnue with coumadin but hold further lovenox.    Consultants/Specialty  GI - s/o    Code Status  full    Disposition  SNF when medically stable.    Review of Systems  Review of Systems   Constitutional: Negative for chills and fever.   HENT: Negative for congestion.    Eyes: Negative for blurred vision and photophobia.   Respiratory: Negative for cough and shortness of breath.    Cardiovascular: Negative for chest pain, claudication and leg swelling.   Gastrointestinal: Positive for blood in stool (in past 2 bowel movements.). Negative for abdominal pain, constipation, diarrhea, heartburn, nausea and vomiting.   Genitourinary: Negative for dysuria and hematuria.   Musculoskeletal: Negative for joint pain and myalgias.   Skin: Negative for itching and rash.   Neurological: Negative for dizziness, sensory change, speech change, weakness and  headaches.   Psychiatric/Behavioral: Negative for depression. The patient is not nervous/anxious and does not have insomnia.         Physical Exam  Temp:  [36.1 °C (97 °F)-36.4 °C (97.6 °F)] 36.4 °C (97.5 °F)  Pulse:  [60-96] 84  Resp:  [18] 18  BP: (101-153)/(5-83) 101/5  SpO2:  [93 %-97 %] 97 %    Physical Exam  Vitals signs and nursing note reviewed.   Constitutional:       General: He is not in acute distress.     Appearance: Normal appearance.   HENT:      Head: Normocephalic and atraumatic.   Eyes:      General: No scleral icterus.     Extraocular Movements: Extraocular movements intact.   Neck:      Musculoskeletal: Normal range of motion and neck supple.   Cardiovascular:      Rate and Rhythm: Normal rate and regular rhythm.      Pulses: Normal pulses.      Heart sounds: Normal heart sounds. No murmur.   Pulmonary:      Effort: Pulmonary effort is normal. No respiratory distress.      Breath sounds: Normal breath sounds. No wheezing, rhonchi or rales.   Abdominal:      General: Abdomen is flat. Bowel sounds are normal. There is no distension.      Palpations: Abdomen is soft.      Tenderness: There is no rebound.   Musculoskeletal:         General: No swelling or tenderness.   Lymphadenopathy:      Cervical: No cervical adenopathy.   Skin:     Coloration: Skin is not jaundiced.      Findings: No erythema.   Neurological:      General: No focal deficit present.      Mental Status: He is alert and oriented to person, place, and time. Mental status is at baseline.      Cranial Nerves: No cranial nerve deficit.   Psychiatric:         Mood and Affect: Mood normal.         Behavior: Behavior normal.         Fluids  No intake or output data in the 24 hours ending 05/19/20 1353    Laboratory  Recent Labs     05/17/20  0246 05/18/20  0335 05/19/20  0218   WBC 6.9 7.4 6.0   RBC 2.66* 3.08* 3.04*   HEMOGLOBIN 8.4* 10.0* 9.8*   HEMATOCRIT 26.8* 31.6* 30.8*   .8* 102.6* 101.3*   MCH 31.6 32.5 32.2   MCHC 31.3*  31.6* 31.8*   RDW 60.0* 64.2* 63.7*   PLATELETCT 206 247 233   MPV 10.9 10.8 10.4     Recent Labs     05/17/20  0246 05/18/20  0335 05/19/20  0218   SODIUM 138 141 140   POTASSIUM 4.5 4.4 4.7   CHLORIDE 104 103 103   CO2 25 27 26   GLUCOSE 200* 111* 101*   BUN 15 18 21   CREATININE 1.04 1.10 1.16   CALCIUM 8.5 9.0 8.8     Recent Labs     05/16/20  1358 05/16/20 2032 05/17/20  0246 05/17/20 2054 05/18/20  0335 05/19/20  0218   APTT 79.8* 89.2*  --  38.1*  --   --    INR  --   --  1.71*  --  1.88* 2.28*               Imaging  EC-ECHOCARDIOGRAM COMPLETE W/O CONT   Final Result      CT-HEAD W/O   Final Result      No acute intracranial abnormality      DX-CHEST-PORTABLE (1 VIEW)   Final Result      1.  Interstitial and hazy airspace opacities and trace left pleural effusion. This may be related to pulmonary edema, however infection should be excluded clinically.   2.  Mild cardiomegaly. Valve prosthesis.      CT-CEREBRAL PERFUSION ANALYSIS   Final Result      1.  Cerebral blood flow less than 30% likely representing completed infarct = 0 mL.      2.  T Max more than 6 seconds likely representing combination of completed infarct and ischemia = 0 mL.      3.  Mismatched volume likely representing ischemic brain/penumbra = None      4.  Please note that the cerebral perfusion was performed on the limited brain tissue around the basal ganglia region. Infarct/ischemia outside the CT perfusion sections can be missed in this study.      CT-CTA NECK WITH & W/O-POST PROCESSING   Final Result      1.  Atherosclerosis at the internal carotid artery origins without significant stenosis.   2.  Patent vertebral arteries.   3.  Bilateral pleural effusions and probable mild pulmonary edema.      CT-CTA HEAD WITH & W/O-POST PROCESS   Final Result      CT angiogram of the [place] within normal limits.      Generalized atrophy and chronic ischemic changes.      No acute intracranial abnormality is seen.      CT-ABDOMEN-PELVIS  WITH   Final Result      1.  No evidence of abdominal or pelvic mass, adenopathy, or inflammatory change.   2.  7 mm metallic foreign body in the descending colon, etiology uncertain. It is not clear whether this is ingested material or from a prior procedure.   3.  Colonic diverticulosis   4.  Atherosclerosis   5.  RIGHT inguinal hernia containing fat            Findings were discussed with ROGELIO HENLEY on 5/8/2020 1:58 PM.                    Assessment/Plan  * Lower GI bleed- (present on admission)  Assessment & Plan  Hb remains stable despite 2 reported bloody BM per RN and patient, continue to monitor.  Consistent with a diverticular bleed.     Scope with large amount of diverticuli, but no specific area of bleed found, which is common for this disease  Gi recommends CTA A/P if significant bleed again   Monitor closely, high risk for further decompensation.          Acute on Chronic Encephalopathy  Assessment & Plan  Has baseline dementia, made worse with anemia, GI bleeding, change in environment.  Neuro consulted, recommended a trial of Sinemet 100mg TID, and Avoid risperdal.  Patient slightly got worse with Sinemet, discontinued   Improved, now back to baseline. Agitation better controlled with Seroquel        Coronary artery disease involving coronary bypass graft- (present on admission)  Assessment & Plan  History of CABG  Continue Lipitor    History of mitral valve replacement with mechanical valve [Z95.2]- (present on admission)  Assessment & Plan  Have been restarted on AC   Follow-up echocardiography shows that of the mechanical mitral valve appears to be functioning well.       Chronic anticoagulation- (present on admission)  Assessment & Plan  Used to be on Coumadin for his mechanical mitral valve   Bridged on heparin drip while having procedure, back on Coumadin with heparin drip bridging on 5/13   Heparin drip bridging transition to Lovenox bridging with Coumadin on 5/17 5/17-18, INR still  subtherapeutic        Dementia with behavioral disturbance (HCC)- (present on admission)  Assessment & Plan  Maintain awake daytime state.    Continue frequent re-orientation, encourage activity.   Quetiapine (Seroquel) as needed for agitation    Try to avoid sedating medications as much as possible, reserve only for extreme agitation posing threat to self.       ACP (advance care planning)  Assessment & Plan  On 5/14/2020, Dr Kelley had a prolonged discussion with the patient who was not able to participate much and wife Comfort:  regarding goals of care, diagnoses, prognosis, and CODE STATUS.  The patient has advanced aged and poor functional performance. He has multiple comorbid conditions including diabetes, diverticulosis, mechanical mitral valve on chronic anticoagulation, and coronary artery disease. Admitted with GI bleeding. I encourage to consider changing code to DNAR/DNI, however Comfort and patient want to maintain FULL code.     Debility  Assessment & Plan  Multifactorial, age, recent GI bleeding, hospitalization    Physical and occupational therapy evaluation     Bradycardia  Assessment & Plan  Resolved    Type II diabetes mellitus (HCC)- (present on admission)  Assessment & Plan  With hyperglycemia  Last glycated hemoglobin 7.4%   Short acting insulin, glargine started 5/17   Accu-Checks, hypoglycemia protocol           VTE prophylaxis: warfarin

## 2020-05-19 NOTE — PROGRESS NOTES
Assumed care of pt @ 0715, bedside report received from YVES Dash.  Pt A&OX3 and denies any pain at this time. Bed locked and lowered with call light within reach and questions answered during present time. Had two bloody stools during night shift and is slightly fatigued this morning. Sitter present at bedside, will continue to monitor.

## 2020-05-19 NOTE — DISCHARGE PLANNING
Agency/Facility Name: [facility]  Outcome: Refaxed referral to  [facility].

## 2020-05-19 NOTE — DISCHARGE PLANNING
Anticipated Discharge Disposition: SNF     Action: LSW tc bedside RN, Pauline. Pt still requiring a sitter and will try to remove. LSW informed that Pauline waiting to speak to attending MD to discuss pt. Pt may not be medically cleared for SNF transfer.     Referrals sent to [facility]. Pt's spouse refusing [facility]. [facility] requesting for pt to be off sitter for 24 hours.     Barriers to Discharge: None    Plan: LSW to continue to follow, LSW to assist as needed     Addendum 1457  Pt discussed during rounds and pt not medically cleared for SNF transfer.   LSW out of office tomorrow and will leave report for covering worker.

## 2020-05-19 NOTE — THERAPY
"Occupational Therapy  Daily Treatment     Patient Name: Carlos Rivera  Age:  74 y.o., Sex:  male  Medical Record #: [id number]  Today's Date: 5/18/2020     Precautions  Precautions:  Fall Risk  Comments: baseline dementia    Subjective    \"I need to stretch when I walk\" while leaning on wall in push up position to \"stretch my calves\".     Objective       05/18/20 1704   Cognition    Cognition / Consciousness X   Speech/ Communication Delayed Responses   Level of Consciousness Alert   Ability To Follow Commands 2 Step   Safety Awareness Impaired;Impulsive   New Learning Impaired   Attention Impaired   Comments baseline dementia with decreased memory and safety awareness. improved from last session. demo'd decreased wayfinding   Active ROM Upper Body   Active ROM Upper Body  WDL   Comments during ADLS   Strength Upper Body   Upper Body Strength  X   Gross Strength Generalized Weakness, Equal Bilaterally.    Comments reports feels weaker than baseline, but functional for ADLs   Sitting Upper Body Exercises   Sitting Upper Body Exercises Yes   Comments Provided with HEP AROM for BUE per request and educated on safety with them   Other Treatments   Other Treatments Provided Reports lives in a 1 story house with no steps, a walk in shower with bars, no AD at baseline, and wife present 24/7. Per RN, wife no longer able to care for both pt and herself; current POC is SNF and then possibly memory care   Balance   Sitting Balance (Static) Fair +   Sitting Balance (Dynamic) Fair +   Standing Balance (Static) Fair -   Standing Balance (Dynamic) Fair -   Weight Shift Sitting Good   Weight Shift Standing Fair   Skilled Intervention Verbal Cuing;Compensatory Strategies   Comments w/FWW; attempted to leave behind, but req min A w/o   Bed Mobility    Supine to Sit Supervised   Sit to Supine Supervised   Scooting Supervised   Skilled Intervention Verbal Cuing  (HOB flat)   Activities of Daily Living   Grooming " Supervision;Standing  (washing hands)   Lower Body Dressing Supervision  (socks and shoes)   Toileting Supervision  (w/ clothing mgmt; standing)   Skilled Intervention Verbal Cuing;Compensatory Strategies   Comments req v/cs for safety and use of fww throughout. limited by cog   Functional Mobility   Sit to Stand Supervised   Bed, Chair, Wheelchair Transfer Supervised  (close SPV for safety)   Toilet Transfers   (declined; completed standing toileting)   Transfer Method Other (Comments)  (stand step)   Mobility w/FWW req v/cs for safety and to use. Bed>toilet>sink>chair>hallway x2 laps>chair   Skilled Intervention Verbal Cuing;Compensatory Strategies;Tactile Cuing  (no LOBs, but shuffling gait. )   Activity Tolerance   Sitting in Chair 7+ min left in chair and during LB dressing   Sitting Edge of Bed 2 min   Standing 12 min   Comments limited by cognition   Short Term Goals   Short Term Goal # 1 B  will complete toilet txf with SPV   Short Term Goal # 4 will use FWW correctly/safely throughout session w/o v/cs   Short Term Goal # 5 will demo AROM HEP w/ SPV and w/o v/cs    Anticipated Discharge Equipment   DC Equipment Tub / Shower Seat;Unable To Determine At This Time       Assessment    Pt seen for OT session. Progressing with functional mobility, cognition, activity tolerance, balance, and AROM. Continues to be limited by decreased functional mobility, cognition, strength, balance which are currently affecting pt's ability to complete ADLs/IADLs at baseline.  Per RN, wife no longer able to care for both pt and herself; current POC is SNF and then possibly memory care      Plan    Continue current treatment plan.but increase frequency to 3x/wk.    Discharge recommendations:  Recommend post-acute placement for additional occupational therapy services prior to discharge home, but progressing and likely will = be able to return home with OT. However, if wife is no longer able to care for pt at home, after d/c pt  may benefit from long term memory care.

## 2020-05-19 NOTE — CARE PLAN
Problem: Communication  Goal: The ability to communicate needs accurately and effectively will improve  Outcome: PROGRESSING AS EXPECTED     Problem: Safety  Goal: Will remain free from injury  Outcome: PROGRESSING AS EXPECTED  Goal: Will remain free from falls  Outcome: PROGRESSING SLOWER THAN EXPECTED     Problem: Venous Thromboembolism (VTW)/Deep Vein Thrombosis (DVT) Prevention:  Goal: Patient will participate in Venous Thrombosis (VTE)/Deep Vein Thrombosis (DVT)Prevention Measures  Outcome: PROGRESSING AS EXPECTED     Problem: Bowel/Gastric:  Goal: Normal bowel function is maintained or improved  Outcome: PROGRESSING AS EXPECTED  Goal: Will not experience complications related to bowel motility  Outcome: PROGRESSING AS EXPECTED     Problem: Knowledge Deficit  Goal: Knowledge of disease process/condition, treatment plan, diagnostic tests, and medications will improve  Outcome: PROGRESSING AS EXPECTED  Goal: Knowledge of the prescribed therapeutic regimen will improve  Outcome: PROGRESSING AS EXPECTED     Problem: Discharge Barriers/Planning  Goal: Patient's continuum of care needs will be met  Outcome: PROGRESSING AS EXPECTED     Problem: Skin Integrity  Goal: Risk for impaired skin integrity will decrease  Outcome: PROGRESSING AS EXPECTED     Problem: Psychosocial Needs:  Goal: Level of anxiety will decrease  Outcome: PROGRESSING AS EXPECTED     Problem: Respiratory:  Goal: Respiratory status will improve  Outcome: PROGRESSING AS EXPECTED     Problem: Urinary Elimination:  Goal: Ability to reestablish a normal urinary elimination pattern will improve  Outcome: PROGRESSING AS EXPECTED     Problem: Pain Management  Goal: Pain level will decrease to patient's comfort goal  Outcome: PROGRESSING AS EXPECTED     Problem: Mobility  Goal: Risk for activity intolerance will decrease  Outcome: PROGRESSING AS EXPECTED

## 2020-05-19 NOTE — PROGRESS NOTES
Monitor Summary:    SB-SR 57-72 w/ occasional PVCs, heart rate down to 49 unsustained  .16/.10/.40

## 2020-05-20 ENCOUNTER — APPOINTMENT (OUTPATIENT)
Dept: RADIOLOGY | Facility: MEDICAL CENTER | Age: 75
DRG: 377 | End: 2020-05-20
Attending: INTERNAL MEDICINE
Payer: MEDICARE

## 2020-05-20 LAB
ANION GAP SERPL CALC-SCNC: 12 MMOL/L (ref 7–16)
ANISOCYTOSIS BLD QL SMEAR: ABNORMAL
BASOPHILS # BLD AUTO: 0.7 % (ref 0–1.8)
BASOPHILS # BLD: 0.04 K/UL (ref 0–0.12)
BUN SERPL-MCNC: 27 MG/DL (ref 8–22)
BURR CELLS BLD QL SMEAR: NORMAL
CALCIUM SERPL-MCNC: 8.2 MG/DL (ref 8.5–10.5)
CHLORIDE SERPL-SCNC: 101 MMOL/L (ref 96–112)
CO2 SERPL-SCNC: 24 MMOL/L (ref 20–33)
COMMENT 1642: NORMAL
CREAT SERPL-MCNC: 1.4 MG/DL (ref 0.5–1.4)
EOSINOPHIL # BLD AUTO: 0.29 K/UL (ref 0–0.51)
EOSINOPHIL NFR BLD: 4.8 % (ref 0–6.9)
ERYTHROCYTE [DISTWIDTH] IN BLOOD BY AUTOMATED COUNT: 69.9 FL (ref 35.9–50)
GLUCOSE BLD-MCNC: 302 MG/DL (ref 65–99)
GLUCOSE BLD-MCNC: 324 MG/DL (ref 65–99)
GLUCOSE SERPL-MCNC: 262 MG/DL (ref 65–99)
HCT VFR BLD AUTO: 25.8 % (ref 42–52)
HGB BLD-MCNC: 8.1 G/DL (ref 14–18)
IMM GRANULOCYTES # BLD AUTO: 0.04 K/UL (ref 0–0.11)
IMM GRANULOCYTES NFR BLD AUTO: 0.7 % (ref 0–0.9)
INR PPP: 3.48 (ref 0.87–1.13)
LYMPHOCYTES # BLD AUTO: 1.15 K/UL (ref 1–4.8)
LYMPHOCYTES NFR BLD: 19.1 % (ref 22–41)
MACROCYTES BLD QL SMEAR: ABNORMAL
MCH RBC QN AUTO: 32.8 PG (ref 27–33)
MCHC RBC AUTO-ENTMCNC: 31.4 G/DL (ref 33.7–35.3)
MCV RBC AUTO: 104.5 FL (ref 81.4–97.8)
MICROCYTES BLD QL SMEAR: ABNORMAL
MONOCYTES # BLD AUTO: 0.62 K/UL (ref 0–0.85)
MONOCYTES NFR BLD AUTO: 10.3 % (ref 0–13.4)
MORPHOLOGY BLD-IMP: NORMAL
NEUTROPHILS # BLD AUTO: 3.88 K/UL (ref 1.82–7.42)
NEUTROPHILS NFR BLD: 64.4 % (ref 44–72)
NRBC # BLD AUTO: 0 K/UL
NRBC BLD-RTO: 0 /100 WBC
PLATELET # BLD AUTO: 219 K/UL (ref 164–446)
PLATELET BLD QL SMEAR: NORMAL
PMV BLD AUTO: 10.8 FL (ref 9–12.9)
POIKILOCYTOSIS BLD QL SMEAR: NORMAL
POLYCHROMASIA BLD QL SMEAR: NORMAL
POTASSIUM SERPL-SCNC: 4.7 MMOL/L (ref 3.6–5.5)
PROTHROMBIN TIME: 36.3 SEC (ref 12–14.6)
RBC # BLD AUTO: 2.47 M/UL (ref 4.7–6.1)
RBC BLD AUTO: PRESENT
SODIUM SERPL-SCNC: 137 MMOL/L (ref 135–145)
WBC # BLD AUTO: 6 K/UL (ref 4.8–10.8)

## 2020-05-20 PROCEDURE — 700102 HCHG RX REV CODE 250 W/ 637 OVERRIDE(OP): Performed by: HOSPITALIST

## 2020-05-20 PROCEDURE — 770020 HCHG ROOM/CARE - TELE (206)

## 2020-05-20 PROCEDURE — A9270 NON-COVERED ITEM OR SERVICE: HCPCS | Performed by: PSYCHIATRY & NEUROLOGY

## 2020-05-20 PROCEDURE — 80048 BASIC METABOLIC PNL TOTAL CA: CPT

## 2020-05-20 PROCEDURE — A9270 NON-COVERED ITEM OR SERVICE: HCPCS | Performed by: HOSPITALIST

## 2020-05-20 PROCEDURE — 700117 HCHG RX CONTRAST REV CODE 255: Performed by: INTERNAL MEDICINE

## 2020-05-20 PROCEDURE — 97116 GAIT TRAINING THERAPY: CPT

## 2020-05-20 PROCEDURE — 74174 CTA ABD&PLVS W/CONTRAST: CPT

## 2020-05-20 PROCEDURE — 36415 COLL VENOUS BLD VENIPUNCTURE: CPT

## 2020-05-20 PROCEDURE — 97530 THERAPEUTIC ACTIVITIES: CPT

## 2020-05-20 PROCEDURE — 99232 SBSQ HOSP IP/OBS MODERATE 35: CPT | Performed by: INTERNAL MEDICINE

## 2020-05-20 PROCEDURE — 85610 PROTHROMBIN TIME: CPT

## 2020-05-20 PROCEDURE — 82962 GLUCOSE BLOOD TEST: CPT

## 2020-05-20 PROCEDURE — 700102 HCHG RX REV CODE 250 W/ 637 OVERRIDE(OP): Performed by: PSYCHIATRY & NEUROLOGY

## 2020-05-20 PROCEDURE — 85025 COMPLETE CBC W/AUTO DIFF WBC: CPT

## 2020-05-20 RX ORDER — WARFARIN SODIUM 5 MG/1
5 TABLET ORAL DAILY
Status: DISCONTINUED | OUTPATIENT
Start: 2020-05-21 | End: 2020-05-23 | Stop reason: HOSPADM

## 2020-05-20 RX ORDER — WARFARIN SODIUM 5 MG/1
5 TABLET ORAL
Status: DISCONTINUED | OUTPATIENT
Start: 2020-05-21 | End: 2020-05-20

## 2020-05-20 RX ADMIN — INSULIN HUMAN 10 UNITS: 100 INJECTION, SOLUTION PARENTERAL at 17:06

## 2020-05-20 RX ADMIN — FERROUS SULFATE TAB 325 MG (65 MG ELEMENTAL FE) 325 MG: 325 (65 FE) TAB at 04:06

## 2020-05-20 RX ADMIN — FERROUS SULFATE TAB 325 MG (65 MG ELEMENTAL FE) 325 MG: 325 (65 FE) TAB at 17:03

## 2020-05-20 RX ADMIN — IOHEXOL 80 ML: 350 INJECTION, SOLUTION INTRAVENOUS at 13:04

## 2020-05-20 RX ADMIN — CITALOPRAM HYDROBROMIDE 20 MG: 20 TABLET ORAL at 17:03

## 2020-05-20 RX ADMIN — INSULIN HUMAN 7 UNITS: 100 INJECTION, SOLUTION PARENTERAL at 07:54

## 2020-05-20 RX ADMIN — INSULIN GLARGINE 15 UNITS: 100 INJECTION, SOLUTION SUBCUTANEOUS at 17:09

## 2020-05-20 RX ADMIN — INSULIN HUMAN 10 UNITS: 100 INJECTION, SOLUTION PARENTERAL at 21:04

## 2020-05-20 RX ADMIN — ATORVASTATIN CALCIUM 40 MG: 40 TABLET, FILM COATED ORAL at 17:03

## 2020-05-20 RX ADMIN — DOCUSATE SODIUM 50 MG AND SENNOSIDES 8.6 MG 2 TABLET: 8.6; 5 TABLET, FILM COATED ORAL at 04:06

## 2020-05-20 RX ADMIN — QUETIAPINE FUMARATE 50 MG: 25 TABLET ORAL at 17:03

## 2020-05-20 RX ADMIN — DOCUSATE SODIUM 50 MG AND SENNOSIDES 8.6 MG 2 TABLET: 8.6; 5 TABLET, FILM COATED ORAL at 17:03

## 2020-05-20 ASSESSMENT — ENCOUNTER SYMPTOMS
NERVOUS/ANXIOUS: 0
BLOOD IN STOOL: 1
CLAUDICATION: 0
SHORTNESS OF BREATH: 0
CHILLS: 0
HEADACHES: 0
SPEECH CHANGE: 0
CONSTIPATION: 0
FEVER: 0
SENSORY CHANGE: 0
NAUSEA: 0
DIARRHEA: 0
PHOTOPHOBIA: 0
COUGH: 0
DEPRESSION: 0
DIZZINESS: 0
ABDOMINAL PAIN: 0
VOMITING: 0
BLURRED VISION: 0
MYALGIAS: 0
HEARTBURN: 0
WEAKNESS: 0
INSOMNIA: 0

## 2020-05-20 ASSESSMENT — COGNITIVE AND FUNCTIONAL STATUS - GENERAL
CLIMB 3 TO 5 STEPS WITH RAILING: A LITTLE
MOBILITY SCORE: 22
SUGGESTED CMS G CODE MODIFIER MOBILITY: CJ
WALKING IN HOSPITAL ROOM: A LITTLE

## 2020-05-20 ASSESSMENT — GAIT ASSESSMENTS
DEVIATION: SHUFFLED GAIT;DECREASED HEEL STRIKE;DECREASED TOE OFF
DISTANCE (FEET): 250
ASSISTIVE DEVICE: FRONT WHEEL WALKER;NONE
GAIT LEVEL OF ASSIST: SUPERVISED

## 2020-05-20 ASSESSMENT — FIBROSIS 4 INDEX: FIB4 SCORE: 1.29

## 2020-05-20 NOTE — THERAPY
"Physical Therapy   Daily Treatment     Patient Name: Carlos Rivera  Age:  74 y.o., Sex:  male  Medical Record #: [id number]  Today's Date: 5/20/2020       Precautions: Fall Risk  Comments: baseline dementia    Assessment  Pt is very motivated to work with PT as he expresses awareness of changes in his gait and balance. Demonstrated circular though with poor STM or education carryover, does have a hx of dementia. Pt demonstrates a \"Parkinsonian like\" gait without AD which improves with use of FWW and verbal cues to consistently lengthen L stride. Pt reports he does have FWW at home, but prefers to use his walking sticks when able. PT will continue to follow while in house. Continue to ambulate unit with nursing assist. Pt is functionally capable of ambulation in room with FWW; however, will leave to RN discretion as pt did experience a GLF while in house.     Plan    Continue current treatment plan.    Discharge recommendations:  Pt appears functionally capable of DC home should he have adequate social support to provide supervision and assist. Would benefit from home health or Outpatient PT to address higher level needs.              05/20/20 1450   Cognition    Speech/ Communication   (circular responses, appears to have STM deficits)   Level of Consciousness Alert   Safety Awareness Impaired;Impulsive   New Learning Impaired   Attention Impaired   Comments impaired STM and poor insight into current impairments and correlation to desired activities.    Sitting Lower Body Exercises   Ankle Pumps 1 set of 10;Bilateral   Long Arc Quad 1 set of 10;Bilateral   Balance   Sitting Balance (Static) Fair +   Sitting Balance (Dynamic) Fair   Standing Balance (Static) Fair   Standing Balance (Dynamic) Fair -   Weight Shift Sitting Fair   Weight Shift Standing Poor   Comments w/o AD, improves with FWW   Gait Analysis   Gait Level Of Assist Supervised   Assistive Device Front Wheel Walker;None   Distance (Feet) " 250  (total - 150 ft with FWW, 100 ft w/o AD)   Deviation Shuffled Gait;Decreased Heel Strike;Decreased Toe Off   Weight Bearing Status No restrictions   Comments pt demonstrates a Parkinsonian like gait which improves with verbal and tactile cues for greater step length   Bed Mobility    Supine to Sit Supervised   Sit to Supine Supervised   Scooting Supervised   Functional Mobility   Sit to Stand Supervised   Bed, Chair, Wheelchair Transfer Supervised   Toilet Transfers Supervised  (standing at toilet)   Short Term Goals    Short Term Goal # 1 Patient will be able to move supine<>sitting EOB without bed features with supervision within 6 tx in order to get out of bed   Goal Outcome # 1   (NT)   Short Term Goal # 2 Patient will be able to move sitting<>standing with supervision within 6 tx in order to initiate gait   Goal Outcome # 2 Goal met   Short Term Goal # 3 Patient will be able to ambulate 50ft with supervision within 6 tx in order to access environment   Goal Outcome # 3 Goal met   Short Term Goal # 3 B Pt to ambulate 150 ft w/ spv w/o AD in 6 visits   Goal Outcome # 3 B Progressing as expected

## 2020-05-20 NOTE — PROGRESS NOTES
Inpatient Anticoagulation Service Note  Date: 5/20/2020  Reason for Anticoagulation: Mechanical Mitral Valve Replacement     Hemoglobin Value: (!) 8.1  Hematocrit Value: (!) 25.8  Lab Platelet Value: 219  Target INR: 2.5 to 3.5  INR from last 7 days     Date/Time INR Value    05/20/20 0417  (!) 3.48    05/19/20 0218  (!) 2.28    05/18/20 0335  (!) 1.88    05/17/20 0246  (!) 1.71    05/16/20 0555  (!) 1.22    05/15/20 0505  1.06        Dose from last 7 days     Date/Time Dose (mg)    05/20/20 1019  0    05/19/20 1605  5    05/18/20 1330  10    05/17/20 1500  5    05/16/20 1321  5    05/15/20 1059  7.5    05/14/20 0817  5        Average Dose (mg): TBD (Home Dose: 7.5 mg Mo and 5 mg all other days)  Significant Interactions: Statin, Other (Comments)(SSRI)  Bridge Therapy: Yes  Bridge Therapy Start Date: 05/14/20  Days of Overlap Therapy: 6   INR Value Greater than 2 Prior to Discontinuation of Parenteral Anticoagulation: No (Must Comment)(due to bleeding concerns)   Reversal Agent Administered: Not Applicable  Comments: INR improved to upper end of goal range rapidly (increase > 1). H/H low. PLT down. Concerns for bloody bowel movements overnight. Warfarin interactions noted. Will hold warfarin today. INR with AM labs. Appears likely to need dose adjustments pending clinical course/toleration of anticoagulation.    Plan:  HOLD warfarin 5/20/20  Education Material Provided?: No (chronic warfarin patient)  Pharmacist suggested discharge dosing: warfarin 5 mg daily    Shamir Bermudez, PharmD

## 2020-05-20 NOTE — CARE PLAN
Pt is still currently having bloody bowel movements. Pt is doing better at remembering to call before exiting the bed.

## 2020-05-20 NOTE — PROGRESS NOTES
Assumed patient care. Received report from YVES Ashyb. Assessed patient for any immediate needs. Educated on use of the call light. Patient verbalized understanding. Discussed POC. All questions answered.

## 2020-05-20 NOTE — PROGRESS NOTES
Hospital Medicine Daily Progress Note    Date of Service  5/20/2020    Chief Complaint  74 y.o. male admitted 5/8/2020 with GI bleed.    Hospital Course    Patient presented from home with multiple red bloody bowel movements and slightly supratherapeutic INR, he is on coumadin secondary to mechanical mitral valve presence.  GI consulted and he had colonoscopy which showed diverticuloses without obvious area for the bleed, there was significant residual blood appreciated at time of evaluation.  Patient had issues with agitation during hospitalization and a fall, he had a safety sitter placed at bedside and started on seroquel with significant improvement in his mentation.  He had resolution of active bleeding following colonoscopy and anticoagulation transitioned from heparin drip back to coumadin with goal 2.5-3.5 secondary to mechanical heart valve.       Interval Problem Update  5/19 Patient states he is feeling okay, wants to do more and get out of bed and ambulate.  INR 2.28 today but patient had bloody bm overnight reported.  H/h remains stable despite this report.  Will conitnue with coumadin but hold further lovenox.  5/20 Patient without complaint other than he doesn't feel as sharp as normal and having trouble with memory.  Hbg dropped to 8.1 from 9.8 and continued bleeding in bowel movement reported by RN.  Will order CTA a/p with contrast for further evaluation as recommended by GI to see if location of bleeding can be localized.    Consultants/Specialty  GI - s/o    Code Status  full    Disposition  SNF when medically stable.    Review of Systems  Review of Systems   Constitutional: Negative for chills and fever.   HENT: Negative for congestion.    Eyes: Negative for blurred vision and photophobia.   Respiratory: Negative for cough and shortness of breath.    Cardiovascular: Negative for chest pain, claudication and leg swelling.   Gastrointestinal: Positive for blood in stool (in past 2 bowel  movements.). Negative for abdominal pain, constipation, diarrhea, heartburn, nausea and vomiting.   Genitourinary: Negative for dysuria and hematuria.   Musculoskeletal: Negative for joint pain and myalgias.   Skin: Negative for itching and rash.   Neurological: Negative for dizziness, sensory change, speech change, weakness and headaches.   Psychiatric/Behavioral: Negative for depression. The patient is not nervous/anxious and does not have insomnia.         Physical Exam  Temp:  [36.3 °C (97.4 °F)-37.5 °C (99.5 °F)] 36.5 °C (97.7 °F)  Pulse:  [65-95] 95  Resp:  [16-18] 18  BP: (101-149)/(53-87) 149/87  SpO2:  [96 %-99 %] 96 %    Physical Exam  Vitals signs and nursing note reviewed.   Constitutional:       General: He is not in acute distress.     Appearance: Normal appearance.   HENT:      Head: Normocephalic and atraumatic.   Eyes:      General: No scleral icterus.     Extraocular Movements: Extraocular movements intact.   Neck:      Musculoskeletal: Normal range of motion and neck supple.   Cardiovascular:      Rate and Rhythm: Normal rate and regular rhythm.      Pulses: Normal pulses.      Heart sounds: Normal heart sounds. No murmur.   Pulmonary:      Effort: Pulmonary effort is normal. No respiratory distress.      Breath sounds: Normal breath sounds. No wheezing, rhonchi or rales.   Abdominal:      General: Abdomen is flat. Bowel sounds are normal. There is no distension.      Palpations: Abdomen is soft.      Tenderness: There is no rebound.   Musculoskeletal:         General: No swelling or tenderness.   Lymphadenopathy:      Cervical: No cervical adenopathy.   Skin:     Coloration: Skin is not jaundiced.      Findings: No erythema.   Neurological:      General: No focal deficit present.      Mental Status: He is alert and oriented to person, place, and time. Mental status is at baseline.      Cranial Nerves: No cranial nerve deficit.   Psychiatric:         Mood and Affect: Mood normal.         Behavior:  Behavior normal.         Fluids    Intake/Output Summary (Last 24 hours) at 5/20/2020 1137  Last data filed at 5/20/2020 0900  Gross per 24 hour   Intake 240 ml   Output 400 ml   Net -160 ml       Laboratory  Recent Labs     05/18/20 0335 05/19/20 0218 05/20/20 0417   WBC 7.4 6.0 6.0   RBC 3.08* 3.04* 2.47*   HEMOGLOBIN 10.0* 9.8* 8.1*   HEMATOCRIT 31.6* 30.8* 25.8*   .6* 101.3* 104.5*   MCH 32.5 32.2 32.8   MCHC 31.6* 31.8* 31.4*   RDW 64.2* 63.7* 69.9*   PLATELETCT 247 233 219   MPV 10.8 10.4 10.8     Recent Labs     05/18/20 0335 05/19/20 0218 05/20/20 0417   SODIUM 141 140 137   POTASSIUM 4.4 4.7 4.7   CHLORIDE 103 103 101   CO2 27 26 24   GLUCOSE 111* 101* 262*   BUN 18 21 27*   CREATININE 1.10 1.16 1.40   CALCIUM 9.0 8.8 8.2*     Recent Labs     05/17/20 2054 05/18/20 0335 05/19/20 0218 05/20/20 0417   APTT 38.1*  --   --   --    INR  --  1.88* 2.28* 3.48*               Imaging  EC-ECHOCARDIOGRAM COMPLETE W/O CONT   Final Result      CT-HEAD W/O   Final Result      No acute intracranial abnormality      DX-CHEST-PORTABLE (1 VIEW)   Final Result      1.  Interstitial and hazy airspace opacities and trace left pleural effusion. This may be related to pulmonary edema, however infection should be excluded clinically.   2.  Mild cardiomegaly. Valve prosthesis.      CT-CEREBRAL PERFUSION ANALYSIS   Final Result      1.  Cerebral blood flow less than 30% likely representing completed infarct = 0 mL.      2.  T Max more than 6 seconds likely representing combination of completed infarct and ischemia = 0 mL.      3.  Mismatched volume likely representing ischemic brain/penumbra = None      4.  Please note that the cerebral perfusion was performed on the limited brain tissue around the basal ganglia region. Infarct/ischemia outside the CT perfusion sections can be missed in this study.      CT-CTA NECK WITH & W/O-POST PROCESSING   Final Result      1.  Atherosclerosis at the internal carotid artery  origins without significant stenosis.   2.  Patent vertebral arteries.   3.  Bilateral pleural effusions and probable mild pulmonary edema.      CT-CTA HEAD WITH & W/O-POST PROCESS   Final Result      CT angiogram of the [place] within normal limits.      Generalized atrophy and chronic ischemic changes.      No acute intracranial abnormality is seen.      CT-ABDOMEN-PELVIS WITH   Final Result      1.  No evidence of abdominal or pelvic mass, adenopathy, or inflammatory change.   2.  7 mm metallic foreign body in the descending colon, etiology uncertain. It is not clear whether this is ingested material or from a prior procedure.   3.  Colonic diverticulosis   4.  Atherosclerosis   5.  RIGHT inguinal hernia containing fat            Findings were discussed with ROGELIO HENLEY on 5/8/2020 1:58 PM.               CTA ABDOMEN PELVIS W & W/O POST PROCESS    (Results Pending)        Assessment/Plan  * Lower GI bleed- (present on admission)  Assessment & Plan  Hb dropped and RN reporting continued bloody bowel movements  CTA A/P ordered per GI recommendations to see if location of bleed can be identified.  Consistent with a diverticular bleed.     Scope with large amount of diverticuli, but no specific area of bleed found, which is common for this disease  Gi recommends CTA A/P if significant bleed again   Monitor closely, high risk for further decompensation.          Acute on Chronic Encephalopathy  Assessment & Plan  Has baseline dementia, made worse with anemia, GI bleeding, change in environment.  Neuro consulted, recommended a trial of Sinemet 100mg TID, and Avoid risperdal.  Patient slightly got worse with Sinemet, discontinued   Improved, now back to baseline. Agitation better controlled with Seroquel        Coronary artery disease involving coronary bypass graft- (present on admission)  Assessment & Plan  History of CABG  Continue Lipitor    History of mitral valve replacement with mechanical valve  [Z95.2]- (present on admission)  Assessment & Plan  Have been restarted on AC   Follow-up echocardiography shows that of the mechanical mitral valve appears to be functioning well.       Chronic anticoagulation- (present on admission)  Assessment & Plan  Used to be on Coumadin for his mechanical mitral valve   Bridged on heparin drip while having procedure, back on Coumadin with heparin drip bridging on 5/13   Heparin drip bridging transition to Lovenox bridging with Coumadin on 5/17 5/17-18, INR still subtherapeutic        Dementia with behavioral disturbance (HCC)- (present on admission)  Assessment & Plan  Maintain awake daytime state.    Continue frequent re-orientation, encourage activity.   Quetiapine (Seroquel) as needed for agitation    Try to avoid sedating medications as much as possible, reserve only for extreme agitation posing threat to self.       ACP (advance care planning)  Assessment & Plan  On 5/14/2020, Dr Kelley had a prolonged discussion with the patient who was not able to participate much and wife Comfort:  regarding goals of care, diagnoses, prognosis, and CODE STATUS.  The patient has advanced aged and poor functional performance. He has multiple comorbid conditions including diabetes, diverticulosis, mechanical mitral valve on chronic anticoagulation, and coronary artery disease. Admitted with GI bleeding. I encourage to consider changing code to DNAR/DNI, however Comfort and patient want to maintain FULL code.     Debility  Assessment & Plan  Multifactorial, age, recent GI bleeding, hospitalization    Physical and occupational therapy evaluation     Bradycardia  Assessment & Plan  Resolved    Type II diabetes mellitus (HCC)- (present on admission)  Assessment & Plan  With hyperglycemia  Last glycated hemoglobin 7.4%   Short acting insulin, glargine started 5/17   Accu-Checks, hypoglycemia protocol         VTE prophylaxis: warfarin

## 2020-05-20 NOTE — CARE PLAN
Problem: Communication  Goal: The ability to communicate needs accurately and effectively will improve  Outcome: PROGRESSING AS EXPECTED  Intervention: Collaborate with speech therapy  Note: Pt educated on POC and medications. Pt verbalized understanding.      Problem: Safety  Goal: Will remain free from falls  Outcome: PROGRESSING AS EXPECTED  Note: Pt bedside table and call-light are within reach, bed in lowest position and locked. Treaded socks on and pt has been educated on call light use and asked to call before getting up. Bed alarm on.

## 2020-05-20 NOTE — PROGRESS NOTES
Report received from Pauline MARINELLI. Pt is in bed in lowest locked position with bed side table and call light within reach. Assessment performed. No further needs stated at this time. Bed alarm is on, hourly rounding in place. Pt is A&Ox3. Educated pt on the importance of calling RN or CNA before getting out of bed.

## 2020-05-20 NOTE — DISCHARGE PLANNING
Anticipated Discharge Disposition: SNF    Action: Per BS RN, Mindy, patient is not medically cleared today as having bloody BM's.    Barriers to Discharge: medical clearance    Plan: Case coordination to f/u with treatment team for medical clearance

## 2020-05-21 LAB
ANION GAP SERPL CALC-SCNC: 9 MMOL/L (ref 7–16)
BUN SERPL-MCNC: 28 MG/DL (ref 8–22)
CALCIUM SERPL-MCNC: 8.4 MG/DL (ref 8.5–10.5)
CHLORIDE SERPL-SCNC: 104 MMOL/L (ref 96–112)
CO2 SERPL-SCNC: 25 MMOL/L (ref 20–33)
CREAT SERPL-MCNC: 1.43 MG/DL (ref 0.5–1.4)
ERYTHROCYTE [DISTWIDTH] IN BLOOD BY AUTOMATED COUNT: 69.2 FL (ref 35.9–50)
GLUCOSE BLD-MCNC: 131 MG/DL (ref 65–99)
GLUCOSE BLD-MCNC: 232 MG/DL (ref 65–99)
GLUCOSE BLD-MCNC: 286 MG/DL (ref 65–99)
GLUCOSE SERPL-MCNC: 75 MG/DL (ref 65–99)
HCT VFR BLD AUTO: 25.3 % (ref 42–52)
HGB BLD-MCNC: 8 G/DL (ref 14–18)
INR PPP: 2.8 (ref 0.87–1.13)
MCH RBC QN AUTO: 32.7 PG (ref 27–33)
MCHC RBC AUTO-ENTMCNC: 31.6 G/DL (ref 33.7–35.3)
MCV RBC AUTO: 103.3 FL (ref 81.4–97.8)
PLATELET # BLD AUTO: 212 K/UL (ref 164–446)
PMV BLD AUTO: 10.6 FL (ref 9–12.9)
POTASSIUM SERPL-SCNC: 4.7 MMOL/L (ref 3.6–5.5)
PROTHROMBIN TIME: 30.5 SEC (ref 12–14.6)
RBC # BLD AUTO: 2.45 M/UL (ref 4.7–6.1)
SODIUM SERPL-SCNC: 138 MMOL/L (ref 135–145)
WBC # BLD AUTO: 6.5 K/UL (ref 4.8–10.8)

## 2020-05-21 PROCEDURE — 97530 THERAPEUTIC ACTIVITIES: CPT

## 2020-05-21 PROCEDURE — 700102 HCHG RX REV CODE 250 W/ 637 OVERRIDE(OP): Performed by: HOSPITALIST

## 2020-05-21 PROCEDURE — 700102 HCHG RX REV CODE 250 W/ 637 OVERRIDE(OP): Performed by: INTERNAL MEDICINE

## 2020-05-21 PROCEDURE — A9270 NON-COVERED ITEM OR SERVICE: HCPCS | Performed by: INTERNAL MEDICINE

## 2020-05-21 PROCEDURE — 82962 GLUCOSE BLOOD TEST: CPT | Mod: 91

## 2020-05-21 PROCEDURE — A9270 NON-COVERED ITEM OR SERVICE: HCPCS | Performed by: PSYCHIATRY & NEUROLOGY

## 2020-05-21 PROCEDURE — 97535 SELF CARE MNGMENT TRAINING: CPT

## 2020-05-21 PROCEDURE — 80048 BASIC METABOLIC PNL TOTAL CA: CPT

## 2020-05-21 PROCEDURE — 85027 COMPLETE CBC AUTOMATED: CPT

## 2020-05-21 PROCEDURE — 97110 THERAPEUTIC EXERCISES: CPT

## 2020-05-21 PROCEDURE — 85610 PROTHROMBIN TIME: CPT

## 2020-05-21 PROCEDURE — 36415 COLL VENOUS BLD VENIPUNCTURE: CPT

## 2020-05-21 PROCEDURE — 770020 HCHG ROOM/CARE - TELE (206)

## 2020-05-21 PROCEDURE — 99232 SBSQ HOSP IP/OBS MODERATE 35: CPT | Performed by: INTERNAL MEDICINE

## 2020-05-21 PROCEDURE — 97116 GAIT TRAINING THERAPY: CPT

## 2020-05-21 PROCEDURE — 700102 HCHG RX REV CODE 250 W/ 637 OVERRIDE(OP): Performed by: PSYCHIATRY & NEUROLOGY

## 2020-05-21 PROCEDURE — A9270 NON-COVERED ITEM OR SERVICE: HCPCS | Performed by: HOSPITALIST

## 2020-05-21 RX ADMIN — INSULIN HUMAN 7 UNITS: 100 INJECTION, SOLUTION PARENTERAL at 11:31

## 2020-05-21 RX ADMIN — WARFARIN SODIUM 5 MG: 5 TABLET ORAL at 16:46

## 2020-05-21 RX ADMIN — INSULIN HUMAN 7 UNITS: 100 INJECTION, SOLUTION PARENTERAL at 19:54

## 2020-05-21 RX ADMIN — QUETIAPINE FUMARATE 50 MG: 25 TABLET ORAL at 16:46

## 2020-05-21 RX ADMIN — CITALOPRAM HYDROBROMIDE 20 MG: 20 TABLET ORAL at 16:46

## 2020-05-21 RX ADMIN — DOCUSATE SODIUM 50 MG AND SENNOSIDES 8.6 MG 2 TABLET: 8.6; 5 TABLET, FILM COATED ORAL at 16:45

## 2020-05-21 RX ADMIN — FERROUS SULFATE TAB 325 MG (65 MG ELEMENTAL FE) 325 MG: 325 (65 FE) TAB at 16:46

## 2020-05-21 RX ADMIN — DOCUSATE SODIUM 50 MG AND SENNOSIDES 8.6 MG 2 TABLET: 8.6; 5 TABLET, FILM COATED ORAL at 04:33

## 2020-05-21 RX ADMIN — FERROUS SULFATE TAB 325 MG (65 MG ELEMENTAL FE) 325 MG: 325 (65 FE) TAB at 04:33

## 2020-05-21 RX ADMIN — INSULIN HUMAN 4 UNITS: 100 INJECTION, SOLUTION PARENTERAL at 16:48

## 2020-05-21 RX ADMIN — ATORVASTATIN CALCIUM 40 MG: 40 TABLET, FILM COATED ORAL at 16:45

## 2020-05-21 RX ADMIN — INSULIN GLARGINE 15 UNITS: 100 INJECTION, SOLUTION SUBCUTANEOUS at 16:48

## 2020-05-21 ASSESSMENT — ENCOUNTER SYMPTOMS
NAUSEA: 0
INSOMNIA: 0
HEARTBURN: 0
BLOOD IN STOOL: 0
DIZZINESS: 0
MYALGIAS: 0
FEVER: 0
COUGH: 0
PHOTOPHOBIA: 0
NERVOUS/ANXIOUS: 0
BLURRED VISION: 0
ABDOMINAL PAIN: 0
WEAKNESS: 0
HEADACHES: 0
SENSORY CHANGE: 0
CHILLS: 0
DIARRHEA: 0
DEPRESSION: 0
CLAUDICATION: 0
SHORTNESS OF BREATH: 0
SPEECH CHANGE: 0
CONSTIPATION: 0
VOMITING: 0

## 2020-05-21 ASSESSMENT — GAIT ASSESSMENTS
DISTANCE (FEET): 500
ASSISTIVE DEVICE: NONE;FRONT WHEEL WALKER
DEVIATION: BRADYKINETIC;SHUFFLED GAIT
GAIT LEVEL OF ASSIST: SUPERVISED

## 2020-05-21 ASSESSMENT — COGNITIVE AND FUNCTIONAL STATUS - GENERAL
SUGGESTED CMS G CODE MODIFIER DAILY ACTIVITY: CJ
SUGGESTED CMS G CODE MODIFIER MOBILITY: CJ
CLIMB 3 TO 5 STEPS WITH RAILING: A LITTLE
HELP NEEDED FOR BATHING: A LITTLE
MOBILITY SCORE: 22
DAILY ACTIVITIY SCORE: 22
WALKING IN HOSPITAL ROOM: A LITTLE
TOILETING: A LITTLE

## 2020-05-21 ASSESSMENT — FIBROSIS 4 INDEX: FIB4 SCORE: 1.34

## 2020-05-21 NOTE — DISCHARGE PLANNING
Agency/Facility Name: [facility]  Spoke To: Tiny   Outcome: Per Tiny she has a bed available for tomorrow. She has schedule transport for patient at 1200 5/22/2020.

## 2020-05-21 NOTE — CARE PLAN
Problem: Communication  Goal: The ability to communicate needs accurately and effectively will improve  Outcome: PROGRESSING AS EXPECTED  Note: Pt communicates needs.      Problem: Venous Thromboembolism (VTW)/Deep Vein Thrombosis (DVT) Prevention:  Goal: Patient will participate in Venous Thrombosis (VTE)/Deep Vein Thrombosis (DVT)Prevention Measures  Outcome: PROGRESSING AS EXPECTED  Flowsheets (Taken 5/21/2020 0724)  Pharmacologic Prophylaxis Used: Warfarin (Coumadin)     Problem: Safety  Goal: Will remain free from injury  Outcome: PROGRESSING SLOWER THAN EXPECTED  Note: Pt impulsive. Fall prevention precautions in place. Frequent rounding. Educated on risk and to call for any assistance.   Goal: Will remain free from falls  Outcome: PROGRESSING SLOWER THAN EXPECTED  Note: Pt impulsive. Fall prevention precautions in place. Frequent rounding. Educated on risk and to call for any assistance.

## 2020-05-21 NOTE — DISCHARGE PLANNING
Anticipated Discharge Disposition: [facility]    Action: Per Dr. Villalobos, patient may be medically cleared for discharge to SNF tomorrow so request that transport be set up to [facility]. DONNA Ng, called and spoke with David at [facility] just to verify if any beds would be available as that is first choice. Per David they do not have any beds at this time but will follow patient to [facility] and transfer patient once bed available if applicable. [facility] is scheduled to pick patient up at 1200 tomorrow.  RN CM called patient's spouse, Comfort, to notify her and she states she was told by a staff member at the [facility] that they do have a bed so she is currently calling David to find out.    Barriers to Discharge: pending medical clearance    Plan: Case coordination to be available for discharge needs

## 2020-05-21 NOTE — THERAPY
"Physical Therapy   Daily Treatment     Patient Name: Carlos Rivera  Age:  74 y.o., Sex:  male  Medical Record #: [id number]  Today's Date: 5/21/2020     Precautions  Precautions: Fall Risk  Comments: baseline dementia    Assessment  Patient progressing with mobility and appears to be at baseline functional mobility. Today he ambulated > 500ft with FWW then no AD. He continues to demonstrate Parkinsonian-like gait with small shuffling steps but is able to correct with cueing, reverts back to shuffling gait when distracted.     Plan  Discharge secondary to goals met.  Discharge recommendations:  He appears to be at baseline functional mobility. Will defer DC recommendations to other disciplines given concern for cognition and safety.     Subjective  \"I don't need that.\" (referring to FWW)     Objective   05/21/20 1125   Precautions   Comments baseline dementia   Cognition    Cognition / Consciousness X   Level of Consciousness Alert   Safety Awareness Impaired   New Learning Impaired   Comments pleasant, cooperative, decreased insight   Neurological Concerns   Neurological Concerns Yes   Comments shuffling, Parkinsonian-like gait   Balance   Sitting Balance (Static) Fair +   Sitting Balance (Dynamic) Fair +   Standing Balance (Static) Fair +   Standing Balance (Dynamic) Fair +   Weight Shift Sitting Fair   Weight Shift Standing Fair   Comments initially with FWW then no AD, no LOB; see gait   Gait Analysis   Gait Level Of Assist Supervised   Assistive Device None;Front Wheel Walker   Distance (Feet) 500   # of Times Distance was Traveled 2   Deviation Bradykinetic;Shuffled Gait   Skilled Intervention Compensatory Strategies;Verbal Cuing   Comments step length improved with cueing but increased shuffling when patient distracted by conversation. No LOB but poor clearance which improved with use of FWW, patient resistant to use of walker but would recommend in unfamiliar environments or uneven surfaces   Bed " Mobility    Supine to Sit   (NT, up with OT upon entry)   Functional Mobility   Sit to Stand   (NT, up with OT upon entry)   Bed, Chair, Wheelchair Transfer Supervised   Short Term Goals    Short Term Goal # 1 Patient will be able to move supine<>sitting EOB without bed features with supervision within 6 tx in order to get out of bed   Goal Outcome # 1 Other (see comments)  (not observed)   Short Term Goal # 2 Patient will be able to move sitting<>standing with supervision within 6 tx in order to initiate gait   Goal Outcome # 2 Goal met   Short Term Goal # 3 Patient will be able to ambulate 50ft with supervision within 6 tx in order to access environment   Goal Outcome # 3 Goal met   Short Term Goal # 3 B Pt to ambulate 150 ft w/ spv w/o AD in 6 visits   Goal Outcome # 3 B Goal met

## 2020-05-21 NOTE — PROGRESS NOTES
Report received from Colleen MARINELLI. Pt is in bed in lowest locked position with bed side table and call light within reach. Assessment performed. No further needs stated at this time. Bed alarm on. Educated pt on the importance of calling before getting out of bed. Hourly rounding in place.

## 2020-05-21 NOTE — PROGRESS NOTES
Hospital Medicine Daily Progress Note    Date of Service  5/21/2020    Chief Complaint  74 y.o. male admitted 5/8/2020 with GI bleed.    Hospital Course    Patient presented from home with multiple red bloody bowel movements and slightly supratherapeutic INR, he is on coumadin secondary to mechanical mitral valve presence.  GI consulted and he had colonoscopy which showed diverticuloses without obvious area for the bleed, there was significant residual blood appreciated at time of evaluation.  Patient had issues with agitation during hospitalization and a fall, he had a safety sitter placed at bedside and started on seroquel with significant improvement in his mentation.  He had resolution of active bleeding following colonoscopy and anticoagulation transitioned from heparin drip back to coumadin with goal 2.5-3.5 secondary to mechanical heart valve.       Interval Problem Update  5/19 Patient states he is feeling okay, wants to do more and get out of bed and ambulate.  INR 2.28 today but patient had bloody bm overnight reported.  H/h remains stable despite this report.  Will conitnue with coumadin but hold further lovenox.  5/20 Patient without complaint other than he doesn't feel as sharp as normal and having trouble with memory.  Hbg dropped to 8.1 from 9.8 and continued bleeding in bowel movement reported by RN.  Will order CTA a/p with contrast for further evaluation as recommended by GI to see if location of bleeding can be localized.  5/21 Patient had bowel movement today without evidence of any bleeding.  H/H remains same from yesterday.  If Hgb remains same tomorrow, he will [facility].    Consultants/Specialty  GI - s/o    Code Status  full    Disposition  SNF when medically stable.    Review of Systems  Review of Systems   Constitutional: Negative for chills and fever.   HENT: Negative for congestion.    Eyes: Negative for blurred vision and photophobia.   Respiratory: Negative for  cough and shortness of breath.    Cardiovascular: Negative for chest pain, claudication and leg swelling.   Gastrointestinal: Negative for abdominal pain, blood in stool, constipation, diarrhea, heartburn, nausea and vomiting.   Genitourinary: Negative for dysuria and hematuria.   Musculoskeletal: Negative for joint pain and myalgias.   Skin: Negative for itching and rash.   Neurological: Negative for dizziness, sensory change, speech change, weakness and headaches.   Psychiatric/Behavioral: Negative for depression. The patient is not nervous/anxious and does not have insomnia.         Physical Exam  Temp:  [36.3 °C (97.3 °F)-36.9 °C (98.5 °F)] 36.6 °C (97.9 °F)  Pulse:  [64-98] 64  Resp:  [16-18] 18  BP: (128-150)/(53-74) 142/65  SpO2:  [94 %-97 %] 97 %    Physical Exam  Vitals signs and nursing note reviewed.   Constitutional:       General: He is not in acute distress.     Appearance: Normal appearance.   HENT:      Head: Normocephalic and atraumatic.   Eyes:      General: No scleral icterus.     Extraocular Movements: Extraocular movements intact.   Neck:      Musculoskeletal: Normal range of motion and neck supple.   Cardiovascular:      Rate and Rhythm: Normal rate and regular rhythm.      Pulses: Normal pulses.      Heart sounds: Normal heart sounds. No murmur.   Pulmonary:      Effort: Pulmonary effort is normal. No respiratory distress.      Breath sounds: Normal breath sounds. No wheezing, rhonchi or rales.   Abdominal:      General: Abdomen is flat. Bowel sounds are normal. There is no distension.      Palpations: Abdomen is soft.      Tenderness: There is no rebound.   Musculoskeletal:         General: No swelling or tenderness.   Lymphadenopathy:      Cervical: No cervical adenopathy.   Skin:     Coloration: Skin is not jaundiced.      Findings: No erythema.   Neurological:      General: No focal deficit present.      Mental Status: He is alert and oriented to person, place, and time. Mental status is  at baseline.      Cranial Nerves: No cranial nerve deficit.   Psychiatric:         Mood and Affect: Mood normal.         Behavior: Behavior normal.         Fluids    Intake/Output Summary (Last 24 hours) at 5/21/2020 1318  Last data filed at 5/21/2020 1251  Gross per 24 hour   Intake 480 ml   Output 800 ml   Net -320 ml       Laboratory  Recent Labs     05/19/20 0218 05/20/20 0417 05/21/20  0239   WBC 6.0 6.0 6.5   RBC 3.04* 2.47* 2.45*   HEMOGLOBIN 9.8* 8.1* 8.0*   HEMATOCRIT 30.8* 25.8* 25.3*   .3* 104.5* 103.3*   MCH 32.2 32.8 32.7   MCHC 31.8* 31.4* 31.6*   RDW 63.7* 69.9* 69.2*   PLATELETCT 233 219 212   MPV 10.4 10.8 10.6     Recent Labs     05/19/20 0218 05/20/20 0417 05/21/20  0239   SODIUM 140 137 138   POTASSIUM 4.7 4.7 4.7   CHLORIDE 103 101 104   CO2 26 24 25   GLUCOSE 101* 262* 75   BUN 21 27* 28*   CREATININE 1.16 1.40 1.43*   CALCIUM 8.8 8.2* 8.4*     Recent Labs     05/19/20 0218 05/20/20 0417 05/21/20  0239   INR 2.28* 3.48* 2.80*               Imaging  CTA ABDOMEN PELVIS W & W/O POST PROCESS   Final Result      1.  No evidence of active contrast extravasation.   2.  Diffuse atherosclerosis. No aortic aneurysm or dissection.   3.  Colonic diverticulosis.      EC-ECHOCARDIOGRAM COMPLETE W/O CONT   Final Result      CT-HEAD W/O   Final Result      No acute intracranial abnormality      DX-CHEST-PORTABLE (1 VIEW)   Final Result      1.  Interstitial and hazy airspace opacities and trace left pleural effusion. This may be related to pulmonary edema, however infection should be excluded clinically.   2.  Mild cardiomegaly. Valve prosthesis.      CT-CEREBRAL PERFUSION ANALYSIS   Final Result      1.  Cerebral blood flow less than 30% likely representing completed infarct = 0 mL.      2.  T Max more than 6 seconds likely representing combination of completed infarct and ischemia = 0 mL.      3.  Mismatched volume likely representing ischemic brain/penumbra = None      4.  Please note that the  cerebral perfusion was performed on the limited brain tissue around the basal ganglia region. Infarct/ischemia outside the CT perfusion sections can be missed in this study.      CT-CTA NECK WITH & W/O-POST PROCESSING   Final Result      1.  Atherosclerosis at the internal carotid artery origins without significant stenosis.   2.  Patent vertebral arteries.   3.  Bilateral pleural effusions and probable mild pulmonary edema.      CT-CTA HEAD WITH & W/O-POST PROCESS   Final Result      CT angiogram of the [place] within normal limits.      Generalized atrophy and chronic ischemic changes.      No acute intracranial abnormality is seen.      CT-ABDOMEN-PELVIS WITH   Final Result      1.  No evidence of abdominal or pelvic mass, adenopathy, or inflammatory change.   2.  7 mm metallic foreign body in the descending colon, etiology uncertain. It is not clear whether this is ingested material or from a prior procedure.   3.  Colonic diverticulosis   4.  Atherosclerosis   5.  RIGHT inguinal hernia containing fat            Findings were discussed with ROGELIO HENLEY on 5/8/2020 1:58 PM.                    Assessment/Plan  * Lower GI bleed- (present on admission)  Assessment & Plan  H/H stable.  Hb dropped and RN reporting continued bloody bowel movements  CTA A/P ordered per GI recommendations  - no evidence of bleeding seen  Consistent with a diverticular bleed.     Scope with large amount of diverticuli, but no specific area of bleed found, which is common for this disease  Monitor closely, high risk for further decompensation.          Acute on Chronic Encephalopathy  Assessment & Plan  Has baseline dementia, made worse with anemia, GI bleeding, change in environment.  Neuro consulted, recommended a trial of Sinemet 100mg TID, and Avoid risperdal.  Patient slightly got worse with Sinemet, discontinued   Improved, now back to baseline. Agitation better controlled with Seroquel        Coronary artery disease  involving coronary bypass graft- (present on admission)  Assessment & Plan  History of CABG  Continue Lipitor    History of mitral valve replacement with mechanical valve [Z95.2]- (present on admission)  Assessment & Plan  Have been restarted on AC   Follow-up echocardiography shows that of the mechanical mitral valve appears to be functioning well.       Chronic anticoagulation- (present on admission)  Assessment & Plan  Used to be on Coumadin for his mechanical mitral valve   Bridged on heparin drip while having procedure, back on Coumadin with heparin drip bridging on 5/13   Heparin drip bridging transition to Lovenox bridging with Coumadin on 5/17 5/17-18, INR still subtherapeutic        Dementia with behavioral disturbance (HCC)- (present on admission)  Assessment & Plan  Maintain awake daytime state.    Continue frequent re-orientation, encourage activity.   Quetiapine (Seroquel) as needed for agitation    Try to avoid sedating medications as much as possible, reserve only for extreme agitation posing threat to self.       ACP (advance care planning)  Assessment & Plan  On 5/14/2020, Dr Kelley had a prolonged discussion with the patient who was not able to participate much and wife Comfort:  regarding goals of care, diagnoses, prognosis, and CODE STATUS.  The patient has advanced aged and poor functional performance. He has multiple comorbid conditions including diabetes, diverticulosis, mechanical mitral valve on chronic anticoagulation, and coronary artery disease. Admitted with GI bleeding. I encourage to consider changing code to DNAR/DNI, however Comfort and patient want to maintain FULL code.     Debility  Assessment & Plan  Multifactorial, age, recent GI bleeding, hospitalization    Physical and occupational therapy evaluation     Bradycardia  Assessment & Plan  Resolved    Type II diabetes mellitus (HCC)- (present on admission)  Assessment & Plan  With hyperglycemia  Last glycated hemoglobin 7.4%    Short acting insulin, glargine started 5/17   Accu-Checks, hypoglycemia protocol         VTE prophylaxis: warfarin

## 2020-05-21 NOTE — THERAPY
"Occupational Therapy  Daily Treatment     Patient Name: Carlos Rivera  Age:  74 y.o., Sex:  male  Medical Record #: [id number]  Today's Date: 5/21/2020     Precautions  Precautions: Fall Risk  Comments: baseline dementia    Subjective    \"I think I'm still foggy because of all the blood I lost.\"      Objective       05/21/20 1103   Cognition    Cognition / Consciousness X   Speech/ Communication   (memory deficits)   Level of Consciousness Alert   Safety Awareness Impaired   New Learning Impaired   Attention Impaired   Sequencing Impaired   Initiation Impaired   Comments demo'd decreased memory and poor insight into deficits and safety awareness. C/o continued \"fogginess in my head\"   Sitting Upper Body Exercises   Sitting Upper Body Exercises Yes   Comments Continued education for BUE AROM exercises; poor retention of edu from prior session   Other Treatments   Other Treatments Provided Per chart, POC is for pt to go to SNF for medication needs, and then likley to longer term memory care   Balance   Sitting Balance (Static) Fair +   Sitting Balance (Dynamic) Fair   Standing Balance (Static) Fair -   Standing Balance (Dynamic) Fair -   Weight Shift Sitting Fair   Weight Shift Standing Fair   Skilled Intervention Verbal Cuing;Tactile Cuing;Compensatory Strategies   Comments w/ FWW; reports he feels doesn't need, but had 2 LOBs in [place]   Bed Mobility    Supine to Sit Supervised   Sit to Supine   (left in chair)   Scooting Supervised   Skilled Intervention Verbal Cuing;Compensatory Strategies   Activities of Daily Living   Grooming Supervision;Standing  (washing hand and wiping face)   Lower Body Dressing Supervision   Toileting Supervision   Skilled Intervention Verbal Cuing;Tactile Cuing;Compensatory Strategies   Comments req v/cs for safety and continued use of FWW during session.    Functional Mobility   Sit to Stand Supervised   Bed, Chair, Wheelchair Transfer Supervised   Toilet Transfers Supervised "   Transfer Method Other (Comments)  (stand step)   Mobility w/ FWW req v/cs throughout session for safety; bed>chair>toilet>sink>hallway with PT   Skilled Intervention Verbal Cuing;Tactile Cuing;Compensatory Strategies   Short Term Goals   Short Term Goal # 4 will use FWW correctly/safely throughout session w/o v/cs   Goal Outcome # 4 Goal not met   Short Term Goal # 5 will demo AROM HEP w/ SPV and w/o v/cs    Goal Outcome # 5 Progressing as expected   Anticipated Discharge Equipment   DC Equipment Tub / Shower Seat;Unable To Determine At This Time       Assessment    Pt seen for OT session. Continues to req SPV and v/cs for safety during ADLs and txfs with FWW. Reported remembered     Plan    {OT Therapy Plan Status:002129}    Discharge recommendations:  ***

## 2020-05-21 NOTE — DISCHARGE PLANNING
Agency/Facility Name: [facility]   Outcome: Voice mail left regarding patients referral and bed availability. Requested a call back.

## 2020-05-21 NOTE — CARE PLAN
Problem: Communication  Goal: The ability to communicate needs accurately and effectively will improve  Outcome: PROGRESSING AS EXPECTED  Intervention: Collaborate with speech therapy  Note: Pt educated on POC and medications. Pt verbalized understanding.      Problem: Safety  Goal: Will remain free from injury  Outcome: PROGRESSING AS EXPECTED  Note: Pt bedside table and call-light are within reach, bed in lowest position and locked. Treaded socks on and pt has been educated on call light use and asked to call before getting up. Bed alarm on.

## 2020-05-21 NOTE — PROGRESS NOTES
Inpatient Anticoagulation Service Note    Date: 5/21/2020    Reason for Anticoagulation: Mechanical Mitral Valve Replacement     Target INR: 2.5 to 3.5         Hemoglobin Value: (!) 8  Hematocrit Value: (!) 25.3  Lab Platelet Value: 212    INR from last 7 days     Date/Time INR Value    05/21/20 0239  (!) 2.8    05/20/20 0417  (!) 3.48    05/19/20 0218  (!) 2.28    05/18/20 0335  (!) 1.88    05/17/20 0246  (!) 1.71    05/16/20 0555  (!) 1.22    05/15/20 0505  1.06        Dose from last 7 days     Date/Time Dose (mg)    05/21/20 1414  5    05/20/20 1019  0    05/19/20 1605  5    05/18/20 1330  10    05/17/20 1500  5    05/16/20 1321  5    05/15/20 1059  7.5        Average Dose (mg): (Home Dose: 7.5 mg Mo and 5 mg all other days)  Significant Interactions: Statin, Other (Comments)(SSRI)  Bridge Therapy: No    Reversal Agent Administered: Not Applicable    Comments: INR down to 2.8 after warfarin dose held yesterday. Per MD no s/s of bleeding today. Pt's warfarin dosing changed to 5 mg daily, will continue to trend the INR.      Education Material Provided?: No(chronic warfarin patient)  Pharmacist suggested discharge dosing: warfarin 5 mg daily for now. Pt will need a follow up INR within 3-5 days of discharge     Carlo Bermudez, PharmD

## 2020-05-22 LAB
ANION GAP SERPL CALC-SCNC: 10 MMOL/L (ref 7–16)
BUN SERPL-MCNC: 27 MG/DL (ref 8–22)
CALCIUM SERPL-MCNC: 8.8 MG/DL (ref 8.5–10.5)
CHLORIDE SERPL-SCNC: 104 MMOL/L (ref 96–112)
CO2 SERPL-SCNC: 24 MMOL/L (ref 20–33)
CREAT SERPL-MCNC: 1.27 MG/DL (ref 0.5–1.4)
ERYTHROCYTE [DISTWIDTH] IN BLOOD BY AUTOMATED COUNT: 74.1 FL (ref 35.9–50)
GLUCOSE BLD-MCNC: 123 MG/DL (ref 65–99)
GLUCOSE BLD-MCNC: 150 MG/DL (ref 65–99)
GLUCOSE BLD-MCNC: 216 MG/DL (ref 65–99)
GLUCOSE BLD-MCNC: 226 MG/DL (ref 65–99)
GLUCOSE BLD-MCNC: 303 MG/DL (ref 65–99)
GLUCOSE SERPL-MCNC: 138 MG/DL (ref 65–99)
HCT VFR BLD AUTO: 26.6 % (ref 42–52)
HGB BLD-MCNC: 8.3 G/DL (ref 14–18)
INR PPP: 1.98 (ref 0.87–1.13)
MCH RBC QN AUTO: 33.5 PG (ref 27–33)
MCHC RBC AUTO-ENTMCNC: 31.2 G/DL (ref 33.7–35.3)
MCV RBC AUTO: 107.3 FL (ref 81.4–97.8)
PLATELET # BLD AUTO: 192 K/UL (ref 164–446)
PMV BLD AUTO: 10.2 FL (ref 9–12.9)
POTASSIUM SERPL-SCNC: 5.2 MMOL/L (ref 3.6–5.5)
PROTHROMBIN TIME: 23.1 SEC (ref 12–14.6)
RBC # BLD AUTO: 2.48 M/UL (ref 4.7–6.1)
SODIUM SERPL-SCNC: 138 MMOL/L (ref 135–145)
WBC # BLD AUTO: 6.6 K/UL (ref 4.8–10.8)

## 2020-05-22 PROCEDURE — 36415 COLL VENOUS BLD VENIPUNCTURE: CPT

## 2020-05-22 PROCEDURE — A9270 NON-COVERED ITEM OR SERVICE: HCPCS | Performed by: PSYCHIATRY & NEUROLOGY

## 2020-05-22 PROCEDURE — 700102 HCHG RX REV CODE 250 W/ 637 OVERRIDE(OP): Performed by: HOSPITALIST

## 2020-05-22 PROCEDURE — 770006 HCHG ROOM/CARE - MED/SURG/GYN SEMI*

## 2020-05-22 PROCEDURE — 700102 HCHG RX REV CODE 250 W/ 637 OVERRIDE(OP): Performed by: PSYCHIATRY & NEUROLOGY

## 2020-05-22 PROCEDURE — 80048 BASIC METABOLIC PNL TOTAL CA: CPT

## 2020-05-22 PROCEDURE — 99239 HOSP IP/OBS DSCHRG MGMT >30: CPT | Performed by: INTERNAL MEDICINE

## 2020-05-22 PROCEDURE — 85027 COMPLETE CBC AUTOMATED: CPT

## 2020-05-22 PROCEDURE — A9270 NON-COVERED ITEM OR SERVICE: HCPCS | Performed by: HOSPITALIST

## 2020-05-22 PROCEDURE — 700102 HCHG RX REV CODE 250 W/ 637 OVERRIDE(OP): Performed by: INTERNAL MEDICINE

## 2020-05-22 PROCEDURE — 85610 PROTHROMBIN TIME: CPT

## 2020-05-22 PROCEDURE — 82962 GLUCOSE BLOOD TEST: CPT

## 2020-05-22 PROCEDURE — A9270 NON-COVERED ITEM OR SERVICE: HCPCS | Performed by: INTERNAL MEDICINE

## 2020-05-22 RX ORDER — INSULIN GLARGINE 100 [IU]/ML
15 INJECTION, SOLUTION SUBCUTANEOUS EVERY EVENING
Qty: 10 ML
Start: 2020-05-22 | End: 2020-09-03 | Stop reason: SDUPTHER

## 2020-05-22 RX ORDER — WARFARIN SODIUM 5 MG/1
5 TABLET ORAL DAILY
Qty: 30 TAB | Refills: 3 | Status: ON HOLD | OUTPATIENT
Start: 2020-05-22 | End: 2020-07-08 | Stop reason: SDUPTHER

## 2020-05-22 RX ORDER — FERROUS SULFATE 325(65) MG
325 TABLET ORAL 2 TIMES DAILY WITH MEALS
Qty: 30 TAB
Start: 2020-05-22 | End: 2020-06-15 | Stop reason: CLARIF

## 2020-05-22 RX ORDER — QUETIAPINE FUMARATE 50 MG/1
50 TABLET, FILM COATED ORAL EVERY EVENING
Qty: 60 TAB | Refills: 3 | Status: ON HOLD | OUTPATIENT
Start: 2020-05-22 | End: 2020-07-08 | Stop reason: SDUPTHER

## 2020-05-22 RX ADMIN — INSULIN HUMAN 4 UNITS: 100 INJECTION, SOLUTION PARENTERAL at 21:08

## 2020-05-22 RX ADMIN — DOCUSATE SODIUM 50 MG AND SENNOSIDES 8.6 MG 2 TABLET: 8.6; 5 TABLET, FILM COATED ORAL at 16:49

## 2020-05-22 RX ADMIN — FERROUS SULFATE TAB 325 MG (65 MG ELEMENTAL FE) 325 MG: 325 (65 FE) TAB at 05:17

## 2020-05-22 RX ADMIN — INSULIN HUMAN 10 UNITS: 100 INJECTION, SOLUTION PARENTERAL at 16:56

## 2020-05-22 RX ADMIN — QUETIAPINE FUMARATE 50 MG: 25 TABLET ORAL at 16:49

## 2020-05-22 RX ADMIN — INSULIN HUMAN 4 UNITS: 100 INJECTION, SOLUTION PARENTERAL at 11:44

## 2020-05-22 RX ADMIN — WARFARIN SODIUM 5 MG: 5 TABLET ORAL at 16:48

## 2020-05-22 RX ADMIN — INSULIN GLARGINE 15 UNITS: 100 INJECTION, SOLUTION SUBCUTANEOUS at 16:56

## 2020-05-22 RX ADMIN — FERROUS SULFATE TAB 325 MG (65 MG ELEMENTAL FE) 325 MG: 325 (65 FE) TAB at 16:49

## 2020-05-22 RX ADMIN — CITALOPRAM HYDROBROMIDE 20 MG: 20 TABLET ORAL at 16:48

## 2020-05-22 RX ADMIN — ATORVASTATIN CALCIUM 40 MG: 40 TABLET, FILM COATED ORAL at 16:51

## 2020-05-22 ASSESSMENT — ENCOUNTER SYMPTOMS
VOMITING: 0
CONSTIPATION: 0
DEPRESSION: 0
DIZZINESS: 0
MYALGIAS: 0
FEVER: 0
BLURRED VISION: 0
WEAKNESS: 0
BLOOD IN STOOL: 0
HEADACHES: 0
SPEECH CHANGE: 0
DIARRHEA: 0
NERVOUS/ANXIOUS: 0
SHORTNESS OF BREATH: 0
HEARTBURN: 0
CLAUDICATION: 0
COUGH: 0
INSOMNIA: 0
ABDOMINAL PAIN: 0
PHOTOPHOBIA: 0
CHILLS: 0
SENSORY CHANGE: 0
NAUSEA: 0

## 2020-05-22 NOTE — PROGRESS NOTES
Monitor Summary:    SB-SR 51-89    Unsustained malini to 44    Rare PVC; Rare PAC    .14/.08/.42

## 2020-05-22 NOTE — PROGRESS NOTES
Received report from night staff. Assumed pt care. Patient awake, sitting up in bed and eating breakfast during bedside report. AOX4. POC discussed. Tele monitor in place. Call light within reach, bed in lowest position, bed alarm on, and personal items accessible. Educated the patient about using the call light for assistance before getting out of bed, patient verbalized understanding. Patient denied any further needs at this time.

## 2020-05-22 NOTE — CARE PLAN
Problem: Communication  Goal: The ability to communicate needs accurately and effectively will improve  Outcome: PROGRESSING AS EXPECTED  Patient communicates appropriately.     Problem: Safety  Goal: Will remain free from injury  Outcome: PROGRESSING AS EXPECTED  Goal: Will remain free from falls  Outcome: PROGRESSING AS EXPECTED  Fall risk interventions in place. Patient educated regarding the need to call for assistance, patient verbalizes understanding. Bed/Chair alarms in place.

## 2020-05-22 NOTE — DISCHARGE SUMMARY
Discharge Summary    CHIEF COMPLAINT ON ADMISSION  Chief Complaint   Patient presents with   • Diarrhea     last night   • Bloody Stools   • Nausea       Reason for Admission  Rectal Bleeding     CODE STATUS  Full Code    HPI & HOSPITAL COURSE  This is a 74 y.o. male here with multiple red bloody bowel movements and slightly supratherapeutic INR, he is on coumadin secondary to mechanical mitral valve presence.  GI consulted and he had colonoscopy which showed diverticuloses without obvious area for the bleed, there was significant residual blood appreciated at time of evaluation.  Patient had issues with agitation during hospitalization and a fall, he had a safety sitter placed at bedside and started on seroquel with significant improvement in his mentation.  He had resolution of active bleeding following colonoscopy and anticoagulation transitioned from heparin drip back to coumadin with goal 2.5-3.5 secondary to mechanical heart valve.  Unfortunately he had bleeding again when on bridging lovenox and coumadin up titration.  CTA abdomen and pelvis performed and no localization of bleeding seen.  He likely has an intermittent and slow diverticular bleeding, currently he is no longer bleeding, h/h is trending up and he is stable to transfer to SNF.    Therefore, he is discharged in good and stable condition to skilled nursing facility.    The patient met 2-midnight criteria for an inpatient stay at the time of discharge.      FOLLOW UP ITEMS POST DISCHARGE  PCP - Dr Rufino Shine 1-2 weeks    DISCHARGE DIAGNOSES  Principal Problem:    Lower GI bleed POA: Yes  Active Problems:    Chronic anticoagulation POA: Yes    History of mitral valve replacement with mechanical valve [Z95.2] POA: Yes    Coronary artery disease involving coronary bypass graft POA: Yes    Acute on Chronic Encephalopathy POA: No    Type II diabetes mellitus (HCC) POA: Yes    Bradycardia POA: No    Debility POA: Unknown    ACP (advance care planning)  POA: Unknown    Dementia with behavioral disturbance (HCC) POA: Yes  Resolved Problems:    * No resolved hospital problems. *      FOLLOW UP  Future Appointments   Date Time Provider Department Center   6/15/2020  7:20 AM [facility]   8/25/2020  4:00 PM Stephon Ramos M.D. [facility] None     Rufino Shine M.D.  [address]  [facility] [address]  [phone]    In 2 weeks        MEDICATIONS ON DISCHARGE     Medication List      START taking these medications      Instructions   ferrous sulfate 325 (65 Fe) MG tablet   Take 1 Tab by mouth 2 times a day, with meals.  Dose:  325 mg     insulin glargine 100 UNIT/ML Soln  Commonly known as:  LANTUS  Replaces:  Lantus SoloStar 100 UNIT/ML Sopn injection   Inject 15 Units as instructed every evening.  Dose:  15 Units     quetiapine 50 MG tablet  Commonly known as:  SEROQUEL   Take 1 Tab by mouth every evening.  Dose:  50 mg     warfarin 5 MG Tabs  Commonly known as:  COUMADIN   Take 1 Tab by mouth every day at 6 PM.  Dose:  5 mg        CHANGE how you take these medications      Instructions   citalopram 10 MG tablet  What changed:    · how much to take  · when to take this  Commonly known as:  CELEXA   TAKE 1 TABLET BY MOUTH  EVERY MORNING        CONTINUE taking these medications      Instructions   Allegra Allergy 180 MG tablet  Generic drug:  fexofenadine   Take 180 mg by mouth every day.  Dose:  180 mg     atorvastatin 40 MG Tabs  Commonly known as:  LIPITOR   Doctor's comments:  Please remind pt to get labs done  TAKE 1 TABLET DAILY IN THE EVENING        STOP taking these medications    Lantus SoloStar 100 UNIT/ML Sopn injection  Generic drug:  insulin glargine  Replaced by:  insulin glargine 100 UNIT/ML Soln            Allergies  Allergies   Allergen Reactions   • [place]ra Vomiting   • Risperidone      Dystonia, altered mental status   • Vicodin [Hydrocodone-Acetaminophen] Vomiting       DIET  Orders Placed This Encounter   Procedures   • Diet  Order Low Fiber(GI Soft), Diabetic     Standing Status:   Standing     Number of Occurrences:   1     Order Specific Question:   Diet:     Answer:   Low Fiber(GI Soft) [2]     Order Specific Question:   Diet:     Answer:   Diabetic [3]       ACTIVITY  As tolerated.  Weight bearing as tolerated    LINES, DRAINS, AND WOUNDS  This is an automated list. Peripheral IVs will be removed prior to discharge.  Peripheral IV 05/11/20 18 G Right Upper arm (Active)   Site Assessment Clean;Dry;Intact 05/22/20 0800   Dressing Type Occlusive;Transparent 05/22/20 0800   Line Status Saline locked 05/22/20 0800   Dressing Status Clean;Dry;Intact 05/22/20 0800   Dressing Intervention N/A 05/22/20 0800   Date Primary Tubing Changed 05/16/20 05/16/20 2000   NEXT Primary Tubing Change  05/19/20 05/17/20 0815   Infiltration Grading ([facility]) 0 05/22/20 0800   Phlebitis Scale ([facility]) 0 05/22/20 0800          Peripheral IV 05/11/20 18 G Right Upper arm (Active)   Site Assessment Clean;Dry;Intact 05/22/20 0800   Dressing Type Occlusive;Transparent 05/22/20 0800   Line Status Saline locked 05/22/20 0800   Dressing Status Clean;Dry;Intact 05/22/20 0800   Dressing Intervention N/A 05/22/20 0800   Date Primary Tubing Changed 05/16/20 05/16/20 2000   NEXT Primary Tubing Change  05/19/20 05/17/20 0815   Infiltration Grading ([facility]) 0 05/22/20 0800   Phlebitis Scale ([facility]) 0 05/22/20 0800               MENTAL STATUS ON TRANSFER  Level of Consciousness: Alert  Orientation : Oriented x 4  Speech: Speech Clear    CONSULTATIONS  GI - Jatinder Villegas  Neuro - Davide Sanders  Cards - Paul [place]     PROCEDURES  5/9/2020 - Colonoscopy - Dr Madera  5/11/2020 - EEG    LABORATORY  Lab Results   Component Value Date    SODIUM 138 05/22/2020    POTASSIUM 5.2 05/22/2020    CHLORIDE 104 05/22/2020    CO2 24 05/22/2020    GLUCOSE 138 (H) 05/22/2020    BUN 27 (H) 05/22/2020    CREATININE 1.27 05/22/2020        Lab Results    Component Value Date    WBC 6.6 05/22/2020    HEMOGLOBIN 8.3 (L) 05/22/2020    HEMATOCRIT 26.6 (L) 05/22/2020    PLATELETCT 192 05/22/2020        Total time of the discharge process exceeds 35 minutes.    No change in medical issues since d/c 5/22.    Added meclizine 12.5 mg prn to his medication list, to help alleviate vertigo.

## 2020-05-22 NOTE — DISCHARGE PLANNING
Agency/Facility Name: [facility]  Spoke To: [facility]   Outcome: Per [facility] no bed available for today. [facility] will have a bed available tomorrow. [facility] schedule transport pick for 5/23/2020 at 1200.

## 2020-05-22 NOTE — DISCHARGE PLANNING
Anticipated Discharge Disposition: [facility]    Action: RN CM completed cobra and transfer packet and gave to BS RN, Calli. She is aware patient will be picked up at 1200 via wheelchair transport to go to [facility].    Barriers to Discharge: none    Plan: Case coordination available for discharge needs

## 2020-05-22 NOTE — PROGRESS NOTES
Hospital Medicine Daily Progress Note    Date of Service  5/22/2020    Chief Complaint  74 y.o. male admitted 5/8/2020 with GI bleed.    Hospital Course    Patient presented from home with multiple red bloody bowel movements and slightly supratherapeutic INR, he is on coumadin secondary to mechanical mitral valve presence.  GI consulted and he had colonoscopy which showed diverticuloses without obvious area for the bleed, there was significant residual blood appreciated at time of evaluation.  Patient had issues with agitation during hospitalization and a fall, he had a safety sitter placed at bedside and started on seroquel with significant improvement in his mentation.  He had resolution of active bleeding following colonoscopy and anticoagulation transitioned from heparin drip back to coumadin with goal 2.5-3.5 secondary to mechanical heart valve.       Interval Problem Update  5/19 Patient states he is feeling okay, wants to do more and get out of bed and ambulate.  INR 2.28 today but patient had bloody bm overnight reported.  H/h remains stable despite this report.  Will conitnue with coumadin but hold further lovenox.  5/20 Patient without complaint other than he doesn't feel as sharp as normal and having trouble with memory.  Hbg dropped to 8.1 from 9.8 and continued bleeding in bowel movement reported by RN.  Will order CTA a/p with contrast for further evaluation as recommended by GI to see if location of bleeding can be localized.  5/21 Patient had bowel movement today without evidence of any bleeding.  H/H remains same from yesterday.  If Hgb remains same tomorrow, [facility].  5/22 Patient doing well, no further bleeding from the bowel.  HGB is increasing again and he is appropriate to discharge to SNF.    Consultants/Specialty  GI - s/o    Code Status  full    Disposition  SNF tomorrow when bed available.    Review of Systems  Review of Systems   Constitutional: Negative  for chills and fever.   HENT: Negative for congestion.    Eyes: Negative for blurred vision and photophobia.   Respiratory: Negative for cough and shortness of breath.    Cardiovascular: Negative for chest pain, claudication and leg swelling.   Gastrointestinal: Negative for abdominal pain, blood in stool, constipation, diarrhea, heartburn, nausea and vomiting.   Genitourinary: Negative for dysuria and hematuria.   Musculoskeletal: Negative for joint pain and myalgias.   Skin: Negative for itching and rash.   Neurological: Negative for dizziness, sensory change, speech change, weakness and headaches.   Psychiatric/Behavioral: Negative for depression. The patient is not nervous/anxious and does not have insomnia.         Physical Exam  Temp:  [36.4 °C (97.6 °F)-36.9 °C (98.5 °F)] 36.9 °C (98.5 °F)  Pulse:  [54-63] 59  Resp:  [16-18] 18  BP: (138-166)/(53-97) 166/53  SpO2:  [93 %-99 %] 94 %    Physical Exam  Vitals signs and nursing note reviewed.   Constitutional:       General: He is not in acute distress.     Appearance: Normal appearance.   HENT:      Head: Normocephalic and atraumatic.   Eyes:      General: No scleral icterus.     Extraocular Movements: Extraocular movements intact.   Neck:      Musculoskeletal: Normal range of motion and neck supple.   Cardiovascular:      Rate and Rhythm: Normal rate and regular rhythm.      Pulses: Normal pulses.      Heart sounds: Normal heart sounds. No murmur.   Pulmonary:      Effort: Pulmonary effort is normal. No respiratory distress.      Breath sounds: Normal breath sounds. No wheezing, rhonchi or rales.   Abdominal:      General: Abdomen is flat. Bowel sounds are normal. There is no distension.      Palpations: Abdomen is soft.      Tenderness: There is no rebound.   Musculoskeletal:         General: No swelling or tenderness.   Lymphadenopathy:      Cervical: No cervical adenopathy.   Skin:     Coloration: Skin is not jaundiced.      Findings: No erythema.    Neurological:      General: No focal deficit present.      Mental Status: He is alert and oriented to person, place, and time. Mental status is at baseline.      Cranial Nerves: No cranial nerve deficit.   Psychiatric:         Mood and Affect: Mood normal.         Behavior: Behavior normal.         Fluids  No intake or output data in the 24 hours ending 05/22/20 1414    Laboratory  Recent Labs     05/20/20 0417 05/21/20  0239 05/22/20  0305   WBC 6.0 6.5 6.6   RBC 2.47* 2.45* 2.48*   HEMOGLOBIN 8.1* 8.0* 8.3*   HEMATOCRIT 25.8* 25.3* 26.6*   .5* 103.3* 107.3*   MCH 32.8 32.7 33.5*   MCHC 31.4* 31.6* 31.2*   RDW 69.9* 69.2* 74.1*   PLATELETCT 219 212 192   MPV 10.8 10.6 10.2     Recent Labs     05/20/20 0417 05/21/20  0239 05/22/20  0305   SODIUM 137 138 138   POTASSIUM 4.7 4.7 5.2   CHLORIDE 101 104 104   CO2 24 25 24   GLUCOSE 262* 75 138*   BUN 27* 28* 27*   CREATININE 1.40 1.43* 1.27   CALCIUM 8.2* 8.4* 8.8     Recent Labs     05/20/20 0417 05/21/20  0239 05/22/20  0305   INR 3.48* 2.80* 1.98*               Imaging  CTA ABDOMEN PELVIS W & W/O POST PROCESS   Final Result      1.  No evidence of active contrast extravasation.   2.  Diffuse atherosclerosis. No aortic aneurysm or dissection.   3.  Colonic diverticulosis.      EC-ECHOCARDIOGRAM COMPLETE W/O CONT   Final Result      CT-HEAD W/O   Final Result      No acute intracranial abnormality      DX-CHEST-PORTABLE (1 VIEW)   Final Result      1.  Interstitial and hazy airspace opacities and trace left pleural effusion. This may be related to pulmonary edema, however infection should be excluded clinically.   2.  Mild cardiomegaly. Valve prosthesis.      CT-CEREBRAL PERFUSION ANALYSIS   Final Result      1.  Cerebral blood flow less than 30% likely representing completed infarct = 0 mL.      2.  T Max more than 6 seconds likely representing combination of completed infarct and ischemia = 0 mL.      3.  Mismatched volume likely representing ischemic  brain/penumbra = None      4.  Please note that the cerebral perfusion was performed on the limited brain tissue around the basal ganglia region. Infarct/ischemia outside the CT perfusion sections can be missed in this study.      CT-CTA NECK WITH & W/O-POST PROCESSING   Final Result      1.  Atherosclerosis at the internal carotid artery origins without significant stenosis.   2.  Patent vertebral arteries.   3.  Bilateral pleural effusions and probable mild pulmonary edema.      CT-CTA HEAD WITH & W/O-POST PROCESS   Final Result      CT angiogram of the [place] within normal limits.      Generalized atrophy and chronic ischemic changes.      No acute intracranial abnormality is seen.      CT-ABDOMEN-PELVIS WITH   Final Result      1.  No evidence of abdominal or pelvic mass, adenopathy, or inflammatory change.   2.  7 mm metallic foreign body in the descending colon, etiology uncertain. It is not clear whether this is ingested material or from a prior procedure.   3.  Colonic diverticulosis   4.  Atherosclerosis   5.  RIGHT inguinal hernia containing fat            Findings were discussed with ROGELIO HENLEY on 5/8/2020 1:58 PM.                    Assessment/Plan  * Lower GI bleed- (present on admission)  Assessment & Plan  H/H increasing.  Hb dropped and RN reporting continued bloody bowel movements  CTA A/P ordered per GI recommendations  - no evidence of bleeding seen  Consistent with a diverticular bleed.     Scope with large amount of diverticuli, but no specific area of bleed found, which is common for this disease  Monitor closely, high risk for further decompensation.          Acute on Chronic Encephalopathy  Assessment & Plan  Has baseline dementia, made worse with anemia, GI bleeding, change in environment.  Neuro consulted, recommended a trial of Sinemet 100mg TID, and Avoid risperdal.  Patient slightly got worse with Sinemet, discontinued   Improved, now back to baseline. Agitation better  controlled with Seroquel        Coronary artery disease involving coronary bypass graft- (present on admission)  Assessment & Plan  History of CABG  Continue Lipitor    History of mitral valve replacement with mechanical valve [Z95.2]- (present on admission)  Assessment & Plan  Have been restarted on AC   Follow-up echocardiography shows that of the mechanical mitral valve appears to be functioning well.       Chronic anticoagulation- (present on admission)  Assessment & Plan  Used to be on Coumadin for his mechanical mitral valve   Bridged on heparin drip while having procedure, back on Coumadin with heparin drip bridging on 5/13   Heparin drip bridging transition to Lovenox bridging with Coumadin on 5/17 5/17-18, INR still subtherapeutic        Dementia with behavioral disturbance (HCC)- (present on admission)  Assessment & Plan  Maintain awake daytime state.    Continue frequent re-orientation, encourage activity.   Quetiapine (Seroquel) as needed for agitation    Try to avoid sedating medications as much as possible, reserve only for extreme agitation posing threat to self.       ACP (advance care planning)  Assessment & Plan  On 5/14/2020, Dr Kelley had a prolonged discussion with the patient who was not able to participate much and wife Comfort:  regarding goals of care, diagnoses, prognosis, and CODE STATUS.  The patient has advanced aged and poor functional performance. He has multiple comorbid conditions including diabetes, diverticulosis, mechanical mitral valve on chronic anticoagulation, and coronary artery disease. Admitted with GI bleeding. I encourage to consider changing code to DNAR/DNI, however Comfort and patient want to maintain FULL code.     Debility  Assessment & Plan  Multifactorial, age, recent GI bleeding, hospitalization    Physical and occupational therapy evaluation     Bradycardia  Assessment & Plan  Resolved    Type II diabetes mellitus (HCC)- (present on admission)  Assessment &  Plan  With hyperglycemia  Last glycated hemoglobin 7.4%   Short acting insulin, glargine started 5/17   Accu-Checks, hypoglycemia protocol         VTE prophylaxis: warfarin

## 2020-05-22 NOTE — DISCHARGE PLANNING
RN CM updated patient's spouse, Comfort, that patient cannot go to [facility] today as the bed is no longer available and they have set up transport for Saturday at 1200.  Comfort very upset that the plans changed, but understands.

## 2020-05-22 NOTE — PROGRESS NOTES
Inpatient Anticoagulation Service Note    Date: 5/22/2020    Reason for Anticoagulation: Mechanical Mitral Valve Replacement     Target INR: 2.5 to 3.5         Hemoglobin Value: (!) 8.3  Hematocrit Value: (!) 26.6  Lab Platelet Value: 192    INR from last 7 days     Date/Time INR Value    05/22/20 0305  (!) 1.98    05/21/20 0239  (!) 2.8    05/20/20 0417  (!) 3.48    05/19/20 0218  (!) 2.28    05/18/20 0335  (!) 1.88    05/17/20 0246  (!) 1.71    05/16/20 0555  (!) 1.22        Dose from last 7 days     Date/Time Dose (mg)    05/22/20 1418  5    05/21/20 1414  5    05/20/20 1019  0    05/19/20 1605  5    05/18/20 1330  10    05/17/20 1500  5    05/16/20 1321  5        Average Dose (mg): (Home Dose: 7.5 mg Mo and 5 mg all other days)  Significant Interactions: Statin, Other (Comments)(SSRI)  Bridge Therapy: No    Comments: INR down to 1.98 which is expected after dose was held a few days ago. Pt is on warfarin 5 mg daily and the INR will trend back up in the next few days. Will resume pt's home warfarin dosing of warfarin 5 mg daily except 7.5 mg on Mondays. Per discussion on rounds, no s/s of bleeding.    Education Material Provided?: No(chronic warfarin patient)  Pharmacist suggested discharge dosing: warfarin 5 mg daily except 7.5 mg on Mondays, pt will need a follow up INR within 3-5 days of discharge (pt followed by [facility])     Carlo Bermudez, PharmD

## 2020-05-22 NOTE — DISCHARGE INSTRUCTIONS
Discharge Instructions    Discharged to other by medical transportation with escort. Discharged via wheelchair, hospital escort: Yes.  Special equipment needed: Walker    Be sure to schedule a follow-up appointment with your primary care doctor or any specialists as instructed.     Discharge Plan:   Diet Plan: Discussed  Activity Level: Discussed  Confirmed Follow up Appointment: No (Comments)(transferring to advanced)  Confirmed Symptoms Management: Discussed  Medication Reconciliation Updated: Yes    I understand that a diet low in cholesterol, fat, and sodium is recommended for good health. Unless I have been given specific instructions below for another diet, I accept this instruction as my diet prescription.       Special Instructions: None    · Is patient discharged on Warfarin / Coumadin?   Yes    You are receiving the drug warfarin. Please understand the importance of monitoring warfarin with scheduled PT/INR blood draws.      IMPORTANT: HOW TO USE THIS INFORMATION:  This is a summary and does NOT have all possible information about this product. This information does not assure that this product is safe, effective, or appropriate for you. This information is not individual medical advice and does not substitute for the advice of your health care professional. Always ask your health care professional for complete information about this product and your specific health needs.      WARFARIN - ORAL (WARF-uh-rin)      COMMON BRAND NAME(S): Coumadin      WARNING:  Warfarin can cause very serious (possibly fatal) bleeding. This is more likely to occur when you first start taking this medication or if you take too much warfarin. To decrease your risk for bleeding, your doctor or other health care provider will monitor you closely and check your lab results (INR test) to make sure you are not taking too much warfarin. Keep all medical and laboratory appointments. Tell your doctor right away if you notice any signs  "of serious bleeding. See also Side Effects section.      USES:  This medication is used to treat blood clots (such as in deep vein thrombosis-DVT or pulmonary embolus-PE) and/or to prevent new clots from forming in your body. Preventing harmful blood clots helps to reduce the risk of a stroke or heart attack. Conditions that increase your risk of developing blood clots include a certain type of irregular heart rhythm (atrial fibrillation), heart valve replacement, recent heart attack, and certain surgeries (such as hip/knee replacement). Warfarin is commonly called a \"blood thinner,\" but the more correct term is \"anticoagulant.\" It helps to keep blood flowing smoothly in your body by decreasing the amount of certain substances (clotting proteins) in your blood.      HOW TO USE:  Read the Medication Guide provided by your pharmacist before you start taking warfarin and each time you get a refill. If you have any questions, ask your doctor or pharmacist. Take this medication by mouth with or without food as directed by your doctor or other health care professional, usually once a day. It is very important to take it exactly as directed. Do not increase the dose, take it more frequently, or stop using it unless directed by your doctor. Dosage is based on your medical condition, laboratory tests (such as INR), and response to treatment. Your doctor or other health care provider will monitor you closely while you are taking this medication to determine the right dose for you. Use this medication regularly to get the most benefit from it. To help you remember, take it at the same time each day. It is important to eat a balanced, consistent diet while taking warfarin. Some foods can affect how warfarin works in your body and may affect your treatment and dose. Avoid sudden large increases or decreases in your intake of foods high in vitamin K (such as broccoli, cauliflower, cabbage, brussels sprouts, kale, spinach, and " other green leafy vegetables, liver, green tea, certain vitamin supplements). If you are trying to lose weight, check with your doctor before you try to go on a diet. Cranberry products may also affect how your warfarin works. Limit the amount of cranberry juice (16 ounces/480 milliliters a day) or other cranberry products you may drink or eat.      SIDE EFFECTS:  Nausea, loss of appetite, or stomach/abdominal pain may occur. If any of these effects persist or worsen, tell your doctor or pharmacist promptly. Remember that your doctor has prescribed this medication because he or she has judged that the benefit to you is greater than the risk of side effects. Many people using this medication do not have serious side effects. This medication can cause serious bleeding if it affects your blood clotting proteins too much (shown by unusually high INR lab results). Even if your doctor stops your medication, this risk of bleeding can continue for up to a week. Tell your doctor right away if you have any signs of serious bleeding, including: unusual pain/swelling/discomfort, unusual/easy bruising, prolonged bleeding from cuts or gums, persistent/frequent nosebleeds, unusually heavy/prolonged menstrual flow, pink/dark urine, coughing up blood, vomit that is bloody or looks like coffee grounds, severe headache, dizziness/fainting, unusual or persistent tiredness/weakness, bloody/black/tarry stools, chest pain, shortness of breath, difficulty swallowing. Tell your doctor right away if any of these unlikely but serious side effects occur: persistent nausea/vomiting, severe stomach/abdominal pain, yellowing eyes/skin. This drug rarely has caused very serious (possibly fatal) problems if its effects lead to small blood clots (usually at the beginning of treatment). This can lead to severe skin/tissue damage that may require surgery or amputation if left untreated. Patients with certain blood conditions (protein C or S deficiency)  may be at greater risk. Get medical help right away if any of these rare but serious side effects occur: painful/red/purplish patches on the skin (such as on the toe, breast, abdomen), change in the amount of urine, vision changes, confusion, slurred speech, weakness on one side of the body. A very serious allergic reaction to this drug is rare. However, get medical help right away if you notice any symptoms of a serious allergic reaction, including: rash, itching/swelling (especially of the face/tongue/throat), severe dizziness, trouble breathing. This is not a complete list of possible side effects. If you notice other effects not listed above, contact your doctor or pharmacist. In the [place] - Call your doctor for medical advice about side effects. You may report side effects to FDA at 0-742-ARA-9568. In [place] - Call your doctor for medical advice about side effects. You may report side effects to [facility] [place] at 1-514.935.2575.      PRECAUTIONS:  Before taking warfarin, tell your doctor or pharmacist if you are allergic to it; or if you have any other allergies. This product may contain inactive ingredients, which can cause allergic reactions or other problems. Talk to your pharmacist for more details. Before using this medication, tell your doctor or pharmacist your medical history, especially of: blood disorders (such as anemia, hemophilia), bleeding problems (such as bleeding of the stomach/intestines, bleeding in the brain), blood vessel disorders (such as aneurysms), recent major injury/surgery, liver disease, alcohol use, mental/mood disorders (including memory problems), frequent falls/injuries. It is important that all your doctors and dentists know that you take warfarin. Before having surgery or any medical/dental procedures, tell your doctor or dentist that you are taking this medication and about all the products you use (including prescription drugs, nonprescription drugs, and herbal products). Avoid  getting injections into the muscles. If you must have an injection into a muscle (for example, a flu shot), it should be given in the arm. This way, it will be easier to check for bleeding and/or apply pressure bandages. This medication may cause stomach bleeding. Daily use of alcohol while using this medicine will increase your risk for stomach bleeding and may also affect how this medication works. Limit or avoid alcoholic beverages. If you have not been eating well, if you have an illness or infection that causes fever, vomiting, or diarrhea for more than 2 days, or if you start using any antibiotic medications, contact your doctor or pharmacist immediately because these conditions can affect how warfarin works. This medication can cause heavy bleeding. To lower the chance of getting cut, bruised, or injured, use great caution with sharp objects like safety razors and nail cutters. Use an electric razor when shaving and a soft toothbrush when brushing your teeth. Avoid activities such as contact sports. If you fall or injure yourself, especially if you hit your head, call your doctor immediately. Your doctor may need to check you. The Food & Drug Administration has stated that generic warfarin products are interchangeable. However, consult your doctor or pharmacist before switching warfarin products. Be careful not to take more than one medication that contains warfarin unless specifically directed by the doctor or health care provider who is monitoring your warfarin treatment. Older adults may be at greater risk for bleeding while using this drug. This medication is not recommended for use during pregnancy because of serious (possibly fatal) harm to an unborn baby. Discuss the use of reliable forms of birth control with your doctor. If you become pregnant or think you may be pregnant, tell your doctor immediately. If you are planning pregnancy, discuss a plan for managing your condition with your doctor before  "you become pregnant. Your doctor may switch the type of medication you use during pregnancy. Very small amounts of this medication may pass into breast milk but is unlikely to harm a nursing infant. Consult your doctor before breast-feeding.      DRUG INTERACTIONS:  Drug interactions may change how your medications work or increase your risk for serious side effects. This document does not contain all possible drug interactions. Keep a list of all the products you use (including prescription/nonprescription drugs and herbal products) and share it with your doctor and pharmacist. Do not start, stop, or change the dosage of any medicines without your doctor's approval. Warfarin interacts with many prescription, nonprescription, vitamin, and herbal products. This includes medications that are applied to the skin or inside the vagina or rectum. The interactions with warfarin usually result in an increase or decrease in the \"blood-thinning\" (anticoagulant) effect. Your doctor or other health care professional should closely monitor you to prevent serious bleeding or clotting problems. While taking warfarin, it is very important to tell your doctor or pharmacist of any changes in medications, vitamins, or herbal products that you are taking. Some products that may interact with this drug include: capecitabine, imatinib, mifepristone. Aspirin, aspirin-like drugs (salicylates), and nonsteroidal anti-inflammatory drugs (NSAIDs such as ibuprofen, naproxen, celecoxib) may have effects similar to warfarin. These drugs may increase the risk of bleeding problems if taken during treatment with warfarin. Carefully check all prescription/nonprescription product labels (including drugs applied to the skin such as pain-relieving creams) since the products may contain NSAIDs or salicylates. Talk to your doctor about using a different medication (such as acetaminophen) to treat pain/fever. Low-dose aspirin and related drugs (such as " clopidogrel, ticlopidine) should be continued if prescribed by your doctor for specific medical reasons such as heart attack or stroke prevention. Consult your doctor or pharmacist for more details. Many herbal products interact with warfarin. Tell your doctor before taking any herbal products, especially bromelains, coenzyme Q10, cranberry, danshen, dong quai, fenugreek, garlic, ginkgo biloba, ginseng, and Trino's wort, among others. This medication may interfere with a certain laboratory test to measure theophylline levels, possibly causing false test results. Make sure laboratory personnel and all your doctors know you use this drug.      OVERDOSE:  If overdose is suspected, contact a poison control center or emergency room immediately. [place] residents can call the [place] National Poison Hotline at 1-467.459.8254. [place] residents can call a provincial poison control center. Symptoms of overdose may include: bloody/black/tarry stools, pink/dark urine, unusual/prolonged bleeding.      NOTES:  Do not share this medication with others. Laboratory and/or medical tests (such as INR, complete blood count) must be performed periodically to monitor your progress or check for side effects. Consult your doctor for more details.      MISSED DOSE:  For the best possible benefit, do not miss any doses. If you do miss a dose and remember on the same day, take it as soon as you remember. If you remember on the next day, skip the missed dose and resume your usual dosing schedule. Do not double the dose to catch up because this could increase your risk for bleeding. Keep a record of missed doses to give to your doctor or pharmacist. Contact your doctor or pharmacist if you miss 2 or more doses in a row.      STORAGE:  Store at room temperature away from light and moisture. Do not store in the bathroom. Keep all medications away from children and pets. Do not flush medications down the toilet or pour them into a drain unless  instructed to do so. Properly discard this product when it is  or no longer needed. Consult your pharmacist or local waste disposal company for more details about how to safely discard your product.      MEDICAL ALERT:  Your condition and medication can cause complications in a medical emergency. For information about enrolling in MedicAlert, call 1-360.993.8095 ([place]) or 1-705.813.2369 ([place]).      Information last revised 2010 Copyright(c)  [employer]             Depression / Suicide Risk    As you are discharged from this [facility] facility, it is important to learn how to keep safe from harming yourself.    Recognize the warning signs:  · Abrupt changes in personality, positive or negative- including increase in energy   · Giving away possessions  · Change in eating patterns- significant weight changes-  positive or negative  · Change in sleeping patterns- unable to sleep or sleeping all the time   · Unwillingness or inability to communicate  · Depression  · Unusual sadness, discouragement and loneliness  · Talk of wanting to die  · Neglect of personal appearance   · Rebelliousness- reckless behavior  · Withdrawal from people/activities they love  · Confusion- inability to concentrate     If you or a loved one observes any of these behaviors or has concerns about self-harm, here's what you can do:  · Talk about it- your feelings and reasons for harming yourself  · Remove any means that you might use to hurt yourself (examples: pills, rope, extension cords, firearm)  · Get professional help from the community (Mental Health, Substance Abuse, psychological counseling)  · Do not be alone:Call your Safe Contact- someone whom you trust who will be there for you.  · Call your local CRISIS HOTLINE 868-9636 or 877-535-7991  · Call your local Children's Mobile Crisis Response Team [place] (702) 135-0935 or www.[employer]  · Call the toll free National Suicide Prevention  Hotlines   · National Suicide Prevention Lifeline 222-234-PNUH (9407)  · [facility] 800-SUICIDE (514-2135)      Diverticulitis  Diverticulitis is inflammation or infection of small pouches in your colon that form when you have a condition called diverticulosis. The pouches in your colon are called diverticula. Your colon, or large intestine, is where water is absorbed and stool is formed.  Complications of diverticulitis can include:  · Bleeding.  · Severe infection.  · Severe pain.  · Perforation of your colon.  · Obstruction of your colon.  What are the causes?  Diverticulitis is caused by bacteria.  Diverticulitis happens when stool becomes trapped in diverticula. This allows bacteria to grow in the diverticula, which can lead to inflammation and infection.  What increases the risk?  People with diverticulosis are at risk for diverticulitis. Eating a diet that does not include enough fiber from fruits and vegetables may make diverticulitis more likely to develop.  What are the signs or symptoms?  Symptoms of diverticulitis may include:  · Abdominal pain and tenderness. The pain is normally located on the left side of the abdomen, but may occur in other areas.  · Fever and chills.  · Bloating.  · Cramping.  · Nausea.  · Vomiting.  · Constipation.  · Diarrhea.  · Blood in your stool.  How is this diagnosed?  Your health care provider will ask you about your medical history and do a physical exam. You may need to have tests done because many medical conditions can cause the same symptoms as diverticulitis. Tests may include:  · Blood tests.  · Urine tests.  · Imaging tests of the abdomen, including X-rays and CT scans.  When your condition is under control, your health care provider may recommend that you have a colonoscopy. A colonoscopy can show how severe your diverticula are and whether something else is causing your symptoms.  How is this treated?  Most cases of diverticulitis are mild and can  be treated at home. Treatment may include:  · Taking over-the-counter pain medicines.  · Following a clear liquid diet.  · Taking antibiotic medicines by mouth for 7-10 days.  More severe cases may be treated at a hospital. Treatment may include:  · Not eating or drinking.  · Taking prescription pain medicine.  · Receiving antibiotic medicines through an IV tube.  · Receiving fluids and nutrition through an IV tube.  · Surgery.  Follow these instructions at home:  · Follow your health care provider’s instructions carefully.  · Follow a full liquid diet or other diet as directed by your health care provider. After your symptoms improve, your health care provider may tell you to change your diet. He or she may recommend you eat a high-fiber diet. Fruits and vegetables are good sources of fiber. Fiber makes it easier to pass stool.  · Take fiber supplements or probiotics as directed by your health care provider.  · Only take medicines as directed by your health care provider.  · Keep all your follow-up appointments.  Contact a health care provider if:  · Your pain does not improve.  · You have a hard time eating food.  · Your bowel movements do not return to normal.  Get help right away if:  · Your pain becomes worse.  · Your symptoms do not get better.  · Your symptoms suddenly get worse.  · You have a fever.  · You have repeated vomiting.  · You have bloody or black, tarry stools.  This information is not intended to replace advice given to you by your health care provider. Make sure you discuss any questions you have with your health care provider.  Document Released: 09/27/2006 Document Revised: 05/25/2017 Document Reviewed: 11/12/2014  [employer] Interactive Patient Education © 2017 [employer]        Gastrointestinal Bleeding  Introduction  Gastrointestinal bleeding is bleeding somewhere along the path food travels through the body (digestive tract). This path is anywhere between the mouth and the opening of the  butt (anus). You may have blood in your poop (stools) or have black poop. If you throw up (vomit), there may be blood in it.  This condition can be mild, serious, or even life-threatening. If you have a lot of bleeding, you may need to stay in the hospital.  Follow these instructions at home:  · Take over-the-counter and prescription medicines only as told by your doctor.  · Eat foods that have a lot of fiber in them. These foods include whole grains, fruits, and vegetables. You can also try eating 1-3 prunes each day.  · Drink enough fluid to keep your pee (urine) clear or pale yellow.  · Keep all follow-up visits as told by your doctor. This is important.  Contact a doctor if:  · Your symptoms do not get better.  Get help right away if:  · Your bleeding gets worse.  · You feel dizzy or you pass out (faint).  · You feel weak.  · You have very bad cramps in your back or belly (abdomen).  · You pass large clumps of blood (clots) in your poop.  · Your symptoms are getting worse.  This information is not intended to replace advice given to you by your health care provider. Make sure you discuss any questions you have with your health care provider.  Document Released: 09/26/2009 Document Revised: 05/25/2017 Document Reviewed: 06/06/2016  © 2017 [employer]

## 2020-05-22 NOTE — CARE PLAN
Problem: Communication  Goal: The ability to communicate needs accurately and effectively will improve  Outcome: PROGRESSING AS EXPECTED     Problem: Safety  Goal: Will remain free from falls  Outcome: PROGRESSING AS EXPECTED     Problem: Infection  Goal: Will remain free from infection  Outcome: PROGRESSING AS EXPECTED     Problem: Bowel/Gastric:  Goal: Will not experience complications related to bowel motility  Outcome: PROGRESSING AS EXPECTED

## 2020-05-23 VITALS
RESPIRATION RATE: 16 BRPM | OXYGEN SATURATION: 97 % | HEART RATE: 60 BPM | HEIGHT: 68 IN | DIASTOLIC BLOOD PRESSURE: 66 MMHG | TEMPERATURE: 97.5 F | BODY MASS INDEX: 18.98 KG/M2 | SYSTOLIC BLOOD PRESSURE: 153 MMHG | WEIGHT: 125.22 LBS

## 2020-05-23 PROBLEM — K92.2 LOWER GI BLEED: Status: RESOLVED | Noted: 2020-05-08 | Resolved: 2020-05-23

## 2020-05-23 PROBLEM — R00.1 BRADYCARDIA: Status: RESOLVED | Noted: 2020-05-10 | Resolved: 2020-05-23

## 2020-05-23 PROBLEM — G93.40 ENCEPHALOPATHY: Status: RESOLVED | Noted: 2020-05-10 | Resolved: 2020-05-23

## 2020-05-23 LAB
GLUCOSE BLD-MCNC: 112 MG/DL (ref 65–99)
GLUCOSE BLD-MCNC: 203 MG/DL (ref 65–99)
GLUCOSE BLD-MCNC: 276 MG/DL (ref 65–99)
GLUCOSE BLD-MCNC: 285 MG/DL (ref 65–99)

## 2020-05-23 PROCEDURE — 82962 GLUCOSE BLOOD TEST: CPT

## 2020-05-23 PROCEDURE — 700102 HCHG RX REV CODE 250 W/ 637 OVERRIDE(OP): Performed by: HOSPITALIST

## 2020-05-23 PROCEDURE — A9270 NON-COVERED ITEM OR SERVICE: HCPCS | Performed by: HOSPITALIST

## 2020-05-23 RX ORDER — MECLIZINE HCL 12.5 MG/1
12.5 TABLET ORAL 3 TIMES DAILY PRN
Qty: 30 TAB | Refills: 0 | Status: SHIPPED | OUTPATIENT
Start: 2020-05-23 | End: 2020-06-15 | Stop reason: CLARIF

## 2020-05-23 RX ORDER — MECLIZINE HYDROCHLORIDE 25 MG/1
12.5 TABLET ORAL 3 TIMES DAILY PRN
Status: DISCONTINUED | OUTPATIENT
Start: 2020-05-23 | End: 2020-05-23 | Stop reason: HOSPADM

## 2020-05-23 RX ADMIN — INSULIN HUMAN 3 UNITS: 100 INJECTION, SOLUTION PARENTERAL at 11:16

## 2020-05-23 RX ADMIN — FERROUS SULFATE TAB 325 MG (65 MG ELEMENTAL FE) 325 MG: 325 (65 FE) TAB at 05:57

## 2020-05-23 ASSESSMENT — FIBROSIS 4 INDEX: FIB4 SCORE: 1.48

## 2020-05-23 NOTE — DISCHARGE PLANNING
Agency/Facility Name: [facility]  Spoke To: Tiny  Outcome: Confirmed  time for 1200 via facility wheelchair van.

## 2020-05-23 NOTE — PROGRESS NOTES
Patient being transferred to [place] [id number]. Report given to receiving  RN. Patient A&Ox4. Patient left the floor with transport and all belongings.

## 2020-05-23 NOTE — PROGRESS NOTES
· 2 RN skin check complete with RN Phu.   · Devices in place : Na.  · Skin assessed under devices: NA.  · Confirmed pressure ulcers found on: NA.  · New potential pressure ulcers noted on: NA. Wound consult placed? NA. Photo uploaded? NA.   · The following interventions are in place: pt turns self.

## 2020-05-23 NOTE — PROGRESS NOTES
Report called to SNF. Packet completed. Patient updated.     Patient picked up by transport at 1215. Patient left with all belongings- including phone and medallion.

## 2020-05-23 NOTE — PROGRESS NOTES
"Assumed pt care at 1900 from Day RN. Aware of fall risk status. VS BP (!) 128/31   Pulse (!) 53 Comment: RN notified  Temp 36.9 °C (98.4 °F) (Temporal)   Resp 16   Ht 1.727 m (5' 8\")   Wt 56.6 kg (124 lb 12.5 oz)   SpO2 97%   BMI 18.97 kg/m² . Assessment complete. Discussed plan of care with patient.    "

## 2020-05-23 NOTE — PROGRESS NOTES
Assumed care of pt at 1730. Report received from Tele RN. Pt is calm no SOB, or in any acute distress noted.    Pt is considered a high fall risk. edu provided on risk level. Fall precautions in place,  bed alarm. - Treaded non slip socks. Bed locked. Communication board updated with POC. Call light and pt belongings within reach - pt makes needs known - hourly rounding in place. See flowsheets for further assessment.     Patient transferred to unit. Has a nurse station bed due to past falls. No c/o pain. Agree with previous RN assessment.

## 2020-05-24 ENCOUNTER — PATIENT MESSAGE (OUTPATIENT)
Dept: MEDICAL GROUP | Facility: PHYSICIAN GROUP | Age: 75
End: 2020-05-24

## 2020-06-11 ENCOUNTER — TELEPHONE (OUTPATIENT)
Dept: PHYSICAL THERAPY | Facility: REHABILITATION | Age: 75
End: 2020-06-11

## 2020-06-11 NOTE — OP THERAPY DISCHARGE SUMMARY
Outpatient Physical Therapy  DISCHARGE SUMMARY NOTE      [facility] [address].  [address]  [place] [address]  Phone:  [phone]  Fax:  [phone]    Date of Visit: 06/11/2020    Patient: Carlos Rivera  YOB: 1945  MRN: [id number]     Referring Provider: No referring provider defined for this encounter.   Referring Diagnosis No admission diagnoses are documented for this encounter.         Functional Assessment Used        Your patient is being discharged from Physical Therapy with the following comments:   · other    Comments:  Mr. Rivera was seen for 4 PT sessions treating his LOB and difficulty walking. Pt showed little benefit from PT during those sessions and ultimately therapy was held due to social distancing recommendations for COVID-19.  Not seen since 3/5/20.      Recommendations:  D/C PT due to lapse in care.  Lack of a true Dx has been a concern.  He is to establish with Dr. Simpson for neurology.  Also shows sings of thenar atrophy in his hands, would consider cervical stenosis.     Romina Whitaker, PT, DPT    Date: 6/11/2020

## 2020-06-15 ENCOUNTER — HOSPITAL ENCOUNTER (OUTPATIENT)
Dept: LAB | Facility: MEDICAL CENTER | Age: 75
End: 2020-06-15
Attending: INTERNAL MEDICINE
Payer: MEDICARE

## 2020-06-15 ENCOUNTER — OFFICE VISIT (OUTPATIENT)
Dept: MEDICAL GROUP | Facility: PHYSICIAN GROUP | Age: 75
End: 2020-06-15
Payer: MEDICARE

## 2020-06-15 ENCOUNTER — PATIENT MESSAGE (OUTPATIENT)
Dept: MEDICAL GROUP | Facility: PHYSICIAN GROUP | Age: 75
End: 2020-06-15

## 2020-06-15 VITALS
HEIGHT: 65 IN | BODY MASS INDEX: 22.49 KG/M2 | DIASTOLIC BLOOD PRESSURE: 62 MMHG | TEMPERATURE: 97.2 F | OXYGEN SATURATION: 100 % | WEIGHT: 135 LBS | RESPIRATION RATE: 14 BRPM | SYSTOLIC BLOOD PRESSURE: 128 MMHG | HEART RATE: 60 BPM

## 2020-06-15 DIAGNOSIS — D64.9 ANEMIA, UNSPECIFIED TYPE: ICD-10-CM

## 2020-06-15 DIAGNOSIS — Z79.4 TYPE 2 DIABETES MELLITUS WITH HYPEROSMOLARITY WITHOUT COMA, WITH LONG-TERM CURRENT USE OF INSULIN (HCC): Chronic | ICD-10-CM

## 2020-06-15 DIAGNOSIS — E11.42 DIABETIC POLYNEUROPATHY ASSOCIATED WITH TYPE 2 DIABETES MELLITUS (HCC): ICD-10-CM

## 2020-06-15 DIAGNOSIS — I10 HYPERTENSION, UNSPECIFIED TYPE: ICD-10-CM

## 2020-06-15 DIAGNOSIS — Z95.2 HISTORY OF MITRAL VALVE REPLACEMENT WITH MECHANICAL VALVE: Chronic | ICD-10-CM

## 2020-06-15 DIAGNOSIS — F32.0 MILD SINGLE CURRENT EPISODE OF MAJOR DEPRESSIVE DISORDER (HCC): Chronic | ICD-10-CM

## 2020-06-15 DIAGNOSIS — R45.4 ANGER: ICD-10-CM

## 2020-06-15 DIAGNOSIS — Z79.01 CHRONIC ANTICOAGULATION: ICD-10-CM

## 2020-06-15 DIAGNOSIS — E78.5 HYPERLIPIDEMIA, UNSPECIFIED HYPERLIPIDEMIA TYPE: Chronic | ICD-10-CM

## 2020-06-15 DIAGNOSIS — E11.00 TYPE 2 DIABETES MELLITUS WITH HYPEROSMOLARITY WITHOUT COMA, WITH LONG-TERM CURRENT USE OF INSULIN (HCC): Chronic | ICD-10-CM

## 2020-06-15 DIAGNOSIS — I25.700 CORONARY ARTERY DISEASE INVOLVING CORONARY BYPASS GRAFT OF NATIVE HEART WITH UNSTABLE ANGINA PECTORIS (HCC): Chronic | ICD-10-CM

## 2020-06-15 PROBLEM — F41.9 ANXIETY: Chronic | Status: ACTIVE | Noted: 2017-02-23

## 2020-06-15 PROBLEM — Z91.81 AT RISK FOR FALLS: Status: ACTIVE | Noted: 2019-09-18

## 2020-06-15 PROBLEM — E11.40 DIABETIC NEUROPATHY (HCC): Status: ACTIVE | Noted: 2020-06-15

## 2020-06-15 PROBLEM — E11.9 TYPE II DIABETES MELLITUS (HCC): Chronic | Status: ACTIVE | Noted: 2017-02-23

## 2020-06-15 PROBLEM — Z79.899 POLYPHARMACY: Status: ACTIVE | Noted: 2019-09-18

## 2020-06-15 PROBLEM — Z86.73 HISTORY OF STROKE: Status: ACTIVE | Noted: 2018-03-29

## 2020-06-15 PROBLEM — I25.810 CORONARY ARTERY DISEASE INVOLVING CORONARY BYPASS GRAFT: Chronic | Status: ACTIVE | Noted: 2020-05-08

## 2020-06-15 PROBLEM — E11.40 DIABETIC NEUROPATHY (HCC): Chronic | Status: ACTIVE | Noted: 2020-06-15

## 2020-06-15 PROBLEM — R53.81 DEBILITY: Status: RESOLVED | Noted: 2020-05-13 | Resolved: 2020-06-15

## 2020-06-15 PROBLEM — R26.89 LOSS OF BALANCE: Status: RESOLVED | Noted: 2018-12-13 | Resolved: 2020-06-15

## 2020-06-15 PROBLEM — Z71.89 ACP (ADVANCE CARE PLANNING): Status: RESOLVED | Noted: 2020-05-14 | Resolved: 2020-06-15

## 2020-06-15 PROBLEM — G47.33 OBSTRUCTIVE SLEEP APNEA SYNDROME: Status: ACTIVE | Noted: 2019-12-24

## 2020-06-15 PROBLEM — Z63.6 CAREGIVER STRESS: Status: ACTIVE | Noted: 2019-10-29

## 2020-06-15 PROBLEM — R56.9 SEIZURES (HCC): Status: RESOLVED | Noted: 2019-02-20 | Resolved: 2020-06-15

## 2020-06-15 LAB
CREAT UR-MCNC: 64.96 MG/DL
MICROALBUMIN UR-MCNC: 251.6 MG/DL
MICROALBUMIN/CREAT UR: 3873 MG/G (ref 0–30)

## 2020-06-15 PROCEDURE — 99215 OFFICE O/P EST HI 40 MIN: CPT | Performed by: INTERNAL MEDICINE

## 2020-06-15 PROCEDURE — 82043 UR ALBUMIN QUANTITATIVE: CPT

## 2020-06-15 PROCEDURE — 82570 ASSAY OF URINE CREATININE: CPT

## 2020-06-15 RX ORDER — LISINOPRIL 20 MG/1
20 TABLET ORAL EVERY EVENING
COMMUNITY
End: 2020-08-17

## 2020-06-15 ASSESSMENT — FIBROSIS 4 INDEX: FIB4 SCORE: 1.48

## 2020-06-15 NOTE — ASSESSMENT & PLAN NOTE
This is a chronic condition.  The patient was seen previously by neurology.  He has tried gabapentin and pregabalin without improvement.  Patient reports recurrent burning discomfort noted on the bottom of his feet constantly.  Deny any recent trauma or injury.

## 2020-06-15 NOTE — ASSESSMENT & PLAN NOTE
Patient is currently on Lantus 15 units daily.  Current control is unclear.  Lab tests ordered.  Patient denies hypoglycemic symptoms.

## 2020-06-15 NOTE — ASSESSMENT & PLAN NOTE
She was done approximately in the year 2000.  The patient has been on Coumadin since that time.  Currently he is taking warfarin 5 mg daily.  Patient is due for lab test which was ordered today.  No active bleeding noted at the present time

## 2020-06-15 NOTE — PROGRESS NOTES
CC: Establish care  Follow-up diabetes  Follow-up hospital visit      HPI: 74 y.o. Patient presents to discuss the following:     History of mitral valve replacement with mechanical valve [Z95.2]  Surgery done approximately in the year 2000 per pts report.  The patient has been on Coumadin since that time.  Currently he is taking warfarin 5 mg daily.  Patient is due for lab test which was ordered today.  No active bleeding noted at the present time    Coronary artery disease involving coronary bypass graft  History of CABG patient is now followed by cardiologist.  Presently the patient denied chest pain shortness of breath palpitation or near syncope.    Type II diabetes mellitus (HCC)  Patient is currently on Lantus 15 units daily.  Current control is unclear.  Lab tests ordered.  Patient denies hypoglycemic symptoms.    Hyperlipidemia  Chronic condition for the patient is currently taking atorvastatin.  Lab order.    Mild single current episode of major depressive disorder (HCC)  Currently he is taking Celexa and Seroquel daily.  Patient reported patient with  recurrent anger outbursts  She is requesting for the patient to be evaluated by behavioral health specialist.  Referral submitted today.  Patient denies suicidal ideation.    Anemia  Patient was recently admitted to the hospital due to GI bleeding.  Colonoscopy done in hospital reportedly show diverticulosis.  Patient denies any further bleeding since discharge from the hospital.  His hemoglobin was as low as 8.0 in the hospital.  Lab tests ordered for follow-up trends.    Diabetic neuropathy (HCC)  This is a chronic condition.  The patient was seen previously by neurology.  He has tried gabapentin and pregabalin without improvement.  Patient reports recurrent burning discomfort noted on the bottom of his feet constantly.  Deny any recent trauma or injury.    Hypertension  Chronic condition.  The patient is presently taking Prinivil.  His blood pressure has  been well controlled at home.          REVIEW OF SYSTEMS:     Constitutional:  no fever / chills   Neurologic: no headaches, no numbness/tingling  Eyes: no changes in vision  ENT: no sore throat, no hearing loss  CV:  no chest pain, no palpitations  Pulmonary: no SOB, no cough    GI: Patient reported episode of GI bleeding in May 2020.  No further bleeding since discharge from the hospital.  :  no dysuria, no hematuria   Skin: no rash  Hematologic: no bleeding      Allergies: Okra; Risperidone; and Hydrocodone-acetaminophen    Current Outpatient Medications Ordered in Epic   Medication Sig Dispense Refill   • lisinopril (PRINIVIL) 20 MG Tab Take 20 mg by mouth every day.     • insulin glargine (LANTUS) 100 UNIT/ML Solution Inject 15 Units as instructed every evening. 10 mL    • warfarin (COUMADIN) 5 MG Tab Take 1 Tab by mouth every day at 6 PM. 30 Tab 3   • QUEtiapine (SEROQUEL) 50 MG tablet Take 1 Tab by mouth every evening. 60 Tab 3   • atorvastatin (LIPITOR) 40 MG Tab TAKE 1 TABLET DAILY IN THE EVENING 90 Tab 0   • citalopram (CELEXA) 10 MG tablet TAKE 1 TABLET BY MOUTH  EVERY MORNING (Patient taking differently: 20 mg every day. TAKE 1 TABLET BY MOUTH  EVERY MORNING) 90 Tab 1     No current Epic-ordered facility-administered medications on file.        Past Medical History:   Diagnosis Date   • Chronic anticoagulation 2/23/2017   • History of mitral valve replacement with mechanical valve [Z95.2] 3/10/2017   • Hyperlipidemia    • Type II diabetes mellitus (HCC) 2/23/2017        Past Surgical History:   Procedure Laterality Date   • PB COLONOSCOPY,DIAGNOSTIC N/A 5/9/2020    Procedure: COLONOSCOPY;  Surgeon: Jatinder Madera M.D.;  [facility]: SURGERY [facility];  Service: Gastroenterology   • MITRAL VALVE REPLACEMENT  1999   • INGUINAL HERNIA REPAIR Bilateral 1984   • TONSILLECTOMY          Family History   Problem Relation Age of Onset   • Heart Attack Father 58   • Diabetes Father    • Heart Attack  Paternal Uncle    • No Known Problems Sister    • No Known Problems Brother    • No Known Problems Maternal Grandmother    • No Known Problems Maternal Grandfather    • No Known Problems Paternal Grandmother    • Heart Attack Paternal Grandfather    • No Known Problems Sister    • No Known Problems Brother         Social History     Tobacco Use   Smoking Status Never Smoker   Smokeless Tobacco Never Used          Social History     Substance and Sexual Activity   Alcohol Use No   • Alcohol/week: 0.0 oz        ---------------------------------------------------------------------     PHYSICAL EXAM:   Vitals:    06/15/20 0731   BP: 128/62   Pulse: 60   Resp: 14   Temp: 36.2 °C (97.2 °F)   SpO2: 100%      Body mass index is 22.22 kg/m².        Constitutional: no acute distress.   Eyes: EOMI  Ears/nose/mouth: OP no exudates  Neck: supple, no LN  CV: heart RRR  Resp: normal effort, no wheezing or crackles.  GI: abdomen soft, NT, no obvious mass  Neuro: CN 2-12 grossly intact  Skin: no obvious rash noted  Psych patient appears calm mood and speech appear normal today    ---------------------------------------------------------------------     ASSESSMENT and PLAN:  1. Mild single current episode of major depressive disorder (HCC)  2. Anger  - REFERRAL TO BEHAVIORAL HEALTH  Brief counseling given in the office today.  Advised the patient to continue with Seroquel and Celexa.  Deferred medication management to behavioral health specialist.    3. History of mitral valve replacement with mechanical valve [Z95.2]  Chronic condition condition.  Refer the patient to anticoagulation clinic for Coumadin monitoring and management.  The patient will need to be on warfarin indefinitely.  INR ordered today.    4. Coronary artery disease involving coronary bypass graft of native heart   Chronic condition but the patient currently asymptomatic.  Is advised to continue follow-up with cardiologist.    5. Hypertension, unspecified  type  Stable condition per continue with Prinivil.    6. Anemia, unspecified type  Is a new condition.  Hemoglobin was 8 in the hospital in May 2020.  Lab tests ordered for follow-up trends.  Patient stated that he is a not taking iron supplementation.  - CBC WITH DIFFERENTIAL; Future  - FERRITIN; Future  - IRON; Future  - FOLATE; Future  - VITAMIN B12; Future    7. Diabetic polyneuropathy associated with type 2 diabetes mellitus (HCC)  This is a chronic condition.  Patient has tried gabapentin and pregabalin without improvement.  Refer the patient to podiatry.  Patient declines to try any other meds at this time.  - Diabetic Monofilament LE Exam  - HEMOGLOBIN A1C; Future  - ALANINE AMINO-TRANS; Future  - Basic Metabolic Panel; Future  - TSH; Future  - MICROALBUMIN CREAT RATIO URINE; Future  - REFERRAL TO PODIATRY    8. Hyperlipidemia, unspecified hyperlipidemia type  Chronic condition.  Lab tests ordered for follow-up trend.  Continue with atorvastatin.  - ALANINE AMINO-TRANS; Future  - Lipid Profile; Future    9. Chronic anticoagulation  Chronic condition.  Refer the patient to anticoagulation clinic.  No active bleeding noted at this time.  We will continue with warfarin.  - Prothrombin Time; Future    10. Type 2 diabetes mellitus with hyperosmolarity without coma, with long-term current use of insulin (HCC)  This is a chronic condition.  The current control is unclear.  Lab tests ordered.  Advised the patient to follow-up after lab test is done.  Advised lifestyle modification.  Stressed the importance of low sweet /low carb diet, high lean protein, and avoid unhealthy foods.  Encourage pt to start/continue with regular aerobic exercises/walking.       I have requested to obtain chart at the outside nursing home for further review.    Patient was seen for 45 minutes face to face of which > 50% of appointment time was spent on counseling and coordination of care regarding the above.     Return in about 4 months  (around 10/15/2020).       PATIENT EDUCATION:  -If any problems should arise, patient was advised to contact our office or go to ER to be evaluated.  -Advised pt to follow a healthy diet and regular aerobic exercise regimen. Advised pt to avoid alcohol and tobacco use.    Please note that this dictation was created using voice recognition software. I have made every reasonable attempt to correct obvious errors, but it is possible there are errors of grammar and possibly content that I did not discover before finalizing the note.

## 2020-06-15 NOTE — ASSESSMENT & PLAN NOTE
Currently he is taking Celexa and Seroquel daily.  Patient reported patient with  recurrent anger outbursts  She is requesting for the patient to be evaluated by behavioral health specialist.  Referral submitted today.  Patient denies suicidal ideation.

## 2020-06-15 NOTE — ASSESSMENT & PLAN NOTE
Patient was recently admitted to the hospital due to GI bleeding.  Colonoscopy done in hospital reportedly show diverticulosis.  Patient denies any further bleeding since discharge from the hospital.  His hemoglobin was as low as 8.0 in the hospital.  Lab tests ordered for follow-up trends.

## 2020-06-15 NOTE — ASSESSMENT & PLAN NOTE
Chronic condition.  The patient is presently taking Prinivil.  His blood pressure has been well controlled at home.

## 2020-06-16 ENCOUNTER — TELEPHONE (OUTPATIENT)
Dept: MEDICAL GROUP | Facility: PHYSICIAN GROUP | Age: 75
End: 2020-06-16

## 2020-06-16 DIAGNOSIS — R80.9 PROTEINURIA, UNSPECIFIED TYPE: ICD-10-CM

## 2020-06-16 NOTE — TELEPHONE ENCOUNTER
----- Message from Rufino Shine M.D. sent at 6/16/2020  7:31 AM PDT -----  Please call patient :urine test showed protein. Ordered 24hr urine protein for further eval. pls send pt back to labs in the next2-3wks.

## 2020-06-16 NOTE — TELEPHONE ENCOUNTER
1st Attempt:  Left voicemail message for patient to call the office back at [phone]    Sent pt my chart message

## 2020-06-17 ENCOUNTER — ANTICOAGULATION MONITORING (OUTPATIENT)
Dept: VASCULAR LAB | Facility: MEDICAL CENTER | Age: 75
End: 2020-06-17

## 2020-06-17 DIAGNOSIS — Z95.2 HISTORY OF MITRAL VALVE REPLACEMENT WITH MECHANICAL VALVE: ICD-10-CM

## 2020-06-17 DIAGNOSIS — Z79.01 CHRONIC ANTICOAGULATION: ICD-10-CM

## 2020-06-17 NOTE — PROGRESS NOTES
Pt was discharged to [facility].   They will manage his anticoagulation while he's there.   We will resume care once DC'ed home.     Mi Miles, PharmD

## 2020-06-19 ENCOUNTER — TELEPHONE (OUTPATIENT)
Dept: MEDICAL GROUP | Facility: PHYSICIAN GROUP | Age: 75
End: 2020-06-19

## 2020-06-19 NOTE — TELEPHONE ENCOUNTER
----- Message from Rufino Shine M.D. sent at 6/19/2020  2:56 PM PDT -----  Regarding: FW: Non-Urgent Medical Question  Contact: [phone]  Correction: pls see note below and call pts wife . thanks  ----- Message -----  From: Daisy Collins, Med Ass't  Sent: 6/19/2020   1:36 PM PDT  To: Rufino Shine M.D.  Subject: FW: Non-Urgent Medical Question                  Please advise.  ----- Message -----  From: Carlos Rivera  Sent: 6/19/2020  11:52 AM PDT  To: [facility]  Subject: Non-Urgent Medical Question                      This message is being sent by Comfort Rivera on behalf of Carlos Rivera.    I have a question about MICROALBUMIN CREAT RATIO URINE resulted on 6/15/20 at 6:52 PM. Please explain what the norm is and what.we do going forward. Also, Coumadin dosing is confused. Of all his drugs I need to be on top of this one. Do not rely on Carlos for knowing what he's supposed to be doing. Also,  please do no call or leave any messages on his phone. Right now he's not understanding or remembering a thing.  My number is on record. [phone]. Thanking you in advance. Comfort Rivera

## 2020-06-23 ENCOUNTER — APPOINTMENT (OUTPATIENT)
Dept: RADIOLOGY | Facility: MEDICAL CENTER | Age: 75
DRG: 305 | End: 2020-06-23
Attending: EMERGENCY MEDICINE
Payer: MEDICARE

## 2020-06-23 ENCOUNTER — APPOINTMENT (OUTPATIENT)
Dept: CARDIOLOGY | Facility: MEDICAL CENTER | Age: 75
DRG: 305 | End: 2020-06-23
Attending: HOSPITALIST
Payer: MEDICARE

## 2020-06-23 ENCOUNTER — HOSPITAL ENCOUNTER (INPATIENT)
Facility: MEDICAL CENTER | Age: 75
LOS: 2 days | DRG: 305 | End: 2020-06-25
Attending: EMERGENCY MEDICINE | Admitting: HOSPITALIST
Payer: MEDICARE

## 2020-06-23 DIAGNOSIS — R47.9 SPEECH DISTURBANCE, UNSPECIFIED TYPE: ICD-10-CM

## 2020-06-23 PROBLEM — I63.30 CEREBROVASCULAR ACCIDENT (CVA) DUE TO THROMBOSIS OF CEREBRAL ARTERY (HCC): Status: ACTIVE | Noted: 2020-06-23

## 2020-06-23 LAB
ABO GROUP BLD: NORMAL
ALBUMIN SERPL BCP-MCNC: 4.2 G/DL (ref 3.2–4.9)
ALBUMIN/GLOB SERPL: 1.9 G/DL
ALP SERPL-CCNC: 93 U/L (ref 30–99)
ALT SERPL-CCNC: 28 U/L (ref 2–50)
ANION GAP SERPL CALC-SCNC: 12 MMOL/L (ref 7–16)
APPEARANCE UR: CLEAR
APPEARANCE UR: CLEAR
APTT PPP: 35.4 SEC (ref 24.7–36)
AST SERPL-CCNC: 25 U/L (ref 12–45)
BACTERIA #/AREA URNS HPF: NEGATIVE /HPF
BACTERIA #/AREA URNS HPF: NEGATIVE /HPF
BASOPHILS # BLD AUTO: 0.5 % (ref 0–1.8)
BASOPHILS # BLD: 0.05 K/UL (ref 0–0.12)
BILIRUB SERPL-MCNC: 0.3 MG/DL (ref 0.1–1.5)
BILIRUB UR QL STRIP.AUTO: NEGATIVE
BILIRUB UR QL STRIP.AUTO: NEGATIVE
BLD GP AB SCN SERPL QL: NORMAL
BUN SERPL-MCNC: 32 MG/DL (ref 8–22)
CALCIUM SERPL-MCNC: 9 MG/DL (ref 8.5–10.5)
CHLORIDE SERPL-SCNC: 104 MMOL/L (ref 96–112)
CO2 SERPL-SCNC: 23 MMOL/L (ref 20–33)
COLOR UR: YELLOW
COLOR UR: YELLOW
CREAT SERPL-MCNC: 1.36 MG/DL (ref 0.5–1.4)
EKG IMPRESSION: NORMAL
EOSINOPHIL # BLD AUTO: 0.25 K/UL (ref 0–0.51)
EOSINOPHIL NFR BLD: 2.4 % (ref 0–6.9)
EPI CELLS #/AREA URNS HPF: NEGATIVE /HPF
EPI CELLS #/AREA URNS HPF: NEGATIVE /HPF
ERYTHROCYTE [DISTWIDTH] IN BLOOD BY AUTOMATED COUNT: 49.5 FL (ref 35.9–50)
EST. AVERAGE GLUCOSE BLD GHB EST-MCNC: 143 MG/DL
GLOBULIN SER CALC-MCNC: 2.2 G/DL (ref 1.9–3.5)
GLUCOSE BLD-MCNC: 174 MG/DL (ref 65–99)
GLUCOSE BLD-MCNC: 209 MG/DL (ref 65–99)
GLUCOSE BLD-MCNC: 292 MG/DL (ref 65–99)
GLUCOSE BLD-MCNC: 317 MG/DL (ref 65–99)
GLUCOSE SERPL-MCNC: 312 MG/DL (ref 65–99)
GLUCOSE UR STRIP.AUTO-MCNC: >=1000 MG/DL
GLUCOSE UR STRIP.AUTO-MCNC: >=1000 MG/DL
HBA1C MFR BLD: 6.6 % (ref 0–5.6)
HCT VFR BLD AUTO: 45.3 % (ref 42–52)
HGB BLD-MCNC: 15 G/DL (ref 14–18)
HYALINE CASTS #/AREA URNS LPF: ABNORMAL /LPF
HYALINE CASTS #/AREA URNS LPF: ABNORMAL /LPF
IMM GRANULOCYTES # BLD AUTO: 0.04 K/UL (ref 0–0.11)
IMM GRANULOCYTES NFR BLD AUTO: 0.4 % (ref 0–0.9)
INR PPP: 2.53 (ref 0.87–1.13)
KETONES UR STRIP.AUTO-MCNC: 15 MG/DL
KETONES UR STRIP.AUTO-MCNC: 15 MG/DL
LEUKOCYTE ESTERASE UR QL STRIP.AUTO: NEGATIVE
LEUKOCYTE ESTERASE UR QL STRIP.AUTO: NEGATIVE
LV EJECT FRACT  99904: 60
LV EJECT FRACT MOD 2C 99903: 64.69
LV EJECT FRACT MOD 4C 99902: 69.06
LV EJECT FRACT MOD BP 99901: 66.8
LYMPHOCYTES # BLD AUTO: 0.66 K/UL (ref 1–4.8)
LYMPHOCYTES NFR BLD: 6.3 % (ref 22–41)
MAGNESIUM SERPL-MCNC: 2 MG/DL (ref 1.5–2.5)
MCH RBC QN AUTO: 33.9 PG (ref 27–33)
MCHC RBC AUTO-ENTMCNC: 33.1 G/DL (ref 33.7–35.3)
MCV RBC AUTO: 102.3 FL (ref 81.4–97.8)
MICRO URNS: ABNORMAL
MICRO URNS: ABNORMAL
MONOCYTES # BLD AUTO: 0.49 K/UL (ref 0–0.85)
MONOCYTES NFR BLD AUTO: 4.6 % (ref 0–13.4)
NEUTROPHILS # BLD AUTO: 9.07 K/UL (ref 1.82–7.42)
NEUTROPHILS NFR BLD: 85.8 % (ref 44–72)
NITRITE UR QL STRIP.AUTO: NEGATIVE
NITRITE UR QL STRIP.AUTO: NEGATIVE
NRBC # BLD AUTO: 0 K/UL
NRBC BLD-RTO: 0 /100 WBC
PH UR STRIP.AUTO: 6.5 [PH] (ref 5–8)
PH UR STRIP.AUTO: 6.5 [PH] (ref 5–8)
PLATELET # BLD AUTO: 149 K/UL (ref 164–446)
PMV BLD AUTO: 10.8 FL (ref 9–12.9)
POTASSIUM SERPL-SCNC: 4.7 MMOL/L (ref 3.6–5.5)
PROT SERPL-MCNC: 6.4 G/DL (ref 6–8.2)
PROT UR QL STRIP: 300 MG/DL
PROT UR QL STRIP: 300 MG/DL
PROTHROMBIN TIME: 28.1 SEC (ref 12–14.6)
RBC # BLD AUTO: 4.43 M/UL (ref 4.7–6.1)
RBC # URNS HPF: ABNORMAL /HPF
RBC # URNS HPF: ABNORMAL /HPF
RBC UR QL AUTO: ABNORMAL
RBC UR QL AUTO: ABNORMAL
RH BLD: NORMAL
SODIUM SERPL-SCNC: 139 MMOL/L (ref 135–145)
SP GR UR STRIP.AUTO: 1.03
SP GR UR STRIP.AUTO: 1.04
TROPONIN T SERPL-MCNC: 108 NG/L (ref 6–19)
TSH SERPL DL<=0.005 MIU/L-ACNC: 0.68 UIU/ML (ref 0.38–5.33)
UROBILINOGEN UR STRIP.AUTO-MCNC: 0.2 MG/DL
UROBILINOGEN UR STRIP.AUTO-MCNC: 0.2 MG/DL
WBC # BLD AUTO: 10.6 K/UL (ref 4.8–10.8)
WBC #/AREA URNS HPF: ABNORMAL /HPF
WBC #/AREA URNS HPF: ABNORMAL /HPF

## 2020-06-23 PROCEDURE — 70450 CT HEAD/BRAIN W/O DYE: CPT

## 2020-06-23 PROCEDURE — 86850 RBC ANTIBODY SCREEN: CPT

## 2020-06-23 PROCEDURE — 93005 ELECTROCARDIOGRAM TRACING: CPT | Performed by: HOSPITALIST

## 2020-06-23 PROCEDURE — 80053 COMPREHEN METABOLIC PANEL: CPT

## 2020-06-23 PROCEDURE — 83735 ASSAY OF MAGNESIUM: CPT

## 2020-06-23 PROCEDURE — 96374 THER/PROPH/DIAG INJ IV PUSH: CPT

## 2020-06-23 PROCEDURE — 85730 THROMBOPLASTIN TIME PARTIAL: CPT

## 2020-06-23 PROCEDURE — 85610 PROTHROMBIN TIME: CPT

## 2020-06-23 PROCEDURE — 700105 HCHG RX REV CODE 258: Performed by: EMERGENCY MEDICINE

## 2020-06-23 PROCEDURE — 82962 GLUCOSE BLOOD TEST: CPT

## 2020-06-23 PROCEDURE — 85025 COMPLETE CBC W/AUTO DIFF WBC: CPT

## 2020-06-23 PROCEDURE — 92610 EVALUATE SWALLOWING FUNCTION: CPT

## 2020-06-23 PROCEDURE — 86901 BLOOD TYPING SEROLOGIC RH(D): CPT

## 2020-06-23 PROCEDURE — 93306 TTE W/DOPPLER COMPLETE: CPT | Mod: 26 | Performed by: INTERNAL MEDICINE

## 2020-06-23 PROCEDURE — 770020 HCHG ROOM/CARE - TELE (206)

## 2020-06-23 PROCEDURE — 84484 ASSAY OF TROPONIN QUANT: CPT

## 2020-06-23 PROCEDURE — 71045 X-RAY EXAM CHEST 1 VIEW: CPT

## 2020-06-23 PROCEDURE — 0042T CT-CEREBRAL PERFUSION ANALYSIS: CPT

## 2020-06-23 PROCEDURE — 99285 EMERGENCY DEPT VISIT HI MDM: CPT

## 2020-06-23 PROCEDURE — 93306 TTE W/DOPPLER COMPLETE: CPT

## 2020-06-23 PROCEDURE — 94760 N-INVAS EAR/PLS OXIMETRY 1: CPT

## 2020-06-23 PROCEDURE — 700117 HCHG RX CONTRAST REV CODE 255

## 2020-06-23 PROCEDURE — 700102 HCHG RX REV CODE 250 W/ 637 OVERRIDE(OP): Performed by: HOSPITALIST

## 2020-06-23 PROCEDURE — 70498 CT ANGIOGRAPHY NECK: CPT

## 2020-06-23 PROCEDURE — 93010 ELECTROCARDIOGRAM REPORT: CPT | Performed by: INTERNAL MEDICINE

## 2020-06-23 PROCEDURE — 70496 CT ANGIOGRAPHY HEAD: CPT

## 2020-06-23 PROCEDURE — 81001 URINALYSIS AUTO W/SCOPE: CPT

## 2020-06-23 PROCEDURE — 36415 COLL VENOUS BLD VENIPUNCTURE: CPT

## 2020-06-23 PROCEDURE — 99223 1ST HOSP IP/OBS HIGH 75: CPT | Mod: AI | Performed by: HOSPITALIST

## 2020-06-23 PROCEDURE — 93005 ELECTROCARDIOGRAM TRACING: CPT | Performed by: EMERGENCY MEDICINE

## 2020-06-23 PROCEDURE — 51798 US URINE CAPACITY MEASURE: CPT

## 2020-06-23 PROCEDURE — 86900 BLOOD TYPING SEROLOGIC ABO: CPT

## 2020-06-23 PROCEDURE — 99223 1ST HOSP IP/OBS HIGH 75: CPT | Performed by: NURSE PRACTITIONER

## 2020-06-23 PROCEDURE — A9270 NON-COVERED ITEM OR SERVICE: HCPCS | Performed by: HOSPITALIST

## 2020-06-23 PROCEDURE — 83036 HEMOGLOBIN GLYCOSYLATED A1C: CPT

## 2020-06-23 PROCEDURE — 84443 ASSAY THYROID STIM HORMONE: CPT

## 2020-06-23 RX ORDER — ATORVASTATIN CALCIUM 80 MG/1
80 TABLET, FILM COATED ORAL EVERY EVENING
Status: DISCONTINUED | OUTPATIENT
Start: 2020-06-23 | End: 2020-06-25 | Stop reason: HOSPADM

## 2020-06-23 RX ORDER — WARFARIN SODIUM 5 MG/1
5 TABLET ORAL DAILY
Status: DISCONTINUED | OUTPATIENT
Start: 2020-06-23 | End: 2020-06-24

## 2020-06-23 RX ORDER — ONDANSETRON 4 MG/1
4 TABLET, ORALLY DISINTEGRATING ORAL EVERY 4 HOURS PRN
Status: DISCONTINUED | OUTPATIENT
Start: 2020-06-23 | End: 2020-06-25 | Stop reason: HOSPADM

## 2020-06-23 RX ORDER — INSULIN GLARGINE 100 [IU]/ML
15 INJECTION, SOLUTION SUBCUTANEOUS EVERY EVENING
Status: DISCONTINUED | OUTPATIENT
Start: 2020-06-23 | End: 2020-06-25 | Stop reason: HOSPADM

## 2020-06-23 RX ORDER — AMOXICILLIN 250 MG
2 CAPSULE ORAL 2 TIMES DAILY
Status: DISCONTINUED | OUTPATIENT
Start: 2020-06-23 | End: 2020-06-25 | Stop reason: HOSPADM

## 2020-06-23 RX ORDER — ACETAMINOPHEN 325 MG/1
650 TABLET ORAL EVERY 6 HOURS PRN
Status: DISCONTINUED | OUTPATIENT
Start: 2020-06-23 | End: 2020-06-25 | Stop reason: HOSPADM

## 2020-06-23 RX ORDER — SODIUM CHLORIDE 9 MG/ML
500 INJECTION, SOLUTION INTRAVENOUS ONCE
Status: DISCONTINUED | OUTPATIENT
Start: 2020-06-23 | End: 2020-06-23

## 2020-06-23 RX ORDER — POLYETHYLENE GLYCOL 3350 17 G/17G
1 POWDER, FOR SOLUTION ORAL
Status: DISCONTINUED | OUTPATIENT
Start: 2020-06-23 | End: 2020-06-25 | Stop reason: HOSPADM

## 2020-06-23 RX ORDER — CITALOPRAM 20 MG/1
20 TABLET ORAL EVERY EVENING
COMMUNITY
End: 2020-08-17

## 2020-06-23 RX ORDER — DEXTROSE MONOHYDRATE 25 G/50ML
50 INJECTION, SOLUTION INTRAVENOUS
Status: DISCONTINUED | OUTPATIENT
Start: 2020-06-23 | End: 2020-06-25 | Stop reason: HOSPADM

## 2020-06-23 RX ORDER — CITALOPRAM 20 MG/1
20 TABLET ORAL EVERY EVENING
Status: DISCONTINUED | OUTPATIENT
Start: 2020-06-23 | End: 2020-06-25 | Stop reason: HOSPADM

## 2020-06-23 RX ORDER — QUETIAPINE FUMARATE 25 MG/1
50 TABLET, FILM COATED ORAL EVERY EVENING
Status: DISCONTINUED | OUTPATIENT
Start: 2020-06-23 | End: 2020-06-25 | Stop reason: HOSPADM

## 2020-06-23 RX ORDER — ONDANSETRON 2 MG/ML
4 INJECTION INTRAMUSCULAR; INTRAVENOUS EVERY 4 HOURS PRN
Status: DISCONTINUED | OUTPATIENT
Start: 2020-06-23 | End: 2020-06-25 | Stop reason: HOSPADM

## 2020-06-23 RX ORDER — BISACODYL 10 MG
10 SUPPOSITORY, RECTAL RECTAL
Status: DISCONTINUED | OUTPATIENT
Start: 2020-06-23 | End: 2020-06-25 | Stop reason: HOSPADM

## 2020-06-23 RX ORDER — SODIUM CHLORIDE 9 MG/ML
250 INJECTION, SOLUTION INTRAVENOUS ONCE
Status: COMPLETED | OUTPATIENT
Start: 2020-06-23 | End: 2020-06-23

## 2020-06-23 RX ADMIN — ATORVASTATIN CALCIUM 80 MG: 80 TABLET, FILM COATED ORAL at 18:35

## 2020-06-23 RX ADMIN — INSULIN HUMAN 6 UNITS: 100 INJECTION, SOLUTION PARENTERAL at 20:15

## 2020-06-23 RX ADMIN — CITALOPRAM HYDROBROMIDE 20 MG: 20 TABLET ORAL at 18:35

## 2020-06-23 RX ADMIN — INSULIN HUMAN 3 UNITS: 100 INJECTION, SOLUTION PARENTERAL at 18:29

## 2020-06-23 RX ADMIN — QUETIAPINE FUMARATE 50 MG: 25 TABLET ORAL at 18:35

## 2020-06-23 RX ADMIN — IOHEXOL 40 ML: 350 INJECTION, SOLUTION INTRAVENOUS at 06:46

## 2020-06-23 RX ADMIN — IOHEXOL 100 ML: 350 INJECTION, SOLUTION INTRAVENOUS at 06:54

## 2020-06-23 RX ADMIN — WARFARIN SODIUM 5 MG: 5 TABLET ORAL at 18:38

## 2020-06-23 RX ADMIN — INSULIN GLARGINE 15 UNITS: 100 INJECTION, SOLUTION SUBCUTANEOUS at 18:29

## 2020-06-23 RX ADMIN — SODIUM CHLORIDE 250 ML: 9 INJECTION, SOLUTION INTRAVENOUS at 07:35

## 2020-06-23 ASSESSMENT — LIFESTYLE VARIABLES
HAVE PEOPLE ANNOYED YOU BY CRITICIZING YOUR DRINKING: NO
CONSUMPTION TOTAL: NEGATIVE
TOTAL SCORE: 0
HOW MANY TIMES IN THE PAST YEAR HAVE YOU HAD 5 OR MORE DRINKS IN A DAY: 0
TOTAL SCORE: 0
EVER HAD A DRINK FIRST THING IN THE MORNING TO STEADY YOUR NERVES TO GET RID OF A HANGOVER: NO
TOTAL SCORE: 0
ON A TYPICAL DAY WHEN YOU DRINK ALCOHOL HOW MANY DRINKS DO YOU HAVE: 3
HAVE YOU EVER FELT YOU SHOULD CUT DOWN ON YOUR DRINKING: NO
EVER FELT BAD OR GUILTY ABOUT YOUR DRINKING: NO
DO YOU DRINK ALCOHOL: YES
AVERAGE NUMBER OF DAYS PER WEEK YOU HAVE A DRINK CONTAINING ALCOHOL: 2

## 2020-06-23 ASSESSMENT — FIBROSIS 4 INDEX
FIB4 SCORE: 1.48
FIB4 SCORE: 2.41

## 2020-06-23 NOTE — ASSESSMENT & PLAN NOTE
Now resolved.  MRB pending.  Awaiting PT/OT/SLP eval.  Continue home Coumadin.  Neurology following

## 2020-06-23 NOTE — ED NOTES
Hilda from Lab called with critical result of troponin 108 at 0712. Critical lab result read back to Clinton.   Dr. Bowen notified of critical lab result at 0713.  Critical lab result read back by Dr. Bowen..

## 2020-06-23 NOTE — PROGRESS NOTES
Inpatient Anticoagulation Service Note    Date: 6/23/2020    Reason for Anticoagulation: Mechanical Mitral Valve Replacement     Target INR: 2.5 to 3.5     Hemoglobin Value: 15  Hematocrit Value: 45.3  Lab Platelet Value: (!) 149    INR from last 7 days     Date/Time INR Value    06/23/20 0620  (!) 2.53        Dose from last 7 days     Date/Time Dose (mg)    06/23/20 1256  5        Average Dose (mg): 5  Significant Interactions: Statin, quetiapine, citalopram  Bridge Therapy: No   Reversal Agent Administered: Not Applicable    Comments: Patient on home warfarin for history of a mechanical mitral valve. He was formerly followed by the [facility] anticoagulation clinic , but the patient is currentlybeing followed by the [facility]. INR today is within therapeutic range. No new DDI noted. H/H WNL and no sxs of bleeding noted. The patient is currently NPO.     Plan:  Will order home warfarin dose of 5 mg PO daily. An INR will be obtained with AM labs. Pharmacy will continue to follow.    Education Material Provided?: No  Pharmacist suggested discharge dosing: TBD based on subsequent INR results. May be able to resume home dose of 5 mg PO daily6. Obtain a follow up INR within 72h of discharge.        Beth Braga, PharmD, BCPS

## 2020-06-23 NOTE — THERAPY
"Speech Language Pathology   Clinical Swallow Evaluation     Patient Name: Carlos Rivera  AGE:  74 y.o., SEX:  male  Medical Record #: [id number]  Today's Date: 6/23/2020     Precautions  Precautions: Other (See Comments)(Subtle word finding deficits)    Assessment    The patient is a 75 y/o male who presented with a headache and word finding deficits. CT head was negative.  Subtle word finding deficits noted during conversational speech.  For example, the patient stated, \"Can I have a true towel?\" when asking for a paper towel.  He was AA&O x3; able to determine month and year with a choice of 2.  Voice was strong but became progressively reduced in loudness with speech tasks.  Patient's spouse, who was at bedside, stated this was his baseline.  Patient required max cues to follow directives for safe swallowing positioning and elevation of HOB.    The patient inconsistently followed directives to the oral [facility] exam.  When asked to lick his lips, lateralize his tongue or retract the lips the patient blew raspberries.  He did eventually follow most directives with minimal tactile cues and minimal weakness noted.  Presentation of PO included ice chips, pudding, soft solids, dry solids, and thin liquids. The patient presented with adequate mastication and bolus manipulation, timely initiation of swallow trigger and complete laryngeal elevation upon palpation.  He had wet vocal quality x 2 with straw sips but cleared with cues to a volitional throat clear.  No overt clinical s/sx of aspiration with cup sips of thins or with any other consistency consumed.  He fed himself independently with adequate pacing and bite size.        Plan    1) Initiate Regular/thin liquid diet  2) Reposition to 90 degrees or up to a chair for ALL meals  3) Ok for whole meds with a liquid wash  4) Cognitive-linguistic evaluation to be completed   5) SLP following x1 to further assess swallowing function during a meal     Recommend Speech " Therapy 2 times per week until therapy goals are met for the following treatments:  Dysphagia Training.    Discharge recommendations:  Anticipate that the patient will have no further speech therapy needs to address dysphagia after discharge from the hospital.     Objective       06/23/20 1503   Oral Motor Eval    Is Patient Able to Complete Oral Motor Eval Yes but Impaired   Labial Function   Labial Structure At Rest Minimal  (slight r-sided asymmetry, ?baseline)   Labial Vowel Production / I /, / U / Within Functional Limits   Labial Sequence / I /, / U / Within Functional Limits   Frown, Pucker Within Functional Limits   Lingual Function   Lingual Structure At Rest Within Functional Limits   Lingual Protrude Within Functional Limits   Lingual Retract Within Functional Limits   Lateralization   (did not follow directives)   Lick Lips (Circular) Minimal  (difficulty following directives)   / Pa / 5X's Minimal  (uncoordinated, halting)   / Ta / 5X's Minimal  (uncoordinated, halting)   / Ka / 5X's Minimal  (uncoordinated, halting)   / [place] / 5X's Minimal  (uncoordinated, halting)   Jaw   Jaw Structure At Rest Within Functional Limits   Bite ([place]) Within Functional Limits   Chew ([place]) Within Functional Limits   Jaw Open / Resist Within Functional Limits   Jaw Close / Resist Minimal   Velar Function   Velar Structure At Rest Within Functional Limits   Laryngeal Function   Voice Quality Minimal   Volutional Cough Within Functional Limits   Excursion Upon Swallow Complete   Oral Food Presentation   Ice Chips Within Functional Limits   Single Swallow Thin (0) Within Functional Limits   Serial Swallow Thin (0) Minimal   Pureed (4) Within Functional Limits   Soft & Bite-Sized (6) - (Dysphagia III) Within Functional Limits   Regular (7) Within Functional Limits   Self Feeding Independent   Dysphagia Strategies / Recommendations   Strategies / Interventions Recommended (Yes / No) Yes   Compensatory Strategies Head  of Bed 90 Degrees During Eating / Drinking;Cough / Clear Wet Vocal Quality Post Swallow;No Talking During Eating / Drinking   Diet / Liquid Recommendation Thin (0);Regular (7)   Medication Administration  Whole with Liquid Wash   Therapy Interventions Dysphagia Therapy By Speech Language Pathologist   Short Term Goals   Short Term Goal # 1 The patient will independenlty consume regular/thins diet with no overt s/sx of aspiration.

## 2020-06-23 NOTE — DISCHARGE PLANNING
Medical Social Work    Referral: Stroke    Intervention: Pt is a 74 year old male brought in by ASHLEY from home with possible stroke.  Pt is Carlos Godfrey Armen (: 1945).  Per ASHLEY pt's wife is on her way.  Per pt's chart pt's wife, Comfort can be reached at: [phone].    Plan: SW will remain available as needed.

## 2020-06-23 NOTE — DISCHARGE PLANNING
[facility] Transitional Care Coordination     Referral from: Dr. Flowers    Facesheet indicates: Medicare   Potential Rehab Diagnosis: Stroke work up     D/C support:Spouse      Physiatry consultation pended per protocol.        Waiting on additional information to determine appropriateness for acute inpatient rehabilitation. Will continue to follow.       Thank you for the referral.

## 2020-06-23 NOTE — ASSESSMENT & PLAN NOTE
History of, questionable baseline  High risk for hospital-acquired delirium.  Avoid benzodiazepines/anticholinergic medications.  Minimize hypnotics or sedatives.  Minds lines.  Avoid Diop catheter.  Daily orientation.

## 2020-06-23 NOTE — ED NOTES
Per pt wife, pt is unsafe to be left alone at home and requesting additional services to help care for  upon discharge.

## 2020-06-23 NOTE — PROGRESS NOTES
Discussed with Dr Bowen, 73 yo male presenting with stroke like symptoms word finding problems, patient on warfarin due to mitral valve replacement INR 2.5 neuro consulted and recommended admission, CT head in er neg, patient not candidate to TPA due to warfarin as per neuro, from neuro standpoint no ASA recommended at this time, patient with elevated troponin but no chest pain.   Dr Flowers informed about admission.

## 2020-06-23 NOTE — PROGRESS NOTES
2 RN skin check completed with Gisselle.     Abrasion to top of head.   Abrasions to BLE  Abrasion to left elbow.  Sacrum pink and blanching.   Bilateral feet dry and calloused.      Moisturizer in use.

## 2020-06-23 NOTE — ED NOTES
Received report on pt. Pt is confused on time and event. Pt has expressive aphasia and short term forgetfulness and needs frequent re-orientation. Pt has no other signs of deficits. Labs drawn and collected and EKG obtained.

## 2020-06-23 NOTE — ED NOTES
Report given to YVES Ahn. Pt transferred up to floor on zoll monitor by ACLS RN. All personal belongings placed in belongings bag and given to wife, Comfort.

## 2020-06-23 NOTE — PROGRESS NOTES
Brief Neurology Note    Case discussed with Dr. Bowen.     74M woke this AM in normal state of health then had progressive onset of headache and lightheadedness at 5AM. Difficulty with word finding. Unable to name or repeat; improving over the past ten min albeit not back to baseline    On warfarin for mechanical heart valve. Headache persists. Admits nausea. Per ERP: Other than speech, no focal deficits.     Ddx include s hemorrhage vs infarct vs hypertensive urgency. To go direct to CT.     Not a candidate for tPA given he is on AC.     To be seen by neurology team today for full consultation with additional recs to follow    MEHDI Sanders MD  Neurology

## 2020-06-23 NOTE — H&P
"Hospital Medicine History & Physical Note    Date of Service  6/23/2020    Primary Care Physician  Rufino Shine M.D.    Code Status  Full Code    Chief Complaint  Chief Complaint   Patient presents with   • Possible Stroke     Difficulty speech and \"finding words\" approximately ten minutes prior to arrival, code stroke activated       History of Presenting Illness  74 y.o. male who presented 6/23/2020 with a history of dyslipidemia, diabetes type 2, MVR with ongoing anticoagulation for mechanical valve, he is a overall poor historian, chief complaint was a headache and abnormal speech upon awakening in the morning, he woke up about 5:00 in the morning, he states now that he had some difficulty urinating, he was found to have a significant amount of dysarthria and word finding difficulties, he was hypertensive at the scene, his blood sugar was in the 240s, initial work-up in the ER did not reveal acute findings based on CT/CTA the patient is chronically on Coumadin and his admitting INR is 2.5, he states that he might of had a fall unknown time and severity, he is again currently not a good historian.  Neurology has evaluated the patient, work-up is in progress, the patient not a candidate for thrombolytics    Review of Systems  Review of Systems   Unable to perform ROS: Mental status change   Genitourinary: Negative.    All other systems reviewed and are negative.      Past Medical History   has a past medical history of Chronic anticoagulation (2/23/2017), History of mitral valve replacement with mechanical valve [Z95.2] (3/10/2017), Hyperlipidemia, and Type II diabetes mellitus (HCC) (2/23/2017).    Surgical History   has a past surgical history that includes mitral valve replacement (1999); inguinal hernia repair (Bilateral, 1984); tonsillectomy; and pr colonoscopy,diagnostic (N/A, 5/9/2020).     Family History  family history includes Diabetes in his father; Heart Attack in his paternal grandfather and paternal " uncle; Heart Attack (age of onset: 58) in his father; No Known Problems in his brother, brother, maternal grandfather, maternal grandmother, paternal grandmother, sister, and sister.     Social History   reports that he has never smoked. He has never used smokeless tobacco. He reports that he does not drink alcohol or use drugs.    Allergies  Allergies   Allergen Reactions   • [place] Vomiting   • Risperidone      Dystonia, altered mental status   • Hydrocodone-Acetaminophen Vomiting       Medications  Prior to Admission Medications   Prescriptions Last Dose Informant Patient Reported? Taking?   QUEtiapine (SEROQUEL) 50 MG tablet 6/21/2020 at PM Patient No No   Sig: Take 1 Tab by mouth every evening.   atorvastatin (LIPITOR) 40 MG Tab 6/21/2020 at PM Patient No No   Sig: TAKE 1 TABLET DAILY IN THE EVENING   citalopram (CELEXA) 20 MG Tab 6/21/2020 at PM Patient Yes Yes   Sig: Take 20 mg by mouth every evening.   insulin glargine (LANTUS) 100 UNIT/ML Solution 6/21/2020 at PM Patient No No   Sig: Inject 15 Units as instructed every evening.   lisinopril (PRINIVIL) 20 MG Tab 6/21/2020 at PM Patient Yes No   Sig: Take 20 mg by mouth every evening.   metFORMIN (GLUCOPHAGE) 500 MG Tab 6/22/2020 at AM Patient Yes Yes   Sig: Take 1,000 mg by mouth 2 times a day, with meals.   warfarin (COUMADIN) 5 MG Tab 6/21/2020 at PM Patient No No   Sig: Take 1 Tab by mouth every day at 6 PM.      Facility-Administered Medications: None       Physical Exam  Temp:  [36.2 °C (97.2 °F)] 36.2 °C (97.2 °F)  Pulse:  [53-87] 65  Resp:  [14-82] 82  BP: (114-196)/() 114/58  SpO2:  [90 %-97 %] 90 %    Physical Exam  Vitals signs and nursing note reviewed.   Constitutional:       Appearance: He is well-developed.      Comments: Pt seen and examined.   HENT:      Head: Normocephalic and atraumatic.   Eyes:      Pupils: Pupils are equal, round, and reactive to light.   Neck:      Musculoskeletal: Normal range of motion and neck supple.    Cardiovascular:      Rate and Rhythm: Normal rate.      Heart sounds: Normal heart sounds.   Pulmonary:      Effort: Pulmonary effort is normal.      Breath sounds: Normal breath sounds.   Abdominal:      General: Bowel sounds are normal.      Palpations: Abdomen is soft.   Genitourinary:     Penis: Normal.    Musculoskeletal: Normal range of motion.   Skin:     General: Skin is warm and dry.      Coloration: Skin is pale.   Neurological:      Mental Status: He is oriented to person, place, and time. He is lethargic.      Cranial Nerves: No cranial nerve deficit.      Sensory: No sensory deficit.      Motor: Weakness present.      Coordination: Coordination abnormal.      Gait: Gait abnormal.   Psychiatric:         Behavior: Behavior is slowed.         Cognition and Memory: Memory is impaired.         Laboratory:  Recent Labs     06/23/20  0620   WBC 10.6   RBC 4.43*   HEMOGLOBIN 15.0   HEMATOCRIT 45.3   .3*   MCH 33.9*   MCHC 33.1*   RDW 49.5   PLATELETCT 149*   MPV 10.8     Recent Labs     06/23/20  0620   SODIUM 139   POTASSIUM 4.7   CHLORIDE 104   CO2 23   GLUCOSE 312*   BUN 32*   CREATININE 1.36   CALCIUM 9.0     Recent Labs     06/23/20  0620   ALTSGPT 28   ASTSGOT 25   ALKPHOSPHAT 93   TBILIRUBIN 0.3   GLUCOSE 312*     Recent Labs     06/23/20  0620   APTT 35.4   INR 2.53*     No results for input(s): NTPROBNP in the last 72 hours.      Recent Labs     06/23/20  0620   TROPONINT 108*       Imaging:  EC-ECHOCARDIOGRAM COMPLETE W/O CONT   Final Result      DX-CHEST-PORTABLE (1 VIEW)   Final Result      No acute cardiopulmonary abnormality.      CT-CTA NECK WITH & W/O-POST PROCESSING   Final Result      No high-grade stenosis, large vessel occlusion, aneurysm or dissection.      CT-CTA HEAD WITH & W/O-POST PROCESS   Final Result      No large vessel occlusion, high-grade stenosis or aneurysm of the [place].      CT-CEREBRAL PERFUSION ANALYSIS   Final Result      1.  Cerebral blood flow less  than 30% likely representing completed infarct = 0 mL.      2.  T Max more than 6 seconds likely representing combination of completed infarct and ischemia = 10 mL.      3.  Mismatched volume likely representing ischemic brain/penumbra = 10 mL.      4.  Please note that the cerebral perfusion was performed on the limited brain tissue around the basal ganglia region. Infarct/ischemia outside the CT perfusion sections can be missed in this study.      CT-HEAD W/O   Final Result      1. No CT evidence of acute infarct, hemorrhage or mass.   2. Moderate global parenchymal atrophy. Chronic small vessel ischemic changes.      MR-BRAIN-W/O    (Results Pending)         Assessment/Plan:      Cerebrovascular accident (CVA) due to thrombosis of cerebral artery (HCC)- (present on admission)  Assessment & Plan  Likely acute to subacute event given presentation  Continue anticoagulation, no aspirin, intensify statin, await MRI scanning  Stroke order set, neurology evaluation    Hypertension- (present on admission)  Assessment & Plan  Permissive hypertension for the first 48 hours    Type II diabetes mellitus (HCC)- (present on admission)  Assessment & Plan  Maintain glycemic control    Chronic anticoagulation- (present on admission)  Assessment & Plan  Continue Coumadin, monitor    Dementia (HCC)- (present on admission)  Assessment & Plan  History of, questionable baseline    Hyperlipidemia- (present on admission)  Assessment & Plan  Intensify statin therapy  Plan  Complete stroke work-up  Continued intensified statin therapy  Permissive hypertension  Neurology consult  Await MRI  Therapies, PT OT SLP  See orders  Medically complex high risk    The patient qualifies for an acute inpatient admission given patient's symptoms and need for intensified therapy and work-up likely requiring 2+ overnight stays in the hospital

## 2020-06-23 NOTE — ED PROVIDER NOTES
"ED Provider Note    Scribed for Randa Bowen M.D. by Rohan Crews. 6/23/2020  6:20 AM    Primary care provider: Rufino Shine M.D.  Means of arrival: EMS  History obtained from: EMS and patient  History limited by: limited due to difficulty with word finding.   CHIEF COMPLAINT  Chief Complaint   Patient presents with   • Possible Stroke     Difficulty speech and \"finding words\" approximately ten minutes prior to arrival, code stroke activated       HPI  Carlos Rivera is a 74 y.o. male who presents to the ED via ambulance for a possible stroke. Per EMS, the patient was already awake when they were called in this morning for dizziness and headache. On transit to the ED via ambulance, the patient began to have trouble with word finding. Initially, he was unable to identify a pen or a pad of paper, but he is now able to in the ER. He continues to have difficulty finding words and is still not back to the baseline he was at prior to boarding the ambulance. EMS notes the patient's blood sugar was 203. Patient is currently complaining of a \"mild\" headache, nausea, and lightheadedness. He also notes he has been feeling off balance on a week. He reports a possible head injury 1 week ago, but he is unsure on the timing. He denies any unilateral numbness/weakness/tingling. No vomiting. No visual changes. Patient is on coumadin for a mitral valve replacement (mechanical valve)     Further history of present illness cannot be obtained due to the patient's difficulty with word finding.    REVIEW OF SYSTEMS  See HPI for further details.   Pertinent positives include: dizziness(lightheaded) and headache. Nausea. Trouble with word finding.   Pertinent negatives include: He denies any unilateral numbness/weakness/tingling. No vomiting. No visual changes.    Further ROS cannot be obtained due to the patient's difficulty with word finding.    PAST MEDICAL HISTORY  Past Medical History:   Diagnosis Date   • Chronic " anticoagulation 2/23/2017   • History of mitral valve replacement with mechanical valve [Z95.2] 3/10/2017   • Hyperlipidemia    • Type II diabetes mellitus (HCC) 2/23/2017       FAMILY HISTORY  Family History   Problem Relation Age of Onset   • Heart Attack Father 58   • Diabetes Father    • Heart Attack Paternal Uncle    • No Known Problems Sister    • No Known Problems Brother    • No Known Problems Maternal Grandmother    • No Known Problems Maternal Grandfather    • No Known Problems Paternal Grandmother    • Heart Attack Paternal Grandfather    • No Known Problems Sister    • No Known Problems Brother        SOCIAL HISTORY  Social History     Tobacco Use   • Smoking status: Never Smoker   • Smokeless tobacco: Never Used   Substance Use Topics   • Alcohol use: No     Alcohol/week: 0.0 oz   • Drug use: No      Social History     Substance and Sexual Activity   Drug Use No       SURGICAL HISTORY  Past Surgical History:   Procedure Laterality Date   • PB COLONOSCOPY,DIAGNOSTIC N/A 5/9/2020    Procedure: COLONOSCOPY;  Surgeon: Jatinder Madera M.D.;  Location: SURGERY [facility];  Service: Gastroenterology   • MITRAL VALVE REPLACEMENT  1999   • INGUINAL HERNIA REPAIR Bilateral 1984   • TONSILLECTOMY         CURRENT MEDICATIONS  Home Medications     Reviewed by Tereza Bustamante (Pharmacy Tech) on 06/23/20 at 0831  Med List Status: Complete   Medication Last Dose Status   atorvastatin (LIPITOR) 40 MG Tab 6/21/2020 Active   citalopram (CELEXA) 20 MG Tab 6/21/2020 Active   insulin glargine (LANTUS) 100 UNIT/ML Solution 6/21/2020 Active   lisinopril (PRINIVIL) 20 MG Tab 6/21/2020 Active   metFORMIN (GLUCOPHAGE) 500 MG Tab 6/22/2020 Active   QUEtiapine (SEROQUEL) 50 MG tablet 6/21/2020 Active   warfarin (COUMADIN) 5 MG Tab 6/21/2020 Active                ALLERGIES  Allergies   Allergen Reactions   • [place] Vomiting   • Risperidone      Dystonia, altered mental status   • Hydrocodone-Acetaminophen Vomiting  "      PHYSICAL EXAM  VITAL SIGNS: BP (!) 183/81   Pulse 76   Temp 36.2 °C (97.2 °F) (Temporal)   Resp (!) 21   Ht 1.6 m (5' 3\")   Wt 61.2 kg (135 lb)   SpO2 94%   BMI 23.91 kg/m²      Constitutional: Well developed, well nourished; No acute distress; Non-toxic appearance.   HENT: Normocephalic, atraumatic; Bilateral external ears normal; Oropharynx with slightly dry mucous membranes; No erythema or exudates in the posterior oropharynx.  Eyes: PERRL, EOMI, Conjunctiva normal. No discharge.   Neck:  Supple, nontender midline; No stridor; No nuchal rigidity.   Lymphatic: No cervical lymphadenopathy noted.   Cardiovascular: Regular rate and rhythm. He has a mechanical click under his heart exam heard best at his left lateral 5th intercostal space.   Thorax & Lungs: No respiratory distress, breath sounds clear to auscultation bilaterally without wheezing, rales or rhonchi. Nontender chest wall. No crepitus or subcutaneous air  Abdomen: Soft, nontender, bowel sounds normal. No obvious masses; No pulsatile masses; no rebound, guarding, or peritoneal signs.   Skin: Good color; warm and dry without rash or petechia.  Back: Nontender, No CVA tenderness.   Extremities: Distal radial, dorsalis pedis, posterior tibial pulses are equal bilaterally; No edema; Nontender calves or saphenous, No cyanosis, No clubbing.   Musculoskeletal: Good range of motion in all major joints. No tenderness to palpation or major deformities noted.   Neurologic: Alert & oriented x 4, clear speech, cranial nerves II through XII intact without facial asymmetry, strengths 5 out of 5 equal bilateral upper and lower extremities, sensory grossly intact, normal gait, no drift, patient does have some difficulty with word finding.  He is able to identify a pen and a stethoscope.  He is unable to say \"no ifs, ands or buts\".  He stumbles over the last 2 parts of that phrase and his words are slurred as he repeats after me.        EKG  12 Lead EKG " interpreted by me as shown below.       LABS/RADIOLOGY/PROCEDURES  Results for orders placed or performed during the hospital encounter of 06/23/20   CBC WITH DIFFERENTIAL   Result Value Ref Range    WBC 10.6 4.8 - 10.8 K/uL    RBC 4.43 (L) 4.70 - 6.10 M/uL    Hemoglobin 15.0 14.0 - 18.0 g/dL    Hematocrit 45.3 42.0 - 52.0 %    .3 (H) 81.4 - 97.8 fL    MCH 33.9 (H) 27.0 - 33.0 pg    MCHC 33.1 (L) 33.7 - 35.3 g/dL    RDW 49.5 35.9 - 50.0 fL    Platelet Count 149 (L) 164 - 446 K/uL    MPV 10.8 9.0 - 12.9 fL    Neutrophils-Polys 85.80 (H) 44.00 - 72.00 %    Lymphocytes 6.30 (L) 22.00 - 41.00 %    Monocytes 4.60 0.00 - 13.40 %    Eosinophils 2.40 0.00 - 6.90 %    Basophils 0.50 0.00 - 1.80 %    Immature Granulocytes 0.40 0.00 - 0.90 %    Nucleated RBC 0.00 /100 WBC    Neutrophils (Absolute) 9.07 (H) 1.82 - 7.42 K/uL    Lymphs (Absolute) 0.66 (L) 1.00 - 4.80 K/uL    Monos (Absolute) 0.49 0.00 - 0.85 K/uL    Eos (Absolute) 0.25 0.00 - 0.51 K/uL    Baso (Absolute) 0.05 0.00 - 0.12 K/uL    Immature Granulocytes (abs) 0.04 0.00 - 0.11 K/uL    NRBC (Absolute) 0.00 K/uL   COMP METABOLIC PANEL   Result Value Ref Range    Sodium 139 135 - 145 mmol/L    Potassium 4.7 3.6 - 5.5 mmol/L    Chloride 104 96 - 112 mmol/L    Co2 23 20 - 33 mmol/L    Anion Gap 12.0 7.0 - 16.0    Glucose 312 (H) 65 - 99 mg/dL    Bun 32 (H) 8 - 22 mg/dL    Creatinine 1.36 0.50 - 1.40 mg/dL    Calcium 9.0 8.5 - 10.5 mg/dL    AST(SGOT) 25 12 - 45 U/L    ALT(SGPT) 28 2 - 50 U/L    Alkaline Phosphatase 93 30 - 99 U/L    Total Bilirubin 0.3 0.1 - 1.5 mg/dL    Albumin 4.2 3.2 - 4.9 g/dL    Total Protein 6.4 6.0 - 8.2 g/dL    Globulin 2.2 1.9 - 3.5 g/dL    A-G Ratio 1.9 g/dL   PROTHROMBIN TIME   Result Value Ref Range    PT 28.1 (H) 12.0 - 14.6 sec    INR 2.53 (H) 0.87 - 1.13   APTT   Result Value Ref Range    APTT 35.4 24.7 - 36.0 sec   COD (ADULT)   Result Value Ref Range    ABO Grouping Only B     Rh Grouping Only POS     Antibody Screen-Cod NEG     TROPONIN   Result Value Ref Range    Troponin T 108 (H) 6 - 19 ng/L   ESTIMATED GFR   Result Value Ref Range    GFR If African American >60 >60 mL/min/1.73 m 2    GFR If Non  51 (A) >60 mL/min/1.73 m 2   URINALYSIS (UA)    Specimen: Urine, Clean Catch   Result Value Ref Range    Color Yellow     Character Clear     Specific Gravity 1.032 <1.035    Ph 6.5 5.0 - 8.0    Glucose >=1000 (A) Negative mg/dL    Ketones 15 (A) Negative mg/dL    Protein 300 (A) Negative mg/dL    Bilirubin Negative Negative    Urobilinogen, Urine 0.2 Negative    Nitrite Negative Negative    Leukocyte Esterase Negative Negative    Occult Blood Trace (A) Negative    Micro Urine Req Microscopic    URINE MICROSCOPIC (W/UA)   Result Value Ref Range    WBC 0-2 (A) /hpf    RBC 2-5 (A) /hpf    Bacteria Negative None /hpf    Epithelial Cells Negative /hpf    Hyaline Cast 0-2 /lpf   ACCU-CHEK GLUCOSE   Result Value Ref Range    Glucose - Accu-Ck 292 (H) 65 - 99 mg/dL   EKG (NOW)   Result Value Ref Range    Report       [facility]    Test Date:  2020  Pt Name:    LESLEY ARROYO             Department: ER  MRN:        [id number]                      Room:  Gender:     Male                         Technician: [id number]  :        [id number]                   Requested By:DAYTON BOWEN  Order #:    [id number]                    Reading MD: Dayton Bowen    Measurements  Intervals                                Axis  Rate:       80                           P:          80  UT:         216                          QRS:        -63  QRSD:       122                          T:          63  QT:         436  QTc:        503    Interpretive Statements  SINUS RHYTHM rate 80  T waves flat I, AVL, V5 and V6  Minimal ST depression V4-V6 (new)  No ST elevation  BORDERLINE AV CONDUCTION DELAY  LEFT BUNDLE BRANCH BLOCK  Compared to ECG 05/10/2020 13:06:13  Left bundle-branch block now present  Electronically Signed On  6- 7:41:03 PDT by Randa Bowen             DX-CHEST-PORTABLE (1 VIEW)   Final Result      No acute cardiopulmonary abnormality.      CT-CTA NECK WITH & W/O-POST PROCESSING   Final Result      No high-grade stenosis, large vessel occlusion, aneurysm or dissection.      CT-CTA HEAD WITH & W/O-POST PROCESS   Final Result      No large vessel occlusion, high-grade stenosis or aneurysm of the [place].      CT-CEREBRAL PERFUSION ANALYSIS   Final Result      1.  Cerebral blood flow less than 30% likely representing completed infarct = 0 mL.      2.  T Max more than 6 seconds likely representing combination of completed infarct and ischemia = 10 mL.      3.  Mismatched volume likely representing ischemic brain/penumbra = 10 mL.      4.  Please note that the cerebral perfusion was performed on the limited brain tissue around the basal ganglia region. Infarct/ischemia outside the CT perfusion sections can be missed in this study.      CT-HEAD W/O   Final Result      1. No CT evidence of acute infarct, hemorrhage or mass.   2. Moderate global parenchymal atrophy. Chronic small vessel ischemic changes.          COURSE & MEDICAL DECISION MAKING  Pertinent Labs & Imaging studies reviewed. (See chart for details)    Reviewed patient's old medical records which showed the patient is on coumadin for a mitral valve replacement with a mechanical valve. The mitral valve replacement was in the year 2000. He has a history of CABG, diabetes, hyperlipidemia, depression, anemia due to GI bleed, and hypertension.     6:20 AM - Patient seen and examined at bedside. Discussed plan of care.  I informed the patient the need for labs and radiology to rule out any emergent processes. Currently awaiting labs and radiology results before deciding if intervention is necessary. Patient will be informed on the results once I have reviewed them. Patient verbalizes understanding and agreement to this plan of care.     6:34 AM- I spoke to  Dr. Sanders, Neurology, about the patient's condition. Dr. Sanders says the patient is not a TPA candidate as he is on coumadin. Recommended to follow with Dr. Long after reviewing the results as he is the next neurologist on call.     7:20 AM -Patient was reevaluated at bedside. Discussed lab and radiology results with the patient. He states he still has a minor headache and feels slightly dizzy and nauseated. He feels that his speech has improved. He denies any chest pain or shortness of breath. He notes he has had several coughs earlier tonight, but nothing out of the ordinary. He has coughed up some phlegm but no blood. He confirmed he was on coumadin for his valve replacement. He denies any fever or recent sick contact. He mostly stays at home. Patient has no known heart attack and is not sure what his blood sugars normally run. Patient understands he will most likely stay over night for continued observation.    7:49 AM Paged Hospitalist.     8:04 AM Spoke with Dr. Long, Neurologist, about the patient's condition. Dr. Long says no aspirin at this time since he is therapeutic on his coumadin. He will see the patient for consultation.     8:07 AM Spoke with Dr. Ferraro, Hospitalist, about the patient's condition. Dr. Ferraro recommended to trend the troponin over time and does not think we need to call cardiology at this time since he has no chest pain. Dr. Ferraro says it is normal to see elevated troponin with strokes sometimes so the plan would be to trend the troponin and see where it goes from there.       Patient presents to the ER by ambulance as a code stroke.  He called the ambulance initially for headache and dizziness which she describes as a lightheadedness.  Symptoms began at 5 AM.  He was already awake and out of bed when his symptoms began.  In route to the ER the paramedics noticed that the patient started having trouble with word finding.  He appeared to be having an expressive  aphasia.  He was also unable to identify a pen and a pad of paper when the paramedics asked him to name the objects they were showing him.  Here in the ER the patient is able to identify a pen and a stethoscope.  His speech is understandable and relatively clear.  However, he does appear to be frustrated at times and that he tells me he knows he wants to say but the words to start coming out right.  He is still exhibiting a bit of expressive aphasia although from what the paramedics say, his symptoms are improved when compared to in route.  He describes a mild headache.  He does not have any chest pain.  He has history of a mechanical mitral valve and is currently on Coumadin.  He is therapeutic with an INR of 2.5.  I spoke with neurology on-call.  They do not want any aspirin at this time since he is therapeutic on his Coumadin.  Clearly we would not be reversing his Coumadin as he has a mechanical mitral valve.  Thankfully does not have a head bleed.  Other than the speech difficulty, he does not have any other neurologic complaints acutely today.  No complaints of numbness, tingling or weakness of extremity.  No facial drooping.  No visual changes.  However, he tells me he has been feeling off balance for the last week or so.  He also describes feeling nauseated, hungry, and thirsty.  His blood sugars in the low 300s.  He says this is high for him.  He denies cough.  No shortness of breath.  No chest pain.  No urinary symptoms.  No vomiting or diarrhea.  Chest x-ray is read as negative by the radiologist.  Interestingly, his troponin came back elevated at 108.  His EKG is nonacute.  No ST elevation or depression.  I spoke with the hospitalist regarding the patient's troponin.  The hospitalist, Dr. Ferraro, states that sometimes patients troponin can be elevated and a stroke.  At this time he would prefer to just trend the troponins and get an echocardiogram.  He does not think we need to contact cardiology at  this time as patient does not have any chest pain and has really no EKG changes that are acute and no changes compared to previous EKG that we have on this patient.  Patient's blood pressure is running little bit high but we will allow for permissive hypertension given the high likelihood of stroke.  He does have a perfusion deficit on his CT perfusion study.  No large vessel occlusion, and again no head bleed.  Patient will need to be hospitalized.  I spoke with the neurologist on-call, Ruy Sanders and Dr. Patel as well as with the hospitalist, Dr. Ferraro.  At this time, further evaluation and management will be done by the neurology team and the hospitalist team.    I was wearing appropriate PPE every time I entered the room.     DISPOSITION:  Patient will be hospitalized by Dr. Ferraro in guarded condition.      FINAL IMPRESSION  1. Speech disturbance, unspecified type Acute        This dictation has been created using voice recognition software. The accuracy of the dictation is limited by the abilities of the software. I expect there may be some errors of grammar and possibly content. I made every attempt to manually correct the errors within my dictation. However, errors related to voice recognition software may still exist and should be interpreted within the appropriate context.     Rohan BENJAMIN ([place]), am scribing for, and in the presence of, Randa Bowen M.D..    Electronically signed by: Rohan Crews (Sharee), 6/23/2020    Randa BENJAMIN M.D. personally performed the services described in this documentation, as scribed by Rohan Crews in my presence, and it is both accurate and complete. C.    The note accurately reflects work and decisions made by me.  Randa Bowen M.D.  6/23/2020  9:29 AM

## 2020-06-23 NOTE — ED TRIAGE NOTES
"Chief Complaint   Patient presents with   • Possible Stroke     Difficulty speech and \"finding words\" approximately ten minutes prior to arrival, code stroke activated     "

## 2020-06-23 NOTE — CONSULTS
"Chief Complaint   Patient presents with   • Possible Stroke     Difficulty speech and \"finding words\" approximately ten minutes prior to arrival, code stroke activated       Problem List Items Addressed This Visit     None      Visit Diagnoses     Speech disturbance, unspecified type   (Acute)      Relevant Orders    REFERRAL TO PHYSIATRY (PMR)      Neurology Consultation     History of present illness:  This is a 74-year old male with PMHx significant for dyslipidemia, type II diabetes mellitus, MVR with mechanical valve replacement (On/compliant with Coumadin) who presented to [facility] on 6/23/20 for a chief complaint of headache and abnormal speech upon waking this morning. Patient reportedly went to sleep in his usual state of health last night (unknown time); when he awoke at 0500, patient states that he had severe headache to whole head; also with word-finding difficulty. EMS called; on scene/at time of presentation here, SBP 180s-190s. CT head on arrival revealed no acute intracranial abnormality. CTA head/neck with no LVO. Patient determined not to be a candidate for IV tPA secondary to wake-up nature of event, also given concurrent use of Coumadin (INR 2.5).  Currently, patient is sitting up in stretcher; arousable. Speech remains somewhat aphasic, with word finding difficulty vs mild confusion (states he is 67 years old; impaired naming of objects). Admits to persistent headache, as above. Denies weakness/focal weakness, numbness, paresthesia, problem with vision, speech or swallowing. Denies nausea or chest pain (note troponin 108 on arrival).     Neurology has been consulted by Dr. Randa Bowen to further evaluate the findings noted above.     Past medical history:   Past Medical History:   Diagnosis Date   • Chronic anticoagulation 2/23/2017   • History of mitral valve replacement with mechanical valve [Z95.2] 3/10/2017   • Hyperlipidemia    • Type II diabetes mellitus (HCC) 2/23/2017 "       Past surgical history:   Past Surgical History:   Procedure Laterality Date   • PB COLONOSCOPY,DIAGNOSTIC N/A 5/9/2020    Procedure: COLONOSCOPY;  Surgeon: Jatinder Madera M.D.;  Location: SURGERY [facility];  Service: Gastroenterology   • MITRAL VALVE REPLACEMENT  1999   • INGUINAL HERNIA REPAIR Bilateral 1984   • TONSILLECTOMY         Family history:   Family History   Problem Relation Age of Onset   • Heart Attack Father 58   • Diabetes Father    • Heart Attack Paternal Uncle    • No Known Problems Sister    • No Known Problems Brother    • No Known Problems Maternal Grandmother    • No Known Problems Maternal Grandfather    • No Known Problems Paternal Grandmother    • Heart Attack Paternal Grandfather    • No Known Problems Sister    • No Known Problems Brother        Social history:   Social History     Socioeconomic History   • Marital status:      Spouse name: Not on file   • Number of children: Not on file   • Years of education: Not on file   • Highest education level: Not on file   Occupational History   • Not on file   Social Needs   • Financial resource strain: Not on file   • Food insecurity     Worry: Not on file     Inability: Not on file   • Transportation needs     Medical: Not on file     Non-medical: Not on file   Tobacco Use   • Smoking status: Never Smoker   • Smokeless tobacco: Never Used   Substance and Sexual Activity   • Alcohol use: No     Alcohol/week: 0.0 oz   • Drug use: No   • Sexual activity: Yes     Comment:    Lifestyle   • Physical activity     Days per week: Not on file     Minutes per session: Not on file   • Stress: Not on file   Relationships   • Social connections     Talks on phone: Not on file     Gets together: Not on file     Attends Jewish service: Not on file     Active member of club or organization: Not on file     Attends meetings of clubs or organizations: Not on file     Relationship status: Not on file   • Intimate partner violence      Fear of current or ex partner: Not on file     Emotionally abused: Not on file     Physically abused: Not on file     Forced sexual activity: Not on file   Other Topics Concern   • Not on file   Social History Narrative    Retired from [employer]        Current medications:   Current Facility-Administered Medications   Medication Dose   • atorvastatin (LIPITOR) tablet 80 mg  80 mg   • citalopram (CELEXA) tablet 20 mg  20 mg   • insulin glargine (LANTUS) injection 15 Units  15 Units   • QUEtiapine (SEROQUEL) tablet 50 mg  50 mg   • senna-docusate (PERICOLACE or SENOKOT S) 8.6-50 MG per tablet 2 Tab  2 Tab    And   • polyethylene glycol/lytes (MIRALAX) PACKET 1 Packet  1 Packet    And   • magnesium hydroxide (MILK OF MAGNESIA) suspension 30 mL  30 mL    And   • bisacodyl (DULCOLAX) suppository 10 mg  10 mg   • Pharmacy consult request - Allow for permissive hypertension: SBP up to 220 mmHg/DBP up to 120 mmHg x 48 hours     • acetaminophen (TYLENOL) tablet 650 mg  650 mg   • ondansetron (ZOFRAN) syringe/vial injection 4 mg  4 mg   • ondansetron (ZOFRAN ODT) dispertab 4 mg  4 mg   • insulin regular (HUMULIN R) injection 2-9 Units  2-9 Units    And   • glucose 4 g chewable tablet 16 g  16 g    And   • dextrose 50% (D50W) injection 50 mL  50 mL   • MD Alert...Warfarin per Pharmacy       Current Outpatient Medications   Medication   • citalopram (CELEXA) 20 MG Tab   • metFORMIN (GLUCOPHAGE) 500 MG Tab   • lisinopril (PRINIVIL) 20 MG Tab   • insulin glargine (LANTUS) 100 UNIT/ML Solution   • warfarin (COUMADIN) 5 MG Tab   • QUEtiapine (SEROQUEL) 50 MG tablet   • atorvastatin (LIPITOR) 40 MG Tab       Medication Allergy:  Allergies   Allergen Reactions   • [place] Vomiting   • Risperidone      Dystonia, altered mental status   • Hydrocodone-Acetaminophen Vomiting       Review of systems:   Constitutional: denies fever, night sweats, weight loss.   Eyes: denies acute vision change, eye pain or secretion.   Ears, Nose, Mouth, Throat:  denies nasal secretion, nasal bleeding, difficulty swallowing, hearing loss, tinnitus, vertigo, ear pain, acute dental problems, oral ulcers or lesions.   Endocrine: denies recent weight changes, heat or cold intolerance, polyuria, polydypsia, polyphagia,abnormal hair growth.  Cardiovascular: denies new onset of chest pain, palpitations, syncope, or dyspnea of exertion.  Pulmonary: denies shortness of breath, new onset of cough, hemoptysis, wheezing, chest pain or flu-like symptoms.   GI: denies nausea, vomiting, diarrhea, GI bleeding, change in appetite, abdominal pain, and change in bowel habits.  : denies dysuria, urinary incontinence, hematuria.  Heme/oncology: denies history of easy bruising or bleeding. No history of cancer, DVTor PE.  Allergy/immunology: denies hives/urticaria, or itching.   Dermatologic: denies new rash, or new skin lesions.  Musculoskeletal:denies joint swelling or pain, muscle pain, neck and back pain.   Neurologic: As noted above.   Psychiatric: denies symptoms of depression, anxiety, hallucinations, mood swings or changes, suicidal or homicidal thoughts.     Physical examination:   Vitals:    06/23/20 0633 06/23/20 0652 06/23/20 0653 06/23/20 0659   BP: (!) 196/82  (!) 182/86 (!) 183/81   Pulse: 63  87 76   Resp: 16  20 (!) 21   Temp:  36.2 °C (97.2 °F)     TempSrc:  Temporal     SpO2: 94%  92% 94%   Weight:       Height:         General: Patient in no acute distress, pleasant and cooperative.  HEENT: Normocephalic, no signs of acute trauma.   Neck: supple, no meningeal signs or carotid bruits. There is normal range of motion. No tenderness on exam.   Chest: clear to auscultation. No cough.   CV: RRR, no murmurs.   Skin: no signs of acute rashes or trauma.   Musculoskeletal: joints exhibit full range of motion, without any pain to palpation. There are no signs of joint or muscle swelling. There is no tenderness to deep palpation of muscles.   Psychiatric: No hallucinatory behavior.        NEUROLOGICAL EXAM:   Mental status, orientation: Awake, alert. Oriented to self and place; otherwise disoriented.    Speech and language: speech is clear/non dysarthric; mildly aphasic, with impaired naming of objects (intact with choices).   Cranial nerve exam: Pupils are 3-4 mm bilaterally and equally reactive to light. Visual fields are intact by confrontation. There is no nystagmus on primary or secondary gaze. Intact full EOM in all directions of gaze. Face appears symmetric. Sensation in the face is intact to light touch. Tongue is midline and without any signs of tongue biting or fasciculations. Shoulder shrug is intact bilaterally.   Motor exam: Strength is 5/5 in all extremities. Tone is normal. No abnormal movements were seen on exam.   Sensory exam reveals normal sense of light touch and pinprick in all extremities.   Deep tendon reflexes:  2+ throughout. Plantar responses are flexor. There is no clonus.   Coordination: shows a normal finger-nose-finger. Normal rapidly alternating movements.   Gait: Not assessed at this time as patient is a fall risk.         NIH Stroke Scale    1a Level of Consciousness   1b Orientation Questions 1  1c Response to Commands   2 Gaze   3 Visual Fields   4 Facial Movement   5 Motor Function (arm)   a Left   b Right   6 Motor Function (leg)   a Left   b Right   7 Limb Ataxia   8 Sensory   9 Language 1  10 Articulation   11 Extinction/Inattention     Score: 2      ANCILLARY DATA REVIEWED:     Lab Data Review:  Recent Results (from the past 24 hour(s))   CBC WITH DIFFERENTIAL    Collection Time: 06/23/20  6:20 AM   Result Value Ref Range    WBC 10.6 4.8 - 10.8 K/uL    RBC 4.43 (L) 4.70 - 6.10 M/uL    Hemoglobin 15.0 14.0 - 18.0 g/dL    Hematocrit 45.3 42.0 - 52.0 %    .3 (H) 81.4 - 97.8 fL    MCH 33.9 (H) 27.0 - 33.0 pg    MCHC 33.1 (L) 33.7 - 35.3 g/dL    RDW 49.5 35.9 - 50.0 fL    Platelet Count 149 (L) 164 - 446 K/uL    MPV 10.8 9.0 - 12.9 fL     Neutrophils-Polys 85.80 (H) 44.00 - 72.00 %    Lymphocytes 6.30 (L) 22.00 - 41.00 %    Monocytes 4.60 0.00 - 13.40 %    Eosinophils 2.40 0.00 - 6.90 %    Basophils 0.50 0.00 - 1.80 %    Immature Granulocytes 0.40 0.00 - 0.90 %    Nucleated RBC 0.00 /100 WBC    Neutrophils (Absolute) 9.07 (H) 1.82 - 7.42 K/uL    Lymphs (Absolute) 0.66 (L) 1.00 - 4.80 K/uL    Monos (Absolute) 0.49 0.00 - 0.85 K/uL    Eos (Absolute) 0.25 0.00 - 0.51 K/uL    Baso (Absolute) 0.05 0.00 - 0.12 K/uL    Immature Granulocytes (abs) 0.04 0.00 - 0.11 K/uL    NRBC (Absolute) 0.00 K/uL   COMP METABOLIC PANEL    Collection Time: 06/23/20  6:20 AM   Result Value Ref Range    Sodium 139 135 - 145 mmol/L    Potassium 4.7 3.6 - 5.5 mmol/L    Chloride 104 96 - 112 mmol/L    Co2 23 20 - 33 mmol/L    Anion Gap 12.0 7.0 - 16.0    Glucose 312 (H) 65 - 99 mg/dL    Bun 32 (H) 8 - 22 mg/dL    Creatinine 1.36 0.50 - 1.40 mg/dL    Calcium 9.0 8.5 - 10.5 mg/dL    AST(SGOT) 25 12 - 45 U/L    ALT(SGPT) 28 2 - 50 U/L    Alkaline Phosphatase 93 30 - 99 U/L    Total Bilirubin 0.3 0.1 - 1.5 mg/dL    Albumin 4.2 3.2 - 4.9 g/dL    Total Protein 6.4 6.0 - 8.2 g/dL    Globulin 2.2 1.9 - 3.5 g/dL    A-G Ratio 1.9 g/dL   PROTHROMBIN TIME    Collection Time: 06/23/20  6:20 AM   Result Value Ref Range    PT 28.1 (H) 12.0 - 14.6 sec    INR 2.53 (H) 0.87 - 1.13   APTT    Collection Time: 06/23/20  6:20 AM   Result Value Ref Range    APTT 35.4 24.7 - 36.0 sec   TROPONIN    Collection Time: 06/23/20  6:20 AM   Result Value Ref Range    Troponin T 108 (H) 6 - 19 ng/L   ESTIMATED GFR    Collection Time: 06/23/20  6:20 AM   Result Value Ref Range    GFR If African American >60 >60 mL/min/1.73 m 2    GFR If Non  51 (A) >60 mL/min/1.73 m 2   Magnesium    Collection Time: 06/23/20  6:20 AM   Result Value Ref Range    Magnesium 2.0 1.5 - 2.5 mg/dL   COD (ADULT)    Collection Time: 06/23/20  6:23 AM   Result Value Ref Range    ABO Grouping Only B     Rh Grouping Only POS      Antibody Screen-Cod NEG    EKG (NOW)    Collection Time: 20  6:45 AM   Result Value Ref Range    Report       [facility] Emergency Dept.    Test Date:  2020  Pt Name:    LESLEY ARROYO             Department: ER  MRN:        [id number]                      Room:  Gender:     Male                         Technician: [id number]  :        [id number]                   Requested By:DAYTON BOWEN  Order #:    [id number]                    Reading MD: Dayton Bowen    Measurements  Intervals                                Axis  Rate:       80                           P:          80  CT:         216                          QRS:        -63  QRSD:       122                          T:          63  QT:         436  QTc:        503    Interpretive Statements  SINUS RHYTHM rate 80  T waves flat I, AVL, V5 and V6  Minimal ST depression V4-V6 (new)  No ST elevation  BORDERLINE AV CONDUCTION DELAY  LEFT BUNDLE BRANCH BLOCK  Compared to ECG 05/10/2020 13:06:13  Left bundle-branch block now present  Electronically Signed On 2020 7:41:03 PDT by Dayton Bowen     ACCU-CHEK GLUCOSE    Collection Time: 20  6:47 AM   Result Value Ref Range    Glucose - Accu-Ck 292 (H) 65 - 99 mg/dL   URINALYSIS (UA)    Collection Time: 20  6:50 AM    Specimen: Urine, Clean Catch   Result Value Ref Range    Color Yellow     Character Clear     Specific Gravity 1.032 <1.035    Ph 6.5 5.0 - 8.0    Glucose >=1000 (A) Negative mg/dL    Ketones 15 (A) Negative mg/dL    Protein 300 (A) Negative mg/dL    Bilirubin Negative Negative    Urobilinogen, Urine 0.2 Negative    Nitrite Negative Negative    Leukocyte Esterase Negative Negative    Occult Blood Trace (A) Negative    Micro Urine Req Microscopic    URINE MICROSCOPIC (W/UA)    Collection Time: 20  6:50 AM   Result Value Ref Range    WBC 0-2 (A) /hpf    RBC 2-5 (A) /hpf    Bacteria Negative None /hpf    Epithelial Cells Negative /hpf    Hyaline Cast 0-2 /lpf    Hemoglobin A1C    Collection Time: 06/23/20  6:50 AM   Result Value Ref Range    Glycohemoglobin 6.6 (H) 0.0 - 5.6 %    Est Avg Glucose 143 mg/dL   Urinalysis    Collection Time: 06/23/20  8:43 AM    Specimen: Urine, Clean Catch   Result Value Ref Range    Color Yellow     Character Clear     Specific Gravity 1.042 <1.035    Ph 6.5 5.0 - 8.0    Glucose >=1000 (A) Negative mg/dL    Ketones 15 (A) Negative mg/dL    Protein 300 (A) Negative mg/dL    Bilirubin Negative Negative    Urobilinogen, Urine 0.2 Negative    Nitrite Negative Negative    Leukocyte Esterase Negative Negative    Occult Blood Small (A) Negative    Micro Urine Req Microscopic    URINE MICROSCOPIC (W/UA)    Collection Time: 06/23/20  8:43 AM   Result Value Ref Range    WBC 0-2 (A) /hpf    RBC 2-5 (A) /hpf    Bacteria Negative None /hpf    Epithelial Cells Negative /hpf    Hyaline Cast 0-2 /lpf       Labs reviewed by me.       Imaging reviewed by me:     DX-CHEST-PORTABLE (1 VIEW)   Final Result      No acute cardiopulmonary abnormality.      CT-CTA NECK WITH & W/O-POST PROCESSING   Final Result      No high-grade stenosis, large vessel occlusion, aneurysm or dissection.      CT-CTA HEAD WITH & W/O-POST PROCESS   Final Result      No large vessel occlusion, high-grade stenosis or aneurysm of the [place].      CT-CEREBRAL PERFUSION ANALYSIS   Final Result      1.  Cerebral blood flow less than 30% likely representing completed infarct = 0 mL.      2.  T Max more than 6 seconds likely representing combination of completed infarct and ischemia = 10 mL.      3.  Mismatched volume likely representing ischemic brain/penumbra = 10 mL.      4.  Please note that the cerebral perfusion was performed on the limited brain tissue around the basal ganglia region. Infarct/ischemia outside the CT perfusion sections can be missed in this study.      CT-HEAD W/O   Final Result      1. No CT evidence of acute infarct, hemorrhage or mass.   2. Moderate global  parenchymal atrophy. Chronic small vessel ischemic changes.      EC-ECHOCARDIOGRAM COMPLETE W/O CONT    (Results Pending)   MR-BRAIN-W/O    (Results Pending)         Presumed mechanism by TOAST:  __Large Artery Atherosclerosis  __Small Vessel (Lacunar)  __Cardioembolic  __Other (Sickle Cell, Vasculitis, Hypercoagulable)  _X_Unknown    Cardioembolic?    Modified [place] Score (on arrival):   Modified [place] (at home/prior to arrival): 0      ASSESSMENT AND PLAN:  74-year old male with PMHx significant for dyslipidemia, type II diabetes mellitus, MVR with mechanical valve replacement (On/compliant with Coumadin) who presented to [facility] on 6/23/20 for a chief complaint of headache and abnormal speech upon waking this morning; Patient not a candidate for IV tPA secondary to wake-up nature of event, also given concurrent use of Coumadin (INR 2.5). CT head, CTA head/neck unremarkable. Differential diagnoses include small acute ischemic stroke (?possible cardioembolic etiology) vs. Encephalopathy, hypertensive or toxic/metabolic.      Recommendations/Plan:      -q4h and PRN neuro assessment. VS per nursing/unit protocol. Permissive HTN ok until 6/25, not to exceed SBP > 220, DBP > 105. Then, BP goal < 140/90. Antihypertensives per primary team.   -Obtain MRI Brain wo contrast.   -Telemetry; currently SR. Screen for Afib/arrhythmia. Obtain TTE with bubble study.   -Continue home dose Coumadin; no need to stop Coumadin nor add ASA at this time.   -Atorvastatin 80 mg PO q HS. Check lipid panel.   -Recommend aggressive BG management per primary team. Avoid IVF with Dextrose. BG goal 140-180. Check hemoglobin A1c.   -Note UA WNL, CXR WNL; no leukocytosis or fever; mildly elevated BUN and elevated troponin.Check B12, TSH; closely monitor electrolytes and replace as needed to combat other potential source of encephalopathy.   -PT/OT/SLP eval and treat.   -Will follow up with results of the above and make additional  recommendations accordingly. All other medical management per primary team.   -DVT PPX: SCDs.      The plan of care above has been discussed with Dr. Almeida.     KATHERINE Daniels.P.R.GIOVANNA.  [facility]

## 2020-06-23 NOTE — ED NOTES
Pt found standing at EOB holding onto side-rails. Pt assisted with urinal and placed back into bed. Linens changed and pt left in position of comfort.

## 2020-06-23 NOTE — ED NOTES
Med Rec complete per Pt and Pt's wife  Allergies Reviewed  No ABX in the last 14 days    Pt takes WARFARIN 5 mg all days of the week- verified with Pt's wife pt is taking 5 mg ALL days of the week.

## 2020-06-24 PROBLEM — R47.01 MIXED APHASIA: Status: ACTIVE | Noted: 2020-06-23

## 2020-06-24 LAB
ALBUMIN SERPL BCP-MCNC: 3.9 G/DL (ref 3.2–4.9)
ALBUMIN/GLOB SERPL: 1.8 G/DL
ALP SERPL-CCNC: 83 U/L (ref 30–99)
ALT SERPL-CCNC: 22 U/L (ref 2–50)
ANION GAP SERPL CALC-SCNC: 11 MMOL/L (ref 7–16)
AST SERPL-CCNC: 20 U/L (ref 12–45)
BASOPHILS # BLD AUTO: 0.7 % (ref 0–1.8)
BASOPHILS # BLD: 0.05 K/UL (ref 0–0.12)
BILIRUB SERPL-MCNC: 0.4 MG/DL (ref 0.1–1.5)
BUN SERPL-MCNC: 28 MG/DL (ref 8–22)
CALCIUM SERPL-MCNC: 8.9 MG/DL (ref 8.5–10.5)
CHLORIDE SERPL-SCNC: 105 MMOL/L (ref 96–112)
CHOLEST SERPL-MCNC: 119 MG/DL (ref 100–199)
CO2 SERPL-SCNC: 22 MMOL/L (ref 20–33)
CREAT SERPL-MCNC: 1.2 MG/DL (ref 0.5–1.4)
EKG IMPRESSION: NORMAL
EOSINOPHIL # BLD AUTO: 0.35 K/UL (ref 0–0.51)
EOSINOPHIL NFR BLD: 5.1 % (ref 0–6.9)
ERYTHROCYTE [DISTWIDTH] IN BLOOD BY AUTOMATED COUNT: 49.6 FL (ref 35.9–50)
GLOBULIN SER CALC-MCNC: 2.2 G/DL (ref 1.9–3.5)
GLUCOSE BLD-MCNC: 118 MG/DL (ref 65–99)
GLUCOSE BLD-MCNC: 122 MG/DL (ref 65–99)
GLUCOSE BLD-MCNC: 129 MG/DL (ref 65–99)
GLUCOSE BLD-MCNC: 313 MG/DL (ref 65–99)
GLUCOSE SERPL-MCNC: 141 MG/DL (ref 65–99)
HCT VFR BLD AUTO: 45.8 % (ref 42–52)
HDLC SERPL-MCNC: 46 MG/DL
HGB BLD-MCNC: 14.9 G/DL (ref 14–18)
IMM GRANULOCYTES # BLD AUTO: 0.01 K/UL (ref 0–0.11)
IMM GRANULOCYTES NFR BLD AUTO: 0.1 % (ref 0–0.9)
INR PPP: 1.65 (ref 0.87–1.13)
LDLC SERPL CALC-MCNC: 47 MG/DL
LYMPHOCYTES # BLD AUTO: 1.51 K/UL (ref 1–4.8)
LYMPHOCYTES NFR BLD: 21.9 % (ref 22–41)
MCH RBC QN AUTO: 33.6 PG (ref 27–33)
MCHC RBC AUTO-ENTMCNC: 32.5 G/DL (ref 33.7–35.3)
MCV RBC AUTO: 103.4 FL (ref 81.4–97.8)
MONOCYTES # BLD AUTO: 0.59 K/UL (ref 0–0.85)
MONOCYTES NFR BLD AUTO: 8.5 % (ref 0–13.4)
NEUTROPHILS # BLD AUTO: 4.4 K/UL (ref 1.82–7.42)
NEUTROPHILS NFR BLD: 63.7 % (ref 44–72)
NRBC # BLD AUTO: 0 K/UL
NRBC BLD-RTO: 0 /100 WBC
PLATELET # BLD AUTO: 150 K/UL (ref 164–446)
PMV BLD AUTO: 11.2 FL (ref 9–12.9)
POTASSIUM SERPL-SCNC: 4.3 MMOL/L (ref 3.6–5.5)
PROT SERPL-MCNC: 6.1 G/DL (ref 6–8.2)
PROTHROMBIN TIME: 20 SEC (ref 12–14.6)
RBC # BLD AUTO: 4.43 M/UL (ref 4.7–6.1)
SODIUM SERPL-SCNC: 138 MMOL/L (ref 135–145)
TRIGL SERPL-MCNC: 129 MG/DL (ref 0–149)
WBC # BLD AUTO: 6.9 K/UL (ref 4.8–10.8)

## 2020-06-24 PROCEDURE — A9270 NON-COVERED ITEM OR SERVICE: HCPCS | Performed by: FAMILY MEDICINE

## 2020-06-24 PROCEDURE — 97162 PT EVAL MOD COMPLEX 30 MIN: CPT

## 2020-06-24 PROCEDURE — 36415 COLL VENOUS BLD VENIPUNCTURE: CPT

## 2020-06-24 PROCEDURE — 80061 LIPID PANEL: CPT

## 2020-06-24 PROCEDURE — 82962 GLUCOSE BLOOD TEST: CPT | Mod: 91

## 2020-06-24 PROCEDURE — 97165 OT EVAL LOW COMPLEX 30 MIN: CPT

## 2020-06-24 PROCEDURE — 700102 HCHG RX REV CODE 250 W/ 637 OVERRIDE(OP): Performed by: HOSPITALIST

## 2020-06-24 PROCEDURE — 770020 HCHG ROOM/CARE - TELE (206)

## 2020-06-24 PROCEDURE — 80053 COMPREHEN METABOLIC PANEL: CPT

## 2020-06-24 PROCEDURE — 85610 PROTHROMBIN TIME: CPT

## 2020-06-24 PROCEDURE — 700102 HCHG RX REV CODE 250 W/ 637 OVERRIDE(OP): Performed by: FAMILY MEDICINE

## 2020-06-24 PROCEDURE — 92523 SPEECH SOUND LANG COMPREHEN: CPT

## 2020-06-24 PROCEDURE — 99232 SBSQ HOSP IP/OBS MODERATE 35: CPT | Performed by: NURSE PRACTITIONER

## 2020-06-24 PROCEDURE — 99233 SBSQ HOSP IP/OBS HIGH 50: CPT | Performed by: FAMILY MEDICINE

## 2020-06-24 PROCEDURE — 85025 COMPLETE CBC W/AUTO DIFF WBC: CPT

## 2020-06-24 PROCEDURE — A9270 NON-COVERED ITEM OR SERVICE: HCPCS | Performed by: HOSPITALIST

## 2020-06-24 RX ORDER — LISINOPRIL 20 MG/1
20 TABLET ORAL
Status: DISCONTINUED | OUTPATIENT
Start: 2020-06-24 | End: 2020-06-25 | Stop reason: HOSPADM

## 2020-06-24 RX ORDER — LISINOPRIL 20 MG/1
20 TABLET ORAL EVERY EVENING
Status: DISCONTINUED | OUTPATIENT
Start: 2020-06-24 | End: 2020-06-24

## 2020-06-24 RX ORDER — WARFARIN SODIUM 7.5 MG/1
7.5 TABLET ORAL
Status: COMPLETED | OUTPATIENT
Start: 2020-06-24 | End: 2020-06-24

## 2020-06-24 RX ORDER — WARFARIN SODIUM 5 MG/1
5 TABLET ORAL DAILY
Status: DISCONTINUED | OUTPATIENT
Start: 2020-06-25 | End: 2020-06-25 | Stop reason: HOSPADM

## 2020-06-24 RX ORDER — ENALAPRILAT 1.25 MG/ML
1.25 INJECTION INTRAVENOUS EVERY 6 HOURS PRN
Status: DISCONTINUED | OUTPATIENT
Start: 2020-06-24 | End: 2020-06-25 | Stop reason: HOSPADM

## 2020-06-24 RX ADMIN — CITALOPRAM HYDROBROMIDE 20 MG: 20 TABLET ORAL at 16:07

## 2020-06-24 RX ADMIN — INSULIN GLARGINE 15 UNITS: 100 INJECTION, SOLUTION SUBCUTANEOUS at 16:12

## 2020-06-24 RX ADMIN — SENNOSIDES-DOCUSATE SODIUM TAB 8.6-50 MG 2 TABLET: 8.6-5 TAB at 16:08

## 2020-06-24 RX ADMIN — LISINOPRIL 20 MG: 20 TABLET ORAL at 16:08

## 2020-06-24 RX ADMIN — SENNOSIDES-DOCUSATE SODIUM TAB 8.6-50 MG 2 TABLET: 8.6-5 TAB at 05:01

## 2020-06-24 RX ADMIN — QUETIAPINE FUMARATE 50 MG: 25 TABLET ORAL at 16:07

## 2020-06-24 RX ADMIN — ATORVASTATIN CALCIUM 80 MG: 80 TABLET, FILM COATED ORAL at 16:07

## 2020-06-24 RX ADMIN — INSULIN HUMAN 6 UNITS: 100 INJECTION, SOLUTION PARENTERAL at 12:01

## 2020-06-24 RX ADMIN — WARFARIN SODIUM 7.5 MG: 7.5 TABLET ORAL at 17:45

## 2020-06-24 ASSESSMENT — GAIT ASSESSMENTS
GAIT LEVEL OF ASSIST: MINIMAL ASSIST
ASSISTIVE DEVICE: FRONT WHEEL WALKER
DISTANCE (FEET): 100

## 2020-06-24 ASSESSMENT — ENCOUNTER SYMPTOMS
MYALGIAS: 0
TINGLING: 0
DOUBLE VISION: 0
NAUSEA: 0
PHOTOPHOBIA: 0
HEADACHES: 0
BLURRED VISION: 0
FEVER: 0
COUGH: 0
ORTHOPNEA: 0
DIZZINESS: 0
PALPITATIONS: 0
HEARTBURN: 0
NECK PAIN: 0
HEMOPTYSIS: 0
VOMITING: 0
DEPRESSION: 0
CHILLS: 0

## 2020-06-24 ASSESSMENT — COGNITIVE AND FUNCTIONAL STATUS - GENERAL
DRESSING REGULAR UPPER BODY CLOTHING: A LITTLE
MOBILITY SCORE: 13
MOVING FROM LYING ON BACK TO SITTING ON SIDE OF FLAT BED: UNABLE
TOILETING: A LITTLE
SUGGESTED CMS G CODE MODIFIER DAILY ACTIVITY: CK
WALKING IN HOSPITAL ROOM: A LITTLE
HELP NEEDED FOR BATHING: A LITTLE
TURNING FROM BACK TO SIDE WHILE IN FLAT BAD: A LOT
PERSONAL GROOMING: A LITTLE
CLIMB 3 TO 5 STEPS WITH RAILING: A LOT
EATING MEALS: A LITTLE
MOVING TO AND FROM BED TO CHAIR: A LOT
DAILY ACTIVITIY SCORE: 18
SUGGESTED CMS G CODE MODIFIER MOBILITY: CL
STANDING UP FROM CHAIR USING ARMS: A LITTLE
DRESSING REGULAR LOWER BODY CLOTHING: A LITTLE

## 2020-06-24 ASSESSMENT — ACTIVITIES OF DAILY LIVING (ADL): TOILETING: INDEPENDENT

## 2020-06-24 NOTE — CARE PLAN
Problem: Communication  Goal: The ability to communicate needs accurately and effectively will improve  Outcome: PROGRESSING AS EXPECTED  Note: Here for stroke. Patient calm and corporative with care. All needs are met by staff. Personal belongings within reach. Call light is within reach.     Problem: Safety  Goal: Will remain free from falls  Outcome: PROGRESSING AS EXPECTED  Note: Here for stroke. Patient uses call light appropriately. X 1 assist, FWW, shuffle gait.

## 2020-06-24 NOTE — PROGRESS NOTES
Patient is sustaining in the high 30-low 40's. Paged Dr. Flowers to update. Repeat EKG ordered. Call if HR sustains under 40 BPM.

## 2020-06-24 NOTE — CARE PLAN
Problem: Communication:  Goal: The ability to communicate needs accurately and effectively will improve  Outcome: PROGRESSING AS EXPECTED  Pt is experiencing expressive aphasia. Education provided to call when needing assistance. Call light is within reach. Hourly rounding in place.      Problem: Safety:  Goal: Will remain free from injury  Outcome: PROGRESSING AS EXPECTED  Education provided to call before ambulating. Call light is within reach. Bed is in a low and locked position with the bed alarm on. Hourly rounding in place.

## 2020-06-24 NOTE — PROGRESS NOTES
Inpatient Anticoagulation Service Note    Date: 6/24/2020    Reason for Anticoagulation: Mechanical Mitral Valve Replacement     Target INR: 2.5 to 3.5     Hemoglobin Value: 14.9  Hematocrit Value: 45.8  Lab Platelet Value: (!) 150    INR from last 7 days     Date/Time INR Value    06/24/20 0725  (!) 1.65    06/23/20 0620  (!) 2.53        Dose from last 7 days     Date/Time Dose (mg)    06/24/20 1351  7.5    06/23/20 1256  5        Average Dose (mg): Per medrec: warfarin 5 mg PO daily  Significant Interactions: Statin, Quetiapine, citalopram  Bridge Therapy: No   Reversal Agent Administered: Not Applicable    Comments: Warfarin resumed from home for history of mechanical mitral valve replacement. H/H and renal function stable. Dose given last night and DDIs carry from home. INR sub-therapeutic possibly due to missed dose on 6/22 as per med rec, patient did not receive a dose that day. Will give bolus dose tonight then resume warfarin 5 mg PO daily. INR ordered for tomorrow AM. Pharmacy will continue to monitor.    Plan:  Warfarin 7.5 mg  Education Material Provided?: No (Chronic warfarin patient)  Pharmacist suggested discharge dosing: Warfarin 5 mg PO daily with close follow-up post-discharge with an INR within 48 to 72 hours.       Thank you!    Aurora Brooks, PharmD, BCPS

## 2020-06-24 NOTE — PROGRESS NOTES
[facility] Medicine Daily Progress Note    Date of Service  6/24/2020    Chief Complaint  74 y.o. male admitted 6/23/2020 with speech difficulty.    Hospital Course   This is a 74 years old male who has past medical history of mechanical mitral valve, history of type 2 diabetes mellitus, history of hyperlipidemia was brought in due to altered mental status and dysarthria.  Also patient had symptoms of lightheadedness and headache.  In the ER no focal deficit was noted other than dysarthria.  Patient was not a candidate for TPA considering he is already on Coumadin for his mechanical valve.  Was admitted for further work-up.  Had CTA of head and neck which were unremarkable.  MRI of the brain was done which was negative for any acute intracranial findings.  His speech dysarthria has resolved over course of hospital stay.  Interval Problem Update  Sitting up in chair comfortably.  Able to form full sentences.  Able to answer simple questions and follow simple commands.  No focal deficit noted.  No acute distress noted.  Hemodynamically stable.  No issues overnight per staff.    Consultants/Specialty  Neurology    Code Status  Full code    Disposition  Pending PT/OT/SLP eval    Review of Systems  Review of Systems   Constitutional: Negative for chills and fever.   HENT: Negative for hearing loss and tinnitus.    Eyes: Negative for blurred vision, double vision and photophobia.   Respiratory: Negative for cough and hemoptysis.    Cardiovascular: Negative for chest pain, palpitations and orthopnea.   Gastrointestinal: Negative for heartburn, nausea and vomiting.   Genitourinary: Negative for dysuria, frequency and urgency.   Musculoskeletal: Negative for myalgias and neck pain.   Skin: Negative for rash.   Neurological: Negative for dizziness, tingling and headaches.   Psychiatric/Behavioral: Negative for depression and suicidal ideas.        Physical Exam  Temp:  [36.2 °C (97.2 °F)-36.8 °C (98.2 °F)] 36.3 °C (97.4  °F)  Pulse:  [41-79] 57  Resp:  [16-18] 16  BP: (114-145)/(50-70) 132/65  SpO2:  [90 %-99 %] 93 %    Physical Exam  Constitutional:       General: He is not in acute distress.  HENT:      Head: Normocephalic and atraumatic.      Mouth/Throat:      Mouth: Mucous membranes are dry.   Eyes:      Extraocular Movements: Extraocular movements intact.      Pupils: Pupils are equal, round, and reactive to light.   Cardiovascular:      Rate and Rhythm: Normal rate and regular rhythm.      Heart sounds: Murmur present. No friction rub. No gallop.    Pulmonary:      Effort: Pulmonary effort is normal. No respiratory distress.   Abdominal:      General: Bowel sounds are normal.      Palpations: Abdomen is soft. There is no mass.      Tenderness: There is no abdominal tenderness.   Musculoskeletal:         General: No swelling or tenderness.   Neurological:      General: No focal deficit present.      Mental Status: He is alert. He is disoriented.   Psychiatric:         Mood and Affect: Mood normal.         Behavior: Behavior normal.         Fluids    Intake/Output Summary (Last 24 hours) at 6/24/2020 1442  Last data filed at 6/24/2020 1300  Gross per 24 hour   Intake 600 ml   Output 225 ml   Net 375 ml       Laboratory  Recent Labs     06/23/20  0620 06/24/20  0725   WBC 10.6 6.9   RBC 4.43* 4.43*   HEMOGLOBIN 15.0 14.9   HEMATOCRIT 45.3 45.8   .3* 103.4*   MCH 33.9* 33.6*   MCHC 33.1* 32.5*   RDW 49.5 49.6   PLATELETCT 149* 150*   MPV 10.8 11.2     Recent Labs     06/23/20  0620 06/24/20  0725   SODIUM 139 138   POTASSIUM 4.7 4.3   CHLORIDE 104 105   CO2 23 22   GLUCOSE 312* 141*   BUN 32* 28*   CREATININE 1.36 1.20   CALCIUM 9.0 8.9     Recent Labs     06/23/20  0620 06/24/20  0725   APTT 35.4  --    INR 2.53* 1.65*         Recent Labs     06/24/20  0725   TRIGLYCERIDE 129   HDL 46   LDL 47       Imaging  EC-ECHOCARDIOGRAM COMPLETE W/O CONT   Final Result      DX-CHEST-PORTABLE (1 VIEW)   Final Result      No acute  cardiopulmonary abnormality.      CT-CTA NECK WITH & W/O-POST PROCESSING   Final Result      No high-grade stenosis, large vessel occlusion, aneurysm or dissection.      CT-CTA HEAD WITH & W/O-POST PROCESS   Final Result      No large vessel occlusion, high-grade stenosis or aneurysm of the [place].      CT-CEREBRAL PERFUSION ANALYSIS   Final Result      1.  Cerebral blood flow less than 30% likely representing completed infarct = 0 mL.      2.  T Max more than 6 seconds likely representing combination of completed infarct and ischemia = 10 mL.      3.  Mismatched volume likely representing ischemic brain/penumbra = 10 mL.      4.  Please note that the cerebral perfusion was performed on the limited brain tissue around the basal ganglia region. Infarct/ischemia outside the CT perfusion sections can be missed in this study.      CT-HEAD W/O   Final Result      1. No CT evidence of acute infarct, hemorrhage or mass.   2. Moderate global parenchymal atrophy. Chronic small vessel ischemic changes.      MR-BRAIN-W/O    (Results Pending)        Assessment/Plan  Mixed aphasia- (present on admission)  Assessment & Plan  Now resolved.  MRB pending.  Awaiting PT/OT/SLP eval.  Continue home Coumadin.  Neurology following          Hypertension- (present on admission)  Assessment & Plan  Permissive hypertension for the first 48 hours    Type II diabetes mellitus (HCC)- (present on admission)  Assessment & Plan  Maintain glycemic control    Chronic anticoagulation- (present on admission)  Assessment & Plan  History of mechanical mitral valve  Continue Coumadin, monitor    Dementia (HCC)- (present on admission)  Assessment & Plan  History of, questionable baseline  High risk for hospital-acquired delirium.  Avoid benzodiazepines/anticholinergic medications.  Minimize hypnotics or sedatives.  Minds lines.  Avoid Diop catheter.  Daily orientation.    Hyperlipidemia- (present on admission)  Assessment & Plan  Intensify  statin therapy       VTE prophylaxis: Coumadin

## 2020-06-24 NOTE — PROGRESS NOTES
Per Bridget Bocanegra note, BP goal is 140/60. BP just now is 154/74. No PRN hypertensive medications on MAR. MD Salmeron notified.     New order received per MD Salmeron:   OK to give Lisinopril 20 mg now

## 2020-06-24 NOTE — PROGRESS NOTES
Late entry.    Wife, Comfort, called around 1000 wanting to discuss his Lisinopril that he takes at home. Comfort is concerned about Lisinopril causing confusion. Explained to wife the patient is here for stroke workup. Patient still here on permissive hypertension protocol. No Lisinopril scheduled at this time. Patient's wife acknowledged. MD Salmeron notified.

## 2020-06-24 NOTE — THERAPY
"Physical Therapy   Initial Evaluation     Patient Name: Carlos Rivera  Age:  74 y.o., Sex:  male  Medical Record #: [id number]  Today's Date: 6/24/2020     Precautions: Fall Risk    Assessment  Patient is 74 y.o. male admitted for stroke work up with c/o headache and abnormal speech. CT head revealed no acute infarct, MRI pending. PMH includes dyslipidemia, DM2, MVR with valve replacement. Pt presented with LE deconditioning and decreased activity tolerance. Pt required occasional verbal cues, physical assist and increased time to complete all functional mobility. PT will continue to work with patient while in acute care setting.     Plan    Recommend Physical Therapy 4 times per week until therapy goals are met for the following treatments:  Bed Mobility, Community Re-integration, Gait Training, Neuro Re-Education / Balance, Self Care/Home Evaluation, Stair Training, Therapeutic Activities and Therapeutic Exercises    Discharge recommendations:  Recommend post-acute placement for continued physical therapy services prior to discharge home.          06/24/20 1044   Prior Living Situation   Prior Services None   Housing / Facility 1 Story House   Steps Into Home 2   Steps In Home 0   Equipment Owned Other (Comments)  (walking sticks)   Lives with - Patient's Self Care Capacity Spouse   Comments pt lives with his spouse   Prior Level of Functional Mobility   Bed Mobility Independent   Transfer Status Independent   Ambulation Independent   Distance Ambulation (Feet)   (community)   Assistive Devices Used   (walking sticks)   Stairs Independent   Comments independent with walking sticks   Cognition    Level of Consciousness Alert   Comments word finding problems   Strength Lower Body   Lower Body Strength  WDL   Comments general deconditioning   Sensation Lower Body   Lower Extremity Sensation   X   Comments pt c/o B feet \"tenderness\"   Gait Analysis   Gait Level Of Assist Minimal Assist   Assistive Device Front " Wheel Walker   Distance (Feet) 100   # of Times Distance was Traveled 1   Deviation Shuffled Gait;Bradykinetic;Decreased Base Of Support;Other (Comment)  (very slow gait)   # of Stairs Climbed 0   Weight Bearing Status FWB   Vision Deficits Impacting Mobility none   Skilled Intervention Verbal Cuing   Comments very slow gait with short step length. Pt ambulating on lateral aspect of B feet due to big toe tenderness   Bed Mobility    Supine to Sit   (in chair)   Sit to Supine   (in chair)   Functional Mobility   Sit to Stand Minimal Assist   Bed, Chair, Wheelchair Transfer Minimal Assist   Toilet Transfers Minimal Assist   Transfer Method Stand Step   Mobility in room with FWW   Skilled Intervention Verbal Cuing   Short Term Goals    Short Term Goal # 1 Pt will be able to ambulate 150ft with FWW and SPV in order to return home as a household ambulator   Short Term Goal # 2 Pt will be able to negotiate 2 steps with SPV in 6tx in order to enter and exit his home   Short Term Goal # 3 Pt will be able to complete all functional transfers with FWW and SPV in 6tx in order to decrease fall risk   Short Term Goal # 4 Pt will be able to complete all bed mobility with HOB flat and SPV in 6tx in order to return to prior level   Anticipated Discharge Equipment   DC Equipment Unable To Determine At This Time

## 2020-06-24 NOTE — THERAPY
Occupational Therapy   Initial Evaluation     Patient Name: Carlos Rivera  Age:  74 y.o., Sex:  male  Medical Record #: [id number]  Today's Date: 6/24/2020     Precautions  Precautions: Fall Risk  Comments: dementia    Assessment  Patient is 74 y.o. male with a diagnosis of aphasia, workup ongoing.  Additional factors influencing patient status / progress: weakness, fatigue, impaired balance, impaired safety awareness.      Plan    Recommend Occupational Therapy 4 times per week until therapy goals are met for the following treatments:  Adaptive Equipment, Cognitive Skill Development, Neuro Re-Education / Balance, Self Care/Activities of Daily Living, Therapeutic Activities and Therapeutic Exercises.    Discharge recommendations:  Recommend post-acute placement for additional occupational therapy services prior to discharge home.       06/24/20 1043   Prior Living Situation   Prior Services Home-Independent   Housing / Facility 1 Story House   Bathroom Set up Walk In Shower;Shower Chair;Grab Bars   Equipment Owned Tub / Shower Seat;Grab Bar(s) In Tub / Shower;Other (Comments)  (walking stick)   Lives with - Patient's Self Care Capacity Spouse   Comments lives with his wife, she can help if needed.   Prior Level of ADL Function   Self Feeding Independent   Grooming / Hygiene Independent   Bathing Independent   Dressing Independent   Toileting Independent   Prior Level of IADL Function   Medication Management Independent   Laundry Requires Assist   Kitchen Mobility Independent   Finances Independent   Home Management Requires Assist   Shopping Independent   Prior Level Of Mobility   (uses walking sticks)   Cognition    Cognition / Consciousness X   Level of Consciousness Alert   Safety Awareness Impaired;Impulsive   Comments pleasant and cooperative, impaired insight into deficits   Bed Mobility    Comments up in chair pre/post   ADL Assessment   Grooming Minimal Assist;Standing   Toileting Minimal Assist    Functional Mobility   Sit to Stand Minimal Assist   Bed, Chair, Wheelchair Transfer Minimal Assist   Toilet Transfers Minimal Assist   Mobility chair>sink>BR>chair   Comments w/ fww   Patient / Family Goals   Patient / Family Goal #1 to go home   Short Term Goals   Short Term Goal # 1 pt will dress LB with supv   Short Term Goal # 2 pt will demo toilet txf with supv   Short Term Goal # 3 pt will groom in stance with supv   Anticipated Discharge Equipment   DC Equipment Unable To Determine At This Time

## 2020-06-24 NOTE — DISCHARGE PLANNING
Anticipated Disposition:  IRF    Action:   This CM received a call from Comfort robles, she made a complaint against Pt's PCP, this CM politely informed her, we are unable to take the complaint, and provided some new PCP offices' number for her to call just in case she wants to change PCP.  No other questions/concerns reported.     Physiatry consult placed, pending evaluation.   PT recommended Post acute placement.          Barriers to Discharge:   Medical clearance  IRF recommendation      Plan:  Please follow up with attending physician for medical clearance.   Please follow up with [facility].

## 2020-06-24 NOTE — PROGRESS NOTES
"Chief Complaint   Patient presents with   • Possible Stroke     Difficulty speech and \"finding words\" approximately ten minutes prior to arrival, code stroke activated       Problem List Items Addressed This Visit     None      Visit Diagnoses     Speech disturbance, unspecified type   (Acute)      Relevant Orders    REFERRAL TO PHYSIATRY (PMR)      Neurology Progress Note     History of present illness:  This is a 74-year old male with PMHx significant for dyslipidemia, type II diabetes mellitus, MVR with mechanical valve replacement (On/compliant with Coumadin) who presented to [facility] on 6/23/20 for a chief complaint of headache and abnormal speech upon waking this morning. Patient reportedly went to sleep in his usual state of health last night (unknown time); when he awoke at 0500, patient states that he had severe headache to whole head; also with word-finding difficulty. EMS called; on scene/at time of presentation here, SBP 180s-190s. CT head on arrival revealed no acute intracranial abnormality. CTA head/neck with no LVO. Patient determined not to be a candidate for IV tPA secondary to wake-up nature of event, also given concurrent use of Coumadin (INR 2.5).  Currently, patient is sitting up in stretcher; arousable. Speech remains somewhat aphasic, with word finding difficulty vs mild confusion (states he is 67 years old; impaired naming of objects). Admits to persistent headache, as above. Denies weakness/focal weakness, numbness, paresthesia, problem with vision, speech or swallowing. Denies nausea or chest pain (note troponin 108 on arrival).     Neurology has been consulted by Dr. Randa Bowen to further evaluate the findings noted above.     Interval, 6/24/20:  Patient sitting up in bed; awake and alert. Conversational, appropriate. Denies headache, states that he's feeling much better, has difficult time recalling why he came to the hospital. No events overnight per nursing.     No changes " to HPI as was previously documented.       Past medical history:   Past Medical History:   Diagnosis Date   • Chronic anticoagulation 2/23/2017   • History of mitral valve replacement with mechanical valve [Z95.2] 3/10/2017   • Hyperlipidemia    • Type II diabetes mellitus (HCC) 2/23/2017       Past surgical history:   Past Surgical History:   Procedure Laterality Date   • PB COLONOSCOPY,DIAGNOSTIC N/A 5/9/2020    Procedure: COLONOSCOPY;  Surgeon: Jatinder Madera M.D.;  Location: SURGERY [facility];  Service: Gastroenterology   • MITRAL VALVE REPLACEMENT  1999   • INGUINAL HERNIA REPAIR Bilateral 1984   • TONSILLECTOMY         Family history:   Family History   Problem Relation Age of Onset   • Heart Attack Father 58   • Diabetes Father    • Heart Attack Paternal Uncle    • No Known Problems Sister    • No Known Problems Brother    • No Known Problems Maternal Grandmother    • No Known Problems Maternal Grandfather    • No Known Problems Paternal Grandmother    • Heart Attack Paternal Grandfather    • No Known Problems Sister    • No Known Problems Brother        Social history:   Social History     Socioeconomic History   • Marital status:      Spouse name: Not on file   • Number of children: Not on file   • Years of education: Not on file   • Highest education level: Not on file   Occupational History   • Not on file   Social Needs   • Financial resource strain: Not on file   • Food insecurity     Worry: Not on file     Inability: Not on file   • Transportation needs     Medical: Not on file     Non-medical: Not on file   Tobacco Use   • Smoking status: Never Smoker   • Smokeless tobacco: Never Used   Substance and Sexual Activity   • Alcohol use: No     Alcohol/week: 0.0 oz   • Drug use: No   • Sexual activity: Yes     Comment:    Lifestyle   • Physical activity     Days per week: Not on file     Minutes per session: Not on file   • Stress: Not on file   Relationships   • Social connections      Talks on phone: Not on file     Gets together: Not on file     Attends Yazidism service: Not on file     Active member of club or organization: Not on file     Attends meetings of clubs or organizations: Not on file     Relationship status: Not on file   • Intimate partner violence     Fear of current or ex partner: Not on file     Emotionally abused: Not on file     Physically abused: Not on file     Forced sexual activity: Not on file   Other Topics Concern   • Not on file   Social History Narrative    Retired from [employer]        Current medications:   Current Facility-Administered Medications   Medication Dose   • warfarin (COUMADIN) tablet 7.5 mg  7.5 mg   • [START ON 6/25/2020] warfarin (COUMADIN) tablet 5 mg  5 mg   • atorvastatin (LIPITOR) tablet 80 mg  80 mg   • citalopram (CELEXA) tablet 20 mg  20 mg   • insulin glargine (LANTUS) injection 15 Units  15 Units   • QUEtiapine (SEROQUEL) tablet 50 mg  50 mg   • senna-docusate (PERICOLACE or SENOKOT S) 8.6-50 MG per tablet 2 Tab  2 Tab    And   • polyethylene glycol/lytes (MIRALAX) PACKET 1 Packet  1 Packet    And   • magnesium hydroxide (MILK OF MAGNESIA) suspension 30 mL  30 mL    And   • bisacodyl (DULCOLAX) suppository 10 mg  10 mg   • Pharmacy consult request - Allow for permissive hypertension: SBP up to 220 mmHg/DBP up to 120 mmHg x 48 hours     • acetaminophen (TYLENOL) tablet 650 mg  650 mg   • ondansetron (ZOFRAN) syringe/vial injection 4 mg  4 mg   • ondansetron (ZOFRAN ODT) dispertab 4 mg  4 mg   • insulin regular (HUMULIN R) injection 2-9 Units  2-9 Units    And   • glucose 4 g chewable tablet 16 g  16 g    And   • dextrose 50% (D50W) injection 50 mL  50 mL   • MD Alert...Warfarin per Pharmacy         Medication Allergy:  Allergies   Allergen Reactions   • [place] Vomiting   • Risperidone      Dystonia, altered mental status   • Hydrocodone-Acetaminophen Vomiting       Review of systems:   Constitutional: denies fever, night sweats, weight loss.    Eyes: denies acute vision change, eye pain or secretion.   Ears, Nose, Mouth, Throat: denies nasal secretion, nasal bleeding, difficulty swallowing, hearing loss, tinnitus, vertigo, ear pain, acute dental problems, oral ulcers or lesions.   Endocrine: denies recent weight changes, heat or cold intolerance, polyuria, polydypsia, polyphagia,abnormal hair growth.  Cardiovascular: denies new onset of chest pain, palpitations, syncope, or dyspnea of exertion.  Pulmonary: denies shortness of breath, new onset of cough, hemoptysis, wheezing, chest pain or flu-like symptoms.   GI: denies nausea, vomiting, diarrhea, GI bleeding, change in appetite, abdominal pain, and change in bowel habits.  : denies dysuria, urinary incontinence, hematuria.  Heme/oncology: denies history of easy bruising or bleeding. No history of cancer, DVTor PE.  Allergy/immunology: denies hives/urticaria, or itching.   Dermatologic: denies new rash, or new skin lesions.  Musculoskeletal:denies joint swelling or pain, muscle pain, neck and back pain.   Neurologic: As noted above.   Psychiatric: denies symptoms of depression, anxiety, hallucinations, mood swings or changes, suicidal or homicidal thoughts.     Physical examination:   Vitals:    06/23/20 2337 06/24/20 0400 06/24/20 0750 06/24/20 1055   BP: 125/70 122/50 145/64 132/65   Pulse: 79 (!) 47 (!) 45 (!) 57   Resp: 18 16 16 16   Temp: 36.4 °C (97.6 °F) 36.7 °C (98.1 °F) 36.2 °C (97.2 °F) 36.3 °C (97.4 °F)   TempSrc: Temporal Temporal Temporal Temporal   SpO2: 94% 93% 95% 93%   Weight:       Height:         General: Patient in no acute distress, pleasant and cooperative.  HEENT: Normocephalic, no signs of acute trauma.   Neck: supple, no meningeal signs or carotid bruits. There is normal range of motion. No tenderness on exam.   Chest: clear to auscultation. No cough.   CV: RRR, no murmurs.   Skin: no signs of acute rashes or trauma.   Musculoskeletal: joints exhibit full range of motion,  without any pain to palpation. There are no signs of joint or muscle swelling. There is no tenderness to deep palpation of muscles.   Psychiatric: No hallucinatory behavior.       NEUROLOGICAL EXAM:   Mental status, orientation: Awake, alert. Oriented to self time and place.  Speech and language: speech is clear/non dysarthric; fluent, no impaired naming of objects.   Cranial nerve exam: Pupils are 3-4 mm bilaterally and equally reactive to light. Visual fields are intact by confrontation. There is no nystagmus on primary or secondary gaze. Intact full EOM in all directions of gaze. Face appears symmetric. Sensation in the face is intact to light touch. Tongue is midline and without any signs of tongue biting or fasciculations. Shoulder shrug is intact bilaterally.   Motor exam: Strength is 5/5 in all extremities. Tone is normal. No abnormal movements were seen on exam.   Sensory exam reveals normal sense of light touch and pinprick in all extremities.   Deep tendon reflexes:  2+ throughout. Plantar responses are flexor. There is no clonus.   Coordination: shows a normal finger-nose-finger. Normal rapidly alternating movements.   Gait: Not assessed at this time as patient is a fall risk.         NIH Stroke Scale    1a Level of Consciousness   1b Orientation Questions   1c Response to Commands   2 Gaze   3 Visual Fields   4 Facial Movement   5 Motor Function (arm)   a Left   b Right   6 Motor Function (leg)   a Left   b Right   7 Limb Ataxia   8 Sensory   9 Language   10 Articulation   11 Extinction/Inattention     Score: 0      ANCILLARY DATA REVIEWED:     Lab Data Review:  Recent Results (from the past 24 hour(s))   EC-ECHOCARDIOGRAM COMPLETE W/O CONT    Collection Time: 06/23/20  1:01 PM   Result Value Ref Range    Eject.Frac. MOD BP 66.8     Eject.Frac. MOD 4C 69.06     Eject.Frac. MOD 2C 64.69     Left Ventrical Ejection Fraction 60    ACCU-CHEK GLUCOSE    Collection Time: 06/23/20  1:37 PM   Result Value Ref  Range    Glucose - Accu-Ck 174 (H) 65 - 99 mg/dL   ACCU-CHEK GLUCOSE    Collection Time: 20  5:32 PM   Result Value Ref Range    Glucose - Accu-Ck 209 (H) 65 - 99 mg/dL   EKG    Collection Time: 20  6:03 PM   Result Value Ref Range    Report       [facility]    Test Date:  2020  Pt Name:    LESLEY ARROYO             Department: [facility]  MRN:        [id number]                      Room:       [facility]  Gender:     Male                         Technician: EMMANUELLE  :        1945                   Requested By:MYLA BARRETT  Order #:    [id number]                    Reading MD: Shamir Concepcion MD    Measurements  Intervals                                Axis  Rate:       38                           P:          37  NY:         192                          QRS:        -58  QRSD:       112                          T:  QT:         604  QTc:        481    Interpretive Statements  SINUS BRADYCARDIA  LAD, CONSIDER LEFT ANTERIOR FASCICULAR BLOCK  LVH WITH SECONDARY REPOLARIZATION ABNORMALITY  Compared to ECG 2020 06:45:07  Left ventricular hypertrophy now present  Early repolarization now present  Sinus rhythm no longer present  Electronically Signed On 2020 7:04:03 PDT by Shamir Concepcion MD     ACCU-CHEK GLUCOSE    Collection Time: 20  8:05 PM   Result Value Ref Range    Glucose - Accu-Ck 317 (H) 65 - 99 mg/dL   ACCU-CHEK GLUCOSE    Collection Time: 20  6:19 AM   Result Value Ref Range    Glucose - Accu-Ck 129 (H) 65 - 99 mg/dL   CBC with Differential    Collection Time: 20  7:25 AM   Result Value Ref Range    WBC 6.9 4.8 - 10.8 K/uL    RBC 4.43 (L) 4.70 - 6.10 M/uL    Hemoglobin 14.9 14.0 - 18.0 g/dL    Hematocrit 45.8 42.0 - 52.0 %    .4 (H) 81.4 - 97.8 fL    MCH 33.6 (H) 27.0 - 33.0 pg    MCHC 32.5 (L) 33.7 - 35.3 g/dL    RDW 49.6 35.9 - 50.0 fL    Platelet Count 150 (L) 164 - 446 K/uL    MPV 11.2 9.0 - 12.9 fL    Neutrophils-Polys 63.70 44.00 - 72.00 %     Lymphocytes 21.90 (L) 22.00 - 41.00 %    Monocytes 8.50 0.00 - 13.40 %    Eosinophils 5.10 0.00 - 6.90 %    Basophils 0.70 0.00 - 1.80 %    Immature Granulocytes 0.10 0.00 - 0.90 %    Nucleated RBC 0.00 /100 WBC    Neutrophils (Absolute) 4.40 1.82 - 7.42 K/uL    Lymphs (Absolute) 1.51 1.00 - 4.80 K/uL    Monos (Absolute) 0.59 0.00 - 0.85 K/uL    Eos (Absolute) 0.35 0.00 - 0.51 K/uL    Baso (Absolute) 0.05 0.00 - 0.12 K/uL    Immature Granulocytes (abs) 0.01 0.00 - 0.11 K/uL    NRBC (Absolute) 0.00 K/uL   Comp Metabolic Panel (CMP)    Collection Time: 06/24/20  7:25 AM   Result Value Ref Range    Sodium 138 135 - 145 mmol/L    Potassium 4.3 3.6 - 5.5 mmol/L    Chloride 105 96 - 112 mmol/L    Co2 22 20 - 33 mmol/L    Anion Gap 11.0 7.0 - 16.0    Glucose 141 (H) 65 - 99 mg/dL    Bun 28 (H) 8 - 22 mg/dL    Creatinine 1.20 0.50 - 1.40 mg/dL    Calcium 8.9 8.5 - 10.5 mg/dL    AST(SGOT) 20 12 - 45 U/L    ALT(SGPT) 22 2 - 50 U/L    Alkaline Phosphatase 83 30 - 99 U/L    Total Bilirubin 0.4 0.1 - 1.5 mg/dL    Albumin 3.9 3.2 - 4.9 g/dL    Total Protein 6.1 6.0 - 8.2 g/dL    Globulin 2.2 1.9 - 3.5 g/dL    A-G Ratio 1.8 g/dL   Lipid Profile    Collection Time: 06/24/20  7:25 AM   Result Value Ref Range    Cholesterol,Tot 119 100 - 199 mg/dL    Triglycerides 129 0 - 149 mg/dL    HDL 46 >=40 mg/dL    LDL 47 <100 mg/dL   PROTHROMBIN TIME    Collection Time: 06/24/20  7:25 AM   Result Value Ref Range    PT 20.0 (H) 12.0 - 14.6 sec    INR 1.65 (H) 0.87 - 1.13   ESTIMATED GFR    Collection Time: 06/24/20  7:25 AM   Result Value Ref Range    GFR If African American >60 >60 mL/min/1.73 m 2    GFR If Non African American 59 (A) >60 mL/min/1.73 m 2   ACCU-CHEK GLUCOSE    Collection Time: 06/24/20 11:58 AM   Result Value Ref Range    Glucose - Accu-Ck 313 (H) 65 - 99 mg/dL       Labs reviewed by me.       Imaging reviewed by me:     EC-ECHOCARDIOGRAM COMPLETE W/O CONT   Final Result      DX-CHEST-PORTABLE (1 VIEW)   Final Result      No  acute cardiopulmonary abnormality.      CT-CTA NECK WITH & W/O-POST PROCESSING   Final Result      No high-grade stenosis, large vessel occlusion, aneurysm or dissection.      CT-CTA HEAD WITH & W/O-POST PROCESS   Final Result      No large vessel occlusion, high-grade stenosis or aneurysm of the [place].      CT-CEREBRAL PERFUSION ANALYSIS   Final Result      1.  Cerebral blood flow less than 30% likely representing completed infarct = 0 mL.      2.  T Max more than 6 seconds likely representing combination of completed infarct and ischemia = 10 mL.      3.  Mismatched volume likely representing ischemic brain/penumbra = 10 mL.      4.  Please note that the cerebral perfusion was performed on the limited brain tissue around the basal ganglia region. Infarct/ischemia outside the CT perfusion sections can be missed in this study.      CT-HEAD W/O   Final Result      1. No CT evidence of acute infarct, hemorrhage or mass.   2. Moderate global parenchymal atrophy. Chronic small vessel ischemic changes.      MR-BRAIN-W/O    (Results Pending)         Presumed mechanism by TOAST:  __Large Artery Atherosclerosis  __Small Vessel (L[place])  __Cardioembolic  __Other (Sickle Cell, Vasculitis, Hypercoagulable)  _X_Unknown    Cardioembolic?    Modified [place] Score (on arrival):   Modified [place] (at home/prior to arrival): 0      ASSESSMENT AND PLAN:  74-year old male with PMHx significant for dyslipidemia, type II diabetes mellitus, MVR with mechanical valve replacement (On/compliant with Coumadin) who presented to [facility] on 6/23/20 for a chief complaint of headache and abnormal speech upon waking on morning of presentation; Patient not a candidate for IV tPA secondary to wake-up nature of event, also given concurrent use of Coumadin (INR 2.5). CT head, CTA head/neck unremarkable. Today, patient appears to be at/near neurological baseline; speech clear and fluent. As was previously noted, differential  diagnoses still include small acute ischemic stroke (?possible cardioembolic etiology) vs. Encephalopathy, hypertensive (PRES?) or toxic/metabolic. Still awaiting MRI Brain.       Recommendations/Plan:      -q4h and PRN neuro assessment. VS per nursing/unit protocol. BP goal < 140/90. Antihypertensives per primary team.   -Obtain MRI Brain wo contrast-- still pending.    -Telemetry; currently SR. Screen for Afib/arrhythmia. TTE with EF 60%;   -Continue home dose Coumadin; no need to stop Coumadin nor add ASA at this time.   -Atorvastatin 80 mg PO q HS. Note LDL is 47, at goal.   -Recommend aggressive BG management per primary team. Avoid IVF with Dextrose. BG goal 140-180. hemoglobin A1c-- 6.6.   -Note UA WNL, CXR WNL; no leukocytosis or fever; mildly elevated BUN and elevated troponin.Check B12, TSH-- both unremarkable. closely monitor electrolytes and replace as needed to combat other potential source of encephalopathy.   -PT/OT/SLP eval and treat.   -Will follow up with results of the above (MRI Brain) and make additional recommendations accordingly. All other medical management per primary team.   -DVT PPX: SCDs.      The plan of care above has been discussed with Dr. Almeida.     Bridget Bocanegra, A.P.R.N.  [facility]

## 2020-06-24 NOTE — PROGRESS NOTES
Patient went from SB around 51 bpm to 5 beats of V Tach around 138 bpm per monitor room. Patient asymptomatic, no complaints of chest pain/shortness of breath. Was getting up to go to bathroom. MD notified via Tiger Text. Waiting for response.    Per MD, no new orders.

## 2020-06-24 NOTE — PROGRESS NOTES
Monitor Summary: SB 40-58, MN 0.20, QRS 0.10, QT 0.48, with rare PACs, rare PVCs, and frequent 1.7-1.9 second pauses, per strip from monitor room.

## 2020-06-24 NOTE — THERAPY
Speech Language Pathology   Initial Assessment     Patient Name: Carlos Rivera  AGE:  74 y.o., SEX:  male  Medical Record #: [id number]  Today's Date: 6/24/2020     Precautions  Precautions: Fall Risk  Comments: dementia    Assessment    Patient is 74 y.o. male with a diagnosis of dysarthria and word finding difficulties was seen for cognitive-linguistic/language assessment. A bedside aphasia screen was completed with no overt language deficits however, patient noted to have anomia during conversation with intermittent paraphasias noted however, appears to be more related to memory and word recall than a true language deficits/aphasia. Patient aware of difficulties during assessment but would quickly forget safety precautions and attempt to get up or do things that are not safe. Patient with severe memory deficits noted this session.      Plan    Recommendations: 1) Continue cognitive-linguistic therapy to address stated deficits.     Recommend Speech Therapy 3 times per week until therapy goals are met for the following treatments:  Dysphagia Training.    Discharge recommendations:  Recommend inpatient transitional care services for continued speech therapy services.       Objective       06/24/20 1146   Verbal Expression   Vocal Quality Clear;Decreased Intensity   Verbal Output Automatic Minimal (4)   Verbal Output: Phrases Within Functional Limits (6-7)   Verbal Output Conversation Supervision (5)   Verbal Output Functional Minimal (4)   Repetition: Single Words Within Functional Limits (6-7)   Repetition: Phrases Within Functional Limits (6-7)   Repetition: Sentences Within Functional Limits (6-7)   Naming Within Functional Limits (6-7)   Paraphasias Minimal (4)   Auditory Comprehension   Yes / No Questions: Personal Information Within Functional Limits (6-7)   Yes / No Questions: General Information Within Functional Limits (6-7)   Yes / No Questions: Abstract Within Functional Limits (6-7)   Identifies  Objects Within Functional Limits (6-7)   Identifies Pictures Within Functional Limits (6-7)   Follows One Unit Commands Within Functional Limits (6-7)   Follows Two Unit Commands Within Functional Limits (6-7)   Follows Three Unit Commands Moderate (3)   Understands Paragraph Minimal (4)   Understands Simple, Structured Conversation  Within Functional Limits (6-7)   Understands Complex Conversation Moderate (3)   Reading Comprehension   Following Written Direction Moderate (3)   Written Expression   Comments Limited by RUE weakness/poor coordination   Cognitive-Linguistic   Level of Consciousness Alert   Sustained Attention Moderate (3)   Alternating Attention Severe (2)   Short Term Memory Profound (1)   Immediate Memory Severe (2)   Long Term Memory / Reminiscing Minimal (4)   Simple Reasoning / Problem Solving Minimal (4)   Safety Awareness Moderate (3)   Insight into Deficits Moderate (3)  (able to verbalize understanding but not demonstrate)   Verbal Sequencing (Simple) Moderate (3)   Auditory Math Severe (2)   Medication Management  Profound (1)   Clock Drawing Impaired Hand Placement;Poor Planning;Disorganization;Perseveration    Short Term Goals   Short Term Goal # 2 The patient will utilize memory strategies to recall STM tasks with >75% accuracy given min A.

## 2020-06-25 ENCOUNTER — APPOINTMENT (OUTPATIENT)
Dept: RADIOLOGY | Facility: MEDICAL CENTER | Age: 75
DRG: 305 | End: 2020-06-25
Attending: NURSE PRACTITIONER
Payer: MEDICARE

## 2020-06-25 ENCOUNTER — HOSPITAL ENCOUNTER (INPATIENT)
Facility: REHABILITATION | Age: 75
LOS: 14 days | DRG: 071 | End: 2020-07-09
Attending: PHYSICAL MEDICINE & REHABILITATION | Admitting: PHYSICAL MEDICINE & REHABILITATION
Payer: MEDICARE

## 2020-06-25 VITALS
OXYGEN SATURATION: 97 % | SYSTOLIC BLOOD PRESSURE: 140 MMHG | DIASTOLIC BLOOD PRESSURE: 69 MMHG | TEMPERATURE: 97 F | RESPIRATION RATE: 16 BRPM | HEIGHT: 63 IN | HEART RATE: 60 BPM | WEIGHT: 125.22 LBS | BODY MASS INDEX: 22.19 KG/M2

## 2020-06-25 DIAGNOSIS — Z95.2 HISTORY OF MITRAL VALVE REPLACEMENT WITH MECHANICAL VALVE: Chronic | ICD-10-CM

## 2020-06-25 DIAGNOSIS — G45.9 TIA (TRANSIENT ISCHEMIC ATTACK): ICD-10-CM

## 2020-06-25 DIAGNOSIS — I67.4 HYPERTENSIVE ENCEPHALOPATHY: ICD-10-CM

## 2020-06-25 PROBLEM — R47.01 MIXED APHASIA: Status: RESOLVED | Noted: 2020-06-23 | Resolved: 2020-06-25

## 2020-06-25 LAB
GLUCOSE BLD-MCNC: 107 MG/DL (ref 65–99)
GLUCOSE BLD-MCNC: 232 MG/DL (ref 65–99)
GLUCOSE BLD-MCNC: 310 MG/DL (ref 65–99)
GLUCOSE BLD-MCNC: 321 MG/DL (ref 65–99)
INR PPP: 1.96 (ref 0.87–1.13)
PROTHROMBIN TIME: 22.9 SEC (ref 12–14.6)

## 2020-06-25 PROCEDURE — 700111 HCHG RX REV CODE 636 W/ 250 OVERRIDE (IP): Performed by: FAMILY MEDICINE

## 2020-06-25 PROCEDURE — A9270 NON-COVERED ITEM OR SERVICE: HCPCS

## 2020-06-25 PROCEDURE — A9270 NON-COVERED ITEM OR SERVICE: HCPCS | Performed by: FAMILY MEDICINE

## 2020-06-25 PROCEDURE — 36415 COLL VENOUS BLD VENIPUNCTURE: CPT

## 2020-06-25 PROCEDURE — 700102 HCHG RX REV CODE 250 W/ 637 OVERRIDE(OP)

## 2020-06-25 PROCEDURE — A9270 NON-COVERED ITEM OR SERVICE: HCPCS | Performed by: HOSPITALIST

## 2020-06-25 PROCEDURE — 99232 SBSQ HOSP IP/OBS MODERATE 35: CPT | Performed by: NURSE PRACTITIONER

## 2020-06-25 PROCEDURE — 94760 N-INVAS EAR/PLS OXIMETRY 1: CPT

## 2020-06-25 PROCEDURE — 70551 MRI BRAIN STEM W/O DYE: CPT

## 2020-06-25 PROCEDURE — 700102 HCHG RX REV CODE 250 W/ 637 OVERRIDE(OP): Performed by: PHYSICAL MEDICINE & REHABILITATION

## 2020-06-25 PROCEDURE — 82962 GLUCOSE BLOOD TEST: CPT

## 2020-06-25 PROCEDURE — 99222 1ST HOSP IP/OBS MODERATE 55: CPT | Performed by: PHYSICAL MEDICINE & REHABILITATION

## 2020-06-25 PROCEDURE — 700102 HCHG RX REV CODE 250 W/ 637 OVERRIDE(OP): Performed by: HOSPITALIST

## 2020-06-25 PROCEDURE — A9270 NON-COVERED ITEM OR SERVICE: HCPCS | Performed by: PHYSICAL MEDICINE & REHABILITATION

## 2020-06-25 PROCEDURE — 85610 PROTHROMBIN TIME: CPT

## 2020-06-25 PROCEDURE — 770010 HCHG ROOM/CARE - REHAB SEMI PRIVAT*

## 2020-06-25 PROCEDURE — 82962 GLUCOSE BLOOD TEST: CPT | Mod: 91

## 2020-06-25 PROCEDURE — 700102 HCHG RX REV CODE 250 W/ 637 OVERRIDE(OP): Performed by: FAMILY MEDICINE

## 2020-06-25 PROCEDURE — 99239 HOSP IP/OBS DSCHRG MGMT >30: CPT | Performed by: FAMILY MEDICINE

## 2020-06-25 RX ORDER — WARFARIN SODIUM 5 MG/1
5 TABLET ORAL DAILY
Status: DISCONTINUED | OUTPATIENT
Start: 2020-06-25 | End: 2020-06-25

## 2020-06-25 RX ORDER — ATORVASTATIN CALCIUM 80 MG/1
80 TABLET, FILM COATED ORAL EVERY EVENING
Qty: 30 TAB | Refills: 0 | Status: SHIPPED | OUTPATIENT
Start: 2020-06-25 | End: 2020-08-17

## 2020-06-25 RX ORDER — TRAZODONE HYDROCHLORIDE 50 MG/1
50 TABLET ORAL
Status: DISCONTINUED | OUTPATIENT
Start: 2020-06-25 | End: 2020-07-09 | Stop reason: HOSPADM

## 2020-06-25 RX ORDER — CITALOPRAM 20 MG/1
20 TABLET ORAL EVERY EVENING
Status: DISCONTINUED | OUTPATIENT
Start: 2020-06-25 | End: 2020-07-09 | Stop reason: HOSPADM

## 2020-06-25 RX ORDER — WARFARIN SODIUM 5 MG/1
5 TABLET ORAL DAILY
Status: CANCELLED | OUTPATIENT
Start: 2020-06-25

## 2020-06-25 RX ORDER — QUETIAPINE FUMARATE 25 MG/1
50 TABLET, FILM COATED ORAL EVERY EVENING
Status: CANCELLED | OUTPATIENT
Start: 2020-06-25

## 2020-06-25 RX ORDER — ONDANSETRON 2 MG/ML
4 INJECTION INTRAMUSCULAR; INTRAVENOUS 4 TIMES DAILY PRN
Status: DISCONTINUED | OUTPATIENT
Start: 2020-06-25 | End: 2020-07-09 | Stop reason: HOSPADM

## 2020-06-25 RX ORDER — WARFARIN SODIUM 7.5 MG/1
7.5 TABLET ORAL
Status: COMPLETED | OUTPATIENT
Start: 2020-06-25 | End: 2020-06-25

## 2020-06-25 RX ORDER — TRAMADOL HYDROCHLORIDE 50 MG/1
50 TABLET ORAL EVERY 4 HOURS PRN
Status: DISCONTINUED | OUTPATIENT
Start: 2020-06-25 | End: 2020-07-09 | Stop reason: HOSPADM

## 2020-06-25 RX ORDER — INSULIN GLARGINE 100 [IU]/ML
15 INJECTION, SOLUTION SUBCUTANEOUS EVERY EVENING
Status: CANCELLED | OUTPATIENT
Start: 2020-06-25

## 2020-06-25 RX ORDER — INSULIN GLARGINE 100 [IU]/ML
15 INJECTION, SOLUTION SUBCUTANEOUS EVERY EVENING
Status: DISCONTINUED | OUTPATIENT
Start: 2020-06-25 | End: 2020-06-26

## 2020-06-25 RX ORDER — LISINOPRIL 20 MG/1
20 TABLET ORAL
Status: DISCONTINUED | OUTPATIENT
Start: 2020-06-26 | End: 2020-07-09 | Stop reason: HOSPADM

## 2020-06-25 RX ORDER — ACETAMINOPHEN 325 MG/1
650 TABLET ORAL EVERY 4 HOURS PRN
Status: DISCONTINUED | OUTPATIENT
Start: 2020-06-25 | End: 2020-07-09 | Stop reason: HOSPADM

## 2020-06-25 RX ORDER — ECHINACEA PURPUREA EXTRACT 125 MG
2 TABLET ORAL PRN
Status: DISCONTINUED | OUTPATIENT
Start: 2020-06-25 | End: 2020-07-09 | Stop reason: HOSPADM

## 2020-06-25 RX ORDER — POLYETHYLENE GLYCOL 3350 17 G/17G
1 POWDER, FOR SOLUTION ORAL
Status: DISCONTINUED | OUTPATIENT
Start: 2020-06-25 | End: 2020-07-06

## 2020-06-25 RX ORDER — CITALOPRAM 20 MG/1
20 TABLET ORAL EVERY EVENING
Status: CANCELLED | OUTPATIENT
Start: 2020-06-25

## 2020-06-25 RX ORDER — BISACODYL 10 MG
10 SUPPOSITORY, RECTAL RECTAL
Status: DISCONTINUED | OUTPATIENT
Start: 2020-06-25 | End: 2020-07-06

## 2020-06-25 RX ORDER — POLYVINYL ALCOHOL 14 MG/ML
1 SOLUTION/ DROPS OPHTHALMIC PRN
Status: DISCONTINUED | OUTPATIENT
Start: 2020-06-25 | End: 2020-07-09 | Stop reason: HOSPADM

## 2020-06-25 RX ORDER — DEXTROSE MONOHYDRATE 25 G/50ML
50 INJECTION, SOLUTION INTRAVENOUS
Status: DISCONTINUED | OUTPATIENT
Start: 2020-06-25 | End: 2020-06-26

## 2020-06-25 RX ORDER — QUETIAPINE FUMARATE 25 MG/1
50 TABLET, FILM COATED ORAL EVERY EVENING
Status: DISCONTINUED | OUTPATIENT
Start: 2020-06-25 | End: 2020-07-09 | Stop reason: HOSPADM

## 2020-06-25 RX ORDER — LORAZEPAM 2 MG/ML
0.5 INJECTION INTRAMUSCULAR ONCE
Status: COMPLETED | OUTPATIENT
Start: 2020-06-25 | End: 2020-06-25

## 2020-06-25 RX ORDER — AMOXICILLIN 250 MG
2 CAPSULE ORAL 2 TIMES DAILY
Status: DISCONTINUED | OUTPATIENT
Start: 2020-06-25 | End: 2020-07-06

## 2020-06-25 RX ORDER — ALUMINA, MAGNESIA, AND SIMETHICONE 2400; 2400; 240 MG/30ML; MG/30ML; MG/30ML
20 SUSPENSION ORAL
Status: DISCONTINUED | OUTPATIENT
Start: 2020-06-25 | End: 2020-07-09 | Stop reason: HOSPADM

## 2020-06-25 RX ORDER — DEXTROSE MONOHYDRATE 25 G/50ML
50 INJECTION, SOLUTION INTRAVENOUS
Status: CANCELLED | OUTPATIENT
Start: 2020-06-25

## 2020-06-25 RX ORDER — LISINOPRIL 20 MG/1
20 TABLET ORAL
Status: CANCELLED | OUTPATIENT
Start: 2020-06-26

## 2020-06-25 RX ORDER — ONDANSETRON 4 MG/1
4 TABLET, ORALLY DISINTEGRATING ORAL 4 TIMES DAILY PRN
Status: DISCONTINUED | OUTPATIENT
Start: 2020-06-25 | End: 2020-07-09 | Stop reason: HOSPADM

## 2020-06-25 RX ORDER — ATORVASTATIN CALCIUM 80 MG/1
80 TABLET, FILM COATED ORAL EVERY EVENING
Status: CANCELLED | OUTPATIENT
Start: 2020-06-25

## 2020-06-25 RX ORDER — HYDRALAZINE HYDROCHLORIDE 25 MG/1
25 TABLET, FILM COATED ORAL EVERY 8 HOURS PRN
Status: DISCONTINUED | OUTPATIENT
Start: 2020-06-25 | End: 2020-07-09 | Stop reason: HOSPADM

## 2020-06-25 RX ORDER — ATORVASTATIN CALCIUM 40 MG/1
80 TABLET, FILM COATED ORAL EVERY EVENING
Status: DISCONTINUED | OUTPATIENT
Start: 2020-06-25 | End: 2020-07-09 | Stop reason: HOSPADM

## 2020-06-25 RX ORDER — QUETIAPINE FUMARATE 100 MG/1
TABLET, FILM COATED ORAL
Status: COMPLETED
Start: 2020-06-25 | End: 2020-06-25

## 2020-06-25 RX ADMIN — INSULIN HUMAN 6 UNITS: 100 INJECTION, SOLUTION PARENTERAL at 20:23

## 2020-06-25 RX ADMIN — QUETIAPINE 50 MG: 25 TABLET ORAL at 19:00

## 2020-06-25 RX ADMIN — QUETIAPINE 50 MG: 100 TABLET, FILM COATED ORAL at 19:00

## 2020-06-25 RX ADMIN — INSULIN GLARGINE 15 UNITS: 100 INJECTION, SOLUTION SUBCUTANEOUS at 20:22

## 2020-06-25 RX ADMIN — INSULIN HUMAN 3 UNITS: 100 INJECTION, SOLUTION PARENTERAL at 11:28

## 2020-06-25 RX ADMIN — TRAZODONE HYDROCHLORIDE 50 MG: 50 TABLET ORAL at 20:16

## 2020-06-25 RX ADMIN — ATORVASTATIN CALCIUM 80 MG: 40 TABLET, FILM COATED ORAL at 20:16

## 2020-06-25 RX ADMIN — ACETAMINOPHEN 650 MG: 325 TABLET, FILM COATED ORAL at 13:58

## 2020-06-25 RX ADMIN — LISINOPRIL 20 MG: 20 TABLET ORAL at 05:40

## 2020-06-25 RX ADMIN — WARFARIN SODIUM 7.5 MG: 7.5 TABLET ORAL at 18:08

## 2020-06-25 RX ADMIN — LORAZEPAM 0.5 MG: 2 INJECTION INTRAMUSCULAR; INTRAVENOUS at 08:52

## 2020-06-25 RX ADMIN — INSULIN HUMAN 6 UNITS: 100 INJECTION, SOLUTION PARENTERAL at 17:39

## 2020-06-25 RX ADMIN — CITALOPRAM HYDROBROMIDE 20 MG: 20 TABLET ORAL at 20:16

## 2020-06-25 ASSESSMENT — LIFESTYLE VARIABLES
HAVE PEOPLE ANNOYED YOU BY CRITICIZING YOUR DRINKING: NO
ON A TYPICAL DAY WHEN YOU DRINK ALCOHOL HOW MANY DRINKS DO YOU HAVE: 0
HOW MANY TIMES IN THE PAST YEAR HAVE YOU HAD 5 OR MORE DRINKS IN A DAY: 0
TOTAL SCORE: 0
ALCOHOL_USE: YES
HAVE YOU EVER FELT YOU SHOULD CUT DOWN ON YOUR DRINKING: NO
TOTAL SCORE: 0
CONSUMPTION TOTAL: NEGATIVE
TOTAL SCORE: 0
EVER FELT BAD OR GUILTY ABOUT YOUR DRINKING: NO
EVER HAD A DRINK FIRST THING IN THE MORNING TO STEADY YOUR NERVES TO GET RID OF A HANGOVER: NO
AVERAGE NUMBER OF DAYS PER WEEK YOU HAVE A DRINK CONTAINING ALCOHOL: 1

## 2020-06-25 ASSESSMENT — PATIENT HEALTH QUESTIONNAIRE - PHQ9
1. LITTLE INTEREST OR PLEASURE IN DOING THINGS: NOT AT ALL
2. FEELING DOWN, DEPRESSED, IRRITABLE, OR HOPELESS: NOT AT ALL
SUM OF ALL RESPONSES TO PHQ9 QUESTIONS 1 AND 2: 0

## 2020-06-25 ASSESSMENT — FIBROSIS 4 INDEX
FIB4 SCORE: 2.16
FIB4 SCORE: 2.16

## 2020-06-25 NOTE — CARE PLAN
Problem: Safety  Goal: Will remain free from injury  Outcome: PROGRESSING AS EXPECTED  Note: Bed alarm on, wheels locked, in lowest position. Non skid socks applied. Call light within reach. Room in direct view of nurses station.      Problem: Respiratory:  Goal: Ability to maintain adequate ventilation will improve  Outcome: PROGRESSING AS EXPECTED  Note: Pt oxygen saturation adequate on room air. Pt denies dyspnea or SOB.      Problem: Nutritional:  Goal: Dysphagia will improve  Outcome: PROGRESSING AS EXPECTED  Note: Pt tolerating oral intake adequately.

## 2020-06-25 NOTE — CARE PLAN
Problem: Knowledge Deficit  Goal: Knowledge of disease process/condition, treatment plan, diagnostic tests, and medications will improve  Outcome: PROGRESSING AS EXPECTED  Note: Here for stroke. Patient and spouse educated of Lisinopril and the purpose of taking medication. Both spouse and patient acknowledge understanding.

## 2020-06-25 NOTE — PROGRESS NOTES
Monitor Summary: SB 41 - SR 77, AK -.18, QRS -.10, QT -.42, with rare PVC's, rare PAC's, rare triplets, and 5 beat V Tach around 1400 per strip from monitor room.

## 2020-06-25 NOTE — CONSULTS
Physical Medicine and Rehabilitation Consultation         Initial Consult      Initial Consultation Date: 6/25/2020  Consulting provider: Dr. Xander Flowers  Reason for consultation: assess for acute inpatient rehab appropriateness  LOS: 2 Day(s)      Chief complaint: aphasia    HPI:   The patient is a 74 y.o. right hand dominant male with a past medical history of HTN, DL, DM2, MVR with mechanical valve replacement (Coumadin);  who presented on 6/23/2020  6:26 AM with , difficulty with speech and word finding when he woke up. CT head without acute abnormality. CTA without LVO. Not a candidate for TPA.       The patient currently reports:  -Improvement in speech, but appears tired; [facility]  -Bowel: 1x on 6/25, senna s  -Bladder: voiding   -Does have some generalized weakness, without focal weakness  -Denies significant pain  -Denies fever, chills, nausea, vomiting      ROS:  Pertinent positives are listed in HPI, all other systems reviewed and are negative      Social Hx:  Pre-morbidly, this patient lived in:   1 story home  With 2 steps to enter  Lives with spouse      Current level of function: The patient was evaluated by:  PT, 6/24: Min A x100 ft FWW, Min A mobility  OT, 6/24: Min A mobility/ADLs  SLP, 6/24: anomia and “severe memory deficits”      PMH:  Past Medical History:   Diagnosis Date   • Chronic anticoagulation 2/23/2017   • History of mitral valve replacement with mechanical valve [Z95.2] 3/10/2017   • Hyperlipidemia    • Type II diabetes mellitus (HCC) 2/23/2017         PSH:  Past Surgical History:   Procedure Laterality Date   • PB COLONOSCOPY,DIAGNOSTIC N/A 5/9/2020    Procedure: COLONOSCOPY;  Surgeon: Jatinder Madera M.D.;  Location: SURGERY [facility];  Service: Gastroenterology   • MITRAL VALVE REPLACEMENT  1999   • INGUINAL HERNIA REPAIR Bilateral 1984   • TONSILLECTOMY           FHX: Reviewed.     Family  "History   Problem Relation Age of Onset   • Heart Attack Father 58   • Diabetes Father    • Heart Attack Paternal Uncle    • No Known Problems Sister    • No Known Problems Brother    • No Known Problems Maternal Grandmother    • No Known Problems Maternal Grandfather    • No Known Problems Paternal Grandmother    • Heart Attack Paternal Grandfather    • No Known Problems Sister    • No Known Problems Brother          Medications:  Current Facility-Administered Medications   Medication Dose   • warfarin (COUMADIN) tablet 5 mg  5 mg   • lisinopril (PRINIVIL) tablet 20 mg  20 mg   • enalaprilat (VASOTEC) injection 1.25 mg  1.25 mg   • atorvastatin (LIPITOR) tablet 80 mg  80 mg   • citalopram (CELEXA) tablet 20 mg  20 mg   • insulin glargine (LANTUS) injection 15 Units  15 Units   • QUEtiapine (SEROQUEL) tablet 50 mg  50 mg   • senna-docusate (PERICOLACE or SENOKOT S) 8.6-50 MG per tablet 2 Tab  2 Tab    And   • polyethylene glycol/lytes (MIRALAX) PACKET 1 Packet  1 Packet    And   • magnesium hydroxide (MILK OF MAGNESIA) suspension 30 mL  30 mL    And   • bisacodyl (DULCOLAX) suppository 10 mg  10 mg   • acetaminophen (TYLENOL) tablet 650 mg  650 mg   • ondansetron (ZOFRAN) syringe/vial injection 4 mg  4 mg   • ondansetron (ZOFRAN ODT) dispertab 4 mg  4 mg   • insulin regular (HUMULIN R) injection 2-9 Units  2-9 Units    And   • glucose 4 g chewable tablet 16 g  16 g    And   • dextrose 50% (D50W) injection 50 mL  50 mL   • MD Alert...Warfarin per Pharmacy         Allergies:  Allergies   Allergen Reactions   • [place] Vomiting   • Risperidone      Dystonia, altered mental status   • Hydrocodone-Acetaminophen Vomiting         Physical Exam:  Vitals: /69   Pulse 60   Temp 36.1 °C (97 °F) (Temporal)   Resp 16   Ht 1.6 m (5' 3\")   Wt 56.8 kg (125 lb 3.5 oz)   SpO2 97%     Gen: pleasant, sleepy but arousable with gentle physical stimulation  Head: no visible head lesions or abrasion  Eyes/ Nose/ Mouth: moist " mucous membranes  Cardio: Well perfused extremities, no peripheral edema  Pulm: on room air, with normal respiratory effort  Abd: Soft NTND  Skin: No visible rashes  Ext: No atrophy of muscles, no joint contractures of extremities   Psych: answers questions appropriately follows commands, tired affect    Neuro:   -Speech: +dysarthria, slow speech production  -Hearing and visual acuity functional and grossly intact    Motor: *may be limited by tiredness from ativan in AM  -Bilateral upper extremities: 4/5 with arm abduction, elbow flexion, elbow extension, 5/5 finger flexion  -Bilateral lower extremities: 4/5 with hip flexion, knee extension, ankle dorsiflexion, plantarflexion      Reflexes:  -Negative clonus bilateral ankles          Labs: Reviewed and significant for 6/25/20: INR 1.96; 6/24/20: Hgb 14.9, INR 1.65, Na 138, K 4.3, Cr 1.2  Recent Labs     06/23/20  0620 06/24/20  0725 06/25/20  0541   RBC 4.43* 4.43*  --    HEMOGLOBIN 15.0 14.9  --    HEMATOCRIT 45.3 45.8  --    PLATELETCT 149* 150*  --    PROTHROMBTM 28.1* 20.0* 22.9*   APTT 35.4  --   --    INR 2.53* 1.65* 1.96*     Recent Labs     06/23/20  0620 06/24/20  0725   SODIUM 139 138   POTASSIUM 4.7 4.3   CHLORIDE 104 105   CO2 23 22   GLUCOSE 312* 141*   BUN 32* 28*   CREATININE 1.36 1.20   CALCIUM 9.0 8.9     Recent Results (from the past 24 hour(s))   ACCU-CHEK GLUCOSE    Collection Time: 06/24/20  4:06 PM   Result Value Ref Range    Glucose - Accu-Ck 118 (H) 65 - 99 mg/dL   ACCU-CHEK GLUCOSE    Collection Time: 06/24/20  9:02 PM   Result Value Ref Range    Glucose - Accu-Ck 122 (H) 65 - 99 mg/dL   ACCU-CHEK GLUCOSE    Collection Time: 06/25/20  5:30 AM   Result Value Ref Range    Glucose - Accu-Ck 107 (H) 65 - 99 mg/dL   PROTHROMBIN TIME    Collection Time: 06/25/20  5:41 AM   Result Value Ref Range    PT 22.9 (H) 12.0 - 14.6 sec    INR 1.96 (H) 0.87 - 1.13   ACCU-CHEK GLUCOSE    Collection Time: 06/25/20 11:25 AM   Result Value Ref Range    Glucose -  Accu-Ck 232 (H) 65 - 99 mg/dL           Imaging:   Ct-cta Head With & W/o-post Process    Result Date: 6/23/2020 6/23/2020 6:47 AM HISTORY/REASON FOR EXAM:  Neurological deficit, acute, stroke suspected; Stroke protocol TECHNIQUE/EXAM DESCRIPTION: CT angiogram of the [place] without and with contrast.  Initial precontrast images were obtained of the head from the skull base through the vertex.  Postcontrast images were obtained of the [place] following the power injection of nonionic contrast at 5.0 mL/sec. Thin-section helical images were obtained with overlapping reconstruction interval. Coronal and sagittal multiplanar volume reformats were generated.  3D angiographic images were reviewed on PACS.  Maximum intensity projection (MIP) images were generated and reviewed. 100 mL of Omnipaque 350 nonionic contrast was injected intravenously. Low dose optimization technique was utilized for this CT exam including automated exposure control and adjustment of the mA and/or kV according to patient size. COMPARISON:  None. FINDINGS: The posterior circulation shows the distal vertebral arteries to be patent. The vertebrobasilar confluence is intact. The basilar artery is patent. No aneurysm or occlusive lesion is evident. The anterior circulation shows no stenotic or occlusive lesion. No aneurysm is evident about the [place]. The brain is discussed separately. 3D angiographic/MIP images of the vasculature confirm the vascular findings as described above.     No large vessel occlusion, high-grade stenosis or aneurysm of the [place].    Ct-cta Neck With & W/o-post Processing    Result Date: 6/23/2020 6/23/2020 6:47 AM HISTORY/REASON FOR EXAM:  Neurological deficit TECHNIQUE/EXAM DESCRIPTION: CT angiogram of the neck with contrast. Postcontrast images were obtained of the neck from the great vessels through the skull base following the power injection of nonionic contrast at 5.0 mL/sec.  Thin-section helical images were obtained with overlapping reconstruction interval. Coronal and oblique multiplanar volume reformats were generated. Cervical internal carotid artery percent stenosis is calculated using the standard method according to the NASCET criteria wherein a segment of uniform caliber mid or distal cervical internal carotid is used as the reference denominator. 3D angiographic images were reviewed on PACS.  Maximum intensity projection (MIP) images were generated and reviewed 100 mL of Omnipaque 350 nonionic contrast was injected intravenously. Low dose optimization technique was utilized for this CT exam including automated exposure control and adjustment of the mA and/or kV according to patient size. COMPARISON:  5/10/2020. FINDINGS: Left-sided aortic arch with patent great vessel origins. The right common carotid artery, cervical carotid bifurcation, and cervical internal carotid artery show no significant stenosis. There is no evidence of dissection or aneurysm. The left common carotid artery, cervical carotid bifurcation, and cervical internal carotid artery show no significant stenosis. There is no evidence of dissection or aneurysm. The cervical vertebral arteries are patent bilaterally. The neck soft tissues and lung apices in the field of view are unremarkable. Cervical spondylosis. 3D angiographic/MIP images of the vasculature confirm the vascular findings as described above.     No high-grade stenosis, large vessel occlusion, aneurysm or dissection.    Ct-head W/o    Result Date: 6/23/2020   CT HEAD WITHOUT CONTRAST 6/23/2020 6:33 AM HISTORY/REASON FOR EXAM:  Neurological deficit TECHNIQUE/EXAM DESCRIPTION AND NUMBER OF VIEWS: CT of the head without contrast. The study was performed on a helical multidetector CT scanner. Contiguous 2.5 mm axial sections were obtained from the skull base through the vertex. Up to date radiation dose reduction adjustments have been utilized to meet  ALARA standards for radiation dose reduction. COMPARISON:  5/14/2020 FINDINGS: Lateral ventricles are normal in size and symmetric. Moderate global parenchymal atrophy. Chronic small vessel ischemic changes. No significant mass effect or midline shift. Basal cisterns are patent. No evidence for intracranial hemorrhage. Calvaria are intact. Atherosclerosis. Visualized orbits are unremarkable. Visualized mastoid air cells are clear. No significant sinus disease in the visualized paranasal sinuses.     1. No CT evidence of acute infarct, hemorrhage or mass. 2. Moderate global parenchymal atrophy. Chronic small vessel ischemic changes.    Dx-chest-portable (1 View)    Result Date: 6/23/2020 6/23/2020 7:00 AM HISTORY/REASON FOR EXAM:  Stroke protocol. TECHNIQUE/EXAM DESCRIPTION AND NUMBER OF VIEWS: Single portable view of the chest. COMPARISON: 5/10/2020 FINDINGS: LUNGS: Clear. No effusions. PNEUMOTHORAX: None. LINES AND TUBES: None. MEDIASTINUM: Stable cardiomegaly. Prior bowel replacement. MUSCULOSKELETAL STRUCTURES: No acute displaced fracture. Median sternotomy.     No acute cardiopulmonary abnormality.    Mr-brain-w/o    Result Date: 6/25/2020 6/25/2020 9:28 AM HISTORY/REASON FOR EXAM:  Altered mental status; Stroke vs hypertensive encephalopathy. TECHNIQUE/EXAM DESCRIPTION: MRI of the brain without contrast. T1 sagittal, T2 fast spin-echo axial, T1 coronal, FLAIR coronal, Diffusion weighted and Apparent Diffusion Coefficient (ADC map) axial images were obtained of the whole brain. Additional FLAIR axial images were obtained. The study was performed on a [employer] 1.5 [place] MRI scanner. COMPARISON:  MRI of brain 6/26/2018 FINDINGS: The calvaria are unremarkable. There are no extra-axial fluid collections. There is a pattern of moderate cerebral atrophy manifest as enlargement of sulcal markings over the convexities and vertex along with ventriculomegaly. There are multifocal areas of old cortical and subcortical  infarction over both cerebral hemispheres involving the right and left high frontal convexities, right frontal vertex, left parietal lobe, right parietal lobe, and left medial occipital lobe. The  old infarction in the left parietal lobe is associated with some minimal hemosiderin deposition (gradient echo axial image 18, series 7) as seen on the prior exam (gradient echo axial images 19, 20, series 401 from 6/26/2018). There is an old lacunar size infarct in the right anterior-medial thalamus (T2 axial image 25, series 6). Old lacunar size infarction in the deep white matter along the left frontal corona radiata (T2 axial image 27, series 6). There is a pattern of advanced supratentorial white matter disease with extensive patchy, confluent, and focal areas of bright T2 and FLAIR signal in the subcortical and deep white matter of both hemispheres consistent with small vessel ischemic change versus demyelination or gliosis. No evidence of acute infarction in the cerebral hemispheres. The brainstem shows minimal pontine ischemic gliosis primarily in the right side of the [place]sa. There are 2 tiny linear foci of encephalomalacia in the right cerebellar hemisphere consistent with infarcts (T2 axial image 14, series 6). No change from prior  exam. No evidence of acute infarction involving the brainstem or cerebellum on today's exam. The major vessels including the dural venous sinuses appear intact. The right transverse -- sigmoid sinus and right internal jugular vein are dominant. The paranasal sinuses in the field-of-view are clear. The mastoids are clear.     1.  Moderate cerebral atrophy. 2.  Multiple areas of old cerebral infarction in both cerebral hemispheres. The old left parietal infarct is associated with minimal hemosiderin deposition indicating a component of hemorrhagic infarction. 3.  Old lacunar size infarct right thalamus. 4.  Old lacunar infarction left frontal corona radiata. 5.  Advanced supratentorial  white matter disease most consistent with microvascular ischemic change. 6.  Mild pontine ischemic gliosis. 7.  Old lacunar infarcts x2 in the right cerebellar hemisphere, no change from prior exam. 8.  No evidence of acute infarction, acute hemorrhage, or mass lesion on today's exam.    Ct-cerebral Perfusion Analysis    Result Date: 6/23/2020 6/23/2020 6:45 AM HISTORY/REASON FOR EXAM:  Neurological deficit. TECHNIQUE/EXAM DESCRIPTION AND NUMBER OF VIEWS: CT Cerebral Perfusion Analysis. The study was performed on a 128 slice [employer]. Lightspeed Multidetector CT scanner. Perfusion data and corresponding time-activity curves are processed and displayed as color-coded maps in the axial plane for Cerebral Blood Flow (CBF), Cerebral Blood Volume  (CBV),T Max and Mean Transit Time (MTT) and are post processed on the Ischemia view-RAPID virtual . 40 mL of Omnipaque 350 nonionic contrast was injected intravenously. Low dose optimization technique was utilized for this CT exam including automated exposure control and adjustment of the mA and/or kV according to patient size. COMPARISON:  None. FINDINGS: Cerebral blood flow less than 30% = 0 mL. T Max more than 6 seconds = 10 mL. Mismatch volume = 10 mL. Mismatch ratio = Infinite.     1.  Cerebral blood flow less than 30% likely representing completed infarct = 0 mL. 2.  T Max more than 6 seconds likely representing combination of completed infarct and ischemia = 10 mL. 3.  Mismatched volume likely representing ischemic brain/penumbra = 10 mL. 4.  Please note that the cerebral perfusion was performed on the limited brain tissue around the basal ganglia region. Infarct/ischemia outside the CT perfusion sections can be missed in this study.    Ec-echocardiogram Complete W/o Cont    Result Date: 6/23/2020  Transthoracic Echo Report Echocardiography Laboratory CONCLUSIONS Left ventricular ejection fraction is visually estimated to be 60%. Basal and mid inferior and  inferolateral hypokinesis. The right ventricle was normal in size and function. Known mitral valve mechanical prosthesis which is functioning normally with appropriate transvalvular gradient. Estimated right ventricular systolic pressure  is 30-35 mmHg. Compared to the images of the study done 20 there has been no significant changes seen LESLEY ARROYO Exam Date:         2020                    12:21 Exam Location:     Inpatient Priority:          Routine Ordering Physician:        MYLA BARRETT Referring Physician:       NENA Lockhart Sonographer:               JOVANNI Gilliam RDCS Age:    74     Gender:    M MRN:    [id number] :    1945 BSA:    1.64   Ht (in):    63     Wt (lb):    135 Exam Type:     Complete Indications:     Cerebrovascular disease, unspecified ICD Codes:       I67.9 CPT Codes:       65865 BP:   183    /   81     HR:   51 Technical Quality:       Fair MEASUREMENTS  (Male / Female) Normal Values 2D ECHO LV Diastolic Diameter PLAX        5.3 cm                4.2 - 5.9 / 3.9 - 5.3 cm LV Systolic Diameter PLAX         4 cm                  2.1 - 4.0 cm IVS Diastolic Thickness           1 cm                  LVPW Diastolic Thickness          0.77 cm               LVOT Diameter                     1.9 cm                Estimated LV Ejection Fraction    60 %                  LV Ejection Fraction MOD BP       66.8 %                >= 55  % LV Ejection Fraction MOD 4C       69.1 %                LV Ejection Fraction MOD 2C       64.7 %                LV Ejection Fraction 4C AL        70.9 %                LV Ejection Fraction 2C AL        64 %                  LA Volume Index                   24.7 cm3/m2           16 - 28 cm3/m2 DOPPLER AV Peak Velocity                  1.5 m/s               AV Peak Gradient                  8.8 mmHg              AV Mean Gradient                  3.5 mmHg              LVOT Peak  Velocity                0.89 m/s              AV Area Cont Eq vti               1.4 cm2               MV Velocity Time Integral         47.6 cm               MV Pressure Half Time             108 ms                MV Area PHT                       2 cm2                 TR Peak Velocity                  299 cm/s              LV E' Lateral Velocity            10.2 cm/s             LV E' Septal Velocity             7.1 cm/s              * Indicates values subject to auto-interpretation LV EF:  60    % FINDINGS Left Ventricle Normal left ventricular chamber size. Normal left ventricular wall thickness. Normal left ventricular systolic function. Left ventricular ejection fraction is visually estimated to be 60%.  Basal and mid inferior and inferolateral hypokinesis.  A reliable estimation of diastolic function cannot be made due to mitral valve disease. Abnormal septal motion consistent with postoperative status. Right Ventricle The right ventricle was normal in size and function. Right Atrium The right atrium is normal in size.  Normal inferior vena cava size and inspiratory collapse. Left Atrium The left atrium is normal in size.  Left atrial volume index is  21 mL/sq m. Mitral Valve Known mitral valve mechanical prosthesis which is functioning normally with appropriate transvalvular gradient. Mean transvalvular gradient is 2 mmHg.   Trace mitral regurgitation. Aortic Valve Structurally normal aortic valve without significant stenosis.  Trace aortic insufficiency. Tricuspid Valve Structurally normal tricuspid valve without significant stenosis.  Mild tricuspid regurgitation.  Right atrial pressure is estimated to be 3 mmHg. Estimated right ventricular systolic pressure  is 30-35 mmHg. Pulmonic Valve Structurally normal pulmonic valve without significant stenosis.  Mild pulmonic regurgitation. Pericardium Normal pericardium without effusion. Aorta The aortic root is normal.  Ascending aorta diameter is  3.4 cm. Arnulfo  Jose Juan OSWALD (Electronically Signed) Final Date:     23 June 2020                 13:07        ASSESSMENT:    Patient is a 74 y.o. male admitted with acute aphasia/word finding difficulty, consistent with stroke clinically. No clear head imaging consistent with acute infarct, but MRI found multiple old infarcts. Speech clinically improving but with severe memory deficits and generalized weakness.      # Rehabilitation: Impaired ADLs and mobility  -Vitals: afebrile, intermittently bradycardic   -Insurance: Medicare/  -If discharge support can be verified, meets criteria for IRF when medically cleared  -Rehab Impairment Code: 0001.9 - Stroke: Other Stroke      #Neuro: aphasia on 6/23, improving. CT head and CTA head/neck unremarkable. MRI brain 6/25, without acute infarct but evidence of prior infarcts, Coumadin, atorvastatin, BG management      #CV: HTN, HL, atorvastatin, Lisinopril, Coumadin       #Mood: citalopram, seroquel      #DM: SSI, lantus      #Bowel: 1x on 6/25, senna s      #Bladder: voiding       #DVT: warfarin         Discussed with pt, summarized hospitalization and care, options for next step of care  Labs reviewed   Imaging personally reviewed:  MRI Brain, 6/25: symmetrically enlarged lateral ventricles; no obvious diffusion restriction per DWI series  Discussed with rehab team about recommendations       Thank you for allowing us to participate in the care of this patient.           Paul Hemphill MD  Physical Medicine and Rehabilitation   6/25/2020

## 2020-06-25 NOTE — DISCHARGE PLANNING
Dr. Salmeron & WINNIE Bocanegra have medically cleared.  Dr. Rodriguez has accepted.  Spoke with Comfort, spouse regarding [facility] and D/C resources/support.  She is agreeable with an admission.  They live in a 1LV home with [facility].  She is able to provide 24/7 supervision/assistance.  Transport has been arranged for 1630.  Dr. Salmeron, WINNIE Bocanegra, Iesha BSN & Comfort spouse are aware.  Msg left for Marques COLEMAN.

## 2020-06-25 NOTE — DISCHARGE PLANNING
Anticipated Discharge Disposition: Acute Rehab    Action: Received message from Kwaku Keita that pt has been accepted to IRF by Dr. oRdriguez.  Spouse notified.  Thomas completed placed in chart. YVES Chopra and YVES Cervantes notified.    Barriers to Discharge: none    Plan: transfer to IRF at 1630.

## 2020-06-25 NOTE — PROGRESS NOTES
Per Kwaku,   Patient's wife wants to talk to a doctor regarding her 's care. Dr Salmreon thinks it's best if neurology calls. Called NP Bridget and she said she will call wife.

## 2020-06-25 NOTE — DISCHARGE INSTRUCTIONS
Discharge Instructions    Discharged to other by [facility] with escort. Discharged via wheelchair, hospital escort: Yes.  Special equipment needed: Not Applicable    Be sure to schedule a follow-up appointment with your primary care doctor or any specialists as instructed.     Discharge Plan:   Diet Plan: Discussed  Activity Level: Discussed  Confirmed Follow up Appointment: Patient to Call and Schedule Appointment  Confirmed Symptoms Management: Discussed  Medication Reconciliation Updated: Yes    I understand that a diet low in cholesterol, fat, and sodium is recommended for good health. Unless I have been given specific instructions below for another diet, I accept this instruction as my diet prescription.   Other diet: Diabetic diet    Special Instructions:     Stroke/CVA/TIA/Hemorrhagic Ischemia Discharge Instructions  You have had a stroke. Your risk factors have been identified as follows:  Age - Over 55  Gender - Men are at a higher risk than women  High blood pressure  Diabetes  It is important that you reduce your risk factors to avoid another stroke in the future. Here are some general guidelines to follow:  · Eat healthy - avoid food high in fat.  · Get regular exercise.  · Maintain a healthy weight.  · Avoid smoking.  · Avoid alcohol and illegal drug use.  · Take your medications as directed.  For more information regarding risk factors, refer to pages 17-19 in your Stroke Patient Education Guide. Stroke Education Guide was given to patient.    Warning signs of a stroke include (which can also be found on page 3 of your Stroke Patient Education Guide):  · Sudden numbness of weakness of the face, arm or leg (especially on one side of the body).  · Sudden confusion, trouble speaking or understanding.  · Sudden trouble seeing in one or both eyes.  · Sudden trouble walking, dizziness, loss of balance or coordination.  · Sudden severe headache with no known cause.  It is very important to get treatment  quickly when a stroke occurs. If you experience any of the above warning signs, call [phone] immediately.     Some patients who have had a stroke will be going home on a blood thinner medication called Warfarin (Coumadin).  This medication requires very close monitoring and follow up.  This follow up can be provided by either your Primary Care Physician or by [facility] Outpatient Anticoagulation Service.  The Outpatient Anticoagulation Service is located at the [facility] at [facility] ([facility]).  If you do not know when your follow up appointment is scheduled, call [phone] to verify your appointment time.      · Is patient discharged on Warfarin / Coumadin?   Yes    You are receiving the drug warfarin. Please understand the importance of monitoring warfarin with scheduled PT/INR blood draws.  Follow-up with the Coumadin Clinic in one week for INR lab  Diabetes Basics    Diabetes (diabetes mellitus) is a long-term (chronic) disease. It occurs when the body does not properly use sugar (glucose) that is released from food after you eat.  Diabetes may be caused by one or both of these problems:  Your pancreas does not make enough of a hormone called insulin.  Your body does not react in a normal way to insulin that it makes.  Insulin lets sugars (glucose) go into cells in your body. This gives you energy. If you have diabetes, sugars cannot get into cells. This causes high blood sugar (hyperglycemia).  Follow these instructions at home:  How is diabetes treated?  You may need to take insulin or other diabetes medicines daily to keep your blood sugar in balance. Take your diabetes medicines every day as told by your doctor. List your diabetes medicines here:  Diabetes medicines  Name of medicine: ______________________________  Amount (dose): _______________ Time (a.m./p.m.): _______________ Notes: ___________________________________  Name of medicine:  ______________________________  Amount (dose): _______________ Time (a.m./p.m.): _______________ Notes: ___________________________________  Name of medicine: ______________________________  Amount (dose): _______________ Time (a.m./p.m.): _______________ Notes: ___________________________________  If you use insulin, you will learn how to give yourself insulin by injection. You may need to adjust the amount based on the food that you eat. List the types of insulin you use here:  Insulin  Insulin type: ______________________________  Amount (dose): _______________ Time (a.m./p.m.): _______________ Notes: ___________________________________  Insulin type: ______________________________  Amount (dose): _______________ Time (a.m./p.m.): _______________ Notes: ___________________________________  Insulin type: ______________________________  Amount (dose): _______________ Time (a.m./p.m.): _______________ Notes: ___________________________________  Insulin type: ______________________________  Amount (dose): _______________ Time (a.m./p.m.): _______________ Notes: ___________________________________  Insulin type: ______________________________  Amount (dose): _______________ Time (a.m./p.m.): _______________ Notes: ___________________________________  How do I manage my blood sugar?    Check your blood sugar levels using a blood glucose monitor as directed by your doctor.  Your doctor will set treatment goals for you. Generally, you should have these blood sugar levels:  Before meals (preprandial):  mg/dL (4.4-7.2 mmol/L).  After meals (postprandial): below 180 mg/dL (10 mmol/L).  A1c level: less than 7%.  Write down the times that you will check your blood sugar levels:  Blood sugar checks  Time: _______________ Notes: ___________________________________  Time: _______________ Notes: ___________________________________  Time: _______________ Notes: ___________________________________  Time: _______________  Notes: ___________________________________  Time: _______________ Notes: ___________________________________  Time: _______________ Notes: ___________________________________    What do I need to know about low blood sugar?  Low blood sugar is called hypoglycemia. This is when blood sugar is at or below 70 mg/dL (3.9 mmol/L). Symptoms may include:  Feeling:  Hungry.  Worried or nervous (anxious).  Sweaty and clammy.  Confused.  Dizzy.  Sleepy.  Sick to your stomach (nauseous).  Having:  A fast heartbeat.  A headache.  A change in your vision.  Tingling or no feeling (numbness) around the mouth, lips, or tongue.  Jerky movements that you cannot control (seizure).  Having trouble with:  Moving (coordination).  Sleeping.  Passing out (fainting).  Getting upset easily (irritability).  Treating low blood sugar  To treat low blood sugar, eat or drink something sugary right away. If you can think clearly and swallow safely, follow the 15:15 rule:  Take 15 grams of a fast-acting carb (carbohydrate). Talk with your doctor about how much you should take.  Some fast-acting carbs are:  Sugar tablets (glucose pills). Take 3-4 glucose pills.  6-8 pieces of hard candy.  4-6 oz (120-150 mL) of fruit juice.  4-6 oz (120-150 mL) of regular (not diet) soda.  1 Tbsp (15 mL) honey or sugar.  Check your blood sugar 15 minutes after you take the carb.  If your blood sugar is still at or below 70 mg/dL (3.9 mmol/L), take 15 grams of a carb again.  If your blood sugar does not go above 70 mg/dL (3.9 mmol/L) after 3 tries, get help right away.  After your blood sugar goes back to normal, eat a meal or a snack within 1 hour.  Treating very low blood sugar  If your blood sugar is at or below 54 mg/dL (3 mmol/L), you have very low blood sugar (severe hypoglycemia). This is an emergency. Do not wait to see if the symptoms will go away. Get medical help right away. Call your local emergency services (911 in the [place] Do not drive yourself to the  [facility].  Questions to ask your health care provider  Do I need to meet with a diabetes educator?  What equipment will I need to care for myself at home?  What diabetes medicines do I need? When should I take them?  How often do I need to check my blood sugar?  What number can I call if I have questions?  When is my next doctor's visit?  Where can I find a support group for people with diabetes?  Where to find more information  American Diabetes Association: www.diabetes.org  American Association of Diabetes Educators: www.diabeteseducator.org/patient-resources  Contact a doctor if:  Your blood sugar is at or above 240 mg/dL (13.3 mmol/L) for 2 days in a row.  You have been sick or have had a fever for 2 days or more, and you are not getting better.  You have any of these problems for more than 6 hours:  You cannot eat or drink.  You feel sick to your stomach (nauseous).  You throw up (vomit).  You have watery poop (diarrhea).  Get help right away if:  Your blood sugar is lower than 54 mg/dL (3 mmol/L).  You get confused.  You have trouble:  Thinking clearly.  Breathing.  Summary  Diabetes (diabetes mellitus) is a long-term (chronic) disease. It occurs when the body does not properly use sugar (glucose) that is released from food after digestion.  Take insulin and diabetes medicines as told.  Check your blood sugar every day, as often as told.  Keep all follow-up visits as told by your doctor. This is important.  This information is not intended to replace advice given to you by your health care provider. Make sure you discuss any questions you have with your health care provider.  Document Released: 03/22/2019 Document Revised: 02/07/2020 Document Reviewed: 03/22/2019  Elsevier Patient Education © 2020 [employer]    Stroke Prevention  Some medical conditions and lifestyle choices can lead to a higher risk for a stroke. You can help to prevent a stroke by making nutrition, lifestyle, and other changes.  What  nutrition changes can be made?    Eat healthy foods.  Choose foods that are high in fiber. These include:  Fresh fruits.  Fresh vegetables.  Whole grains.  Eat at least 5 or more servings of fruits and vegetables each day. Try to fill half of your plate at each meal with fruits and vegetables.  Choose lean protein foods. These include:  Lowfat (lean) cuts of meat.  Chicken without skin.  Fish.  Tofu.  Beans.  Nuts.  Eat low-fat dairy products.  Avoid foods that:  Are high in salt (sodium).  Have saturated fat.  Have trans fat.  Have cholesterol.  Are processed.  Are premade.  Follow eating guidelines as told by your doctor. These may include:  Reducing how many calories you eat and drink each day.  Limiting how much salt you eat or drink each day to 1,500 milligrams (mg).  Using only healthy fats for cooking. These include:  Olive oil.  Canola oil.  Sunflower oil.  Counting how many carbohydrates you eat and drink each day.  What lifestyle changes can be made?  Try to stay at a healthy weight. Talk to your doctor about what a good weight is for you.  Get at least 30 minutes of moderate physical activity at least 5 days a week. This can include:  Fast walking.  Biking.  Swimming.  Do not use any products that have nicotine or tobacco. This includes cigarettes and e-cigarettes. If you need help quitting, ask your doctor. Avoid being around tobacco smoke in general.  Limit how much alcohol you drink to no more than 1 drink a day for nonpregnant women and 2 drinks a day for men. One drink equals 12 oz of beer, 5 oz of wine, or 1½ oz of hard liquor.  Do not use drugs.  Avoid taking birth control pills. Talk to your doctor about the risks of taking birth control pills if:  You are over 35 years old.  You smoke.  You get migraines.  You have had a blood clot.  What other changes can be made?  Manage your cholesterol.  It is important to eat a healthy diet.  If your cholesterol cannot be managed through your diet, you may  "also need to take medicines. Take medicines as told by your doctor.  Manage your diabetes.  It is important to eat a healthy diet and to exercise regularly.  If your blood sugar cannot be managed through diet and exercise, you may need to take medicines. Take medicines as told by your doctor.  Control your high blood pressure (hypertension).  Try to keep your blood pressure below 130/80. This can help lower your risk of stroke.  It is important to eat a healthy diet and to exercise regularly.  If your blood pressure cannot be managed through diet and exercise, you may need to take medicines. Take medicines as told by your doctor.  Ask your doctor if you should check your blood pressure at home.  Have your blood pressure checked every year. Do this even if your blood pressure is normal.  Talk to your doctor about getting checked for a sleep disorder. Signs of this can include:  Snoring a lot.  Feeling very tired.  Take over-the-counter and prescription medicines only as told by your doctor. These may include aspirin or blood thinners (antiplatelets or anticoagulants).  Make sure that any other medical conditions you have are managed.  Where to find more information  American Stroke Association: www.strokeassociation.org  National Stroke Association: www.stroke.org  Get help right away if:  You have any symptoms of stroke. \"BE FAST\" is an easy way to remember the main warning signs:  B - Balance. Signs are dizziness, sudden trouble walking, or loss of balance.  E - Eyes. Signs are trouble seeing or a sudden change in how you see.  F - Face. Signs are sudden weakness or loss of feeling of the face, or the face or eyelid drooping on one side.  A - Arms. Signs are weakness or loss of feeling in an arm. This happens suddenly and usually on one side of the body.  S - Speech. Signs are sudden trouble speaking, slurred speech, or trouble understanding what people say.  T - Time. Time to call emergency services. Write down " what time symptoms started.  You have other signs of stroke, such as:  A sudden, very bad headache with no known cause.  Feeling sick to your stomach (nausea).  Throwing up (vomiting).  Jerky movements you cannot control (seizure).  These symptoms may represent a serious problem that is an emergency. Do not wait to see if the symptoms will go away. Get medical help right away. Call your local emergency services (911 in the [place] Do not drive yourself to the hospital.  Summary  You can prevent a stroke by eating healthy, exercising, not smoking, drinking less alcohol, and treating other health problems, such as diabetes, high blood pressure, or high cholesterol.  Do not use any products that contain nicotine or tobacco, such as cigarettes and e-cigarettes.  Get help right away if you have any signs or symptoms of a stroke.  This information is not intended to replace advice given to you by your health care provider. Make sure you discuss any questions you have with your health care provider.  Document Released: 06/18/2013 Document Revised: 02/13/2020 Document Reviewed: 03/21/2018  [employer] Patient Education © 2020 [employer]  .    IMPORTANT: HOW TO USE THIS INFORMATION:  This is a summary and does NOT have all possible information about this product. This information does not assure that this product is safe, effective, or appropriate for you. This information is not individual medical advice and does not substitute for the advice of your health care professional. Always ask your health care professional for complete information about this product and your specific health needs.      WARFARIN - ORAL (WARF-uh-rin)      COMMON BRAND NAME(S): Coumadin      WARNING:  Warfarin can cause very serious (possibly fatal) bleeding. This is more likely to occur when you first start taking this medication or if you take too much warfarin. To decrease your risk for bleeding, your doctor or other health care provider will  "monitor you closely and check your lab results (INR test) to make sure you are not taking too much warfarin. Keep all medical and laboratory appointments. Tell your doctor right away if you notice any signs of serious bleeding. See also Side Effects section.      USES:  This medication is used to treat blood clots (such as in deep vein thrombosis-DVT or pulmonary embolus-PE) and/or to prevent new clots from forming in your body. Preventing harmful blood clots helps to reduce the risk of a stroke or heart attack. Conditions that increase your risk of developing blood clots include a certain type of irregular heart rhythm (atrial fibrillation), heart valve replacement, recent heart attack, and certain surgeries (such as hip/knee replacement). Warfarin is commonly called a \"blood thinner,\" but the more correct term is \"anticoagulant.\" It helps to keep blood flowing smoothly in your body by decreasing the amount of certain substances (clotting proteins) in your blood.      HOW TO USE:  Read the Medication Guide provided by your pharmacist before you start taking warfarin and each time you get a refill. If you have any questions, ask your doctor or pharmacist. Take this medication by mouth with or without food as directed by your doctor or other health care professional, usually once a day. It is very important to take it exactly as directed. Do not increase the dose, take it more frequently, or stop using it unless directed by your doctor. Dosage is based on your medical condition, laboratory tests (such as INR), and response to treatment. Your doctor or other health care provider will monitor you closely while you are taking this medication to determine the right dose for you. Use this medication regularly to get the most benefit from it. To help you remember, take it at the same time each day. It is important to eat a balanced, consistent diet while taking warfarin. Some foods can affect how warfarin works in your " body and may affect your treatment and dose. Avoid sudden large increases or decreases in your intake of foods high in vitamin K (such as broccoli, cauliflower, cabbage, brussels sprouts, kale, spinach, and other green leafy vegetables, liver, green tea, certain vitamin supplements). If you are trying to lose weight, check with your doctor before you try to go on a diet. Cranberry products may also affect how your warfarin works. Limit the amount of cranberry juice (16 ounces/480 milliliters a day) or other cranberry products you may drink or eat.      SIDE EFFECTS:  Nausea, loss of appetite, or stomach/abdominal pain may occur. If any of these effects persist or worsen, tell your doctor or pharmacist promptly. Remember that your doctor has prescribed this medication because he or she has judged that the benefit to you is greater than the risk of side effects. Many people using this medication do not have serious side effects. This medication can cause serious bleeding if it affects your blood clotting proteins too much (shown by unusually high INR lab results). Even if your doctor stops your medication, this risk of bleeding can continue for up to a week. Tell your doctor right away if you have any signs of serious bleeding, including: unusual pain/swelling/discomfort, unusual/easy bruising, prolonged bleeding from cuts or gums, persistent/frequent nosebleeds, unusually heavy/prolonged menstrual flow, pink/dark urine, coughing up blood, vomit that is bloody or looks like coffee grounds, severe headache, dizziness/fainting, unusual or persistent tiredness/weakness, bloody/black/tarry stools, chest pain, shortness of breath, difficulty swallowing. Tell your doctor right away if any of these unlikely but serious side effects occur: persistent nausea/vomiting, severe stomach/abdominal pain, yellowing eyes/skin. This drug rarely has caused very serious (possibly fatal) problems if its effects lead to small blood clots  (usually at the beginning of treatment). This can lead to severe skin/tissue damage that may require surgery or amputation if left untreated. Patients with certain blood conditions (protein C or S deficiency) may be at greater risk. Get medical help right away if any of these rare but serious side effects occur: painful/red/purplish patches on the skin (such as on the toe, breast, abdomen), change in the amount of urine, vision changes, confusion, slurred speech, weakness on one side of the body. A very serious allergic reaction to this drug is rare. However, get medical help right away if you notice any symptoms of a serious allergic reaction, including: rash, itching/swelling (especially of the face/tongue/throat), severe dizziness, trouble breathing. This is not a complete list of possible side effects. If you notice other effects not listed above, contact your doctor or pharmacist. In the [place] - Call your doctor for medical advice about side effects. You may report side effects to FDA at 9-140-DBX-0960. In [place] - Call your doctor for medical advice about side effects. You may report side effects to [facility] [place] at 1-577.508.2721.      PRECAUTIONS:  Before taking warfarin, tell your doctor or pharmacist if you are allergic to it; or if you have any other allergies. This product may contain inactive ingredients, which can cause allergic reactions or other problems. Talk to your pharmacist for more details. Before using this medication, tell your doctor or pharmacist your medical history, especially of: blood disorders (such as anemia, hemophilia), bleeding problems (such as bleeding of the stomach/intestines, bleeding in the brain), blood vessel disorders (such as aneurysms), recent major injury/surgery, liver disease, alcohol use, mental/mood disorders (including memory problems), frequent falls/injuries. It is important that all your doctors and dentists know that you take warfarin. Before having surgery or any  medical/dental procedures, tell your doctor or dentist that you are taking this medication and about all the products you use (including prescription drugs, nonprescription drugs, and herbal products). Avoid getting injections into the muscles. If you must have an injection into a muscle (for example, a flu shot), it should be given in the arm. This way, it will be easier to check for bleeding and/or apply pressure bandages. This medication may cause stomach bleeding. Daily use of alcohol while using this medicine will increase your risk for stomach bleeding and may also affect how this medication works. Limit or avoid alcoholic beverages. If you have not been eating well, if you have an illness or infection that causes fever, vomiting, or diarrhea for more than 2 days, or if you start using any antibiotic medications, contact your doctor or pharmacist immediately because these conditions can affect how warfarin works. This medication can cause heavy bleeding. To lower the chance of getting cut, bruised, or injured, use great caution with sharp objects like safety razors and nail cutters. Use an electric razor when shaving and a soft toothbrush when brushing your teeth. Avoid activities such as contact sports. If you fall or injure yourself, especially if you hit your head, call your doctor immediately. Your doctor may need to check you. The Food & Drug Administration has stated that generic warfarin products are interchangeable. However, consult your doctor or pharmacist before switching warfarin products. Be careful not to take more than one medication that contains warfarin unless specifically directed by the doctor or health care provider who is monitoring your warfarin treatment. Older adults may be at greater risk for bleeding while using this drug. This medication is not recommended for use during pregnancy because of serious (possibly fatal) harm to an unborn baby. Discuss the use of reliable forms of birth  "control with your doctor. If you become pregnant or think you may be pregnant, tell your doctor immediately. If you are planning pregnancy, discuss a plan for managing your condition with your doctor before you become pregnant. Your doctor may switch the type of medication you use during pregnancy. Very small amounts of this medication may pass into breast milk but is unlikely to harm a nursing infant. Consult your doctor before breast-feeding.      DRUG INTERACTIONS:  Drug interactions may change how your medications work or increase your risk for serious side effects. This document does not contain all possible drug interactions. Keep a list of all the products you use (including prescription/nonprescription drugs and herbal products) and share it with your doctor and pharmacist. Do not start, stop, or change the dosage of any medicines without your doctor's approval. Warfarin interacts with many prescription, nonprescription, vitamin, and herbal products. This includes medications that are applied to the skin or inside the vagina or rectum. The interactions with warfarin usually result in an increase or decrease in the \"blood-thinning\" (anticoagulant) effect. Your doctor or other health care professional should closely monitor you to prevent serious bleeding or clotting problems. While taking warfarin, it is very important to tell your doctor or pharmacist of any changes in medications, vitamins, or herbal products that you are taking. Some products that may interact with this drug include: capecitabine, imatinib, mifepristone. Aspirin, aspirin-like drugs (salicylates), and nonsteroidal anti-inflammatory drugs (NSAIDs such as ibuprofen, naproxen, celecoxib) may have effects similar to warfarin. These drugs may increase the risk of bleeding problems if taken during treatment with warfarin. Carefully check all prescription/nonprescription product labels (including drugs applied to the skin such as pain-relieving " creams) since the products may contain NSAIDs or salicylates. Talk to your doctor about using a different medication (such as acetaminophen) to treat pain/fever. Low-dose aspirin and related drugs (such as clopidogrel, ticlopidine) should be continued if prescribed by your doctor for specific medical reasons such as heart attack or stroke prevention. Consult your doctor or pharmacist for more details. Many herbal products interact with warfarin. Tell your doctor before taking any herbal products, especially bromelains, coenzyme Q10, cranberry, danshen, dong quai, fenugreek, garlic, ginkgo biloba, ginseng, and [place]'s wort, among others. This medication may interfere with a certain laboratory test to measure theophylline levels, possibly causing false test results. Make sure laboratory personnel and all your doctors know you use this drug.      OVERDOSE:  If overdose is suspected, contact a poison control center or emergency room immediately.  residents can call the [employer] Poison Hotline at 1-107.759.7748. [place] residents can call a provincial poison control center. Symptoms of overdose may include: bloody/black/tarry stools, pink/dark urine, unusual/prolonged bleeding.      NOTES:  Do not share this medication with others. Laboratory and/or medical tests (such as INR, complete blood count) must be performed periodically to monitor your progress or check for side effects. Consult your doctor for more details.      MISSED DOSE:  For the best possible benefit, do not miss any doses. If you do miss a dose and remember on the same day, take it as soon as you remember. If you remember on the next day, skip the missed dose and resume your usual dosing schedule. Do not double the dose to catch up because this could increase your risk for bleeding. Keep a record of missed doses to give to your doctor or pharmacist. Contact your doctor or pharmacist if you miss 2 or more doses in a row.      STORAGE:  Store at room  temperature away from light and moisture. Do not store in the bathroom. Keep all medications away from children and pets. Do not flush medications down the toilet or pour them into a drain unless instructed to do so. Properly discard this product when it is  or no longer needed. Consult your pharmacist or local waste disposal company for more details about how to safely discard your product.      MEDICAL ALERT:  Your condition and medication can cause complications in a medical emergency. For information about enrolling in MedicAlert, call 1-552.618.7454 ([place]) or 1-302.253.3095 ([place]).      Information last revised 2010 Copyright(c) 2010 [employer]             Depression / Suicide Risk    As you are discharged from this [facility] facility, it is important to learn how to keep safe from harming yourself.    Recognize the warning signs:  · Abrupt changes in personality, positive or negative- including increase in energy   · Giving away possessions  · Change in eating patterns- significant weight changes-  positive or negative  · Change in sleeping patterns- unable to sleep or sleeping all the time   · Unwillingness or inability to communicate  · Depression  · Unusual sadness, discouragement and loneliness  · Talk of wanting to die  · Neglect of personal appearance   · Rebelliousness- reckless behavior  · Withdrawal from people/activities they love  · Confusion- inability to concentrate     If you or a loved one observes any of these behaviors or has concerns about self-harm, here's what you can do:  · Talk about it- your feelings and reasons for harming yourself  · Remove any means that you might use to hurt yourself (examples: pills, rope, extension cords, firearm)  · Get professional help from the community (Mental Health, Substance Abuse, psychological counseling)  · Do not be alone:Call your Safe Contact- someone whom you trust who will be there for you.  · Call your local CRISIS  HOTLINE 769-4725 or 643-506-6389  · Call your local Children's Mobile Crisis Response Team [place] (600) 965-5542 or www.Worlds  · Call the toll free National Suicide Prevention Hotlines   · National Suicide Prevention Lifeline 567-065-PCXQ (7557)  · National [employer] Line Network 800-SUICIDE (740-6431)

## 2020-06-25 NOTE — CARE PLAN
Problem: Communication  Goal: The ability to communicate needs accurately and effectively will improve  Outcome: PROGRESSING AS EXPECTED  Note: Here for stroke. Patient calm and corporative with most of care, will get a little agitated at times. A7O x 3 with confusion to time. Reoriented. All needs are met by staff. Patient wants to talk to wife but per night RN, wife does not want to talk with patient. Patient will get agitated when wife is around or talking on the phone. Patient redirected and now resting in chair with TV on.      Problem: Safety  Goal: Will remain free from falls  Outcome: PROGRESSING AS EXPECTED  Note: Here for stroke. Bed is locked and in lowest position with call light within reach. Bed/chair alarm on. Patient does not call for staff. Patient upset about the bed alarm but educated why it's there and patient verbalizes understanding.

## 2020-06-25 NOTE — PREADMISSION SCREENING NOTE
Pre-Admission Screening Form    Patient Information:   Name: Carlos Rivera     MRN: [id number]       : 1945      Age: 74 y.o.   Gender: male      Race: Patient Refused [9]  White [7]       Marital Status:  [2]  Family Contact: St Ayers,Comfort Rivera, Jw Deleon        Relationship: Spouse [17]  Son [15]  Son [15]  Home Phone:                Cell Phone: [phone]  Advanced Directives: None  Code Status:  FULL  Current Attending Provider: Alissa Salmeron M.D.  Referring Physician: Dr. Flowers   Physiatrist Consult: Dr. Hemphill    Referral Date: 20  Primary Payor Source:  MEDICARE  Secondary Payor Source:  [facility]    Medical Information:   Date of Admission to Acute Care [place]  Room Number: S199/01  Rehabilitation Diagnosis: 0001.9 - Stroke: Other Stroke  Immunization History   Administered Date(s) Administered   • Influenza TIV (IM) 10/14/2008, 2013, 10/24/2014   • Influenza Vaccine Adult HD 2015, 2017, 2018   • Influenza Vaccine Pediatric Split 10/20/1998, 2001, 2002, 2003, 10/25/2004, 2005, 10/16/2013   • Influenza Vaccine Quad Inj (Pf) 2013   • Pneumococcal Conjugate Vaccine (Prevnar/PCV-13) 2015, 2017   • Pneumococcal polysaccharide vaccine (PPSV-23) 1997, 10/14/2008, 03/15/2010   • TD Vaccine 1997   • Tdap Vaccine 2010   • Zoster Vaccine Live (ZVL) (Zostavax) 2016     Allergies   Allergen Reactions   • Okra Vomiting   • Risperidone      Dystonia, altered mental status   • Hydrocodone-Acetaminophen Vomiting     Past Medical History:   Diagnosis Date   • Chronic anticoagulation 2017   • History of mitral valve replacement with mechanical valve [Z95.2] 3/10/2017   • Hyperlipidemia    • Type II diabetes mellitus (HCC) 2017     Past Surgical History:   Procedure Laterality Date   • PB COLONOSCOPY,DIAGNOSTIC N/A 2020    "Procedure: COLONOSCOPY;  Surgeon: Jatinder Madera M.D.;  Location: SURGERY [facility];  Service: Gastroenterology   • MITRAL VALVE REPLACEMENT  1999   • INGUINAL HERNIA REPAIR Bilateral 1984   • TONSILLECTOMY         History Leading to Admission, Conditions that Caused the Need for Rehab (CMS):     Dr. Flowers H&P:  Chief Complaint   Patient presents with   • Possible Stroke       Difficulty speech and \"finding words\" approximately ten minutes prior to arrival, code stroke activated        History of Presenting Illness  74 y.o. male who presented 6/23/2020 with a history of dyslipidemia, diabetes type 2, MVR with ongoing anticoagulation for mechanical valve, he is a overall poor historian, chief complaint was a headache and abnormal speech upon awakening in the morning, he woke up about 5:00 in the morning, he states now that he had some difficulty urinating, he was found to have a significant amount of dysarthria and word finding difficulties, he was hypertensive at the scene, his blood sugar was in the 240s, initial work-up in the ER did not reveal acute findings based on CT/CTA the patient is chronically on Coumadin and his admitting INR is 2.5, he states that he might of had a fall unknown time and severity, he is again currently not a good historian.  Neurology has evaluated the patient, work-up is in progress, the patient not a candidate for thrombolytics     Assessment/Plan:        Cerebrovascular accident (CVA) due to thrombosis of cerebral artery (HCC)- (present on admission)  Assessment & Plan  Likely acute to subacute event given presentation  Continue anticoagulation, no aspirin, intensify statin, await MRI scanning  Stroke order set, neurology evaluation     Hypertension- (present on admission)  Assessment & Plan  Permissive hypertension for the first 48 hours     Type II diabetes mellitus (HCC)- (present on admission)  Assessment & Plan  Maintain glycemic control     Chronic " anticoagulation- (present on admission)  Assessment & Plan  Continue Coumadin, monitor     Dementia (HCC)- (present on admission)  Assessment & Plan  History of, questionable baseline     Hyperlipidemia- (present on admission)  Assessment & Plan  Intensify statin therapy  Plan  Complete stroke work-up  Continued intensified statin therapy  Permissive hypertension  Neurology consult  Await MRI  Therapies, PT OT SLP  See orders  Medically complex high risk     The patient qualifies for an acute inpatient admission given patient's symptoms and need for intensified therapy and work-up likely requiring 2+ overnight stays in the hospital        WINNIE Bocanegra (Neurology) recommendations:  ASSESSMENT AND PLAN:  74-year old male with PMHx significant for dyslipidemia, type II diabetes mellitus, MVR with mechanical valve replacement (On/compliant with Coumadin) who presented to [facility] on 6/23/20 for a chief complaint of headache and abnormal speech upon waking this morning; Patient not a candidate for IV tPA secondary to wake-up nature of event, also given concurrent use of Coumadin (INR 2.5). CT head, CTA head/neck unremarkable. Differential diagnoses include small acute ischemic stroke (?possible cardioembolic etiology) vs. Encephalopathy, hypertensive or toxic/metabolic.       Recommendations/Plan:       -q4h and PRN neuro assessment. VS per nursing/unit protocol. Permissive HTN ok until 6/25, not to exceed SBP > 220, DBP > 105. Then, BP goal < 140/90. Antihypertensives per primary team.   -Obtain MRI Brain wo contrast.   -Telemetry; currently SR. Screen for Afib/arrhythmia. Obtain TTE with bubble study.   -Continue home dose Coumadin; no need to stop Coumadin nor add ASA at this time.   -Atorvastatin 80 mg PO q HS. Check lipid panel.   -Recommend aggressive BG management per primary team. Avoid IVF with Dextrose. BG goal 140-180. Check hemoglobin A1c.   -Note UA WNL, CXR WNL; no leukocytosis or fever; mildly  "elevated BUN and elevated troponin.Check B12, TSH; closely monitor electrolytes and replace as needed to combat other potential source of encephalopathy.   -PT/OT/SLP eval and treat.   -Will follow up with results of the above and make additional recommendations accordingly. All other medical management per primary team.   -DVT PPX: SCDs    PRADEEP Daniels    Nurse Practitioner    Neurology    Progress Notes    Signed    Date of Service:  6/25/2020 10:06 AM                Expand All Collapse All      []Hide copied text    []Hover for details  Chief Complaint   Patient presents with   • Possible Stroke       Difficulty speech and \"finding words\" approximately ten minutes prior to arrival, code stroke activated            Problem List Items Addressed This Visit      None               Visit Diagnoses      Speech disturbance, unspecified type   (Acute)       Relevant Orders     REFERRAL TO PHYSIATRY (PMR)       Neurology Progress Note      History of present illness:  This is a 74-year old male with PMHx significant for dyslipidemia, type II diabetes mellitus, MVR with mechanical valve replacement (On/compliant with Coumadin) who presented to [facility] on 6/23/20 for a chief complaint of headache and abnormal speech upon waking this morning. Patient reportedly went to sleep in his usual state of health last night (unknown time); when he awoke at 0500, patient states that he had severe headache to whole head; also with word-finding difficulty. EMS called; on scene/at time of presentation here, SBP 180s-190s. CT head on arrival revealed no acute intracranial abnormality. CTA head/neck with no LVO. Patient determined not to be a candidate for IV tPA secondary to wake-up nature of event, also given concurrent use of Coumadin (INR 2.5).  Currently, patient is sitting up in stretcher; arousable. Speech remains somewhat aphasic, with word finding difficulty vs mild confusion (states he is 67 years old; " impaired naming of objects). Admits to persistent headache, as above. Denies weakness/focal weakness, numbness, paresthesia, problem with vision, speech or swallowing. Denies nausea or chest pain (note troponin 108 on arrival).      Neurology has been consulted by Dr. Randa Bowen to further evaluate the findings noted above.      Interval, 6/25/20:  Patient sitting up in bed; awake and alert. Going to MRI now. No events overnight per nursing, speech improved, near baseline.      No changes to HPI as was previously documented.      Past medical history:   Past Medical History        Past Medical History:   Diagnosis Date   • Chronic anticoagulation 2/23/2017   • History of mitral valve replacement with mechanical valve [Z95.2] 3/10/2017   • Hyperlipidemia     • Type II diabetes mellitus (HCC) 2/23/2017           Past surgical history:   Past Surgical History         Past Surgical History:   Procedure Laterality Date   • PB COLONOSCOPY,DIAGNOSTIC N/A 5/9/2020     Procedure: COLONOSCOPY;  Surgeon: Jatinder Madera M.D.;  Location: SURGERY [facility];  Service: Gastroenterology   • MITRAL VALVE REPLACEMENT   1999   • INGUINAL HERNIA REPAIR Bilateral 1984   • TONSILLECTOMY               Family history:   Family History         Family History   Problem Relation Age of Onset   • Heart Attack Father 58   • Diabetes Father     • Heart Attack Paternal Uncle     • No Known Problems Sister     • No Known Problems Brother     • No Known Problems Maternal Grandmother     • No Known Problems Maternal Grandfather     • No Known Problems Paternal Grandmother     • Heart Attack Paternal Grandfather     • No Known Problems Sister     • No Known Problems Brother             Social history:   Social History               Socioeconomic History   • Marital status:        Spouse name: Not on file   • Number of children: Not on file   • Years of education: Not on file   • Highest education level: Not on file   Occupational  History   • Not on file   Social Needs   • Financial resource strain: Not on file   • Food insecurity       Worry: Not on file       Inability: Not on file   • Transportation needs       Medical: Not on file       Non-medical: Not on file   Tobacco Use   • Smoking status: Never Smoker   • Smokeless tobacco: Never Used   Substance and Sexual Activity   • Alcohol use: No       Alcohol/week: 0.0 oz   • Drug use: No   • Sexual activity: Yes       Comment:    Lifestyle   • Physical activity       Days per week: Not on file       Minutes per session: Not on file   • Stress: Not on file   Relationships   • Social connections       Talks on phone: Not on file       Gets together: Not on file       Attends [employer] service: Not on file       Active member of club or organization: Not on file       Attends meetings of clubs or organizations: Not on file       Relationship status: Not on file   • Intimate partner violence       Fear of current or ex partner: Not on file       Emotionally abused: Not on file       Physically abused: Not on file       Forced sexual activity: Not on file   Other Topics Concern   • Not on file   Social History Narrative     Retired from [employer]            Current medications:        Current Facility-Administered Medications   Medication Dose   • warfarin (COUMADIN) tablet 5 mg  5 mg   • lisinopril (PRINIVIL) tablet 20 mg  20 mg   • enalaprilat (VASOTEC) injection 1.25 mg  1.25 mg   • atorvastatin (LIPITOR) tablet 80 mg  80 mg   • citalopram (CELEXA) tablet 20 mg  20 mg   • insulin glargine (LANTUS) injection 15 Units  15 Units   • QUEtiapine (SEROQUEL) tablet 50 mg  50 mg   • senna-docusate (PERICOLACE or SENOKOT S) 8.6-50 MG per tablet 2 Tab  2 Tab     And   • polyethylene glycol/lytes (MIRALAX) PACKET 1 Packet  1 Packet     And   • magnesium hydroxide (MILK OF MAGNESIA) suspension 30 mL  30 mL     And   • bisacodyl (DULCOLAX) suppository 10 mg  10 mg   • acetaminophen (TYLENOL) tablet 650  mg  650 mg   • ondansetron (ZOFRAN) syringe/vial injection 4 mg  4 mg   • ondansetron (ZOFRAN ODT) dispertab 4 mg  4 mg   • insulin regular (HUMULIN R) injection 2-9 Units  2-9 Units     And   • glucose 4 g chewable tablet 16 g  16 g     And   • dextrose 50% (D50W) injection 50 mL  50 mL   • MD Alert...Warfarin per Pharmacy          Medication Allergy:        Allergies   Allergen Reactions   • Okra Vomiting   • Risperidone         Dystonia, altered mental status   • Hydrocodone-Acetaminophen Vomiting        Review of systems:   Constitutional: denies fever, night sweats, weight loss.   Eyes: denies acute vision change, eye pain or secretion.   Ears, Nose, Mouth, Throat: denies nasal secretion, nasal bleeding, difficulty swallowing, hearing loss, tinnitus, vertigo, ear pain, acute dental problems, oral ulcers or lesions.   Endocrine: denies recent weight changes, heat or cold intolerance, polyuria, polydypsia, polyphagia,abnormal hair growth.  Cardiovascular: denies new onset of chest pain, palpitations, syncope, or dyspnea of exertion.  Pulmonary: denies shortness of breath, new onset of cough, hemoptysis, wheezing, chest pain or flu-like symptoms.   GI: denies nausea, vomiting, diarrhea, GI bleeding, change in appetite, abdominal pain, and change in bowel habits.  : denies dysuria, urinary incontinence, hematuria.  Heme/oncology: denies history of easy bruising or bleeding. No history of cancer, DVTor PE.  Allergy/immunology: denies hives/urticaria, or itching.   Dermatologic: denies new rash, or new skin lesions.  Musculoskeletal:denies joint swelling or pain, muscle pain, neck and back pain.   Neurologic: As noted above.   Psychiatric: denies symptoms of depression, anxiety, hallucinations, mood swings or changes, suicidal or homicidal thoughts.      Physical examination:   Vitals          Vitals:     06/24/20 1923 06/25/20 0000 06/25/20 0500 06/25/20 0800   BP: 151/75 115/65 118/52 135/75   Pulse: (!) 59 79  (!) 47 80   Resp: 16 16 16 16   Temp: 36.9 °C (98.5 °F) 36.5 °C (97.7 °F) 36.3 °C (97.3 °F) 36.3 °C (97.4 °F)   TempSrc: Temporal Temporal Temporal Temporal   SpO2: 96% 97% 97% 95%   Weight:           Height:                General: Patient in no acute distress, pleasant and cooperative.  HEENT: Normocephalic, no signs of acute trauma.   Neck: supple, no meningeal signs or carotid bruits. There is normal range of motion. No tenderness on exam.   Chest: clear to auscultation. No cough.   CV: RRR, no murmurs.   Skin: no signs of acute rashes or trauma.   Musculoskeletal: joints exhibit full range of motion, without any pain to palpation. There are no signs of joint or muscle swelling. There is no tenderness to deep palpation of muscles.   Psychiatric: No hallucinatory behavior.         NEUROLOGICAL EXAM:   Mental status, orientation: Awake, alert. Oriented to self time and place.  Speech and language: speech is clear/non dysarthric; fluent, no impaired naming of objects.   Cranial nerve exam: Pupils are 3-4 mm bilaterally and equally reactive to light. Visual fields are intact by confrontation. There is no nystagmus on primary or secondary gaze. Intact full EOM in all directions of gaze. Face appears symmetric. Sensation in the face is intact to light touch. Tongue is midline and without any signs of tongue biting or fasciculations. Shoulder shrug is intact bilaterally.   Motor exam: Strength is 5/5 in all extremities. Tone is normal. No abnormal movements were seen on exam.   Sensory exam reveals normal sense of light touch and pinprick in all extremities.   Deep tendon reflexes:  2+ throughout. Plantar responses are flexor. There is no clonus.   Coordination: shows a normal finger-nose-finger. Normal rapidly alternating movements.   Gait: Not assessed at this time as patient is a fall risk.      No changes to exam as was documented by me on 6/24/20.         NIH Stroke Scale     1a Level of Consciousness   1b  Orientation Questions   1c Response to Commands   2 Gaze   3 Visual Fields   4 Facial Movement   5 Motor Function (arm)   a Left   b Right   6 Motor Function (leg)   a Left   b Right   7 Limb Ataxia   8 Sensory   9 Language   10 Articulation   11 Extinction/Inattention      Score: 0        ANCILLARY DATA REVIEWED:      Lab Data Review:  Recent Results         Recent Results (from the past 24 hour(s))   ACCU-CHEK GLUCOSE     Collection Time: 06/24/20 11:58 AM   Result Value Ref Range     Glucose - Accu-Ck 313 (H) 65 - 99 mg/dL   ACCU-CHEK GLUCOSE     Collection Time: 06/24/20  4:06 PM   Result Value Ref Range     Glucose - Accu-Ck 118 (H) 65 - 99 mg/dL   ACCU-CHEK GLUCOSE     Collection Time: 06/24/20  9:02 PM   Result Value Ref Range     Glucose - Accu-Ck 122 (H) 65 - 99 mg/dL   ACCU-CHEK GLUCOSE     Collection Time: 06/25/20  5:30 AM   Result Value Ref Range     Glucose - Accu-Ck 107 (H) 65 - 99 mg/dL   PROTHROMBIN TIME     Collection Time: 06/25/20  5:41 AM   Result Value Ref Range     PT 22.9 (H) 12.0 - 14.6 sec     INR 1.96 (H) 0.87 - 1.13           Labs reviewed by me.         Imaging reviewed by me:      EC-ECHOCARDIOGRAM COMPLETE W/O CONT   Final Result       DX-CHEST-PORTABLE (1 VIEW)   Final Result       No acute cardiopulmonary abnormality.       CT-CTA NECK WITH & W/O-POST PROCESSING   Final Result       No high-grade stenosis, large vessel occlusion, aneurysm or dissection.       CT-CTA HEAD WITH & W/O-POST PROCESS   Final Result       No large vessel occlusion, high-grade stenosis or aneurysm of the [place].       CT-CEREBRAL PERFUSION ANALYSIS   Final Result       1.  Cerebral blood flow less than 30% likely representing completed infarct = 0 mL.       2.  T Max more than 6 seconds likely representing combination of completed infarct and ischemia = 10 mL.       3.  Mismatched volume likely representing ischemic brain/penumbra = 10 mL.       4.  Please note that the cerebral perfusion was  performed on the limited brain tissue around the basal ganglia region. Infarct/ischemia outside the CT perfusion sections can be missed in this study.       CT-HEAD W/O   Final Result       1. No CT evidence of acute infarct, hemorrhage or mass.   2. Moderate global parenchymal atrophy. Chronic small vessel ischemic changes.       MR-BRAIN-W/O    (Results Pending)           Presumed mechanism by TOAST:  __Large Artery Atherosclerosis  __Small Vessel ([place]un[place])  __Cardioembolic  __Other (Sickle Cell, Vasculitis, Hypercoagulable)  _X_Unknown     Cardioembolic?     Modified Angela Score (on arrival):   Modified Douglass (at home/prior to arrival): 0        ASSESSMENT AND PLAN:  74-year old male with PMHx significant for dyslipidemia, type II diabetes mellitus, MVR with mechanical valve replacement (On/compliant with Coumadin) who presented to [facility] on 6/23/20 for a chief complaint of headache and abnormal speech upon waking on morning of presentation; Patient not a candidate for IV tPA secondary to wake-up nature of event, also given concurrent use of Coumadin (INR 2.5). CT head, CTA head/neck unremarkable. Patient appears to be at/near neurological baseline now; NIHSS 0; he remains confused (has mild/moderate dementia at baseline), speech non-dysarthric, fluent. Additionally, patient had MRI Brain this morning; awaiting radiology read, per my read, no acute intracranial abnormality.      Impression:   Hypertensive encephalopathy vs transient ischemic event (TIA).  Hypertensive crisis, resolved.   Headache, resolved.   Dyslipidemia.   Type II DM.  MVR with mechanical valve, on Coumadin.         Recommendations/Plan:       -q4h and PRN neuro assessment. VS per nursing/unit protocol. BP goal < 140/90. Antihypertensives per primary team.   -Telemetry; currently SR. Screen for Afib/arrhythmia. TTE with EF 60%.   -Continue home dose Coumadin; no need to stop Coumadin nor add ASA at this time.   -Atorvastatin 80 mg  PO q HS. Note LDL is 47, at goal.   -Recommend aggressive BG management per primary team. Avoid IVF with Dextrose. BG goal 140-180. hemoglobin A1c-- 6.6.   -Note UA WNL, CXR WNL; no leukocytosis or fever; mildly elevated BUN and elevated troponin.Check B12, TSH-- both unremarkable. closely monitor electrolytes and replace as needed to combat other potential source of encephalopathy.   -PT/OT/SLP eval and treat.   -Outpatient neurology referral placed by me today 6/25/20.   -All other medical management per primary team.   -DVT PPX: SCDs.      The plan of care above has been discussed with Dr. Almeida. Other than the above, no further recommendations from a neurological perspective, patient is clear for discharge. Please call with questions.      RODRICK Daniels.  [facility]              Head CT 06-23-20:  1. No CT evidence of acute infarct, hemorrhage or mass.  2. Moderate global parenchymal atrophy. Chronic small vessel ischemic changes.    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     Priority: High   • Hypertension 06/15/2020     Priority: High   • Acute on Chronic Encephalopathy 05/10/2020     Priority: High   • Coronary artery disease involving coronary bypass graft 05/08/2020     Priority: High   • History of mitral valve replacement with mechanical valve [Z95.2] 03/10/2017     Priority: High   • Type II diabetes mellitus (HCC) 02/23/2017     Priority: High   • Dementia with behavioral disturbance (HCC) 05/15/2020     Priority: Medium   • Chronic anticoagulation 02/23/2017     Priority: Medium   •  "Proteinuria 06/16/2020   • Anger 06/15/2020   • Anemia 06/15/2020   • Diabetic neuropathy ([facility]) 06/15/2020   • Obstructive sleep apnea syndrome 12/24/2019   • Caregiver stress 10/29/2019   • At risk for falls 09/18/2019   • Polypharmacy 09/18/2019   • Medication management 07/10/2019   • Shuffling gait 02/07/2019   • Dementia ([facility]) 03/29/2018   • History of stroke 03/29/2018   • Mild single current episode of major depressive disorder ([facility]) 12/20/2017   • Hyperlipidemia 02/23/2017   • Anxiety 02/23/2017   • Mild cognitive impairment 02/23/2017     A & O periods of confusion/forgetfulness.      Current Vital Signs:   Temperature: 36.3 °C (97.4 °F) Pulse: 80 Respiration: 16 Blood Pressure : 135/75  Weight: 56.8 kg (125 lb 3.5 oz) Height: 160 cm (5' 3\")  Pulse Oximetry: 95 % O2 (LPM): 0      Completed Laboratory Reports:  Recent Labs     06/23/20  0620 06/23/20  0647 06/23/20  1337 06/23/20  1732 06/23/20  2005 06/24/20  0619 06/24/20  0725 06/24/20  1158 06/24/20  1606 06/24/20  2102 06/25/20  0530 06/25/20  0541 06/25/20  1125   WBC 10.6  --   --   --   --   --  6.9  --   --   --   --   --   --    HEMOGLOBIN 15.0  --   --   --   --   --  14.9  --   --   --   --   --   --    HEMATOCRIT 45.3  --   --   --   --   --  45.8  --   --   --   --   --   --    PLATELETCT 149*  --   --   --   --   --  150*  --   --   --   --   --   --    SODIUM 139  --   --   --   --   --  138  --   --   --   --   --   --    POTASSIUM 4.7  --   --   --   --   --  4.3  --   --   --   --   --   --    BUN 32*  --   --   --   --   --  28*  --   --   --   --   --   --    CREATININE 1.36  --   --   --   --   --  1.20  --   --   --   --   --   --    ALBUMIN 4.2  --   --   --   --   --  3.9  --   --   --   --   --   --    GLUCOSE 312*  --   --   --   --   --  141*  --   --   --   --   --   --    POCGLUCOSE  --  292* 174* 209* 317* 129*  --  313* 118* 122* 107*  --  232*   INR 2.53*  --   --   --   --   --  1.65*  --   --   --   --  1.96*  --      "     Additional Labs: Not Applicable    Prior Living Situation:   Housing / Facility: 1 Story House  Steps Into Home: 2  Steps In Home: 0  Lives with - Patient's Self Care Capacity: Spouse  Equipment Owned: Other (Comments)(walking sticks)    Prior Level of Function / Living Situation:   Physical Therapy: Prior Services: Home-Independent  Housing / Facility: 1 Story House  Steps Into Home: 2  Steps In Home: 0  Bathroom Set up: Walk In Shower, Shower Chair, Grab Bars  Equipment Owned: Other (Comments)(walking sticks)  Lives with - Patient's Self Care Capacity: Spouse  Bed Mobility: Independent  Transfer Status: Independent  Ambulation: Independent  Distance Ambulation (Feet): (community)  Assistive Devices Used: (walking sticks)  Stairs: Independent  Current Level of Function:   Gait Level Of Assist: Minimal Assist  Assistive Device: Front Wheel Walker  Distance (Feet): 100  Deviation: Shuffled Gait, Bradykinetic, Decreased Base Of Support, Other (Comment)(very slow gait)  # of Stairs Climbed: 0  Weight Bearing Status: FWB  Skilled Intervention: Verbal Cuing  Supine to Sit: (in chair)  Sit to Supine: (in chair)  Comments: up in chair pre/post  Sit to Stand: Minimal Assist  Bed, Chair, Wheelchair Transfer: Minimal Assist  Toilet Transfers: Minimal Assist  Transfer Method: Stand Step  Skilled Intervention: Verbal Cuing  Sitting in Chair: 10+ min  Standing: 10 min  Occupational Therapy:   Self Feeding: Independent  Grooming / Hygiene: Independent  Bathing: Independent  Dressing: Independent  Toileting: Independent  Medication Management: Independent  Laundry: Requires Assist  Kitchen Mobility: Independent  Finances: Independent  Home Management: Requires Assist  Shopping: Independent  Prior Level Of Mobility: (uses walking sticks)  Prior Services: Home-Independent  Housing / Facility: 1 Story House  Occupation (Pre-Hospital Vocational): 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 Therapeutic Interventions Required: Maximize Endurance, Mobility, Strength and Safety  Occupational Therapy: 1 hr / day 5 days / week. Therapeutic Interventions Required: Maximize Self Care, ADLs, IADLs and Energy Conservation  Speech & Language Pathology: 1 hr / day 5 days / week. Therapeutic Interventions Required: Maximize Cognition, Swallowing and Safety  Rehabilitation Nursin/. Therapeutic Interventions Required: Monitor Pain, Skin, Vital Signs, Intake and Output, Labs, Safety, Aspiration Risk and Family Training  Rehabilitation Physician: 3 - 5 days / week. Therapeutic Interventions Required: Medical Management    He requires 24-hour rehabilitation nursing to manage bowel and bladder function, skin care, nutrition and fluid intake, pulmonary hygiene, pain control, safety, medication management and patient/family goals. In addition, rehabilitation nursing will reiterate and reinforce therapy skills and equipment use, including ADLs, as well as provide education to the patient and family. Carlos Rivera is willing to participate in and is able to tolerate the proposed plan of care.    Rehabilitation Goals and Plan (Expected frequency & duration of treatment in the IRF):   Return to the Community, Modified Independent Level of Care and Family Able to Provide  Assistance  Anticipated Date of Rehabilitation Admission: 20  Patient/Family oriented IRF level of care/facility/plan: Yes  Patient/Family willing to participate in IRF care/facility/plan: Yes  Patient able to tolerate IRF level of care proposed: Yes  Patient has potential to  benefit IRF level of care proposed: Yes  Comments: Not Applicable    Special Needs or Precautions - Medical Necessity:  Safety Concerns/Precautions:  Fall Risk / High Risk for Falls, Balance, Cognition and Bed / Chair Alarm  Pain Management  IV Site: Peripheral  Current Medications:    Current Facility-Administered Medications Ordered in Epic   Medication Dose Route Frequency Provider Last Rate Last Dose   • warfarin (COUMADIN) tablet 5 mg  5 mg Oral DAILY AT 1800 Alissa Salmeron M.D.       • lisinopril (PRINIVIL) tablet 20 mg  20 mg Oral Q DAY Alissa Salmeron M.D.   20 mg at 06/25/20 0540   • enalaprilat (VASOTEC) injection 1.25 mg  1.25 mg Intravenous Q6HRS PRN Alissa Salmeron M.D.       • atorvastatin (LIPITOR) tablet 80 mg  80 mg Oral Q EVENING Xander Flowers M.D.   80 mg at 06/24/20 1607   • citalopram (CELEXA) tablet 20 mg  20 mg Oral Q EVENING Xander Flowers M.D.   20 mg at 06/24/20 1607   • insulin glargine (LANTUS) injection 15 Units  15 Units Subcutaneous Q EVENING Xander Flowers M.D.   15 Units at 06/24/20 1612   • QUEtiapine (SEROQUEL) tablet 50 mg  50 mg Oral Q EVENING Xander Flowers M.D.   50 mg at 06/24/20 1607   • senna-docusate (PERICOLACE or SENOKOT S) 8.6-50 MG per tablet 2 Tab  2 Tab Oral BID Xander Flowers M.D.   Stopped at 06/25/20 0600    And   • polyethylene glycol/lytes (MIRALAX) PACKET 1 Packet  1 Packet Oral QDAY PRN Xander Flowers M.D.        And   • magnesium hydroxide (MILK OF MAGNESIA) suspension 30 mL  30 mL Oral QDAY PRN Xander Flowers M.D.        And   • bisacodyl (DULCOLAX) suppository 10 mg  10 mg Rectal QDAY PRN Xander Flowers M.D.       • acetaminophen (TYLENOL) tablet 650 mg  650 mg Oral Q6HRS PRN Xander Flowers M.D.       • ondansetron (ZOFRAN) syringe/vial injection 4 mg  4 mg Intravenous Q4HRS PRN Xander Flowers M.D.       • ondansetron (ZOFRAN ODT) dispertab 4 mg  4 mg Oral Q4HRS PRN Xander Flowers M.D.       •  insulin regular (HUMULIN R) injection 2-9 Units  2-9 Units Subcutaneous 4X/DAY ACHS Xander Flowers M.D.   3 Units at 06/25/20 1128    And   • glucose 4 g chewable tablet 16 g  16 g Oral Q15 MIN PRN Xander Flowers M.D.        And   • dextrose 50% (D50W) injection 50 mL  50 mL Intravenous Q15 MIN PRN Xander Flowers M.D.       • MD Alert...Warfarin per Pharmacy   Other PHARMACY TO DOSE Xander Flowers M.D.         No current [facility]-ordered outpatient medications on file.     Diet:   DIET ORDERS (From admission to next 24h)     Start     Ordered    06/23/20 1936  Diet Order Diabetic (Reposition the patient to 90 degrees for meals)  ALL MEALS     Question:  Diet:  Answer:  Diabetic  Comment:  Reposition the patient to 90 degrees for meals    06/23/20 1936                Anticipated Discharge Destination / Patient/Family Goal:  Destination: Home with Assistance Support System: Spouse  Anticipated home health services: OT, PT, Nursing, Social Work and Aide  Previously used  service/ provider: Not Applicable  Anticipated DME Needs: [employer]  Outpatient Services: OT and PT  Alternative resources to address additional identified needs:     Pre-Screen Completed: 6/25/2020 12:03 PM Kwaku Keita L.P.N.

## 2020-06-25 NOTE — PROGRESS NOTES
"Chief Complaint   Patient presents with   • Possible Stroke     Difficulty speech and \"finding words\" approximately ten minutes prior to arrival, code stroke activated       Problem List Items Addressed This Visit     None      Visit Diagnoses     Speech disturbance, unspecified type   (Acute)      Relevant Orders    REFERRAL TO PHYSIATRY (PMR)      Neurology Progress Note     History of present illness:  This is a 74-year old male with PMHx significant for dyslipidemia, type II diabetes mellitus, MVR with mechanical valve replacement (On/compliant with Coumadin) who presented to [facility] on 6/23/20 for a chief complaint of headache and abnormal speech upon waking this morning. Patient reportedly went to sleep in his usual state of health last night (unknown time); when he awoke at 0500, patient states that he had severe headache to whole head; also with word-finding difficulty. EMS called; on scene/at time of presentation here, SBP 180s-190s. CT head on arrival revealed no acute intracranial abnormality. CTA head/neck with no LVO. Patient determined not to be a candidate for IV tPA secondary to wake-up nature of event, also given concurrent use of Coumadin (INR 2.5).  Currently, patient is sitting up in stretcher; arousable. Speech remains somewhat aphasic, with word finding difficulty vs mild confusion (states he is 67 years old; impaired naming of objects). Admits to persistent headache, as above. Denies weakness/focal weakness, numbness, paresthesia, problem with vision, speech or swallowing. Denies nausea or chest pain (note troponin 108 on arrival).     Neurology has been consulted by Dr. Randa Bowen to further evaluate the findings noted above.     Interval, 6/25/20:  Patient sitting up in bed; awake and alert. Going to MRI now. No events overnight per nursing, speech improved, near baseline.     No changes to HPI as was previously documented.     Past medical history:   Past Medical History: "   Diagnosis Date   • Chronic anticoagulation 2/23/2017   • History of mitral valve replacement with mechanical valve [Z95.2] 3/10/2017   • Hyperlipidemia    • Type II diabetes mellitus (HCC) 2/23/2017       Past surgical history:   Past Surgical History:   Procedure Laterality Date   • PB COLONOSCOPY,DIAGNOSTIC N/A 5/9/2020    Procedure: COLONOSCOPY;  Surgeon: Jatinder Madera M.D.;  Location: SURGERY [facility];  Service: Gastroenterology   • MITRAL VALVE REPLACEMENT  1999   • INGUINAL HERNIA REPAIR Bilateral 1984   • TONSILLECTOMY         Family history:   Family History   Problem Relation Age of Onset   • Heart Attack Father 58   • Diabetes Father    • Heart Attack Paternal Uncle    • No Known Problems Sister    • No Known Problems Brother    • No Known Problems Maternal Grandmother    • No Known Problems Maternal Grandfather    • No Known Problems Paternal Grandmother    • Heart Attack Paternal Grandfather    • No Known Problems Sister    • No Known Problems Brother        Social history:   Social History     Socioeconomic History   • Marital status:      Spouse name: Not on file   • Number of children: Not on file   • Years of education: Not on file   • Highest education level: Not on file   Occupational History   • Not on file   Social Needs   • Financial resource strain: Not on file   • Food insecurity     Worry: Not on file     Inability: Not on file   • Transportation needs     Medical: Not on file     Non-medical: Not on file   Tobacco Use   • Smoking status: Never Smoker   • Smokeless tobacco: Never Used   Substance and Sexual Activity   • Alcohol use: No     Alcohol/week: 0.0 oz   • Drug use: No   • Sexual activity: Yes     Comment:    Lifestyle   • Physical activity     Days per week: Not on file     Minutes per session: Not on file   • Stress: Not on file   Relationships   • Social connections     Talks on phone: Not on file     Gets together: Not on file     Attends Faith  service: Not on file     Active member of club or organization: Not on file     Attends meetings of clubs or organizations: Not on file     Relationship status: Not on file   • Intimate partner violence     Fear of current or ex partner: Not on file     Emotionally abused: Not on file     Physically abused: Not on file     Forced sexual activity: Not on file   Other Topics Concern   • Not on file   Social History Narrative    Retired from [employer]        Current medications:   Current Facility-Administered Medications   Medication Dose   • warfarin (COUMADIN) tablet 5 mg  5 mg   • lisinopril (PRINIVIL) tablet 20 mg  20 mg   • enalaprilat (VASOTEC) injection 1.25 mg  1.25 mg   • atorvastatin (LIPITOR) tablet 80 mg  80 mg   • citalopram (CELEXA) tablet 20 mg  20 mg   • insulin glargine (LANTUS) injection 15 Units  15 Units   • QUEtiapine (SEROQUEL) tablet 50 mg  50 mg   • senna-docusate (PERICOLACE or SENOKOT S) 8.6-50 MG per tablet 2 Tab  2 Tab    And   • polyethylene glycol/lytes (MIRALAX) PACKET 1 Packet  1 Packet    And   • magnesium hydroxide (MILK OF MAGNESIA) suspension 30 mL  30 mL    And   • bisacodyl (DULCOLAX) suppository 10 mg  10 mg   • acetaminophen (TYLENOL) tablet 650 mg  650 mg   • ondansetron (ZOFRAN) syringe/vial injection 4 mg  4 mg   • ondansetron (ZOFRAN ODT) dispertab 4 mg  4 mg   • insulin regular (HUMULIN R) injection 2-9 Units  2-9 Units    And   • glucose 4 g chewable tablet 16 g  16 g    And   • dextrose 50% (D50W) injection 50 mL  50 mL   • MD Alert...Warfarin per Pharmacy         Medication Allergy:  Allergies   Allergen Reactions   • [place] Vomiting   • Risperidone      Dystonia, altered mental status   • Hydrocodone-Acetaminophen Vomiting       Review of systems:   Constitutional: denies fever, night sweats, weight loss.   Eyes: denies acute vision change, eye pain or secretion.   Ears, Nose, Mouth, Throat: denies nasal secretion, nasal bleeding, difficulty swallowing, hearing loss, tinnitus,  vertigo, ear pain, acute dental problems, oral ulcers or lesions.   Endocrine: denies recent weight changes, heat or cold intolerance, polyuria, polydypsia, polyphagia,abnormal hair growth.  Cardiovascular: denies new onset of chest pain, palpitations, syncope, or dyspnea of exertion.  Pulmonary: denies shortness of breath, new onset of cough, hemoptysis, wheezing, chest pain or flu-like symptoms.   GI: denies nausea, vomiting, diarrhea, GI bleeding, change in appetite, abdominal pain, and change in bowel habits.  : denies dysuria, urinary incontinence, hematuria.  Heme/oncology: denies history of easy bruising or bleeding. No history of cancer, DVTor PE.  Allergy/immunology: denies hives/urticaria, or itching.   Dermatologic: denies new rash, or new skin lesions.  Musculoskeletal:denies joint swelling or pain, muscle pain, neck and back pain.   Neurologic: As noted above.   Psychiatric: denies symptoms of depression, anxiety, hallucinations, mood swings or changes, suicidal or homicidal thoughts.     Physical examination:   Vitals:    06/24/20 1923 06/25/20 0000 06/25/20 0500 06/25/20 0800   BP: 151/75 115/65 118/52 135/75   Pulse: (!) 59 79 (!) 47 80   Resp: 16 16 16 16   Temp: 36.9 °C (98.5 °F) 36.5 °C (97.7 °F) 36.3 °C (97.3 °F) 36.3 °C (97.4 °F)   TempSrc: Temporal Temporal Temporal Temporal   SpO2: 96% 97% 97% 95%   Weight:       Height:         General: Patient in no acute distress, pleasant and cooperative.  HEENT: Normocephalic, no signs of acute trauma.   Neck: supple, no meningeal signs or carotid bruits. There is normal range of motion. No tenderness on exam.   Chest: clear to auscultation. No cough.   CV: RRR, no murmurs.   Skin: no signs of acute rashes or trauma.   Musculoskeletal: joints exhibit full range of motion, without any pain to palpation. There are no signs of joint or muscle swelling. There is no tenderness to deep palpation of muscles.   Psychiatric: No hallucinatory behavior.        NEUROLOGICAL EXAM:   Mental status, orientation: Awake, alert. Oriented to self time and place.  Speech and language: speech is clear/non dysarthric; fluent, no impaired naming of objects.   Cranial nerve exam: Pupils are 3-4 mm bilaterally and equally reactive to light. Visual fields are intact by confrontation. There is no nystagmus on primary or secondary gaze. Intact full EOM in all directions of gaze. Face appears symmetric. Sensation in the face is intact to light touch. Tongue is midline and without any signs of tongue biting or fasciculations. Shoulder shrug is intact bilaterally.   Motor exam: Strength is 5/5 in all extremities. Tone is normal. No abnormal movements were seen on exam.   Sensory exam reveals normal sense of light touch and pinprick in all extremities.   Deep tendon reflexes:  2+ throughout. Plantar responses are flexor. There is no clonus.   Coordination: shows a normal finger-nose-finger. Normal rapidly alternating movements.   Gait: Not assessed at this time as patient is a fall risk.     No changes to exam as was documented by me on 6/24/20.       NIH Stroke Scale    1a Level of Consciousness   1b Orientation Questions   1c Response to Commands   2 Gaze   3 Visual Fields   4 Facial Movement   5 Motor Function (arm)   a Left   b Right   6 Motor Function (leg)   a Left   b Right   7 Limb Ataxia   8 Sensory   9 Language   10 Articulation   11 Extinction/Inattention     Score: 0      ANCILLARY DATA REVIEWED:     Lab Data Review:  Recent Results (from the past 24 hour(s))   ACCU-CHEK GLUCOSE    Collection Time: 06/24/20 11:58 AM   Result Value Ref Range    Glucose - Accu-Ck 313 (H) 65 - 99 mg/dL   ACCU-CHEK GLUCOSE    Collection Time: 06/24/20  4:06 PM   Result Value Ref Range    Glucose - Accu-Ck 118 (H) 65 - 99 mg/dL   ACCU-CHEK GLUCOSE    Collection Time: 06/24/20  9:02 PM   Result Value Ref Range    Glucose - Accu-Ck 122 (H) 65 - 99 mg/dL   ACCU-CHEK GLUCOSE    Collection Time:  06/25/20  5:30 AM   Result Value Ref Range    Glucose - Accu-Ck 107 (H) 65 - 99 mg/dL   PROTHROMBIN TIME    Collection Time: 06/25/20  5:41 AM   Result Value Ref Range    PT 22.9 (H) 12.0 - 14.6 sec    INR 1.96 (H) 0.87 - 1.13       Labs reviewed by me.       Imaging reviewed by me:     EC-ECHOCARDIOGRAM COMPLETE W/O CONT   Final Result      DX-CHEST-PORTABLE (1 VIEW)   Final Result      No acute cardiopulmonary abnormality.      CT-CTA NECK WITH & W/O-POST PROCESSING   Final Result      No high-grade stenosis, large vessel occlusion, aneurysm or dissection.      CT-CTA HEAD WITH & W/O-POST PROCESS   Final Result      No large vessel occlusion, high-grade stenosis or aneurysm of the [place].      CT-CEREBRAL PERFUSION ANALYSIS   Final Result      1.  Cerebral blood flow less than 30% likely representing completed infarct = 0 mL.      2.  T Max more than 6 seconds likely representing combination of completed infarct and ischemia = 10 mL.      3.  Mismatched volume likely representing ischemic brain/penumbra = 10 mL.      4.  Please note that the cerebral perfusion was performed on the limited brain tissue around the basal ganglia region. Infarct/ischemia outside the CT perfusion sections can be missed in this study.      CT-HEAD W/O   Final Result      1. No CT evidence of acute infarct, hemorrhage or mass.   2. Moderate global parenchymal atrophy. Chronic small vessel ischemic changes.      MR-BRAIN-W/O    (Results Pending)         Presumed mechanism by TOAST:  __Large Artery Atherosclerosis  __Small Vessel (Lacunar)  __Cardioembolic  __Other (Sickle Cell, Vasculitis, Hypercoagulable)  _X_Unknown    Cardioembolic?    Modified Maroa Score (on arrival):   Modified Angela (at home/prior to arrival): 0      ASSESSMENT AND PLAN:  74-year old male with PMHx significant for dyslipidemia, type II diabetes mellitus, MVR with mechanical valve replacement (On/compliant with Coumadin) who presented [facility] 6/23/20 for a chief complaint of headache and abnormal speech upon waking on morning of presentation; Patient not a candidate for IV tPA secondary to wake-up nature of event, also given concurrent use of Coumadin (INR 2.5). CT head, CTA head/neck unremarkable. Patient appears to be at/near neurological baseline now; NIHSS 0; he remains confused (has mild/moderate dementia at baseline), speech non-dysarthric, fluent. Additionally, patient had MRI Brain this morning; awaiting radiology read, per my read, no acute intracranial abnormality.     Impression:   Hypertensive encephalopathy vs transient ischemic event (TIA).  Hypertensive crisis, resolved.   Headache, resolved.   Dyslipidemia.   Type II DM.  MVR with mechanical valve, on Coumadin.       Recommendations/Plan:      -q4h and PRN neuro assessment. VS per nursing/unit protocol. BP goal < 140/90. Antihypertensives per primary team.   -Telemetry; currently SR. Screen for Afib/arrhythmia. TTE with EF 60%.   -Continue home dose Coumadin; no need to stop Coumadin nor add ASA at this time.   -Atorvastatin 80 mg PO q HS. Note LDL is 47, at goal.   -Recommend aggressive BG management per primary team. Avoid IVF with Dextrose. BG goal 140-180. hemoglobin A1c-- 6.6.   -Note UA WNL, CXR WNL; no leukocytosis or fever; mildly elevated BUN and elevated troponin.Check B12, TSH-- both unremarkable. closely monitor electrolytes and replace as needed to combat other potential source of encephalopathy.   -PT/OT/SLP eval and treat.   -Outpatient neurology referral placed by me today 6/25/20.   -All other medical management per primary team.   -DVT PPX: SCDs.      The plan of care above has been discussed with Dr. Almeida. Other than the above, no further recommendations from a neurological perspective, patient is clear for discharge. Please call with questions.     Bridget Bocanegra, KATHERINE.P.R.GIOVANNA.  [facility]

## 2020-06-25 NOTE — PROGRESS NOTES
Monitor summary: SB/SR 51-82, ID 0.12, QRS 0.08, QT 0.44, with rare PVCs, trigemini, and 1.7 sec pause per strip from monitor room.

## 2020-06-25 NOTE — PROGRESS NOTES
"Assumed care of pt at 1900. Pt is oriented to self only at this time. Pt displaying confusion, ambulating into walls with walker, continues to get out of bed without calling and states \"I'm going home to get some sleep.\" Pt showing some agitation when redirected. Will continue to reorient and monitor.    "

## 2020-06-25 NOTE — PROGRESS NOTES
Inpatient Anticoagulation Service Note    Date: 6/25/2020    Reason for Anticoagulation: Mechanical Mitral Valve Replacement     Target INR: 2.5 to 3.5     Hemoglobin Value: 14.9  Hematocrit Value: 45.8  Lab Platelet Value: (!) 150    INR from last 7 days     Date/Time INR Value    06/25/20 0541  (!) 1.96    06/24/20 0725  (!) 1.65    06/23/20 0620  (!) 2.53        Dose from last 7 days     Date/Time Dose (mg)    06/25/20 1137  5    06/24/20 1351  7.5    06/23/20 1256  5        Average Dose (mg): Per medrec: warfarin 5 mg PO daily  Significant Interactions: Statin, quetiapine, citalopram  Bridge Therapy: No  Reversal Agent Administered: Not Applicable    Comments: Warfarin continues for history of mechanical MVR. INR today sub-therapeutic but increasing. Dose given last night. NNLs but no s/s of overt bleeding. DDIs noted and continue from home. Will continue with warfarin 5 mg PO daily and monitor with an INR tomorrow AM.    Plan:  Warfarin 5 mg  Education Material Provided?: No (Chronic warfarin patient)  Pharmacist suggested discharge dosing: Warfarin 5 mg PO daily with close follow-up post-discharge with an INR within 48 to 72 hours.       Thank you!    Aurora Brooks, PharmD, BCPS

## 2020-06-25 NOTE — PROGRESS NOTES
Patient discharged [facility] with patient in stable condition. All personal belongings with patient. IV's taken out. Discharge education provided to patient.

## 2020-06-25 NOTE — PROGRESS NOTES
Patient was ripping off tele machine. Called MD Salmeron and got orders to D/C tele. Monitor tech aware.

## 2020-06-26 PROBLEM — G45.9 TIA (TRANSIENT ISCHEMIC ATTACK): Status: ACTIVE | Noted: 2020-06-26

## 2020-06-26 PROBLEM — F32.A DEPRESSION: Status: ACTIVE | Noted: 2020-06-26

## 2020-06-26 PROBLEM — D75.89 MACROCYTOSIS WITHOUT ANEMIA: Status: ACTIVE | Noted: 2020-06-26

## 2020-06-26 PROBLEM — E55.9 VITAMIN D DEFICIENCY: Status: ACTIVE | Noted: 2020-06-26

## 2020-06-26 PROBLEM — D69.6 THROMBOCYTOPENIA (HCC): Status: ACTIVE | Noted: 2020-06-26

## 2020-06-26 PROBLEM — R79.89 AZOTEMIA: Status: ACTIVE | Noted: 2020-06-26

## 2020-06-26 LAB
25(OH)D3 SERPL-MCNC: 27 NG/ML (ref 30–100)
ALBUMIN SERPL BCP-MCNC: 3.6 G/DL (ref 3.2–4.9)
ALBUMIN/GLOB SERPL: 1.6 G/DL
ALP SERPL-CCNC: 81 U/L (ref 30–99)
ALT SERPL-CCNC: 26 U/L (ref 2–50)
ANION GAP SERPL CALC-SCNC: 8 MMOL/L (ref 7–16)
AST SERPL-CCNC: 22 U/L (ref 12–45)
BASOPHILS # BLD AUTO: 1 % (ref 0–1.8)
BASOPHILS # BLD: 0.05 K/UL (ref 0–0.12)
BILIRUB SERPL-MCNC: 0.3 MG/DL (ref 0.1–1.5)
BUN SERPL-MCNC: 30 MG/DL (ref 8–22)
CALCIUM SERPL-MCNC: 8.8 MG/DL (ref 8.5–10.5)
CHLORIDE SERPL-SCNC: 104 MMOL/L (ref 96–112)
CO2 SERPL-SCNC: 24 MMOL/L (ref 20–33)
CREAT SERPL-MCNC: 1.26 MG/DL (ref 0.5–1.4)
EOSINOPHIL # BLD AUTO: 0.36 K/UL (ref 0–0.51)
EOSINOPHIL NFR BLD: 7.2 % (ref 0–6.9)
ERYTHROCYTE [DISTWIDTH] IN BLOOD BY AUTOMATED COUNT: 48.6 FL (ref 35.9–50)
EST. AVERAGE GLUCOSE BLD GHB EST-MCNC: 146 MG/DL
GLOBULIN SER CALC-MCNC: 2.2 G/DL (ref 1.9–3.5)
GLUCOSE BLD-MCNC: 107 MG/DL (ref 65–99)
GLUCOSE BLD-MCNC: 240 MG/DL (ref 65–99)
GLUCOSE BLD-MCNC: 277 MG/DL (ref 65–99)
GLUCOSE BLD-MCNC: 295 MG/DL (ref 65–99)
GLUCOSE SERPL-MCNC: 127 MG/DL (ref 65–99)
HBA1C MFR BLD: 6.7 % (ref 0–5.6)
HCT VFR BLD AUTO: 45.8 % (ref 42–52)
HGB BLD-MCNC: 15 G/DL (ref 14–18)
IMM GRANULOCYTES # BLD AUTO: 0.02 K/UL (ref 0–0.11)
IMM GRANULOCYTES NFR BLD AUTO: 0.4 % (ref 0–0.9)
INR PPP: 2.86 (ref 0.87–1.13)
LYMPHOCYTES # BLD AUTO: 1.4 K/UL (ref 1–4.8)
LYMPHOCYTES NFR BLD: 28.1 % (ref 22–41)
MCH RBC QN AUTO: 33.6 PG (ref 27–33)
MCHC RBC AUTO-ENTMCNC: 32.8 G/DL (ref 33.7–35.3)
MCV RBC AUTO: 102.7 FL (ref 81.4–97.8)
MONOCYTES # BLD AUTO: 0.48 K/UL (ref 0–0.85)
MONOCYTES NFR BLD AUTO: 9.6 % (ref 0–13.4)
NEUTROPHILS # BLD AUTO: 2.67 K/UL (ref 1.82–7.42)
NEUTROPHILS NFR BLD: 53.7 % (ref 44–72)
NRBC # BLD AUTO: 0 K/UL
NRBC BLD-RTO: 0 /100 WBC
PLATELET # BLD AUTO: 159 K/UL (ref 164–446)
PMV BLD AUTO: 11.4 FL (ref 9–12.9)
POTASSIUM SERPL-SCNC: 4.3 MMOL/L (ref 3.6–5.5)
PROT SERPL-MCNC: 5.8 G/DL (ref 6–8.2)
PROTHROMBIN TIME: 30.9 SEC (ref 12–14.6)
RBC # BLD AUTO: 4.46 M/UL (ref 4.7–6.1)
SODIUM SERPL-SCNC: 136 MMOL/L (ref 135–145)
TSH SERPL DL<=0.005 MIU/L-ACNC: 1.15 UIU/ML (ref 0.38–5.33)
WBC # BLD AUTO: 5 K/UL (ref 4.8–10.8)

## 2020-06-26 PROCEDURE — A9270 NON-COVERED ITEM OR SERVICE: HCPCS | Performed by: PHYSICAL MEDICINE & REHABILITATION

## 2020-06-26 PROCEDURE — 83036 HEMOGLOBIN GLYCOSYLATED A1C: CPT

## 2020-06-26 PROCEDURE — 97530 THERAPEUTIC ACTIVITIES: CPT

## 2020-06-26 PROCEDURE — 94760 N-INVAS EAR/PLS OXIMETRY 1: CPT

## 2020-06-26 PROCEDURE — 99222 1ST HOSP IP/OBS MODERATE 55: CPT | Performed by: HOSPITALIST

## 2020-06-26 PROCEDURE — 82962 GLUCOSE BLOOD TEST: CPT | Mod: 91

## 2020-06-26 PROCEDURE — 85025 COMPLETE CBC W/AUTO DIFF WBC: CPT

## 2020-06-26 PROCEDURE — 82306 VITAMIN D 25 HYDROXY: CPT

## 2020-06-26 PROCEDURE — 770010 HCHG ROOM/CARE - REHAB SEMI PRIVAT*

## 2020-06-26 PROCEDURE — 85610 PROTHROMBIN TIME: CPT

## 2020-06-26 PROCEDURE — 97166 OT EVAL MOD COMPLEX 45 MIN: CPT

## 2020-06-26 PROCEDURE — 80053 COMPREHEN METABOLIC PANEL: CPT

## 2020-06-26 PROCEDURE — 36415 COLL VENOUS BLD VENIPUNCTURE: CPT

## 2020-06-26 PROCEDURE — 700105 HCHG RX REV CODE 258: Performed by: HOSPITALIST

## 2020-06-26 PROCEDURE — 97535 SELF CARE MNGMENT TRAINING: CPT

## 2020-06-26 PROCEDURE — 84443 ASSAY THYROID STIM HORMONE: CPT

## 2020-06-26 PROCEDURE — 700102 HCHG RX REV CODE 250 W/ 637 OVERRIDE(OP): Performed by: HOSPITALIST

## 2020-06-26 PROCEDURE — 99223 1ST HOSP IP/OBS HIGH 75: CPT | Mod: AI | Performed by: PHYSICAL MEDICINE & REHABILITATION

## 2020-06-26 PROCEDURE — A9270 NON-COVERED ITEM OR SERVICE: HCPCS | Performed by: HOSPITALIST

## 2020-06-26 PROCEDURE — 97162 PT EVAL MOD COMPLEX 30 MIN: CPT

## 2020-06-26 PROCEDURE — 700102 HCHG RX REV CODE 250 W/ 637 OVERRIDE(OP): Performed by: PHYSICAL MEDICINE & REHABILITATION

## 2020-06-26 PROCEDURE — 92523 SPEECH SOUND LANG COMPREHEN: CPT

## 2020-06-26 RX ORDER — INSULIN GLARGINE 100 [IU]/ML
16 INJECTION, SOLUTION SUBCUTANEOUS EVERY EVENING
Status: DISCONTINUED | OUTPATIENT
Start: 2020-06-26 | End: 2020-06-28

## 2020-06-26 RX ORDER — SODIUM CHLORIDE 9 MG/ML
1000 INJECTION, SOLUTION INTRAVENOUS ONCE
Status: COMPLETED | OUTPATIENT
Start: 2020-06-26 | End: 2020-06-27

## 2020-06-26 RX ORDER — WARFARIN SODIUM 2.5 MG/1
2.5 TABLET ORAL
Status: COMPLETED | OUTPATIENT
Start: 2020-06-26 | End: 2020-06-26

## 2020-06-26 RX ORDER — GABAPENTIN 300 MG/1
300 CAPSULE ORAL 3 TIMES DAILY
Status: DISCONTINUED | OUTPATIENT
Start: 2020-06-26 | End: 2020-06-29

## 2020-06-26 RX ORDER — DEXTROSE MONOHYDRATE 25 G/50ML
50 INJECTION, SOLUTION INTRAVENOUS
Status: DISCONTINUED | OUTPATIENT
Start: 2020-06-26 | End: 2020-06-30

## 2020-06-26 RX ORDER — OMEPRAZOLE 20 MG/1
20 CAPSULE, DELAYED RELEASE ORAL DAILY
Status: DISCONTINUED | OUTPATIENT
Start: 2020-06-27 | End: 2020-07-07

## 2020-06-26 RX ORDER — VITAMIN B COMPLEX
1000 TABLET ORAL DAILY
Status: DISCONTINUED | OUTPATIENT
Start: 2020-06-26 | End: 2020-07-09 | Stop reason: HOSPADM

## 2020-06-26 RX ORDER — WARFARIN SODIUM 1 MG/1
1 TABLET ORAL
Status: DISCONTINUED | OUTPATIENT
Start: 2020-06-26 | End: 2020-06-26

## 2020-06-26 RX ADMIN — DOCUSATE SODIUM 50 MG AND SENNOSIDES 8.6 MG 1 TABLET: 8.6; 5 TABLET, FILM COATED ORAL at 08:19

## 2020-06-26 RX ADMIN — CITALOPRAM HYDROBROMIDE 20 MG: 20 TABLET ORAL at 19:56

## 2020-06-26 RX ADMIN — INSULIN HUMAN 5 UNITS: 100 INJECTION, SOLUTION PARENTERAL at 11:15

## 2020-06-26 RX ADMIN — GABAPENTIN 300 MG: 300 CAPSULE ORAL at 19:56

## 2020-06-26 RX ADMIN — GABAPENTIN 300 MG: 300 CAPSULE ORAL at 14:04

## 2020-06-26 RX ADMIN — WARFARIN SODIUM 2.5 MG: 2.5 TABLET ORAL at 17:46

## 2020-06-26 RX ADMIN — MELATONIN 1000 UNITS: at 17:59

## 2020-06-26 RX ADMIN — INSULIN HUMAN 6 UNITS: 100 INJECTION, SOLUTION PARENTERAL at 20:04

## 2020-06-26 RX ADMIN — ATORVASTATIN CALCIUM 80 MG: 40 TABLET, FILM COATED ORAL at 19:56

## 2020-06-26 RX ADMIN — INSULIN HUMAN 4 UNITS: 100 INJECTION, SOLUTION PARENTERAL at 17:32

## 2020-06-26 RX ADMIN — QUETIAPINE 50 MG: 25 TABLET ORAL at 19:55

## 2020-06-26 RX ADMIN — ACETAMINOPHEN 325 MG: 325 TABLET, FILM COATED ORAL at 08:20

## 2020-06-26 RX ADMIN — INSULIN GLARGINE 16 UNITS: 100 INJECTION, SOLUTION SUBCUTANEOUS at 20:04

## 2020-06-26 RX ADMIN — DOCUSATE SODIUM 50 MG AND SENNOSIDES 8.6 MG 2 TABLET: 8.6; 5 TABLET, FILM COATED ORAL at 19:55

## 2020-06-26 RX ADMIN — SODIUM CHLORIDE 1000 ML: 9 INJECTION, SOLUTION INTRAVENOUS at 18:29

## 2020-06-26 ASSESSMENT — ACTIVITIES OF DAILY LIVING (ADL)
TOILETING_LEVEL_OF_ASSIST_DESCRIPTION: INCREASED TIME;GRAB BAR;SUPERVISION FOR SAFETY;VERBAL CUEING
TOILET_TRANSFER_DESCRIPTION: GRAB BAR;INCREASED TIME;VERBAL CUEING;SUPERVISION FOR SAFETY
TOILETING: INDEPENDENT
BED_CHAIR_WHEELCHAIR_TRANSFER_DESCRIPTION: ADAPTIVE EQUIPMENT;INCREASED TIME;REQUIRES LIFT;VERBAL CUEING
TUB_SHOWER_TRANSFER_DESCRIPTION: GRAB BAR;SHOWER BENCH;SUPERVISION FOR SAFETY;VERBAL CUEING

## 2020-06-26 ASSESSMENT — ENCOUNTER SYMPTOMS
VOMITING: 0
CHILLS: 0
EYES NEGATIVE: 1
MEMORY LOSS: 1
POLYDIPSIA: 0
ABDOMINAL PAIN: 0
BRUISES/BLEEDS EASILY: 0
SHORTNESS OF BREATH: 0
COUGH: 0
FEVER: 0
PALPITATIONS: 0
NAUSEA: 0
MUSCULOSKELETAL NEGATIVE: 1

## 2020-06-26 ASSESSMENT — BRIEF INTERVIEW FOR MENTAL STATUS (BIMS)
ASKED TO RECALL BED: YES, NO CUE REQUIRED
ASKED TO RECALL BLUE: YES, NO CUE REQUIRED
ASKED TO RECALL SOCK: YES, NO CUE REQUIRED
BIMS SUMMARY SCORE: 12
WHAT MONTH IS IT: ACCURATE WITHIN 5 DAYS
INITIAL REPETITION OF BED BLUE SOCK - FIRST ATTEMPT: 2
WHAT DAY OF THE WEEK IS IT: CORRECT
WHAT YEAR IS IT: MISSED BY 2 TO 5 YEARS

## 2020-06-26 ASSESSMENT — COPD QUESTIONNAIRES
HAVE YOU SMOKED AT LEAST 100 CIGARETTES IN YOUR ENTIRE LIFE: NO/DON'T KNOW
COPD SCREENING SCORE: 3
DO YOU EVER COUGH UP ANY MUCUS OR PHLEGM?: NO/ONLY WITH OCCASIONAL COLDS OR INFECTIONS
DURING THE PAST 4 WEEKS HOW MUCH DID YOU FEEL SHORT OF BREATH: NONE/LITTLE OF THE TIME

## 2020-06-26 ASSESSMENT — GAIT ASSESSMENTS
ASSISTIVE DEVICE: FRONT WHEEL WALKER
GAIT LEVEL OF ASSIST: MINIMAL ASSIST
DEVIATION: SHUFFLED GAIT
DISTANCE (FEET): 150

## 2020-06-26 NOTE — FLOWSHEET NOTE
06/26/20 1133   Patient History   Pulmonary Diagnosis None   Procedures Relevant to Respiratory Status None   Home O2 No   Nocturnal CPAP No   Home Treatments/Frequency No   Sleep Apnea Screening   Have you had a sleep study? Yes   Have you been diagnosed with sleep apnea? No   COPD Risk Screening   Do you have a history of COPD? No   COPD Population Screener   During the past 4 weeks, how much did you feel short of breath? 0   Do you ever cough up any mucus or phlegm? 0   In the past 12 months, you do less than you used to because of your breathing problems 1   Have you smoked at least 100 cigarettes in your entire life? 0   How old are you? 2   COPD Screening Score 3   COPD Coordinator Not Recommended Yes

## 2020-06-26 NOTE — THERAPY
"Speech Language Pathology   Initial Assessment     Patient Name: Carlos Rivera  AGE:  74 y.o., SEX:  male  Medical Record #: [id number]  Today's Date: 6/26/2020     Subjective    Per [facility] DC paperwork \"This is a 74 years old male who has past medical history of mechanical mitral valve, history of type 2 diabetes mellitus, history of hyperlipidemia was brought in due to altered mental status and dysarthria.  Also patient had symptoms of lightheadedness and headache.  In the ER no focal deficit was noted other than dysarthria.  Patient was not a candidate for TPA considering he is already on Coumadin for his mechanical valve.  Was admitted for further work-up.  Had CTA of head and neck which were unremarkable.  MRI of the brain was done which was negative for any acute intracranial findings.  His speech dysarthria has resolved over course of hospital stay.  His NIH score was 0.  Mental status gradually improved over the course of hospital stay.  Dysphasia resolved.  MRI of the brain was negative for acute infarcts or bleeding but rather showed old lacunar infarcts.  Felt that his symptoms are related to delirium in the light of likely vascular dementia take into consideration his brain MRI that showed multiple old lacunar infarcts.  Neurology recommended to continue Coumadin and high intensity statin.  Was evaluated by physiatry and patient was accepted to acute rehab.  Patient was hemodynamically and clinically stable.  He was cleared for discharge from medical standpoint.\"     Objective       06/26/20 0831   Prior Level Of Function   Communication Impaired   Hearing Within Functional Limits for Evaluation   Hearing Aid Right;Left  (pt reported they are at home )   Vision Reading ;Wears Corrective Lenses   Patient's Primary Language English   Occupation (Pre-Hospital Vocational) Retired Due To Age   Receptive Language / Auditory Comprehension   Receptive Language / Auditory Comprehension X   Identifies " "Pictures Within Functional Limits (6-7)   Answers Yes / No Personal Questions Within Functional Limits (6-7)   Follows One Unit Commands Within Functional Limits (6-7)   Follows Two Unit Commands Within Functional Limits (6-7)   Understands Simple, Structured Conversation  Within Functional Limits (6-7)   Expressive Language   Expressive Language (WDL) WDL   Reading Comprehension    Reading Comprehension (WDL)   (to be assessed )   Written Language Expression   Written Language Expression (WDL)   (To be assessed )   Dominant Hand Right   Cognition   Cognitive-Linguistic (WDL) X   Simple Attention Minimal (4)   Moderate Attention Moderate (3)   Orientation  Minimal (4)   Simple Information Processing Minimal (4)   Functional Memory Activities Moderate (3)   Simple Reasoning / Problem Solving Moderate (3)   Safety Awareness Moderate (3)   Insight into Deficits Moderate (3)   Functional Math / Financial Management Moderate (3)   Clock Drawing Impaired Hand Placement;Disorganization   Cognitive Pattern Assessment   Cognitive Pattern Assessment Used BIMS   Brief Interview for Mental Status (BIMS)   Repetition of Three Words (First Attempt) 2   Temporal Orientation: Year Missed by 2 to 5 years   Temporal Orientation: Month Accurate within 5 days   Temporal Orientation: Day Correct   Recall: \"Sock\" Yes, no cue required   Recall: \"Blue\" Yes, no cue required   Recall: \"Bed\" Yes, no cue required   BIMS Summary Score 12   Social / Pragmatic Communication   Social / Pragmatic Communication WDL   Functional Level of Assist   Comprehension Supervision   Comprehension Description Glasses;Verbal cues   Expression Modified Independent   Expression Description Verbal cueing   Social Interaction Modified Independent   Social Interaction Description Increased time;Verbal cues   Problem Solving Moderate Assist   Problem Solving Description Verbal cueing;Therapy schedule;Seat belt;Increased time;Bed/chair alarm   Memory Maximal Assist "   Memory Description Verbal cueing;Therapy schedule;Seat belt;Bed/chair alarm;Increased time   Outcome Measures   Outcome Measures Utilized SCCAN   SCCAN (Scales of Cognitive and Communicative Ability for Neurorehabilitation)   Oral Expression - Raw Score 17   Oral Expression - Scale Performance Score 89   Orientation - Raw Score 11   Orientation - Scale Performance Score 92   Memory - Raw Score 5   Memory - Scale Performance Score 26   Speech Comprehension - Raw Score 13   Speech Comprehension - Scale Performance Score 100   Problem List   Problem List Cognitive-Linguistic Deficits;Dementia;Attention Deficit;Verbal Problem Solving Deficits;Memory Deficit;Executive Function Deficit;Impaired Safety;Impaired Judgement   Current Discharge Plan   Current Discharge Plan Return to Prior Living Situation   Benefit   Therapy Benefit Patient would benefit from Inpatient Rehab Speech-Language Pathology to address above identified deficits.   Strengths & Barriers   Strengths Able to follow instructions;Alert and oriented;Effective communication skills;Motivated for self care and independence;Pleasant and cooperative;Supportive family;Willingly participates in therapeutic activities   Barriers Confused;Dementia;Impaired carryover of learning;Impaired insight/denial of deficits;Impaired functional cognition;Impulsive   Speech Language Pathologist Assigned   Assigned SLP / Extension CW, 60   SLP Total Time Spent   SLP Individual Total Time Spent (Mins) 60   SLP Charge Group   Charges Yes   SLP Speech Language Evaluation Speech Sound Language Comprehension       Assessment    Patient is 74 y.o. male with a diagnosis of possible stroke, likely vascular dementia.  Additional factors influencing patient status/progress (ie: cognitive factors, co-morbidities, social support, etc): severe memory deficits, unclear baseline cognition, pleasant and cooperative.      Cognitive evaluation completed using the SCCAN, pt unable to complete  "this assessment during this evaluation due to slow processing and poor attention requiring frequent cues to remain on task.  So far pt has demonstrated the most difficulty in the area of memory (26%).  Pt very concerned about the difficulty he is having with his memory and problem solving stating multiple times \"This is a problem, I need to work on this\" throughout this evaluation.  Pt reported that he lives with his wife who occasionally assists him with his medications and reported completing finances together with his wife.  Pt also reported that he still currently drives.  Pt stated that he has been noticing difficulty with his memory for the last month.  Unable to confirm pt's baseline cognitive status as no family was present during this evaluation.  Recommend ST to target memory, attention, problem solving and discuss baseline cognition with family.        Plan  Recommend Speech Therapy 30-60 minutes per day 5-6 days per week for 10 days for the following treatments:  SLP Speech Language Treatment, SLP Self Care / ADL Training , SLP Cognitive Skill Development and SLP Group Treatment.    Goals:  Long term and short term goals have been discussed with patient and they are in agreement.    Speech Therapy Problems     Problem: Memory STGs     Dates: Start: 06/26/20       Goal: STG-Within one week, patient will     Dates: Start: 06/26/20                   Problem: Problem Solving STGs     Dates: Start: 06/26/20       Goal: STG-Within one week, patient will     Dates: Start: 06/26/20             Goal: STG-Within one week, patient will     Dates: Start: 06/26/20                   Problem: Speech/Swallowing LTGs     Dates: Start: 06/26/20       Goal: LTG-By discharge, patient will solve basic problems     Dates: Start: 06/26/20                        "

## 2020-06-26 NOTE — H&P
REHABILITATION HISTORY AND PHYSICAL/POST ADMISSION PHYSICAL EVALUATION    Date of Admission: 6/25/2020  Date of Service: 6/26/2020  Carlos Rivera  [id number]    [facility] Code / Diagnosis to Support: 0002.1 - Brain Dysfunction: Non-Traumatic  Etiologic Diagnosis: Encephalopathy    CC: Decreased memory, decreased mobility    HPI:  Adapted from Dr. Knapp's PM&R consult:  The patient is a 74 y.o. right hand dominant male with a past medical history of HTN, HLD, DM2, Dementia, MVR with mechanical valve replacement (Coumadin);  who presented on 6/23/2020  6:26 AM with , difficulty with speech and word finding when he woke up. CT head without acute abnormality. CTA without large vessel occlusion. Neurology was consulted and he was not a candidate for tPA.  Per neurology they were concerned for acute stroke vs hypertensive encephalopathy as his blood pressure was > 190s on admission. They recommended MRI brain which showed multiple old cerebral infarcts as well as lacunar, cerebellar and parietal infarcts but no acute infarcts or hemorrhages.  Patient's aphasia improved but continued to have decreased mobility and memory concerning for encephalopathy vs TIA.      Patient tolerated transfer over. He reports he does not remember the events leading up to the hospital. He reports he was told that he could not speak well or get out of bed. He does not remember the first day or so in the hospital. He remembers them telling him he would need more rehabilitation yesterday. He reports poor memory at baseline but not large gaps like this. He reports his mobility has been worsening over the past 2 years. He reports his feet have severe neuropathy in the soles that feels like burning constantly. He reports he has been on Gabapentin but cannot say if it helped. He reports the burning sensation limits his ambulation as well. Per chart review he has a history of diverticular bleeding but admission hemoglobin remains within normal  limits. Denies SOB.     REVIEW OF SYSTEMS:     Comprehensive 14 point ROS was reviewed and all were negative except as noted elsewhere in this document.     PMH:  Past Medical History:   Diagnosis Date   • Chronic anticoagulation 2/23/2017   • History of mitral valve replacement with mechanical valve [Z95.2] 3/10/2017   • Hyperlipidemia    • Type II diabetes mellitus (HCC) 2/23/2017       PSH:  Past Surgical History:   Procedure Laterality Date   • PB COLONOSCOPY,DIAGNOSTIC N/A 5/9/2020    Procedure: COLONOSCOPY;  Surgeon: Jatinder Madera M.D.;  Location: SURGERY [facility];  Service: Gastroenterology   • MITRAL VALVE REPLACEMENT  1999   • INGUINAL HERNIA REPAIR Bilateral 1984   • TONSILLECTOMY         FAMILY HISTORY:  Family History   Problem Relation Age of Onset   • Heart Attack Father 58   • Diabetes Father    • Heart Attack Paternal Uncle    • No Known Problems Sister    • No Known Problems Brother    • No Known Problems Maternal Grandmother    • No Known Problems Maternal Grandfather    • No Known Problems Paternal Grandmother    • Heart Attack Paternal Grandfather    • No Known Problems Sister    • No Known Problems Brother          MEDICATIONS:  Current Facility-Administered Medications   Medication Dose   • Respiratory Therapy Consult     • Pharmacy Consult Request ...Pain Management Review 1 Each  1 Each   • tramadol (ULTRAM) 50 MG tablet 50 mg  50 mg   • hydrALAZINE (APRESOLINE) tablet 25 mg  25 mg   • acetaminophen (TYLENOL) tablet 650 mg  650 mg   • senna-docusate (PERICOLACE or SENOKOT S) 8.6-50 MG per tablet 2 Tab  2 Tab    And   • polyethylene glycol/lytes (MIRALAX) PACKET 1 Packet  1 Packet    And   • magnesium hydroxide (MILK OF MAGNESIA) suspension 30 mL  30 mL    And   • bisacodyl (DULCOLAX) suppository 10 mg  10 mg   • artificial tears ophthalmic solution 1 Drop  1 Drop   • benzocaine-menthol (CEPACOL) lozenge 1 Lozenge  1 Lozenge   • mag hydrox-al hydrox-simeth (MAALOX PLUS ES or  MYLANTA DS) suspension 20 mL  20 mL   • ondansetron (ZOFRAN ODT) dispertab 4 mg  4 mg    Or   • ondansetron (ZOFRAN) syringe/vial injection 4 mg  4 mg   • traZODone (DESYREL) tablet 50 mg  50 mg   • sodium chloride (OCEAN) 0.65 % nasal spray 2 Spray  2 Spray   • insulin regular (HUMULIN R) injection 2-9 Units  2-9 Units    And   • glucose 4 g chewable tablet 16 g  16 g    And   • dextrose 50% (D50W) injection 50 mL  50 mL   • atorvastatin (LIPITOR) tablet 80 mg  80 mg   • citalopram (CELEXA) tablet 20 mg  20 mg   • insulin glargine (Lantus) injection  15 Units   • lisinopril (PRINIVIL) tablet 20 mg  20 mg   • QUEtiapine (SEROQUEL) tablet 50 mg  50 mg   • MD Alert...Warfarin per Pharmacy         ALLERGIES:  Okra; Risperidone; and Hydrocodone-acetaminophen    PSYCHOSOCIAL HISTORY:  Housing / Facility: 1 Story House  Steps Into Home: 2  Steps In Home: 0  Lives with - Patient's Self Care Capacity: Spouse  Equipment Owned: Other (Comments)(walking sticks)     Prior Level of Function / Living Situation:   Physical Therapy: Prior Services: Home-Independent  Housing / Facility: 1 Story House  Steps Into Home: 2  Steps In Home: 0  Bathroom Set up: Walk In Shower, Shower Chair, Grab Bars  Equipment Owned: Other (Comments)(walking sticks)  Lives with - Patient's Self Care Capacity: Spouse  Bed Mobility: Independent  Transfer Status: Independent  Ambulation: Independent  Distance Ambulation (Feet): (community)  Assistive Devices Used: (walking sticks)  Stairs: Independent  Current Level of Function:   Gait Level Of Assist: Minimal Assist  Assistive Device: Front Wheel Walker  Distance (Feet): 100  Deviation: Shuffled Gait, Bradykinetic, Decreased Base Of Support, Other (Comment)(very slow gait)  # of Stairs Climbed: 0  Weight Bearing Status: FWB  Skilled Intervention: Verbal Cuing  Supine to Sit: (in chair)  Sit to Supine: (in chair)  Comments: up in chair pre/post  Sit to Stand: Minimal Assist  Bed, Chair, Wheelchair Transfer:  "Minimal Assist  Toilet Transfers: Minimal Assist  Transfer Method: Stand Step  Skilled Intervention: Verbal Cuing  Sitting in Chair: 10+ min  Standing: 10 min  Occupational Therapy:   Self Feeding: Independent  Grooming / Hygiene: Independent  Bathing: Independent  Dressing: Independent  Toileting: Independent  Medication Management: Independent  Laundry: Requires Assist  Kitchen Mobility: Independent  Finances: Independent  Home Management: Requires Assist  Shopping: Independent  Prior Level Of Mobility: (uses walking sticks)  Prior Services: Home-Independent  Housing / Facility: [address] [place] [address] House  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 His temporal temperature is 36.8 °C (98.3 °F). His blood pressure is 138/76 and his pulse is 55 (abnormal). His respiration is 16 and oxygen saturation is 96%.     GENERAL: No apparent distress, thin  HEENT: Normocephalic/atraumatic, EOMI and PERRL  CARDIAC: Regular rate and rhythm, normal S1, S2   LUNGS: Clear to auscultation   ABDOMINAL: bowel sounds present, soft and nontender    EXTREMITIES: no contractures, spasticity, or edema.  No calf tenderness bilaterally  NEURO:  Mental status: answers questions appropriately follows commands; large memory gaps in hospitalization and delay in responses/processing  Speech: fluent, no aphasia or dysarthria  CRANIAL NERVES: CN 2-12 intact    Motor:  4+/5 BUE, poor coordination  4/5 B HF and KE, 4+/5 distal. Uncoordinated and difficulty relaxin " muscles  Sensory: Decreased bilateral feet  DTRs: 2+ in bilateral biceps and patellar tendons      RADIOLOGY:      Results for orders placed during the hospital encounter of 06/23/20   MR-BRAIN-W/O    Impression 1.  Moderate cerebral atrophy.  2.  Multiple areas of old cerebral infarction in both cerebral hemispheres. The old left parietal infarct is associated with minimal hemosiderin deposition indicating a component of hemorrhagic infarction.  3.  Old lacunar size infarct right thalamus.  4.  Old lacunar infarction left frontal corona radiata.  5.  Advanced supratentorial white matter disease most consistent with microvascular ischemic change.  6.  Mild pontine ischemic gliosis.  7.  Old lacunar infarcts x2 in the right cerebellar hemisphere, no change from prior exam.  8.  No evidence of acute infarction, acute hemorrhage, or mass lesion on today's exam.               LABS:  Recent Labs     06/24/20  0725 06/26/20  0637   SODIUM 138 136   POTASSIUM 4.3 4.3   CHLORIDE 105 104   CO2 22 24   GLUCOSE 141* 127*   BUN 28* 30*   CREATININE 1.20 1.26   CALCIUM 8.9 8.8     Recent Labs     06/24/20  0725 06/26/20  0637   WBC 6.9 5.0   RBC 4.43* 4.46*   HEMOGLOBIN 14.9 15.0   HEMATOCRIT 45.8 45.8   .4* 102.7*   MCH 33.6* 33.6*   MCHC 32.5* 32.8*   RDW 49.6 48.6   PLATELETCT 150* 159*   MPV 11.2 11.4     Recent Labs     06/24/20  0725 06/25/20  0541 06/26/20  0637   INR 1.65* 1.96* 2.86*         PRIMARY REHAB DIAGNOSIS:    This patient is a 74 y.o. male admitted for acute inpatient rehabilitation with Encephalopathy.    IMPAIRMENTS:   Cognitive  ADLs/IADLs  Mobility  Speech    SECONDARY DIAGNOSIS/MEDICAL CO-MORBIDITIES AFFECTING FUNCTION:  DM  HTN  HLD  Chronic AC      RELEVANT CHANGES SINCE PREADMISSION EVALUATION:    Status unchanged    The patient's rehabilitation potential is Good  The patient's medical prognosis is fair    PLAN:   Discussion and Recommendations:   1. The patient requires an acute inpatient  rehabilitation program with a coordinated program of care at an intensity and frequency not available at a lower level of care. This recommendation is substantiated by the patient's medical physicians who recommend that the patient's intervention and assessment of medical issues needs to be done at an acute level of care for patient's safety and maximum outcome.   2. A coordinated program of care will be supplied by an interdisciplinary team of physical therapy, occupational therapy, rehab physician, rehab nursing, and, if needed, speech therapy and rehab psychology. Rehab team presents a patient-specific rehabilitation and education program concentrating on prevention of future problems related to accessibility, mobility, skin, bowel, bladder, sexuality, and psychosocial and medical/surgical problems.   3. Need for Rehabilitation Physician: The rehab physician will be evaluating the patient on a multi-weekly basis to help coordinate the program of care. The rehab physician communicates between medical physicians, therapists, and nurses to maximize the patient's potential outcome. Specific areas in which the rehab physician will be providing daily assessment include the following:   A. Assessing the patient's heart rate and blood pressure response (vitals monitoring) to activity and making adjustments in medications or conservative measures as needed.   B. The rehab physician will be assessing the frequency at which the program can be increased to allow the patient to reach optimal functional outcome.   C. The rehab physician will also provide assessments in daily skin care, especially in light of patient's impairments in mobility.   D. The rehab physician will provide special expertise in understanding how to work with functional impairment and recommend appropriate interventions, compensatory techniques, and education that will facilitate the patient's outcome.   4. Rehab PAOLA.   The rehab RN will be working with  patient to carry over in room mobility and activities of daily living when the patient is not in 3 hours of skilled therapy. Rehab nursing will be working in conjunction with rehab physician to address all the medical issues above and continue to assess laboratory work and discuss abnormalities with the treating physicians, assess vitals, and response to activity, and discuss and report abnormalities with the rehab physician. Rehab RN will also continue daily skin care, supervise bladder/bowel program, instruct in medication administration, and ensure patient safety.   5. Rehab Therapy: Therapies to treat at intensity and frequency of (may change after completion of evaluation by all therapeutic disciplines):       PT:  Physical therapy to address mobility, transfer, gait training and evaluation for adaptive equipment needs 1 hour/day at least 5 days/week for the duration of the ELOS (see below)       OT:  Occupational therapy to address ADLs, self-care, home management training, functional mobility/transfers and assistive device evaluation, and community re-integration 1  hour/day at least 5 days/week for the duration of the ELOS (see below).        ST/Dysphagia:  Speech therapy to address speech, language, and cognitive deficits as well as swallowing difficulties with retraining/dysphagia management and community re-integration with comprehension, expression, cognitive training 1  hour/day at least 5 days/week for the duration of the ELOS (see below).     Medical management / Rehabilitation Issues/ Adverse Potential as part of rehabilitation plan     REHABILITATION ISSUES/ADVERSE POTENTIAL:  1.  Encephalopathy vs delirium vs TIA - Patient with aphasia and weakness with negative work-up for stroke but with hypertensive emergency on admission concerning for hypertensive encephalopathy.  Patient also has baseline of mild to moderate dementia.. Patient demonstrates functional deficits in cognition, behavior, strength,  balance, coordination, and ADL's. The patient requires therapy to correct these deficits prior to discharge. Patient is admitted to [facility] for comprehensive rehabilitation therapy, including physical, occupational and speech therapy.     Rehabilitation nursing monitors bowel and bladder control, educates on medication administration, co-morbidities and monitors patient safety.    2.  Neurostimulants: None at this time but continue to assess daily for need to initiate should status change.    3.  DVT prophylaxis:  Patient is on Coumadin for anticoagulation upon transfer. Encourage OOB. Monitor daily for signs and symptoms of DVT including but not limited to swelling and pain to prevent the development of DVT that may interfere with therapies.    4.  GI prophylaxis:  On prilosec to prevent gastritis/dyspepsia which may interfere with therapies.    5.  Pain: No issues with pain currently / Controlled with APAP/Tramadol    6.  Nutrition/Dysphagia: Dietician monitors nutrient intake, recommend supplements prn and provide nutrition education to pt/family to promote optimal nutrition for wound healing/recovery.     7.  Bladder/bowel:  Start bowel and bladder program as described below, to prevent constipation, urinary retention (which may lead to UTI), and urinary incontinence (which will impact upon pt's functional independence).   - Post void bladder scans, I&O cath for PVRs >400  - up to commode after meal     8.  Skin/dermal ulcer prophylaxis: Monitor for new skin conditions with q.2 h. turns as required to prevent the development of skin breakdown.     9.  Cognition/Behavior:  Psychologist Dr. Adame provides adjustment counseling to illness and psychosocial barriers that may be potential barriers to rehabilitation.     10. Respiratory therapy: RT performs O2 management prn, breathing retraining, pulmonary hygiene and bronchospasm management prn to optimize participation in therapies.      MEDICAL CO-MORBIDITIES/ADVERSE POTENTIAL AFFECTING FUNCTION:  Encephalopathy vs delirium vs TIA - Patient with aphasia and weakness with negative work-up for stroke but with hypertensive emergency on admission concerning for hypertensive encephalopathy. Patient started on statin for concern for CVA vs TIA.     DM - Patient on Lantus 15U QPM and SSI. Will continue to monitor   Blood sugars into 300s, consult hospitalist    Dementia - Patient on Seroquel 50 mg QPM for agitation and sleep. MRI consistent with probable vascular dementia.     Mood/Neuropathy - Patient on Celexa 20 mg QPM. Patient with burning into bilateral feet. Trial of Gabapentin 300 mg TID    HTN - Patient on Lisinopril 20 mg on transfer. Previously came in with SBP > 180s    Elevated BUN - encouraged fluid intake. Will monitor    HLD - Patient on Atorvastatin 80 mg QPM.     GI Ppx - Patient on Prilosec on transfer.    DVT ppx - Patient on Coumadin     I personally performed a complete drug regimen review and no potential clinically significant medication issues were identified.     Pt was seen today for 72 min, and entire time spent in face-to-face contact was >50% in counseling and coordination of care as detailed in A/P above.        GOALS/EXPECTED LEVEL OF FUNCTION BASED ON CURRENT MEDICAL AND FUNCTIONAL STATUS (may change based on patient's medical status and rate of impairment recovery):  Transfers:   Modified Independent  Mobility/Gait:   Modified Independent  ADL's:   Supervision  Cognition:  supervs    DISPOSITION: Discharge to pre-morbid independent living setting with the supportive care of patient's family.    ELOS: 10-14 days

## 2020-06-26 NOTE — DISCHARGE SUMMARY
"Discharge Summary    CHIEF COMPLAINT ON ADMISSION  Chief Complaint   Patient presents with   • Possible Stroke     Difficulty speech and \"finding words\" approximately ten minutes prior to arrival, code stroke activated       Reason for Admission  Possible Stroke      CODE STATUS  Full Code    HPI & HOSPITAL COURSE  This is a 74 years old male who has past medical history of mechanical mitral valve, history of type 2 diabetes mellitus, history of hyperlipidemia was brought in due to altered mental status and dysarthria.  Also patient had symptoms of lightheadedness and headache.  In the ER no focal deficit was noted other than dysarthria.  Patient was not a candidate for TPA considering he is already on Coumadin for his mechanical valve.  Was admitted for further work-up.  Had CTA of head and neck which were unremarkable.  MRI of the brain was done which was negative for any acute intracranial findings.  His speech dysarthria has resolved over course of hospital stay.  His NIH score was 0.  Mental status gradually improved over the course of hospital stay.  Dysphasia resolved.  MRI of the brain was negative for acute infarcts or bleeding but rather showed old lacunar infarcts.  Felt that his symptoms are related to delirium in the light of likely vascular dementia take into consideration his brain MRI that showed multiple old lacunar infarcts.  Neurology recommended to continue Coumadin and high intensity statin.  Was evaluated by physiatry and patient was accepted to acute rehab.  Patient was hemodynamically and clinically stable.  He was cleared for discharge from medical standpoint.        Therefore, he is discharged in guarded and stable condition to an inpatient rehabilitation hospital.    The patient met 2-midnight criteria for an inpatient stay at the time of discharge.      FOLLOW UP ITEMS POST DISCHARGE  Follow-up with hospitalist at acute rehab per recommendations  Follow-up with neurology as per " recommendations    DISCHARGE DIAGNOSES  Active Problems:    Type II diabetes mellitus (HCC) POA: Yes    Hypertension (Chronic) POA: Yes    Chronic anticoagulation POA: Yes    Hyperlipidemia (Chronic) POA: Yes    Dementia (HCC) POA: Yes  Resolved Problems:    Mixed aphasia POA: Yes      FOLLOW UP  Future Appointments   Date Time Provider Department Center   8/25/2020  4:00 PM Stephon Ramos M.D. [facility] None     Rufino Shine M.D.  [address] [facility] [id number]  [phone]    Schedule an appointment as soon as possible for a visit in 1 week        MEDICATIONS ON DISCHARGE     Medication List      CHANGE how you take these medications      Instructions   atorvastatin 80 MG tablet  What changed:    · medication strength  · how much to take  · when to take this  Commonly known as:  LIPITOR   Take 1 Tab by mouth every evening.  Dose:  80 mg        CONTINUE taking these medications      Instructions   citalopram 20 MG Tabs  Commonly known as:  CELEXA   Take 20 mg by mouth every evening.  Dose:  20 mg     insulin glargine 100 UNIT/ML Soln  Commonly known as:  Lantus   Inject 15 Units as instructed every evening.  Dose:  15 Units     lisinopril 20 MG Tabs  Commonly known as:  PRINIVIL   Take 20 mg by mouth every evening.  Dose:  20 mg     metFORMIN 500 MG Tabs  Commonly known as:  GLUCOPHAGE   Take 1,000 mg by mouth 2 times a day, with meals.  Dose:  1,000 mg     quetiapine 50 MG tablet  Commonly known as:  SEROQUEL   Take 1 Tab by mouth every evening.  Dose:  50 mg     warfarin 5 MG Tabs  Commonly known as:  COUMADIN   Take 1 Tab by mouth every day at 6 PM.  Dose:  5 mg            Allergies  Allergies   Allergen Reactions   • Okra Vomiting   • Risperidone      Dystonia, altered mental status   • Hydrocodone-Acetaminophen Vomiting       DIET  Orders Placed This Encounter   Procedures   • Discontinue Diet Tray     Standing Status:   Standing     Number of Occurrences:   1       ACTIVITY  As tolerated.  Weight  bearing as tolerated    LINES, DRAINS, AND WOUNDS  This is an automated list. Peripheral IVs will be removed prior to discharge.                     MENTAL STATUS ON TRANSFER  Level of Consciousness: Lethargic  Orientation : Oriented x 4  Speech: Expressive Aphasia    CONSULTATIONS  Neurology    PROCEDURES  None    LABORATORY  Lab Results   Component Value Date    SODIUM 138 06/24/2020    POTASSIUM 4.3 06/24/2020    CHLORIDE 105 06/24/2020    CO2 22 06/24/2020    GLUCOSE 141 (H) 06/24/2020    BUN 28 (H) 06/24/2020    CREATININE 1.20 06/24/2020        Lab Results   Component Value Date    WBC 6.9 06/24/2020    HEMOGLOBIN 14.9 06/24/2020    HEMATOCRIT 45.8 06/24/2020    PLATELETCT 150 (L) 06/24/2020        Total time of the discharge process exceeds 40 minutes.

## 2020-06-26 NOTE — CARE PLAN
Pt is much more cooperative with safety protocols so far this shift. Asked nurse if they needed permission to get into bed from their wheelchair as they didn't want to break any rules. Pt agreeable to stay in chair for therapy and did not try to get up unassisted. Tylenol given for soreness in feet, reports pain didn't improve much so will continue to monitor. Felt dizzy, vss, encouraged to drink water and reported feeling better. Talked to patient about how they were agitated and trying to leave last night and they do not remember this.     Problem: Pain Management  Goal: Pain level will decrease to patient's comfort goal  Outcome: PROGRESSING AS EXPECTED     Problem: Safety  Goal: Will remain free from injury  Outcome: PROGRESSING SLOWER THAN EXPECTED  Goal: Will remain free from falls  Outcome: PROGRESSING SLOWER THAN EXPECTED

## 2020-06-26 NOTE — THERAPY
"Occupational Therapy   Initial Evaluation     Patient Name: Carlos Rivera  Age:  74 y.o., Sex:  male  Medical Record #: [id number]  Today's Date: 6/26/2020     Subjective    \"When do I graduate from this program? Later today?\"     Objective       06/26/20 0701   Prior Living Situation   Prior Services Home-Independent   Housing / Facility 1 Story House   Steps Into Home 3   Bathroom Set up Walk In Shower;Grab Bars   Equipment Owned Single Point Cane   Lives with - Patient's Self Care Capacity Spouse   Comments lives with wife and 3 cats, info is based on patient report and should be clarified with wife 2/2 dementia/cognitive deficits   Prior Level of ADL Function   Self Feeding Independent   Grooming / Hygiene Independent   Bathing Independent   Dressing Independent   Toileting Independent   Comments info is based on patient report and should be clarified with wife 2/2 dementia/cognitive deficits   Prior Level of IADL Function   Medication Management Independent   Laundry Independent   Kitchen Mobility Independent   Finances Independent   Home Management Independent   Shopping Independent   Prior Level Of Mobility Independent With Device in Home   Driving / Transportation Driving Independent   Occupation (Pre-Hospital Vocational) Retired Due To Age  (former )   Leisure Interests Other (Comments)  (\"honey do\" projects around the house)   Comments info is based on patient report and should be clarified with wife 2/2 dementia/cognitive deficits   Prior Functioning: Everyday Activities   Self Care Independent   Indoor Mobility (Ambulation) Independent   Stairs Independent   Functional Cognition Needed some help   Prior Device Use None of the given options  (SPC per patient)   Cognition    Cognition / Consciousness X   Level of Consciousness Alert   Ability To Follow Commands 1 Step   Safety Awareness Impaired;Impulsive   New Learning Impaired   Attention Impaired   Comments easily distracted, " poor attention to safety commands   Vision Screen   Vision Not tested  (wears reading glasses, denies changes)   Passive ROM Upper Body   Passive ROM Upper Body WDL   Active ROM Upper Body   Active ROM Upper Body  WDL   Dominant Hand Right   Strength Upper Body   Gross Strength Generalized Weakness, Equal Bilaterally.    Sensation Upper Body   Upper Extremity Sensation  Not Tested   Upper Body Muscle Tone   Upper Body Muscle Tone  Not Tested   Balance Assessment   Sitting Balance (Static) Fair   Sitting Balance (Dynamic) Fair -   Standing Balance (Static) Fair -   Standing Balance (Dynamic) Poor   Weight Shift Sitting Fair   Weight Shift Standing Poor   Bed Mobility    Supine to Sit Minimal Assist   Sit to Stand Minimal Assist   Scooting Minimal Assist   Coordination Upper Body   Coordination WDL   Comments grossly intact for ADLs   Eating   Assistance Needed Set-up / clean-up   Physical Assistance Level No physical assistance   CARE Score 5   Eating Discharge Goal   Discharge Goal Independent   Oral Hygiene   Assistance Needed Supervision   Physical Assistance Level No physical assistance   CARE Score 4   Oral Hygiene Discharge Goal   Discharge Goal Independent   Shower/Bathe Self   Assistance Needed Physical assistance   Physical Assistance Level Less than 25%   CARE Score 3   Shower/Bathe Self Discharge Goal   Discharge Goal Supervision or touching assistance   Upper Body Dressing   Assistance Needed Set-up / clean-up   Physical Assistance Level No physical assistance   CARE Score 5   Upper Body Dressing Discharge Goal   Discharge Goal Independent   Lower Body Dressing   Assistance Needed Physical assistance   Physical Assistance Level Less than 25%   CARE Score 3   Lower Body Dressing Discharge Goal   Discharge Goal Independent   Putting On/Taking Off Footwear   Assistance Needed Physical assistance   Physical Assistance Level 25%-49%   CARE Score 3   Putting On/Taking Off Footwear Discharge Goal   Discharge  Goal Independent   Toileting Hygiene   Assistance Needed Physical assistance   Physical Assistance Level Less than 25%   CARE Score 3   Toileting Hygiene Discharge Goal   Discharge Goal Independent   Toilet Transfer   Assistance Needed Physical assistance   Physical Assistance Level 25%-49%   CARE Score 3   Toilet Transfer Discharge Goal   Discharge Goal Independent   Hearing, Speech, and Vision   Expression of Ideas and Wants Without difficulty   Understanding Verbal and Non-Verbal Content Usually understands   Functional Level of Assist   Eating Supervision   Eating Description Set-up of equipment or meal/tube feeding   Grooming Supervision;Standing   Grooming Description Verbal cueing;Supervision for safety;Standing at sink;Increased time   Bathing Minimal Assist   Bathing Description Grab bar;Increased time;Supervision for safety;Verbal cueing  (did not follow cues to sit or to maintain hand on GB)   Upper Body Dressing Supervision   Upper Body Dressing Description Set-up of equipment;Supervision for safety   Lower Body Dressing Moderate Assist   Lower Body Dressing Description Increased time;Supervision for safety;Verbal cueing  (min A for standing balance, assist to don socks)   Toileting Minimal Assist   Toileting Description Increased time;Grab bar;Supervision for safety;Verbal cueing   Toilet Transfers Moderate Assist   Toilet Transfer Description Grab bar;Increased time;Verbal cueing;Supervision for safety   Tub / Shower Transfers Moderate Assist   Tub Shower Transfer Description Grab bar;Shower bench;Supervision for safety;Verbal cueing   Problem List   Problem List Decreased Active Daily Living Skills;Decreased Homemaking Skills;Decreased Functional Mobility;Safety Awareness Deficits / Cognition;Decreased Activity Tolerance;Impaired Cognitive Function   Precautions   Precautions Fall Risk   Comments baseline dementia   Current Discharge Plan   Current Discharge Plan Return to Prior Living Situation    Benefit    Therapy Benefit Patient Would Benefit from Inpatient Rehab Occupational Therapy to Maximize [place] with ADLs, IADLs and Functional Mobility.   Interdisciplinary Plan of Care Collaboration   IDT Collaboration with  Physical Therapist   Patient Position at End of Therapy Seated;Self Releasing Lap Belt Applied;Chair Alarm On;Call Light within Reach   Collaboration Comments re: CLOF   Strengths & Barriers   Strengths Motivated for self care and independence;Willingly participates in therapeutic activities   Barriers Decreased endurance;Difficulty following instructions;Generalized weakness;Impaired activity tolerance;Impaired balance;Impaired carryover of learning;Impaired insight/denial of deficits;Impaired functional cognition;Impulsive;Limited mobility;Pain   OT Total Time Spent   OT Individual Total Time Spent (Mins) 60   OT Charge Group   Charges Yes   OT Self Care / ADL 1   OT Evaluation OT Evaluation Mod       Assessment  Patient is 74 y.o. male admitted with altered mental status, dysarthria, headache. Had CTA of head and neck which were unremarkable.  MRI of the brain was done which was negative for any acute intracranial findings but rather showed old lacunar infarcts. Felt that his symptoms are related to delirium in the light of likely vascular dementia take into consideration his brain MRI that showed multiple old lacunar infarcts.  Additional factors influencing patient status / progress (ie: cognitive factors, co-morbidities, social support, etc): past medical history of mechanical mitral valve, type 2 diabetes mellitus, hyperlipidemia.    Per patient he is independent with ADLs/IADLs and mobility using SPC in the house at baseline, this will need to be confirmed with family as pt may be poor historian due to suspected dementia. At time of OT evaluation patient requires mod assist for mobility without a device, min to mod assist for ADLs. He is primarily limited by impaired balance with  reported foot pain and decreased sensation during mobility and standing tasks, he also demos significant cognitive deficits including impaired safety awareness, impaired insight into deficits, impaired attention, and difficulty following directions.    Plan  Recommend Occupational Therapy  minutes per day 5-7 days per week for 7-14 days for the following treatments:  OT Self Care/ADL, OT Cognitive Skill Dev, OT Neuro Re-Ed/Balance, OT Sensory Int Techniques, OT Therapeutic Activity, OT Evaluation and OT Therapeutic Exercise.    Goals:  Long term and short term goals have been discussed with patient and they are in agreement.    Occupational Therapy Goals     Problem: Bathing     Dates: Start: 06/26/20       Goal: STG-Within one week, patient will bathe     Dates: Start: 06/26/20       Description: 1) Individualized Goal:  with supervision, min verbal cues for safety strategies  2) Interventions:  OT Self Care/ADL, OT Cognitive Skill Dev, OT Neuro Re-Ed/Balance, OT Sensory Int Techniques, OT Therapeutic Activity, OT Evaluation, and OT Therapeutic Exercise                  Problem: Dressing     Dates: Start: 06/26/20       Goal: STG-Within one week, patient will dress LB     Dates: Start: 06/26/20       Description: 1) Individualized Goal:  with supervision, min verbal cues for safety strategies  2) Interventions:  OT Self Care/ADL, OT Cognitive Skill Dev, OT Neuro Re-Ed/Balance, OT Sensory Int Techniques, OT Therapeutic Activity, OT Evaluation, and OT Therapeutic Exercise                  Problem: Functional Transfers     Dates: Start: 06/26/20       Goal: STG-Within one week, patient will transfer to toilet     Dates: Start: 06/26/20       Description: 1) Individualized Goal:  with supervision using least restrictive adaptive device  2) Interventions:  OT Self Care/ADL, OT Cognitive Skill Dev, OT Neuro Re-Ed/Balance, OT Sensory Int Techniques, OT Therapeutic Activity, OT Evaluation, and OT Therapeutic  Exercise                  Problem: OT Long Term Goals     Dates: Start: 06/26/20       Goal: LTG-By discharge, patient will complete basic self care tasks     Dates: Start: 06/26/20       Description: 1) Individualized Goal:  with supervision to  modified independence  2) Interventions:  OT Self Care/ADL, OT Cognitive Skill Dev, OT Neuro Re-Ed/Balance, OT Sensory Int Techniques, OT Therapeutic Activity, OT Evaluation, and OT Therapeutic Exercise            Goal: LTG-By discharge, patient will perform bathroom transfers     Dates: Start: 06/26/20       Description: 1) Individualized Goal:  with supervision to  modified independence  2) Interventions:  OT Self Care/ADL, OT Cognitive Skill Dev, OT Neuro Re-Ed/Balance, OT Sensory Int Techniques, OT Therapeutic Activity, OT Evaluation, and OT Therapeutic Exercise                  Problem: Toileting     Dates: Start: 06/26/20       Goal: STG-Within one week, patient will complete toileting tasks     Dates: Start: 06/26/20       Description: 1) Individualized Goal:  with supervision, min verbal cues for safety strategies  2) Interventions:  OT Self Care/ADL, OT Cognitive Skill Dev, OT Neuro Re-Ed/Balance, OT Sensory Int Techniques, OT Therapeutic Activity, OT Evaluation, and OT Therapeutic Exercise

## 2020-06-26 NOTE — FLOWSHEET NOTE
06/26/20 1147   Vital Signs   Pulse (!) 55   Respiration 16   Pulse Oximetry 96 %   $ Pulse Oximetry (Spot Check) Yes   Respiratory Assessment   Level of Consciousness Alert   Breath Sounds   RUL Breath Sounds Clear   RML Breath Sounds Clear   RLL Breath Sounds Clear   LYNDSEY Breath Sounds Clear   LLL Breath Sounds Clear   Secretions   Cough Non Productive   Oxygen   O2 Delivery Device None - Room Air

## 2020-06-26 NOTE — PROGRESS NOTES
Pt received approx 1700 via w/c. Able to transfer self into bed with SBA. Denies any pain at this time. Has glasses for reading and occasionally uses hearing aids but does not have them with them. Pt seems to be able to hear staff fine at the moment. Pt has cellphone and  with them. 2 RN skin check done with Diane MARINELLI. Scattered scabbed over abrasions on left lower leg and head. Pt pleasant and cooperative.

## 2020-06-26 NOTE — CONSULTS
HOSPITAL MEDICINE CONSULTATION    Requesting Physician:  Dr. Rodriguez    Reason for Consult:  Hypertension and Diabetes    History of Present Illness:  The patient is a 74-year-old  male with past medical history significant for mild cognitive impairment, mechanical mitral valve replacement on Coumadin, hypertension, diabetes, and depression.  He was admitted to [facility] on 6/23/20 for confusion and speech difficulties.  MR imaging demonstrated multiple old lacunar infarcts but negative acute.  CT angiogram of the head and neck were unremarkable.  Echocardiogram showed ejection fraction 60% and right ventricular systolic pressure 30 mmHg.  His symptoms were attributed to nonspecific encephalopathy versus transient ischemic attack.  Due to his ongoing functional debility, the patient was transferred to [facility] on 6/25/20.  Hospital Medicine consultation is requested to assist in the management of this patient's     Review of Systems:  Review of Systems   Constitutional: Negative for chills and fever.   HENT: Negative.    Eyes: Negative.    Respiratory: Negative for cough and shortness of breath.    Cardiovascular: Negative for chest pain and palpitations.   Gastrointestinal: Negative for abdominal pain, nausea and vomiting.   Musculoskeletal: Negative.    Skin: Negative for itching and rash.   Endo/Heme/Allergies: Negative for polydipsia. Does not bruise/bleed easily.   Psychiatric/Behavioral: Positive for memory loss.   All other systems reviewed and are negative.      Allergies:  Allergies   Allergen Reactions   • [place] Vomiting   • Risperidone      Dystonia, altered mental status   • Hydrocodone-Acetaminophen Vomiting       Medications:    Current Facility-Administered Medications:   •  [START ON 6/27/2020] omeprazole (PRILOSEC) capsule 20 mg, 20 mg, Oral, DAILY, Wood Rodriguez M.D.  •  gabapentin (NEURONTIN) capsule 300 mg, 300 mg, Oral, TID,  Wood Rodriguez M.D., 300 mg at 06/26/20 1404  •  insulin glargine (Lantus) injection, 16 Units, Subcutaneous, Q EVENING, Carline Han M.D.  •  insulin regular (HUMULIN R) injection 2-12 Units, 2-12 Units, Subcutaneous, 4X/DAY ACHS **AND** POC Blood Glucose, , , Q AC AND BEDTIME(S) **AND** NOTIFY MD and PharmD, , , Once **AND** glucose 4 g chewable tablet 16 g, 16 g, Oral, Q15 MIN PRN **AND** dextrose 50% (D50W) injection 50 mL, 50 mL, Intravenous, Q15 MIN PRN, Carline Han M.D.  •  vitamin D (cholecalciferol) tablet 1,000 Units, 1,000 Units, Oral, DAILY, Carline Han M.D.  •  warfarin (COUMADIN) tablet 2.5 mg, 2.5 mg, Oral, ONCE AT 1800, Wood Rodriguez M.D.  •  NS infusion 1,000 mL, 1,000 mL, Intravenous, Once, Carline Han M.D.  •  Respiratory Therapy Consult, , Nebulization, Continuous RT, Wood Rodriguez M.D.  •  Pharmacy Consult Request ...Pain Management Review 1 Each, 1 Each, Other, PHARMACY TO DOSE, Wood Rodriguez M.D.  •  tramadol (ULTRAM) 50 MG tablet 50 mg, 50 mg, Oral, Q4HRS PRN, Wood Rodriguez M.D.  •  hydrALAZINE (APRESOLINE) tablet 25 mg, 25 mg, Oral, Q8HRS PRN, Wood Rodrgiuez M.D.  •  acetaminophen (TYLENOL) tablet 650 mg, 650 mg, Oral, Q4HRS PRN, Wood Rodriguez M.D., 325 mg at 06/26/20 0820  •  senna-docusate (PERICOLACE or SENOKOT S) 8.6-50 MG per tablet 2 Tab, 2 Tab, Oral, BID, 1 Tab at 06/26/20 0819 **AND** polyethylene glycol/lytes (MIRALAX) PACKET 1 Packet, 1 Packet, Oral, QDAY PRN **AND** magnesium hydroxide (MILK OF MAGNESIA) suspension 30 mL, 30 mL, Oral, QDAY PRN **AND** bisacodyl (DULCOLAX) suppository 10 mg, 10 mg, Rectal, QDAY PRN, Wood Rodriguez M.D.  •  artificial tears ophthalmic solution 1 Drop, 1 Drop, Both Eyes, PRN, Wood Rodriguez M.D.  •  benzocaine-menthol (CEPACOL) lozenge 1 Lozenge, 1 Lozenge, Mouth/Throat, Q2HRS PRN, Wood Rodriguez M.D.  •  mag hydrox-al hydrox-simeth (MAALOX PLUS ES or MYLANTA  DS) suspension 20 mL, 20 mL, Oral, Q2HRS PRN, Wood Rodriguez M.D.  •  ondansetron (ZOFRAN ODT) dispertab 4 mg, 4 mg, Oral, 4X/DAY PRN **OR** ondansetron (ZOFRAN) syringe/vial injection 4 mg, 4 mg, Intramuscular, 4X/DAY PRN, Wood Rodriguez M.D.  •  traZODone (DESYREL) tablet 50 mg, 50 mg, Oral, QHS PRN, Wood Rodriguez M.D., 50 mg at 06/25/20 2016  •  sodium chloride (OCEAN) 0.65 % nasal spray 2 Spray, 2 Spray, Nasal, PRN, Wood Rodriguez M.D.  •  atorvastatin (LIPITOR) tablet 80 mg, 80 mg, Oral, Q EVENING, Wood Rodriguez M.D., 80 mg at 06/25/20 2016  •  citalopram (CELEXA) tablet 20 mg, 20 mg, Oral, Q EVENING, Wood Rodriguez M.D., 20 mg at 06/25/20 2016  •  lisinopril (PRINIVIL) tablet 20 mg, 20 mg, Oral, Q DAY, Wood Rodriguez M.D., Stopped at 06/26/20 0538  •  QUEtiapine (SEROQUEL) tablet 50 mg, 50 mg, Oral, Q EVENING, Wood Rodriguez M.D., 50 mg at 06/25/20 1900  •  MD Alert...Warfarin per Pharmacy, , Other, PHARMACY TO DOSE, Wood Rodriguez M.D.    Past Medical/Surgical History:  Past Medical History:   Diagnosis Date   • Chronic anticoagulation 2/23/2017   • History of mitral valve replacement with mechanical valve [Z95.2] 3/10/2017   • Hyperlipidemia    • Type II diabetes mellitus (HCC) 2/23/2017     Past Surgical History:   Procedure Laterality Date   • PB COLONOSCOPY,DIAGNOSTIC N/A 5/9/2020    Procedure: COLONOSCOPY;  Surgeon: Jatinder Madera M.D.;  Location: SURGERY [facility];  Service: Gastroenterology   • MITRAL VALVE REPLACEMENT  1999   • INGUINAL HERNIA REPAIR Bilateral 1984   • TONSILLECTOMY         Social History:  Social History     Socioeconomic History   • Marital status:      Spouse name: Not on file   • Number of children: Not on file   • Years of education: Not on file   • Highest education level: Not on file   Occupational History   • Not on file   Social Needs   • Financial resource strain: Not  on file   • Food insecurity     Worry: Not on file     Inability: Not on file   • Transportation needs     Medical: Not on file     Non-medical: Not on file   Tobacco Use   • Smoking status: Never Smoker   • Smokeless tobacco: Never Used   Substance and Sexual Activity   • Alcohol use: No     Alcohol/week: 0.0 oz   • Drug use: No   • Sexual activity: Yes     Comment:    Lifestyle   • Physical activity     Days per week: Not on file     Minutes per session: Not on file   • Stress: Not on file   Relationships   • Social connections     Talks on phone: Not on file     Gets together: Not on file     Attends Muslim service: Not on file     Active member of club or organization: Not on file     Attends meetings of clubs or organizations: Not on file     Relationship status: Not on file   • Intimate partner violence     Fear of current or ex partner: Not on file     Emotionally abused: Not on file     Physically abused: Not on file     Forced sexual activity: Not on file   Other Topics Concern   • Not on file   Social History Narrative    Retired from [employer]        Family History  Family History   Problem Relation Age of Onset   • Heart Attack Father 58   • Diabetes Father    • Heart Attack Paternal Uncle    • No Known Problems Sister    • No Known Problems Brother    • No Known Problems Maternal Grandmother    • No Known Problems Maternal Grandfather    • No Known Problems Paternal Grandmother    • Heart Attack Paternal Grandfather    • No Known Problems Sister    • No Known Problems Brother        Physical Examination:   Vitals:    06/26/20 0700 06/26/20 0948 06/26/20 1147 06/26/20 1400   BP: 155/70 138/76  130/74   Pulse: 80 78 (!) 55 60   Resp: 20  16 18   Temp: 36.8 °C (98.3 °F)   36.7 °C (98.1 °F)   TempSrc: Temporal   Temporal   SpO2:  98% 96%    Weight:       Height:           Physical Exam   Constitutional: He is oriented to person, place, and time. No distress.   HENT:   Head: Normocephalic and  "atraumatic.   Right Ear: External ear normal.   Left Ear: External ear normal.   Eyes: Conjunctivae and EOM are normal. Right eye exhibits no discharge. Left eye exhibits no discharge.   Neck: Normal range of motion. Neck supple. No tracheal deviation present.   Cardiovascular: Normal rate and regular rhythm.   Pulmonary/Chest: No stridor. No respiratory distress. He has no wheezes.   Decreased BS    Abdominal: Soft. Bowel sounds are normal. He exhibits no distension. There is no abdominal tenderness.   Musculoskeletal:         General: No tenderness or edema.   Neurological: He is alert and oriented to person, place, and time.   Skin: Skin is warm and dry. He is not diaphoretic.   Vitals reviewed.      Laboratory Data:  Recent Labs     06/24/20  0725 06/26/20  0637   WBC 6.9 5.0   RBC 4.43* 4.46*   HEMOGLOBIN 14.9 15.0   HEMATOCRIT 45.8 45.8   .4* 102.7*   MCH 33.6* 33.6*   MCHC 32.5* 32.8*   RDW 49.6 48.6   PLATELETCT 150* 159*   MPV 11.2 11.4     Recent Labs     06/24/20  0725 06/26/20  0637   SODIUM 138 136   POTASSIUM 4.3 4.3   CHLORIDE 105 104   CO2 22 24   GLUCOSE 141* 127*   BUN 28* 30*   CREATININE 1.20 1.26   CALCIUM 8.9 8.8       Imaging:  No orders to display       Impressions/Recommendations:  Type II diabetes mellitus (HCC)  HbA1c 6.7  Increase Lantus and SSI  Observe serum glucose trends     Hypertension  Observe blood pressure trends on Lisinopril    Mild cognitive impairment  Pt reports \"forgetfulness\" that's been ongoing for the past 2 yrs    TIA (transient ischemic attack)  Pt presented w/ dysarthria, now resolving  Neuroimaging negative acute  On Coumadin and Lipitor    Thrombocytopenia (HCC)  Follow Platelet counts on Coumadin    Vitamin D deficiency  Vit D level 27  Start supplementation    Depression  On Celexa    History of mitral valve replacement with mechanical valve [Z95.2]  Anticoagulated on Coumadin    Anxiety  On Celexa and Seroquel    Macrocytosis without anemia  Check Vit B12, " Folate, and TSH w/ next draw    Azotemia  Will give trial of IVF  Follow renal function    Full Code    Thank you for the opportunity to assist in this patient's care.  We will continue to follow along with you.

## 2020-06-26 NOTE — PROGRESS NOTES
Pharmacy Warfarin Consult                                    6/25/2020      74 y.o.   male      Indication for anticoagulation: Mechanical Mitral Valve Replacement        Goal INR = 2.5 - 3.5           Recent Labs     06/23/20  0620 06/24/20  0725 06/25/20  0541   INR 2.53* 1.65* 1.96*   HEMOGLOBIN 15.0 14.9  --    HEMATOCRIT 45.3 45.8  --          Pertinent Drug/Drug Interactions:  Statin, quetiapine, citalopram  Outpatient Warfarin Regimen:  warfarin 5 mg PO daily  Recent Warfarin Dosing:         Dose from last 7 days      Date/Time Dose (mg)     06/25/20 1137  7.5 at rehab     06/24/20 1351  7.5     06/23/20 1256  5             Bridge Therapy: no           1.  Coumadin 7.5 mg tonight per INR = 1.96           Rojelio Alegre [facility]

## 2020-06-26 NOTE — THERAPY
Physical Therapy   Initial Evaluation     Patient Name: Carlos Rivera  Age:  74 y.o., Sex:  male  Medical Record #: [id number]  Today's Date: 6/26/2020     Subjective    Patient seated in w/c, spilled coffee on his lap, agreeable to PT evaluation.     Objective       06/26/20 1001   Prior Living Situation   Prior Services Home-Independent   Housing / Facility 1 Story House   Steps Into Home 2  (1+1)   Steps In Home 0   Rail None   Elevator No   Bathroom Set up Walk In Shower   Equipment Owned 4-Wheel Walker;Wheelchair;Tub / Shower Seat;Grab Bar(s) In Tub / Shower  (walking sticks)   Lives with - Patient's Self Care Capacity Spouse   Comments lives with wife and 3 cats, info is based on patient report and should be clarified with wife 2/2 dementia/cognitive deficits    Prior Level of Functional Mobility   Bed Mobility Independent   Transfer Status Independent   Ambulation Independent   Distance Ambulation (Feet) 1000   Assistive Devices Used   (walking sticks)   Wheelchair Other (Comments)  (n/a)   Stairs Independent   Comments per patient report, needs to be verified. Reports being independent for all mobility, driving, cooking, cleaning, etc. Son, Andrew, lives nearby and visits 1x/week   Prior Functioning: Everyday Activities   Self Care Independent   Indoor Mobility (Ambulation) Independent   Stairs Independent   Functional Cognition Independent   Prior Device Use None of the given options  (walking sticks per patient report)   Cognition    Orientation Level Not Oriented to Year;Not Oriented to Month   ABS (Agitated Behavior Scale)   Agitated Behavior Scale Performed Yes   Short Attention Span, Easy Distractibility, Inability to Concentrate 2   Impulsive, Impatient, Low Tolerance for Pain or Frustration 2   Uncooperative, Resistant to Care, Demanding 1   Violent and/or Threatening Violence Toward People or Property 1   Explosive and/or Unpredictable Anger 1   Rocking, Rubbing, Moaning, Other Self-Stimulating  Behavior 1   Pulling at Tubes, Restraints, etc. 1   Wandering from Treatment Area 1   Restlessness, Pacing, Excessive Movement 1   Repetitive Behaviors, Motor and/or Verbal 1   Rapid, Loud or Excessive Talking 2   Sudden Changes of Mood 1   Easily Initiated - Excessive Crying and/or Laughter 1   Self-Abusiveness, Physical and/or Verbal 1   Agitated Behavior Scale Total Score 17   Level of Severity No Agitation   Passive ROM Lower Body   Passive ROM Lower Body WDL   Active ROM Lower Body    Active ROM Lower Body  WDL   Strength Lower Body   Lower Body Strength  X   Comments RLE WFL, LLE 4-/5 hip flexion, knee flex/ext, ankle DF   Sensation Lower Body   Lower Extremity Sensation   X   Comments denies changes in sensation however has baseline numbnes/sensitivity. intact to light touch and tactile localization, extinguishes LLE sensation at ankles.   Lower Body Muscle Tone   Lower Body Muscle Tone  X   Comments BLE 1/4 MAS hip extensors, knee flexors, hip IRs. Hyperreflexive knee-jerk reaction with quick stretch to L plantar flexors   Balance Assessment   Sitting Balance (Static) Fair   Sitting Balance (Dynamic) Fair -   Standing Balance (Static) Poor +   Standing Balance (Dynamic) Trace +   Bed Mobility    Supine to Sit Minimal Assist   Sit to Supine Minimal Assist   Sit to Stand Minimal Assist   Scooting Stand by Assist   Rolling Supervised   Neurological Concerns   Neurological Concerns No   Coordination Lower Body    Coordination Lower Body  WDL   Roll Left and Right   Assistance Needed Supervision   Physical Assistance Level No physical assistance   CARE Score 4   Roll Left and Right Discharge Goal   Discharge Goal Independent   Sit to Lying   Assistance Needed Supervision   Physical Assistance Level No physical assistance   CARE Score 4   Sit to Lying Discharge Goal   Discharge Goal Independent   Lying to Sitting on Side of Bed   Assistance Needed Incidental touching   Physical Assistance Level No physical  assistance   CARE Score 4   Lying to Sitting on Side of Bed Discharge Goal   Discharge Goal Independent   Sit to Stand   Assistance Needed Physical assistance   Physical Assistance Level 25%-49%   CARE Score 3   Sit to Stand Discharge Goal   Discharge Goal Independent   Chair/Bed-to-Chair Transfer   Assistance Needed Physical assistance   Physical Assistance Level 25%-49%   CARE Score 3   Chair/Bed-to-Chair Transfer Discharge Goal   Discharge Goal Supervision or touching assistance   Car Transfer   Reason if not Attempted Safety concerns   CARE Score 88   Car Transfer Discharge Goal   Discharge Goal Supervision or touching assistance   Walk 10 Feet   Assistance Needed Physical assistance   Physical Assistance Level 25%-49%   CARE Score 3   Walk 10 Feet Discharge Goal   Discharge Goal Supervision or touching assistance   Walk 50 Feet with Two Turns   Assistance Needed Physical assistance   Physical Assistance Level 25%-49%   CARE Score 3   Walk 50 Feet with Two Turns Discharge Goal   Discharge Goal Supervision or touching assistance   Walk 150 Feet   Assistance Needed Physical assistance   Physical Assistance Level 25%-49%   CARE Score 3   Walk 150 Feet Discharge Goal   Discharge Goal Supervision or touching assistance   Walking 10 Feet on Uneven Surfaces   Reason if not Attempted Safety concerns   CARE Score 88   Walking 10 Feet on Uneven Surfaces Discharge Goal   Discharge Goal Supervision or touching assistance   1 Step (Curb)   Reason if not Attempted Safety concerns   CARE Score 88   1 Step (Curb) Discharge Goal   Discharge Goal Supervision or touching assistance   4 Steps   Reason if not Attempted Safety concerns   CARE Score 88   4 Steps Discharge Goal   Discharge Goal Supervision or touching assistance   12 Steps   Reason if not Attempted Safety concerns   CARE Score 88   12 Steps Discharge Goal   Discharge Goal Supervision or touching assistance   Picking Up Object   Reason if not Attempted Safety concerns    CARE Score 88   Picking Up Object Discharge Goal   Discharge Goal Supervision or touching assistance   Wheel 50 Feet with Two Turns   Reason if not Attempted Refused to perform   CARE Score 7   Type of Wheelchair/Scooter Manual   Wheel 50 Feet with Two Turns Discharge Goal   Discharge Goal Supervision or touching assistance   Wheel 150 Feet   Reason if not Attempted Refused to perform   CARE Score 7   Type of Wheelchair/Scooter Manual   Wheel 150 Feet Discharge Goal   Discharge Goal Supervision or touching assistance   Gait Functional Level of Assist    Gait Level Of Assist Minimal Assist  (occasional Mod A )   Assistive Device Front Wheel Walker  (BUE walking sticks)   Distance (Feet) 150   Deviation Shuffled Gait  (festinating gait with anterior LOBs, unsteady)   Wheelchair Functional Level of Assist   Wheelchair Assist Refused   Stairs Functional Level of Assist   Level of Assist with Stairs Unable to Participate   Transfer Functional Level of Assist   Bed, Chair, Wheelchair Transfer Minimal Assist   Bed Chair Wheelchair Transfer Description Adaptive equipment;Increased time;Requires lift;Verbal cueing  (assist for anterior weight shift)   Problem List    Problems Impaired Bed Mobility;Impaired Transfers;Impaired Ambulation;Functional Strength Deficit;Impaired Balance;Decreased Activity Tolerance;Safety Awareness Deficits / Cognition   Precautions   Precautions Fall Risk   Comments baseline dementia   Current Discharge Plan   Current Discharge Plan Return to Prior Living Situation   Interdisciplinary Plan of Care Collaboration   IDT Collaboration with  Occupational Therapist;Physician;Family / Caregiver   Patient Position at End of Therapy Seated;Chair Alarm On;Self Releasing Lap Belt Applied;Call Light within Reach;Tray Table within Reach;Phone within Reach   Collaboration Comments CLOF   Benefit   Therapy Benefit Patient Would Benefit from Inpatient Rehabilitation Physical Therapy to Maximize Functional  "[place] with ADLs, IADLs and Mobility.   Strengths & Barriers   Strengths Able to follow instructions;Adequate strength;Motivated for self care and independence;Supportive family;Willingly participates in therapeutic activities   Barriers Decreased endurance;Dementia;Generalized weakness;Impaired activity tolerance;Impaired balance;Impaired insight/denial of deficits;Impaired functional cognition;Impaired carryover of learning;Impulsive;Limited mobility   PT Total Time Spent   PT Individual Total Time Spent (Mins) 60   PT Charge Group   PT Therapeutic Activities 1   PT Evaluation PT Evaluation Mod       Assessment  Patient is 74 y.o. male with a diagnosis of encephalopathy. Patient presented to hospital on 6/23/2020 with impaired speech and word finding. Head CT without acute abnormality; CAT without large vessel occlusion. Neurology concerned for acute stroke vs. Hypertensive encephalopathy as patient's SBP was >190s on admission. Brain MRI showed multiple old cerebral infarcts, as well as lacunar, cerebellar, and parietal infarcts; however nothing acute. Aphasia has improved however he continues to have decreased mobility and memory with concern for encephalopathy vs. TIA. PMH includes: HTN, HLD, DM2, dementia, MVR with mechanical valve replacement, bilateral inguinal hernia repair.  Additional factors influencing patient status / progress (ie: cognitive factors, co-morbidities, social support, etc): Patient is very motivated to return to prior level of independence (however this needs to be confirmed due to baseline dementia) and has good family support with his spouse and his oldest son, Andrew, who lives in the area. Primary impairments include impaired standing balance, decreased strength especially LLE, impaired muscle tone, decreased endurance, and impaired cognition. Son reports change in mood over the past 6 months to year, with patient being more \"cranky and disagreeable\" which is not his typical " behavior. Patient also presents with significant festinating gait causing anterior LOBs as well as posterior LOBs with mobility and presents with Parkinson-like movement patterns with mobility affecting safety. Patient will benefit from skilled physical therapy to address current impairments and maximize functional mobility and safety prior to discharge.      Plan  Recommend Physical Therapy  minutes per day 5-7 days per week for 10-14 days for the following treatments:  PT Group Therapy, PT Gait Training, PT Therapeutic Exercises, PT Neuro Re-Ed/Balance, PT Aquatic Therapy, PT Therapeutic Activity and PT Evaluation.    Goals:  Long term and short term goals have been discussed with patient and they are in agreement.    Physical Therapy Problems     Problem: Mobility     Dates: Start: 06/26/20       Goal: STG-Within one week, patient will ambulate household distance     Dates: Start: 06/26/20       Description: 1) Individualized goal:  150 ft with LRAD and CGA-SBA.  2) Interventions:  PT Group Therapy, PT Gait Training, PT Therapeutic Exercises, PT Neuro Re-Ed/Balance, PT Aquatic Therapy, PT Therapeutic Activity, and PT Evaluation            Goal: STG-Within one week, patient will ambulate up/down a curb     Dates: Start: 06/26/20       Description: 1) Individualized goal:  150 ft with LRAD and CGA-SBA.  2) Interventions:  PT Group Therapy, PT Gait Training, PT Therapeutic Exercises, PT Neuro Re-Ed/Balance, PT Aquatic Therapy, PT Therapeutic Activity, and PT Evaluation          Goal: STG-Within one week, patient will ascend and descend four to six stairs     Dates: Start: 06/26/20       Description: 1) Individualized goal:  with B rails and CGA.  2) Interventions:  PT Group Therapy, PT Gait Training, PT Therapeutic Exercises, PT Neuro Re-Ed/Balance, PT Aquatic Therapy, PT Therapeutic Activity, and PT Evaluation                    Problem: Mobility Transfers     Dates: Start: 06/26/20       Goal: STG-Within one  week, patient will perform bed mobility     Dates: Start: 06/26/20       Description: 1) Individualized goal:  with supervision.  2) Interventions:  PT Group Therapy, PT Gait Training, PT Therapeutic Exercises, PT Neuro Re-Ed/Balance, PT Aquatic Therapy, PT Therapeutic Activity, and PT Evaluation              Goal: STG-Within one week, patient will transfer bed to chair     Dates: Start: 06/26/20       Description: 1) Individualized goal:  with LRAD and CGA-SBA.  2) Interventions:  PT Group Therapy, PT Gait Training, PT Therapeutic Exercises, PT Neuro Re-Ed/Balance, PT Aquatic Therapy, PT Therapeutic Activity, and PT Evaluation                  Problem: PT-Long Term Goals     Dates: Start: 06/26/20       Goal: LTG-By discharge, patient will ambulate     Dates: Start: 06/26/20       Description: 1) Individualized goal:  >150 ft with LRAD and supervision.  2) Interventions:  PT Group Therapy, PT Gait Training, PT Therapeutic Exercises, PT Neuro Re-Ed/Balance, PT Aquatic Therapy, PT Therapeutic Activity, and PT Evaluation              Goal: LTG-By discharge, patient will transfer one surface to another     Dates: Start: 06/26/20       Description: 1) Individualized goal: with LRAD and supervision.  2) Interventions:  PT Group Therapy, PT Gait Training, PT Therapeutic Exercises, PT Neuro Re-Ed/Balance, PT Aquatic Therapy, PT Therapeutic Activity, and PT Evaluation            Goal: LTG-By discharge, patient will ambulate up/down 4-6 stairs     Dates: Start: 06/26/20       Description: 1) Individualized goal: with B rails and supervision.  2) Interventions:  PT Group Therapy, PT Gait Training, PT Therapeutic Exercises, PT Neuro Re-Ed/Balance, PT Aquatic Therapy, PT Therapeutic Activity, and PT Evaluation          Goal: LTG-By discharge, patient will transfer in/out of a car     Dates: Start: 06/26/20       Description: 1) Individualized goal: with LRAD and supervision.  2) Interventions:  PT Group Therapy, PT Gait  Training, PT Therapeutic Exercises, PT Neuro Re-Ed/Balance, PT Aquatic Therapy, PT Therapeutic Activity, and PT Evaluation          Goal: LTG-By discharge, patient will     Dates: Start: 06/26/20       Description: 1) Individualized goal: negotiate single curb with LRAD and supervision-SBA.  2) Interventions:  PT Group Therapy, PT Gait Training, PT Therapeutic Exercises, PT Neuro Re-Ed/Balance, PT Aquatic Therapy, PT Therapeutic Activity, and PT Evaluation          Goal: LTG-By discharge, patient will     Dates: Start: 06/26/20       Description: 1) Individualized goal: perform bed mobility mod I.  2) Interventions:  PT Group Therapy, PT Gait Training, PT Therapeutic Exercises, PT Neuro Re-Ed/Balance, PT Aquatic Therapy, PT Therapeutic Activity, and PT Evaluation

## 2020-06-26 NOTE — PROGRESS NOTES
Pharmacy Warfarin Consult   6/26/2020     74 y.o.   male     Indication for anticoagulation: Mechanical Mitral Valve Replacement        Goal INR = 2.5 - 3.5    Recent Labs     06/24/20  0725 06/25/20  0541 06/26/20  0637   INR 1.65* 1.96* 2.86*   HEMOGLOBIN 14.9  --  15.0   HEMATOCRIT 45.8  --  45.8       Pertinent Drug/Drug Interactions:  Statin, quetiapine, citalopram  Outpatient Warfarin Regimen:  warfarin 5 mg PO daily  Recent Warfarin Dosing:    Dose from last 7 days     Date/Time Dose (mg)    06/26/20 0637  2.5    06/25/20 1732  7.5        Dose from last 7 days      Date/Time Dose (mg)          06/24/20 1351  7.5     06/23/20 1256  5             Bridge Therapy: no           1.  Coumadin 2.5 mg tonight per INR = 2.86           Rojelio Alegre [facility]

## 2020-06-26 NOTE — PROGRESS NOTES
Pharmacy Warfarin Consult   6/25/2020     74 y.o.   male     Indication for anticoagulation: Mechanical Mitral Valve Replacement       Goal INR = 2.5 - 3.5    Recent Labs     06/23/20  0620 06/24/20  0725 06/25/20  0541   INR 2.53* 1.65* 1.96*   HEMOGLOBIN 15.0 14.9  --    HEMATOCRIT 45.3 45.8  --        Pertinent Drug/Drug Interactions:  Statin, quetiapine, citalopram  Outpatient Warfarin Regimen:  warfarin 5 mg PO daily  Recent Warfarin Dosing:    Dose from last 7 days     Date/Time Dose (mg)    06/25/20 1137  7.5 at rehab    06/24/20 1351  7.5    06/23/20 1256  5          Bridge Therapy: no        1.  Coumadin 7.5 mg tonight per INR = 1.96        Rojelio Alegre [facility]

## 2020-06-26 NOTE — ASSESSMENT & PLAN NOTE
HbA1c 6.7  Continue Metformin and Lantus  Will not need SSI on discharge  Outpt meds include Lantus 15 units and Metformin 1000 mg bid

## 2020-06-26 NOTE — FLOWSHEET NOTE
06/1945   Events/Summary/Plan   Events/Summary/Plan SpO2 check   Vital Signs   Pulse 66   Respiration 16   Pulse Oximetry 95 %   $ Pulse Oximetry (Spot Check) Yes   Oxygen   O2 Delivery Device None - Room Air

## 2020-06-26 NOTE — DISCHARGE PLANNING
"CASE MANAGEMENT INITIAL ASSESSMENT    Admit Date:  6/25/2020     I spoke with patients wife Comfort to discuss role of case management / discharge planning / team conference.     Per H&P, \"patient is a  74 y.o. male transferred from [facility] S/P large vessel occlusion, multiple old cerebral infarcts (no acute), encephalopathy, decreased mobility and memory\".    PMHx: HTN, HLD, DM2, dementia, MVR w/mechanical valve replacement (coumadin), neuropathy/feet, diverticular bleeding, per H&P.      PLOF: Home independent w/wife's assistance.    DC Plan: Home with wife.      PCP: Rufino Shine MD  Cardiology: Stephon Ramos MD    ADM MD: Dr. Rodriguez    Diagnosis: 0002.1 - Brain Dysfunction: Non-Traumatic  Acute encephalopathy    Co-morbidities:   Patient Active Problem List    Diagnosis Date Noted   • Hypertension 06/15/2020     Priority: High   • Acute on Chronic Encephalopathy 05/10/2020     Priority: High   • Coronary artery disease involving coronary bypass graft 05/08/2020     Priority: High   • History of mitral valve replacement with mechanical valve [Z95.2] 03/10/2017     Priority: High   • Type II diabetes mellitus (HCC) 02/23/2017     Priority: High   • Dementia with behavioral disturbance ([facility]) 05/15/2020     Priority: Medium   • Chronic anticoagulation 02/23/2017     Priority: Medium   • TIA (transient ischemic attack) 06/26/2020   • Thrombocytopenia ([facility]) 06/26/2020   • Vitamin D deficiency 06/26/2020   • Depression 06/26/2020   • Proteinuria 06/16/2020   • Anger 06/15/2020   • Anemia 06/15/2020   • Diabetic neuropathy ([facility]) 06/15/2020   • Obstructive sleep apnea syndrome 12/24/2019   • Caregiver stress 10/29/2019   • At risk for falls 09/18/2019   • Polypharmacy 09/18/2019   • Medication management 07/10/2019   • Shuffling gait 02/07/2019   • Dementia ([facility]) 03/29/2018   • History of stroke 03/29/2018   • Mild single current episode of major depressive disorder ([facility]) 12/20/2017   • Hyperlipidemia 02/23/2017   • Anxiety " "02/23/2017   • Mild cognitive impairment 02/23/2017     Prior Living Situation:  Housing / Facility: 1 Story House  Lives with - Patient's Self Care Capacity: Spouse    Prior Level of Function:  Medication Management: Independent  Finances: Independent  Home Management: Independent  Shopping: Independent  Prior Level Of Mobility: Independent With Device in Home  Driving / Transportation: Driving Independent    Support Systems:  Primary : Comfort Wyman-Wife  Other support systems: Son-Jw Wyman: [phone]  Advance Directives: Yes    Previous Services Utilized:   Equipment Owned: 4-Wheel Walker, [place] ([place]) Crutches, Tub / Shower Seat  Prior Services: Home-Independent    Other Information:  Occupation (Pre-Hospital Vocational): Retired Due To Age     Primary Payor Source: Medicare A, Medicare B  Secondary Payor Source: Other (Comments)([place])  Primary Care Practitioner : Rufino Shine  Other MDs: Stephon Ramos, Cardiology    Additional Case Management Questions:  Have you ever received case management services for yourself or a family member? No    Do you feel you have and an understanding of what services  provide? Yes    Do you have any additional questions regarding case management? No          CASE MANAGEMENT PLAN OF CARE   Individualized Goals:   Per wife:  1. \"I need to know how to communicate with my -he's confused\"  2. \"I'm looking forward to family training\"    Barriers:   1. Functional limitation  2. Cognitive limitation  3. Multiple co-morbidities    Plan:  1. Continue to follow patient through hospitalization and provide discharge planning in collaboration with patient, family, physicians and ancillary services.     2. Utilize community resources to ensure a safe discharge.       "

## 2020-06-26 NOTE — CARE PLAN
Problem: Safety  Goal: Will remain free from falls  Outcome: NOT MET  Note: Patient very impulsive - not using call light     Problem: Infection  Goal: Will remain free from infection  Note: Education reinforced to wash hands thoroughly after using the restroom, prior to eating and throughout the day to minimize the potential of acquiring germs while in a facility setting. Patient engaged in conversation and verbalizes understanding.

## 2020-06-27 LAB
GLUCOSE BLD-MCNC: 149 MG/DL (ref 65–99)
GLUCOSE BLD-MCNC: 223 MG/DL (ref 65–99)
GLUCOSE BLD-MCNC: 326 MG/DL (ref 65–99)
GLUCOSE BLD-MCNC: 91 MG/DL (ref 65–99)
INR PPP: 3.18 (ref 0.87–1.13)
PROTHROMBIN TIME: 33.6 SEC (ref 12–14.6)

## 2020-06-27 PROCEDURE — 36415 COLL VENOUS BLD VENIPUNCTURE: CPT

## 2020-06-27 PROCEDURE — 700102 HCHG RX REV CODE 250 W/ 637 OVERRIDE(OP): Performed by: PHYSICAL MEDICINE & REHABILITATION

## 2020-06-27 PROCEDURE — 770010 HCHG ROOM/CARE - REHAB SEMI PRIVAT*

## 2020-06-27 PROCEDURE — A9270 NON-COVERED ITEM OR SERVICE: HCPCS | Performed by: PHYSICAL MEDICINE & REHABILITATION

## 2020-06-27 PROCEDURE — 99232 SBSQ HOSP IP/OBS MODERATE 35: CPT | Performed by: HOSPITALIST

## 2020-06-27 PROCEDURE — 85610 PROTHROMBIN TIME: CPT

## 2020-06-27 PROCEDURE — A9270 NON-COVERED ITEM OR SERVICE: HCPCS | Performed by: HOSPITALIST

## 2020-06-27 PROCEDURE — 700102 HCHG RX REV CODE 250 W/ 637 OVERRIDE(OP): Performed by: HOSPITALIST

## 2020-06-27 PROCEDURE — 82962 GLUCOSE BLOOD TEST: CPT

## 2020-06-27 RX ORDER — WARFARIN SODIUM 1 MG/1
1 TABLET ORAL DAILY
Status: DISCONTINUED | OUTPATIENT
Start: 2020-06-27 | End: 2020-06-28

## 2020-06-27 RX ADMIN — TRAMADOL HYDROCHLORIDE 50 MG: 50 TABLET, COATED ORAL at 08:39

## 2020-06-27 RX ADMIN — QUETIAPINE 50 MG: 25 TABLET ORAL at 20:47

## 2020-06-27 RX ADMIN — INSULIN HUMAN 4 UNITS: 100 INJECTION, SOLUTION PARENTERAL at 20:56

## 2020-06-27 RX ADMIN — MELATONIN 1000 UNITS: at 08:39

## 2020-06-27 RX ADMIN — GABAPENTIN 300 MG: 300 CAPSULE ORAL at 08:39

## 2020-06-27 RX ADMIN — INSULIN GLARGINE 16 UNITS: 100 INJECTION, SOLUTION SUBCUTANEOUS at 20:55

## 2020-06-27 RX ADMIN — DOCUSATE SODIUM 50 MG AND SENNOSIDES 8.6 MG 2 TABLET: 8.6; 5 TABLET, FILM COATED ORAL at 08:39

## 2020-06-27 RX ADMIN — CITALOPRAM HYDROBROMIDE 20 MG: 20 TABLET ORAL at 20:47

## 2020-06-27 RX ADMIN — ATORVASTATIN CALCIUM 80 MG: 40 TABLET, FILM COATED ORAL at 20:47

## 2020-06-27 RX ADMIN — TRAZODONE HYDROCHLORIDE 50 MG: 50 TABLET ORAL at 20:47

## 2020-06-27 RX ADMIN — INSULIN HUMAN 8 UNITS: 100 INJECTION, SOLUTION PARENTERAL at 11:35

## 2020-06-27 RX ADMIN — OMEPRAZOLE 20 MG: 20 CAPSULE, DELAYED RELEASE ORAL at 08:39

## 2020-06-27 RX ADMIN — GABAPENTIN 300 MG: 300 CAPSULE ORAL at 14:09

## 2020-06-27 RX ADMIN — GABAPENTIN 300 MG: 300 CAPSULE ORAL at 20:47

## 2020-06-27 RX ADMIN — WARFARIN SODIUM 1 MG: 1 TABLET ORAL at 17:16

## 2020-06-27 ASSESSMENT — ENCOUNTER SYMPTOMS
BRUISES/BLEEDS EASILY: 0
EYES NEGATIVE: 1
PALPITATIONS: 0
SHORTNESS OF BREATH: 0
POLYDIPSIA: 0
VOMITING: 0
NAUSEA: 0
FEVER: 0
ABDOMINAL PAIN: 0
MUSCULOSKELETAL NEGATIVE: 1
MEMORY LOSS: 1
COUGH: 0
CHILLS: 0

## 2020-06-27 NOTE — PROGRESS NOTES
Hospital Medicine Daily Progress Note      Chief Complaint  Hypertension  Diabetes    Interval Problem Update  No overnight problems reported.    Review of Systems  Review of Systems   Constitutional: Negative for chills and fever.   HENT: Negative.    Eyes: Negative.    Respiratory: Negative for cough and shortness of breath.    Cardiovascular: Negative for chest pain and palpitations.   Gastrointestinal: Negative for abdominal pain, nausea and vomiting.   Musculoskeletal: Negative.    Skin: Negative for itching and rash.   Endo/Heme/Allergies: Negative for polydipsia. Does not bruise/bleed easily.   Psychiatric/Behavioral: Positive for memory loss.        Physical Exam  Temp:  [36 °C (96.8 °F)-36.7 °C (98.1 °F)] 36.2 °C (97.1 °F)  Pulse:  [60-65] 65  Resp:  [18] 18  BP: ()/(54-74) 116/69  SpO2:  [95 %] 95 %    Physical Exam  Vitals signs reviewed.   Constitutional:       General: He is not in acute distress.     Appearance: Normal appearance. He is not ill-appearing.   HENT:      Head: Normocephalic and atraumatic.      Right Ear: External ear normal.      Left Ear: External ear normal.      Nose: Nose normal.      Mouth/Throat:      Pharynx: Oropharynx is clear.   Eyes:      General:         Right eye: No discharge.         Left eye: No discharge.      Extraocular Movements: Extraocular movements intact.      Conjunctiva/sclera: Conjunctivae normal.   Neck:      Musculoskeletal: Normal range of motion and neck supple.   Cardiovascular:      Rate and Rhythm: Normal rate and regular rhythm.   Pulmonary:      Effort: No respiratory distress.      Breath sounds: No wheezing.      Comments: Decreased BS   Abdominal:      General: Bowel sounds are normal. There is no distension.      Palpations: Abdomen is soft.      Tenderness: There is no abdominal tenderness.   Musculoskeletal:      Right lower leg: No edema.      Left lower leg: No edema.   Skin:     General: Skin is warm and dry.   Neurological:      Mental  "Status: He is alert.      Comments: Awake and alert         Fluids    Intake/Output Summary (Last 24 hours) at 6/27/2020 1318  Last data filed at 6/27/2020 0830  Gross per 24 hour   Intake 360 ml   Output 500 ml   Net -140 ml       Laboratory  Recent Labs     06/26/20  0637   WBC 5.0   RBC 4.46*   HEMOGLOBIN 15.0   HEMATOCRIT 45.8   .7*   MCH 33.6*   MCHC 32.8*   RDW 48.6   PLATELETCT 159*   MPV 11.4     Recent Labs     06/26/20  0637   SODIUM 136   POTASSIUM 4.3   CHLORIDE 104   CO2 24   GLUCOSE 127*   BUN 30*   CREATININE 1.26   CALCIUM 8.8     Recent Labs     06/25/20  0541 06/26/20  0637 06/27/20  0550   INR 1.96* 2.86* 3.18*               Assessment/Plan  Hypertension- (present on admission)  Assessment & Plan  Observe blood pressure trends on Lisinopril    History of mitral valve replacement with mechanical valve [Z95.2]- (present on admission)  Assessment & Plan  Anticoagulated on Coumadin    Type II diabetes mellitus (HCC)- (present on admission)  Assessment & Plan  HbA1c 6.7  Continue Lantus and SSI  Observe serum glucose trends     Azotemia  Assessment & Plan  S/P NS x 1 liter  Check F/U labs in am    Macrocytosis without anemia  Assessment & Plan  Check Vit B12 and Folate  TSH normal    Depression  Assessment & Plan  On Celexa    Vitamin D deficiency  Assessment & Plan  Vit D level 27  On supplementation    Thrombocytopenia (HCC)  Assessment & Plan  Follow Platelet counts on Coumadin  Check F/U labs in am     TIA (transient ischemic attack)  Assessment & Plan  Pt presented w/ dysarthria, now resolving  Neuroimaging negative acute  On Coumadin and Lipitor    Mild cognitive impairment- (present on admission)  Assessment & Plan  Pt reports \"forgetfulness\" that's been ongoing for the past 2 yrs    Anxiety- (present on admission)  Assessment & Plan  On Celexa and Seroquel     Full Code        "

## 2020-06-27 NOTE — PROGRESS NOTES
Inpatient Anticoagulation Service Note    Date: 2020    Reason for Anticoagulation: Mechanical Mitral Valve Replacement     Target INR: 2.5 to 3.5    Hemoglobin Value: 15  Hematocrit Value: 45.8  Lab Platelet Value: (!) 159    INR from last 7 days     Date/Time INR Value    20 0550  (!) 3.18    20 0637  (!) 2.86        Dose from last 7 days     Date/Time Dose (mg)    20 1400  1    20 0637  2.5    20 1732  7.5        Significant Interactions: Statin, Other (Comments)(Seroquel, Celexa)  Bridge Therapy: No        Plan:  Warfarin 1 mg PO tonight for INR 3.18  Education Material Provided?: No  Pharmacist suggested discharge dosin.5-5 mg daily     Sandy Wheeler, BethanyD

## 2020-06-27 NOTE — CARE PLAN
Problem: Communication  Goal: The ability to communicate needs accurately and effectively will improve  Outcome: PROGRESSING SLOWER THAN EXPECTED  Note: Pt has demonstrated ability to use call light but does not consistently do so; verbalizes understanding that he needs supervision to use restroom. Fall precautions in place; will continue to monitor and educate.     Problem: Infection  Goal: Will remain free from infection  Outcome: PROGRESSING AS EXPECTED  Note: Patient remains free from s/s infection; afebrile.  Will continue to monitor.

## 2020-06-28 LAB
ANION GAP SERPL CALC-SCNC: 10 MMOL/L (ref 7–16)
BUN SERPL-MCNC: 22 MG/DL (ref 8–22)
CALCIUM SERPL-MCNC: 8.6 MG/DL (ref 8.5–10.5)
CHLORIDE SERPL-SCNC: 107 MMOL/L (ref 96–112)
CO2 SERPL-SCNC: 23 MMOL/L (ref 20–33)
CREAT SERPL-MCNC: 1.06 MG/DL (ref 0.5–1.4)
ERYTHROCYTE [DISTWIDTH] IN BLOOD BY AUTOMATED COUNT: 47.7 FL (ref 35.9–50)
FOLATE SERPL-MCNC: 16.9 NG/ML
GLUCOSE BLD-MCNC: 191 MG/DL (ref 65–99)
GLUCOSE BLD-MCNC: 90 MG/DL (ref 65–99)
GLUCOSE SERPL-MCNC: 88 MG/DL (ref 65–99)
HCT VFR BLD AUTO: 45.5 % (ref 42–52)
HGB BLD-MCNC: 14.9 G/DL (ref 14–18)
INR PPP: 2.6 (ref 0.87–1.13)
MCH RBC QN AUTO: 33.1 PG (ref 27–33)
MCHC RBC AUTO-ENTMCNC: 32.7 G/DL (ref 33.7–35.3)
MCV RBC AUTO: 101.1 FL (ref 81.4–97.8)
PLATELET # BLD AUTO: 144 K/UL (ref 164–446)
PMV BLD AUTO: 10.9 FL (ref 9–12.9)
POTASSIUM SERPL-SCNC: 4.4 MMOL/L (ref 3.6–5.5)
PROTHROMBIN TIME: 28.6 SEC (ref 12–14.6)
RBC # BLD AUTO: 4.5 M/UL (ref 4.7–6.1)
SODIUM SERPL-SCNC: 140 MMOL/L (ref 135–145)
VIT B12 SERPL-MCNC: 1102 PG/ML (ref 211–911)
WBC # BLD AUTO: 6.2 K/UL (ref 4.8–10.8)

## 2020-06-28 PROCEDURE — 97535 SELF CARE MNGMENT TRAINING: CPT

## 2020-06-28 PROCEDURE — 97116 GAIT TRAINING THERAPY: CPT

## 2020-06-28 PROCEDURE — 97110 THERAPEUTIC EXERCISES: CPT

## 2020-06-28 PROCEDURE — 97130 THER IVNTJ EA ADDL 15 MIN: CPT | Performed by: SPEECH-LANGUAGE PATHOLOGIST

## 2020-06-28 PROCEDURE — 82962 GLUCOSE BLOOD TEST: CPT | Mod: 91

## 2020-06-28 PROCEDURE — 770010 HCHG ROOM/CARE - REHAB SEMI PRIVAT*

## 2020-06-28 PROCEDURE — 36415 COLL VENOUS BLD VENIPUNCTURE: CPT

## 2020-06-28 PROCEDURE — 700102 HCHG RX REV CODE 250 W/ 637 OVERRIDE(OP): Performed by: HOSPITALIST

## 2020-06-28 PROCEDURE — 80048 BASIC METABOLIC PNL TOTAL CA: CPT

## 2020-06-28 PROCEDURE — A9270 NON-COVERED ITEM OR SERVICE: HCPCS | Performed by: HOSPITALIST

## 2020-06-28 PROCEDURE — 97129 THER IVNTJ 1ST 15 MIN: CPT | Performed by: SPEECH-LANGUAGE PATHOLOGIST

## 2020-06-28 PROCEDURE — 82746 ASSAY OF FOLIC ACID SERUM: CPT

## 2020-06-28 PROCEDURE — 85027 COMPLETE CBC AUTOMATED: CPT

## 2020-06-28 PROCEDURE — 85610 PROTHROMBIN TIME: CPT

## 2020-06-28 PROCEDURE — 82607 VITAMIN B-12: CPT

## 2020-06-28 PROCEDURE — A9270 NON-COVERED ITEM OR SERVICE: HCPCS | Performed by: PHYSICAL MEDICINE & REHABILITATION

## 2020-06-28 PROCEDURE — 99232 SBSQ HOSP IP/OBS MODERATE 35: CPT | Performed by: HOSPITALIST

## 2020-06-28 PROCEDURE — 700102 HCHG RX REV CODE 250 W/ 637 OVERRIDE(OP): Performed by: PHYSICAL MEDICINE & REHABILITATION

## 2020-06-28 RX ORDER — WARFARIN SODIUM 2.5 MG/1
2.5 TABLET ORAL
Status: DISCONTINUED | OUTPATIENT
Start: 2020-06-28 | End: 2020-06-28

## 2020-06-28 RX ORDER — INSULIN GLARGINE 100 [IU]/ML
18 INJECTION, SOLUTION SUBCUTANEOUS
Status: DISCONTINUED | OUTPATIENT
Start: 2020-06-29 | End: 2020-06-30

## 2020-06-28 RX ORDER — WARFARIN SODIUM 3 MG/1
3 TABLET ORAL
Status: COMPLETED | OUTPATIENT
Start: 2020-06-28 | End: 2020-06-28

## 2020-06-28 RX ADMIN — INSULIN HUMAN 2 UNITS: 100 INJECTION, SOLUTION PARENTERAL at 20:22

## 2020-06-28 RX ADMIN — OMEPRAZOLE 20 MG: 20 CAPSULE, DELAYED RELEASE ORAL at 07:58

## 2020-06-28 RX ADMIN — INSULIN HUMAN 2 UNITS: 100 INJECTION, SOLUTION PARENTERAL at 11:17

## 2020-06-28 RX ADMIN — TRAZODONE HYDROCHLORIDE 50 MG: 50 TABLET ORAL at 20:19

## 2020-06-28 RX ADMIN — QUETIAPINE 50 MG: 25 TABLET ORAL at 20:18

## 2020-06-28 RX ADMIN — ATORVASTATIN CALCIUM 80 MG: 40 TABLET, FILM COATED ORAL at 20:18

## 2020-06-28 RX ADMIN — WARFARIN SODIUM 3 MG: 3 TABLET ORAL at 17:36

## 2020-06-28 RX ADMIN — GABAPENTIN 300 MG: 300 CAPSULE ORAL at 15:18

## 2020-06-28 RX ADMIN — GABAPENTIN 300 MG: 300 CAPSULE ORAL at 20:18

## 2020-06-28 RX ADMIN — MELATONIN 1000 UNITS: at 07:58

## 2020-06-28 RX ADMIN — GABAPENTIN 300 MG: 300 CAPSULE ORAL at 07:58

## 2020-06-28 RX ADMIN — INSULIN HUMAN 6 UNITS: 100 INJECTION, SOLUTION PARENTERAL at 17:30

## 2020-06-28 RX ADMIN — CITALOPRAM HYDROBROMIDE 20 MG: 20 TABLET ORAL at 20:19

## 2020-06-28 RX ADMIN — DOCUSATE SODIUM 50 MG AND SENNOSIDES 8.6 MG 2 TABLET: 8.6; 5 TABLET, FILM COATED ORAL at 20:18

## 2020-06-28 RX ADMIN — LISINOPRIL 20 MG: 20 TABLET ORAL at 05:38

## 2020-06-28 ASSESSMENT — ENCOUNTER SYMPTOMS
POLYDIPSIA: 0
MEMORY LOSS: 1
FEVER: 0
BRUISES/BLEEDS EASILY: 0
ABDOMINAL PAIN: 0
NAUSEA: 0
CHILLS: 0
PALPITATIONS: 0
MUSCULOSKELETAL NEGATIVE: 1
COUGH: 0
EYES NEGATIVE: 1
VOMITING: 0
SHORTNESS OF BREATH: 0

## 2020-06-28 ASSESSMENT — ACTIVITIES OF DAILY LIVING (ADL)
TOILET_TRANSFER_DESCRIPTION: GRAB BAR;INCREASED TIME;VERBAL CUEING
BED_CHAIR_WHEELCHAIR_TRANSFER_DESCRIPTION: ADAPTIVE EQUIPMENT;VERBAL CUEING
BED_CHAIR_WHEELCHAIR_TRANSFER_DESCRIPTION: VERBAL CUEING;INCREASED TIME

## 2020-06-28 ASSESSMENT — GAIT ASSESSMENTS
GAIT LEVEL OF ASSIST: MINIMAL ASSIST
ASSISTIVE DEVICE: FRONT WHEEL WALKER
DISTANCE (FEET): 240

## 2020-06-28 NOTE — THERAPY
Physical Therapy   Daily Treatment     Patient Name: Carlos Rivera  Age:  74 y.o., Sex:  male  Medical Record #: [id number]  Today's Date: 6/28/2020     Precautions  Precautions: (P) Fall Risk  Comments: (P) baseline dementia     Subjective    Pt was sitting in w/c upon arrival and agreeable to tx.  Pt wanted to perform strength exercises. Stated that at home he prefers cycling activities to walking and that he used to cycle to commute for work. Stated he was very fatigued and had been the past couple weeks.     Objective       06/28/20 1301   Precautions   Precautions Fall Risk   Comments baseline dementia    Cognition    Level of Consciousness Alert   Ability To Follow Commands 1 Step   Safety Awareness Impaired;Impulsive   New Learning Impaired   Attention Impaired   Comments poor attn, easily distracted, tangential   Gait Functional Level of Assist    Gait Level Of Assist Minimal Assist   Assistive Device Front Wheel Walker   Distance (Feet) 240   # of Times Distance was Traveled 1   Deviation   (festinating, anterior leaning with fatigue, LOB with turn)   Transfer Functional Level of Assist   Bed, Chair, Wheelchair Transfer Minimal Assist   Bed Chair Wheelchair Transfer Description Adaptive equipment;Verbal cueing  (SPT FWW)   Sitting Lower Body Exercises   Nustep Resistance Level 5  (10 min B UE/ B LE)   Standing Lower Body Exercises   Standing Lower Body Exercises   (all with SBA and B UE support in // bars)   Hamstring Curl 3 sets of 15;Right ;Left   Hip Flexion 3 sets of 15;Bilateral   Hip Extension 3 sets of 15;Right ;Left   Hip Abduction 3 sets of 15;Right ;Left   Heel Rise 3 sets of 15;Bilateral   Toe Rise 3 sets of 15;Bilateral   Mini Squat Full ;2 sets of 15   Other Exercises unilateral alternating UE support with // bar for picking up object from ground 2 x 10 each R/L UE reaching   Interdisciplinary Plan of Care Collaboration   Patient Position at End of Therapy Seated;Call Light within  Reach;Tray Table within Reach;Phone within Reach   Strengths & Barriers   Strengths Willingly participates in therapeutic activities;Manages pain appropriately   Barriers Impaired activity tolerance;Impaired balance;Impaired carryover of learning;Impulsive;Decreased endurance;Impaired insight/denial of deficits;Impaired functional cognition   PT Total Time Spent   PT Individual Total Time Spent (Mins) 60   PT Charge Group   PT Gait Training 1   PT Therapeutic Exercise 3     Trial AMB with B walking sticks 10ft x2 mod-maxA for LOB    Assessment  Pt requiring frequent redirection to talk and 100% VCs and TCs for performance of standing therex; will require increased practice if plan to d/c home with program as HEP. Pt motivated by Nustep activity as he found it similar to the cycling he enjoys.    Strengths: (P) Willingly participates in therapeutic activities, Manages pain appropriately  Barriers: (P) Impaired activity tolerance, Impaired balance, Impaired carryover of learning, Impulsive, Decreased endurance, Impaired insight/denial of deficits, Impaired functional cognition    Plan    AMB with FWW vs no AD or walking sticks in // bars, lite gait for increased step length, postural re-ed, trial dual tasking with functional mobility, single leg balance activities, bed mobility/ transfer training, AMB with turns, safety awareness, review standing therex program for strength    Physical Therapy Problems     Problem: Mobility     Dates: Start: 06/26/20       Goal: STG-Within one week, patient will ambulate household distance     Dates: Start: 06/26/20       Description: 1) Individualized goal:  150 ft with LRAD and CGA-SBA.  2) Interventions:  PT Group Therapy, PT Gait Training, PT Therapeutic Exercises, PT Neuro Re-Ed/Balance, PT Aquatic Therapy, PT Therapeutic Activity, and PT Evaluation            Goal: STG-Within one week, patient will ambulate up/down a curb     Dates: Start: 06/26/20       Description: 1)  Individualized goal:  150 ft with LRAD and CGA-SBA.  2) Interventions:  PT Group Therapy, PT Gait Training, PT Therapeutic Exercises, PT Neuro Re-Ed/Balance, PT Aquatic Therapy, PT Therapeutic Activity, and PT Evaluation          Goal: STG-Within one week, patient will ascend and descend four to six stairs     Dates: Start: 06/26/20       Description: 1) Individualized goal:  with B rails and CGA.  2) Interventions:  PT Group Therapy, PT Gait Training, PT Therapeutic Exercises, PT Neuro Re-Ed/Balance, PT Aquatic Therapy, PT Therapeutic Activity, and PT Evaluation                    Problem: Mobility Transfers     Dates: Start: 06/26/20       Goal: STG-Within one week, patient will perform bed mobility     Dates: Start: 06/26/20       Description: 1) Individualized goal:  with supervision.  2) Interventions:  PT Group Therapy, PT Gait Training, PT Therapeutic Exercises, PT Neuro Re-Ed/Balance, PT Aquatic Therapy, PT Therapeutic Activity, and PT Evaluation              Goal: STG-Within one week, patient will transfer bed to chair     Dates: Start: 06/26/20       Description: 1) Individualized goal:  with LRAD and CGA-SBA.  2) Interventions:  PT Group Therapy, PT Gait Training, PT Therapeutic Exercises, PT Neuro Re-Ed/Balance, PT Aquatic Therapy, PT Therapeutic Activity, and PT Evaluation                  Problem: PT-Long Term Goals     Dates: Start: 06/26/20       Goal: LTG-By discharge, patient will ambulate     Dates: Start: 06/26/20       Description: 1) Individualized goal:  >150 ft with LRAD and supervision.  2) Interventions:  PT Group Therapy, PT Gait Training, PT Therapeutic Exercises, PT Neuro Re-Ed/Balance, PT Aquatic Therapy, PT Therapeutic Activity, and PT Evaluation              Goal: LTG-By discharge, patient will transfer one surface to another     Dates: Start: 06/26/20       Description: 1) Individualized goal: with LRAD and supervision.  2) Interventions:  PT Group Therapy, PT Gait Training, PT  Therapeutic Exercises, PT Neuro Re-Ed/Balance, PT Aquatic Therapy, PT Therapeutic Activity, and PT Evaluation            Goal: LTG-By discharge, patient will ambulate up/down 4-6 stairs     Dates: Start: 06/26/20       Description: 1) Individualized goal: with B rails and supervision.  2) Interventions:  PT Group Therapy, PT Gait Training, PT Therapeutic Exercises, PT Neuro Re-Ed/Balance, PT Aquatic Therapy, PT Therapeutic Activity, and PT Evaluation          Goal: LTG-By discharge, patient will transfer in/out of a car     Dates: Start: 06/26/20       Description: 1) Individualized goal: with LRAD and supervision.  2) Interventions:  PT Group Therapy, PT Gait Training, PT Therapeutic Exercises, PT Neuro Re-Ed/Balance, PT Aquatic Therapy, PT Therapeutic Activity, and PT Evaluation          Goal: LTG-By discharge, patient will     Dates: Start: 06/26/20       Description: 1) Individualized goal: negotiate single curb with LRAD and supervision-SBA.  2) Interventions:  PT Group Therapy, PT Gait Training, PT Therapeutic Exercises, PT Neuro Re-Ed/Balance, PT Aquatic Therapy, PT Therapeutic Activity, and PT Evaluation          Goal: LTG-By discharge, patient will     Dates: Start: 06/26/20       Description: 1) Individualized goal: perform bed mobility mod I.  2) Interventions:  PT Group Therapy, PT Gait Training, PT Therapeutic Exercises, PT Neuro Re-Ed/Balance, PT Aquatic Therapy, PT Therapeutic Activity, and PT Evaluation

## 2020-06-28 NOTE — THERAPY
Occupational Therapy  Daily Treatment     Patient Name: Carlos Rivera  Age:  74 y.o., Sex:  male  Medical Record #: [id number]  Today's Date: 6/28/2020     Precautions  Precautions: Fall Risk  Comments: baseline dementia    Safety   ADL Safety : Requires Supervision for Safety, Requires Physical Assist for Safety, Requires Cueing for Safety  Bathroom Safety: Requires Supervision for Safety, Requires Physical Assist for Safety, Requires Cuing for Safety    Subjective    Confused, agreeable to ADLs. Pleasant and cooperative throughout     Objective       06/28/20 0831   Precautions   Precautions Fall Risk   Comments baseline dementia   Safety    ADL Safety  Requires Supervision for Safety;Requires Physical Assist for Safety;Requires Cueing for Safety   Bathroom Safety Requires Supervision for Safety;Requires Physical Assist for Safety;Requires Cuing for Safety   Vitals   O2 Delivery Device None - Room Air   Pain 0 - 10 Group   Therapist Pain Assessment Post Activity Pain Same as Prior to Activity;Nurse Notified;0   Cognition    Orientation Level Not Oriented to Year;Not Oriented to Month   Level of Consciousness Alert   Ability To Follow Commands 1 Step   Safety Awareness Impaired;Impulsive   New Learning Impaired   Attention Impaired   ABS (Agitated Behavior Scale)   Agitated Behavior Scale Performed No   Sleep/Wake Cycle   Sleep & Rest Awake   Passive ROM Upper Body   Passive ROM Upper Body WDL   Active ROM Upper Body   Active ROM Upper Body  WDL   Dominant Hand Right   Strength Upper Body   Gross Strength Generalized Weakness, Equal Bilaterally.    Functional Level of Assist   Eating Supervision   Eating Description Increased time;Set-up of equipment or meal/tube feeding   Grooming Supervision;Seated   Grooming Description Verbal cueing;Increased time;Set-up of equipment   Bathing Minimal Assist   Bathing Description Assit with back;Increased time;Verbal cueing   Upper Body Dressing Supervision   Upper Body  Dressing Description Increased time;Set-up of equipment;Verbal cueing   Toileting Minimal Assist   Toileting Description Grab bar;Assist for standing balance;Assist to pull pants down;Verbal cueing   Bed, Chair, Wheelchair Transfer Minimal Assist   Bed Chair Wheelchair Transfer Description Verbal cueing;Increased time   Toilet Transfers Minimal Assist   Toilet Transfer Description Grab bar;Increased time;Verbal cueing   Balance   Sitting Balance (Static) Fair   Sitting Balance (Dynamic) Fair   Standing Balance (Static) Fair -   Standing Balance (Dynamic) Poor +   Weight Shift Sitting Fair   Weight Shift Standing Poor   Bed Mobility    Supine to Sit Minimal Assist   Sit to Supine Contact Guard Assist   Scooting Contact Guard Assist   Rolling Supervised   Skilled Intervention Verbal Cuing;Sequencing   Interdisciplinary Plan of Care Collaboration   IDT Collaboration with  Nursing   Patient Position at End of Therapy In Bed;Chair Alarm On;Call Light within Reach;Tray Table within Reach;Phone within Reach   Collaboration Comments participation, functional level   Strengths & Barriers   Strengths Motivated for self care and independence;Pleasant and cooperative   Barriers Decreased endurance   OT Total Time Spent   OT Individual Total Time Spent (Mins) 60   OT Charge Group   OT Self Care / ADL 4       Assessment    Cues required to focus on task, sequence and initiate appropriately. Tolerated all activity with brief rest periods due to muscle fatigue.   Strengths: Motivated for self care and independence, Pleasant and cooperative  Barriers: Decreased endurance    Plan    Follow current OT POC    Occupational Therapy Goals     Problem: Bathing     Dates: Start: 06/26/20       Goal: STG-Within one week, patient will bathe     Dates: Start: 06/26/20       Description: 1) Individualized Goal:  with supervision, min verbal cues for safety strategies  2) Interventions:  OT Self Care/ADL, OT Cognitive Skill Dev, OT Neuro  Re-Ed/Balance, OT Sensory Int Techniques, OT Therapeutic Activity, OT Evaluation, and OT Therapeutic Exercise                  Problem: Dressing     Dates: Start: 06/26/20       Goal: STG-Within one week, patient will dress LB     Dates: Start: 06/26/20       Description: 1) Individualized Goal:  with supervision, min verbal cues for safety strategies  2) Interventions:  OT Self Care/ADL, OT Cognitive Skill Dev, OT Neuro Re-Ed/Balance, OT Sensory Int Techniques, OT Therapeutic Activity, OT Evaluation, and OT Therapeutic Exercise                  Problem: Functional Transfers     Dates: Start: 06/26/20       Goal: STG-Within one week, patient will transfer to toilet     Dates: Start: 06/26/20       Description: 1) Individualized Goal:  with supervision using least restrictive adaptive device  2) Interventions:  OT Self Care/ADL, OT Cognitive Skill Dev, OT Neuro Re-Ed/Balance, OT Sensory Int Techniques, OT Therapeutic Activity, OT Evaluation, and OT Therapeutic Exercise                  Problem: OT Long Term Goals     Dates: Start: 06/26/20       Goal: LTG-By discharge, patient will complete basic self care tasks     Dates: Start: 06/26/20       Description: 1) Individualized Goal:  with supervision to  modified independence  2) Interventions:  OT Self Care/ADL, OT Cognitive Skill Dev, OT Neuro Re-Ed/Balance, OT Sensory Int Techniques, OT Therapeutic Activity, OT Evaluation, and OT Therapeutic Exercise            Goal: LTG-By discharge, patient will perform bathroom transfers     Dates: Start: 06/26/20       Description: 1) Individualized Goal:  with supervision to  modified independence  2) Interventions:  OT Self Care/ADL, OT Cognitive Skill Dev, OT Neuro Re-Ed/Balance, OT Sensory Int Techniques, OT Therapeutic Activity, OT Evaluation, and OT Therapeutic Exercise                  Problem: Toileting     Dates: Start: 06/26/20       Goal: STG-Within one week, patient will complete toileting tasks     Dates: Start:  06/26/20       Description: 1) Individualized Goal:  with supervision, min verbal cues for safety strategies  2) Interventions:  OT Self Care/ADL, OT Cognitive Skill Dev, OT Neuro Re-Ed/Balance, OT Sensory Int Techniques, OT Therapeutic Activity, OT Evaluation, and OT Therapeutic Exercise

## 2020-06-28 NOTE — PROGRESS NOTES
Inpatient Anticoagulation Service Note    Date: 2020    Reason for Anticoagulation: Mechanical Mitral Valve Replacement     Target INR: 2.5 to 3.5    Hemoglobin Value: 14.9  Hematocrit Value: 45.5  Lab Platelet Value: (!) 144    INR from last 7 days     Date/Time INR Value    20 0541  (!) 2.6    20 0550  (!) 3.18    20 0637  (!) 2.86        Dose from last 7 days     Date/Time Dose (mg)    20 1341  3    20 1400  1    20 0637  2.5    20 1732  7.5        Significant Interactions: Proton Pump Inhibitor, Statin(Seroquel, Celexa)  Bridge Therapy: No       Plan:  Warfarin 3 mg PO tonight for INR 2.6  Education Material Provided?: No  Pharmacist suggested discharge dosin mg daily     Sandy Wheeler, PharmD

## 2020-06-28 NOTE — PROGRESS NOTES
Patient getting out of bed attempting to climb over side rail, did not use call light. Patient difficult to reorient.

## 2020-06-28 NOTE — PROGRESS NOTES
Hospital Medicine Daily Progress Note      Chief Complaint  Hypertension  Diabetes    Interval Problem Update  Doing well.    Review of Systems  Review of Systems   Constitutional: Negative for chills and fever.   HENT: Negative.    Eyes: Negative.    Respiratory: Negative for cough and shortness of breath.    Cardiovascular: Negative for chest pain and palpitations.   Gastrointestinal: Negative for abdominal pain, nausea and vomiting.   Musculoskeletal: Negative.    Skin: Negative for itching and rash.   Endo/Heme/Allergies: Negative for polydipsia. Does not bruise/bleed easily.   Psychiatric/Behavioral: Positive for memory loss.        Physical Exam  Temp:  [36.3 °C (97.4 °F)-36.4 °C (97.5 °F)] 36.4 °C (97.5 °F)  Pulse:  [48-63] 63  Resp:  [18] 18  BP: (127-150)/(67-80) 127/67    Physical Exam  Vitals signs reviewed.   Constitutional:       General: He is not in acute distress.     Appearance: Normal appearance. He is not ill-appearing.   HENT:      Head: Normocephalic and atraumatic.      Right Ear: External ear normal.      Left Ear: External ear normal.      Nose: Nose normal.      Mouth/Throat:      Pharynx: Oropharynx is clear.   Eyes:      General:         Right eye: No discharge.         Left eye: No discharge.      Extraocular Movements: Extraocular movements intact.      Conjunctiva/sclera: Conjunctivae normal.   Neck:      Musculoskeletal: Normal range of motion and neck supple.   Cardiovascular:      Rate and Rhythm: Normal rate and regular rhythm.   Pulmonary:      Effort: No respiratory distress.      Breath sounds: No wheezing.      Comments: Decreased BS   Abdominal:      General: Bowel sounds are normal. There is no distension.      Palpations: Abdomen is soft.      Tenderness: There is no abdominal tenderness.   Musculoskeletal:      Right lower leg: No edema.      Left lower leg: No edema.   Skin:     General: Skin is warm and dry.   Neurological:      Mental Status: He is alert.      Comments:  "Awake and alert         Fluids    Intake/Output Summary (Last 24 hours) at 6/28/2020 1550  Last data filed at 6/27/2020 1802  Gross per 24 hour   Intake 240 ml   Output --   Net 240 ml       Laboratory  Recent Labs     06/26/20  0637 06/28/20  0541   WBC 5.0 6.2   RBC 4.46* 4.50*   HEMOGLOBIN 15.0 14.9   HEMATOCRIT 45.8 45.5   .7* 101.1*   MCH 33.6* 33.1*   MCHC 32.8* 32.7*   RDW 48.6 47.7   PLATELETCT 159* 144*   MPV 11.4 10.9     Recent Labs     06/26/20  0637 06/28/20  0541   SODIUM 136 140   POTASSIUM 4.3 4.4   CHLORIDE 104 107   CO2 24 23   GLUCOSE 127* 88   BUN 30* 22   CREATININE 1.26 1.06   CALCIUM 8.8 8.6     Recent Labs     06/26/20  0637 06/27/20  0550 06/28/20  0541   INR 2.86* 3.18* 2.60*               Assessment/Plan  Hypertension- (present on admission)  Assessment & Plan  Observe blood pressure trends on Lisinopril    History of mitral valve replacement with mechanical valve [Z95.2]- (present on admission)  Assessment & Plan  Anticoagulated on Coumadin    Type II diabetes mellitus (HCC)- (present on admission)  Assessment & Plan  HbA1c 6.7  Change Lantus from qhs to qam dosing due to morning hypoglycemia  Continue SSI  Observe serum glucose trends     Azotemia  Assessment & Plan  S/P NS x 1 liter  Renal function improved    Macrocytosis without anemia  Assessment & Plan  Vit B12, Folate, and TSH levels all normal  Follow H/H    Depression  Assessment & Plan  On Celexa    Vitamin D deficiency  Assessment & Plan  Vit D level 27  On supplementation    Thrombocytopenia (HCC)  Assessment & Plan  Follow Platelet counts on Coumadin    TIA (transient ischemic attack)  Assessment & Plan  Pt presented w/ dysarthria, now resolving  Neuroimaging negative acute  On Coumadin and Lipitor    Mild cognitive impairment- (present on admission)  Assessment & Plan  Pt reports \"forgetfulness\" that's been ongoing for the past 2 yrs    Anxiety- (present on admission)  Assessment & Plan  On Celexa and Seroquel   "   Full Code

## 2020-06-28 NOTE — PROGRESS NOTES
Patient uncooperative, on his hands and knees on the bed attempting to get out of bed, did not use call light - refuses to sit down.

## 2020-06-28 NOTE — CARE PLAN
Problem: Infection  Goal: Will remain free from infection  Outcome: PROGRESSING AS EXPECTED     Problem: Safety  Goal: Will remain free from falls  Note: Patient educated on importance of utilizing call light to minimize the potential of injury from falls. Patient verbalizes understanding.  Patient does not use call light before ambulating - patient educated repeatedly. Bed and strip alarm in place.

## 2020-06-28 NOTE — PROGRESS NOTES
Patient getting out of bed without using call light. Patient removed IV - and continued to remove bandage while this writer was attempting to apply pressure to stop bleeding. Strip alarm and bed alarm engaged.

## 2020-06-28 NOTE — THERAPY
Speech Language Pathology  Daily Treatment     Patient Name: Carlos Rivera  Age:  74 y.o., Sex:  male  Medical Record #: [id number]  Today's Date: 6/28/2020     Precautions  Precautions: Fall Risk  Comments: baseline dementia     Subjective    Pt was seen in the speech office to complete cognitive testing. Pt required extended processing time to complete each subtest.      Objective       06/28/20 1531   SCCAN (Scales of Cognitive and Communicative Ability for Neurorehabilitation)   Reading Comprehension - Raw Score 9   Reading Comprehension - Scale Performance Score 75   Writing - Raw Score 4   Writing - Scale Performance Score 57   Attention - Raw Score 9   Attention - Scale Performance Score 56   Problem Solving - Raw Score 16   Problem Solving - Scale Performance Score 70   SCCAN Total Raw Score 66   SCCAN Degree of Severity Moderate Impairment   Interdisciplinary Plan of Care Collaboration   Patient Position at End of Therapy Seated;Chair Alarm On;Call Light within Reach;Tray Table within Reach;Phone within Reach   SLP Total Time Spent   SLP Individual Total Time Spent (Mins) 60   Charge Group   SLP Cognitive Skill Development First 15 Minutes 1   SLP Cognitive Skill Development Additional 15 Minutes 3       Assessment    SLP completed the cognitive testing that was initiated at initial eval. Pt scored 66 on the SCCAN, indicating a moderate cognitive impairment. Pt with intact oral expression, orientation and speech comprehension and moderate to severe deficits in memory and attention.     Strengths: Able to follow instructions, Alert and oriented, Effective communication skills, Motivated for self care and independence, Pleasant and cooperative, Supportive family, Willingly participates in therapeutic activities  Barriers: Confused, Dementia, Impaired carryover of learning, Impaired insight/denial of deficits, Impaired functional cognition, Impulsive    Plan    Goals to target memory and  attention    Speech Therapy Problems     Problem: Memory STGs     Dates: Start: 06/26/20       Goal: STG-Within one week, patient will     Dates: Start: 06/26/20       Description: 1) Individualized goal:  Recall new information related to pt's hospitalization with mod A required for use of functional memory strategies (I.e. memory notebook).    2) Interventions:  SLP Self Care / ADL Training , SLP Cognitive Skill Development, and SLP Group Treatment                    Problem: Problem Solving STGs     Dates: Start: 06/26/20       Goal: STG-Within one week, patient will     Dates: Start: 06/26/20       Description: 1) Individualized goal:  Complete administration of the SCCAN   2) Interventions:  SLP Self Care / ADL Training , SLP Cognitive Skill Development, and SLP Group Treatment              Goal: STG-Within one week, patient will     Dates: Start: 06/26/20       Description: 1) Individualized goal:  Complete simple attention tasks with min A required to achieve 80% accuracy.    2) Interventions:  SLP Self Care / ADL Training , SLP Cognitive Skill Development, and SLP Group Treatment                    Problem: Speech/Swallowing LTGs     Dates: Start: 06/26/20       Goal: LTG-By discharge, patient will solve basic problems     Dates: Start: 06/26/20       Description: 1) Individualized goal:  With spv for a safe discharge home   2) Interventions:  SLP Self Care / ADL Training , SLP Cognitive Skill Development, and SLP Group Treatment

## 2020-06-29 LAB
GLUCOSE BLD-MCNC: 119 MG/DL (ref 65–99)
GLUCOSE BLD-MCNC: 254 MG/DL (ref 65–99)
INR PPP: 1.88 (ref 0.87–1.13)
PROTHROMBIN TIME: 22.2 SEC (ref 12–14.6)

## 2020-06-29 PROCEDURE — 770010 HCHG ROOM/CARE - REHAB SEMI PRIVAT*

## 2020-06-29 PROCEDURE — 97129 THER IVNTJ 1ST 15 MIN: CPT

## 2020-06-29 PROCEDURE — 97530 THERAPEUTIC ACTIVITIES: CPT

## 2020-06-29 PROCEDURE — 700102 HCHG RX REV CODE 250 W/ 637 OVERRIDE(OP): Performed by: HOSPITALIST

## 2020-06-29 PROCEDURE — 700102 HCHG RX REV CODE 250 W/ 637 OVERRIDE(OP): Performed by: PHYSICAL MEDICINE & REHABILITATION

## 2020-06-29 PROCEDURE — 97116 GAIT TRAINING THERAPY: CPT

## 2020-06-29 PROCEDURE — A9270 NON-COVERED ITEM OR SERVICE: HCPCS | Performed by: PHYSICAL MEDICINE & REHABILITATION

## 2020-06-29 PROCEDURE — 99233 SBSQ HOSP IP/OBS HIGH 50: CPT | Performed by: PHYSICAL MEDICINE & REHABILITATION

## 2020-06-29 PROCEDURE — 85610 PROTHROMBIN TIME: CPT

## 2020-06-29 PROCEDURE — 99232 SBSQ HOSP IP/OBS MODERATE 35: CPT | Performed by: HOSPITALIST

## 2020-06-29 PROCEDURE — 82962 GLUCOSE BLOOD TEST: CPT | Mod: 91

## 2020-06-29 PROCEDURE — 97112 NEUROMUSCULAR REEDUCATION: CPT

## 2020-06-29 PROCEDURE — A9270 NON-COVERED ITEM OR SERVICE: HCPCS | Performed by: HOSPITALIST

## 2020-06-29 PROCEDURE — 36415 COLL VENOUS BLD VENIPUNCTURE: CPT

## 2020-06-29 PROCEDURE — 97130 THER IVNTJ EA ADDL 15 MIN: CPT

## 2020-06-29 RX ORDER — WARFARIN SODIUM 7.5 MG/1
7.5 TABLET ORAL
Status: COMPLETED | OUTPATIENT
Start: 2020-06-29 | End: 2020-06-29

## 2020-06-29 RX ORDER — GABAPENTIN 400 MG/1
400 CAPSULE ORAL 3 TIMES DAILY
Status: DISCONTINUED | OUTPATIENT
Start: 2020-06-29 | End: 2020-07-01

## 2020-06-29 RX ADMIN — DOCUSATE SODIUM 50 MG AND SENNOSIDES 8.6 MG 2 TABLET: 8.6; 5 TABLET, FILM COATED ORAL at 19:50

## 2020-06-29 RX ADMIN — OMEPRAZOLE 20 MG: 20 CAPSULE, DELAYED RELEASE ORAL at 09:47

## 2020-06-29 RX ADMIN — GABAPENTIN 300 MG: 300 CAPSULE ORAL at 14:59

## 2020-06-29 RX ADMIN — CITALOPRAM HYDROBROMIDE 20 MG: 20 TABLET ORAL at 19:50

## 2020-06-29 RX ADMIN — GABAPENTIN 400 MG: 400 CAPSULE ORAL at 19:50

## 2020-06-29 RX ADMIN — INSULIN HUMAN 2 UNITS: 100 INJECTION, SOLUTION PARENTERAL at 11:20

## 2020-06-29 RX ADMIN — INSULIN HUMAN 4 UNITS: 100 INJECTION, SOLUTION PARENTERAL at 17:44

## 2020-06-29 RX ADMIN — ATORVASTATIN CALCIUM 80 MG: 40 TABLET, FILM COATED ORAL at 19:49

## 2020-06-29 RX ADMIN — QUETIAPINE 50 MG: 25 TABLET ORAL at 19:50

## 2020-06-29 RX ADMIN — WARFARIN SODIUM 7.5 MG: 7.5 TABLET ORAL at 17:48

## 2020-06-29 RX ADMIN — LISINOPRIL 20 MG: 20 TABLET ORAL at 05:49

## 2020-06-29 RX ADMIN — INSULIN HUMAN 8 UNITS: 100 INJECTION, SOLUTION PARENTERAL at 19:58

## 2020-06-29 RX ADMIN — GABAPENTIN 300 MG: 300 CAPSULE ORAL at 09:46

## 2020-06-29 RX ADMIN — DOCUSATE SODIUM 50 MG AND SENNOSIDES 8.6 MG 2 TABLET: 8.6; 5 TABLET, FILM COATED ORAL at 09:46

## 2020-06-29 RX ADMIN — INSULIN GLARGINE 18 UNITS: 100 INJECTION, SOLUTION SUBCUTANEOUS at 07:43

## 2020-06-29 RX ADMIN — MELATONIN 1000 UNITS: at 09:46

## 2020-06-29 ASSESSMENT — ENCOUNTER SYMPTOMS
SHORTNESS OF BREATH: 0
COUGH: 0
FEVER: 0
BRUISES/BLEEDS EASILY: 0
CHILLS: 0
EYES NEGATIVE: 1
MUSCULOSKELETAL NEGATIVE: 1
POLYDIPSIA: 0
PALPITATIONS: 0
VOMITING: 0
ABDOMINAL PAIN: 0
MEMORY LOSS: 1
NAUSEA: 0

## 2020-06-29 ASSESSMENT — ACTIVITIES OF DAILY LIVING (ADL): TOILET_TRANSFER_DESCRIPTION: GRAB BAR;INCREASED TIME

## 2020-06-29 ASSESSMENT — GAIT ASSESSMENTS
ASSISTIVE DEVICE: OTHER (COMMENTS)
GAIT LEVEL OF ASSIST: STAND BY ASSIST
DISTANCE (FEET): 546

## 2020-06-29 NOTE — THERAPY
Occupational Therapy  Daily Treatment     Patient Name: Carlos Rivera  Age:  74 y.o., Sex:  male  Medical Record #: [id number]  Today's Date: 6/29/2020     Precautions  Precautions: (P) Fall Risk  Comments: (P) baseline dementia     Safety   ADL Safety : Requires Supervision for Safety, Requires Physical Assist for Safety, Requires Cueing for Safety  Bathroom Safety: Requires Supervision for Safety, Requires Physical Assist for Safety, Requires Cuing for Safety    Subjective    Pt agreeable to OT     Objective       06/29/20 1301   Precautions   Precautions Fall Risk   Comments baseline dementia    Sitting Lower Body Exercises   Sitting Lower Body Exercises Yes   Sit to Stand 2 sets of 10  (no UE support, min A facilitation for anterior weightshift)   Nustep Resistance Level 5  (12 min BUE/BLE)   OT Total Time Spent   OT Individual Total Time Spent (Mins) 60   OT Charge Group   Charges Yes   OT Neuromuscular Re-education / Balance 2   OT Therapy Activity 2     In // bars; walking while stepping over bolster x30 repetitions toward increased step length, required consistent cues for motor planning steps to bring feet closer before stepping over bolster    Pt donned shoes sitting in w/c, required cues for sequencing and increased time, attempted to start tieing L shoe before actually getting it on his foot.     Assessment    Pt tolerated session well, continues with shuffled steps during transfers and mobility as well as posterior lean with sit to stands and unsupported standing, improved with blocked practice sit to stands, continued to require facilitation for anterior weightshift but decreased need as activity progressed.     Strengths: Motivated for self care and independence, Pleasant and cooperative  Barriers: Decreased endurance    Plan    Attention/sequencing during ADLs, standing balance (posterior lean), functional household mobility with focus on increasing step length, safety awareness during  mobility and standing tasks, shower tomorrow 6/30    Occupational Therapy Goals     Problem: Bathing     Dates: Start: 06/26/20       Goal: STG-Within one week, patient will bathe     Dates: Start: 06/26/20       Description: 1) Individualized Goal:  with supervision, min verbal cues for safety strategies  2) Interventions:  OT Self Care/ADL, OT Cognitive Skill Dev, OT Neuro Re-Ed/Balance, OT Sensory Int Techniques, OT Therapeutic Activity, OT Evaluation, and OT Therapeutic Exercise                  Problem: Dressing     Dates: Start: 06/26/20       Goal: STG-Within one week, patient will dress LB     Dates: Start: 06/26/20       Description: 1) Individualized Goal:  with supervision, min verbal cues for safety strategies  2) Interventions:  OT Self Care/ADL, OT Cognitive Skill Dev, OT Neuro Re-Ed/Balance, OT Sensory Int Techniques, OT Therapeutic Activity, OT Evaluation, and OT Therapeutic Exercise                  Problem: Functional Transfers     Dates: Start: 06/26/20       Goal: STG-Within one week, patient will transfer to toilet     Dates: Start: 06/26/20       Description: 1) Individualized Goal:  with supervision using least restrictive adaptive device  2) Interventions:  OT Self Care/ADL, OT Cognitive Skill Dev, OT Neuro Re-Ed/Balance, OT Sensory Int Techniques, OT Therapeutic Activity, OT Evaluation, and OT Therapeutic Exercise                  Problem: OT Long Term Goals     Dates: Start: 06/26/20       Goal: LTG-By discharge, patient will complete basic self care tasks     Dates: Start: 06/26/20       Description: 1) Individualized Goal:  with supervision to  modified independence  2) Interventions:  OT Self Care/ADL, OT Cognitive Skill Dev, OT Neuro Re-Ed/Balance, OT Sensory Int Techniques, OT Therapeutic Activity, OT Evaluation, and OT Therapeutic Exercise            Goal: LTG-By discharge, patient will perform bathroom transfers     Dates: Start: 06/26/20       Description: 1) Individualized Goal:   with supervision to  modified independence  2) Interventions:  OT Self Care/ADL, OT Cognitive Skill Dev, OT Neuro Re-Ed/Balance, OT Sensory Int Techniques, OT Therapeutic Activity, OT Evaluation, and OT Therapeutic Exercise                  Problem: Toileting     Dates: Start: 06/26/20       Goal: STG-Within one week, patient will complete toileting tasks     Dates: Start: 06/26/20       Description: 1) Individualized Goal:  with supervision, min verbal cues for safety strategies  2) Interventions:  OT Self Care/ADL, OT Cognitive Skill Dev, OT Neuro Re-Ed/Balance, OT Sensory Int Techniques, OT Therapeutic Activity, OT Evaluation, and OT Therapeutic Exercise

## 2020-06-29 NOTE — THERAPY
Physical Therapy   Daily Treatment     Patient Name: Carlos Rivera  Age:  74 y.o., Sex:  male  Medical Record #: [id number]  Today's Date: 6/29/2020     Precautions  Precautions: (P) Fall Risk  Comments: (P) baseline dementia     Subjective    Pt was sitting in w/c upon arrival and agreeable to tx  Pt requesting to use restroom at start of session     Objective       06/29/20 0901   Precautions   Precautions Fall Risk   Comments baseline dementia    Gait Functional Level of Assist    Gait Level Of Assist Stand by Assist   Assistive Device Other (Comments)  (lite gait, 5-10% BWS)   Distance (Feet) 546   # of Times Distance was Traveled 1   Deviation   (anterior lean, decreased step length/ shuffled gait)   Transfer Functional Level of Assist   Toilet Transfers Minimal Assist   Toilet Transfer Description Grab bar;Increased time   Interdisciplinary Plan of Care Collaboration   IDT Collaboration with  Nursing   Patient Position at End of Therapy Seated;Call Light within Reach;Tray Table within Reach;Phone within Reach   Collaboration Comments re: hand off to CNA   Strengths & Barriers   Strengths Willingly participates in therapeutic activities   Barriers Impaired activity tolerance;Impaired insight/denial of deficits;Impaired carryover of learning;Impaired balance  (L LE foot pain reported)   PT Total Time Spent   PT Individual Total Time Spent (Mins) 60   PT Charge Group   PT Gait Training 3   PT Therapeutic Activities 1     Pt SPT and stance at toilet with Lionel for urine output; posterior leaning. Pt needed to lean against wall with CGA for stability while buckling belt and buttoning/zipping pants; refused sitting for these tasks despite significant support given from wall and significant increased time d/t balance    Additional gait training on lite gait: .6mph 4min, 211 ft with unilateral alternating UE support, 6.4minutes, 546ft 1.0mph with pool noodle use as external cue for SL stance, increased hip  flexion and to increase stride length; mild cognitive dual tasking throughout gait exercises     Discussion with nursing and patient present at end of session; nursing reports pt with increased agitation and decreased balance in afternoon/ evening    Assessment    Pt with good participation but continues to demonstrate impaired safety, problem solving and insight. Pt with improved step length and upright posture with external cues for step length.     Strengths: (P) Willingly participates in therapeutic activities  Barriers: (P) Impaired activity tolerance, Impaired insight/denial of deficits, Impaired carryover of learning, Impaired balance(L LE foot pain reported)    Plan    AMB with FWW vs no AD or walking sticks, lite gait for increased step length, postural re-ed, trial dual tasking with functional mobility, single leg balance activities, bed mobility/ transfer training, AMB with turns, safety awareness, review standing therex program for strength    Physical Therapy Problems     Problem: Mobility     Dates: Start: 06/26/20       Goal: STG-Within one week, patient will ambulate household distance     Dates: Start: 06/26/20       Description: 1) Individualized goal:  150 ft with LRAD and CGA-SBA.  2) Interventions:  PT Group Therapy, PT Gait Training, PT Therapeutic Exercises, PT Neuro Re-Ed/Balance, PT Aquatic Therapy, PT Therapeutic Activity, and PT Evaluation            Goal: STG-Within one week, patient will ambulate up/down a curb     Dates: Start: 06/26/20       Description: 1) Individualized goal:  150 ft with LRAD and CGA-SBA.  2) Interventions:  PT Group Therapy, PT Gait Training, PT Therapeutic Exercises, PT Neuro Re-Ed/Balance, PT Aquatic Therapy, PT Therapeutic Activity, and PT Evaluation          Goal: STG-Within one week, patient will ascend and descend four to six stairs     Dates: Start: 06/26/20       Description: 1) Individualized goal:  with B rails and CGA.  2) Interventions:  PT Group  Therapy, PT Gait Training, PT Therapeutic Exercises, PT Neuro Re-Ed/Balance, PT Aquatic Therapy, PT Therapeutic Activity, and PT Evaluation                    Problem: Mobility Transfers     Dates: Start: 06/26/20       Goal: STG-Within one week, patient will perform bed mobility     Dates: Start: 06/26/20       Description: 1) Individualized goal:  with supervision.  2) Interventions:  PT Group Therapy, PT Gait Training, PT Therapeutic Exercises, PT Neuro Re-Ed/Balance, PT Aquatic Therapy, PT Therapeutic Activity, and PT Evaluation              Goal: STG-Within one week, patient will transfer bed to chair     Dates: Start: 06/26/20       Description: 1) Individualized goal:  with LRAD and CGA-SBA.  2) Interventions:  PT Group Therapy, PT Gait Training, PT Therapeutic Exercises, PT Neuro Re-Ed/Balance, PT Aquatic Therapy, PT Therapeutic Activity, and PT Evaluation                  Problem: PT-Long Term Goals     Dates: Start: 06/26/20       Goal: LTG-By discharge, patient will ambulate     Dates: Start: 06/26/20       Description: 1) Individualized goal:  >150 ft with LRAD and supervision.  2) Interventions:  PT Group Therapy, PT Gait Training, PT Therapeutic Exercises, PT Neuro Re-Ed/Balance, PT Aquatic Therapy, PT Therapeutic Activity, and PT Evaluation              Goal: LTG-By discharge, patient will transfer one surface to another     Dates: Start: 06/26/20       Description: 1) Individualized goal: with LRAD and supervision.  2) Interventions:  PT Group Therapy, PT Gait Training, PT Therapeutic Exercises, PT Neuro Re-Ed/Balance, PT Aquatic Therapy, PT Therapeutic Activity, and PT Evaluation            Goal: LTG-By discharge, patient will ambulate up/down 4-6 stairs     Dates: Start: 06/26/20       Description: 1) Individualized goal: with B rails and supervision.  2) Interventions:  PT Group Therapy, PT Gait Training, PT Therapeutic Exercises, PT Neuro Re-Ed/Balance, PT Aquatic Therapy, PT Therapeutic  Activity, and PT Evaluation          Goal: LTG-By discharge, patient will transfer in/out of a car     Dates: Start: 06/26/20       Description: 1) Individualized goal: with LRAD and supervision.  2) Interventions:  PT Group Therapy, PT Gait Training, PT Therapeutic Exercises, PT Neuro Re-Ed/Balance, PT Aquatic Therapy, PT Therapeutic Activity, and PT Evaluation          Goal: LTG-By discharge, patient will     Dates: Start: 06/26/20       Description: 1) Individualized goal: negotiate single curb with LRAD and supervision-SBA.  2) Interventions:  PT Group Therapy, PT Gait Training, PT Therapeutic Exercises, PT Neuro Re-Ed/Balance, PT Aquatic Therapy, PT Therapeutic Activity, and PT Evaluation          Goal: LTG-By discharge, patient will     Dates: Start: 06/26/20       Description: 1) Individualized goal: perform bed mobility mod I.  2) Interventions:  PT Group Therapy, PT Gait Training, PT Therapeutic Exercises, PT Neuro Re-Ed/Balance, PT Aquatic Therapy, PT Therapeutic Activity, and PT Evaluation

## 2020-06-29 NOTE — THERAPY
"Speech Language Pathology  Daily Treatment     Patient Name: Carlos Rivera  Age:  74 y.o., Sex:  male  Medical Record #: [id number]  Today's Date: 6/29/2020     Precautions  Precautions: Fall Risk  Comments: baseline dementia     Subjective    Pt was eating breakfast at the beginning of this session, per CNA pt would not get up this morning for breakfast and requested to eat later in the morning.      Objective       06/29/20 1001   SLP Total Time Spent   SLP Individual Total Time Spent (Mins) 60   Charge Group   SLP Cognitive Skill Development First 15 Minutes 1   SLP Cognitive Skill Development Additional 15 Minutes 3       Assessment    Pt required min-mod cues for attention stay seated in the wheelchair before leaving his room.  Pt continued to think he was going to walk to ST and required cues x3 to sit down in the wheelchair.  Once in therapy pt completed simple 1 step written directions independently to achieve 90% accuracy, min cues required for attention to details on the 1st and second questions.  Pt then completed 2 step written directions independently to achieve 69% accuracy.  Pt stated \"This is good, this is making me think\" during following written directions tasks.      Pt's wife, Comfort, was in pt's room at the end of this session.  She reported that she was managing pt's medications and their finances but did state \"He was managing his Warfarin, as far as I know he was doing well taking that but I'm not sure\".  Pt's wife also confirmed that pt was driving, but only short trips to the store and back.  She reports that he does have baseline cognitive deficits, but she has noticed a significant change in his cognition since this recent hospitalization.      Strengths: Able to follow instructions, Alert and oriented, Effective communication skills, Motivated for self care and independence, Pleasant and cooperative, Supportive family, Willingly participates in therapeutic activities  Barriers: " Confused, Dementia, Impaired carryover of learning, Impaired insight/denial of deficits, Impaired functional cognition, Impulsive    Plan    Continue targeting functional memory, attention, simple problem solving     Speech Therapy Problems     Problem: Memory STGs     Dates: Start: 06/26/20       Goal: STG-Within one week, patient will     Dates: Start: 06/26/20       Description: 1) Individualized goal:  Recall new information related to pt's hospitalization with mod A required for use of functional memory strategies (I.e. memory notebook).    2) Interventions:  SLP Self Care / ADL Training , SLP Cognitive Skill Development, and SLP Group Treatment                    Problem: Problem Solving STGs     Dates: Start: 06/26/20       Goal: STG-Within one week, patient will     Dates: Start: 06/26/20       Description: 1) Individualized goal:  Complete administration of the SCCAN   2) Interventions:  SLP Self Care / ADL Training , SLP Cognitive Skill Development, and SLP Group Treatment              Goal: STG-Within one week, patient will     Dates: Start: 06/26/20       Description: 1) Individualized goal:  Complete simple attention tasks with min A required to achieve 80% accuracy.    2) Interventions:  SLP Self Care / ADL Training , SLP Cognitive Skill Development, and SLP Group Treatment                    Problem: Speech/Swallowing LTGs     Dates: Start: 06/26/20       Goal: LTG-By discharge, patient will solve basic problems     Dates: Start: 06/26/20       Description: 1) Individualized goal:  With spv for a safe discharge home   2) Interventions:  SLP Self Care / ADL Training , SLP Cognitive Skill Development, and SLP Group Treatment

## 2020-06-29 NOTE — PROGRESS NOTES
Pharmacy Warfarin Consult   6/29/2020     74 y.o.   male     Indication for anticoagulation: Mechanical Mitral Valve Replacement        Goal INR = 2.5 - 3.5    Recent Labs     06/27/20  0550 06/28/20  0541 06/29/20  0535   INR 3.18* 2.60* 1.88*   HEMOGLOBIN  --  14.9  --    HEMATOCRIT  --  45.5  --        Pertinent Drug/Drug Interactions:  Statin, quetiapine, citalopram  Outpatient Warfarin Regimen:  warfarin 5 mg PO daily  Recent Warfarin Dosing:    Dose from last 7 days     Date/Time Dose (mg)    06/29/20 0535  7.5    06/28/20 1341  3    06/27/20 1400  1    06/26/20 0637  2.5    06/25/20 1732  7.5          Bridge Therapy: no           1.  Coumadin 7.5 mg tonight per INR = 1.88           Rojelio Alegre [facility]

## 2020-06-29 NOTE — PROGRESS NOTES
Hospital Medicine Daily Progress Note      Chief Complaint  Hypertension  Diabetes    Interval Problem Update  Pt moved to new room overnight; no other issues.    Review of Systems  Review of Systems   Constitutional: Negative for chills and fever.   HENT: Negative.    Eyes: Negative.    Respiratory: Negative for cough and shortness of breath.    Cardiovascular: Negative for chest pain and palpitations.   Gastrointestinal: Negative for abdominal pain, nausea and vomiting.   Musculoskeletal: Negative.    Skin: Negative for itching and rash.   Endo/Heme/Allergies: Negative for polydipsia. Does not bruise/bleed easily.   Psychiatric/Behavioral: Positive for memory loss.        Physical Exam  Temp:  [36.4 °C (97.6 °F)-36.7 °C (98 °F)] 36.4 °C (97.6 °F)  Pulse:  [46-70] 68  Resp:  [18] 18  BP: (105-158)/(69-84) 146/71  SpO2:  [97 %] 97 %    Physical Exam  Vitals signs reviewed.   Constitutional:       General: He is not in acute distress.     Appearance: Normal appearance. He is not ill-appearing.   HENT:      Head: Normocephalic and atraumatic.      Right Ear: External ear normal.      Left Ear: External ear normal.      Nose: Nose normal.      Mouth/Throat:      Pharynx: Oropharynx is clear.   Eyes:      General:         Right eye: No discharge.         Left eye: No discharge.      Extraocular Movements: Extraocular movements intact.      Conjunctiva/sclera: Conjunctivae normal.   Neck:      Musculoskeletal: Normal range of motion and neck supple.   Cardiovascular:      Rate and Rhythm: Normal rate and regular rhythm.   Pulmonary:      Effort: No respiratory distress.      Breath sounds: No wheezing.      Comments: Decreased BS   Abdominal:      General: Bowel sounds are normal. There is no distension.      Palpations: Abdomen is soft.      Tenderness: There is no abdominal tenderness.   Musculoskeletal:      Right lower leg: No edema.      Left lower leg: No edema.   Skin:     General: Skin is warm and dry.  "  Neurological:      Mental Status: He is alert and oriented to person, place, and time.         Fluids    Intake/Output Summary (Last 24 hours) at 6/29/2020 1042  Last data filed at 6/29/2020 0200  Gross per 24 hour   Intake 610 ml   Output 650 ml   Net -40 ml       Laboratory  Recent Labs     06/28/20  0541   WBC 6.2   RBC 4.50*   HEMOGLOBIN 14.9   HEMATOCRIT 45.5   .1*   MCH 33.1*   MCHC 32.7*   RDW 47.7   PLATELETCT 144*   MPV 10.9     Recent Labs     06/28/20  0541   SODIUM 140   POTASSIUM 4.4   CHLORIDE 107   CO2 23   GLUCOSE 88   BUN 22   CREATININE 1.06   CALCIUM 8.6     Recent Labs     06/27/20  0550 06/28/20  0541 06/29/20  0535   INR 3.18* 2.60* 1.88*               Assessment/Plan  Hypertension- (present on admission)  Assessment & Plan  Observe blood pressure trends on Lisinopril    History of mitral valve replacement with mechanical valve [Z95.2]- (present on admission)  Assessment & Plan  Anticoagulated on Coumadin    Type II diabetes mellitus (HCC)- (present on admission)  Assessment & Plan  HbA1c 6.7  Changed Lantus from qhs to qam dosing due to morning hypoglycemia  Continue SSI  Observe serum glucose trends     Azotemia  Assessment & Plan  S/P NS x 1 liter  Renal function improved    Macrocytosis without anemia  Assessment & Plan  Vit B12, Folate, and TSH levels all normal  Follow H/H    Depression  Assessment & Plan  On Celexa    Vitamin D deficiency  Assessment & Plan  Vit D level 27  On supplementation    Thrombocytopenia (HCC)  Assessment & Plan  Follow Platelet counts on Coumadin    TIA (transient ischemic attack)  Assessment & Plan  Pt presented w/ dysarthria, now resolving  Neuroimaging negative acute  On Coumadin and Lipitor    Mild cognitive impairment- (present on admission)  Assessment & Plan  Pt reports \"forgetfulness\" that's been ongoing for the past 2 yrs    Anxiety- (present on admission)  Assessment & Plan  On Celexa and Seroquel     Full Code    Patient seen and examined.  " Chart reviewed.  Assessment and Plan as above.  No changes today.

## 2020-06-29 NOTE — CARE PLAN
Problem: Safety  Goal: Will remain free from injury  Outcome: PROGRESSING SLOWER THAN EXPECTED  Note: Pt is confused and impulsive. He's been wanting to go home tonight. Reorient patient to time, place and situation. Bed alarm, hourly rounding and room closed to nurses station for safety. Will continue to monitor.     Problem: Pain Management  Goal: Pain level will decrease to patient's comfort goal  Outcome: PROGRESSING AS EXPECTED  Note: Pt denies pain or discomfort tonight. He's been in and out of sleep despite PRN trazodone given. Will continue to monitor.

## 2020-06-29 NOTE — PROGRESS NOTES
"Rehab Progress Note     Encounter Date: 6/29/2020    CC: Decreased memory, poor mobility    Interval Events (Subjective)  Patient sitting up in his room visiting with his roommate. He reports his blood sugars have never been this poorly controlled. His roommate will bring in his glucometers and will compare to the home. She reports they have several at home. Denies pain. Denies NVD. Discussed with hospitalist and increasing Lantus in morning.    Objective:  VITAL SIGNS: /51   Pulse 60   Temp 36.9 °C (98.4 °F) (Oral)   Resp 16   Ht 1.753 m (5' 9\")   Wt 58.5 kg (129 lb)   SpO2 97%   BMI 19.05 kg/m²   Gen: NAD  Psych: Mood and affect appropriate  CV: RRR, no edema  Resp: CTAB, no upper airway sounds  Abd: NTND  Neuro: AOx3, following simple commands    Recent Results (from the past 72 hour(s))   ACCU-CHEK GLUCOSE    Collection Time: 06/26/20  5:26 PM   Result Value Ref Range    Glucose - Accu-Ck 240 (H) 65 - 99 mg/dL   ACCU-CHEK GLUCOSE    Collection Time: 06/26/20  8:01 PM   Result Value Ref Range    Glucose - Accu-Ck 295 (H) 65 - 99 mg/dL   Prothrombin Time    Collection Time: 06/27/20  5:50 AM   Result Value Ref Range    PT 33.6 (H) 12.0 - 14.6 sec    INR 3.18 (H) 0.87 - 1.13   ACCU-CHEK GLUCOSE    Collection Time: 06/27/20  7:25 AM   Result Value Ref Range    Glucose - Accu-Ck 91 65 - 99 mg/dL   ACCU-CHEK GLUCOSE    Collection Time: 06/27/20 11:22 AM   Result Value Ref Range    Glucose - Accu-Ck 326 (H) 65 - 99 mg/dL   ACCU-CHEK GLUCOSE    Collection Time: 06/27/20  5:14 PM   Result Value Ref Range    Glucose - Accu-Ck 149 (H) 65 - 99 mg/dL   ACCU-CHEK GLUCOSE    Collection Time: 06/27/20  8:46 PM   Result Value Ref Range    Glucose - Accu-Ck 223 (H) 65 - 99 mg/dL   Prothrombin Time    Collection Time: 06/28/20  5:41 AM   Result Value Ref Range    PT 28.6 (H) 12.0 - 14.6 sec    INR 2.60 (H) 0.87 - 1.13   CBC WITHOUT DIFFERENTIAL    Collection Time: 06/28/20  5:41 AM   Result Value Ref Range    WBC 6.2 " 4.8 - 10.8 K/uL    RBC 4.50 (L) 4.70 - 6.10 M/uL    Hemoglobin 14.9 14.0 - 18.0 g/dL    Hematocrit 45.5 42.0 - 52.0 %    .1 (H) 81.4 - 97.8 fL    MCH 33.1 (H) 27.0 - 33.0 pg    MCHC 32.7 (L) 33.7 - 35.3 g/dL    RDW 47.7 35.9 - 50.0 fL    Platelet Count 144 (L) 164 - 446 K/uL    MPV 10.9 9.0 - 12.9 fL   Basic Metabolic Panel    Collection Time: 06/28/20  5:41 AM   Result Value Ref Range    Sodium 140 135 - 145 mmol/L    Potassium 4.4 3.6 - 5.5 mmol/L    Chloride 107 96 - 112 mmol/L    Co2 23 20 - 33 mmol/L    Glucose 88 65 - 99 mg/dL    Bun 22 8 - 22 mg/dL    Creatinine 1.06 0.50 - 1.40 mg/dL    Calcium 8.6 8.5 - 10.5 mg/dL    Anion Gap 10.0 7.0 - 16.0   VITAMIN B12    Collection Time: 06/28/20  5:41 AM   Result Value Ref Range    Vitamin B12 -True Cobalamin 1102 (H) 211 - 911 pg/mL   FOLATE    Collection Time: 06/28/20  5:41 AM   Result Value Ref Range    Folate -Folic Acid 16.9 >4.0 ng/mL   ESTIMATED GFR    Collection Time: 06/28/20  5:41 AM   Result Value Ref Range    GFR If African American >60 >60 mL/min/1.73 m 2    GFR If Non African American >60 >60 mL/min/1.73 m 2   ACCU-CHEK GLUCOSE    Collection Time: 06/28/20  7:35 AM   Result Value Ref Range    Glucose - Accu-Ck 90 65 - 99 mg/dL   ACCU-CHEK GLUCOSE    Collection Time: 06/28/20 11:16 AM   Result Value Ref Range    Glucose - Accu-Ck 191 (H) 65 - 99 mg/dL   ACCU-CHEK GLUCOSE    Collection Time: 06/28/20  5:29 PM   Result Value Ref Range    Glucose - Accu-Ck 254 (H) 65 - 99 mg/dL   Prothrombin Time    Collection Time: 06/29/20  5:35 AM   Result Value Ref Range    PT 22.2 (H) 12.0 - 14.6 sec    INR 1.88 (H) 0.87 - 1.13   ACCU-CHEK GLUCOSE    Collection Time: 06/29/20  7:39 AM   Result Value Ref Range    Glucose - Accu-Ck 119 (H) 65 - 99 mg/dL       Current Facility-Administered Medications   Medication Frequency   • warfarin (COUMADIN) tablet 7.5 mg ONCE AT 1800   • insulin glargine (Lantus) injection QAM INSULIN   • omeprazole (PRILOSEC) capsule 20 mg  DAILY   • gabapentin (NEURONTIN) capsule 300 mg TID   • insulin regular (HUMULIN R) injection 2-12 Units 4X/DAY ACHS    And   • glucose 4 g chewable tablet 16 g Q15 MIN PRN    And   • dextrose 50% (D50W) injection 50 mL Q15 MIN PRN   • vitamin D (cholecalciferol) tablet 1,000 Units DAILY   • Respiratory Therapy Consult Continuous RT   • Pharmacy Consult Request ...Pain Management Review 1 Each PHARMACY TO DOSE   • tramadol (ULTRAM) 50 MG tablet 50 mg Q4HRS PRN   • hydrALAZINE (APRESOLINE) tablet 25 mg Q8HRS PRN   • acetaminophen (TYLENOL) tablet 650 mg Q4HRS PRN   • senna-docusate (PERICOLACE or SENOKOT S) 8.6-50 MG per tablet 2 Tab BID    And   • polyethylene glycol/lytes (MIRALAX) PACKET 1 Packet QDAY PRN    And   • magnesium hydroxide (MILK OF MAGNESIA) suspension 30 mL QDAY PRN    And   • bisacodyl (DULCOLAX) suppository 10 mg QDAY PRN   • artificial tears ophthalmic solution 1 Drop PRN   • benzocaine-menthol (CEPACOL) lozenge 1 Lozenge Q2HRS PRN   • mag hydrox-al hydrox-simeth (MAALOX PLUS ES or MYLANTA DS) suspension 20 mL Q2HRS PRN   • ondansetron (ZOFRAN ODT) dispertab 4 mg 4X/DAY PRN    Or   • ondansetron (ZOFRAN) syringe/vial injection 4 mg 4X/DAY PRN   • traZODone (DESYREL) tablet 50 mg QHS PRN   • sodium chloride (OCEAN) 0.65 % nasal spray 2 Spray PRN   • atorvastatin (LIPITOR) tablet 80 mg Q EVENING   • citalopram (CELEXA) tablet 20 mg Q EVENING   • lisinopril (PRINIVIL) tablet 20 mg Q DAY   • QUEtiapine (SEROQUEL) tablet 50 mg Q EVENING   • MD Alert...Warfarin per Pharmacy PHARMACY TO DOSE       Orders Placed This Encounter   Procedures   • Diet Order Diabetic     Standing Status:   Standing     Number of Occurrences:   1     Order Specific Question:   Diet:     Answer:   Diabetic [3]       Assessment:  Active Hospital Problems    Diagnosis   • *Acute on Chronic Encephalopathy   • Hypertension   • Coronary artery disease involving coronary bypass graft   • History of mitral valve replacement with  mechanical valve [Z95.2]   • Type II diabetes mellitus (HCC)   • TIA (transient ischemic attack)   • Thrombocytopenia (HCC)   • Vitamin D deficiency   • Depression   • Macrocytosis without anemia   • Azotemia   • Diabetic neuropathy (HCC)   • Obstructive sleep apnea syndrome   • Shuffling gait   • Dementia (HCC)   • Hyperlipidemia   • Mild cognitive impairment   • Anxiety       Medical Decision Making and Plan:  Encephalopathy vs delirium vs TIA - Patient with aphasia and weakness with negative work-up for stroke but with hypertensive emergency on admission concerning for hypertensive encephalopathy. Patient started on statin for concern for CVA vs TIA.      DM - Patient on Lantus 15U QPM and SSI. Will continue to monitor   Blood sugars into 300s, consult hospitalist. Lantus increased to 18U qAM. Roommate to bring in glucometer  -Hospitalist to consult     Dementia - Patient on Seroquel 50 mg QPM for agitation and sleep. MRI consistent with probable vascular dementia.      Mood/Neuropathy - Patient on Celexa 20 mg QPM. Patient with burning into bilateral feet. Trial of Gabapentin 300 mg TID -> increase to 400 mg TID     HTN - Patient on Lisinopril 20 mg on transfer. Previously came in with SBP > 180s  -Hospitalist to consult    Elevated BUN - encouraged fluid intake. Will monitor     HLD - Patient on Atorvastatin 80 mg QPM.      GI Ppx - Patient on Prilosec on transfer.     Vitamin D Deficiency -  27 on admission. Started on 1000 U     DVT ppx - Patient on Coumadin     Total time:  36 minutes.  I spent greater than 50% of the time for patient care and coordination on this date, including unit/floor time, and face-to-face time with the patient as per assessment and plan above.  Discussion included diabetes education, counseling with roommate including to bring in glucometer, change to AM dosing, and labile SBP.     Wood Rodriguez M.D.

## 2020-06-30 LAB
GLUCOSE BLD-MCNC: 119 MG/DL (ref 65–99)
GLUCOSE BLD-MCNC: 172 MG/DL (ref 65–99)
GLUCOSE BLD-MCNC: 189 MG/DL (ref 65–99)
GLUCOSE BLD-MCNC: 200 MG/DL (ref 65–99)
GLUCOSE BLD-MCNC: 244 MG/DL (ref 65–99)
GLUCOSE BLD-MCNC: 250 MG/DL (ref 65–99)
GLUCOSE BLD-MCNC: 317 MG/DL (ref 65–99)
INR PPP: 1.78 (ref 0.87–1.13)
PROTHROMBIN TIME: 21.3 SEC (ref 12–14.6)

## 2020-06-30 PROCEDURE — A9270 NON-COVERED ITEM OR SERVICE: HCPCS | Performed by: PHYSICAL MEDICINE & REHABILITATION

## 2020-06-30 PROCEDURE — 99233 SBSQ HOSP IP/OBS HIGH 50: CPT | Performed by: PHYSICAL MEDICINE & REHABILITATION

## 2020-06-30 PROCEDURE — 85610 PROTHROMBIN TIME: CPT

## 2020-06-30 PROCEDURE — 97116 GAIT TRAINING THERAPY: CPT

## 2020-06-30 PROCEDURE — 82962 GLUCOSE BLOOD TEST: CPT

## 2020-06-30 PROCEDURE — 97530 THERAPEUTIC ACTIVITIES: CPT

## 2020-06-30 PROCEDURE — 81001 URINALYSIS AUTO W/SCOPE: CPT

## 2020-06-30 PROCEDURE — 97535 SELF CARE MNGMENT TRAINING: CPT

## 2020-06-30 PROCEDURE — 700102 HCHG RX REV CODE 250 W/ 637 OVERRIDE(OP): Performed by: HOSPITALIST

## 2020-06-30 PROCEDURE — 700102 HCHG RX REV CODE 250 W/ 637 OVERRIDE(OP): Performed by: PHYSICAL MEDICINE & REHABILITATION

## 2020-06-30 PROCEDURE — 770010 HCHG ROOM/CARE - REHAB SEMI PRIVAT*

## 2020-06-30 PROCEDURE — 97130 THER IVNTJ EA ADDL 15 MIN: CPT

## 2020-06-30 PROCEDURE — 99232 SBSQ HOSP IP/OBS MODERATE 35: CPT | Performed by: HOSPITALIST

## 2020-06-30 PROCEDURE — A9270 NON-COVERED ITEM OR SERVICE: HCPCS | Performed by: HOSPITALIST

## 2020-06-30 PROCEDURE — 97110 THERAPEUTIC EXERCISES: CPT

## 2020-06-30 PROCEDURE — 97129 THER IVNTJ 1ST 15 MIN: CPT

## 2020-06-30 PROCEDURE — 36415 COLL VENOUS BLD VENIPUNCTURE: CPT

## 2020-06-30 PROCEDURE — 97112 NEUROMUSCULAR REEDUCATION: CPT

## 2020-06-30 RX ORDER — INSULIN GLARGINE 100 [IU]/ML
12 INJECTION, SOLUTION SUBCUTANEOUS
Status: DISCONTINUED | OUTPATIENT
Start: 2020-07-01 | End: 2020-07-02

## 2020-06-30 RX ORDER — WARFARIN SODIUM 7.5 MG/1
7.5 TABLET ORAL
Status: COMPLETED | OUTPATIENT
Start: 2020-06-30 | End: 2020-06-30

## 2020-06-30 RX ORDER — DEXTROSE MONOHYDRATE 25 G/50ML
50 INJECTION, SOLUTION INTRAVENOUS
Status: DISCONTINUED | OUTPATIENT
Start: 2020-06-30 | End: 2020-07-01

## 2020-06-30 RX ORDER — QUETIAPINE FUMARATE 25 MG/1
50 TABLET, FILM COATED ORAL DAILY
Status: DISCONTINUED | OUTPATIENT
Start: 2020-06-30 | End: 2020-07-06

## 2020-06-30 RX ADMIN — ATORVASTATIN CALCIUM 80 MG: 40 TABLET, FILM COATED ORAL at 20:41

## 2020-06-30 RX ADMIN — INSULIN GLARGINE 18 UNITS: 100 INJECTION, SOLUTION SUBCUTANEOUS at 07:39

## 2020-06-30 RX ADMIN — GABAPENTIN 400 MG: 400 CAPSULE ORAL at 08:36

## 2020-06-30 RX ADMIN — QUETIAPINE 50 MG: 25 TABLET ORAL at 17:22

## 2020-06-30 RX ADMIN — INSULIN HUMAN 2 UNITS: 100 INJECTION, SOLUTION PARENTERAL at 17:19

## 2020-06-30 RX ADMIN — OMEPRAZOLE 20 MG: 20 CAPSULE, DELAYED RELEASE ORAL at 08:37

## 2020-06-30 RX ADMIN — CITALOPRAM HYDROBROMIDE 20 MG: 20 TABLET ORAL at 20:41

## 2020-06-30 RX ADMIN — DOCUSATE SODIUM 50 MG AND SENNOSIDES 8.6 MG 2 TABLET: 8.6; 5 TABLET, FILM COATED ORAL at 20:41

## 2020-06-30 RX ADMIN — GABAPENTIN 400 MG: 400 CAPSULE ORAL at 20:40

## 2020-06-30 RX ADMIN — WARFARIN SODIUM 7.5 MG: 7.5 TABLET ORAL at 17:22

## 2020-06-30 RX ADMIN — QUETIAPINE 50 MG: 25 TABLET ORAL at 20:41

## 2020-06-30 RX ADMIN — MELATONIN 1000 UNITS: at 08:37

## 2020-06-30 RX ADMIN — INSULIN HUMAN 4 UNITS: 100 INJECTION, SOLUTION PARENTERAL at 11:38

## 2020-06-30 RX ADMIN — LISINOPRIL 20 MG: 20 TABLET ORAL at 05:23

## 2020-06-30 RX ADMIN — GABAPENTIN 400 MG: 400 CAPSULE ORAL at 15:20

## 2020-06-30 ASSESSMENT — GAIT ASSESSMENTS
GAIT LEVEL OF ASSIST: CONTACT GUARD ASSIST
DISTANCE (FEET): 240
ASSISTIVE DEVICE: FRONT WHEEL WALKER
GAIT LEVEL OF ASSIST: MINIMAL ASSIST
DEVIATION: OTHER (COMMENT)
ASSISTIVE DEVICE: FRONT WHEEL WALKER
DISTANCE (FEET): 150

## 2020-06-30 ASSESSMENT — ACTIVITIES OF DAILY LIVING (ADL)
TOILET_TRANSFER_DESCRIPTION: GRAB BAR;VERBAL CUEING;SUPERVISION FOR SAFETY;INCREASED TIME
TUB_SHOWER_TRANSFER_DESCRIPTION: GRAB BAR;INCREASED TIME
BED_CHAIR_WHEELCHAIR_TRANSFER_DESCRIPTION: VERBAL CUEING
TOILETING_LEVEL_OF_ASSIST_DESCRIPTION: GRAB BAR;INCREASED TIME;VERBAL CUEING;SUPERVISION FOR SAFETY

## 2020-06-30 ASSESSMENT — ENCOUNTER SYMPTOMS
SHORTNESS OF BREATH: 0
VOMITING: 0
ABDOMINAL PAIN: 0
CHILLS: 0
FEVER: 0
DIARRHEA: 0
NAUSEA: 0
NERVOUS/ANXIOUS: 0

## 2020-06-30 ASSESSMENT — PATIENT HEALTH QUESTIONNAIRE - PHQ9
2. FEELING DOWN, DEPRESSED, IRRITABLE, OR HOPELESS: NOT AT ALL
SUM OF ALL RESPONSES TO PHQ9 QUESTIONS 1 AND 2: 0
1. LITTLE INTEREST OR PLEASURE IN DOING THINGS: NOT AT ALL

## 2020-06-30 NOTE — THERAPY
Occupational Therapy  Daily Treatment     Patient Name: Carlos Rivera  Age:  74 y.o., Sex:  male  Medical Record #: [id number]  Today's Date: 6/30/2020     Precautions  Precautions: (P) Fall Risk  Comments: (P) baseline dementia     Safety   ADL Safety : (P) Requires Supervision for Safety, Requires Cueing for Safety, Requires Physical Assist for Safety  Bathroom Safety: (P) Requires Supervision for Safety, Requires Cuing for Safety, Requires Physical Assist for Safety    Subjective    Pt received asleep in bed, easily awoken but required increased time to fully wake-up and initiate movement to sit edge of bed     Objective       06/30/20 0701   Precautions   Precautions Fall Risk   Comments baseline dementia    Safety    ADL Safety  Requires Supervision for Safety;Requires Cueing for Safety;Requires Physical Assist for Safety   Bathroom Safety Requires Supervision for Safety;Requires Cuing for Safety;Requires Physical Assist for Safety   Functional Level of Assist   Eating Modified Independent   Eating Description Increased time   Grooming Supervision;Standing   Grooming Description Verbal cueing;Supervision for safety   Upper Body Dressing Supervision   Upper Body Dressing Description Supervision for safety  (completed in standing)   Lower Body Dressing Minimal Assist   Lower Body Dressing Description Verbal cueing;Supervision for safety  (cues to sit, SBA for standing, assist for socks)   Toileting Minimal Assist   Toileting Description Grab bar;Increased time;Verbal cueing;Supervision for safety  (CGA for standing)   Toilet Transfers Minimal Assist   Toilet Transfer Description Grab bar;Verbal cueing;Supervision for safety;Increased time  (FWW in/out of bathroom, GB for transition)   Tub / Shower Transfers Minimal Assist   Tub Shower Transfer Description Grab bar;Increased time  (dry run due to time constraints, FWW approach, GB transition)   OT Total Time Spent   OT Individual Total Time Spent (Mins) 60    OT Charge Group   OT Self Care / ADL 4       Assessment    Pt tolerated session well, time constraints limited ability to shower this AM due to increased time needed for getting out of bed and extended time on toilet. Pt continues with shuffled gait during in-room mobility with FWW, can take larger steps with verbal cues but does not sustain, improvements in balance from sit to stands this session though requires UE support to compensate for posterior lean. Pt with overall very poor safety awareness requiring cues to sit for safety when dressing, refuses to sit for toileting and UB dressing despite poor balance and delayed balance reactions.     Plan    Attention/sequencing during ADLs, standing balance (posterior lean), functional household mobility with focus on increasing step length, safety awareness during mobility and standing tasks    Occupational Therapy Goals     Problem: Bathing     Dates: Start: 06/26/20       Goal: STG-Within one week, patient will bathe     Dates: Start: 06/26/20       Description: 1) Individualized Goal:  with supervision, min verbal cues for safety strategies  2) Interventions:  OT Self Care/ADL, OT Cognitive Skill Dev, OT Neuro Re-Ed/Balance, OT Sensory Int Techniques, OT Therapeutic Activity, OT Evaluation, and OT Therapeutic Exercise                  Problem: Dressing     Dates: Start: 06/26/20       Goal: STG-Within one week, patient will dress LB     Dates: Start: 06/26/20       Description: 1) Individualized Goal:  with supervision, min verbal cues for safety strategies  2) Interventions:  OT Self Care/ADL, OT Cognitive Skill Dev, OT Neuro Re-Ed/Balance, OT Sensory Int Techniques, OT Therapeutic Activity, OT Evaluation, and OT Therapeutic Exercise                  Problem: Functional Transfers     Dates: Start: 06/26/20       Goal: STG-Within one week, patient will transfer to toilet     Dates: Start: 06/26/20       Description: 1) Individualized Goal:  with supervision using  least restrictive adaptive device  2) Interventions:  OT Self Care/ADL, OT Cognitive Skill Dev, OT Neuro Re-Ed/Balance, OT Sensory Int Techniques, OT Therapeutic Activity, OT Evaluation, and OT Therapeutic Exercise                  Problem: OT Long Term Goals     Dates: Start: 06/26/20       Goal: LTG-By discharge, patient will complete basic self care tasks     Dates: Start: 06/26/20       Description: 1) Individualized Goal:  with supervision to  modified independence  2) Interventions:  OT Self Care/ADL, OT Cognitive Skill Dev, OT Neuro Re-Ed/Balance, OT Sensory Int Techniques, OT Therapeutic Activity, OT Evaluation, and OT Therapeutic Exercise            Goal: LTG-By discharge, patient will perform bathroom transfers     Dates: Start: 06/26/20       Description: 1) Individualized Goal:  with supervision to  modified independence  2) Interventions:  OT Self Care/ADL, OT Cognitive Skill Dev, OT Neuro Re-Ed/Balance, OT Sensory Int Techniques, OT Therapeutic Activity, OT Evaluation, and OT Therapeutic Exercise                  Problem: Toileting     Dates: Start: 06/26/20       Goal: STG-Within one week, patient will complete toileting tasks     Dates: Start: 06/26/20       Description: 1) Individualized Goal:  with supervision, min verbal cues for safety strategies  2) Interventions:  OT Self Care/ADL, OT Cognitive Skill Dev, OT Neuro Re-Ed/Balance, OT Sensory Int Techniques, OT Therapeutic Activity, OT Evaluation, and OT Therapeutic Exercise

## 2020-06-30 NOTE — PROGRESS NOTES
Hospital Medicine Daily Progress Note      Chief Complaint:  Hypertension  Diabetes    Interval History:  No significant events or changes since last visit    Review of Systems  Review of Systems   Constitutional: Negative for chills and fever.   Respiratory: Negative for shortness of breath.    Cardiovascular: Negative for chest pain.   Gastrointestinal: Negative for abdominal pain, diarrhea, nausea and vomiting.   Psychiatric/Behavioral: The patient is not nervous/anxious.         Physical Exam  Temp:  [36.3 °C (97.3 °F)-36.9 °C (98.4 °F)] 36.3 °C (97.3 °F)  Pulse:  [56-89] 56  Resp:  [16-20] 18  BP: (115-133)/(51-72) 123/64  SpO2:  [95 %] 95 %    Physical Exam  Vitals signs and nursing note reviewed.   Constitutional:       Appearance: Normal appearance.   HENT:      Head: Atraumatic.   Eyes:      Conjunctiva/sclera: Conjunctivae normal.      Pupils: Pupils are equal, round, and reactive to light.   Neck:      Musculoskeletal: Normal range of motion and neck supple.   Cardiovascular:      Rate and Rhythm: Normal rate and regular rhythm.      Heart sounds: No murmur.   Pulmonary:      Effort: Pulmonary effort is normal.      Breath sounds: No stridor. No wheezing or rales.   Abdominal:      General: There is no distension.      Palpations: Abdomen is soft.      Tenderness: There is no abdominal tenderness.   Musculoskeletal:      Right lower leg: No edema.      Left lower leg: No edema.   Skin:     General: Skin is warm and dry.      Findings: No rash.   Neurological:      Mental Status: He is alert and oriented to person, place, and time.   Psychiatric:         Mood and Affect: Mood normal.         Behavior: Behavior normal.         Fluids    Intake/Output Summary (Last 24 hours) at 6/30/2020 0855  Last data filed at 6/30/2020 0526  Gross per 24 hour   Intake 590 ml   Output --   Net 590 ml       Laboratory  Recent Labs     06/28/20  0541   WBC 6.2   RBC 4.50*   HEMOGLOBIN 14.9   HEMATOCRIT 45.5   .1*  "  MCH 33.1*   MCHC 32.7*   RDW 47.7   PLATELETCT 144*   MPV 10.9     Recent Labs     06/28/20  0541   SODIUM 140   POTASSIUM 4.4   CHLORIDE 107   CO2 23   GLUCOSE 88   BUN 22   CREATININE 1.06   CALCIUM 8.6     Recent Labs     06/28/20  0541 06/29/20  0535 06/30/20  0552   INR 2.60* 1.88* 1.78*               Assessment/Plan  Hypertension- (present on admission)  Assessment & Plan  BP ok  On Lisinopril: 20 mg daily  Monitor    History of mitral valve replacement with mechanical valve [Z95.2]- (present on admission)  Assessment & Plan  On Coumadin    Type II diabetes mellitus (HCC)- (present on admission)  Assessment & Plan  Hba1c: 6.7 (6/26)  BS labile: 119-317  BS drop in am  On Lantus: 16 units qam --> 18 units qam (6/29) --> will decrease to 12 units qam (7/1)  Will start Metformin 500 mg bid (7/1)  Will stop night time SS coverage (6/30)  Note: home meds include Lantus 15 units and Metformin 1000 mg bid  Cont to monitor    Depression  Assessment & Plan  On Celexa    Vitamin D deficiency  Assessment & Plan  Vit D: 27  On supplements    TIA (transient ischemic attack)  Assessment & Plan  Pt with dysarthria, now resolving  Neuroimaging negative acute  On Coumadin  On Lipitor    Mild cognitive impairment- (present on admission)  Assessment & Plan  Pt reports \"forgetfulness\" that's been ongoing for the past 2 yrs    Anxiety- (present on admission)  Assessment & Plan  On Celexa and Seroquel    "

## 2020-06-30 NOTE — CARE PLAN
Problem: Bathing  Goal: STG-Within one week, patient will bathe  Description: 1) Individualized Goal:  with supervision, min verbal cues for safety strategies  2) Interventions:  OT Self Care/ADL, OT Cognitive Skill Dev, OT Neuro Re-Ed/Balance, OT Sensory Int Techniques, OT Therapeutic Activity, OT Evaluation, and OT Therapeutic Exercise    Outcome: NOT MET     Problem: Dressing  Goal: STG-Within one week, patient will dress LB  Description: 1) Individualized Goal:  with supervision, min verbal cues for safety strategies  2) Interventions:  OT Self Care/ADL, OT Cognitive Skill Dev, OT Neuro Re-Ed/Balance, OT Sensory Int Techniques, OT Therapeutic Activity, OT Evaluation, and OT Therapeutic Exercise    Outcome: NOT MET     Problem: Toileting  Goal: STG-Within one week, patient will complete toileting tasks  Description: 1) Individualized Goal:  with supervision, min verbal cues for safety strategies  2) Interventions:  OT Self Care/ADL, OT Cognitive Skill Dev, OT Neuro Re-Ed/Balance, OT Sensory Int Techniques, OT Therapeutic Activity, OT Evaluation, and OT Therapeutic Exercise    Outcome: NOT MET     Problem: Functional Transfers  Goal: STG-Within one week, patient will transfer to toilet  Description: 1) Individualized Goal:  with supervision using least restrictive adaptive device  2) Interventions:  OT Self Care/ADL, OT Cognitive Skill Dev, OT Neuro Re-Ed/Balance, OT Sensory Int Techniques, OT Therapeutic Activity, OT Evaluation, and OT Therapeutic Exercise    Outcome: NOT MET

## 2020-06-30 NOTE — CARE PLAN
Problem: Problem Solving STGs  Goal: STG-Within one week, patient will  Description: 1) Individualized goal:  Complete simple attention tasks with min A required to achieve 80% accuracy.    2) Interventions:  SLP Self Care / ADL Training , SLP Cognitive Skill Development, and SLP Group Treatment      Outcome: NOT MET  Note: Min-Mod A required for pt to complete simple attention tasks      Problem: Memory STGs  Goal: STG-Within one week, patient will  Description: 1) Individualized goal:  Recall new information related to pt's hospitalization with mod A required for use of functional memory strategies (I.e. memory notebook).    2) Interventions:  SLP Self Care / ADL Training , SLP Cognitive Skill Development, and SLP Group Treatment      Outcome: NOT MET  Note: Mod-max A required for pt to recall new information      Problem: Problem Solving STGs  Goal: STG-Within one week, patient will  Description: 1) Individualized goal:  Complete administration of the SCCAN   2) Interventions:  SLP Self Care / ADL Training , SLP Cognitive Skill Development, and SLP Group Treatment      Outcome: MET  Note: SCCAN completed

## 2020-06-30 NOTE — THERAPY
"Speech Language Pathology  Daily Treatment     Patient Name: Carlos Rivera  Age:  74 y.o., Sex:  male  Medical Record #: [id number]  Today's Date: 6/30/2020     Precautions  Precautions: Fall Risk  Comments: baseline dementia     Subjective    Pt was pleasant and cooperative during this ST session, brought to therapy by PT      Objective       06/30/20 1031   SLP Total Time Spent   SLP Individual Total Time Spent (Mins) 60   Charge Group   SLP Cognitive Skill Development First 15 Minutes 1   SLP Cognitive Skill Development Additional 15 Minutes 3       Assessment    Pt demonstrated difficulty recalling tasks that he had just completed in his PT session stating \"I know we worked on balance, but I really don't remember what we did\".  Pt very concerned about his memory, memory notebook introduced to pt.  Pt encouraged to ask therapists to write down tasks completed during his therapy sessions due to difficulty pt has with writing.      Pt completed 4 step written sequencing tasks independently to achieve 100% accuracy and 5 step written sequencing tasks with 90% accuracy, pt was able to increase this to 100% with min cues.  Pt demonstrated good awareness of his attention deficits stating \"I think I just get ahead of myself too much.  I had to redo a few of these because I started numbering them before I even finished reading all the steps\".      Strengths: Able to follow instructions, Alert and oriented, Effective communication skills, Motivated for self care and independence, Pleasant and cooperative, Supportive family, Willingly participates in therapeutic activities  Barriers: Confused, Dementia, Impaired carryover of learning, Impaired insight/denial of deficits, Impaired functional cognition, Impulsive    Plan    Memory notebook, attention     Speech Therapy Problems     Problem: Memory STGs     Dates: Start: 06/26/20       Goal: STG-Within one week, patient will     Dates: Start: 06/26/20       " Description: 1) Individualized goal:  Recall new information related to pt's hospitalization with mod A required for use of functional memory strategies (I.e. memory notebook).    2) Interventions:  SLP Self Care / ADL Training , SLP Cognitive Skill Development, and SLP Group Treatment        Note:     Goal Note filed on 06/30/20 0750 by Kavon Dent MS,CCC-SLP    Mod-max A required for pt to recall new information                         Problem: Problem Solving STGs     Dates: Start: 06/26/20       Goal: STG-Within one week, patient will     Dates: Start: 06/26/20       Description: 1) Individualized goal:  Complete simple attention tasks with min A required to achieve 80% accuracy.    2) Interventions:  SLP Self Care / ADL Training , SLP Cognitive Skill Development, and SLP Group Treatment        Note:     Goal Note filed on 06/30/20 0750 by Kavon Dent MS,CCC-SLP    Min-Mod A required for pt to complete simple attention tasks                         Problem: Speech/Swallowing LTGs     Dates: Start: 06/26/20       Goal: LTG-By discharge, patient will solve basic problems     Dates: Start: 06/26/20       Description: 1) Individualized goal:  With spv for a safe discharge home   2) Interventions:  SLP Self Care / ADL Training , SLP Cognitive Skill Development, and SLP Group Treatment

## 2020-06-30 NOTE — CARE PLAN
Problem: Safety  Goal: Will remain free from injury  Outcome: PROGRESSING SLOWER THAN EXPECTED  Note: Pt remains to be impulsive and confused tonight. Bed alarm, hourly rounding and room closed to nurses station for safety precautions. Will continue to monitor.     Problem: Bowel/Gastric:  Goal: Normal bowel function is maintained or improved  Outcome: PROGRESSING AS EXPECTED  Note: Pt continent of bowel. On bowel meds. LBM 6/29/20.

## 2020-06-30 NOTE — THERAPY
"Physical Therapy   Daily Treatment     Patient Name: Carlos Rivera  Age:  74 y.o., Sex:  male  Medical Record #: [id number]  Today's Date: 6/30/2020     Precautions  Precautions: (P) Fall Risk  Comments: (P) baseline dementia     Subjective    Patient asking to try \"walking sticks\"     Objective       06/30/20 0931   Precautions   Precautions Fall Risk   Comments baseline dementia    Gait Functional Level of Assist    Gait Level Of Assist Contact Guard Assist   Assistive Device Front Wheel Walker   Distance (Feet) 150   # of Times Distance was Traveled 1   Deviation Other (Comment);Bradykinetic;Shuffled Gait;Decreased Base Of Support  (cues to keep FWW closer with fatigue)   Wheelchair Functional Level of Assist   Wheelchair Assist Supervised   Distance Wheelchair (Feet or Distance) 200   Wheelchair Description   (bilateral UE, verbal cues for steering)   Transfer Functional Level of Assist   Bed, Chair, Wheelchair Transfer Contact Guard Assist   Bed Chair Wheelchair Transfer Description Verbal cueing  (car transfer)   Standing Lower Body Exercises   Mini Squat 1 set of 10;Partial   Other Exercises side stepping, tightrope 8ft x 4 to address balance   Interdisciplinary Plan of Care Collaboration   IDT Collaboration with  Occupational Therapist   Collaboration Comments treatment planning   PT Total Time Spent   PT Individual Total Time Spent (Mins) 60   PT Charge Group   Charges Yes   PT Gait Training 1   PT Therapeutic Exercise 1   PT Neuromuscular Re-Education / Balance 1   PT Therapeutic Activities 1       Assessment    Ambulation trial in parallel bars to assess UE use on device; patient requires CGA for balance (tendency to lean posteriorly) and requires use of hands 3x in 10ft to self-correct balance anteriorly    Strengths: Willingly participates in therapeutic activities  Barriers: Impaired activity tolerance, Impaired insight/denial of deficits, Impaired carryover of learning, Impaired balance(L LE " foot pain reported)    Plan    Continue to progress ambulation towards SPC/self walking sticks as balance improves, continue to reinforce transfer sequencing, balance work; LE strengthening/endurance work    Physical Therapy Problems     Problem: Mobility     Dates: Start: 06/26/20       Goal: STG-Within one week, patient will ambulate household distance     Dates: Start: 06/26/20       Description: 1) Individualized goal:  150 ft with LRAD and CGA-SBA.  2) Interventions:  PT Group Therapy, PT Gait Training, PT Therapeutic Exercises, PT Neuro Re-Ed/Balance, PT Aquatic Therapy, PT Therapeutic Activity, and PT Evaluation      Note:     Goal Note filed on 06/30/20 1016 by Jose Conley, PT    Min A                  Goal: STG-Within one week, patient will ambulate up/down a curb     Dates: Start: 06/26/20       Description: 1) Individualized goal:  150 ft with LRAD and CGA-SBA.  2) Interventions:  PT Group Therapy, PT Gait Training, PT Therapeutic Exercises, PT Neuro Re-Ed/Balance, PT Aquatic Therapy, PT Therapeutic Activity, and PT Evaluation    Note:     Goal Note filed on 06/30/20 1016 by Jose Conley, PT    NT dt focus on other goals                  Goal: STG-Within one week, patient will ascend and descend four to six stairs     Dates: Start: 06/26/20       Description: 1) Individualized goal:  with B rails and CGA.  2) Interventions:  PT Group Therapy, PT Gait Training, PT Therapeutic Exercises, PT Neuro Re-Ed/Balance, PT Aquatic Therapy, PT Therapeutic Activity, and PT Evaluation        Note:     Goal Note filed on 06/30/20 1016 by Jose Conley, PT    NT dt safety concerns                         Problem: Mobility Transfers     Dates: Start: 06/26/20       Goal: STG-Within one week, patient will perform bed mobility     Dates: Start: 06/26/20       Description: 1) Individualized goal:  with supervision.  2) Interventions:  PT Group Therapy, PT Gait Training, PT Therapeutic Exercises, PT Neuro  Re-Ed/Balance, PT Aquatic Therapy, PT Therapeutic Activity, and PT Evaluation        Note:     Goal Note filed on 06/30/20 1016 by Jose Colney PT    SPV - min A                  Goal: STG-Within one week, patient will transfer bed to chair     Dates: Start: 06/26/20       Description: 1) Individualized goal:  with LRAD and CGA-SBA.  2) Interventions:  PT Group Therapy, PT Gait Training, PT Therapeutic Exercises, PT Neuro Re-Ed/Balance, PT Aquatic Therapy, PT Therapeutic Activity, and PT Evaluation      Note:     Goal Note filed on 06/30/20 1016 by JANETT Ying                        Problem: PT-Long Term Goals     Dates: Start: 06/26/20       Goal: LTG-By discharge, patient will ambulate     Dates: Start: 06/26/20       Description: 1) Individualized goal:  >150 ft with LRAD and supervision.  2) Interventions:  PT Group Therapy, PT Gait Training, PT Therapeutic Exercises, PT Neuro Re-Ed/Balance, PT Aquatic Therapy, PT Therapeutic Activity, and PT Evaluation              Goal: LTG-By discharge, patient will transfer one surface to another     Dates: Start: 06/26/20       Description: 1) Individualized goal: with LRAD and supervision.  2) Interventions:  PT Group Therapy, PT Gait Training, PT Therapeutic Exercises, PT Neuro Re-Ed/Balance, PT Aquatic Therapy, PT Therapeutic Activity, and PT Evaluation            Goal: LTG-By discharge, patient will ambulate up/down 4-6 stairs     Dates: Start: 06/26/20       Description: 1) Individualized goal: with B rails and supervision.  2) Interventions:  PT Group Therapy, PT Gait Training, PT Therapeutic Exercises, PT Neuro Re-Ed/Balance, PT Aquatic Therapy, PT Therapeutic Activity, and PT Evaluation          Goal: LTG-By discharge, patient will transfer in/out of a car     Dates: Start: 06/26/20       Description: 1) Individualized goal: with LRAD and supervision.  2) Interventions:  PT Group Therapy, PT Gait Training, PT Therapeutic Exercises, PT  Neuro Re-Ed/Balance, PT Aquatic Therapy, PT Therapeutic Activity, and PT Evaluation          Goal: LTG-By discharge, patient will     Dates: Start: 06/26/20       Description: 1) Individualized goal: negotiate single curb with LRAD and supervision-SBA.  2) Interventions:  PT Group Therapy, PT Gait Training, PT Therapeutic Exercises, PT Neuro Re-Ed/Balance, PT Aquatic Therapy, PT Therapeutic Activity, and PT Evaluation          Goal: LTG-By discharge, patient will     Dates: Start: 06/26/20       Description: 1) Individualized goal: perform bed mobility mod I.  2) Interventions:  PT Group Therapy, PT Gait Training, PT Therapeutic Exercises, PT Neuro Re-Ed/Balance, PT Aquatic Therapy, PT Therapeutic Activity, and PT Evaluation

## 2020-06-30 NOTE — PROGRESS NOTES
"Rehab Progress Note     Encounter Date: 6/30/2020    CC: Decreased memory, poor mobility    Interval Events (Subjective)  Patient sitting up in room. He reports he is doing well. Patient appears distracted as if he may be hallucinating. Per staff he sundowns in the early evening and can become very confused at night. Discussed adding earlier dose of Seroquel. Discussed with hospitalist and patient had told hospitalist he had seen a squirrel that was not there. Vitals remains stable. Blood sugars remain elevated.     IDT Team Meeting 6/30/2020    IWood M.D., was present and led the interdisciplinary team conference on 6/30/2020.  I led the IDT conference and agree with the IDT conference documentation and plan of care as noted below.     RN:  Diet Regular   % Meal     Pain    Sleep    Bowel Continent   Bladder Continent   In's & Out's    Doing well, neuropathy at baseline  Increased sleeping/sundown with agitation    PT:  Bed Mobility    Transfers    Mobility Lionel loses balance   Stairs    Gait is poor at baseline     OT:  Eating    Grooming    Bathing    UB Dressing    LB Dressing    Toileting Lionel   Shower & Transfer Lionel   Poor safety awareness  Poor posterior balance reaction  Rigid in structure  Limited by gait     SLP:  Memory and attention are very limited  SO concerned about cognitive deficits  Previously driving    CM:  Continues to work on disposition and DME needs.      DC/Disposition:  7/9/20    Objective:  VITAL SIGNS: /64   Pulse (!) 56   Temp 36.3 °C (97.3 °F) (Temporal)   Resp 18   Ht 1.753 m (5' 9\")   Wt 58.5 kg (129 lb)   SpO2 95%   BMI 19.05 kg/m²   Gen: NAD  Psych: Mood and affect appropriate  CV: RRR, no edema  Resp: CTAB, no upper airway sounds  Abd: NTND  Neuro: AOx2, distracted looking around, following some commands    Recent Results (from the past 72 hour(s))   ACCU-CHEK GLUCOSE    Collection Time: 06/27/20  5:14 PM   Result Value Ref Range    Glucose - " Accu-Ck 149 (H) 65 - 99 mg/dL   ACCU-CHEK GLUCOSE    Collection Time: 06/27/20  8:46 PM   Result Value Ref Range    Glucose - Accu-Ck 223 (H) 65 - 99 mg/dL   Prothrombin Time    Collection Time: 06/28/20  5:41 AM   Result Value Ref Range    PT 28.6 (H) 12.0 - 14.6 sec    INR 2.60 (H) 0.87 - 1.13   CBC WITHOUT DIFFERENTIAL    Collection Time: 06/28/20  5:41 AM   Result Value Ref Range    WBC 6.2 4.8 - 10.8 K/uL    RBC 4.50 (L) 4.70 - 6.10 M/uL    Hemoglobin 14.9 14.0 - 18.0 g/dL    Hematocrit 45.5 42.0 - 52.0 %    .1 (H) 81.4 - 97.8 fL    MCH 33.1 (H) 27.0 - 33.0 pg    MCHC 32.7 (L) 33.7 - 35.3 g/dL    RDW 47.7 35.9 - 50.0 fL    Platelet Count 144 (L) 164 - 446 K/uL    MPV 10.9 9.0 - 12.9 fL   Basic Metabolic Panel    Collection Time: 06/28/20  5:41 AM   Result Value Ref Range    Sodium 140 135 - 145 mmol/L    Potassium 4.4 3.6 - 5.5 mmol/L    Chloride 107 96 - 112 mmol/L    Co2 23 20 - 33 mmol/L    Glucose 88 65 - 99 mg/dL    Bun 22 8 - 22 mg/dL    Creatinine 1.06 0.50 - 1.40 mg/dL    Calcium 8.6 8.5 - 10.5 mg/dL    Anion Gap 10.0 7.0 - 16.0   VITAMIN B12    Collection Time: 06/28/20  5:41 AM   Result Value Ref Range    Vitamin B12 -True Cobalamin 1102 (H) 211 - 911 pg/mL   FOLATE    Collection Time: 06/28/20  5:41 AM   Result Value Ref Range    Folate -Folic Acid 16.9 >4.0 ng/mL   ESTIMATED GFR    Collection Time: 06/28/20  5:41 AM   Result Value Ref Range    GFR If African American >60 >60 mL/min/1.73 m 2    GFR If Non African American >60 >60 mL/min/1.73 m 2   ACCU-CHEK GLUCOSE    Collection Time: 06/28/20  7:35 AM   Result Value Ref Range    Glucose - Accu-Ck 90 65 - 99 mg/dL   ACCU-CHEK GLUCOSE    Collection Time: 06/28/20 11:16 AM   Result Value Ref Range    Glucose - Accu-Ck 191 (H) 65 - 99 mg/dL   ACCU-CHEK GLUCOSE    Collection Time: 06/28/20  5:29 PM   Result Value Ref Range    Glucose - Accu-Ck 254 (H) 65 - 99 mg/dL   ACCU-CHEK GLUCOSE    Collection Time: 06/28/20  8:17 PM   Result Value Ref Range     Glucose - Accu-Ck 189 (H) 65 - 99 mg/dL   Prothrombin Time    Collection Time: 06/29/20  5:35 AM   Result Value Ref Range    PT 22.2 (H) 12.0 - 14.6 sec    INR 1.88 (H) 0.87 - 1.13   ACCU-CHEK GLUCOSE    Collection Time: 06/29/20  7:39 AM   Result Value Ref Range    Glucose - Accu-Ck 119 (H) 65 - 99 mg/dL   ACCU-CHEK GLUCOSE    Collection Time: 06/29/20 11:18 AM   Result Value Ref Range    Glucose - Accu-Ck 172 (H) 65 - 99 mg/dL   ACCU-CHEK GLUCOSE    Collection Time: 06/29/20  5:43 PM   Result Value Ref Range    Glucose - Accu-Ck 244 (H) 65 - 99 mg/dL   ACCU-CHEK GLUCOSE    Collection Time: 06/29/20  7:54 PM   Result Value Ref Range    Glucose - Accu-Ck 317 (H) 65 - 99 mg/dL   Prothrombin Time    Collection Time: 06/30/20  5:52 AM   Result Value Ref Range    PT 21.3 (H) 12.0 - 14.6 sec    INR 1.78 (H) 0.87 - 1.13   ACCU-CHEK GLUCOSE    Collection Time: 06/30/20  7:36 AM   Result Value Ref Range    Glucose - Accu-Ck 119 (H) 65 - 99 mg/dL   ACCU-CHEK GLUCOSE    Collection Time: 06/30/20 11:37 AM   Result Value Ref Range    Glucose - Accu-Ck 250 (H) 65 - 99 mg/dL       Current Facility-Administered Medications   Medication Frequency   • warfarin (COUMADIN) tablet 7.5 mg ONCE AT 1800   • gabapentin (NEURONTIN) capsule 400 mg TID   • insulin glargine (Lantus) injection QAM INSULIN   • omeprazole (PRILOSEC) capsule 20 mg DAILY   • insulin regular (HUMULIN R) injection 2-12 Units 4X/DAY ACHS    And   • glucose 4 g chewable tablet 16 g Q15 MIN PRN    And   • dextrose 50% (D50W) injection 50 mL Q15 MIN PRN   • vitamin D (cholecalciferol) tablet 1,000 Units DAILY   • Respiratory Therapy Consult Continuous RT   • Pharmacy Consult Request ...Pain Management Review 1 Each PHARMACY TO DOSE   • tramadol (ULTRAM) 50 MG tablet 50 mg Q4HRS PRN   • hydrALAZINE (APRESOLINE) tablet 25 mg Q8HRS PRN   • acetaminophen (TYLENOL) tablet 650 mg Q4HRS PRN   • senna-docusate (PERICOLACE or SENOKOT S) 8.6-50 MG per tablet 2 Tab BID    And    • polyethylene glycol/lytes (MIRALAX) PACKET 1 Packet QDAY PRN    And   • magnesium hydroxide (MILK OF MAGNESIA) suspension 30 mL QDAY PRN    And   • bisacodyl (DULCOLAX) suppository 10 mg QDAY PRN   • artificial tears ophthalmic solution 1 Drop PRN   • benzocaine-menthol (CEPACOL) lozenge 1 Lozenge Q2HRS PRN   • mag hydrox-al hydrox-simeth (MAALOX PLUS ES or MYLANTA DS) suspension 20 mL Q2HRS PRN   • ondansetron (ZOFRAN ODT) dispertab 4 mg 4X/DAY PRN    Or   • ondansetron (ZOFRAN) syringe/vial injection 4 mg 4X/DAY PRN   • traZODone (DESYREL) tablet 50 mg QHS PRN   • sodium chloride (OCEAN) 0.65 % nasal spray 2 Spray PRN   • atorvastatin (LIPITOR) tablet 80 mg Q EVENING   • citalopram (CELEXA) tablet 20 mg Q EVENING   • lisinopril (PRINIVIL) tablet 20 mg Q DAY   • QUEtiapine (SEROQUEL) tablet 50 mg Q EVENING   • MD Alert...Warfarin per Pharmacy PHARMACY TO DOSE       Orders Placed This Encounter   Procedures   • Diet Order Diabetic     Standing Status:   Standing     Number of Occurrences:   1     Order Specific Question:   Diet:     Answer:   Diabetic [3]       Assessment:  Active Hospital Problems    Diagnosis   • *Acute on Chronic Encephalopathy   • Hypertension   • Coronary artery disease involving coronary bypass graft   • History of mitral valve replacement with mechanical valve [Z95.2]   • Type II diabetes mellitus (HCC)   • TIA (transient ischemic attack)   • Thrombocytopenia (HCC)   • Vitamin D deficiency   • Depression   • Macrocytosis without anemia   • Azotemia   • Diabetic neuropathy (HCC)   • Obstructive sleep apnea syndrome   • Shuffling gait   • Dementia (HCC)   • Hyperlipidemia   • Mild cognitive impairment   • Anxiety       Medical Decision Making and Plan:  Encephalopathy vs delirium vs TIA - Patient with aphasia and weakness with negative work-up for stroke but with hypertensive emergency on admission concerning for hypertensive encephalopathy. Patient started on statin for concern for CVA vs  TIA.      DM - Patient on Lantus 15U QPM and SSI. Will continue to monitor   Blood sugars into 300s, consult hospitalist. Lantus increased to 18U qAM. Roommate to bring in glucometer  -Hospitalist to consult     Dementia - Patient on Seroquel 50 mg QPM for agitation and sleep. MRI consistent with probable vascular dementia.   -Sundowning earlier in the evening. Start Seroquel at 1800 as well as 2100. Ongoing hallucinations     Mood/Neuropathy - Patient on Celexa 20 mg QPM. Patient with burning into bilateral feet. Trial of Gabapentin 300 mg TID -> increase to 400 mg TID     HTN - Patient on Lisinopril 20 mg on transfer. Previously came in with SBP > 180s  -Hospitalist to consult    Elevated BUN - encouraged fluid intake. Will monitor     HLD - Patient on Atorvastatin 80 mg QPM.      GI Ppx - Patient on Prilosec on transfer.     Vitamin D Deficiency -  27 on admission. Started on 1000 U     DVT ppx - Patient on Coumadin     Total time:  36 minutes.  I spent greater than 50% of the time for patient care, counseling, and coordination on this date, including unit/floor time, and face-to-face time with the patient as per interval events and assessment and plan above. Topics discussed included new hallucinations, sundowning, increase seroquel to early evening as well and monitor for sedation. Patient was discussed separately in IDT today; please see details above.    Wood Rodriguez M.D.

## 2020-06-30 NOTE — PROGRESS NOTES
Pharmacy Warfarin Consult   6/30/2020     74 y.o.   male     Indication for anticoagulation: Mechanical Mitral Valve Replacement        Goal INR = 2.5 - 3.5    Recent Labs     06/28/20  0541 06/29/20  0535 06/30/20  0552   INR 2.60* 1.88* 1.78*   HEMOGLOBIN 14.9  --   --    HEMATOCRIT 45.5  --   --        Pertinent Drug/Drug Interactions:  Statin, quetiapine, citalopram  Outpatient Warfarin Regimen:  warfarin 5 mg PO daily  Recent Warfarin Dosing:    Dose from last 7 days     Date/Time Dose (mg)    06/30/20 0552  7.5    06/29/20 0535  7.5    06/28/20 1341  3    06/27/20 1400  1    06/26/20 0637  2.5    06/25/20 1732  7.5          Bridge Therapy: no           1.  Coumadin 7.5 mg tonight per INR = 1.78           Rojelio Alegre [facility]

## 2020-06-30 NOTE — CARE PLAN
Problem: Mobility  Goal: STG-Within one week, patient will ambulate up/down a curb  Description: 1) Individualized goal:  150 ft with LRAD and CGA-SBA.  2) Interventions:  PT Group Therapy, PT Gait Training, PT Therapeutic Exercises, PT Neuro Re-Ed/Balance, PT Aquatic Therapy, PT Therapeutic Activity, and PT Evaluation  Outcome: NOT MET  Note: NT dt focus on other goals  Goal: STG-Within one week, patient will ascend and descend four to six stairs  Description: 1) Individualized goal:  with B rails and CGA.  2) Interventions:  PT Group Therapy, PT Gait Training, PT Therapeutic Exercises, PT Neuro Re-Ed/Balance, PT Aquatic Therapy, PT Therapeutic Activity, and PT Evaluation      Outcome: NOT MET  Note: NT dt safety concerns      Problem: Mobility  Goal: STG-Within one week, patient will ambulate household distance  Description: 1) Individualized goal:  150 ft with LRAD and CGA-SBA.  2) Interventions:  PT Group Therapy, PT Gait Training, PT Therapeutic Exercises, PT Neuro Re-Ed/Balance, PT Aquatic Therapy, PT Therapeutic Activity, and PT Evaluation    Outcome: PROGRESSING AS EXPECTED  Note: Min A     Problem: Mobility Transfers  Goal: STG-Within one week, patient will perform bed mobility  Description: 1) Individualized goal:  with supervision.  2) Interventions:  PT Group Therapy, PT Gait Training, PT Therapeutic Exercises, PT Neuro Re-Ed/Balance, PT Aquatic Therapy, PT Therapeutic Activity, and PT Evaluation      Outcome: PROGRESSING AS EXPECTED  Note: SPV - min A  Goal: STG-Within one week, patient will transfer bed to chair  Description: 1) Individualized goal:  with LRAD and CGA-SBA.  2) Interventions:  PT Group Therapy, PT Gait Training, PT Therapeutic Exercises, PT Neuro Re-Ed/Balance, PT Aquatic Therapy, PT Therapeutic Activity, and PT Evaluation    Outcome: PROGRESSING AS EXPECTED  Note: Min A

## 2020-07-01 LAB
APPEARANCE UR: CLEAR
BACTERIA #/AREA URNS HPF: NEGATIVE /HPF
BILIRUB UR QL STRIP.AUTO: NEGATIVE
COLOR UR: YELLOW
EKG IMPRESSION: NORMAL
EPI CELLS #/AREA URNS HPF: NEGATIVE /HPF
GLUCOSE BLD-MCNC: 107 MG/DL (ref 65–99)
GLUCOSE BLD-MCNC: 125 MG/DL (ref 65–99)
GLUCOSE BLD-MCNC: 322 MG/DL (ref 65–99)
GLUCOSE UR STRIP.AUTO-MCNC: 500 MG/DL
HYALINE CASTS #/AREA URNS LPF: ABNORMAL /LPF
INR PPP: 2.47 (ref 0.87–1.13)
KETONES UR STRIP.AUTO-MCNC: NEGATIVE MG/DL
LEUKOCYTE ESTERASE UR QL STRIP.AUTO: NEGATIVE
MICRO URNS: ABNORMAL
NITRITE UR QL STRIP.AUTO: NEGATIVE
PH UR STRIP.AUTO: 5.5 [PH] (ref 5–8)
PROT UR QL STRIP: 100 MG/DL
PROTHROMBIN TIME: 27.5 SEC (ref 12–14.6)
RBC # URNS HPF: ABNORMAL /HPF
RBC UR QL AUTO: NEGATIVE
SP GR UR STRIP.AUTO: 1.01
TROPONIN T SERPL-MCNC: 147 NG/L (ref 6–19)
UROBILINOGEN UR STRIP.AUTO-MCNC: 0.2 MG/DL
WBC #/AREA URNS HPF: ABNORMAL /HPF

## 2020-07-01 PROCEDURE — A9270 NON-COVERED ITEM OR SERVICE: HCPCS | Performed by: PHYSICAL MEDICINE & REHABILITATION

## 2020-07-01 PROCEDURE — 82962 GLUCOSE BLOOD TEST: CPT

## 2020-07-01 PROCEDURE — 97129 THER IVNTJ 1ST 15 MIN: CPT

## 2020-07-01 PROCEDURE — 99232 SBSQ HOSP IP/OBS MODERATE 35: CPT | Performed by: HOSPITALIST

## 2020-07-01 PROCEDURE — 99232 SBSQ HOSP IP/OBS MODERATE 35: CPT | Performed by: PHYSICAL MEDICINE & REHABILITATION

## 2020-07-01 PROCEDURE — 700102 HCHG RX REV CODE 250 W/ 637 OVERRIDE(OP): Performed by: HOSPITALIST

## 2020-07-01 PROCEDURE — 97116 GAIT TRAINING THERAPY: CPT

## 2020-07-01 PROCEDURE — A9270 NON-COVERED ITEM OR SERVICE: HCPCS | Performed by: HOSPITALIST

## 2020-07-01 PROCEDURE — 770010 HCHG ROOM/CARE - REHAB SEMI PRIVAT*

## 2020-07-01 PROCEDURE — 36415 COLL VENOUS BLD VENIPUNCTURE: CPT

## 2020-07-01 PROCEDURE — 84484 ASSAY OF TROPONIN QUANT: CPT

## 2020-07-01 PROCEDURE — 85610 PROTHROMBIN TIME: CPT

## 2020-07-01 PROCEDURE — 97535 SELF CARE MNGMENT TRAINING: CPT | Mod: CO

## 2020-07-01 PROCEDURE — 93005 ELECTROCARDIOGRAM TRACING: CPT | Performed by: PHYSICAL MEDICINE & REHABILITATION

## 2020-07-01 PROCEDURE — 93010 ELECTROCARDIOGRAM REPORT: CPT | Performed by: INTERNAL MEDICINE

## 2020-07-01 PROCEDURE — 700102 HCHG RX REV CODE 250 W/ 637 OVERRIDE(OP): Performed by: PHYSICAL MEDICINE & REHABILITATION

## 2020-07-01 PROCEDURE — 97130 THER IVNTJ EA ADDL 15 MIN: CPT

## 2020-07-01 PROCEDURE — 97530 THERAPEUTIC ACTIVITIES: CPT

## 2020-07-01 RX ORDER — WARFARIN SODIUM 4 MG/1
4 TABLET ORAL
Status: DISCONTINUED | OUTPATIENT
Start: 2020-07-01 | End: 2020-07-01

## 2020-07-01 RX ORDER — DEXTROSE MONOHYDRATE 25 G/50ML
50 INJECTION, SOLUTION INTRAVENOUS
Status: DISCONTINUED | OUTPATIENT
Start: 2020-07-01 | End: 2020-07-09 | Stop reason: HOSPADM

## 2020-07-01 RX ORDER — WARFARIN SODIUM 5 MG/1
5 TABLET ORAL
Status: COMPLETED | OUTPATIENT
Start: 2020-07-01 | End: 2020-07-01

## 2020-07-01 RX ADMIN — CITALOPRAM HYDROBROMIDE 20 MG: 20 TABLET ORAL at 19:40

## 2020-07-01 RX ADMIN — DOCUSATE SODIUM 50 MG AND SENNOSIDES 8.6 MG 2 TABLET: 8.6; 5 TABLET, FILM COATED ORAL at 08:11

## 2020-07-01 RX ADMIN — MELATONIN 1000 UNITS: at 08:11

## 2020-07-01 RX ADMIN — GABAPENTIN 400 MG: 400 CAPSULE ORAL at 14:40

## 2020-07-01 RX ADMIN — QUETIAPINE 50 MG: 25 TABLET ORAL at 17:23

## 2020-07-01 RX ADMIN — GABAPENTIN 400 MG: 400 CAPSULE ORAL at 08:11

## 2020-07-01 RX ADMIN — WARFARIN SODIUM 5 MG: 5 TABLET ORAL at 17:20

## 2020-07-01 RX ADMIN — QUETIAPINE 50 MG: 25 TABLET ORAL at 19:40

## 2020-07-01 RX ADMIN — METFORMIN HYDROCHLORIDE 500 MG: 500 TABLET ORAL at 08:11

## 2020-07-01 RX ADMIN — DOCUSATE SODIUM 50 MG AND SENNOSIDES 8.6 MG 2 TABLET: 8.6; 5 TABLET, FILM COATED ORAL at 19:40

## 2020-07-01 RX ADMIN — INSULIN GLARGINE 12 UNITS: 100 INJECTION, SOLUTION SUBCUTANEOUS at 08:15

## 2020-07-01 RX ADMIN — METFORMIN HYDROCHLORIDE 500 MG: 500 TABLET ORAL at 17:20

## 2020-07-01 RX ADMIN — ATORVASTATIN CALCIUM 80 MG: 40 TABLET, FILM COATED ORAL at 19:40

## 2020-07-01 RX ADMIN — OMEPRAZOLE 20 MG: 20 CAPSULE, DELAYED RELEASE ORAL at 08:11

## 2020-07-01 RX ADMIN — LISINOPRIL 20 MG: 20 TABLET ORAL at 05:56

## 2020-07-01 ASSESSMENT — ENCOUNTER SYMPTOMS
NAUSEA: 0
PALPITATIONS: 0
HEADACHES: 0
FEVER: 0
DIZZINESS: 0
VOMITING: 0
BLURRED VISION: 0
SHORTNESS OF BREATH: 0
HALLUCINATIONS: 0

## 2020-07-01 ASSESSMENT — PATIENT HEALTH QUESTIONNAIRE - PHQ9
2. FEELING DOWN, DEPRESSED, IRRITABLE, OR HOPELESS: NOT AT ALL
1. LITTLE INTEREST OR PLEASURE IN DOING THINGS: NOT AT ALL
1. LITTLE INTEREST OR PLEASURE IN DOING THINGS: NOT AT ALL
2. FEELING DOWN, DEPRESSED, IRRITABLE, OR HOPELESS: NOT AT ALL
SUM OF ALL RESPONSES TO PHQ9 QUESTIONS 1 AND 2: 0
SUM OF ALL RESPONSES TO PHQ9 QUESTIONS 1 AND 2: 0

## 2020-07-01 ASSESSMENT — ACTIVITIES OF DAILY LIVING (ADL)
TOILET_TRANSFER_DESCRIPTION: GRAB BAR
TOILETING_LEVEL_OF_ASSIST_DESCRIPTION: GRAB BAR;INCREASED TIME
TOILET_TRANSFER_DESCRIPTION: GRAB BAR;INCREASED TIME;SUPERVISION FOR SAFETY;VERBAL CUEING

## 2020-07-01 ASSESSMENT — GAIT ASSESSMENTS
DISTANCE (FEET): 110
GAIT LEVEL OF ASSIST: MINIMAL ASSIST
ASSISTIVE DEVICE: FRONT WHEEL WALKER

## 2020-07-01 NOTE — THERAPY
Speech Language Pathology  Daily Treatment     Patient Name: Carlos Rivera  Age:  74 y.o., Sex:  male  Medical Record #: [id number]  Today's Date: 7/1/2020     Precautions  Precautions: Fall Risk  Comments: Baseline Dementia     Subjective    Pt was attempting to transfer himself back into bed without assistance at the beginning of this session.  Pt educated on the importance of calling and waiting for assistance before transferring to bed or the the bathroom.  Pt verbalized understanding, but then later in this session reported that he did not need assistance to go to the bathroom.  Reinforcement on safety required.       Objective       07/01/20 1031   SLP Total Time Spent   SLP Individual Total Time Spent (Mins) 60   Charge Group   SLP Cognitive Skill Development First 15 Minutes 1   SLP Cognitive Skill Development Additional 15 Minutes 3       Assessment    Pt did not remember to write any information in his memory notebook, but was able to recall the date, what he ate for breakfast and 1 activity he completed in his OT session independently.  Pt required mod cues to recall other tasks completed in his OT session.  Pt perseverative on the difficulty he was having with his writing and eventually needed help writing the remaining information.  Pt c/o feeling dizzy and somewhat lightheaded during this session.  Nursing made aware and vitals were taken (/61, HR 44, blood sugar 322).  Pt requested to use the bathroom before laying back down in bed and was adamant that he wanted to stand while urinating.  Mod cues were required for safety to keep holding into the grab bars; however pt let go of the bar with both hands to try to undo his pants and had a loss of balance backwards that pt was able to correct with min A from this therapist.      Strengths: Able to follow instructions, Alert and oriented, Effective communication skills, Motivated for self care and independence, Pleasant and cooperative,  Supportive family, Willingly participates in therapeutic activities  Barriers: Confused, Dementia, Impaired carryover of learning, Impaired insight/denial of deficits, Impaired functional cognition, Impulsive    Plan    Continue memory notebook, target simple attention and safety awareness     Speech Therapy Problems     Problem: Memory STGs     Dates: Start: 06/26/20       Goal: STG-Within one week, patient will     Dates: Start: 06/26/20       Description: 1) Individualized goal:  Recall new information related to pt's hospitalization with mod A required for use of functional memory strategies (I.e. memory notebook).    2) Interventions:  SLP Self Care / ADL Training , SLP Cognitive Skill Development, and SLP Group Treatment        Note:     Goal Note filed on 06/30/20 0750 by Kavon Dent MS,CCC-SLP    Mod-max A required for pt to recall new information                         Problem: Problem Solving STGs     Dates: Start: 06/26/20       Goal: STG-Within one week, patient will     Dates: Start: 06/26/20       Description: 1) Individualized goal:  Complete simple attention tasks with min A required to achieve 80% accuracy.    2) Interventions:  SLP Self Care / ADL Training , SLP Cognitive Skill Development, and SLP Group Treatment        Note:     Goal Note filed on 06/30/20 0750 by Kavon Dent MS,CCC-SLP    Min-Mod A required for pt to complete simple attention tasks                         Problem: Speech/Swallowing LTGs     Dates: Start: 06/26/20       Goal: LTG-By discharge, patient will solve basic problems     Dates: Start: 06/26/20       Description: 1) Individualized goal:  With spv for a safe discharge home   2) Interventions:  SLP Self Care / ADL Training , SLP Cognitive Skill Development, and SLP Group Treatment

## 2020-07-01 NOTE — PROGRESS NOTES
Pharmacy Warfarin Consult   7/1/2020     74 y.o.   male     Indication for anticoagulation: Mechanical Mitral Valve Replacement    Goal INR = 2.5 - 3.5    Recent Labs     06/29/20  0535 06/30/20  0552 07/01/20  0550   INR 1.88* 1.78* 2.47*       Pertinent Drug/Drug Interactions:  Statin, SSRI, Quetiapine  Outpatient Warfarin Regimen:  Warfarin 5 mg nightly  Recent Warfarin Dosing:    Dose from last 7 days     Date/Time Dose (mg)    07/01/20 0550  5    06/30/20 0552  7.5    06/29/20 0535  7.5    06/28/20 1341  3    06/27/20 1400  1    06/26/20 0637  2.5    06/25/20 1732  7.5            Bridge Therapy: No        1.  Warfarin 5 mg today for INR= 2.47        Armen Marks [facility]

## 2020-07-01 NOTE — THERAPY
Physical Therapy   Daily Treatment     Patient Name: Carlos Rivera  Age:  74 y.o., Sex:  male  Medical Record #: [id number]  Today's Date: 7/1/2020     Precautions  Precautions: (P) Fall Risk  Comments: (P) Baseline Dementia    Subjective    Patient seated in w/c reporting fatigue and wanting to take a nap, however able to motivate self to participate in therapy.     Objective       07/01/20 1231   Precautions   Precautions Fall Risk   Comments Baseline Dementia   Sleep/Wake Cycle   Sleep & Rest Awake   Gait Functional Level of Assist    Gait Level Of Assist Minimal Assist   Assistive Device Front Wheel Walker   Distance (Feet) 110   # of Times Distance was Traveled 3   Deviation   (festinating, anterior lean with fatigue, LOB with turn )   Transfer Functional Level of Assist   Bed, Chair, Wheelchair Transfer Minimal Assist  (to CGA)   Bed Chair Wheelchair Transfer Description Verbal cueing;Increased time;Requires lift;Adaptive equipment  (retropulses, FWW stand step transfer)   Toilet Transfers Stand by Assist  (to CGA)   Toilet Transfer Description Grab bar;Increased time;Supervision for safety;Verbal cueing   Supine Lower Body Exercise   Other Exercises upper trunk rotation 1x10 each side   Bed Mobility    Supine to Sit Stand by Assist   Sit to Supine Stand by Assist   Sit to Stand Minimal Assist  (to CGA)   Scooting Stand by Assist  (to Min A)   Rolling Supervised   Interdisciplinary Plan of Care Collaboration   Patient Position at End of Therapy In Bed;Bed Alarm On;Call Light within Reach;Tray Table within Reach;Phone within Reach   PT Total Time Spent   PT Individual Total Time Spent (Mins) 60   PT Charge Group   PT Gait Training 2   PT Therapeutic Activities 2     Toilet transfer x3 with grab bar and FWW, SBA-CGA. Patient able to bend down to manage LB dressing and hygiene; however tendency for posterior LOB and retropulsion requiring additional Min A to prevent LOB. Standing hand hygiene with SBA and  intermittent UE support on counter.    Gait training with no AD and CGA-Min A with significant LOBs especially in L lateral direction, impaired and ineffective balance responses observed.    Assessment    Patient tolerated session fairly, limited by multiple toileting needs however agreeable to performing at an ambulatory level to work on gait training. Also reporting hypophonia of recent onset.    Strengths: Willingly participates in therapeutic activities  Barriers: Impaired activity tolerance, Impaired insight/denial of deficits, Impaired carryover of learning, Impaired balance(L LE foot pain reported)    Plan    Gait training with FWW vs no AD or walking sticks, treadmill training, postural re-ed, trial dual tasking with functional mobility, single leg balance activities, bed mobility/ transfer training, safety awareness, review standing therex program for strength    Physical Therapy Problems     Problem: Mobility     Dates: Start: 06/26/20       Goal: STG-Within one week, patient will ambulate household distance     Dates: Start: 06/26/20       Description: 1) Individualized goal:  150 ft with LRAD and CGA-SBA.  2) Interventions:  PT Group Therapy, PT Gait Training, PT Therapeutic Exercises, PT Neuro Re-Ed/Balance, PT Aquatic Therapy, PT Therapeutic Activity, and PT Evaluation      Note:     Goal Note filed on 06/30/20 1016 by Jose Conley, PT    Min A                  Goal: STG-Within one week, patient will ambulate up/down a curb     Dates: Start: 06/26/20       Description: 1) Individualized goal:  150 ft with LRAD and CGA-SBA.  2) Interventions:  PT Group Therapy, PT Gait Training, PT Therapeutic Exercises, PT Neuro Re-Ed/Balance, PT Aquatic Therapy, PT Therapeutic Activity, and PT Evaluation    Note:     Goal Note filed on 06/30/20 1016 by Jose Conley, PT    NT dt focus on other goals                  Goal: STG-Within one week, patient will ascend and descend four to six stairs     Dates:  Start: 06/26/20       Description: 1) Individualized goal:  with B rails and CGA.  2) Interventions:  PT Group Therapy, PT Gait Training, PT Therapeutic Exercises, PT Neuro Re-Ed/Balance, PT Aquatic Therapy, PT Therapeutic Activity, and PT Evaluation        Note:     Goal Note filed on 06/30/20 1016 by Jose Conley, PT    NT dt safety concerns                         Problem: Mobility Transfers     Dates: Start: 06/26/20       Goal: STG-Within one week, patient will perform bed mobility     Dates: Start: 06/26/20       Description: 1) Individualized goal:  with supervision.  2) Interventions:  PT Group Therapy, PT Gait Training, PT Therapeutic Exercises, PT Neuro Re-Ed/Balance, PT Aquatic Therapy, PT Therapeutic Activity, and PT Evaluation        Note:     Goal Note filed on 06/30/20 1016 by Jose Conley, PT    SPV - min A                  Goal: STG-Within one week, patient will transfer bed to chair     Dates: Start: 06/26/20       Description: 1) Individualized goal:  with LRAD and CGA-SBA.  2) Interventions:  PT Group Therapy, PT Gait Training, PT Therapeutic Exercises, PT Neuro Re-Ed/Balance, PT Aquatic Therapy, PT Therapeutic Activity, and PT Evaluation      Note:     Goal Note filed on 06/30/20 1016 by Jose Conley PT    Min A                        Problem: PT-Long Term Goals     Dates: Start: 06/26/20       Goal: LTG-By discharge, patient will ambulate     Dates: Start: 06/26/20       Description: 1) Individualized goal:  >150 ft with LRAD and supervision.  2) Interventions:  PT Group Therapy, PT Gait Training, PT Therapeutic Exercises, PT Neuro Re-Ed/Balance, PT Aquatic Therapy, PT Therapeutic Activity, and PT Evaluation              Goal: LTG-By discharge, patient will transfer one surface to another     Dates: Start: 06/26/20       Description: 1) Individualized goal: with LRAD and supervision.  2) Interventions:  PT Group Therapy, PT Gait Training, PT Therapeutic Exercises, PT Neuro  Re-Ed/Balance, PT Aquatic Therapy, PT Therapeutic Activity, and PT Evaluation            Goal: LTG-By discharge, patient will ambulate up/down 4-6 stairs     Dates: Start: 06/26/20       Description: 1) Individualized goal: with B rails and supervision.  2) Interventions:  PT Group Therapy, PT Gait Training, PT Therapeutic Exercises, PT Neuro Re-Ed/Balance, PT Aquatic Therapy, PT Therapeutic Activity, and PT Evaluation          Goal: LTG-By discharge, patient will transfer in/out of a car     Dates: Start: 06/26/20       Description: 1) Individualized goal: with LRAD and supervision.  2) Interventions:  PT Group Therapy, PT Gait Training, PT Therapeutic Exercises, PT Neuro Re-Ed/Balance, PT Aquatic Therapy, PT Therapeutic Activity, and PT Evaluation          Goal: LTG-By discharge, patient will     Dates: Start: 06/26/20       Description: 1) Individualized goal: negotiate single curb with LRAD and supervision-SBA.  2) Interventions:  PT Group Therapy, PT Gait Training, PT Therapeutic Exercises, PT Neuro Re-Ed/Balance, PT Aquatic Therapy, PT Therapeutic Activity, and PT Evaluation          Goal: LTG-By discharge, patient will     Dates: Start: 06/26/20       Description: 1) Individualized goal: perform bed mobility mod I.  2) Interventions:  PT Group Therapy, PT Gait Training, PT Therapeutic Exercises, PT Neuro Re-Ed/Balance, PT Aquatic Therapy, PT Therapeutic Activity, and PT Evaluation

## 2020-07-01 NOTE — CARE PLAN
Problem: Safety  Goal: Will remain free from injury  Outcome: PROGRESSING SLOWER THAN EXPECTED   Pt does not use call light for assistance, Impulsive and unsteady from transfers.  Patient encouraged to call for assistance and not attempt self transfer . Able to verbalize needs.   Problem: Infection  Goal: Will remain free from infection  Outcome: PROGRESSING AS EXPECTED   Patient remains free of infection as evidenced by normal vital signs and breath sounds. Will continue monitoring.

## 2020-07-01 NOTE — CARE PLAN
Problem: Venous Thromboembolism (VTW)/Deep Vein Thrombosis (DVT) Prevention:  Goal: Patient will participate in Venous Thrombosis (VTE)/Deep Vein Thrombosis (DVT)Prevention Measures  Outcome: PROGRESSING AS EXPECTED  Note: Pt is on warfarin for DVT prophylaxis. No s/s of DVT or edema noted; will continue to monitor.     Problem: Skin Integrity  Goal: Risk for impaired skin integrity will decrease  Outcome: PROGRESSING AS EXPECTED  Note: Patient's skin remains intact and free from new or accidental injury this shift.  Will continue to monitor.

## 2020-07-01 NOTE — PROGRESS NOTES
"Rehab Progress Note     Encounter Date: 7/1/2020    CC: Decreased memory, poor mobility    Interval Events (Subjective)  Patient sitting up in room. He reports he is doing well with therapy but fatigued and with low HR. Discussed about UA was negative. Per nursing improvement with second dose of Seroquel in evening. Will check EKG. Denies NVD.     IDT Team Meeting 6/30/2020  DC/Disposition:  7/9/20    Objective:  VITAL SIGNS: /61   Pulse (!) 44 Comment: nurse notified  Temp 36.3 °C (97.4 °F) (Temporal)   Resp 18   Ht 1.753 m (5' 9\")   Wt 58.5 kg (129 lb)   SpO2 94%   BMI 19.05 kg/m²   Gen: NAD  Psych: Mood and affect appropriate  CV: RRR, no edema  Resp: CTAB, no upper airway sounds  Abd: NTND  Neuro: AOx3, following simple commands    Recent Results (from the past 72 hour(s))   ACCU-CHEK GLUCOSE    Collection Time: 06/28/20  5:29 PM   Result Value Ref Range    Glucose - Accu-Ck 254 (H) 65 - 99 mg/dL   ACCU-CHEK GLUCOSE    Collection Time: 06/28/20  8:17 PM   Result Value Ref Range    Glucose - Accu-Ck 189 (H) 65 - 99 mg/dL   Prothrombin Time    Collection Time: 06/29/20  5:35 AM   Result Value Ref Range    PT 22.2 (H) 12.0 - 14.6 sec    INR 1.88 (H) 0.87 - 1.13   ACCU-CHEK GLUCOSE    Collection Time: 06/29/20  7:39 AM   Result Value Ref Range    Glucose - Accu-Ck 119 (H) 65 - 99 mg/dL   ACCU-CHEK GLUCOSE    Collection Time: 06/29/20 11:18 AM   Result Value Ref Range    Glucose - Accu-Ck 172 (H) 65 - 99 mg/dL   ACCU-CHEK GLUCOSE    Collection Time: 06/29/20  5:43 PM   Result Value Ref Range    Glucose - Accu-Ck 244 (H) 65 - 99 mg/dL   ACCU-CHEK GLUCOSE    Collection Time: 06/29/20  7:54 PM   Result Value Ref Range    Glucose - Accu-Ck 317 (H) 65 - 99 mg/dL   Prothrombin Time    Collection Time: 06/30/20  5:52 AM   Result Value Ref Range    PT 21.3 (H) 12.0 - 14.6 sec    INR 1.78 (H) 0.87 - 1.13   ACCU-CHEK GLUCOSE    Collection Time: 06/30/20  7:36 AM   Result Value Ref Range    Glucose - Accu-Ck 119 " (H) 65 - 99 mg/dL   ACCU-CHEK GLUCOSE    Collection Time: 06/30/20 11:37 AM   Result Value Ref Range    Glucose - Accu-Ck 250 (H) 65 - 99 mg/dL   ACCU-CHEK GLUCOSE    Collection Time: 06/30/20  5:18 PM   Result Value Ref Range    Glucose - Accu-Ck 200 (H) 65 - 99 mg/dL   URINALYSIS    Collection Time: 06/30/20  9:30 PM    Specimen: Urine, Clean Catch   Result Value Ref Range    Color Yellow     Character Clear     Specific Gravity 1.011 <1.035    Ph 5.5 5.0 - 8.0    Glucose 500 (A) Negative mg/dL    Ketones Negative Negative mg/dL    Protein 100 (A) Negative mg/dL    Bilirubin Negative Negative    Urobilinogen, Urine 0.2 Negative    Nitrite Negative Negative    Leukocyte Esterase Negative Negative    Occult Blood Negative Negative    Micro Urine Req Microscopic    URINE MICROSCOPIC (W/UA)    Collection Time: 06/30/20  9:30 PM   Result Value Ref Range    WBC 0-2 (A) /hpf    RBC 0-2 (A) /hpf    Bacteria Negative None /hpf    Epithelial Cells Negative /hpf    Hyaline Cast 0-2 /lpf   Prothrombin Time    Collection Time: 07/01/20  5:50 AM   Result Value Ref Range    PT 27.5 (H) 12.0 - 14.6 sec    INR 2.47 (H) 0.87 - 1.13   ACCU-CHEK GLUCOSE    Collection Time: 07/01/20  7:17 AM   Result Value Ref Range    Glucose - Accu-Ck 125 (H) 65 - 99 mg/dL   ACCU-CHEK GLUCOSE    Collection Time: 07/01/20 10:57 AM   Result Value Ref Range    Glucose - Accu-Ck 322 (H) 65 - 99 mg/dL       Current Facility-Administered Medications   Medication Frequency   • insulin regular (HUMULIN R) injection 2-12 Units TID AC    And   • glucose 4 g chewable tablet 16 g Q15 MIN PRN    And   • dextrose 50% (D50W) injection 50 mL Q15 MIN PRN   • warfarin (COUMADIN) tablet 5 mg ONCE AT 1800   • QUEtiapine (SEROQUEL) tablet 50 mg DAILY   • insulin glargine (Lantus) injection QAM INSULIN   • metFORMIN (GLUCOPHAGE) tablet 500 mg BID WITH MEALS   • gabapentin (NEURONTIN) capsule 400 mg TID   • omeprazole (PRILOSEC) capsule 20 mg DAILY   • vitamin D  (cholecalciferol) tablet 1,000 Units DAILY   • Respiratory Therapy Consult Continuous RT   • Pharmacy Consult Request ...Pain Management Review 1 Each PHARMACY TO DOSE   • tramadol (ULTRAM) 50 MG tablet 50 mg Q4HRS PRN   • hydrALAZINE (APRESOLINE) tablet 25 mg Q8HRS PRN   • acetaminophen (TYLENOL) tablet 650 mg Q4HRS PRN   • senna-docusate (PERICOLACE or SENOKOT S) 8.6-50 MG per tablet 2 Tab BID    And   • polyethylene glycol/lytes (MIRALAX) PACKET 1 Packet QDAY PRN    And   • magnesium hydroxide (MILK OF MAGNESIA) suspension 30 mL QDAY PRN    And   • bisacodyl (DULCOLAX) suppository 10 mg QDAY PRN   • artificial tears ophthalmic solution 1 Drop PRN   • benzocaine-menthol (CEPACOL) lozenge 1 Lozenge Q2HRS PRN   • mag hydrox-al hydrox-simeth (MAALOX PLUS ES or MYLANTA DS) suspension 20 mL Q2HRS PRN   • ondansetron (ZOFRAN ODT) dispertab 4 mg 4X/DAY PRN    Or   • ondansetron (ZOFRAN) syringe/vial injection 4 mg 4X/DAY PRN   • traZODone (DESYREL) tablet 50 mg QHS PRN   • sodium chloride (OCEAN) 0.65 % nasal spray 2 Spray PRN   • atorvastatin (LIPITOR) tablet 80 mg Q EVENING   • citalopram (CELEXA) tablet 20 mg Q EVENING   • lisinopril (PRINIVIL) tablet 20 mg Q DAY   • QUEtiapine (SEROQUEL) tablet 50 mg Q EVENING   • MD Alert...Warfarin per Pharmacy PHARMACY TO DOSE       Orders Placed This Encounter   Procedures   • Diet Order Diabetic     Standing Status:   Standing     Number of Occurrences:   1     Order Specific Question:   Diet:     Answer:   Diabetic [3]       Assessment:  Active Hospital Problems    Diagnosis   • *Acute on Chronic Encephalopathy   • Hypertension   • Coronary artery disease involving coronary bypass graft   • History of mitral valve replacement with mechanical valve [Z95.2]   • Type II diabetes mellitus (HCC)   • TIA (transient ischemic attack)   • Thrombocytopenia (HCC)   • Vitamin D deficiency   • Depression   • Macrocytosis without anemia   • Azotemia   • Diabetic neuropathy (HCC)   •  Obstructive sleep apnea syndrome   • Shuffling gait   • Dementia (HCC)   • Hyperlipidemia   • Mild cognitive impairment   • Anxiety       Medical Decision Making and Plan:  Encephalopathy vs delirium vs TIA - Patient with aphasia and weakness with negative work-up for stroke but with hypertensive emergency on admission concerning for hypertensive encephalopathy. Patient started on statin for concern for CVA vs TIA.      DM - Patient on Lantus 15U QPM and SSI. Will continue to monitor   Blood sugars into 300s, consult hospitalist. Lantus at 12 U  -Hospitalist to consult     Dementia - Patient on Seroquel 50 mg QPM for agitation and sleep. MRI consistent with probable vascular dementia.   -Sundowning earlier in the evening. Start Seroquel at 1800 as well as 2100. Ongoing hallucinations     Mood/Neuropathy - Patient on Celexa 20 mg QPM. Patient with burning into bilateral feet. Trial of Gabapentin 300 mg TID -> increase to 400 mg TID     HTN - Patient on Lisinopril 20 mg on transfer. Previously came in with SBP > 180s  -Hospitalist to consult    Bradycardia - Patient into low 40s, check EKG. Only change was Seroquel will check QT    Elevated BUN - encouraged fluid intake. Will monitor     HLD - Patient on Atorvastatin 80 mg QPM.      GI Ppx - Patient on Prilosec on transfer.     Vitamin D Deficiency -  27 on admission. Started on 1000 U     DVT ppx - Patient on Coumadin     Total time:  26 minutes.  I spent greater than 50% of the time for patient care, counseling, and coordination on this date, including unit/floor time, and face-to-face time with the patient as per interval events and assessment and plan above. Topics discussed included new bradycardia, fatigue, check EKG, and monitor QTc    Wood Rodriguez M.D.

## 2020-07-01 NOTE — PROGRESS NOTES
Hospital Medicine Daily Progress Note      Chief Complaint:  Hypertension  Diabetes    Interval History:  No significant events or changes since last visit    Review of Systems  Review of Systems   Constitutional: Negative for fever.   Eyes: Negative for blurred vision.   Respiratory: Negative for shortness of breath.    Cardiovascular: Negative for palpitations.   Gastrointestinal: Negative for nausea and vomiting.   Neurological: Negative for dizziness and headaches.   Psychiatric/Behavioral: Negative for hallucinations.        Physical Exam  Temp:  [36.3 °C (97.4 °F)-36.7 °C (98 °F)] 36.3 °C (97.4 °F)  Pulse:  [50-60] 50  Resp:  [18] 18  BP: (116-145)/(59-74) 116/59  SpO2:  [94 %] 94 %    Physical Exam  Vitals signs and nursing note reviewed.   Constitutional:       General: He is not in acute distress.  HENT:      Mouth/Throat:      Mouth: Mucous membranes are moist.      Pharynx: Oropharynx is clear.   Eyes:      General: No scleral icterus.  Neck:      Musculoskeletal: No neck rigidity.   Cardiovascular:      Rate and Rhythm: Normal rate and regular rhythm.      Heart sounds: No murmur.   Pulmonary:      Effort: Pulmonary effort is normal.      Breath sounds: Normal breath sounds. No stridor.   Abdominal:      General: There is no distension.      Palpations: Abdomen is soft.      Tenderness: There is no abdominal tenderness.   Musculoskeletal:      Right lower leg: No edema.      Left lower leg: No edema.   Skin:     General: Skin is warm and dry.      Findings: No rash.   Neurological:      Mental Status: He is alert and oriented to person, place, and time.   Psychiatric:         Mood and Affect: Mood normal.         Behavior: Behavior normal.         Fluids    Intake/Output Summary (Last 24 hours) at 7/1/2020 0823  Last data filed at 6/30/2020 2130  Gross per 24 hour   Intake 720 ml   Output 500 ml   Net 220 ml       Laboratory          Recent Labs     06/29/20  0535 06/30/20  0552 07/01/20  0550   INR  "1.88* 1.78* 2.47*               Assessment/Plan  Hypertension- (present on admission)  Assessment & Plan  BP ok  On Lisinopril: 20 mg daily  Monitor    History of mitral valve replacement with mechanical valve [Z95.2]- (present on admission)  Assessment & Plan  On Coumadin    Type II diabetes mellitus (HCC)- (present on admission)  Assessment & Plan  Hba1c: 6.7 (6/26)  BS tend to drop lower in am  On Lantus: 16 units qam --> 18 units qam (6/29) --> 12 units qam (7/1)  On Metformin 500 mg bid (7/1)  On accuchecks without night time SS coverage (6/30)  Note: home meds include Lantus 15 units and Metformin 1000 mg bid  Cont to monitor    Depression  Assessment & Plan  On Celexa    Vitamin D deficiency  Assessment & Plan  Vit D: 27  On supplements    TIA (transient ischemic attack)  Assessment & Plan  Pt with dysarthria, now resolving  Neuroimaging negative acute  On Coumadin  On Lipitor    Mild cognitive impairment- (present on admission)  Assessment & Plan  Pt reports \"forgetfulness\" that's been ongoing for the past 2 yrs    Anxiety- (present on admission)  Assessment & Plan  On Celexa and Seroquel    "

## 2020-07-01 NOTE — THERAPY
"Occupational Therapy  Daily Treatment     Patient Name: Carlos Rivera  Age:  74 y.o., Sex:  male  Medical Record #: [id number]  Today's Date: 7/1/2020     Precautions  Precautions: (P) Fall Risk  Comments: (P) Baseline Dementia     Safety   ADL Safety : Requires Supervision for Safety, Requires Cueing for Safety, Requires Physical Assist for Safety  Bathroom Safety: Requires Supervision for Safety, Requires Cuing for Safety, Requires Physical Assist for Safety    Subjective    \"  It's like what I see is flicking like a movie.\"     Objective       07/01/20 0701   Precautions   Precautions Fall Risk   Comments Baseline Dementia    Functional Level of Assist   Eating Modified Independent   Grooming Supervision  (oral care seated )   Lower Body Dressing Minimal Assist  ( CGA  to stand and pull up pats  socks  setup)   Lower Body Dressing Description   (shower  required therapist to tie laces  )   Toileting Stand by Assist  (close  SBA  standing to urinate )   Toileting Description Grab bar;Increased time   Bed, Chair, Wheelchair Transfer Minimal Assist  ( bed to w/c)   Toilet Transfers Stand by Assist  (close  SBA   w/c to standing at toilet )   Toilet Transfer Description Grab bar   Interdisciplinary Plan of Care Collaboration   Patient Position at End of Therapy Seated;Chair Alarm On;Self Releasing Lap Belt Applied   OT Total Time Spent   OT Individual Total Time Spent (Mins) 60   OT Charge Group   OT Self Care / ADL 4       Assessment     Slow to arouse and awaken to actively engage in activity this AM     Demonstrated  Fair static standing balance  Standing to urinate  While using the grab bar as needed for steady assist     Strengths: Motivated for self care and independence, Willingly participates in therapeutic activities, Pleasant and cooperative  Barriers: Generalized weakness, Decreased endurance, Dementia, Difficulty following instructions, Impaired balance, Impaired carryover of learning, Impaired " insight/denial of deficits, Impaired functional cognition, Limited mobility    Plan    Attention/sequencing during ADLs, standing balance (posterior lean), functional household mobility with focus on increasing step length, safety awareness during mobility and standing tasks    Occupational Therapy Goals     Problem: Bathing     Dates: Start: 06/26/20       Goal: STG-Within one week, patient will bathe     Dates: Start: 06/26/20       Description: 1) Individualized Goal:  with supervision, min verbal cues for safety strategies  2) Interventions:  OT Self Care/ADL, OT Cognitive Skill Dev, OT Neuro Re-Ed/Balance, OT Sensory Int Techniques, OT Therapeutic Activity, OT Evaluation, and OT Therapeutic Exercise                  Problem: Dressing     Dates: Start: 06/26/20       Goal: STG-Within one week, patient will dress LB     Dates: Start: 06/26/20       Description: 1) Individualized Goal:  with supervision, min verbal cues for safety strategies  2) Interventions:  OT Self Care/ADL, OT Cognitive Skill Dev, OT Neuro Re-Ed/Balance, OT Sensory Int Techniques, OT Therapeutic Activity, OT Evaluation, and OT Therapeutic Exercise                  Problem: Functional Transfers     Dates: Start: 06/26/20       Goal: STG-Within one week, patient will transfer to toilet     Dates: Start: 06/26/20       Description: 1) Individualized Goal:  with supervision using least restrictive adaptive device  2) Interventions:  OT Self Care/ADL, OT Cognitive Skill Dev, OT Neuro Re-Ed/Balance, OT Sensory Int Techniques, OT Therapeutic Activity, OT Evaluation, and OT Therapeutic Exercise                  Problem: OT Long Term Goals     Dates: Start: 06/26/20       Goal: LTG-By discharge, patient will complete basic self care tasks     Dates: Start: 06/26/20       Description: 1) Individualized Goal:  with supervision to  modified independence  2) Interventions:  OT Self Care/ADL, OT Cognitive Skill Dev, OT Neuro Re-Ed/Balance, OT Sensory Int  Techniques, OT Therapeutic Activity, OT Evaluation, and OT Therapeutic Exercise            Goal: LTG-By discharge, patient will perform bathroom transfers     Dates: Start: 06/26/20       Description: 1) Individualized Goal:  with supervision to  modified independence  2) Interventions:  OT Self Care/ADL, OT Cognitive Skill Dev, OT Neuro Re-Ed/Balance, OT Sensory Int Techniques, OT Therapeutic Activity, OT Evaluation, and OT Therapeutic Exercise                  Problem: Toileting     Dates: Start: 06/26/20       Goal: STG-Within one week, patient will complete toileting tasks     Dates: Start: 06/26/20       Description: 1) Individualized Goal:  with supervision, min verbal cues for safety strategies  2) Interventions:  OT Self Care/ADL, OT Cognitive Skill Dev, OT Neuro Re-Ed/Balance, OT Sensory Int Techniques, OT Therapeutic Activity, OT Evaluation, and OT Therapeutic Exercise

## 2020-07-01 NOTE — CARE PLAN
Problem: Safety  Goal: Will remain free from falls  Intervention: Implement fall precautions  Note: Pt is impulsive.Use of call light reinforced to make needs known.Fall precautions and frequent rounding in place for safety.Call light within reach.Will continue to monitor and assess needs and safety.     Problem: Pain Management  Goal: Pain level will decrease to patient's comfort goal  Intervention: Follow pain managment plan developed in collaboration with patient and Interdisciplinary Team  Note: Pain is manageable with scheduled medication.Repositioned with pillows for comfort.Will continue to monitor and assess pain level and medicate as needed.

## 2020-07-02 LAB
ALBUMIN SERPL BCP-MCNC: 3.8 G/DL (ref 3.2–4.9)
ALBUMIN/GLOB SERPL: 1.8 G/DL
ALP SERPL-CCNC: 96 U/L (ref 30–99)
ALT SERPL-CCNC: 40 U/L (ref 2–50)
ANION GAP SERPL CALC-SCNC: 12 MMOL/L (ref 7–16)
AST SERPL-CCNC: 23 U/L (ref 12–45)
BASOPHILS # BLD AUTO: 0.8 % (ref 0–1.8)
BASOPHILS # BLD: 0.07 K/UL (ref 0–0.12)
BILIRUB SERPL-MCNC: 0.2 MG/DL (ref 0.1–1.5)
BUN SERPL-MCNC: 27 MG/DL (ref 8–22)
CALCIUM SERPL-MCNC: 9 MG/DL (ref 8.5–10.5)
CHLORIDE SERPL-SCNC: 105 MMOL/L (ref 96–112)
CO2 SERPL-SCNC: 22 MMOL/L (ref 20–33)
CREAT SERPL-MCNC: 1.04 MG/DL (ref 0.5–1.4)
EOSINOPHIL # BLD AUTO: 0.53 K/UL (ref 0–0.51)
EOSINOPHIL NFR BLD: 6.2 % (ref 0–6.9)
ERYTHROCYTE [DISTWIDTH] IN BLOOD BY AUTOMATED COUNT: 47.2 FL (ref 35.9–50)
GLOBULIN SER CALC-MCNC: 2.1 G/DL (ref 1.9–3.5)
GLUCOSE BLD-MCNC: 106 MG/DL (ref 65–99)
GLUCOSE BLD-MCNC: 180 MG/DL (ref 65–99)
GLUCOSE BLD-MCNC: 187 MG/DL (ref 65–99)
GLUCOSE BLD-MCNC: 199 MG/DL (ref 65–99)
GLUCOSE SERPL-MCNC: 132 MG/DL (ref 65–99)
HCT VFR BLD AUTO: 46 % (ref 42–52)
HGB BLD-MCNC: 15.4 G/DL (ref 14–18)
IMM GRANULOCYTES # BLD AUTO: 0.03 K/UL (ref 0–0.11)
IMM GRANULOCYTES NFR BLD AUTO: 0.4 % (ref 0–0.9)
INR PPP: 3.3 (ref 0.87–1.13)
LYMPHOCYTES # BLD AUTO: 1.28 K/UL (ref 1–4.8)
LYMPHOCYTES NFR BLD: 15 % (ref 22–41)
MAGNESIUM SERPL-MCNC: 1.9 MG/DL (ref 1.5–2.5)
MCH RBC QN AUTO: 34.1 PG (ref 27–33)
MCHC RBC AUTO-ENTMCNC: 33.5 G/DL (ref 33.7–35.3)
MCV RBC AUTO: 101.8 FL (ref 81.4–97.8)
MONOCYTES # BLD AUTO: 0.76 K/UL (ref 0–0.85)
MONOCYTES NFR BLD AUTO: 8.9 % (ref 0–13.4)
NEUTROPHILS # BLD AUTO: 5.87 K/UL (ref 1.82–7.42)
NEUTROPHILS NFR BLD: 68.7 % (ref 44–72)
NRBC # BLD AUTO: 0 K/UL
NRBC BLD-RTO: 0 /100 WBC
NT-PROBNP SERPL IA-MCNC: 359 PG/ML (ref 0–125)
PHOSPHATE SERPL-MCNC: 3 MG/DL (ref 2.5–4.5)
PLATELET # BLD AUTO: 153 K/UL (ref 164–446)
PMV BLD AUTO: 11.7 FL (ref 9–12.9)
POTASSIUM SERPL-SCNC: 4.4 MMOL/L (ref 3.6–5.5)
PROT SERPL-MCNC: 5.9 G/DL (ref 6–8.2)
PROTHROMBIN TIME: 34.6 SEC (ref 12–14.6)
RBC # BLD AUTO: 4.52 M/UL (ref 4.7–6.1)
SODIUM SERPL-SCNC: 139 MMOL/L (ref 135–145)
TROPONIN T SERPL-MCNC: 164 NG/L (ref 6–19)
WBC # BLD AUTO: 8.5 K/UL (ref 4.8–10.8)

## 2020-07-02 PROCEDURE — 84484 ASSAY OF TROPONIN QUANT: CPT

## 2020-07-02 PROCEDURE — 85025 COMPLETE CBC W/AUTO DIFF WBC: CPT

## 2020-07-02 PROCEDURE — A9270 NON-COVERED ITEM OR SERVICE: HCPCS | Performed by: HOSPITALIST

## 2020-07-02 PROCEDURE — 99233 SBSQ HOSP IP/OBS HIGH 50: CPT | Performed by: PHYSICAL MEDICINE & REHABILITATION

## 2020-07-02 PROCEDURE — 83735 ASSAY OF MAGNESIUM: CPT

## 2020-07-02 PROCEDURE — 700102 HCHG RX REV CODE 250 W/ 637 OVERRIDE(OP): Performed by: HOSPITALIST

## 2020-07-02 PROCEDURE — 36415 COLL VENOUS BLD VENIPUNCTURE: CPT

## 2020-07-02 PROCEDURE — 97112 NEUROMUSCULAR REEDUCATION: CPT

## 2020-07-02 PROCEDURE — 97129 THER IVNTJ 1ST 15 MIN: CPT

## 2020-07-02 PROCEDURE — 84100 ASSAY OF PHOSPHORUS: CPT

## 2020-07-02 PROCEDURE — 85610 PROTHROMBIN TIME: CPT

## 2020-07-02 PROCEDURE — 97535 SELF CARE MNGMENT TRAINING: CPT

## 2020-07-02 PROCEDURE — 99232 SBSQ HOSP IP/OBS MODERATE 35: CPT | Performed by: HOSPITALIST

## 2020-07-02 PROCEDURE — 80053 COMPREHEN METABOLIC PANEL: CPT

## 2020-07-02 PROCEDURE — 97130 THER IVNTJ EA ADDL 15 MIN: CPT

## 2020-07-02 PROCEDURE — 700102 HCHG RX REV CODE 250 W/ 637 OVERRIDE(OP): Performed by: PHYSICAL MEDICINE & REHABILITATION

## 2020-07-02 PROCEDURE — 97116 GAIT TRAINING THERAPY: CPT

## 2020-07-02 PROCEDURE — 82962 GLUCOSE BLOOD TEST: CPT | Mod: 91

## 2020-07-02 PROCEDURE — A9270 NON-COVERED ITEM OR SERVICE: HCPCS | Performed by: PHYSICAL MEDICINE & REHABILITATION

## 2020-07-02 PROCEDURE — 770010 HCHG ROOM/CARE - REHAB SEMI PRIVAT*

## 2020-07-02 PROCEDURE — 83880 ASSAY OF NATRIURETIC PEPTIDE: CPT

## 2020-07-02 RX ORDER — WARFARIN SODIUM 4 MG/1
4 TABLET ORAL
Status: COMPLETED | OUTPATIENT
Start: 2020-07-02 | End: 2020-07-02

## 2020-07-02 RX ADMIN — TRAZODONE HYDROCHLORIDE 50 MG: 50 TABLET ORAL at 22:26

## 2020-07-02 RX ADMIN — INSULIN GLARGINE 12 UNITS: 100 INJECTION, SOLUTION SUBCUTANEOUS at 08:01

## 2020-07-02 RX ADMIN — QUETIAPINE 50 MG: 25 TABLET ORAL at 17:39

## 2020-07-02 RX ADMIN — ATORVASTATIN CALCIUM 80 MG: 40 TABLET, FILM COATED ORAL at 21:17

## 2020-07-02 RX ADMIN — QUETIAPINE 50 MG: 25 TABLET ORAL at 21:17

## 2020-07-02 RX ADMIN — CITALOPRAM HYDROBROMIDE 20 MG: 20 TABLET ORAL at 21:17

## 2020-07-02 RX ADMIN — METFORMIN HYDROCHLORIDE 500 MG: 500 TABLET ORAL at 17:40

## 2020-07-02 RX ADMIN — DOCUSATE SODIUM 50 MG AND SENNOSIDES 8.6 MG 2 TABLET: 8.6; 5 TABLET, FILM COATED ORAL at 21:16

## 2020-07-02 RX ADMIN — OMEPRAZOLE 20 MG: 20 CAPSULE, DELAYED RELEASE ORAL at 08:36

## 2020-07-02 RX ADMIN — LISINOPRIL 20 MG: 20 TABLET ORAL at 05:34

## 2020-07-02 RX ADMIN — WARFARIN SODIUM 4 MG: 4 TABLET ORAL at 17:40

## 2020-07-02 RX ADMIN — METFORMIN HYDROCHLORIDE 500 MG: 500 TABLET ORAL at 08:36

## 2020-07-02 RX ADMIN — MELATONIN 1000 UNITS: at 08:36

## 2020-07-02 ASSESSMENT — ACTIVITIES OF DAILY LIVING (ADL)
TOILET_TRANSFER_DESCRIPTION: INCREASED TIME;GRAB BAR;VERBAL CUEING;SUPERVISION FOR SAFETY
TOILETING_LEVEL_OF_ASSIST_DESCRIPTION: GRAB BAR;INCREASED TIME;SUPERVISION FOR SAFETY;VERBAL CUEING

## 2020-07-02 ASSESSMENT — GAIT ASSESSMENTS
GAIT LEVEL OF ASSIST: MINIMAL ASSIST
DISTANCE (FEET): 100
GAIT LEVEL OF ASSIST: CONTACT GUARD ASSIST
DISTANCE (FEET): 100
ASSISTIVE DEVICE: FRONT WHEEL WALKER
DEVIATION: SHUFFLED GAIT
ASSISTIVE DEVICE: NONE;FRONT WHEEL WALKER

## 2020-07-02 ASSESSMENT — ENCOUNTER SYMPTOMS
BLURRED VISION: 0
NERVOUS/ANXIOUS: 0
COUGH: 0
DIZZINESS: 0
DIARRHEA: 0
FEVER: 0

## 2020-07-02 NOTE — PROGRESS NOTES
Pharmacy Warfarin Consult   7/2/2020     74 y.o.   male     Indication for anticoagulation: Mechanical Mitral Valve Replacement     Goal INR = 2.5 to 3.5    Recent Labs     06/30/20  0552 07/01/20  0550 07/02/20  0539   INR 1.78* 2.47* 3.30*   HEMOGLOBIN  --   --  15.4   HEMATOCRIT  --   --  46.0       Pertinent Drug/Drug Interactions:  Statin, SSRI, Quetiapine  Outpatient Warfarin Regimen:  Warfarin 5mg daily  Recent Warfarin Dosing:    Dose from last 7 days     Date/Time Dose (mg)    07/02/20 0539  4    07/01/20 0550  5    06/30/20 0552  7.5    06/29/20 0535  7.5    06/28/20 1341  3    06/27/20 1400  1    06/26/20 0637  2.5    06/25/20 1732  7.5          Bridge Therapy: No        1.  Coumadin 4mg tonight for INR = 3.3        [facility]

## 2020-07-02 NOTE — THERAPY
Physical Therapy   Daily Treatment     Patient Name: Carlos Rivera  Age:  74 y.o., Sex:  male  Medical Record #: [id number]  Today's Date: 7/2/2020     Precautions  Precautions: (P) Fall Risk  Comments: (P) Baseline Dementia    Subjective    Patient in w/c with wife and CM present, agreeable to therapy.     Objective       07/02/20 1301   Precautions   Precautions Fall Risk   Comments Baseline Dementia   Gait Functional Level of Assist    Gait Level Of Assist Minimal Assist   Assistive Device None;Front Wheel Walker   Distance (Feet) 100   # of Times Distance was Traveled 2   Transfer Functional Level of Assist   Bed, Chair, Wheelchair Transfer Minimal Assist  (to CGA)   Bed Chair Wheelchair Transfer Description Verbal cueing;Increased time;Requires lift;Adaptive equipment  (retropulses, FWW stand step transfer)   Bed Mobility    Sit to Stand Minimal Assist  (to CGA)   Neuro-Muscular Treatments   Comments Anterior stepping 1x5 each leg, lateral stepping 1x5 each leg with CGA-Min A due to posterior LOBs   Interdisciplinary Plan of Care Collaboration   IDT Collaboration with  Family / Caregiver   Patient Position at End of Therapy Seated;Chair Alarm On;Self Releasing Lap Belt Applied;Call Light within Reach;Tray Table within Reach;Phone within Reach;Family / Friend in Room   Collaboration Comments re: CLOF   PT Total Time Spent   PT Individual Total Time Spent (Mins) 30   PT Charge Group   PT Gait Training 1   PT Neuromuscular Re-Education / Balance 1       Assessment    Patient tolerated session fairly, addressing larger equal step lengths when ambulating and dynamic balance. Patient with poor participation with standing balance activities, significantly retropulsing and losing balance backwards; delayed and ineffective stepping responses with LOBs in all directions requiring additional assist.    Strengths: Willingly participates in therapeutic activities  Barriers: Impaired activity tolerance, Impaired  insight/denial of deficits, Impaired carryover of learning, Impaired balance(L LE foot pain reported)    Plan    Gait training with FWW vs no AD or walking sticks, treadmill training, postural re-ed, trial dual tasking with functional mobility, single leg balance activities, bed mobility/ transfer training, safety awareness, review standing therex program for strength    Physical Therapy Problems     Problem: Mobility     Dates: Start: 06/26/20       Goal: STG-Within one week, patient will ambulate household distance     Dates: Start: 06/26/20       Description: 1) Individualized goal:  150 ft with LRAD and CGA-SBA.  2) Interventions:  PT Group Therapy, PT Gait Training, PT Therapeutic Exercises, PT Neuro Re-Ed/Balance, PT Aquatic Therapy, PT Therapeutic Activity, and PT Evaluation      Note:     Goal Note filed on 06/30/20 1016 by JANETT Ying                  Goal: STG-Within one week, patient will ambulate up/down a curb     Dates: Start: 06/26/20       Description: 1) Individualized goal:  150 ft with LRAD and CGA-SBA.  2) Interventions:  PT Group Therapy, PT Gait Training, PT Therapeutic Exercises, PT Neuro Re-Ed/Balance, PT Aquatic Therapy, PT Therapeutic Activity, and PT Evaluation    Note:     Goal Note filed on 06/30/20 1016 by Jose Conley PT    NT dt focus on other goals                  Goal: STG-Within one week, patient will ascend and descend four to six stairs     Dates: Start: 06/26/20       Description: 1) Individualized goal:  with B rails and CGA.  2) Interventions:  PT Group Therapy, PT Gait Training, PT Therapeutic Exercises, PT Neuro Re-Ed/Balance, PT Aquatic Therapy, PT Therapeutic Activity, and PT Evaluation        Note:     Goal Note filed on 06/30/20 1016 by Jose Conley PT    NT dt safety concerns                         Problem: Mobility Transfers     Dates: Start: 06/26/20       Goal: STG-Within one week, patient will perform bed mobility     Dates:  Start: 06/26/20       Description: 1) Individualized goal:  with supervision.  2) Interventions:  PT Group Therapy, PT Gait Training, PT Therapeutic Exercises, PT Neuro Re-Ed/Balance, PT Aquatic Therapy, PT Therapeutic Activity, and PT Evaluation        Note:     Goal Note filed on 06/30/20 1016 by Jose Conley PT    SPV - min A                  Goal: STG-Within one week, patient will transfer bed to chair     Dates: Start: 06/26/20       Description: 1) Individualized goal:  with LRAD and CGA-SBA.  2) Interventions:  PT Group Therapy, PT Gait Training, PT Therapeutic Exercises, PT Neuro Re-Ed/Balance, PT Aquatic Therapy, PT Therapeutic Activity, and PT Evaluation      Note:     Goal Note filed on 06/30/20 1016 by JANETT Ying                        Problem: PT-Long Term Goals     Dates: Start: 06/26/20       Goal: LTG-By discharge, patient will ambulate     Dates: Start: 06/26/20       Description: 1) Individualized goal:  >150 ft with LRAD and supervision.  2) Interventions:  PT Group Therapy, PT Gait Training, PT Therapeutic Exercises, PT Neuro Re-Ed/Balance, PT Aquatic Therapy, PT Therapeutic Activity, and PT Evaluation              Goal: LTG-By discharge, patient will transfer one surface to another     Dates: Start: 06/26/20       Description: 1) Individualized goal: with LRAD and supervision.  2) Interventions:  PT Group Therapy, PT Gait Training, PT Therapeutic Exercises, PT Neuro Re-Ed/Balance, PT Aquatic Therapy, PT Therapeutic Activity, and PT Evaluation            Goal: LTG-By discharge, patient will ambulate up/down 4-6 stairs     Dates: Start: 06/26/20       Description: 1) Individualized goal: with B rails and supervision.  2) Interventions:  PT Group Therapy, PT Gait Training, PT Therapeutic Exercises, PT Neuro Re-Ed/Balance, PT Aquatic Therapy, PT Therapeutic Activity, and PT Evaluation          Goal: LTG-By discharge, patient will transfer in/out of a car     Dates:  Start: 06/26/20       Description: 1) Individualized goal: with LRAD and supervision.  2) Interventions:  PT Group Therapy, PT Gait Training, PT Therapeutic Exercises, PT Neuro Re-Ed/Balance, PT Aquatic Therapy, PT Therapeutic Activity, and PT Evaluation          Goal: LTG-By discharge, patient will     Dates: Start: 06/26/20       Description: 1) Individualized goal: negotiate single curb with LRAD and supervision-SBA.  2) Interventions:  PT Group Therapy, PT Gait Training, PT Therapeutic Exercises, PT Neuro Re-Ed/Balance, PT Aquatic Therapy, PT Therapeutic Activity, and PT Evaluation          Goal: LTG-By discharge, patient will     Dates: Start: 06/26/20       Description: 1) Individualized goal: perform bed mobility mod I.  2) Interventions:  PT Group Therapy, PT Gait Training, PT Therapeutic Exercises, PT Neuro Re-Ed/Balance, PT Aquatic Therapy, PT Therapeutic Activity, and PT Evaluation

## 2020-07-02 NOTE — PROGRESS NOTES
"Rehab Progress Note     Encounter Date: 7/2/2020    CC: Decreased memory, poor mobility    Interval Events (Subjective)  Patient sitting up in room. Discontinued Gabapentin as wife concerned about confusion. She reports the confusion has been ongoing for months and she is frustrated. She reports the patient takes out his anger on her due to feeling like she keeps \"dumping him in homes.\" She understands that both SNF and IRF are not \"dumps\" and he is making slow progress. Denies NVD.     IDT Team Meeting 6/30/2020  DC/Disposition:  7/9/20    Objective:  VITAL SIGNS: /70   Pulse (!) 55   Temp 36.2 °C (97.2 °F) (Temporal)   Resp 18   Ht 1.753 m (5' 9\")   Wt 58.5 kg (129 lb)   SpO2 97%   BMI 19.05 kg/m²   Gen: NAD  Psych: Mood and affect appropriate  CV: RRR, no edema  Resp: CTAB, no upper airway sounds  Abd: NTND  Neuro: AOx3, following simple commands  Unchanged from 7/1/20    Recent Results (from the past 72 hour(s))   ACCU-CHEK GLUCOSE    Collection Time: 06/29/20  5:43 PM   Result Value Ref Range    Glucose - Accu-Ck 244 (H) 65 - 99 mg/dL   ACCU-CHEK GLUCOSE    Collection Time: 06/29/20  7:54 PM   Result Value Ref Range    Glucose - Accu-Ck 317 (H) 65 - 99 mg/dL   Prothrombin Time    Collection Time: 06/30/20  5:52 AM   Result Value Ref Range    PT 21.3 (H) 12.0 - 14.6 sec    INR 1.78 (H) 0.87 - 1.13   ACCU-CHEK GLUCOSE    Collection Time: 06/30/20  7:36 AM   Result Value Ref Range    Glucose - Accu-Ck 119 (H) 65 - 99 mg/dL   ACCU-CHEK GLUCOSE    Collection Time: 06/30/20 11:37 AM   Result Value Ref Range    Glucose - Accu-Ck 250 (H) 65 - 99 mg/dL   ACCU-CHEK GLUCOSE    Collection Time: 06/30/20  5:18 PM   Result Value Ref Range    Glucose - Accu-Ck 200 (H) 65 - 99 mg/dL   URINALYSIS    Collection Time: 06/30/20  9:30 PM    Specimen: Urine, Clean Catch   Result Value Ref Range    Color Yellow     Character Clear     Specific Gravity 1.011 <1.035    Ph 5.5 5.0 - 8.0    Glucose 500 (A) Negative mg/dL    " Ketones Negative Negative mg/dL    Protein 100 (A) Negative mg/dL    Bilirubin Negative Negative    Urobilinogen, Urine 0.2 Negative    Nitrite Negative Negative    Leukocyte Esterase Negative Negative    Occult Blood Negative Negative    Micro Urine Req Microscopic    URINE MICROSCOPIC (W/UA)    Collection Time: 20  9:30 PM   Result Value Ref Range    WBC 0-2 (A) /hpf    RBC 0-2 (A) /hpf    Bacteria Negative None /hpf    Epithelial Cells Negative /hpf    Hyaline Cast 0-2 /lpf   Prothrombin Time    Collection Time: 20  5:50 AM   Result Value Ref Range    PT 27.5 (H) 12.0 - 14.6 sec    INR 2.47 (H) 0.87 - 1.13   ACCU-CHEK GLUCOSE    Collection Time: 20  7:17 AM   Result Value Ref Range    Glucose - Accu-Ck 125 (H) 65 - 99 mg/dL   ACCU-CHEK GLUCOSE    Collection Time: 20 10:57 AM   Result Value Ref Range    Glucose - Accu-Ck 322 (H) 65 - 99 mg/dL   EKG    Collection Time: 20  2:51 PM   Result Value Ref Range    Report       [facility]    Test Date:  2020  Pt Name:    LESLEY ARROYO             Department: [facility]  MRN:        [id number]                      Room:       [facility]  Gender:     Male                         Technician: ELI  :        [id number]                   Requested By:HORACIO VEGA  Order #:    [id number]                    Reading MD: Arnulfo Manzano MD    Measurements  Intervals                                Axis  Rate:       44                           P:          65  IA:         188                          QRS:        -68  QRSD:       110                          T:          -6  QT:         516  QTc:        442    Interpretive Statements  SINUS BRADYCARDIA  LEFT ANTERIOR FASCICULAR BLOCK  - consider INFERIOR INFARCT and anterior MI  Electronically Signed On 2020 17:31:53 PDT by Arnulfo Manzano MD     ACCU-CHEK GLUCOSE    Collection Time: 20  5:17 PM   Result Value Ref Range    Glucose - Accu-Ck 107 (H) 65 - 99 mg/dL   TROPONIN     Collection Time: 07/01/20  6:25 PM   Result Value Ref Range    Troponin T 147 (H) 6 - 19 ng/L   Prothrombin Time    Collection Time: 07/02/20  5:39 AM   Result Value Ref Range    PT 34.6 (H) 12.0 - 14.6 sec    INR 3.30 (H) 0.87 - 1.13   Comp Metabolic Panel    Collection Time: 07/02/20  5:39 AM   Result Value Ref Range    Sodium 139 135 - 145 mmol/L    Potassium 4.4 3.6 - 5.5 mmol/L    Chloride 105 96 - 112 mmol/L    Co2 22 20 - 33 mmol/L    Anion Gap 12.0 7.0 - 16.0    Glucose 132 (H) 65 - 99 mg/dL    Bun 27 (H) 8 - 22 mg/dL    Creatinine 1.04 0.50 - 1.40 mg/dL    Calcium 9.0 8.5 - 10.5 mg/dL    AST(SGOT) 23 12 - 45 U/L    ALT(SGPT) 40 2 - 50 U/L    Alkaline Phosphatase 96 30 - 99 U/L    Total Bilirubin 0.2 0.1 - 1.5 mg/dL    Albumin 3.8 3.2 - 4.9 g/dL    Total Protein 5.9 (L) 6.0 - 8.2 g/dL    Globulin 2.1 1.9 - 3.5 g/dL    A-G Ratio 1.8 g/dL   MAGNESIUM    Collection Time: 07/02/20  5:39 AM   Result Value Ref Range    Magnesium 1.9 1.5 - 2.5 mg/dL   PHOSPHORUS    Collection Time: 07/02/20  5:39 AM   Result Value Ref Range    Phosphorus 3.0 2.5 - 4.5 mg/dL   CBC WITH DIFFERENTIAL    Collection Time: 07/02/20  5:39 AM   Result Value Ref Range    WBC 8.5 4.8 - 10.8 K/uL    RBC 4.52 (L) 4.70 - 6.10 M/uL    Hemoglobin 15.4 14.0 - 18.0 g/dL    Hematocrit 46.0 42.0 - 52.0 %    .8 (H) 81.4 - 97.8 fL    MCH 34.1 (H) 27.0 - 33.0 pg    MCHC 33.5 (L) 33.7 - 35.3 g/dL    RDW 47.2 35.9 - 50.0 fL    Platelet Count 153 (L) 164 - 446 K/uL    MPV 11.7 9.0 - 12.9 fL    Neutrophils-Polys 68.70 44.00 - 72.00 %    Lymphocytes 15.00 (L) 22.00 - 41.00 %    Monocytes 8.90 0.00 - 13.40 %    Eosinophils 6.20 0.00 - 6.90 %    Basophils 0.80 0.00 - 1.80 %    Immature Granulocytes 0.40 0.00 - 0.90 %    Nucleated RBC 0.00 /100 WBC    Neutrophils (Absolute) 5.87 1.82 - 7.42 K/uL    Lymphs (Absolute) 1.28 1.00 - 4.80 K/uL    Monos (Absolute) 0.76 0.00 - 0.85 K/uL    Eos (Absolute) 0.53 (H) 0.00 - 0.51 K/uL    Baso (Absolute) 0.07 0.00 -  0.12 K/uL    Immature Granulocytes (abs) 0.03 0.00 - 0.11 K/uL    NRBC (Absolute) 0.00 K/uL   proBrain Natriuretic Peptide, NT    Collection Time: 07/02/20  5:39 AM   Result Value Ref Range    NT-proBNP 359 (H) 0 - 125 pg/mL   TROPONIN    Collection Time: 07/02/20  5:39 AM   Result Value Ref Range    Troponin T 164 (H) 6 - 19 ng/L   ESTIMATED GFR    Collection Time: 07/02/20  5:39 AM   Result Value Ref Range    GFR If African American >60 >60 mL/min/1.73 m 2    GFR If Non African American >60 >60 mL/min/1.73 m 2   ACCU-CHEK GLUCOSE    Collection Time: 07/02/20  7:59 AM   Result Value Ref Range    Glucose - Accu-Ck 106 (H) 65 - 99 mg/dL   ACCU-CHEK GLUCOSE    Collection Time: 07/02/20 11:39 AM   Result Value Ref Range    Glucose - Accu-Ck 180 (H) 65 - 99 mg/dL       Current Facility-Administered Medications   Medication Frequency   • warfarin (COUMADIN) tablet 4 mg ONCE AT 1800   • insulin regular (HUMULIN R) injection 2-12 Units TID AC    And   • glucose 4 g chewable tablet 16 g Q15 MIN PRN    And   • dextrose 50% (D50W) injection 50 mL Q15 MIN PRN   • QUEtiapine (SEROQUEL) tablet 50 mg DAILY   • metFORMIN (GLUCOPHAGE) tablet 500 mg BID WITH MEALS   • omeprazole (PRILOSEC) capsule 20 mg DAILY   • vitamin D (cholecalciferol) tablet 1,000 Units DAILY   • Respiratory Therapy Consult Continuous RT   • Pharmacy Consult Request ...Pain Management Review 1 Each PHARMACY TO DOSE   • tramadol (ULTRAM) 50 MG tablet 50 mg Q4HRS PRN   • hydrALAZINE (APRESOLINE) tablet 25 mg Q8HRS PRN   • acetaminophen (TYLENOL) tablet 650 mg Q4HRS PRN   • senna-docusate (PERICOLACE or SENOKOT S) 8.6-50 MG per tablet 2 Tab BID    And   • polyethylene glycol/lytes (MIRALAX) PACKET 1 Packet QDAY PRN    And   • magnesium hydroxide (MILK OF MAGNESIA) suspension 30 mL QDAY PRN    And   • bisacodyl (DULCOLAX) suppository 10 mg QDAY PRN   • artificial tears ophthalmic solution 1 Drop PRN   • benzocaine-menthol (CEPACOL) lozenge 1 Lozenge Q2HRS PRN   •  mag hydrox-al hydrox-simeth (MAALOX PLUS ES or MYLANTA DS) suspension 20 mL Q2HRS PRN   • ondansetron (ZOFRAN ODT) dispertab 4 mg 4X/DAY PRN    Or   • ondansetron (ZOFRAN) syringe/vial injection 4 mg 4X/DAY PRN   • traZODone (DESYREL) tablet 50 mg QHS PRN   • sodium chloride (OCEAN) 0.65 % nasal spray 2 Spray PRN   • atorvastatin (LIPITOR) tablet 80 mg Q EVENING   • citalopram (CELEXA) tablet 20 mg Q EVENING   • lisinopril (PRINIVIL) tablet 20 mg Q DAY   • QUEtiapine (SEROQUEL) tablet 50 mg Q EVENING   • MD Alert...Warfarin per Pharmacy PHARMACY TO DOSE       Orders Placed This Encounter   Procedures   • Diet Order Diabetic     Standing Status:   Standing     Number of Occurrences:   1     Order Specific Question:   Diet:     Answer:   Diabetic [3]       Assessment:  Active Hospital Problems    Diagnosis   • *Acute on Chronic Encephalopathy   • Hypertension   • Coronary artery disease involving coronary bypass graft   • History of mitral valve replacement with mechanical valve [Z95.2]   • Type II diabetes mellitus (HCC)   • TIA (transient ischemic attack)   • Thrombocytopenia (HCC)   • Vitamin D deficiency   • Depression   • Macrocytosis without anemia   • Azotemia   • Diabetic neuropathy (HCC)   • Obstructive sleep apnea syndrome   • Shuffling gait   • Dementia (HCC)   • Hyperlipidemia   • Mild cognitive impairment   • Anxiety       Medical Decision Making and Plan:  Encephalopathy vs delirium vs TIA - Patient with aphasia and weakness with negative work-up for stroke but with hypertensive emergency on admission concerning for hypertensive encephalopathy. Patient started on statin for concern for CVA vs TIA.   -Worsening confusion - per wife history of Gabapentin causing worse. Discontinue Gabapentin.     DM - Patient on Lantus 15U QPM and SSI. Will continue to monitor   Blood sugars into 300s, consult hospitalist. Lantus at 12 U  -Hospitalist to consult     Dementia - Patient on Seroquel 50 mg QPM for agitation  and sleep. MRI consistent with probable vascular dementia.   -Sundowning earlier in the evening. Start Seroquel at 1800 as well as 2100. Ongoing hallucinations. UA negative     Mood/Neuropathy - Patient on Celexa 20 mg QPM. Patient with burning into bilateral feet. Trial of Gabapentin 300 mg TID. Discontinue as worsening confusion.      HTN - Patient on Lisinopril 20 mg on transfer. Previously came in with SBP > 180s  -Hospitalist to consult    Bradycardia - Patient into low 40s, check EKG. Only change was Seroquel will check QT  -Sinus Bradycardia. Per Hospitalist recommending TTE    Elevated BUN - encouraged fluid intake. Will monitor     HLD - Patient on Atorvastatin 80 mg QPM.      GI Ppx - Patient on Prilosec on transfer.     Vitamin D Deficiency -  27 on admission. Started on 1000 U     DVT ppx - Patient on Coumadin     Total time:  36 minutes.  I spent greater than 50% of the time for patient care, counseling, and coordination on this date, including unit/floor time, and face-to-face time with the patient as per interval events and assessment and plan above. Topics discussed included EKG bradycardia, UA negative, TTE pending, and discontinue Gabapentin. Discussed with wife who is having trouble with his encephalopathy/dementia. Discussed about slow recovery and will always need additional help.     Wood Rodriguez M.D.

## 2020-07-02 NOTE — THERAPY
Occupational Therapy  Daily Treatment     Patient Name: Carlos Rivera  Age:  74 y.o., Sex:  male  Medical Record #: [id number]  Today's Date: 7/2/2020     Precautions  Precautions: (P) Fall Risk  Comments: (P) Baseline Dementia     Safety   ADL Safety : Requires Supervision for Safety, Requires Cueing for Safety, Requires Physical Assist for Safety  Bathroom Safety: Requires Supervision for Safety, Requires Cuing for Safety, Requires Physical Assist for Safety    Subjective    Pt received up in chair finishing breakfast, agreeable to OT     Objective       07/02/20 0831   Precautions   Precautions Fall Risk   Comments Baseline Dementia    Cognition    Ability To Follow Commands 1 Step   Safety Awareness Impaired;Impulsive   New Learning Impaired   Attention Impaired   Functional Level of Assist   Grooming Supervision;Standing   Grooming Description Increased time;Supervision for safety  (standing at sink for handwashing and oral care)   Toileting Stand by Assist   Toileting Description Grab bar;Increased time;Supervision for safety;Verbal cueing  (close SBA for standing components, managed hygiene w/o fabiola)   Toilet Transfers Contact Guard Assist   Toilet Transfer Description Increased time;Grab bar;Verbal cueing;Supervision for safety   Sitting Lower Body Exercises   Sit to Stand 2 sets of 10  (no UE support, close SBA, no LOB)   Nustep Resistance Level 3  (5 minutes BUE/BLE, then requested toilet)   Balance   Comments functional mobility 2 laps in gym (~150 ft x2) with no device, min assist, cues for step length, tendency to shuffle particularly on turns, cues to stop and regain balance when shuffling   OT Total Time Spent   OT Individual Total Time Spent (Mins) 60   OT Charge Group   OT Self Care / ADL 2   OT Neuromuscular Re-education / Balance 2       Assessment    Pt tolerated session well, demos improved static standing balance and decreased posterior LOB with sit to stands and standing ADLs, still  requires close SBA for safety. Good initiation of communicating ADL needs (toileting, brushing teeth) this session. Continues with shuffling gait and poor balance reactions.   Strengths: Motivated for self care and independence, Willingly participates in therapeutic activities, Pleasant and cooperative  Barriers: Generalized weakness, Decreased endurance, Dementia, Difficulty following instructions, Impaired balance, Impaired carryover of learning, Impaired insight/denial of deficits, Impaired functional cognition, Limited mobility    Plan    Attention/sequencing during ADLs, standing balance (posterior lean), functional household mobility with focus on increasing step length, safety awareness during mobility and standing tasks    Occupational Therapy Goals     Problem: Bathing     Dates: Start: 06/26/20       Goal: STG-Within one week, patient will bathe     Dates: Start: 06/26/20       Description: 1) Individualized Goal:  with supervision, min verbal cues for safety strategies  2) Interventions:  OT Self Care/ADL, OT Cognitive Skill Dev, OT Neuro Re-Ed/Balance, OT Sensory Int Techniques, OT Therapeutic Activity, OT Evaluation, and OT Therapeutic Exercise                  Problem: Dressing     Dates: Start: 06/26/20       Goal: STG-Within one week, patient will dress LB     Dates: Start: 06/26/20       Description: 1) Individualized Goal:  with supervision, min verbal cues for safety strategies  2) Interventions:  OT Self Care/ADL, OT Cognitive Skill Dev, OT Neuro Re-Ed/Balance, OT Sensory Int Techniques, OT Therapeutic Activity, OT Evaluation, and OT Therapeutic Exercise                  Problem: Functional Transfers     Dates: Start: 06/26/20       Goal: STG-Within one week, patient will transfer to toilet     Dates: Start: 06/26/20       Description: 1) Individualized Goal:  with supervision using least restrictive adaptive device  2) Interventions:  OT Self Care/ADL, OT Cognitive Skill Dev, OT Neuro  Re-Ed/Balance, OT Sensory Int Techniques, OT Therapeutic Activity, OT Evaluation, and OT Therapeutic Exercise                  Problem: OT Long Term Goals     Dates: Start: 06/26/20       Goal: LTG-By discharge, patient will complete basic self care tasks     Dates: Start: 06/26/20       Description: 1) Individualized Goal:  with supervision to  modified independence  2) Interventions:  OT Self Care/ADL, OT Cognitive Skill Dev, OT Neuro Re-Ed/Balance, OT Sensory Int Techniques, OT Therapeutic Activity, OT Evaluation, and OT Therapeutic Exercise            Goal: LTG-By discharge, patient will perform bathroom transfers     Dates: Start: 06/26/20       Description: 1) Individualized Goal:  with supervision to  modified independence  2) Interventions:  OT Self Care/ADL, OT Cognitive Skill Dev, OT Neuro Re-Ed/Balance, OT Sensory Int Techniques, OT Therapeutic Activity, OT Evaluation, and OT Therapeutic Exercise                  Problem: Toileting     Dates: Start: 06/26/20       Goal: STG-Within one week, patient will complete toileting tasks     Dates: Start: 06/26/20       Description: 1) Individualized Goal:  with supervision, min verbal cues for safety strategies  2) Interventions:  OT Self Care/ADL, OT Cognitive Skill Dev, OT Neuro Re-Ed/Balance, OT Sensory Int Techniques, OT Therapeutic Activity, OT Evaluation, and OT Therapeutic Exercise

## 2020-07-02 NOTE — THERAPY
"Speech Language Pathology  Daily Treatment     Patient Name: Carlos Rivera  Age:  74 y.o., Sex:  male  Medical Record #: [id number]  Today's Date: 7/2/2020     Precautions  Precautions: Fall Risk  Comments: Baseline Dementia    Subjective    Pt was in his room c/o 8/10 stomach pain.  Nursing made aware; however once nursing arrived to assess pt he reported \"It feels like it went away\".       Objective       07/02/20 1031   SLP Total Time Spent   SLP Individual Total Time Spent (Mins) 60   Charge Group   SLP Cognitive Skill Development First 15 Minutes 1   SLP Cognitive Skill Development Additional 15 Minutes 3       Assessment    Pt required mod cues to locate the current date on his schedule, pt initially thinking it was June.  Pt was able to recall 1 item he ate for breakfast and 1 item he drank and then stated \"the rest is blank, I don't know\".  Pt unable to independently recall any therapy tasks he completed this morning and required mod-max cues to recall that he walked with a walker and practiced getting dressed.  Pt completed an attention task (following directions) and required mod A for attention to details.  Increased difficulty with attention, following directions and memory noted today.    Strengths: Able to follow instructions, Alert and oriented, Effective communication skills, Motivated for self care and independence, Pleasant and cooperative, Supportive family, Willingly participates in therapeutic activities  Barriers: Confused, Dementia, Impaired carryover of learning, Impaired insight/denial of deficits, Impaired functional cognition, Impulsive    Plan    Continue targeting memory with memory notebook, simple attention, functional problem solving     Speech Therapy Problems     Problem: Memory STGs     Dates: Start: 06/26/20       Goal: STG-Within one week, patient will     Dates: Start: 06/26/20       Description: 1) Individualized goal:  Recall new information related to pt's " hospitalization with mod A required for use of functional memory strategies (I.e. memory notebook).    2) Interventions:  SLP Self Care / ADL Training , SLP Cognitive Skill Development, and SLP Group Treatment        Note:     Goal Note filed on 06/30/20 0750 by Kavon Dent MS,CCC-SLP    Mod-max A required for pt to recall new information                         Problem: Problem Solving STGs     Dates: Start: 06/26/20       Goal: STG-Within one week, patient will     Dates: Start: 06/26/20       Description: 1) Individualized goal:  Complete simple attention tasks with min A required to achieve 80% accuracy.    2) Interventions:  SLP Self Care / ADL Training , SLP Cognitive Skill Development, and SLP Group Treatment        Note:     Goal Note filed on 06/30/20 0750 by Kavon Dent MS,CCC-SLP    Min-Mod A required for pt to complete simple attention tasks                         Problem: Speech/Swallowing LTGs     Dates: Start: 06/26/20       Goal: LTG-By discharge, patient will solve basic problems     Dates: Start: 06/26/20       Description: 1) Individualized goal:  With spv for a safe discharge home   2) Interventions:  SLP Self Care / ADL Training , SLP Cognitive Skill Development, and SLP Group Treatment

## 2020-07-02 NOTE — THERAPY
Physical Therapy   Daily Treatment     Patient Name: Carlos Rivera  Age:  74 y.o., Sex:  male  Medical Record #: [id number]  Today's Date: 7/2/2020     Precautions  Precautions: Fall Risk  Comments: Baseline Dementia    Subjective    Pt up in wc, willing to participate     Objective       07/02/20 1001   Gait Functional Level of Assist    Gait Level Of Assist Contact Guard Assist   Assistive Device Front Wheel Walker   Distance (Feet) 100   # of Times Distance was Traveled 2   Deviation Shuffled Gait   Neuro-Muscular Treatments   Neuro-Muscular Treatments Anterior weight shift;Weight Shift Right;Weight Shift Left   Comments pre-gait standing/ stepping activity over small dowel with BUE support at // bars 1 x 15 forward/back and side step, 2 x 15 with 2# ankle wts for muscle overloading forward/back and side step, manual cues for anterior/posterior and lateral wt shiftig throughout activity.    PT Total Time Spent   PT Individual Total Time Spent (Mins) 30   PT Charge Group   PT Gait Training 1   PT Neuromuscular Re-Education / Balance 1       Assessment    Pt requires verbal cues throughout pre-gait stepping activity for increased stride/ attention to dowel placement, minimal carryover with gait.    Strengths: Willingly participates in therapeutic activities  Barriers: Impaired activity tolerance, Impaired insight/denial of deficits, Impaired carryover of learning, Impaired balance(L LE foot pain reported)    Plan    Gait training with FWW vs no AD or walking sticks, treadmill training, postural re-ed, trial dual tasking with functional mobility, single leg balance activities, bed mobility/ transfer training, safety awareness, review standing therex program for strength    Physical Therapy Problems     Problem: Mobility     Dates: Start: 06/26/20       Goal: STG-Within one week, patient will ambulate household distance     Dates: Start: 06/26/20       Description: 1) Individualized goal:  150 ft with LRAD and  CGA-SBA.  2) Interventions:  PT Group Therapy, PT Gait Training, PT Therapeutic Exercises, PT Neuro Re-Ed/Balance, PT Aquatic Therapy, PT Therapeutic Activity, and PT Evaluation      Note:     Goal Note filed on 06/30/20 1016 by Jose Conley, PT    Min A                  Goal: STG-Within one week, patient will ambulate up/down a curb     Dates: Start: 06/26/20       Description: 1) Individualized goal:  150 ft with LRAD and CGA-SBA.  2) Interventions:  PT Group Therapy, PT Gait Training, PT Therapeutic Exercises, PT Neuro Re-Ed/Balance, PT Aquatic Therapy, PT Therapeutic Activity, and PT Evaluation    Note:     Goal Note filed on 06/30/20 1016 by Jose Conley, PT    NT dt focus on other goals                  Goal: STG-Within one week, patient will ascend and descend four to six stairs     Dates: Start: 06/26/20       Description: 1) Individualized goal:  with B rails and CGA.  2) Interventions:  PT Group Therapy, PT Gait Training, PT Therapeutic Exercises, PT Neuro Re-Ed/Balance, PT Aquatic Therapy, PT Therapeutic Activity, and PT Evaluation        Note:     Goal Note filed on 06/30/20 1016 by Jose Conley, PT    NT dt safety concerns                         Problem: Mobility Transfers     Dates: Start: 06/26/20       Goal: STG-Within one week, patient will perform bed mobility     Dates: Start: 06/26/20       Description: 1) Individualized goal:  with supervision.  2) Interventions:  PT Group Therapy, PT Gait Training, PT Therapeutic Exercises, PT Neuro Re-Ed/Balance, PT Aquatic Therapy, PT Therapeutic Activity, and PT Evaluation        Note:     Goal Note filed on 06/30/20 1016 by Jose Conley, PT    SPV - min A                  Goal: STG-Within one week, patient will transfer bed to chair     Dates: Start: 06/26/20       Description: 1) Individualized goal:  with LRAD and CGA-SBA.  2) Interventions:  PT Group Therapy, PT Gait Training, PT Therapeutic Exercises, PT Neuro Re-Ed/Balance,  PT Aquatic Therapy, PT Therapeutic Activity, and PT Evaluation      Note:     Goal Note filed on 06/30/20 1016 by Jose Conley PT    Torito MURPHY                        Problem: PT-Long Term Goals     Dates: Start: 06/26/20       Goal: LTG-By discharge, patient will ambulate     Dates: Start: 06/26/20       Description: 1) Individualized goal:  >150 ft with LRAD and supervision.  2) Interventions:  PT Group Therapy, PT Gait Training, PT Therapeutic Exercises, PT Neuro Re-Ed/Balance, PT Aquatic Therapy, PT Therapeutic Activity, and PT Evaluation              Goal: LTG-By discharge, patient will transfer one surface to another     Dates: Start: 06/26/20       Description: 1) Individualized goal: with LRAD and supervision.  2) Interventions:  PT Group Therapy, PT Gait Training, PT Therapeutic Exercises, PT Neuro Re-Ed/Balance, PT Aquatic Therapy, PT Therapeutic Activity, and PT Evaluation            Goal: LTG-By discharge, patient will ambulate up/down 4-6 stairs     Dates: Start: 06/26/20       Description: 1) Individualized goal: with B rails and supervision.  2) Interventions:  PT Group Therapy, PT Gait Training, PT Therapeutic Exercises, PT Neuro Re-Ed/Balance, PT Aquatic Therapy, PT Therapeutic Activity, and PT Evaluation          Goal: LTG-By discharge, patient will transfer in/out of a car     Dates: Start: 06/26/20       Description: 1) Individualized goal: with LRAD and supervision.  2) Interventions:  PT Group Therapy, PT Gait Training, PT Therapeutic Exercises, PT Neuro Re-Ed/Balance, PT Aquatic Therapy, PT Therapeutic Activity, and PT Evaluation          Goal: LTG-By discharge, patient will     Dates: Start: 06/26/20       Description: 1) Individualized goal: negotiate single curb with LRAD and supervision-SBA.  2) Interventions:  PT Group Therapy, PT Gait Training, PT Therapeutic Exercises, PT Neuro Re-Ed/Balance, PT Aquatic Therapy, PT Therapeutic Activity, and PT Evaluation          Goal: LTG-By  discharge, patient will     Dates: Start: 06/26/20       Description: 1) Individualized goal: perform bed mobility mod I.  2) Interventions:  PT Group Therapy, PT Gait Training, PT Therapeutic Exercises, PT Neuro Re-Ed/Balance, PT Aquatic Therapy, PT Therapeutic Activity, and PT Evaluation

## 2020-07-02 NOTE — CARE PLAN
Problem: Safety  Goal: Will remain free from falls  Intervention: Implement fall precautions  Note: Pt remains impulsive.Use of call light reinforced to make needs known.Fall precautions and frequent rounding in place for safety.Call light within reach.Will continue to monitor and assess needs and safety.     Problem: Bowel/Gastric:  Goal: Normal bowel function is maintained or improved  Intervention: Educate patient and significant other/support system about signs and symptoms of constipation and interventions to implement  Note: Pt remains continent of bowel per report.Scheduled senna given at hs.LB 07/01.Will continue to monitor.     Problem: Pain Management  Goal: Pain level will decrease to patient's comfort goal  Note: Pt denies any pain or discomfort.Repositioned with pillows for comfort.Will continue to monitor and assess pain level and medicate as needed.

## 2020-07-03 ENCOUNTER — ANCILLARY PROCEDURE (OUTPATIENT)
Dept: CARDIOLOGY | Facility: REHABILITATION | Age: 75
DRG: 071 | End: 2020-07-03
Attending: HOSPITALIST
Payer: MEDICARE

## 2020-07-03 LAB
GLUCOSE BLD-MCNC: 129 MG/DL (ref 65–99)
GLUCOSE BLD-MCNC: 181 MG/DL (ref 65–99)
GLUCOSE BLD-MCNC: 260 MG/DL (ref 65–99)
GLUCOSE BLD-MCNC: 263 MG/DL (ref 65–99)
INR PPP: 3.74 (ref 0.87–1.13)
LV EJECT FRACT  99904: 55
LV EJECT FRACT MOD 2C 99903: 34.92
LV EJECT FRACT MOD 4C 99902: 38.19
LV EJECT FRACT MOD BP 99901: 38.49
PROTHROMBIN TIME: 38.1 SEC (ref 12–14.6)

## 2020-07-03 PROCEDURE — 93306 TTE W/DOPPLER COMPLETE: CPT | Mod: 26 | Performed by: INTERNAL MEDICINE

## 2020-07-03 PROCEDURE — 97530 THERAPEUTIC ACTIVITIES: CPT | Mod: CQ

## 2020-07-03 PROCEDURE — 82962 GLUCOSE BLOOD TEST: CPT | Mod: 91

## 2020-07-03 PROCEDURE — 700102 HCHG RX REV CODE 250 W/ 637 OVERRIDE(OP): Performed by: HOSPITALIST

## 2020-07-03 PROCEDURE — 36415 COLL VENOUS BLD VENIPUNCTURE: CPT

## 2020-07-03 PROCEDURE — 97116 GAIT TRAINING THERAPY: CPT | Mod: CQ

## 2020-07-03 PROCEDURE — 97112 NEUROMUSCULAR REEDUCATION: CPT

## 2020-07-03 PROCEDURE — 770010 HCHG ROOM/CARE - REHAB SEMI PRIVAT*

## 2020-07-03 PROCEDURE — 85610 PROTHROMBIN TIME: CPT

## 2020-07-03 PROCEDURE — 97112 NEUROMUSCULAR REEDUCATION: CPT | Mod: CQ

## 2020-07-03 PROCEDURE — 97130 THER IVNTJ EA ADDL 15 MIN: CPT

## 2020-07-03 PROCEDURE — 700102 HCHG RX REV CODE 250 W/ 637 OVERRIDE(OP): Performed by: PHYSICAL MEDICINE & REHABILITATION

## 2020-07-03 PROCEDURE — 97129 THER IVNTJ 1ST 15 MIN: CPT

## 2020-07-03 PROCEDURE — 93306 TTE W/DOPPLER COMPLETE: CPT

## 2020-07-03 PROCEDURE — A9270 NON-COVERED ITEM OR SERVICE: HCPCS | Performed by: HOSPITALIST

## 2020-07-03 PROCEDURE — 99232 SBSQ HOSP IP/OBS MODERATE 35: CPT | Performed by: HOSPITALIST

## 2020-07-03 PROCEDURE — A9270 NON-COVERED ITEM OR SERVICE: HCPCS | Performed by: PHYSICAL MEDICINE & REHABILITATION

## 2020-07-03 PROCEDURE — 97535 SELF CARE MNGMENT TRAINING: CPT

## 2020-07-03 RX ORDER — WARFARIN SODIUM 1 MG/1
1 TABLET ORAL DAILY
Status: COMPLETED | OUTPATIENT
Start: 2020-07-03 | End: 2020-07-03

## 2020-07-03 RX ADMIN — QUETIAPINE 50 MG: 25 TABLET ORAL at 17:45

## 2020-07-03 RX ADMIN — WARFARIN SODIUM 1 MG: 1 TABLET ORAL at 17:44

## 2020-07-03 RX ADMIN — ATORVASTATIN CALCIUM 80 MG: 40 TABLET, FILM COATED ORAL at 20:15

## 2020-07-03 RX ADMIN — ACETAMINOPHEN 650 MG: 325 TABLET, FILM COATED ORAL at 14:02

## 2020-07-03 RX ADMIN — METFORMIN HYDROCHLORIDE 500 MG: 500 TABLET ORAL at 08:28

## 2020-07-03 RX ADMIN — MELATONIN 1000 UNITS: at 08:28

## 2020-07-03 RX ADMIN — LISINOPRIL 20 MG: 20 TABLET ORAL at 05:37

## 2020-07-03 RX ADMIN — DOCUSATE SODIUM 50 MG AND SENNOSIDES 8.6 MG 2 TABLET: 8.6; 5 TABLET, FILM COATED ORAL at 08:28

## 2020-07-03 RX ADMIN — OMEPRAZOLE 20 MG: 20 CAPSULE, DELAYED RELEASE ORAL at 08:28

## 2020-07-03 RX ADMIN — METFORMIN HYDROCHLORIDE 500 MG: 500 TABLET ORAL at 17:45

## 2020-07-03 RX ADMIN — CITALOPRAM HYDROBROMIDE 20 MG: 20 TABLET ORAL at 20:15

## 2020-07-03 RX ADMIN — QUETIAPINE 50 MG: 25 TABLET ORAL at 20:16

## 2020-07-03 ASSESSMENT — ENCOUNTER SYMPTOMS
VOMITING: 0
NAUSEA: 0
ABDOMINAL PAIN: 0
SHORTNESS OF BREATH: 0
DIARRHEA: 0
FEVER: 0
CHILLS: 0
NERVOUS/ANXIOUS: 0

## 2020-07-03 ASSESSMENT — ACTIVITIES OF DAILY LIVING (ADL): TOILET_TRANSFER_DESCRIPTION: GRAB BAR;INCREASED TIME;SUPERVISION FOR SAFETY;VERBAL CUEING

## 2020-07-03 NOTE — THERAPY
"Speech Language Pathology  Daily Treatment     Patient Name: Carlos Rivera  Age:  74 y.o., Sex:  male  Medical Record #: [id number]  Today's Date: 7/3/2020     Precautions  Precautions: Fall Risk  Comments: Baseline Dementia     Subjective    Pt pleasant and agreeable to therapy.      Objective     07/03/20 1004   Cognition   Functional Memory Activities Moderate (3)   Interdisciplinary Plan of Care Collaboration   Patient Position at End of Therapy Seated;Call Light within Reach;Tray Table within Reach;Phone within Reach   SLP Total Time Spent   SLP Individual Total Time Spent (Mins) 30   Charge Group   SLP Cognitive Skill Development First 15 Minutes 1   SLP Cognitive Skill Development Additional 15 Minutes 1       Assessment    Pt recalled what he had eaten for breakfast, reports ST was his first therapy of the day, recalled \"writing stuff down\" as task completed in ST day prior. Ongoing education of using written aids to assist with recall of novel training and encouraged pt to continue to note throughout the day to aid in immediate recall of therapy tasks/training.     Strengths: Able to follow instructions, Alert and oriented, Effective communication skills, Motivated for self care and independence, Pleasant and cooperative, Supportive family, Willingly participates in therapeutic activities  Barriers: Confused, Dementia, Impaired carryover of learning, Impaired insight/denial of deficits, Impaired functional cognition, Impulsive    Plan    Continue to reinforce use of memory notebook/theray log.     Speech Therapy Problems     Problem: Memory STGs     Dates: Start: 06/26/20       Goal: STG-Within one week, patient will     Dates: Start: 06/26/20       Description: 1) Individualized goal:  Recall new information related to pt's hospitalization with mod A required for use of functional memory strategies (I.e. memory notebook).    2) Interventions:  SLP Self Care / ADL Training , SLP Cognitive Skill " Development, and SLP Group Treatment        Note:     Goal Note filed on 06/30/20 0750 by Kavon Dent MS,CCC-SLP    Mod-max A required for pt to recall new information                         Problem: Problem Solving STGs     Dates: Start: 06/26/20       Goal: STG-Within one week, patient will     Dates: Start: 06/26/20       Description: 1) Individualized goal:  Complete simple attention tasks with min A required to achieve 80% accuracy.    2) Interventions:  SLP Self Care / ADL Training , SLP Cognitive Skill Development, and SLP Group Treatment        Note:     Goal Note filed on 06/30/20 0750 by Kavon Dent MS,CCC-SLP    Min-Mod A required for pt to complete simple attention tasks                         Problem: Speech/Swallowing LTGs     Dates: Start: 06/26/20       Goal: LTG-By discharge, patient will solve basic problems     Dates: Start: 06/26/20       Description: 1) Individualized goal:  With spv for a safe discharge home   2) Interventions:  SLP Self Care / ADL Training , SLP Cognitive Skill Development, and SLP Group Treatment

## 2020-07-03 NOTE — PROGRESS NOTES
Pharmacy Warfarin Consult   7/3/2020     74 y.o.   male     Indication for anticoagulation: Mechanical Mitral Valve Replacement        Goal INR = 2.5 - 3.5    Recent Labs     07/01/20  0550 07/02/20  0539 07/03/20  0514   INR 2.47* 3.30* 3.74*   HEMOGLOBIN  --  15.4  --    HEMATOCRIT  --  46.0  --        Pertinent Drug/Drug Interactions:  Statin, SSRI, Quetiapine, PPI  Outpatient Warfarin Regimen:  Warfarin 5mg daily  Recent Warfarin Dosing:      Dose from last 7 days     Date/Time Dose (mg)    07/03/20 0514  1    07/02/20 0539  4    07/01/20 0550  5    06/30/20 0552  7.5    06/29/20 0535  7.5    06/28/20 1341  3    06/27/20 1400  1          Bridge Therapy: no           1.  Coumadin 1 mg tonight per INR = 3.74           Rojelio Alegre [facility]

## 2020-07-03 NOTE — PROGRESS NOTES
Hospital Medicine Daily Progress Note      Chief Complaint:  Hypertension  Diabetes    Interval History:  No significant events or changes since last visit    Review of Systems  Review of Systems   Constitutional: Negative for chills and fever.   Respiratory: Negative for shortness of breath.    Cardiovascular: Negative for chest pain.   Gastrointestinal: Negative for abdominal pain, diarrhea, nausea and vomiting.   Psychiatric/Behavioral: The patient is not nervous/anxious.         Physical Exam  Temp:  [36.6 °C (97.8 °F)-37 °C (98.6 °F)] 37 °C (98.6 °F)  Pulse:  [51-59] 59  Resp:  [18] 18  BP: (116-135)/(67-73) 116/70  SpO2:  [91 %-97 %] 96 %    Physical Exam  Vitals signs and nursing note reviewed.   Constitutional:       Appearance: Normal appearance.   HENT:      Head: Atraumatic.   Eyes:      Conjunctiva/sclera: Conjunctivae normal.      Pupils: Pupils are equal, round, and reactive to light.   Neck:      Musculoskeletal: Normal range of motion and neck supple.      Vascular: No carotid bruit.   Cardiovascular:      Rate and Rhythm: Normal rate and regular rhythm.   Pulmonary:      Effort: Pulmonary effort is normal.      Breath sounds: Normal breath sounds.   Abdominal:      General: Bowel sounds are normal.      Palpations: Abdomen is soft.   Musculoskeletal:      Right lower leg: No edema.      Left lower leg: No edema.   Skin:     General: Skin is warm and dry.   Neurological:      Mental Status: He is alert and oriented to person, place, and time.      Comments: Has some memory loss   Psychiatric:         Mood and Affect: Mood normal.         Behavior: Behavior normal.         Fluids    Intake/Output Summary (Last 24 hours) at 7/3/2020 0844  Last data filed at 7/3/2020 0145  Gross per 24 hour   Intake 220 ml   Output 400 ml   Net -180 ml       Laboratory  Recent Labs     07/02/20  0539   WBC 8.5   RBC 4.52*   HEMOGLOBIN 15.4   HEMATOCRIT 46.0   .8*   MCH 34.1*   MCHC 33.5*   RDW 47.2   PLATELETCT  "153*   MPV 11.7     Recent Labs     07/02/20  0539   SODIUM 139   POTASSIUM 4.4   CHLORIDE 105   CO2 22   GLUCOSE 132*   BUN 27*   CREATININE 1.04   CALCIUM 9.0     Recent Labs     07/01/20  0550 07/02/20  0539 07/03/20  0514   INR 2.47* 3.30* 3.74*               Assessment/Plan  Hypertension- (present on admission)  Assessment & Plan  BP ok  On Lisinopril: 20 mg daily  Monitor    History of mitral valve replacement with mechanical valve [Z95.2]- (present on admission)  Assessment & Plan  On Coumadin    Type II diabetes mellitus (HCC)- (present on admission)  Assessment & Plan  Hba1c: 6.7 (6/26)  BS better and less labile recently: 106-189  Off Lantus (last dose 7/2)  On Metformin 500 mg bid (7/1)  On accuchecks without night time SS coverage (6/30)  Note: home meds include Lantus 15 units and Metformin 1000 mg bid  Cont to monitor    Bradycardia  Assessment & Plan  HR 50's  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), consider inferior infarct and anterior MI  TropI: 108 (6/23) --> 147 (7/1) --> 164 (7/2)  BNP: 359  F/U echo  Monitor    Depression  Assessment & Plan  On Celexa    Vitamin D deficiency  Assessment & Plan  Vit D: 27  On supplements    TIA (transient ischemic attack)  Assessment & Plan  Pt with dysarthria, now resolving  Neuroimaging negative acute  On Coumadin  On Lipitor    Mild cognitive impairment- (present on admission)  Assessment & Plan  Pt reports \"forgetfulness\" that's been ongoing for the past 2 yrs    Anxiety- (present on admission)  Assessment & Plan  On Celexa and Seroquel    "

## 2020-07-03 NOTE — DISCHARGE PLANNING
"Met w/ patient & wife, Comfort, at bedside to review IDT recommendations & targeted DC date 7.9.2020. Wife confused about hospitalization dates at [facility], states \"hospitalized since April.\" Verified admit dates in computer & reviewed w/ wife. Patient previously at [facility] discharge 5.23.2020 & had HH referral to [facility]. States \"he was only home 3d before he was readmitted to [facility],\" however Epic shows only ER visit on 6.15.2020 w/ hospitalization 6.23.2020. Discussed family training prior to discharge. Wife requesting son, Andrew, be able to attend training. Discussed one person at this time for training, but son can follow via zoom. Will have Anita contact for information & set up. Discussed 24/7 supervision recommendation. States \"he doesn't want to go back to the skilled facility.\" Provided brochures for private hire caregivers & reviewed not covered by Medicare. Discussed shower chair for discharge & Medicare does not cover bathroom equipment. Wife will purchase shower chair prior to discharge. Lian PT in room for discussion & suggests wife discuss bathroom equipment recommendations w/ Nikky OT including grab bars. Wife will provide transportation. Questions answered. Emotional support provided.  "

## 2020-07-03 NOTE — THERAPY
Occupational Therapy  Daily Treatment     Patient Name: Carlos Rivera  Age:  74 y.o., Sex:  male  Medical Record #: [id number]  Today's Date: 7/3/2020     Precautions  Precautions: (P) Fall Risk  Comments: (P) Baseline Dementia     Safety   ADL Safety : Requires Physical Assist for Safety, Requires Cueing for Safety  Bathroom Safety: Requires Cuing for Safety, Requires Physical Assist for Safety    Subjective    Pt received seated on toilet, assisted to sink for handwashing, agreeable to heading to gym to work on balance during OT session     Objective       07/03/20 1401   Precautions   Precautions Fall Risk   Comments Baseline Dementia    Functional Level of Assist   Grooming Standing;Supervision   Grooming Description Supervision for safety;Verbal cueing  (standing at sink)   OT Total Time Spent   OT Individual Total Time Spent (Mins) 30   OT Charge Group   OT Neuromuscular Re-education / Balance 2     In // bars:  With no UE support - alternating functional reach up to jax/down to knees/to low back with CGA  With intermittent UE support - reaching back to w/c to grab bean bags and toss into bucket, alternating UEs for reach back  With bilateral UE support - stepping fwd/back alternating LEs over small bolster with min cues to increase step length for clearance  With intermittent UE support - walking in // bars ~50 feet with consistent cues for step length, noted increased shuffling when turning around    Assessment    Pt tolerated session fair, continues with significant balance impairments and shuffled steps when walking, impaired attention and memory limit ability to carryover training and education but pt participating to best of abilities today, tends to demo insight into impaired balance when working on balance but limited understanding of how his impairments impact his safety with mobility in his room and at home  Strengths: Motivated for self care and independence, Willingly participates in  therapeutic activities, Pleasant and cooperative  Barriers: Generalized weakness, Decreased endurance, Dementia, Difficulty following instructions, Impaired balance, Impaired carryover of learning, Impaired insight/denial of deficits, Impaired functional cognition, Limited mobility    Plan    Attention/sequencing during ADLs, standing balance (posterior lean), functional household mobility with focus on increasing step length, safety awareness during mobility and standing tasks    Occupational Therapy Goals     Problem: Bathing     Dates: Start: 06/26/20       Goal: STG-Within one week, patient will bathe     Dates: Start: 06/26/20       Description: 1) Individualized Goal:  with supervision, min verbal cues for safety strategies  2) Interventions:  OT Self Care/ADL, OT Cognitive Skill Dev, OT Neuro Re-Ed/Balance, OT Sensory Int Techniques, OT Therapeutic Activity, OT Evaluation, and OT Therapeutic Exercise                  Problem: Dressing     Dates: Start: 06/26/20       Goal: STG-Within one week, patient will dress LB     Dates: Start: 06/26/20       Description: 1) Individualized Goal:  with supervision, min verbal cues for safety strategies  2) Interventions:  OT Self Care/ADL, OT Cognitive Skill Dev, OT Neuro Re-Ed/Balance, OT Sensory Int Techniques, OT Therapeutic Activity, OT Evaluation, and OT Therapeutic Exercise                  Problem: Functional Transfers     Dates: Start: 06/26/20       Goal: STG-Within one week, patient will transfer to toilet     Dates: Start: 06/26/20       Description: 1) Individualized Goal:  with supervision using least restrictive adaptive device  2) Interventions:  OT Self Care/ADL, OT Cognitive Skill Dev, OT Neuro Re-Ed/Balance, OT Sensory Int Techniques, OT Therapeutic Activity, OT Evaluation, and OT Therapeutic Exercise                  Problem: OT Long Term Goals     Dates: Start: 06/26/20       Goal: LTG-By discharge, patient will complete basic self care tasks      Dates: Start: 06/26/20       Description: 1) Individualized Goal:  with supervision to  modified independence  2) Interventions:  OT Self Care/ADL, OT Cognitive Skill Dev, OT Neuro Re-Ed/Balance, OT Sensory Int Techniques, OT Therapeutic Activity, OT Evaluation, and OT Therapeutic Exercise            Goal: LTG-By discharge, patient will perform bathroom transfers     Dates: Start: 06/26/20       Description: 1) Individualized Goal:  with supervision to  modified independence  2) Interventions:  OT Self Care/ADL, OT Cognitive Skill Dev, OT Neuro Re-Ed/Balance, OT Sensory Int Techniques, OT Therapeutic Activity, OT Evaluation, and OT Therapeutic Exercise                  Problem: Toileting     Dates: Start: 06/26/20       Goal: STG-Within one week, patient will complete toileting tasks     Dates: Start: 06/26/20       Description: 1) Individualized Goal:  with supervision, min verbal cues for safety strategies  2) Interventions:  OT Self Care/ADL, OT Cognitive Skill Dev, OT Neuro Re-Ed/Balance, OT Sensory Int Techniques, OT Therapeutic Activity, OT Evaluation, and OT Therapeutic Exercise

## 2020-07-03 NOTE — THERAPY
Occupational Therapy  Daily Treatment     Patient Name: Carlos Rivera  Age:  74 y.o., Sex:  male  Medical Record #: [id number]  Today's Date: 7/3/2020     Precautions  Precautions: Fall Risk  Comments: Baseline Dementia     Safety   ADL Safety : Requires Physical Assist for Safety, Requires Cueing for Safety  Bathroom Safety: Requires Cuing for Safety, Requires Physical Assist for Safety    Subjective    Patient in bed with almost empty breakfast tray in front of him. Agreeable to participate in OT.     Patient reported urge to urinate however unable to void during this session. RN notified.      Objective     07/03/20 0931   Precautions   Precautions Fall Risk   Comments Baseline Dementia    Safety    ADL Safety  Requires Physical Assist for Safety;Requires Cueing for Safety   Bathroom Safety Requires Cuing for Safety;Requires Physical Assist for Safety   Cognition    Level of Consciousness Alert   New Learning Impaired   Attention Impaired   Functional Level of Assist   Grooming Supervision  (for washing hands while seated in w/c )   Grooming Description Supervision for safety;Increased time   Lower Body Dressing Minimal Assist   Lower Body Dressing Description Verbal cueing;Set-up of equipment;Supervision for safety;Increased time   Interdisciplinary Plan of Care Collaboration   Patient Position at End of Therapy Seated;Self Releasing Lap Belt Applied   OT Total Time Spent   OT Individual Total Time Spent (Mins) 30   OT Charge Group   OT Self Care / ADL 2     Max- mod assist for bed>w/c txfr at start of session due to retropulsion/posterior lean, poor attention/sequencing.     Patient completed STS from w/c with CGA (with use of grab bar) and positioned self in front of toilet, as he had urge to urinate. Min assist for doffing underwear/shorts. Patient attempted to void while standing, however unable (RN was notified). Pt noted his underwear/shorts were mildly damp and undersigned therapist assisted him  with changing into clean clothing. Pt donned underwear and shorts with min assist (for clothing articles over hips, standing balance and safety).     Assessment    Patient tolerated OT session fair with focus on progression with ADLs. STS/functional txfrs significantly improved within OT session. Primarily limited by significant retropulsion/posterior lean, poor attention and sequencing (ie patient attempted to remove shirt multiple times instead of shorts and required redirection to LB dressing tasks).     Strengths: Motivated for self care and independence, Willingly participates in therapeutic activities, Pleasant and cooperative  Barriers: Generalized weakness, Decreased endurance, Dementia, Difficulty following instructions, Impaired balance, Impaired carryover of learning, Impaired insight/denial of deficits, Impaired functional cognition, Limited mobility    Plan    Attention/sequencing during ADLs, standing balance (posterior lean), functional household mobility with focus on increasing step length, safety awareness during mobility and standing tasks    Occupational Therapy Goals     Problem: Bathing     Dates: Start: 06/26/20       Goal: STG-Within one week, patient will bathe     Dates: Start: 06/26/20       Description: 1) Individualized Goal:  with supervision, min verbal cues for safety strategies  2) Interventions:  OT Self Care/ADL, OT Cognitive Skill Dev, OT Neuro Re-Ed/Balance, OT Sensory Int Techniques, OT Therapeutic Activity, OT Evaluation, and OT Therapeutic Exercise                  Problem: Dressing     Dates: Start: 06/26/20       Goal: STG-Within one week, patient will dress LB     Dates: Start: 06/26/20       Description: 1) Individualized Goal:  with supervision, min verbal cues for safety strategies  2) Interventions:  OT Self Care/ADL, OT Cognitive Skill Dev, OT Neuro Re-Ed/Balance, OT Sensory Int Techniques, OT Therapeutic Activity, OT Evaluation, and OT Therapeutic Exercise                   Problem: Functional Transfers     Dates: Start: 06/26/20       Goal: STG-Within one week, patient will transfer to toilet     Dates: Start: 06/26/20       Description: 1) Individualized Goal:  with supervision using least restrictive adaptive device  2) Interventions:  OT Self Care/ADL, OT Cognitive Skill Dev, OT Neuro Re-Ed/Balance, OT Sensory Int Techniques, OT Therapeutic Activity, OT Evaluation, and OT Therapeutic Exercise                  Problem: OT Long Term Goals     Dates: Start: 06/26/20       Goal: LTG-By discharge, patient will complete basic self care tasks     Dates: Start: 06/26/20       Description: 1) Individualized Goal:  with supervision to  modified independence  2) Interventions:  OT Self Care/ADL, OT Cognitive Skill Dev, OT Neuro Re-Ed/Balance, OT Sensory Int Techniques, OT Therapeutic Activity, OT Evaluation, and OT Therapeutic Exercise            Goal: LTG-By discharge, patient will perform bathroom transfers     Dates: Start: 06/26/20       Description: 1) Individualized Goal:  with supervision to  modified independence  2) Interventions:  OT Self Care/ADL, OT Cognitive Skill Dev, OT Neuro Re-Ed/Balance, OT Sensory Int Techniques, OT Therapeutic Activity, OT Evaluation, and OT Therapeutic Exercise                  Problem: Toileting     Dates: Start: 06/26/20       Goal: STG-Within one week, patient will complete toileting tasks     Dates: Start: 06/26/20       Description: 1) Individualized Goal:  with supervision, min verbal cues for safety strategies  2) Interventions:  OT Self Care/ADL, OT Cognitive Skill Dev, OT Neuro Re-Ed/Balance, OT Sensory Int Techniques, OT Therapeutic Activity, OT Evaluation, and OT Therapeutic Exercise

## 2020-07-03 NOTE — THERAPY
Speech Language Pathology  Daily Treatment     Patient Name: Carlos Rivera  Age:  74 y.o., Sex:  male  Medical Record #: [id number]  Today's Date: 7/3/2020     Precautions  Precautions: Fall Risk  Comments: Baseline Dementia     Subjective    Pt pleasant and cooperative during tx.  Pt verbalized frustration regarding his poor memory.       Objective       07/03/20 1531   Cognition   Moderate Attention Moderate (3)   Functional Memory Activities Severe (2)   SLP Total Time Spent   SLP Individual Total Time Spent (Mins) 30   Charge Group   SLP Cognitive Skill Development First 15 Minutes 1   SLP Cognitive Skill Development Additional 15 Minutes 1       Assessment    Pt required mod-max verbal cues to recall daily events.  Alternating attn: pt required to state names of people for each letter of the alphabet): 40% independent; 100% mod-max A.    Strengths: Able to follow instructions, Alert and oriented, Effective communication skills, Motivated for self care and independence, Pleasant and cooperative, Supportive family, Willingly participates in therapeutic activities  Barriers: Confused, Dementia, Impaired carryover of learning, Impaired insight/denial of deficits, Impaired functional cognition, Impulsive    Plan    Target use of memory book, simple attn, functional problem solving.     Speech Therapy Problems     Problem: Memory STGs     Dates: Start: 06/26/20       Goal: STG-Within one week, patient will     Dates: Start: 06/26/20       Description: 1) Individualized goal:  Recall new information related to pt's hospitalization with mod A required for use of functional memory strategies (I.e. memory notebook).    2) Interventions:  SLP Self Care / ADL Training , SLP Cognitive Skill Development, and SLP Group Treatment        Note:     Goal Note filed on 06/30/20 0750 by Kavon eDnt MS,CCC-SLP    Mod-max A required for pt to recall new information                         Problem: Problem Solving STGs      Dates: Start: 06/26/20       Goal: STG-Within one week, patient will     Dates: Start: 06/26/20       Description: 1) Individualized goal:  Complete simple attention tasks with min A required to achieve 80% accuracy.    2) Interventions:  SLP Self Care / ADL Training , SLP Cognitive Skill Development, and SLP Group Treatment        Note:     Goal Note filed on 06/30/20 0750 by Kavon Dent MS,CCC-SLP    Min-Mod A required for pt to complete simple attention tasks                         Problem: Speech/Swallowing LTGs     Dates: Start: 06/26/20       Goal: LTG-By discharge, patient will solve basic problems     Dates: Start: 06/26/20       Description: 1) Individualized goal:  With spv for a safe discharge home   2) Interventions:  SLP Self Care / ADL Training , SLP Cognitive Skill Development, and SLP Group Treatment

## 2020-07-03 NOTE — THERAPY
"Physical Therapy   Daily Treatment     Patient Name: Carlos Rivera  Age:  74 y.o., Sex:  male  Medical Record #: [id number]  Today's Date: 7/3/2020     Precautions  Precautions: Fall Risk  Comments: Baseline Dementia     Subjective    Pt found sleeping in bed upon arrival, required additional time to mobilize self to the EOB     Objective       07/03/20 1301   Precautions   Precautions Fall Risk   Comments baseline Dementia   Vitals   Pulse (!) 54   Patient BP Position Sitting   Blood Pressure  120/59   Pulse Oximetry 97 %   O2 (LPM) 0   O2 Delivery Device None - Room Air   Vitals Comments seated EOB-pt reporting dizziness    Pain 0 - 10 Group   Location Head   Comfort Goal Perform Activity   Therapist Pain Assessment Nurse Notified;Post Activity Pain Same as Prior to Activity  (did not quantify)   Cognition    Level of Consciousness Confused   Ability To Follow Commands 1 Step   Safety Awareness Impaired   New Learning Impaired   Attention Impaired   Comments poor attention, reqires redirection to task   Transfer Functional Level of Assist   Bed, Chair, Wheelchair Transfer Contact Guard Assist   Bed Chair Wheelchair Transfer Description Squat pivot transfer to wheelchair;Verbal cueing;Supervision for safety;Set-up of equipment;Increased time  (reach pivot transfer EOB > wc + set up)   Toilet Transfers Contact Guard Assist   Toilet Transfer Description Grab bar;Increased time;Supervision for safety;Verbal cueing   Bed Mobility    Supine to Sit Moderate Assist   Sit to Stand Minimal Assist   Scooting Minimal Assist   Neuro-Muscular Treatments   Neuro-Muscular Treatments Sequencing;Verbal Cuing;Postural Changes;Postural Facilitation   Comments treadmill training with harness for safety, no BWS needed. B UE support 2' at 0.8-0.9mph and 3' at 0.9-1.0 mph, seated rest between bouts vc for increased step length and \"quiet steps\"   Interdisciplinary Plan of Care Collaboration   IDT Collaboration with  Physical " "Therapist;Nursing;Occupational Therapist   Patient Position at End of Therapy Seated;Other (Comments)  (patient on commode, call ligth in reach, OT/RN aware)   Collaboration Comments RN: medication/HA, PT: tx ideas/CLOF, OT: transfer of care to OT/CLOF   PT Total Time Spent   PT Individual Total Time Spent (Mins) 60   PT Charge Group   PT Gait Training 1   PT Neuromuscular Re-Education / Balance 2   PT Therapeutic Activities 1   Supervising Physical Therapist Lian Snider       Assessment    Pt with fair tolerance to lite gait activity, he found the harness to be uncomfortable despite multiple attempts at adjustment.  Pt fatigued quickly with activity and required a seated rest breaks after 2' on the treadmill.  As pt fatigues increased festination noted. Pt unable to amb without UE support on treadmill. Overall the patient was limited by c/o HA and not feeling well, \"body aches\", RN notified    Strengths: Willingly participates in therapeutic activities  Barriers: Impaired activity tolerance, Impaired insight/denial of deficits, Impaired carryover of learning, Impaired balance(L LE foot pain reported)    Plan    Gait training with FWW vs no AD or walking sticks, treadmill training, postural re-ed, trial dual tasking with functional mobility, single leg balance activities, bed mobility/ transfer training, safety awareness, review standing therex program for strength       Physical Therapy Problems     Problem: Mobility     Dates: Start: 06/26/20       Goal: STG-Within one week, patient will ambulate household distance     Dates: Start: 06/26/20       Description: 1) Individualized goal:  150 ft with LRAD and CGA-SBA.  2) Interventions:  PT Group Therapy, PT Gait Training, PT Therapeutic Exercises, PT Neuro Re-Ed/Balance, PT Aquatic Therapy, PT Therapeutic Activity, and PT Evaluation      Note:     Goal Note filed on 06/30/20 1016 by Jose Conley, PT    Min A                  Goal: STG-Within one week, patient " will ambulate up/down a curb     Dates: Start: 06/26/20       Description: 1) Individualized goal:  150 ft with LRAD and CGA-SBA.  2) Interventions:  PT Group Therapy, PT Gait Training, PT Therapeutic Exercises, PT Neuro Re-Ed/Balance, PT Aquatic Therapy, PT Therapeutic Activity, and PT Evaluation    Note:     Goal Note filed on 06/30/20 1016 by Jose Conley, PT    NT dt focus on other goals                  Goal: STG-Within one week, patient will ascend and descend four to six stairs     Dates: Start: 06/26/20       Description: 1) Individualized goal:  with B rails and CGA.  2) Interventions:  PT Group Therapy, PT Gait Training, PT Therapeutic Exercises, PT Neuro Re-Ed/Balance, PT Aquatic Therapy, PT Therapeutic Activity, and PT Evaluation        Note:     Goal Note filed on 06/30/20 1016 by Jose Conley, PT    NT dt safety concerns                         Problem: Mobility Transfers     Dates: Start: 06/26/20       Goal: STG-Within one week, patient will perform bed mobility     Dates: Start: 06/26/20       Description: 1) Individualized goal:  with supervision.  2) Interventions:  PT Group Therapy, PT Gait Training, PT Therapeutic Exercises, PT Neuro Re-Ed/Balance, PT Aquatic Therapy, PT Therapeutic Activity, and PT Evaluation        Note:     Goal Note filed on 06/30/20 1016 by Jose Conley, PT    SPV - min A                  Goal: STG-Within one week, patient will transfer bed to chair     Dates: Start: 06/26/20       Description: 1) Individualized goal:  with LRAD and CGA-SBA.  2) Interventions:  PT Group Therapy, PT Gait Training, PT Therapeutic Exercises, PT Neuro Re-Ed/Balance, PT Aquatic Therapy, PT Therapeutic Activity, and PT Evaluation      Note:     Goal Note filed on 06/30/20 1016 by Jose Conley PT    Min A                        Problem: PT-Long Term Goals     Dates: Start: 06/26/20       Goal: LTG-By discharge, patient will ambulate     Dates: Start: 06/26/20        Description: 1) Individualized goal:  >150 ft with LRAD and supervision.  2) Interventions:  PT Group Therapy, PT Gait Training, PT Therapeutic Exercises, PT Neuro Re-Ed/Balance, PT Aquatic Therapy, PT Therapeutic Activity, and PT Evaluation              Goal: LTG-By discharge, patient will transfer one surface to another     Dates: Start: 06/26/20       Description: 1) Individualized goal: with LRAD and supervision.  2) Interventions:  PT Group Therapy, PT Gait Training, PT Therapeutic Exercises, PT Neuro Re-Ed/Balance, PT Aquatic Therapy, PT Therapeutic Activity, and PT Evaluation            Goal: LTG-By discharge, patient will ambulate up/down 4-6 stairs     Dates: Start: 06/26/20       Description: 1) Individualized goal: with B rails and supervision.  2) Interventions:  PT Group Therapy, PT Gait Training, PT Therapeutic Exercises, PT Neuro Re-Ed/Balance, PT Aquatic Therapy, PT Therapeutic Activity, and PT Evaluation          Goal: LTG-By discharge, patient will transfer in/out of a car     Dates: Start: 06/26/20       Description: 1) Individualized goal: with LRAD and supervision.  2) Interventions:  PT Group Therapy, PT Gait Training, PT Therapeutic Exercises, PT Neuro Re-Ed/Balance, PT Aquatic Therapy, PT Therapeutic Activity, and PT Evaluation          Goal: LTG-By discharge, patient will     Dates: Start: 06/26/20       Description: 1) Individualized goal: negotiate single curb with LRAD and supervision-SBA.  2) Interventions:  PT Group Therapy, PT Gait Training, PT Therapeutic Exercises, PT Neuro Re-Ed/Balance, PT Aquatic Therapy, PT Therapeutic Activity, and PT Evaluation          Goal: LTG-By discharge, patient will     Dates: Start: 06/26/20       Description: 1) Individualized goal: perform bed mobility mod I.  2) Interventions:  PT Group Therapy, PT Gait Training, PT Therapeutic Exercises, PT Neuro Re-Ed/Balance, PT Aquatic Therapy, PT Therapeutic Activity, and PT Evaluation

## 2020-07-04 ENCOUNTER — TELEPHONE (OUTPATIENT)
Dept: INPATIENT REHAB | Facility: REHABILITATION | Age: 75
End: 2020-07-04

## 2020-07-04 LAB
GLUCOSE BLD-MCNC: 133 MG/DL (ref 65–99)
GLUCOSE BLD-MCNC: 185 MG/DL (ref 65–99)
GLUCOSE BLD-MCNC: 188 MG/DL (ref 65–99)
GLUCOSE BLD-MCNC: 257 MG/DL (ref 65–99)
INR PPP: 3.22 (ref 0.87–1.13)
PROTHROMBIN TIME: 33.9 SEC (ref 12–14.6)

## 2020-07-04 PROCEDURE — 770010 HCHG ROOM/CARE - REHAB SEMI PRIVAT*

## 2020-07-04 PROCEDURE — A9270 NON-COVERED ITEM OR SERVICE: HCPCS | Performed by: HOSPITALIST

## 2020-07-04 PROCEDURE — 700102 HCHG RX REV CODE 250 W/ 637 OVERRIDE(OP): Performed by: PHYSICAL MEDICINE & REHABILITATION

## 2020-07-04 PROCEDURE — 700102 HCHG RX REV CODE 250 W/ 637 OVERRIDE(OP): Performed by: HOSPITALIST

## 2020-07-04 PROCEDURE — 36415 COLL VENOUS BLD VENIPUNCTURE: CPT

## 2020-07-04 PROCEDURE — 99232 SBSQ HOSP IP/OBS MODERATE 35: CPT | Performed by: HOSPITALIST

## 2020-07-04 PROCEDURE — 97116 GAIT TRAINING THERAPY: CPT

## 2020-07-04 PROCEDURE — A9270 NON-COVERED ITEM OR SERVICE: HCPCS | Performed by: PHYSICAL MEDICINE & REHABILITATION

## 2020-07-04 PROCEDURE — 97530 THERAPEUTIC ACTIVITIES: CPT

## 2020-07-04 PROCEDURE — 82962 GLUCOSE BLOOD TEST: CPT

## 2020-07-04 PROCEDURE — 97112 NEUROMUSCULAR REEDUCATION: CPT

## 2020-07-04 PROCEDURE — 85610 PROTHROMBIN TIME: CPT

## 2020-07-04 RX ORDER — INSULIN GLARGINE 100 [IU]/ML
12 INJECTION, SOLUTION SUBCUTANEOUS
Status: DISCONTINUED | OUTPATIENT
Start: 2020-07-04 | End: 2020-07-07

## 2020-07-04 RX ORDER — WARFARIN SODIUM 3 MG/1
3 TABLET ORAL
Status: COMPLETED | OUTPATIENT
Start: 2020-07-04 | End: 2020-07-04

## 2020-07-04 RX ADMIN — METFORMIN HYDROCHLORIDE 500 MG: 500 TABLET ORAL at 17:27

## 2020-07-04 RX ADMIN — INSULIN GLARGINE 12 UNITS: 100 INJECTION, SOLUTION SUBCUTANEOUS at 10:32

## 2020-07-04 RX ADMIN — OMEPRAZOLE 20 MG: 20 CAPSULE, DELAYED RELEASE ORAL at 07:55

## 2020-07-04 RX ADMIN — ATORVASTATIN CALCIUM 80 MG: 40 TABLET, FILM COATED ORAL at 20:21

## 2020-07-04 RX ADMIN — DOCUSATE SODIUM 50 MG AND SENNOSIDES 8.6 MG 2 TABLET: 8.6; 5 TABLET, FILM COATED ORAL at 07:55

## 2020-07-04 RX ADMIN — QUETIAPINE 50 MG: 25 TABLET ORAL at 17:27

## 2020-07-04 RX ADMIN — MELATONIN 1000 UNITS: at 07:55

## 2020-07-04 RX ADMIN — QUETIAPINE 50 MG: 25 TABLET ORAL at 20:21

## 2020-07-04 RX ADMIN — WARFARIN SODIUM 3 MG: 3 TABLET ORAL at 18:00

## 2020-07-04 RX ADMIN — CITALOPRAM HYDROBROMIDE 20 MG: 20 TABLET ORAL at 20:21

## 2020-07-04 RX ADMIN — METFORMIN HYDROCHLORIDE 500 MG: 500 TABLET ORAL at 07:55

## 2020-07-04 ASSESSMENT — GAIT ASSESSMENTS
DEVIATION: SHUFFLED GAIT
GAIT LEVEL OF ASSIST: CONTACT GUARD ASSIST
DISTANCE (FEET): 200
GAIT LEVEL OF ASSIST: CONTACT GUARD ASSIST
DEVIATION: SHUFFLED GAIT
DISTANCE (FEET): 110
ASSISTIVE DEVICE: NONE;FRONT WHEEL WALKER

## 2020-07-04 ASSESSMENT — ENCOUNTER SYMPTOMS
DIZZINESS: 0
NAUSEA: 0
HALLUCINATIONS: 0
PALPITATIONS: 0
SHORTNESS OF BREATH: 0
BLURRED VISION: 0
FEVER: 0
VOMITING: 0
HEADACHES: 0

## 2020-07-04 ASSESSMENT — ACTIVITIES OF DAILY LIVING (ADL)
BED_CHAIR_WHEELCHAIR_TRANSFER_DESCRIPTION: INCREASED TIME;SUPERVISION FOR SAFETY;VERBAL CUEING
TOILET_TRANSFER_DESCRIPTION: GRAB BAR;INCREASED TIME;VERBAL CUEING;SUPERVISION FOR SAFETY
BED_CHAIR_WHEELCHAIR_TRANSFER_DESCRIPTION: INCREASED TIME;SUPERVISION FOR SAFETY;VERBAL CUEING

## 2020-07-04 NOTE — THERAPY
Physical Therapy   Daily Treatment     Patient Name: Carlos Rivera  Age:  74 y.o., Sex:  male  Medical Record #: [id number]  Today's Date: 7/4/2020     Precautions  Precautions: (P) Fall Risk  Comments: (P) Baseline Dementia    Subjective    Patient with CNA in bathroom, agreeable to therapy.     Objective       07/04/20 0931   Precautions   Precautions Fall Risk   Comments Baseline Dementia   Sleep/Wake Cycle   Sleep & Rest Awake   Gait Functional Level of Assist    Gait Level Of Assist Contact Guard Assist  (to Min A)   Assistive Device None;Front Wheel Walker   Distance (Feet) 110   # of Times Distance was Traveled 3   Deviation Shuffled Gait  (festinating gait with anterior LOBs, unsteady )   Transfer Functional Level of Assist   Bed, Chair, Wheelchair Transfer Contact Guard Assist   Bed Chair Wheelchair Transfer Description Increased time;Supervision for safety;Verbal cueing  (stand step transfer with no AD)   Toilet Transfers Contact Guard Assist   Toilet Transfer Description Grab bar;Increased time;Verbal cueing;Supervision for safety   Supine Lower Body Exercise   Other Exercises Prone serratus press with neck extension 3x10, prone cervical rotation stretching self-directed   Bed Mobility    Supine to Sit Stand by Assist   Sit to Supine Stand by Assist   Sit to Stand Contact Guard Assist   Scooting Stand by Assist   Rolling Supervised   Neuro-Muscular Treatments   Comments Dynamic balance activities with no UE support and CGA, Min A with LOBs: blocked anterior stepping to tape target on floor 2x10 each leg leading, blocked lateral stepping to tape target on floor 2x10 each leg. Patient requiring redirection to task during rest breaks as well as while performing exercise, perseverating on balance impairments.    Interdisciplinary Plan of Care Collaboration   Patient Position at End of Therapy In Bed;Bed Alarm On;Call Light within Reach;Tray Table within Reach;Phone within Reach  (nursing present)   PT  Total Time Spent   PT Individual Total Time Spent (Mins) 60   PT Charge Group   PT Gait Training 1   PT Neuromuscular Re-Education / Balance 2   PT Therapeutic Activities 1     Donning of sneakers in sitting with SBA, requiring Min A to tie shoelaces.    Toilet transfers x2 with grab bar and SBA-CGA for LB dressing. Standing hand hygiene with no support and SBA-CGA.    Assessment    Patient tolerated session fairly, limited by toileting needs and patient's cognitive deficits. Patient with tendency to perseverate on his balance impairments, requiring education on purpose of balance training. Patient with improved balance and gait quality when cued to take larger steps, however poor carryover and quickly falls back into shuffling/festinating gait pattern, especially when navigating smaller spaces and approaching objects.    Strengths: Willingly participates in therapeutic activities  Barriers: Impaired activity tolerance, Impaired insight/denial of deficits, Impaired carryover of learning, Impaired balance(L LE foot pain reported)    Plan    Gait training with FWW vs no AD or walking sticks, treadmill training, postural re-ed, trial dual tasking with functional mobility, single leg balance activities, bed mobility/ transfer training, safety awareness, review standing therex program for strength    Physical Therapy Problems     Problem: Mobility     Dates: Start: 06/26/20       Goal: STG-Within one week, patient will ambulate household distance     Dates: Start: 06/26/20       Description: 1) Individualized goal:  150 ft with LRAD and CGA-SBA.  2) Interventions:  PT Group Therapy, PT Gait Training, PT Therapeutic Exercises, PT Neuro Re-Ed/Balance, PT Aquatic Therapy, PT Therapeutic Activity, and PT Evaluation      Note:     Goal Note filed on 06/30/20 1016 by Jose Conley, PT    Min A                  Goal: STG-Within one week, patient will ambulate up/down a curb     Dates: Start: 06/26/20       Description: 1)  Individualized goal:  150 ft with LRAD and CGA-SBA.  2) Interventions:  PT Group Therapy, PT Gait Training, PT Therapeutic Exercises, PT Neuro Re-Ed/Balance, PT Aquatic Therapy, PT Therapeutic Activity, and PT Evaluation    Note:     Goal Note filed on 06/30/20 1016 by Jose Conley, PT    NT dt focus on other goals                  Goal: STG-Within one week, patient will ascend and descend four to six stairs     Dates: Start: 06/26/20       Description: 1) Individualized goal:  with B rails and CGA.  2) Interventions:  PT Group Therapy, PT Gait Training, PT Therapeutic Exercises, PT Neuro Re-Ed/Balance, PT Aquatic Therapy, PT Therapeutic Activity, and PT Evaluation        Note:     Goal Note filed on 06/30/20 1016 by Jose Conley, PT    NT dt safety concerns                         Problem: Mobility Transfers     Dates: Start: 06/26/20       Goal: STG-Within one week, patient will perform bed mobility     Dates: Start: 06/26/20       Description: 1) Individualized goal:  with supervision.  2) Interventions:  PT Group Therapy, PT Gait Training, PT Therapeutic Exercises, PT Neuro Re-Ed/Balance, PT Aquatic Therapy, PT Therapeutic Activity, and PT Evaluation        Note:     Goal Note filed on 06/30/20 1016 by Jose Conley, PT    SPV - min A                  Goal: STG-Within one week, patient will transfer bed to chair     Dates: Start: 06/26/20       Description: 1) Individualized goal:  with LRAD and CGA-SBA.  2) Interventions:  PT Group Therapy, PT Gait Training, PT Therapeutic Exercises, PT Neuro Re-Ed/Balance, PT Aquatic Therapy, PT Therapeutic Activity, and PT Evaluation      Note:     Goal Note filed on 06/30/20 1016 by Jose Conley, PT    Min A                        Problem: PT-Long Term Goals     Dates: Start: 06/26/20       Goal: LTG-By discharge, patient will ambulate     Dates: Start: 06/26/20       Description: 1) Individualized goal:  >150 ft with LRAD and supervision.  2)  Interventions:  PT Group Therapy, PT Gait Training, PT Therapeutic Exercises, PT Neuro Re-Ed/Balance, PT Aquatic Therapy, PT Therapeutic Activity, and PT Evaluation              Goal: LTG-By discharge, patient will transfer one surface to another     Dates: Start: 06/26/20       Description: 1) Individualized goal: with LRAD and supervision.  2) Interventions:  PT Group Therapy, PT Gait Training, PT Therapeutic Exercises, PT Neuro Re-Ed/Balance, PT Aquatic Therapy, PT Therapeutic Activity, and PT Evaluation            Goal: LTG-By discharge, patient will ambulate up/down 4-6 stairs     Dates: Start: 06/26/20       Description: 1) Individualized goal: with B rails and supervision.  2) Interventions:  PT Group Therapy, PT Gait Training, PT Therapeutic Exercises, PT Neuro Re-Ed/Balance, PT Aquatic Therapy, PT Therapeutic Activity, and PT Evaluation          Goal: LTG-By discharge, patient will transfer in/out of a car     Dates: Start: 06/26/20       Description: 1) Individualized goal: with LRAD and supervision.  2) Interventions:  PT Group Therapy, PT Gait Training, PT Therapeutic Exercises, PT Neuro Re-Ed/Balance, PT Aquatic Therapy, PT Therapeutic Activity, and PT Evaluation          Goal: LTG-By discharge, patient will     Dates: Start: 06/26/20       Description: 1) Individualized goal: negotiate single curb with LRAD and supervision-SBA.  2) Interventions:  PT Group Therapy, PT Gait Training, PT Therapeutic Exercises, PT Neuro Re-Ed/Balance, PT Aquatic Therapy, PT Therapeutic Activity, and PT Evaluation          Goal: LTG-By discharge, patient will     Dates: Start: 06/26/20       Description: 1) Individualized goal: perform bed mobility mod I.  2) Interventions:  PT Group Therapy, PT Gait Training, PT Therapeutic Exercises, PT Neuro Re-Ed/Balance, PT Aquatic Therapy, PT Therapeutic Activity, and PT Evaluation

## 2020-07-04 NOTE — CARE PLAN
Problem: Communication  Goal: The ability to communicate needs accurately and effectively will improve  Outcome: PROGRESSING AS EXPECTED     Problem: Safety  Goal: Will remain free from injury  Outcome: PROGRESSING AS EXPECTED     Problem: Psychosocial Needs:  Goal: Level of anxiety will decrease  Outcome: PROGRESSING AS EXPECTED     Problem: Pain Management  Goal: Pain level will decrease to patient's comfort goal  Outcome: PROGRESSING AS EXPECTED

## 2020-07-04 NOTE — CARE PLAN
Problem: Safety  Goal: Will remain free from falls  Outcome: PROGRESSING AS EXPECTED  Intervention: Implement fall precautions  Flowsheets (Taken 7/4/2020 0257)  Bed Alarm: Yes - Alarm On  Environmental Precautions:   Treaded Slipper Socks on Patient   Personal Belongings, Wastebasket, Call Bell etc. in Easy Reach   Bed in Low Position  Chair/Bed Strip Alarm: Yes - Alarm On  Note: Safety measures reinforced, call light within easy reach, needs anticipated and attended. Pt free from fall and injury.     Problem: Infection  Goal: Will remain free from infection  Intervention: Assess signs and symptoms of infection  Note: Afebrile, hR 45 asymptomatic , denies chest pain and coughing . BP stable. No s/s of infection noted.     Problem: Pain Management  Goal: Pain level will decrease to patient's comfort goal  Intervention: Educate and implement non-pharmacologic comfort measures. Examples: relaxation, distration, play therapy, activity therapy, massage, etc.  Note: Assessed for pain , not noted.Denies any pain .      Problem: Acute Care of the Diabetic Patient  Goal: Optimal Outcome for the Diabetic Patient  Intervention: Monitor Glucose levels per MD Orders  Note: Finger stick monitored - F/S at HS- 263 , no coverage at night ordered. No s/s of hypo and hyperglycemia noted. Pt aware of s/s to watch and report.

## 2020-07-04 NOTE — THERAPY
Physical Therapy   Daily Treatment     Patient Name: Carlos Rivera  Age:  74 y.o., Sex:  male  Medical Record #: [id number]  Today's Date: 7/4/2020     Precautions  Precautions: (P) Fall Risk  Comments: (P) Baseline Dementia    Subjective    Patient in bed with wife present, reporting fatigue however agreeable to participate with encouragement.     Objective       07/04/20 1431   Precautions   Precautions Fall Risk   Comments Baseline Dementia   Sleep/Wake Cycle   Sleep & Rest Awake   Gait Functional Level of Assist    Gait Level Of Assist Contact Guard Assist  (to Min A)   Assistive Device None;Front Wheel Walker;Hand Held Assist   Distance (Feet) 200   # of Times Distance was Traveled 1   Deviation Shuffled Gait  (festinating gait with anterior LOBs, unsteady)   Transfer Functional Level of Assist   Bed, Chair, Wheelchair Transfer Contact Guard Assist   Bed Chair Wheelchair Transfer Description Increased time;Supervision for safety;Verbal cueing  (stand step transfer with no AD)   Bed Mobility    Supine to Sit Stand by Assist   Sit to Stand Contact Guard Assist   Scooting Stand by Assist   Rolling Supervised   Interdisciplinary Plan of Care Collaboration   IDT Collaboration with  Family / Caregiver   Patient Position at End of Therapy Seated;Chair Alarm On;Self Releasing Lap Belt Applied;Call Light within Reach;Tray Table within Reach;Phone within Reach;Family / Friend in Room  (wife present in room)   Collaboration Comments wife present for session   PT Total Time Spent   PT Individual Total Time Spent (Mins) 30   PT Charge Group   PT Gait Training 1   PT Therapeutic Activities 1       Assessment    Patient tolerated session fairly, reporting fatigue and soreness in feet. Attempted donning sneakers however due to abnormal foot anatomy unable to tolerate this afternoon. Patient's wife very verbose requiring extra time for education and redirection to task.    Strengths: Willingly participates in therapeutic  activities  Barriers: Impaired activity tolerance, Impaired insight/denial of deficits, Impaired carryover of learning, Impaired balance(L LE foot pain reported)    Plan    Gait training with FWW vs no AD or walking sticks, treadmill training, postural re-ed, trial dual tasking with functional mobility, single leg balance activities, bed mobility/ transfer training, safety awareness, review standing therex program for strength    Physical Therapy Problems     Problem: Mobility     Dates: Start: 06/26/20       Goal: STG-Within one week, patient will ambulate household distance     Dates: Start: 06/26/20       Description: 1) Individualized goal:  150 ft with LRAD and CGA-SBA.  2) Interventions:  PT Group Therapy, PT Gait Training, PT Therapeutic Exercises, PT Neuro Re-Ed/Balance, PT Aquatic Therapy, PT Therapeutic Activity, and PT Evaluation      Note:     Goal Note filed on 06/30/20 1016 by Jose Conley PT    Torito MURPHY                  Goal: STG-Within one week, patient will ambulate up/down a curb     Dates: Start: 06/26/20       Description: 1) Individualized goal:  150 ft with LRAD and CGA-SBA.  2) Interventions:  PT Group Therapy, PT Gait Training, PT Therapeutic Exercises, PT Neuro Re-Ed/Balance, PT Aquatic Therapy, PT Therapeutic Activity, and PT Evaluation    Note:     Goal Note filed on 06/30/20 1016 by Jose Conley PT    NT dt focus on other goals                  Goal: STG-Within one week, patient will ascend and descend four to six stairs     Dates: Start: 06/26/20       Description: 1) Individualized goal:  with B rails and CGA.  2) Interventions:  PT Group Therapy, PT Gait Training, PT Therapeutic Exercises, PT Neuro Re-Ed/Balance, PT Aquatic Therapy, PT Therapeutic Activity, and PT Evaluation        Note:     Goal Note filed on 06/30/20 1016 by Jose Conley PT    NT dt safety concerns                         Problem: Mobility Transfers     Dates: Start: 06/26/20       Goal:  STG-Within one week, patient will perform bed mobility     Dates: Start: 06/26/20       Description: 1) Individualized goal:  with supervision.  2) Interventions:  PT Group Therapy, PT Gait Training, PT Therapeutic Exercises, PT Neuro Re-Ed/Balance, PT Aquatic Therapy, PT Therapeutic Activity, and PT Evaluation        Note:     Goal Note filed on 06/30/20 1016 by Jose Conley, PT    SPV - min A                  Goal: STG-Within one week, patient will transfer bed to chair     Dates: Start: 06/26/20       Description: 1) Individualized goal:  with LRAD and CGA-SBA.  2) Interventions:  PT Group Therapy, PT Gait Training, PT Therapeutic Exercises, PT Neuro Re-Ed/Balance, PT Aquatic Therapy, PT Therapeutic Activity, and PT Evaluation      Note:     Goal Note filed on 06/30/20 1016 by Jose Conley PT    Min A                        Problem: PT-Long Term Goals     Dates: Start: 06/26/20       Goal: LTG-By discharge, patient will ambulate     Dates: Start: 06/26/20       Description: 1) Individualized goal:  >150 ft with LRAD and supervision.  2) Interventions:  PT Group Therapy, PT Gait Training, PT Therapeutic Exercises, PT Neuro Re-Ed/Balance, PT Aquatic Therapy, PT Therapeutic Activity, and PT Evaluation              Goal: LTG-By discharge, patient will transfer one surface to another     Dates: Start: 06/26/20       Description: 1) Individualized goal: with LRAD and supervision.  2) Interventions:  PT Group Therapy, PT Gait Training, PT Therapeutic Exercises, PT Neuro Re-Ed/Balance, PT Aquatic Therapy, PT Therapeutic Activity, and PT Evaluation            Goal: LTG-By discharge, patient will ambulate up/down 4-6 stairs     Dates: Start: 06/26/20       Description: 1) Individualized goal: with B rails and supervision.  2) Interventions:  PT Group Therapy, PT Gait Training, PT Therapeutic Exercises, PT Neuro Re-Ed/Balance, PT Aquatic Therapy, PT Therapeutic Activity, and PT Evaluation          Goal:  LTG-By discharge, patient will transfer in/out of a car     Dates: Start: 06/26/20       Description: 1) Individualized goal: with LRAD and supervision.  2) Interventions:  PT Group Therapy, PT Gait Training, PT Therapeutic Exercises, PT Neuro Re-Ed/Balance, PT Aquatic Therapy, PT Therapeutic Activity, and PT Evaluation          Goal: LTG-By discharge, patient will     Dates: Start: 06/26/20       Description: 1) Individualized goal: negotiate single curb with LRAD and supervision-SBA.  2) Interventions:  PT Group Therapy, PT Gait Training, PT Therapeutic Exercises, PT Neuro Re-Ed/Balance, PT Aquatic Therapy, PT Therapeutic Activity, and PT Evaluation          Goal: LTG-By discharge, patient will     Dates: Start: 06/26/20       Description: 1) Individualized goal: perform bed mobility mod I.  2) Interventions:  PT Group Therapy, PT Gait Training, PT Therapeutic Exercises, PT Neuro Re-Ed/Balance, PT Aquatic Therapy, PT Therapeutic Activity, and PT Evaluation

## 2020-07-05 LAB
GLUCOSE BLD-MCNC: 148 MG/DL (ref 65–99)
GLUCOSE BLD-MCNC: 171 MG/DL (ref 65–99)
GLUCOSE BLD-MCNC: 245 MG/DL (ref 65–99)
INR PPP: 2.52 (ref 0.87–1.13)
PROTHROMBIN TIME: 27.9 SEC (ref 12–14.6)
T4 FREE SERPL-MCNC: 0.93 NG/DL (ref 0.93–1.7)
TROPONIN T SERPL-MCNC: 68 NG/L (ref 6–19)
TSH SERPL DL<=0.005 MIU/L-ACNC: 1.46 UIU/ML (ref 0.38–5.33)

## 2020-07-05 PROCEDURE — 700102 HCHG RX REV CODE 250 W/ 637 OVERRIDE(OP): Performed by: HOSPITALIST

## 2020-07-05 PROCEDURE — 82962 GLUCOSE BLOOD TEST: CPT

## 2020-07-05 PROCEDURE — A9270 NON-COVERED ITEM OR SERVICE: HCPCS | Performed by: HOSPITALIST

## 2020-07-05 PROCEDURE — A9270 NON-COVERED ITEM OR SERVICE: HCPCS | Performed by: PHYSICAL MEDICINE & REHABILITATION

## 2020-07-05 PROCEDURE — 99232 SBSQ HOSP IP/OBS MODERATE 35: CPT | Performed by: HOSPITALIST

## 2020-07-05 PROCEDURE — 85610 PROTHROMBIN TIME: CPT

## 2020-07-05 PROCEDURE — 700102 HCHG RX REV CODE 250 W/ 637 OVERRIDE(OP): Performed by: PHYSICAL MEDICINE & REHABILITATION

## 2020-07-05 PROCEDURE — 84484 ASSAY OF TROPONIN QUANT: CPT

## 2020-07-05 PROCEDURE — 84443 ASSAY THYROID STIM HORMONE: CPT

## 2020-07-05 PROCEDURE — 36415 COLL VENOUS BLD VENIPUNCTURE: CPT

## 2020-07-05 PROCEDURE — 770010 HCHG ROOM/CARE - REHAB SEMI PRIVAT*

## 2020-07-05 PROCEDURE — 84439 ASSAY OF FREE THYROXINE: CPT

## 2020-07-05 RX ORDER — WARFARIN SODIUM 5 MG/1
5 TABLET ORAL
Status: COMPLETED | OUTPATIENT
Start: 2020-07-05 | End: 2020-07-05

## 2020-07-05 RX ADMIN — WARFARIN SODIUM 5 MG: 5 TABLET ORAL at 17:57

## 2020-07-05 RX ADMIN — CITALOPRAM HYDROBROMIDE 20 MG: 20 TABLET ORAL at 19:11

## 2020-07-05 RX ADMIN — METFORMIN HYDROCHLORIDE 500 MG: 500 TABLET ORAL at 08:16

## 2020-07-05 RX ADMIN — OMEPRAZOLE 20 MG: 20 CAPSULE, DELAYED RELEASE ORAL at 08:16

## 2020-07-05 RX ADMIN — QUETIAPINE 50 MG: 25 TABLET ORAL at 19:10

## 2020-07-05 RX ADMIN — INSULIN GLARGINE 12 UNITS: 100 INJECTION, SOLUTION SUBCUTANEOUS at 07:27

## 2020-07-05 RX ADMIN — QUETIAPINE 50 MG: 25 TABLET ORAL at 17:15

## 2020-07-05 RX ADMIN — LISINOPRIL 20 MG: 20 TABLET ORAL at 05:18

## 2020-07-05 RX ADMIN — MELATONIN 1000 UNITS: at 08:16

## 2020-07-05 RX ADMIN — TRAZODONE HYDROCHLORIDE 50 MG: 50 TABLET ORAL at 00:37

## 2020-07-05 RX ADMIN — ATORVASTATIN CALCIUM 80 MG: 40 TABLET, FILM COATED ORAL at 19:10

## 2020-07-05 RX ADMIN — METFORMIN HYDROCHLORIDE 500 MG: 500 TABLET ORAL at 17:15

## 2020-07-05 ASSESSMENT — ENCOUNTER SYMPTOMS
BLURRED VISION: 0
DIZZINESS: 0
COUGH: 0
FEVER: 0
DIARRHEA: 0
NERVOUS/ANXIOUS: 0

## 2020-07-05 ASSESSMENT — FIBROSIS 4 INDEX: FIB4 SCORE: 1.81

## 2020-07-05 NOTE — PROGRESS NOTES
Inpatient Anticoagulation Service Note    Date: 7/4/2020    Reason for Anticoagulation: Mechanical Mitral Valve Replacement     Target INR: 2.5 to 3.5         Hemoglobin Value: 15.4  Hematocrit Value: 46  Lab Platelet Value: (!) 153    INR from last 7 days     Date/Time INR Value    07/04/20 0535  (!) 3.22    07/03/20 0514  (!) 3.74    07/02/20 0539  (!) 3.3    07/01/20 0550  (!) 2.47    06/30/20 0552  (!) 1.78    06/29/20 0535  (!) 1.88    06/28/20 0541  (!) 2.6        Dose from last 7 days     Date/Time Dose (mg)    07/04/20 1700  3    07/03/20 0514  1    07/02/20 0539  4    07/01/20 0550  5    06/30/20 0552  7.5    06/29/20 0535  7.5    06/28/20 1341  3        Significant Interactions: Proton Pump Inhibitor, Statin(Seroquel, Celexa)  Bridge Therapy: No (If less than 5 days and overlap therapy discontinued -- document reason (i.e. Bleed Risk))    (If still on overlap therapy, if No -- document reason (i.e. Bleed Risk))         Plan:  Warfarin 3 mg PO today       Chance Pham, PharmD

## 2020-07-05 NOTE — CARE PLAN
Problem: Acute Care of the Diabetic Patient  Goal: Optimal Outcome for the Diabetic Patient  Note: F/s ac and HS- 257, no night coverage ordered. Cont monitored.

## 2020-07-05 NOTE — CARE PLAN
Problem: Safety  Goal: Will remain free from falls  Outcome: PROGRESSING AS EXPECTED  Intervention: Implement fall precautions  Flowsheets (Taken 7/5/2020 0248)  Bed Alarm: Yes - Alarm On  Environmental Precautions:   Treaded Slipper Socks on Patient   Personal Belongings, Wastebasket, Call Bell etc. in Easy Reach   Transferred to [facility] Side   Bed in Low Position   Communication Sign for Patients & Families  Chair/Bed Strip Alarm: Yes - Alarm On     Problem: Infection  Goal: Will remain free from infection  Outcome: PROGRESSING AS EXPECTED     Problem: Bowel/Gastric:  Goal: Normal bowel function is maintained or improved  Outcome: PROGRESSING AS EXPECTED     Problem: Pain Management  Goal: Pain level will decrease to patient's comfort goal  Outcome: PROGRESSING AS EXPECTED     Problem: Infection  Goal: Will remain free from infection  Outcome: PROGRESSING AS EXPECTED     Problem: Bowel/Gastric:  Goal: Normal bowel function is maintained or improved  Outcome: PROGRESSING AS EXPECTED

## 2020-07-05 NOTE — CARE PLAN
Problem: Communication  Goal: The ability to communicate needs accurately and effectively will improve  7/5/2020 1302 by Bernadine Hernandez R.N.  Outcome: PROGRESSING SLOWER THAN EXPECTED  Patient has been reoriented to fall risk education throughout the day because he continues to be impulsive by getting out of the bed and or chair without utilizing the call light. Bed and chair alarms on and patient is near nursing station.      Problem: Safety  Goal: Will remain free from falls  Outcome: PROGRESSING AS EXPECTED  Patient has not had a fall even though he has failed to use call light several times when getting up. Staff has intervened several times to assist him as he was getting up with the alarm going off.

## 2020-07-05 NOTE — PROGRESS NOTES
Hospital Medicine Daily Progress Note      Chief Complaint:  Hypertension  Diabetes    Interval History:  No significant events or changes since last visit    Review of Systems  Review of Systems   Constitutional: Negative for fever.   Eyes: Negative for blurred vision.   Respiratory: Negative for cough.    Cardiovascular: Negative for chest pain.   Gastrointestinal: Negative for diarrhea.   Musculoskeletal: Negative for joint pain.   Neurological: Negative for dizziness.   Psychiatric/Behavioral: The patient is not nervous/anxious.         Physical Exam  Temp:  [36.3 °C (97.3 °F)-36.8 °C (98.3 °F)] 36.3 °C (97.3 °F)  Pulse:  [49-60] 50  Resp:  [12-18] 18  BP: (121-149)/(65-78) 130/73  SpO2:  [95 %] 95 %    Physical Exam  Vitals signs and nursing note reviewed.   Constitutional:       Appearance: He is not diaphoretic.   HENT:      Mouth/Throat:      Pharynx: No oropharyngeal exudate or posterior oropharyngeal erythema.   Eyes:      Extraocular Movements: Extraocular movements intact.   Neck:      Vascular: No carotid bruit.   Cardiovascular:      Rate and Rhythm: Normal rate and regular rhythm.   Pulmonary:      Effort: Pulmonary effort is normal.      Breath sounds: No wheezing or rales.   Abdominal:      General: There is no distension.      Palpations: Abdomen is soft.      Tenderness: There is no abdominal tenderness.   Musculoskeletal:      Right lower leg: No edema.      Left lower leg: No edema.   Skin:     General: Skin is warm and dry.   Neurological:      Mental Status: He is alert and oriented to person, place, and time.      Comments: Has some memory loss   Psychiatric:         Mood and Affect: Mood normal.         Behavior: Behavior normal.         Fluids    Intake/Output Summary (Last 24 hours) at 7/5/2020 0839  Last data filed at 7/4/2020 1317  Gross per 24 hour   Intake 440 ml   Output --   Net 440 ml       Laboratory          Recent Labs     07/03/20  0514 07/04/20  0535 07/05/20  0522   INR 3.74*  "3.22* 2.52*               Assessment/Plan  Hypertension- (present on admission)  Assessment & Plan  BP ok  On Lisinopril: 20 mg daily  Monitor    History of mitral valve replacement with mechanical valve [Z95.2]- (present on admission)  Assessment & Plan  On Coumadin    Type II diabetes mellitus (HCC)- (present on admission)  Assessment & Plan  Hba1c: 6.7 (6/26)  BS recently 171-257  On Lantus: 12 units qam (7/4)  On Metformin 500 mg bid (7/1)  On accuchecks without night time SS coverage (6/30)  Note: home meds include Lantus 15 units and Metformin 1000 mg bid  Monitor another day since meds were recently adjusted (7/5)    Bradycardia  Assessment & Plan  HR labile: 49-72  Asymptomatic  Denies CP  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), consider inferior infarct and anterior MI  TropI: 108 (6/23) --> 147 (7/1) --> 164 (7/2) --> 68 (7/5)  BNP: 359  TFT's: wnl (7/5)  Echo (7/3): no change from prior; EF 55%, RVSP 30  Note: needs outpatient f/u with Cardio  Monitor    Depression  Assessment & Plan  On Celexa    Vitamin D deficiency  Assessment & Plan  Vit D: 27  On supplements    TIA (transient ischemic attack)  Assessment & Plan  Pt with dysarthria, now resolving  Neuroimaging negative acute  On Coumadin  On Lipitor    Mild cognitive impairment- (present on admission)  Assessment & Plan  Pt reports \"forgetfulness\" that's been ongoing for the past 2 yrs    Anxiety- (present on admission)  Assessment & Plan  On Celexa and Seroquel    "

## 2020-07-06 LAB
GLUCOSE BLD-MCNC: 139 MG/DL (ref 65–99)
GLUCOSE BLD-MCNC: 140 MG/DL (ref 65–99)
GLUCOSE BLD-MCNC: 189 MG/DL (ref 65–99)
GLUCOSE BLD-MCNC: 199 MG/DL (ref 65–99)
INR PPP: 2.13 (ref 0.87–1.13)
PROTHROMBIN TIME: 24.5 SEC (ref 12–14.6)

## 2020-07-06 PROCEDURE — A9270 NON-COVERED ITEM OR SERVICE: HCPCS | Performed by: HOSPITALIST

## 2020-07-06 PROCEDURE — 99232 SBSQ HOSP IP/OBS MODERATE 35: CPT | Performed by: HOSPITALIST

## 2020-07-06 PROCEDURE — 97530 THERAPEUTIC ACTIVITIES: CPT

## 2020-07-06 PROCEDURE — 99232 SBSQ HOSP IP/OBS MODERATE 35: CPT | Performed by: PHYSICAL MEDICINE & REHABILITATION

## 2020-07-06 PROCEDURE — 770010 HCHG ROOM/CARE - REHAB SEMI PRIVAT*

## 2020-07-06 PROCEDURE — 97535 SELF CARE MNGMENT TRAINING: CPT

## 2020-07-06 PROCEDURE — 36415 COLL VENOUS BLD VENIPUNCTURE: CPT

## 2020-07-06 PROCEDURE — 700102 HCHG RX REV CODE 250 W/ 637 OVERRIDE(OP): Performed by: PHYSICAL MEDICINE & REHABILITATION

## 2020-07-06 PROCEDURE — 85610 PROTHROMBIN TIME: CPT

## 2020-07-06 PROCEDURE — A9270 NON-COVERED ITEM OR SERVICE: HCPCS | Performed by: PHYSICAL MEDICINE & REHABILITATION

## 2020-07-06 PROCEDURE — 97129 THER IVNTJ 1ST 15 MIN: CPT

## 2020-07-06 PROCEDURE — 97116 GAIT TRAINING THERAPY: CPT

## 2020-07-06 PROCEDURE — 82962 GLUCOSE BLOOD TEST: CPT | Mod: 91

## 2020-07-06 PROCEDURE — 700102 HCHG RX REV CODE 250 W/ 637 OVERRIDE(OP): Performed by: HOSPITALIST

## 2020-07-06 PROCEDURE — 97130 THER IVNTJ EA ADDL 15 MIN: CPT

## 2020-07-06 RX ORDER — POLYETHYLENE GLYCOL 3350 17 G/17G
1 POWDER, FOR SOLUTION ORAL
Status: DISCONTINUED | OUTPATIENT
Start: 2020-07-06 | End: 2020-07-08

## 2020-07-06 RX ORDER — AMOXICILLIN 250 MG
2 CAPSULE ORAL 2 TIMES DAILY PRN
Status: DISCONTINUED | OUTPATIENT
Start: 2020-07-06 | End: 2020-07-08

## 2020-07-06 RX ORDER — BISACODYL 10 MG
10 SUPPOSITORY, RECTAL RECTAL
Status: DISCONTINUED | OUTPATIENT
Start: 2020-07-06 | End: 2020-07-08

## 2020-07-06 RX ORDER — WARFARIN SODIUM 3 MG/1
6 TABLET ORAL
Status: COMPLETED | OUTPATIENT
Start: 2020-07-06 | End: 2020-07-06

## 2020-07-06 RX ORDER — QUETIAPINE FUMARATE 25 MG/1
50 TABLET, FILM COATED ORAL DAILY
Status: DISCONTINUED | OUTPATIENT
Start: 2020-07-07 | End: 2020-07-09 | Stop reason: HOSPADM

## 2020-07-06 RX ADMIN — METFORMIN HYDROCHLORIDE 750 MG: 500 TABLET ORAL at 17:44

## 2020-07-06 RX ADMIN — DOCUSATE SODIUM 50 MG AND SENNOSIDES 8.6 MG 2 TABLET: 8.6; 5 TABLET, FILM COATED ORAL at 20:03

## 2020-07-06 RX ADMIN — OMEPRAZOLE 20 MG: 20 CAPSULE, DELAYED RELEASE ORAL at 08:12

## 2020-07-06 RX ADMIN — TRAZODONE HYDROCHLORIDE 50 MG: 50 TABLET ORAL at 00:44

## 2020-07-06 RX ADMIN — CITALOPRAM HYDROBROMIDE 20 MG: 20 TABLET ORAL at 20:03

## 2020-07-06 RX ADMIN — METFORMIN HYDROCHLORIDE 500 MG: 500 TABLET ORAL at 08:12

## 2020-07-06 RX ADMIN — DOCUSATE SODIUM 50 MG AND SENNOSIDES 8.6 MG 2 TABLET: 8.6; 5 TABLET, FILM COATED ORAL at 08:12

## 2020-07-06 RX ADMIN — METFORMIN HYDROCHLORIDE 250 MG: 500 TABLET ORAL at 11:48

## 2020-07-06 RX ADMIN — QUETIAPINE 50 MG: 25 TABLET ORAL at 20:03

## 2020-07-06 RX ADMIN — INSULIN GLARGINE 12 UNITS: 100 INJECTION, SOLUTION SUBCUTANEOUS at 07:45

## 2020-07-06 RX ADMIN — QUETIAPINE 50 MG: 25 TABLET ORAL at 17:44

## 2020-07-06 RX ADMIN — WARFARIN SODIUM 6 MG: 3 TABLET ORAL at 17:44

## 2020-07-06 RX ADMIN — LISINOPRIL 20 MG: 20 TABLET ORAL at 05:12

## 2020-07-06 RX ADMIN — ATORVASTATIN CALCIUM 80 MG: 40 TABLET, FILM COATED ORAL at 20:03

## 2020-07-06 RX ADMIN — MELATONIN 1000 UNITS: at 08:12

## 2020-07-06 ASSESSMENT — GAIT ASSESSMENTS
DISTANCE (FEET): 250
ASSISTIVE DEVICE: FRONT WHEEL WALKER;NONE
GAIT LEVEL OF ASSIST: CONTACT GUARD ASSIST
DEVIATION: SHUFFLED GAIT

## 2020-07-06 ASSESSMENT — ENCOUNTER SYMPTOMS
SHORTNESS OF BREATH: 0
DIARRHEA: 0
VOMITING: 0
ABDOMINAL PAIN: 0
NAUSEA: 0
FEVER: 0
NERVOUS/ANXIOUS: 0
CHILLS: 0

## 2020-07-06 ASSESSMENT — ACTIVITIES OF DAILY LIVING (ADL): BED_CHAIR_WHEELCHAIR_TRANSFER_DESCRIPTION: INCREASED TIME;SUPERVISION FOR SAFETY;VERBAL CUEING;ADAPTIVE EQUIPMENT

## 2020-07-06 NOTE — PROGRESS NOTES
Received patient on bed. Awake alert. Patient verbalized no pain. Patient not in cp distress. Patient is impulsive. Strip bed alarm on. Patient verbalized will not get up without assist. Call button within reach. Safety and fall precaution reinforced.

## 2020-07-06 NOTE — THERAPY
Physical Therapy   Daily Treatment     Patient Name: Carlos Rivera  Age:  74 y.o., Sex:  male  Medical Record #: [id number]  Today's Date: 7/6/2020     Precautions  Precautions: (P) Fall Risk  Comments: (P) Baseline Dementia    Subjective    Patient seated in w/c and agreeable to therapy.     Objective       07/06/20 1401   Precautions   Precautions Fall Risk   Comments Baseline Dementia   Gait Functional Level of Assist    Gait Level Of Assist Contact Guard Assist  (to SBA)   Assistive Device Front Wheel Walker;None   Distance (Feet) 250   # of Times Distance was Traveled 2   Deviation Shuffled Gait  (festinating gait with anterior LOBs, unsteady )   Transfer Functional Level of Assist   Bed, Chair, Wheelchair Transfer Stand by Assist  (to CGA)   Bed Chair Wheelchair Transfer Description Increased time;Supervision for safety;Verbal cueing;Adaptive equipment  (stand step transfer with FWW)   Sitting Lower Body Exercises   Sit to Stand 1 set of 10  (elevated bed, cues to reduce retropulsion)   Bed Mobility    Supine to Sit Stand by Assist   Sit to Supine Stand by Assist   Sit to Stand Stand by Assist  (to CGA)   Scooting Stand by Assist   Rolling Supervised   Interdisciplinary Plan of Care Collaboration   Patient Position at End of Therapy In Bed;Bed Alarm On;Call Light within Reach;Tray Table within Reach;Phone within Reach   PT Total Time Spent   PT Individual Total Time Spent (Mins) 60   PT Charge Group   PT Gait Training 2   PT Therapeutic Activities 2     Car transfer x2 on passenger and  side, requiring extra time for sequencing and sit<>stand.    Toilet transfer with grab bar and FWW, SBA-CGA.    Assessment    Patient tolerated session well, perseverating on performing car transfer in [place]. Demonstrating improved stability with FWW however continues to experience festinating gait and mild LOBs.    Strengths: Willingly participates in therapeutic activities  Barriers: Impaired activity tolerance,  Impaired insight/denial of deficits, Impaired carryover of learning, Impaired balance(L LE foot pain reported)    Plan    Gait training with FWW, treadmill training, postural re-ed, trial dual tasking with functional mobility, single leg balance activities, bed mobility/ transfer training, safety awareness, review standing therex program for strength    Physical Therapy Problems     Problem: Mobility     Dates: Start: 06/26/20       Goal: STG-Within one week, patient will ambulate household distance     Dates: Start: 06/26/20       Description: 1) Individualized goal:  150 ft with LRAD and CGA-SBA.  2) Interventions:  PT Group Therapy, PT Gait Training, PT Therapeutic Exercises, PT Neuro Re-Ed/Balance, PT Aquatic Therapy, PT Therapeutic Activity, and PT Evaluation      Note:     Goal Note filed on 06/30/20 1016 by JANETT Ying                  Goal: STG-Within one week, patient will ambulate up/down a curb     Dates: Start: 06/26/20       Description: 1) Individualized goal:  150 ft with LRAD and CGA-SBA.  2) Interventions:  PT Group Therapy, PT Gait Training, PT Therapeutic Exercises, PT Neuro Re-Ed/Balance, PT Aquatic Therapy, PT Therapeutic Activity, and PT Evaluation    Note:     Goal Note filed on 06/30/20 1016 by Jose Conley, PT    NT dt focus on other goals                  Goal: STG-Within one week, patient will ascend and descend four to six stairs     Dates: Start: 06/26/20       Description: 1) Individualized goal:  with B rails and CGA.  2) Interventions:  PT Group Therapy, PT Gait Training, PT Therapeutic Exercises, PT Neuro Re-Ed/Balance, PT Aquatic Therapy, PT Therapeutic Activity, and PT Evaluation        Note:     Goal Note filed on 06/30/20 1016 by Jose Conley, PT    NT dt safety concerns                         Problem: Mobility Transfers     Dates: Start: 06/26/20       Goal: STG-Within one week, patient will perform bed mobility     Dates: Start: 06/26/20        Description: 1) Individualized goal:  with supervision.  2) Interventions:  PT Group Therapy, PT Gait Training, PT Therapeutic Exercises, PT Neuro Re-Ed/Balance, PT Aquatic Therapy, PT Therapeutic Activity, and PT Evaluation        Note:     Goal Note filed on 06/30/20 1016 by Jose Conley PT    SPV - min A                  Goal: STG-Within one week, patient will transfer bed to chair     Dates: Start: 06/26/20       Description: 1) Individualized goal:  with LRAD and CGA-SBA.  2) Interventions:  PT Group Therapy, PT Gait Training, PT Therapeutic Exercises, PT Neuro Re-Ed/Balance, PT Aquatic Therapy, PT Therapeutic Activity, and PT Evaluation      Note:     Goal Note filed on 06/30/20 1016 by JANETT Ying                        Problem: PT-Long Term Goals     Dates: Start: 06/26/20       Goal: LTG-By discharge, patient will ambulate     Dates: Start: 06/26/20       Description: 1) Individualized goal:  >150 ft with LRAD and supervision.  2) Interventions:  PT Group Therapy, PT Gait Training, PT Therapeutic Exercises, PT Neuro Re-Ed/Balance, PT Aquatic Therapy, PT Therapeutic Activity, and PT Evaluation              Goal: LTG-By discharge, patient will transfer one surface to another     Dates: Start: 06/26/20       Description: 1) Individualized goal: with LRAD and supervision.  2) Interventions:  PT Group Therapy, PT Gait Training, PT Therapeutic Exercises, PT Neuro Re-Ed/Balance, PT Aquatic Therapy, PT Therapeutic Activity, and PT Evaluation            Goal: LTG-By discharge, patient will ambulate up/down 4-6 stairs     Dates: Start: 06/26/20       Description: 1) Individualized goal: with B rails and supervision.  2) Interventions:  PT Group Therapy, PT Gait Training, PT Therapeutic Exercises, PT Neuro Re-Ed/Balance, PT Aquatic Therapy, PT Therapeutic Activity, and PT Evaluation          Goal: LTG-By discharge, patient will transfer in/out of a car     Dates: Start: 06/26/20        Description: 1) Individualized goal: with LRAD and supervision.  2) Interventions:  PT Group Therapy, PT Gait Training, PT Therapeutic Exercises, PT Neuro Re-Ed/Balance, PT Aquatic Therapy, PT Therapeutic Activity, and PT Evaluation          Goal: LTG-By discharge, patient will     Dates: Start: 06/26/20       Description: 1) Individualized goal: negotiate single curb with LRAD and supervision-SBA.  2) Interventions:  PT Group Therapy, PT Gait Training, PT Therapeutic Exercises, PT Neuro Re-Ed/Balance, PT Aquatic Therapy, PT Therapeutic Activity, and PT Evaluation          Goal: LTG-By discharge, patient will     Dates: Start: 06/26/20       Description: 1) Individualized goal: perform bed mobility mod I.  2) Interventions:  PT Group Therapy, PT Gait Training, PT Therapeutic Exercises, PT Neuro Re-Ed/Balance, PT Aquatic Therapy, PT Therapeutic Activity, and PT Evaluation

## 2020-07-06 NOTE — PROGRESS NOTES
Hospital Medicine Daily Progress Note      Chief Complaint:  Hypertension  Diabetes    Interval History:  No significant events or changes since last visit    Review of Systems  Review of Systems   Constitutional: Negative for chills and fever.   Respiratory: Negative for shortness of breath.    Cardiovascular: Negative for chest pain.   Gastrointestinal: Negative for abdominal pain, diarrhea, nausea and vomiting.   Psychiatric/Behavioral: The patient is not nervous/anxious.         Physical Exam  Temp:  [36.3 °C (97.3 °F)-36.7 °C (98.1 °F)] 36.3 °C (97.3 °F)  Pulse:  [51-58] 58  Resp:  [12-18] 18  BP: (122-152)/(52-81) 122/75  SpO2:  [94 %] 94 %    Physical Exam  Vitals signs and nursing note reviewed.   Constitutional:       Appearance: Normal appearance.   HENT:      Head: Atraumatic.   Eyes:      Conjunctiva/sclera: Conjunctivae normal.      Pupils: Pupils are equal, round, and reactive to light.   Neck:      Musculoskeletal: Normal range of motion and neck supple.   Cardiovascular:      Rate and Rhythm: Normal rate and regular rhythm.      Heart sounds: No murmur.   Pulmonary:      Effort: Pulmonary effort is normal.      Breath sounds: No stridor. No wheezing or rales.   Abdominal:      General: There is no distension.      Palpations: Abdomen is soft.      Tenderness: There is no abdominal tenderness.   Musculoskeletal:      Right lower leg: No edema.      Left lower leg: No edema.   Skin:     General: Skin is warm and dry.      Findings: No rash.   Neurological:      Mental Status: He is alert and oriented to person, place, and time.      Comments: Has some memory loss   Psychiatric:         Mood and Affect: Mood normal.         Behavior: Behavior normal.         Fluids    Intake/Output Summary (Last 24 hours) at 7/6/2020 0936  Last data filed at 7/5/2020 1800  Gross per 24 hour   Intake 240 ml   Output --   Net 240 ml       Laboratory          Recent Labs     07/04/20  0535 07/05/20  0522 07/06/20  0611  "  INR 3.22* 2.52* 2.13*               Assessment/Plan  Hypertension- (present on admission)  Assessment & Plan  BP ok  On Lisinopril: 20 mg daily  Monitor    History of mitral valve replacement with mechanical valve [Z95.2]- (present on admission)  Assessment & Plan  On Coumadin    Type II diabetes mellitus (HCC)- (present on admission)  Assessment & Plan  Hba1c: 6.7 (6/26)  BS better recently: 140-245  On Lantus: 12 units qam (7/4)  On Metformin 500 mg bid (7/1) --> will increase to 750 mg bid (7/5)  On accuchecks without night time SS coverage (6/30)  Note: home meds include Lantus 15 units and Metformin 1000 mg bid  Cont to monitor    Bradycardia  Assessment & Plan  HR labile: 49-72  Asymptomatic  Denies CP  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), consider inferior infarct and anterior MI  TropI: 108 (6/23) --> 147 (7/1) --> 164 (7/2) --> 68 (7/5)  BNP: 359  TFT's: wnl (7/5)  Echo (7/3): no change from prior; EF 55%, RVSP 30  Note: needs outpatient f/u with Cardio  Monitor    Depression  Assessment & Plan  On Celexa    Vitamin D deficiency  Assessment & Plan  Vit D: 27  On supplements    TIA (transient ischemic attack)  Assessment & Plan  Pt with dysarthria, now resolving  Neuroimaging negative acute  On Coumadin  On Lipitor    Mild cognitive impairment- (present on admission)  Assessment & Plan  Pt reports \"forgetfulness\" that's been ongoing for the past 2 yrs    Anxiety- (present on admission)  Assessment & Plan  On Celexa and Seroquel    "

## 2020-07-06 NOTE — THERAPY
Occupational Therapy  Daily Treatment     Patient Name: Carlos Rivera  Age:  74 y.o., Sex:  male  Medical Record #: [id number]  Today's Date: 7/6/2020     Precautions  Precautions: (P) Fall Risk  Comments: (P) Baseline Dementia     Safety   ADL Safety : Requires Physical Assist for Safety, Requires Cueing for Safety  Bathroom Safety: Requires Cuing for Safety, Requires Physical Assist for Safety    Subjective    Pt received up in w/c, requesting assistance to call wife, helped pt to problem solve restarting phone and he was able to leave her a voicemail     Objective       07/06/20 0831   Precautions   Precautions Fall Risk   Comments Baseline Dementia    Sitting Lower Body Exercises   Sit to Stand 1 set of 10  (no UE support, Min A, c/o low back pain at end of activity)   Nustep Resistance Level 3  (8 minutes BUE/BLE)   Balance   Comments functional mobility with FWW in gym with min A, consistent cues for increased step length and FWW placement   OT Total Time Spent   OT Individual Total Time Spent (Mins) 60   OT Charge Group   OT Self Care / ADL 1   OT Therapy Activity 3     Pt donned shoes seated in w/c with significant time, required cues to problem solve to loosen laces when shoes too tight, cues for attention and sequencing to ensure foot was all the way in before tieing shoes    Assessment    Pt tolerated session fair, continues with significant cognitive impairments requiring mod cues to problem solve and sequence routine tasks. Limited by low back pain today during balance and mobility activity, can attend to cues for FWW placement and increased step length for 3-5 seconds however poor attention and memory impact ability to sustain and pt reverts back to shuffling gait with FWW far in front of him.     Strengths: Motivated for self care and independence, Willingly participates in therapeutic activities, Pleasant and cooperative  Barriers: Generalized weakness, Decreased endurance, Dementia,  Difficulty following instructions, Impaired balance, Impaired carryover of learning, Impaired insight/denial of deficits, Impaired functional cognition, Limited mobility    Plan    Attention/sequencing during ADLs, standing balance (posterior lean), functional household mobility with focus on increasing step length, safety awareness during mobility and standing tasks    Occupational Therapy Goals     Problem: Bathing     Dates: Start: 06/26/20       Goal: STG-Within one week, patient will bathe     Dates: Start: 06/26/20       Description: 1) Individualized Goal:  with supervision, min verbal cues for safety strategies  2) Interventions:  OT Self Care/ADL, OT Cognitive Skill Dev, OT Neuro Re-Ed/Balance, OT Sensory Int Techniques, OT Therapeutic Activity, OT Evaluation, and OT Therapeutic Exercise                  Problem: Dressing     Dates: Start: 06/26/20       Goal: STG-Within one week, patient will dress LB     Dates: Start: 06/26/20       Description: 1) Individualized Goal:  with supervision, min verbal cues for safety strategies  2) Interventions:  OT Self Care/ADL, OT Cognitive Skill Dev, OT Neuro Re-Ed/Balance, OT Sensory Int Techniques, OT Therapeutic Activity, OT Evaluation, and OT Therapeutic Exercise                  Problem: Functional Transfers     Dates: Start: 06/26/20       Goal: STG-Within one week, patient will transfer to toilet     Dates: Start: 06/26/20       Description: 1) Individualized Goal:  with supervision using least restrictive adaptive device  2) Interventions:  OT Self Care/ADL, OT Cognitive Skill Dev, OT Neuro Re-Ed/Balance, OT Sensory Int Techniques, OT Therapeutic Activity, OT Evaluation, and OT Therapeutic Exercise                  Problem: OT Long Term Goals     Dates: Start: 06/26/20       Goal: LTG-By discharge, patient will complete basic self care tasks     Dates: Start: 06/26/20       Description: 1) Individualized Goal:  with supervision to  modified independence  2)  Interventions:  OT Self Care/ADL, OT Cognitive Skill Dev, OT Neuro Re-Ed/Balance, OT Sensory Int Techniques, OT Therapeutic Activity, OT Evaluation, and OT Therapeutic Exercise            Goal: LTG-By discharge, patient will perform bathroom transfers     Dates: Start: 06/26/20       Description: 1) Individualized Goal:  with supervision to  modified independence  2) Interventions:  OT Self Care/ADL, OT Cognitive Skill Dev, OT Neuro Re-Ed/Balance, OT Sensory Int Techniques, OT Therapeutic Activity, OT Evaluation, and OT Therapeutic Exercise                  Problem: Toileting     Dates: Start: 06/26/20       Goal: STG-Within one week, patient will complete toileting tasks     Dates: Start: 06/26/20       Description: 1) Individualized Goal:  with supervision, min verbal cues for safety strategies  2) Interventions:  OT Self Care/ADL, OT Cognitive Skill Dev, OT Neuro Re-Ed/Balance, OT Sensory Int Techniques, OT Therapeutic Activity, OT Evaluation, and OT Therapeutic Exercise

## 2020-07-06 NOTE — THERAPY
"Speech Language Pathology  Daily Treatment     Patient Name: Carlos Rivera  Age:  74 y.o., Sex:  male  Medical Record #: [id number]  Today's Date: 7/6/2020     Precautions  Precautions: Fall Risk  Comments: Baseline Dementia    Subjective    Assumed care from nursing, pt stating to nursing he would like to go sit in his car. \"I'm having little blanks on my memory.\"      Objective     07/06/20 1034   Cognition   Simple Attention Minimal (4)   Moderate Attention Moderate (3)   Simple Information Processing Moderate (3)   Functional Memory Activities Severe (2)   Interdisciplinary Plan of Care Collaboration   IDT Collaboration with  Nursing   Patient Position at End of Therapy Seated;Self Releasing Lap Belt Applied;Call Light within Reach;Tray Table within Reach   Collaboration Comments assumed care from nursing   SLP Total Time Spent   SLP Individual Total Time Spent (Mins) 60   Charge Group   SLP Cognitive Skill Development First 15 Minutes 1   SLP Cognitive Skill Development Additional 15 Minutes 3       Assessment    Simple attention, following 2 component written directives. Pt required MAX cues to complete to achieve 100% accuracy. Pt frequently losing track of task, often referencing a task already completed, pt benefits from blacking out completed tasks to aid in placekeeping, use of visual markers in various colors to keep track.   Following simple written directions using map reading activity - MAX cues to follow directions, break info into smaller units and visual markers to aid in placekeeping needed for this task as well.     Strengths: Able to follow instructions, Alert and oriented, Effective communication skills, Motivated for self care and independence, Pleasant and cooperative, Supportive family, Willingly participates in therapeutic activities  Barriers: Confused, Dementia, Impaired carryover of learning, Impaired insight/denial of deficits, Impaired functional cognition, " Impulsive    Plan    Continue to target simple attention    Speech Therapy Problems     Problem: Memory STGs     Dates: Start: 06/26/20       Goal: STG-Within one week, patient will     Dates: Start: 06/26/20       Description: 1) Individualized goal:  Recall new information related to pt's hospitalization with mod A required for use of functional memory strategies (I.e. memory notebook).    2) Interventions:  SLP Self Care / ADL Training , SLP Cognitive Skill Development, and SLP Group Treatment        Note:     Goal Note filed on 06/30/20 0750 by Kavon Dent MS,CCC-SLP    Mod-max A required for pt to recall new information                         Problem: Problem Solving STGs     Dates: Start: 06/26/20       Goal: STG-Within one week, patient will     Dates: Start: 06/26/20       Description: 1) Individualized goal:  Complete simple attention tasks with min A required to achieve 80% accuracy.    2) Interventions:  SLP Self Care / ADL Training , SLP Cognitive Skill Development, and SLP Group Treatment        Note:     Goal Note filed on 06/30/20 0750 by Kavon Dent MS,CCC-SLP    Min-Mod A required for pt to complete simple attention tasks                         Problem: Speech/Swallowing LTGs     Dates: Start: 06/26/20       Goal: LTG-By discharge, patient will solve basic problems     Dates: Start: 06/26/20       Description: 1) Individualized goal:  With spv for a safe discharge home   2) Interventions:  SLP Self Care / ADL Training , SLP Cognitive Skill Development, and SLP Group Treatment

## 2020-07-06 NOTE — PROGRESS NOTES
Pharmacy Warfarin Consult   7/6/2020     74 y.o.   male     Indication for anticoagulation: Mechanical Mitral Valve Replacement        Goal INR = 2.5 - 3.5    Recent Labs     07/04/20  0535 07/05/20  0522 07/06/20  0611   INR 3.22* 2.52* 2.13*       Pertinent Drug/Drug Interactions:  Proton Pump Inhibitor, Statin(Seroquel, Celexa)  Outpatient Warfarin Regimen:  warfarin 5 mg PO daily  Recent Warfarin Dosing:    Dose from last 7 days     Date/Time Dose (mg)    07/06/20 0611  6    07/05/20 1700  5    07/04/20 1700  3    07/03/20 0514  1    07/02/20 0539  4    07/01/20 0550  5    06/30/20 0552  7.5          Bridge Therapy: no           1.  Coumadin 6 mg tonight per INR = 2.13           Rojelio Alegre [facility]

## 2020-07-06 NOTE — CARE PLAN
Problem: Safety  Goal: Will remain free from injury  Outcome: PROGRESSING SLOWER THAN EXPECTED  Patient is very impulsive and continues to try and get up without utilizing call light. Will continue to reorient patient to fall risk education. Bed alarms on and patient is placed in a room close to nursing station.           Problem: Knowledge Deficit  Goal: Knowledge of disease process/condition, treatment plan, diagnostic tests, and medications will improve  Outcome: PROGRESSING SLOWER THAN EXPECTED  Patient does not understand the importance of using call light.

## 2020-07-06 NOTE — PROGRESS NOTES
Inpatient Anticoagulation Service Note    Date: 7/5/2020    Reason for Anticoagulation: Mechanical Mitral Valve Replacement     Target INR: 2.5 to 3.5         Hemoglobin Value: 15.4  Hematocrit Value: 46  Lab Platelet Value: (!) 153    INR from last 7 days     Date/Time INR Value    07/05/20 0522  (!) 2.52    07/04/20 0535  (!) 3.22    07/03/20 0514  (!) 3.74    07/02/20 0539  (!) 3.3    07/01/20 0550  (!) 2.47    06/30/20 0552  (!) 1.78    06/29/20 0535  (!) 1.88        Dose from last 7 days     Date/Time Dose (mg)    07/05/20 1700  5    07/04/20 1700  3    07/03/20 0514  1    07/02/20 0539  4    07/01/20 0550  5    06/30/20 0552  7.5    06/29/20 0535  7.5        Significant Interactions: Proton Pump Inhibitor, Statin(Seroquel, Celexa)  Bridge Therapy: No (If less than 5 days and overlap therapy discontinued -- document reason (i.e. Bleed Risk))    (If still on overlap therapy, if No -- document reason (i.e. Bleed Risk))         Plan:  Warfarin 5 mg PO today       Chance Pham, PharmD

## 2020-07-07 LAB
ANION GAP SERPL CALC-SCNC: 11 MMOL/L (ref 7–16)
BUN SERPL-MCNC: 31 MG/DL (ref 8–22)
CALCIUM SERPL-MCNC: 8.8 MG/DL (ref 8.5–10.5)
CHLORIDE SERPL-SCNC: 100 MMOL/L (ref 96–112)
CO2 SERPL-SCNC: 23 MMOL/L (ref 20–33)
CREAT SERPL-MCNC: 1.2 MG/DL (ref 0.5–1.4)
GLUCOSE BLD-MCNC: 128 MG/DL (ref 65–99)
GLUCOSE BLD-MCNC: 153 MG/DL (ref 65–99)
GLUCOSE BLD-MCNC: 160 MG/DL (ref 65–99)
GLUCOSE BLD-MCNC: 168 MG/DL (ref 65–99)
GLUCOSE SERPL-MCNC: 162 MG/DL (ref 65–99)
INR PPP: 2.51 (ref 0.87–1.13)
MAGNESIUM SERPL-MCNC: 1.9 MG/DL (ref 1.5–2.5)
PHOSPHATE SERPL-MCNC: 3.1 MG/DL (ref 2.5–4.5)
POTASSIUM SERPL-SCNC: 4.7 MMOL/L (ref 3.6–5.5)
PROTHROMBIN TIME: 27.8 SEC (ref 12–14.6)
SODIUM SERPL-SCNC: 134 MMOL/L (ref 135–145)

## 2020-07-07 PROCEDURE — A9270 NON-COVERED ITEM OR SERVICE: HCPCS | Performed by: HOSPITALIST

## 2020-07-07 PROCEDURE — 700102 HCHG RX REV CODE 250 W/ 637 OVERRIDE(OP): Performed by: HOSPITALIST

## 2020-07-07 PROCEDURE — 82962 GLUCOSE BLOOD TEST: CPT | Mod: 91

## 2020-07-07 PROCEDURE — 36415 COLL VENOUS BLD VENIPUNCTURE: CPT

## 2020-07-07 PROCEDURE — 80048 BASIC METABOLIC PNL TOTAL CA: CPT

## 2020-07-07 PROCEDURE — A9270 NON-COVERED ITEM OR SERVICE: HCPCS | Performed by: PHYSICAL MEDICINE & REHABILITATION

## 2020-07-07 PROCEDURE — 97116 GAIT TRAINING THERAPY: CPT

## 2020-07-07 PROCEDURE — 84100 ASSAY OF PHOSPHORUS: CPT

## 2020-07-07 PROCEDURE — 85610 PROTHROMBIN TIME: CPT

## 2020-07-07 PROCEDURE — 97535 SELF CARE MNGMENT TRAINING: CPT

## 2020-07-07 PROCEDURE — 770010 HCHG ROOM/CARE - REHAB SEMI PRIVAT*

## 2020-07-07 PROCEDURE — 99233 SBSQ HOSP IP/OBS HIGH 50: CPT | Performed by: PHYSICAL MEDICINE & REHABILITATION

## 2020-07-07 PROCEDURE — 97129 THER IVNTJ 1ST 15 MIN: CPT

## 2020-07-07 PROCEDURE — 700102 HCHG RX REV CODE 250 W/ 637 OVERRIDE(OP): Performed by: PHYSICAL MEDICINE & REHABILITATION

## 2020-07-07 PROCEDURE — 83735 ASSAY OF MAGNESIUM: CPT

## 2020-07-07 PROCEDURE — 97130 THER IVNTJ EA ADDL 15 MIN: CPT

## 2020-07-07 PROCEDURE — 99232 SBSQ HOSP IP/OBS MODERATE 35: CPT | Performed by: HOSPITALIST

## 2020-07-07 PROCEDURE — 97112 NEUROMUSCULAR REEDUCATION: CPT

## 2020-07-07 PROCEDURE — 97110 THERAPEUTIC EXERCISES: CPT

## 2020-07-07 RX ORDER — WARFARIN SODIUM 5 MG/1
5 TABLET ORAL
Status: COMPLETED | OUTPATIENT
Start: 2020-07-07 | End: 2020-07-07

## 2020-07-07 RX ORDER — INSULIN GLARGINE 100 [IU]/ML
14 INJECTION, SOLUTION SUBCUTANEOUS
Status: DISCONTINUED | OUTPATIENT
Start: 2020-07-08 | End: 2020-07-09 | Stop reason: HOSPADM

## 2020-07-07 RX ADMIN — LISINOPRIL 20 MG: 20 TABLET ORAL at 05:13

## 2020-07-07 RX ADMIN — METFORMIN HYDROCHLORIDE 750 MG: 500 TABLET ORAL at 17:17

## 2020-07-07 RX ADMIN — QUETIAPINE 50 MG: 25 TABLET ORAL at 17:18

## 2020-07-07 RX ADMIN — MELATONIN 1000 UNITS: at 08:30

## 2020-07-07 RX ADMIN — TRAZODONE HYDROCHLORIDE 50 MG: 50 TABLET ORAL at 00:42

## 2020-07-07 RX ADMIN — WARFARIN SODIUM 5 MG: 5 TABLET ORAL at 17:18

## 2020-07-07 RX ADMIN — QUETIAPINE 50 MG: 25 TABLET ORAL at 20:39

## 2020-07-07 RX ADMIN — CITALOPRAM HYDROBROMIDE 20 MG: 20 TABLET ORAL at 20:39

## 2020-07-07 RX ADMIN — METFORMIN HYDROCHLORIDE 750 MG: 500 TABLET ORAL at 08:32

## 2020-07-07 RX ADMIN — ATORVASTATIN CALCIUM 80 MG: 40 TABLET, FILM COATED ORAL at 20:39

## 2020-07-07 RX ADMIN — INSULIN GLARGINE 12 UNITS: 100 INJECTION, SOLUTION SUBCUTANEOUS at 08:33

## 2020-07-07 RX ADMIN — OMEPRAZOLE 20 MG: 20 CAPSULE, DELAYED RELEASE ORAL at 08:31

## 2020-07-07 ASSESSMENT — ACTIVITIES OF DAILY LIVING (ADL)
TOILET_TRANSFER_DESCRIPTION: VERBAL CUEING;GRAB BAR
TOILETING_LEVEL_OF_ASSIST_DESCRIPTION: SUPERVISION FOR SAFETY;VERBAL CUEING;GRAB BAR
BED_CHAIR_WHEELCHAIR_TRANSFER_DESCRIPTION: INCREASED TIME;SUPERVISION FOR SAFETY;VERBAL CUEING;ADAPTIVE EQUIPMENT
TUB_SHOWER_TRANSFER_DESCRIPTION: VERBAL CUEING;SUPERVISION FOR SAFETY;GRAB BAR

## 2020-07-07 ASSESSMENT — ENCOUNTER SYMPTOMS
MUSCULOSKELETAL NEGATIVE: 1
PALPITATIONS: 0
SHORTNESS OF BREATH: 0
MEMORY LOSS: 1
FEVER: 0
NAUSEA: 0
VOMITING: 0
BRUISES/BLEEDS EASILY: 0
COUGH: 0
POLYDIPSIA: 0
ABDOMINAL PAIN: 0
EYES NEGATIVE: 1
CHILLS: 0

## 2020-07-07 ASSESSMENT — BALANCE ASSESSMENTS
STANDING ON ONE LEG: 0
STANDING UNSUPPORTED WITH EYES CLOSED: 3
SITTING UNSUPPORTED: 4
LOOK OVER LEFT AND RIGHT SHOULDERS WHILE STANDING: 1
LONG VERSION TOTAL SCORE (MAX 56): 27
SITTING TO STANDING: 4
TRANSFERS: 2
STANDING TO SITTING: 2
STANDING UNSUPPORTED: 3
STANDING UNSUPPORTED WITH FEET TOGETHER: 1
PLACE ALTERNATE FOOT ON STEP OR STOOL WHILE STANDING UNSUPPORTED: 1
STANDING UNSUPPORTED ONE FOOT IN FRONT: 1
REACHING FORWARD WITH OUTSTRETCHED ARM WHILE STANDING: 1
TURN 360 DEGREES: 1
LONG VERSION TOTAL SCORE (MAX 56): 27
PICK UP OBJECT FROM THE FLOOR FROM A STANDING POSITION: 3

## 2020-07-07 ASSESSMENT — GAIT ASSESSMENTS
DISTANCE (FEET): 150
GAIT LEVEL OF ASSIST: CONTACT GUARD ASSIST
ASSISTIVE DEVICE: FRONT WHEEL WALKER
DEVIATION: SHUFFLED GAIT
GAIT LEVEL OF ASSIST: STAND BY ASSIST
DEVIATION: SHUFFLED GAIT
DISTANCE (FEET): 150
ASSISTIVE DEVICE: FRONT WHEEL WALKER

## 2020-07-07 NOTE — PROGRESS NOTES
Pharmacy Warfarin Consult   7/7/2020     74 y.o.   male     Indication for anticoagulation: Mechanical Mitral Valve Replacement        Goal INR = 2.5 - 3.5    Recent Labs     07/05/20  0522 07/06/20  0611 07/07/20  0528   INR 2.52* 2.13* 2.51*       Pertinent Drug/Drug Interactions:  Proton Pump Inhibitor, Statin(Seroquel, Celexa)  Outpatient Warfarin Regimen:  warfarin 5 mg PO daily  Recent Warfarin Dosing:   Dose from last 7 days     Date/Time Dose (mg)    07/07/20 0528  5    07/06/20 0611  6    07/05/20 1700  5    07/04/20 1700  3    07/03/20 0514  1    07/02/20 0539  4    07/01/20 0550  5          Bridge Therapy: no           1.  Coumadin 5 mg tonight per INR = 2.51           Rojelio Alegre [facility]

## 2020-07-07 NOTE — THERAPY
"Physical Therapy   Daily Treatment     Patient Name: Carlos Rivera  Age:  74 y.o., Sex:  male  Medical Record #: [id number]  Today's Date: 7/7/2020     Precautions  Precautions: Fall Risk  Comments: Baseline Dementia    Subjective    Pt sleeping in bed, \"I was dreaming about watermelon\" Willing to participate once awake     Objective       07/07/20 1501   Gait Functional Level of Assist    Gait Level Of Assist Contact Guard Assist   Assistive Device Front Wheel Walker   Distance (Feet) 150   # of Times Distance was Traveled 2   Deviation Shuffled Gait   Transfer Functional Level of Assist   Bed, Chair, Wheelchair Transfer Stand by Assist   Supine Lower Body Exercise   Other Exercises shuttle for closed chain strength/ coordintation and flexibility training 3 x 10 for B squats 5 bands, 3 x 10 for single limb squats 3 bands, manual and verbacl cues for full ROM.    Neuro-Muscular Treatments   Neuro-Muscular Treatments Anterior weight shift;Facilitation;Sequencing;Tactile Cuing   Comments neuro re-ed gait training for increased stride length and gait speed/ UE support at // bars and dowels evenly spaced/ manual cues for anterior wt shifting/ forward momentum and verbal cues for step through gait patterning x 100 ft.    PT Total Time Spent   PT Individual Total Time Spent (Mins) 30   PT Charge Group   PT Gait Training 1   PT Therapeutic Exercise 1       Assessment    Pt demonstrated improved stride length and gait speed/ forward momentum within structure training task as noted, but minimal carryover during functional ambulation with FWW.    Strengths: Willingly participates in therapeutic activities  Barriers: Impaired activity tolerance, Impaired insight/denial of deficits, Impaired carryover of learning, Impaired balance(L LE foot pain reported)    Plan    Gait training with FWW, treadmill training, postural re-ed, trial dual tasking with functional mobility, single leg balance activities, bed mobility/ " transfer training, safety awareness, review standing therex program for strength    Physical Therapy Problems     Problem: Mobility     Dates: Start: 06/26/20       Goal: STG-Within one week, patient will ambulate up/down a curb     Dates: Start: 06/26/20       Description: 1) Individualized goal:  150 ft with LRAD and CGA-SBA.  2) Interventions:  PT Group Therapy, PT Gait Training, PT Therapeutic Exercises, PT Neuro Re-Ed/Balance, PT Aquatic Therapy, PT Therapeutic Activity, and PT Evaluation    Note:     Goal Note filed on 06/30/20 1016 by Jose Conley, PT    NT dt focus on other goals                        Problem: PT-Long Term Goals     Dates: Start: 06/26/20       Goal: LTG-By discharge, patient will ambulate     Dates: Start: 06/26/20       Description: 1) Individualized goal:  >150 ft with LRAD and supervision.  2) Interventions:  PT Group Therapy, PT Gait Training, PT Therapeutic Exercises, PT Neuro Re-Ed/Balance, PT Aquatic Therapy, PT Therapeutic Activity, and PT Evaluation              Goal: LTG-By discharge, patient will transfer one surface to another     Dates: Start: 06/26/20       Description: 1) Individualized goal: with LRAD and supervision.  2) Interventions:  PT Group Therapy, PT Gait Training, PT Therapeutic Exercises, PT Neuro Re-Ed/Balance, PT Aquatic Therapy, PT Therapeutic Activity, and PT Evaluation            Goal: LTG-By discharge, patient will ambulate up/down 4-6 stairs     Dates: Start: 06/26/20       Description: 1) Individualized goal: with B rails and supervision.  2) Interventions:  PT Group Therapy, PT Gait Training, PT Therapeutic Exercises, PT Neuro Re-Ed/Balance, PT Aquatic Therapy, PT Therapeutic Activity, and PT Evaluation          Goal: LTG-By discharge, patient will transfer in/out of a car     Dates: Start: 06/26/20       Description: 1) Individualized goal: with LRAD and supervision.  2) Interventions:  PT Group Therapy, PT Gait Training, PT Therapeutic Exercises,  PT Neuro Re-Ed/Balance, PT Aquatic Therapy, PT Therapeutic Activity, and PT Evaluation          Goal: LTG-By discharge, patient will     Dates: Start: 06/26/20       Description: 1) Individualized goal: negotiate single curb with LRAD and supervision-SBA.  2) Interventions:  PT Group Therapy, PT Gait Training, PT Therapeutic Exercises, PT Neuro Re-Ed/Balance, PT Aquatic Therapy, PT Therapeutic Activity, and PT Evaluation          Goal: LTG-By discharge, patient will     Dates: Start: 06/26/20       Description: 1) Individualized goal: perform bed mobility mod I.  2) Interventions:  PT Group Therapy, PT Gait Training, PT Therapeutic Exercises, PT Neuro Re-Ed/Balance, PT Aquatic Therapy, PT Therapeutic Activity, and PT Evaluation

## 2020-07-07 NOTE — CARE PLAN
Problem: Safety  Goal: Will remain free from falls  Outcome: PROGRESSING AS EXPECTED  Intervention: Implement fall precautions  Flowsheets (Taken 7/7/2020 1827)  Bed Alarm: Yes - Alarm On  Note: Pt impulsive, safety measures reinforced, needs anticipated and attended. Pt free from fall and injury.     Problem: Infection  Goal: Will remain free from infection  Outcome: PROGRESSING AS EXPECTED     Problem: Pain Management  Goal: Pain level will decrease to patient's comfort goal  Outcome: PROGRESSING AS EXPECTED

## 2020-07-07 NOTE — PROGRESS NOTES
"Rehab Progress Note     Encounter Date: 7/6/2020    CC: Decreased memory, poor mobility    Interval Events (Subjective)  Patient sitting up in room. He reports he has been trying to speak with his wife. Discussed could have staff help him. He reports cognitively to be not doing great. He reports his memory has been bad \"for a while I guess.\" Discussed about his multiple hospitalizations. Denies NVD, requesting to stop bowel medications. Denies SOB.     IDT Team Meeting 6/30/2020  DC/Disposition:  7/9/20    Objective:  VITAL SIGNS: /78   Pulse 83   Temp 36.7 °C (98.1 °F) (Oral)   Resp 18   Ht 1.753 m (5' 9\")   Wt 56 kg (123 lb 8 oz)   SpO2 95%   BMI 18.24 kg/m²   Gen: NAD  Psych: Mood and affect appropriate; suspicious/paranoid  CV: RRR, no edema  Resp: CTAB, no upper airway sounds  Abd: NTND  Neuro: AOx3, following simple commands,    Recent Results (from the past 72 hour(s))   Prothrombin Time    Collection Time: 07/04/20  5:35 AM   Result Value Ref Range    PT 33.9 (H) 12.0 - 14.6 sec    INR 3.22 (H) 0.87 - 1.13   ACCU-CHEK GLUCOSE    Collection Time: 07/04/20  7:55 AM   Result Value Ref Range    Glucose - Accu-Ck 133 (H) 65 - 99 mg/dL   ACCU-CHEK GLUCOSE    Collection Time: 07/04/20 11:22 AM   Result Value Ref Range    Glucose - Accu-Ck 185 (H) 65 - 99 mg/dL   ACCU-CHEK GLUCOSE    Collection Time: 07/04/20  4:57 PM   Result Value Ref Range    Glucose - Accu-Ck 188 (H) 65 - 99 mg/dL   ACCU-CHEK GLUCOSE    Collection Time: 07/04/20  8:26 PM   Result Value Ref Range    Glucose - Accu-Ck 257 (H) 65 - 99 mg/dL   Prothrombin Time    Collection Time: 07/05/20  5:22 AM   Result Value Ref Range    PT 27.9 (H) 12.0 - 14.6 sec    INR 2.52 (H) 0.87 - 1.13   FREE THYROXINE    Collection Time: 07/05/20  5:22 AM   Result Value Ref Range    Free T-4 0.93 0.93 - 1.70 ng/dL   TSH    Collection Time: 07/05/20  5:22 AM   Result Value Ref Range    TSH 1.460 0.380 - 5.330 uIU/mL   TROPONIN    Collection Time: 07/05/20  " 5:22 AM   Result Value Ref Range    Troponin T 68 (H) 6 - 19 ng/L   ACCU-CHEK GLUCOSE    Collection Time: 07/05/20  7:25 AM   Result Value Ref Range    Glucose - Accu-Ck 171 (H) 65 - 99 mg/dL   ACCU-CHEK GLUCOSE    Collection Time: 07/05/20 11:19 AM   Result Value Ref Range    Glucose - Accu-Ck 245 (H) 65 - 99 mg/dL   ACCU-CHEK GLUCOSE    Collection Time: 07/05/20  5:12 PM   Result Value Ref Range    Glucose - Accu-Ck 148 (H) 65 - 99 mg/dL   Prothrombin Time    Collection Time: 07/06/20  6:11 AM   Result Value Ref Range    PT 24.5 (H) 12.0 - 14.6 sec    INR 2.13 (H) 0.87 - 1.13   ACCU-CHEK GLUCOSE    Collection Time: 07/06/20  7:13 AM   Result Value Ref Range    Glucose - Accu-Ck 140 (H) 65 - 99 mg/dL   ACCU-CHEK GLUCOSE    Collection Time: 07/06/20 11:21 AM   Result Value Ref Range    Glucose - Accu-Ck 189 (H) 65 - 99 mg/dL   ACCU-CHEK GLUCOSE    Collection Time: 07/06/20  5:10 PM   Result Value Ref Range    Glucose - Accu-Ck 139 (H) 65 - 99 mg/dL       Current Facility-Administered Medications   Medication Frequency   • metFORMIN (GLUCOPHAGE) tablet 750 mg BID WITH MEALS   • insulin glargine (Lantus) injection QAM INSULIN   • insulin regular (HUMULIN R) injection 2-12 Units TID AC    And   • glucose 4 g chewable tablet 16 g Q15 MIN PRN    And   • dextrose 50% (D50W) injection 50 mL Q15 MIN PRN   • QUEtiapine (SEROQUEL) tablet 50 mg DAILY   • omeprazole (PRILOSEC) capsule 20 mg DAILY   • vitamin D (cholecalciferol) tablet 1,000 Units DAILY   • Respiratory Therapy Consult Continuous RT   • Pharmacy Consult Request ...Pain Management Review 1 Each PHARMACY TO DOSE   • tramadol (ULTRAM) 50 MG tablet 50 mg Q4HRS PRN   • hydrALAZINE (APRESOLINE) tablet 25 mg Q8HRS PRN   • acetaminophen (TYLENOL) tablet 650 mg Q4HRS PRN   • senna-docusate (PERICOLACE or SENOKOT S) 8.6-50 MG per tablet 2 Tab BID    And   • polyethylene glycol/lytes (MIRALAX) PACKET 1 Packet QDAY PRN    And   • magnesium hydroxide (MILK OF MAGNESIA)  suspension 30 mL QDAY PRN    And   • bisacodyl (DULCOLAX) suppository 10 mg QDAY PRN   • artificial tears ophthalmic solution 1 Drop PRN   • benzocaine-menthol (CEPACOL) lozenge 1 Lozenge Q2HRS PRN   • mag hydrox-al hydrox-simeth (MAALOX PLUS ES or MYLANTA DS) suspension 20 mL Q2HRS PRN   • ondansetron (ZOFRAN ODT) dispertab 4 mg 4X/DAY PRN    Or   • ondansetron (ZOFRAN) syringe/vial injection 4 mg 4X/DAY PRN   • traZODone (DESYREL) tablet 50 mg QHS PRN   • sodium chloride (OCEAN) 0.65 % nasal spray 2 Spray PRN   • atorvastatin (LIPITOR) tablet 80 mg Q EVENING   • citalopram (CELEXA) tablet 20 mg Q EVENING   • lisinopril (PRINIVIL) tablet 20 mg Q DAY   • QUEtiapine (SEROQUEL) tablet 50 mg Q EVENING   • MD Alert...Warfarin per Pharmacy PHARMACY TO DOSE       Orders Placed This Encounter   Procedures   • Diet Order Diabetic     Standing Status:   Standing     Number of Occurrences:   1     Order Specific Question:   Diet:     Answer:   Diabetic [3]       Assessment:  Active Hospital Problems    Diagnosis   • *Acute on Chronic Encephalopathy   • Hypertension   • Coronary artery disease involving coronary bypass graft   • History of mitral valve replacement with mechanical valve [Z95.2]   • Type II diabetes mellitus (HCC)   • TIA (transient ischemic attack)   • Thrombocytopenia (HCC)   • Vitamin D deficiency   • Depression   • Macrocytosis without anemia   • Azotemia   • Diabetic neuropathy (HCC)   • Obstructive sleep apnea syndrome   • Shuffling gait   • Dementia (HCC)   • Hyperlipidemia   • Mild cognitive impairment   • Anxiety       Medical Decision Making and Plan:  Encephalopathy vs delirium vs TIA - Patient with aphasia and weakness with negative work-up for stroke but with hypertensive emergency on admission concerning for hypertensive encephalopathy. Patient started on statin for concern for CVA vs TIA.   -Worsening confusion - per wife history of Gabapentin causing worse. Discontinue Gabapentin.     DM -  Patient on Lantus 15U QPM and SSI. Will continue to monitor   Blood sugars into 300s, consult hospitalist. Lantus at 12 U  -Hospitalist to consult. Started on Metformin and increased to 750 mg BID     Dementia - Patient on Seroquel 50 mg QPM for agitation and sleep. MRI consistent with probable vascular dementia.   -Sundowning earlier in the evening. Start Seroquel at 1800 as well as 2100. Ongoing hallucinations. UA negative. Move up seroquel to 1700     Mood/Neuropathy - Patient on Celexa 20 mg QPM. Patient with burning into bilateral feet. Trial of Gabapentin 300 mg TID. Discontinue as worsening confusion.      HTN - Patient on Lisinopril 20 mg on transfer. Previously came in with SBP > 180s  -Hospitalist to consult    Bradycardia - Patient into low 40s, check EKG. Only change was Seroquel will check QT  -Sinus Bradycardia. Per Hospitalist recommending TTE, TTE with EF of 55%    Elevated BUN - encouraged fluid intake. Will monitor     HLD - Patient on Atorvastatin 80 mg QPM.      GI Ppx - Patient on Prilosec on transfer. Discontinue bowel medications     Vitamin D Deficiency -  27 on admission. Started on 1000 U     DVT ppx - Patient on Coumadin     Total time:  26 minutes.  I spent greater than 50% of the time for patient care, counseling, and coordination on this date, including unit/floor time, and face-to-face time with the patient as per interval events and assessment and plan above. Topics discussed included memory issues, paranoia, move up Seroquel to 1700 and discontinue bowel medications.    Wood Rodriguez M.D.

## 2020-07-07 NOTE — THERAPY
Speech Language Pathology  Daily Treatment     Patient Name: Carlos Rivera  Age:  74 y.o., Sex:  male  Medical Record #: [id number]  Today's Date: 7/7/2020     Precautions  Precautions: Fall Risk  Comments: Baseline Dementia    Subjective    Pt was pleasant and cooperative during this ST session      Objective       07/07/20 0931   SCCAN (Scales of Cognitive and Communicative Ability for Neurorehabilitation)   Oral Expression - Raw Score 17   Oral Expression - Scale Performance Score 89   Orientation - Raw Score 8   Orientation - Scale Performance Score 67   Memory - Raw Score 7   Memory - Scale Performance Score 37   Speech Comprehension - Raw Score 12   Speech Comprehension - Scale Performance Score 92   Reading Comprehension - Raw Score 7   Reading Comprehension - Scale Performance Score 58   Writing - Raw Score 4   Writing - Scale Performance Score 57   Attention - Raw Score 7   Attention - Scale Performance Score 44   Problem Solving - Raw Score 17   Problem Solving - Scale Performance Score 74   SCCAN Total Raw Score 62   SCCAN Degree of Severity Moderate Impairment   SLP Total Time Spent   SLP Individual Total Time Spent (Mins) 60   Charge Group   SLP Cognitive Skill Development First 15 Minutes 1   SLP Cognitive Skill Development Additional 15 Minutes 3       Assessment    Outcome assessment completed using the SCCAN in anticipation of pt's upcoming discharge.  Pt achieved an overall score of 62/94 indicating moderate cognitive impairments which is similar to his initial score of 66/94 (moderate cognitive impairments) on 6/26.  Pt demonstrated improvements in the areas of memory (26% to 37%) and problem solving (70% to 74%), but showed a decrease in scores in all other subtests.  Pt continues to demonstrate significant deficits in the areas of memory (37%) and attention (44%).      Strengths: Able to follow instructions, Alert and oriented, Effective communication skills, Motivated for self care and  independence, Pleasant and cooperative, Supportive family, Willingly participates in therapeutic activities  Barriers: Confused, Dementia, Impaired carryover of learning, Impaired insight/denial of deficits, Impaired functional cognition, Impulsive    Plan    Family training, memory notebook, target simple attention     Speech Therapy Problems     Problem: Memory STGs     Dates: Start: 06/26/20       Goal: STG-Within one week, patient will     Dates: Start: 06/26/20       Description: 1) Individualized goal:  Recall new information related to pt's hospitalization with mod A required for use of functional memory strategies (I.e. memory notebook).    2) Interventions:  SLP Self Care / ADL Training , SLP Cognitive Skill Development, and SLP Group Treatment        Note:     Goal Note filed on 07/07/20 0825 by Kavon Dent MS,CCC-SLP    Mod-max A required for pt to recall new information                         Problem: Problem Solving STGs     Dates: Start: 06/26/20       Goal: STG-Within one week, patient will     Dates: Start: 06/26/20       Description: 1) Individualized goal:  Complete simple attention tasks with min A required to achieve 80% accuracy.    2) Interventions:  SLP Self Care / ADL Training , SLP Cognitive Skill Development, and SLP Group Treatment        Note:     Goal Note filed on 07/07/20 0825 by Kavon Dent MS,CCC-SLP    Mod A required for pt to complete simple attention tasks                         Problem: Speech/Swallowing LTGs     Dates: Start: 06/26/20       Goal: LTG-By discharge, patient will solve basic problems     Dates: Start: 06/26/20       Description: 1) Individualized goal:  With spv for a safe discharge home   2) Interventions:  SLP Self Care / ADL Training , SLP Cognitive Skill Development, and SLP Group Treatment

## 2020-07-07 NOTE — THERAPY
Physical Therapy   Daily Treatment     Patient Name: Carlos Rivera  Age:  74 y.o., Sex:  male  Medical Record #: [id number]  Today's Date: 7/7/2020     Precautions  Precautions: (P) Fall Risk  Comments: (P) Baseline Dementia    Subjective    Patient seated in w/c and agreeable to therapy, reporting fatigue and tiredness.     Objective       07/07/20 1301   Precautions   Precautions Fall Risk   Comments Baseline Dementia   Gait Functional Level of Assist    Gait Level Of Assist Stand by Assist  (to CGA)   Assistive Device Front Wheel Walker   Distance (Feet) 150   # of Times Distance was Traveled 2   Deviation Shuffled Gait  (festinating gait with anterior LOBs, unsteady)   Stairs Functional Level of Assist   Level of Assist with Stairs Contact Guard Assist   # of Stairs Climbed 4   Stairs Description Extra time;Hand rails;Limited by fatigue;Safety concerns;Requires incidental assist;Verbal cueing   Transfer Functional Level of Assist   Bed, Chair, Wheelchair Transfer Stand by Assist  (to CGA)   Bed Chair Wheelchair Transfer Description Increased time;Supervision for safety;Verbal cueing;Adaptive equipment  (stand step transfer with FWW )   Bed Mobility    Sit to Supine Stand by Assist   Sit to Stand Stand by Assist  (to CGA)   Scooting Stand by Assist   Rolling Supervised   Lee Balance Scale   Sitting Unsupported (Score 0-4) 4   Change Of Positon: Sitting To Standing (Score 0-4) 4   Change Of Positon: Standing To Sitting (Score 0-4) 2   Transfers (Score 0-4) 2   Standing Unsupported (Score 0-4) 3   Standing With Eyes Closed (Score 0-4) 3   Standing With Feet Together (Score 0-4) 1   Tandem Standing (Score 0-4) 1   Standing On One Leg (Score 0-4) 0   Turning Trunk (Feet Fixed) (Score 0-4) 1   Retrieving Objects From Floor (Score 0-4) 3   Turning 360 Degrees (Score 0-4) 1   Stool Stepping (Score 0-4) 1   Reaching Forward While Standing (Score 0-4) 1   Lee Balance Total Score (0-56) 27   Interdisciplinary Plan  "of Care Collaboration   Patient Position at End of Therapy In Bed;Bed Alarm On;Call Light within Reach;Tray Table within Reach;Phone within Reach   PT Total Time Spent   PT Individual Total Time Spent (Mins) 30   PT Charge Group   PT Gait Training 1   PT Neuromuscular Re-Education / Balance 1       Assessment    Patient tolerated session well, scored 27/56 on the Lee Balance Scale, indicating high risk for falls. Patient with poor insight into deficits and poor safety awareness, stating \"I can work on these balance exercises by myself at home, I can just hold onto a wall in my hallway if I lose my balance.\" Education provided with safety recommendations and only working on balance with home therapists; will benefit from reinforcement.     Strengths: Willingly participates in therapeutic activities  Barriers: Impaired activity tolerance, Impaired insight/denial of deficits, Impaired carryover of learning, Impaired balance(L LE foot pain reported)    Plan    Gait training with FWW, treadmill training, postural re-ed, trial dual tasking with functional mobility, single leg balance activities, bed mobility/ transfer training, safety awareness, review standing therex program for strength    Physical Therapy Problems     Problem: Mobility     Dates: Start: 06/26/20       Goal: STG-Within one week, patient will ambulate up/down a curb     Dates: Start: 06/26/20       Description: 1) Individualized goal:  150 ft with LRAD and CGA-SBA.  2) Interventions:  PT Group Therapy, PT Gait Training, PT Therapeutic Exercises, PT Neuro Re-Ed/Balance, PT Aquatic Therapy, PT Therapeutic Activity, and PT Evaluation    Note:     Goal Note filed on 06/30/20 1016 by Jose Conley, PT    NT dt focus on other goals                        Problem: PT-Long Term Goals     Dates: Start: 06/26/20       Goal: LTG-By discharge, patient will ambulate     Dates: Start: 06/26/20       Description: 1) Individualized goal:  >150 ft with LRAD and " supervision.  2) Interventions:  PT Group Therapy, PT Gait Training, PT Therapeutic Exercises, PT Neuro Re-Ed/Balance, PT Aquatic Therapy, PT Therapeutic Activity, and PT Evaluation              Goal: LTG-By discharge, patient will transfer one surface to another     Dates: Start: 06/26/20       Description: 1) Individualized goal: with LRAD and supervision.  2) Interventions:  PT Group Therapy, PT Gait Training, PT Therapeutic Exercises, PT Neuro Re-Ed/Balance, PT Aquatic Therapy, PT Therapeutic Activity, and PT Evaluation            Goal: LTG-By discharge, patient will ambulate up/down 4-6 stairs     Dates: Start: 06/26/20       Description: 1) Individualized goal: with B rails and supervision.  2) Interventions:  PT Group Therapy, PT Gait Training, PT Therapeutic Exercises, PT Neuro Re-Ed/Balance, PT Aquatic Therapy, PT Therapeutic Activity, and PT Evaluation          Goal: LTG-By discharge, patient will transfer in/out of a car     Dates: Start: 06/26/20       Description: 1) Individualized goal: with LRAD and supervision.  2) Interventions:  PT Group Therapy, PT Gait Training, PT Therapeutic Exercises, PT Neuro Re-Ed/Balance, PT Aquatic Therapy, PT Therapeutic Activity, and PT Evaluation          Goal: LTG-By discharge, patient will     Dates: Start: 06/26/20       Description: 1) Individualized goal: negotiate single curb with LRAD and supervision-SBA.  2) Interventions:  PT Group Therapy, PT Gait Training, PT Therapeutic Exercises, PT Neuro Re-Ed/Balance, PT Aquatic Therapy, PT Therapeutic Activity, and PT Evaluation          Goal: LTG-By discharge, patient will     Dates: Start: 06/26/20       Description: 1) Individualized goal: perform bed mobility mod I.  2) Interventions:  PT Group Therapy, PT Gait Training, PT Therapeutic Exercises, PT Neuro Re-Ed/Balance, PT Aquatic Therapy, PT Therapeutic Activity, and PT Evaluation

## 2020-07-07 NOTE — PROGRESS NOTES
Hospital Medicine Daily Progress Note      Chief Complaint  Hypertension  Diabetes    Interval Problem Update  No overnight problems.    Review of Systems  Review of Systems   Constitutional: Negative for chills and fever.   HENT: Negative.    Eyes: Negative.    Respiratory: Negative for cough and shortness of breath.    Cardiovascular: Negative for chest pain and palpitations.   Gastrointestinal: Negative for abdominal pain, nausea and vomiting.   Musculoskeletal: Negative.    Skin: Negative for itching and rash.   Endo/Heme/Allergies: Negative for polydipsia. Does not bruise/bleed easily.   Psychiatric/Behavioral: Positive for memory loss.        Physical Exam  Temp:  [36.4 °C (97.5 °F)-36.7 °C (98.1 °F)] 36.6 °C (97.9 °F)  Pulse:  [53-83] 58  Resp:  [17-18] 17  BP: (107-156)/(56-78) 107/56  SpO2:  [91 %-95 %] 91 %    Physical Exam  Vitals signs reviewed.   Constitutional:       General: He is not in acute distress.     Appearance: Normal appearance. He is not ill-appearing.   HENT:      Head: Normocephalic and atraumatic.      Right Ear: External ear normal.      Left Ear: External ear normal.      Nose: Nose normal.      Mouth/Throat:      Pharynx: Oropharynx is clear.   Eyes:      General:         Right eye: No discharge.         Left eye: No discharge.      Extraocular Movements: Extraocular movements intact.      Conjunctiva/sclera: Conjunctivae normal.   Neck:      Musculoskeletal: Normal range of motion and neck supple.   Cardiovascular:      Rate and Rhythm: Normal rate and regular rhythm.   Pulmonary:      Effort: No respiratory distress.      Breath sounds: No wheezing.      Comments: Decreased BS   Abdominal:      General: Bowel sounds are normal. There is no distension.      Palpations: Abdomen is soft.      Tenderness: There is no abdominal tenderness.   Musculoskeletal:      Right lower leg: No edema.      Left lower leg: No edema.   Skin:     General: Skin is warm and dry.   Neurological:       "Mental Status: He is alert and oriented to person, place, and time.         Fluids    Intake/Output Summary (Last 24 hours) at 7/7/2020 1535  Last data filed at 7/7/2020 1200  Gross per 24 hour   Intake 340 ml   Output 350 ml   Net -10 ml       Laboratory      Recent Labs     07/07/20  0528   SODIUM 134*   POTASSIUM 4.7   CHLORIDE 100   CO2 23   GLUCOSE 162*   BUN 31*   CREATININE 1.20   CALCIUM 8.8     Recent Labs     07/05/20  0522 07/06/20  0611 07/07/20  0528   INR 2.52* 2.13* 2.51*               Assessment/Plan  Hypertension- (present on admission)  Assessment & Plan  Blood pressure controlled on Lisinopril    History of mitral valve replacement with mechanical valve [Z95.2]- (present on admission)  Assessment & Plan  Anticoagulated on Coumadin    Type II diabetes mellitus (HCC)- (present on admission)  Assessment & Plan  HbA1c 6.7  Increase Lantus to avoid need for SSI  Continue Metformin  Outpt meds include Lantus 15 units and Metformin 1000 mg bid    Bradycardia  Assessment & Plan  Pt asymptomatic  Outpt Cardiology F/U    Azotemia  Assessment & Plan  Encourage PO fluids  Follow renal function    Macrocytosis without anemia  Assessment & Plan  Vit B12, Folate, and TSH levels all normal  Follow H/H    Vitamin D deficiency  Assessment & Plan  Vit D level 27  On supplementation    TIA (transient ischemic attack)  Assessment & Plan  Pt presented w/ dysarthria, now resolved  Neuroimaging negative acute  On Coumadin and Lipitor    Mild cognitive impairment- (present on admission)  Assessment & Plan  Pt reports \"forgetfulness\" that's been ongoing for the past 2 yrs    Anxiety- (present on admission)  Assessment & Plan  On Celexa and Seroquel     Full Code    "

## 2020-07-07 NOTE — PROGRESS NOTES
"Rehab Progress Note     Encounter Date: 7/7/2020    CC: Decreased memory, poor mobility    Interval Events (Subjective)  Patient sitting up in room. He reports therapy is going well. Per therapy they plan on working on showers this morning. He reports he feels like his balance is somewhat better but it is only slowly improving. Denies NVD. Denies SOB.     IDT Team Meeting 7/7/2020    IWood M.D., was present and led the interdisciplinary team conference on 7/7/2020.  I led the IDT conference and agree with the IDT conference documentation and plan of care as noted below.     RN:  Diet Regular   % Meal     Pain No pain   Sleep    Bowel Continent   Bladder Continent   In's & Out's      PT:  Bed Mobility SBA   Transfers    Mobility CGA   Stairs CGA   28 on Lee   Poor insight  Limited progress    OT:  Eating    Grooming    Bathing supervs   UB Dressing    LB Dressing Lionel   Toileting supervs   Shower & Transfer supervs   Able to verbalize that he has balance issues but no compensation  Poor insight; fall risk  Needs 24/7 supervision    SLP:  Reassessment - poor progress  Improved memory 25% to 37%   Poor safety awareness  Was apparently managing medications; cannot drive    CM:  Continues to work on disposition and DME needs.      DC/Disposition:  7/9/20    Objective:  VITAL SIGNS: /71   Pulse (!) 53   Temp 36.4 °C (97.5 °F) (Temporal)   Resp 17   Ht 1.753 m (5' 9\")   Wt 56 kg (123 lb 8 oz)   SpO2 95%   BMI 18.24 kg/m²   Gen: NAD  Psych: Mood and affect appropriate; suspicious/paranoid  CV: RRR, no edema  Resp: CTAB, no upper airway sounds  Abd: NTND  Neuro: AOx3, following simple commands,  Unchanged from 7/6/20    Recent Results (from the past 72 hour(s))   ACCU-CHEK GLUCOSE    Collection Time: 07/04/20  4:57 PM   Result Value Ref Range    Glucose - Accu-Ck 188 (H) 65 - 99 mg/dL   ACCU-CHEK GLUCOSE    Collection Time: 07/04/20  8:26 PM   Result Value Ref Range    Glucose - Accu-Ck " 257 (H) 65 - 99 mg/dL   Prothrombin Time    Collection Time: 07/05/20  5:22 AM   Result Value Ref Range    PT 27.9 (H) 12.0 - 14.6 sec    INR 2.52 (H) 0.87 - 1.13   FREE THYROXINE    Collection Time: 07/05/20  5:22 AM   Result Value Ref Range    Free T-4 0.93 0.93 - 1.70 ng/dL   TSH    Collection Time: 07/05/20  5:22 AM   Result Value Ref Range    TSH 1.460 0.380 - 5.330 uIU/mL   TROPONIN    Collection Time: 07/05/20  5:22 AM   Result Value Ref Range    Troponin T 68 (H) 6 - 19 ng/L   ACCU-CHEK GLUCOSE    Collection Time: 07/05/20  7:25 AM   Result Value Ref Range    Glucose - Accu-Ck 171 (H) 65 - 99 mg/dL   ACCU-CHEK GLUCOSE    Collection Time: 07/05/20 11:19 AM   Result Value Ref Range    Glucose - Accu-Ck 245 (H) 65 - 99 mg/dL   ACCU-CHEK GLUCOSE    Collection Time: 07/05/20  5:12 PM   Result Value Ref Range    Glucose - Accu-Ck 148 (H) 65 - 99 mg/dL   Prothrombin Time    Collection Time: 07/06/20  6:11 AM   Result Value Ref Range    PT 24.5 (H) 12.0 - 14.6 sec    INR 2.13 (H) 0.87 - 1.13   ACCU-CHEK GLUCOSE    Collection Time: 07/06/20  7:13 AM   Result Value Ref Range    Glucose - Accu-Ck 140 (H) 65 - 99 mg/dL   ACCU-CHEK GLUCOSE    Collection Time: 07/06/20 11:21 AM   Result Value Ref Range    Glucose - Accu-Ck 189 (H) 65 - 99 mg/dL   ACCU-CHEK GLUCOSE    Collection Time: 07/06/20  5:10 PM   Result Value Ref Range    Glucose - Accu-Ck 139 (H) 65 - 99 mg/dL   ACCU-CHEK GLUCOSE    Collection Time: 07/06/20  9:06 PM   Result Value Ref Range    Glucose - Accu-Ck 199 (H) 65 - 99 mg/dL   Prothrombin Time    Collection Time: 07/07/20  5:28 AM   Result Value Ref Range    PT 27.8 (H) 12.0 - 14.6 sec    INR 2.51 (H) 0.87 - 1.13   Basic Metabolic Panel    Collection Time: 07/07/20  5:28 AM   Result Value Ref Range    Sodium 134 (L) 135 - 145 mmol/L    Potassium 4.7 3.6 - 5.5 mmol/L    Chloride 100 96 - 112 mmol/L    Co2 23 20 - 33 mmol/L    Glucose 162 (H) 65 - 99 mg/dL    Bun 31 (H) 8 - 22 mg/dL    Creatinine 1.20 0.50  - 1.40 mg/dL    Calcium 8.8 8.5 - 10.5 mg/dL    Anion Gap 11.0 7.0 - 16.0   MAGNESIUM    Collection Time: 07/07/20  5:28 AM   Result Value Ref Range    Magnesium 1.9 1.5 - 2.5 mg/dL   PHOSPHORUS    Collection Time: 07/07/20  5:28 AM   Result Value Ref Range    Phosphorus 3.1 2.5 - 4.5 mg/dL   ESTIMATED GFR    Collection Time: 07/07/20  5:28 AM   Result Value Ref Range    GFR If African American >60 >60 mL/min/1.73 m 2    GFR If Non African American 59 (A) >60 mL/min/1.73 m 2   ACCU-CHEK GLUCOSE    Collection Time: 07/07/20  8:10 AM   Result Value Ref Range    Glucose - Accu-Ck 128 (H) 65 - 99 mg/dL   ACCU-CHEK GLUCOSE    Collection Time: 07/07/20 11:35 AM   Result Value Ref Range    Glucose - Accu-Ck 160 (H) 65 - 99 mg/dL       Current Facility-Administered Medications   Medication Frequency   • warfarin (COUMADIN) tablet 5 mg ONCE AT 1800   • metFORMIN (GLUCOPHAGE) tablet 750 mg BID WITH MEALS   • senna-docusate (PERICOLACE or SENOKOT S) 8.6-50 MG per tablet 2 Tab BID PRN    And   • polyethylene glycol/lytes (MIRALAX) PACKET 1 Packet QDAY PRN    And   • magnesium hydroxide (MILK OF MAGNESIA) suspension 30 mL QDAY PRN    And   • bisacodyl (DULCOLAX) suppository 10 mg QDAY PRN   • QUEtiapine (SEROQUEL) tablet 50 mg DAILY   • insulin glargine (Lantus) injection QAM INSULIN   • insulin regular (HUMULIN R) injection 2-12 Units TID AC    And   • glucose 4 g chewable tablet 16 g Q15 MIN PRN    And   • dextrose 50% (D50W) injection 50 mL Q15 MIN PRN   • omeprazole (PRILOSEC) capsule 20 mg DAILY   • vitamin D (cholecalciferol) tablet 1,000 Units DAILY   • Respiratory Therapy Consult Continuous RT   • tramadol (ULTRAM) 50 MG tablet 50 mg Q4HRS PRN   • hydrALAZINE (APRESOLINE) tablet 25 mg Q8HRS PRN   • acetaminophen (TYLENOL) tablet 650 mg Q4HRS PRN   • artificial tears ophthalmic solution 1 Drop PRN   • benzocaine-menthol (CEPACOL) lozenge 1 Lozenge Q2HRS PRN   • mag hydrox-al hydrox-simeth (MAALOX PLUS ES or MYLANTA DS)  suspension 20 mL Q2HRS PRN   • ondansetron (ZOFRAN ODT) dispertab 4 mg 4X/DAY PRN    Or   • ondansetron (ZOFRAN) syringe/vial injection 4 mg 4X/DAY PRN   • traZODone (DESYREL) tablet 50 mg QHS PRN   • sodium chloride (OCEAN) 0.65 % nasal spray 2 Spray PRN   • atorvastatin (LIPITOR) tablet 80 mg Q EVENING   • citalopram (CELEXA) tablet 20 mg Q EVENING   • lisinopril (PRINIVIL) tablet 20 mg Q DAY   • QUEtiapine (SEROQUEL) tablet 50 mg Q EVENING   • MD Alert...Warfarin per Pharmacy PHARMACY TO DOSE       Orders Placed This Encounter   Procedures   • Diet Order Diabetic     Standing Status:   Standing     Number of Occurrences:   1     Order Specific Question:   Diet:     Answer:   Diabetic [3]       Assessment:  Active Hospital Problems    Diagnosis   • *Acute on Chronic Encephalopathy   • Hypertension   • Coronary artery disease involving coronary bypass graft   • History of mitral valve replacement with mechanical valve [Z95.2]   • Type II diabetes mellitus (HCC)   • TIA (transient ischemic attack)   • Thrombocytopenia (HCC)   • Vitamin D deficiency   • Depression   • Macrocytosis without anemia   • Azotemia   • Diabetic neuropathy (HCC)   • Obstructive sleep apnea syndrome   • Shuffling gait   • Dementia (HCC)   • Hyperlipidemia   • Mild cognitive impairment   • Anxiety       Medical Decision Making and Plan:  Encephalopathy vs delirium vs TIA - Patient with aphasia and weakness with negative work-up for stroke but with hypertensive emergency on admission concerning for hypertensive encephalopathy. Patient started on statin for concern for CVA vs TIA.   -Worsening confusion - per wife history of Gabapentin causing worse. Discontinue Gabapentin, some improvements     DM - Patient on Lantus 15U QPM and SSI. Will continue to monitor   Blood sugars into 300s, consult hospitalist. Lantus at 12 U  -Hospitalist to consult. Started on Metformin and increased to 750 mg BID     Dementia - Patient on Seroquel 50 mg QPM for  agitation and sleep. MRI consistent with probable vascular dementia.   -Sundowning earlier in the evening. Start Seroquel at 1800 as well as 2100. Ongoing hallucinations. UA negative. Move up seroquel to 1700     Mood/Neuropathy - Patient on Celexa 20 mg QPM. Patient with burning into bilateral feet. Trial of Gabapentin 300 mg TID. Discontinue as worsening confusion.     HTN - Patient on Lisinopril 20 mg on transfer. Previously came in with SBP > 180s  -Hospitalist to consult    Bradycardia - Patient into low 40s, check EKG. Only change was Seroquel will check QT  -Sinus Bradycardia. Per Hospitalist recommending TTE, TTE with EF of 55%    Elevated BUN - encouraged fluid intake. Will monitor     HLD - Patient on Atorvastatin 80 mg QPM.      GI Ppx - Patient on Prilosec on transfer. Discontinue bowel medications     Vitamin D Deficiency -  27 on admission. Started on 1000 U     DVT ppx - Patient on Coumadin     Total time:  36 minutes.  I spent greater than 50% of the time for patient care, counseling, and coordination on this date, including unit/floor time, and face-to-face time with the patient as per interval events and assessment and plan above. Topics discussed included discharge planning, ongoing confusion, consider increase in seroquel, and discontinue PPI. Patient was discussed separately in IDT today; please see details above.    Wood Rodriguez M.D.

## 2020-07-07 NOTE — THERAPY
Occupational Therapy  Daily Treatment     Patient Name: Carlos Rivera  Age:  74 y.o., Sex:  male  Medical Record #: [id number]  Today's Date: 7/7/2020     Precautions  Precautions: (P) Fall Risk  Comments: (P) Baseline Dementia     Safety   ADL Safety : Requires Physical Assist for Safety, Requires Cueing for Safety  Bathroom Safety: Requires Cuing for Safety, Requires Physical Assist for Safety    Subjective    Pt received up in w/c, agreeable to OT session     Objective       07/07/20 1031   Precautions   Precautions Fall Risk   Comments Baseline Dementia    Functional Level of Assist   Eating Modified Independent   Eating Description Increased time   Grooming Standing;Supervision   Grooming Description Standing at sink;Supervision for safety   Bathing Supervision   Bathing Description Grab bar;Hand held shower;Tub bench;Supervision for safety;Verbal cueing   Upper Body Dressing Supervision   Upper Body Dressing Description Supervision for safety;Verbal cueing  (completed in standing)   Lower Body Dressing Minimal Assist   Lower Body Dressing Description Verbal cueing;Supervision for safety;Increased time  (assist to tie shoes)   Toileting Stand by Assist   Toileting Description Supervision for safety;Verbal cueing;Grab bar   Toilet Transfers Supervised   Toilet Transfer Description Verbal cueing;Grab bar  (FWW approach)   Tub / Shower Transfers Supervised   Tub Shower Transfer Description Verbal cueing;Supervision for safety;Grab bar  (FWW approach)   OT Total Time Spent   OT Individual Total Time Spent (Mins) 60   OT Charge Group   OT Self Care / ADL 4       Assessment    Pt tolerated session fair, completed ADL routine at primarily supervised level using FWW for in room and in bathroom mobility, he requires consistent cues for sequencing routine ADL tasks and consistent cues for safety strategies with poor compliance. Pt able to verbalize awareness of balance deficits however demos poor insight into  strategies to manage those deficits during daily tasks, requires significant time to complete all activities.   Strengths: Motivated for self care and independence, Willingly participates in therapeutic activities, Pleasant and cooperative  Barriers: Generalized weakness, Decreased endurance, Dementia, Difficulty following instructions, Impaired balance, Impaired carryover of learning, Impaired insight/denial of deficits, Impaired functional cognition, Limited mobility    Plan    Attention/sequencing during ADLs, standing balance (posterior lean), functional household mobility with focus on increasing step length, safety awareness during mobility and standing tasks    Occupational Therapy Goals     Problem: Bathing     Dates: Start: 06/26/20       Goal: STG-Within one week, patient will bathe     Dates: Start: 06/26/20       Description: 1) Individualized Goal:  with supervision, min verbal cues for safety strategies  2) Interventions:  OT Self Care/ADL, OT Cognitive Skill Dev, OT Neuro Re-Ed/Balance, OT Sensory Int Techniques, OT Therapeutic Activity, OT Evaluation, and OT Therapeutic Exercise                  Problem: Dressing     Dates: Start: 06/26/20       Goal: STG-Within one week, patient will dress LB     Dates: Start: 06/26/20       Description: 1) Individualized Goal:  with supervision, min verbal cues for safety strategies  2) Interventions:  OT Self Care/ADL, OT Cognitive Skill Dev, OT Neuro Re-Ed/Balance, OT Sensory Int Techniques, OT Therapeutic Activity, OT Evaluation, and OT Therapeutic Exercise                  Problem: Functional Transfers     Dates: Start: 06/26/20       Goal: STG-Within one week, patient will transfer to toilet     Dates: Start: 06/26/20       Description: 1) Individualized Goal:  with supervision using least restrictive adaptive device  2) Interventions:  OT Self Care/ADL, OT Cognitive Skill Dev, OT Neuro Re-Ed/Balance, OT Sensory Int Techniques, OT Therapeutic Activity, OT  Evaluation, and OT Therapeutic Exercise                  Problem: OT Long Term Goals     Dates: Start: 06/26/20       Goal: LTG-By discharge, patient will complete basic self care tasks     Dates: Start: 06/26/20       Description: 1) Individualized Goal:  with supervision to  modified independence  2) Interventions:  OT Self Care/ADL, OT Cognitive Skill Dev, OT Neuro Re-Ed/Balance, OT Sensory Int Techniques, OT Therapeutic Activity, OT Evaluation, and OT Therapeutic Exercise            Goal: LTG-By discharge, patient will perform bathroom transfers     Dates: Start: 06/26/20       Description: 1) Individualized Goal:  with supervision to  modified independence  2) Interventions:  OT Self Care/ADL, OT Cognitive Skill Dev, OT Neuro Re-Ed/Balance, OT Sensory Int Techniques, OT Therapeutic Activity, OT Evaluation, and OT Therapeutic Exercise                  Problem: Toileting     Dates: Start: 06/26/20       Goal: STG-Within one week, patient will complete toileting tasks     Dates: Start: 06/26/20       Description: 1) Individualized Goal:  with supervision, min verbal cues for safety strategies  2) Interventions:  OT Self Care/ADL, OT Cognitive Skill Dev, OT Neuro Re-Ed/Balance, OT Sensory Int Techniques, OT Therapeutic Activity, OT Evaluation, and OT Therapeutic Exercise

## 2020-07-07 NOTE — CARE PLAN
Problem: Mobility  Goal: STG-Within one week, patient will ambulate up/down a curb  Description: 1) Individualized goal:  150 ft with LRAD and CGA-SBA.  2) Interventions:  PT Group Therapy, PT Gait Training, PT Therapeutic Exercises, PT Neuro Re-Ed/Balance, PT Aquatic Therapy, PT Therapeutic Activity, and PT Evaluation  Outcome: PROGRESSING SLOWER THAN EXPECTED     Problem: Mobility  Goal: STG-Within one week, patient will ambulate household distance  Description: 1) Individualized goal:  150 ft with LRAD and CGA-SBA.  2) Interventions:  PT Group Therapy, PT Gait Training, PT Therapeutic Exercises, PT Neuro Re-Ed/Balance, PT Aquatic Therapy, PT Therapeutic Activity, and PT Evaluation    Outcome: MET  Goal: STG-Within one week, patient will ascend and descend four to six stairs  Description: 1) Individualized goal:  with B rails and CGA.  2) Interventions:  PT Group Therapy, PT Gait Training, PT Therapeutic Exercises, PT Neuro Re-Ed/Balance, PT Aquatic Therapy, PT Therapeutic Activity, and PT Evaluation      Outcome: MET     Problem: Mobility Transfers  Goal: STG-Within one week, patient will perform bed mobility  Description: 1) Individualized goal:  with supervision.  2) Interventions:  PT Group Therapy, PT Gait Training, PT Therapeutic Exercises, PT Neuro Re-Ed/Balance, PT Aquatic Therapy, PT Therapeutic Activity, and PT Evaluation      Outcome: MET  Goal: STG-Within one week, patient will transfer bed to chair  Description: 1) Individualized goal:  with LRAD and CGA-SBA.  2) Interventions:  PT Group Therapy, PT Gait Training, PT Therapeutic Exercises, PT Neuro Re-Ed/Balance, PT Aquatic Therapy, PT Therapeutic Activity, and PT Evaluation    Outcome: MET

## 2020-07-07 NOTE — CARE PLAN
Problem: Acute Care of the Diabetic Patient  Goal: Optimal Outcome for the Diabetic Patient  Intervention: Monitor Glucose levels per MD Orders  Note: F/s  at HS - 199 , no coverage ordered . No s/s ogf hypo and hyperglycemia noted.

## 2020-07-08 ENCOUNTER — APPOINTMENT (OUTPATIENT)
Dept: RADIOLOGY | Facility: REHABILITATION | Age: 75
DRG: 071 | End: 2020-07-08
Attending: HOSPITALIST
Payer: MEDICARE

## 2020-07-08 PROBLEM — R10.9 ABDOMINAL PAIN: Status: ACTIVE | Noted: 2020-07-08

## 2020-07-08 PROBLEM — R00.1 BRADYCARDIA: Status: RESOLVED | Noted: 2020-05-10 | Resolved: 2020-07-08

## 2020-07-08 PROBLEM — D69.6 THROMBOCYTOPENIA (HCC): Status: RESOLVED | Noted: 2020-06-26 | Resolved: 2020-07-08

## 2020-07-08 PROBLEM — R10.9 ABDOMINAL PAIN: Status: RESOLVED | Noted: 2020-07-08 | Resolved: 2020-07-08

## 2020-07-08 PROBLEM — R79.89 AZOTEMIA: Status: RESOLVED | Noted: 2020-06-26 | Resolved: 2020-07-08

## 2020-07-08 LAB
GLUCOSE BLD-MCNC: 100 MG/DL (ref 65–99)
GLUCOSE BLD-MCNC: 145 MG/DL (ref 65–99)
GLUCOSE BLD-MCNC: 259 MG/DL (ref 65–99)
INR PPP: 3.46 (ref 0.87–1.13)
PROTHROMBIN TIME: 35.9 SEC (ref 12–14.6)

## 2020-07-08 PROCEDURE — 97530 THERAPEUTIC ACTIVITIES: CPT

## 2020-07-08 PROCEDURE — 85610 PROTHROMBIN TIME: CPT

## 2020-07-08 PROCEDURE — 99232 SBSQ HOSP IP/OBS MODERATE 35: CPT | Performed by: HOSPITALIST

## 2020-07-08 PROCEDURE — 82962 GLUCOSE BLOOD TEST: CPT

## 2020-07-08 PROCEDURE — 700102 HCHG RX REV CODE 250 W/ 637 OVERRIDE(OP): Performed by: HOSPITALIST

## 2020-07-08 PROCEDURE — A9270 NON-COVERED ITEM OR SERVICE: HCPCS | Performed by: HOSPITALIST

## 2020-07-08 PROCEDURE — A9270 NON-COVERED ITEM OR SERVICE: HCPCS | Performed by: PHYSICAL MEDICINE & REHABILITATION

## 2020-07-08 PROCEDURE — 770010 HCHG ROOM/CARE - REHAB SEMI PRIVAT*

## 2020-07-08 PROCEDURE — 700102 HCHG RX REV CODE 250 W/ 637 OVERRIDE(OP): Performed by: PHYSICAL MEDICINE & REHABILITATION

## 2020-07-08 PROCEDURE — 97129 THER IVNTJ 1ST 15 MIN: CPT

## 2020-07-08 PROCEDURE — 99233 SBSQ HOSP IP/OBS HIGH 50: CPT | Performed by: PHYSICAL MEDICINE & REHABILITATION

## 2020-07-08 PROCEDURE — 97116 GAIT TRAINING THERAPY: CPT

## 2020-07-08 PROCEDURE — 97130 THER IVNTJ EA ADDL 15 MIN: CPT

## 2020-07-08 PROCEDURE — 36415 COLL VENOUS BLD VENIPUNCTURE: CPT

## 2020-07-08 PROCEDURE — 74018 RADEX ABDOMEN 1 VIEW: CPT

## 2020-07-08 RX ORDER — AMOXICILLIN 250 MG
2 CAPSULE ORAL 2 TIMES DAILY PRN
Status: DISCONTINUED | OUTPATIENT
Start: 2020-07-08 | End: 2020-07-09 | Stop reason: HOSPADM

## 2020-07-08 RX ORDER — WARFARIN SODIUM 4 MG/1
4 TABLET ORAL DAILY
Start: 2020-07-08 | End: 2020-09-11

## 2020-07-08 RX ORDER — POLYETHYLENE GLYCOL 3350 17 G/17G
1 POWDER, FOR SOLUTION ORAL DAILY
Status: DISCONTINUED | OUTPATIENT
Start: 2020-07-09 | End: 2020-07-09 | Stop reason: HOSPADM

## 2020-07-08 RX ORDER — BISACODYL 10 MG
10 SUPPOSITORY, RECTAL RECTAL
Status: DISCONTINUED | OUTPATIENT
Start: 2020-07-08 | End: 2020-07-09 | Stop reason: HOSPADM

## 2020-07-08 RX ORDER — WARFARIN SODIUM 4 MG/1
4 TABLET ORAL
Status: COMPLETED | OUTPATIENT
Start: 2020-07-08 | End: 2020-07-08

## 2020-07-08 RX ORDER — QUETIAPINE FUMARATE 50 MG/1
50 TABLET, FILM COATED ORAL 2 TIMES DAILY
Start: 2020-07-08 | End: 2020-08-17

## 2020-07-08 RX ADMIN — METFORMIN HYDROCHLORIDE 750 MG: 500 TABLET ORAL at 07:51

## 2020-07-08 RX ADMIN — ATORVASTATIN CALCIUM 80 MG: 40 TABLET, FILM COATED ORAL at 23:50

## 2020-07-08 RX ADMIN — MELATONIN 1000 UNITS: at 07:51

## 2020-07-08 RX ADMIN — QUETIAPINE 50 MG: 25 TABLET ORAL at 17:29

## 2020-07-08 RX ADMIN — LISINOPRIL 20 MG: 20 TABLET ORAL at 05:09

## 2020-07-08 RX ADMIN — CITALOPRAM HYDROBROMIDE 20 MG: 20 TABLET ORAL at 23:50

## 2020-07-08 RX ADMIN — STANDARDIZED SENNA CONCENTRATE AND DOCUSATE SODIUM 2 TABLET: 8.6; 5 TABLET ORAL at 05:09

## 2020-07-08 RX ADMIN — INSULIN GLARGINE 14 UNITS: 100 INJECTION, SOLUTION SUBCUTANEOUS at 07:51

## 2020-07-08 RX ADMIN — METFORMIN HYDROCHLORIDE 750 MG: 500 TABLET ORAL at 17:27

## 2020-07-08 RX ADMIN — WARFARIN SODIUM 4 MG: 4 TABLET ORAL at 17:29

## 2020-07-08 ASSESSMENT — ENCOUNTER SYMPTOMS
PALPITATIONS: 0
BRUISES/BLEEDS EASILY: 0
SHORTNESS OF BREATH: 0
FEVER: 0
MEMORY LOSS: 1
COUGH: 0
ABDOMINAL PAIN: 1
VOMITING: 0
POLYDIPSIA: 0
CHILLS: 0
NAUSEA: 0
EYES NEGATIVE: 1
MUSCULOSKELETAL NEGATIVE: 1

## 2020-07-08 ASSESSMENT — GAIT ASSESSMENTS
DISTANCE (FEET): 150
DEVIATION: SHUFFLED GAIT
GAIT LEVEL OF ASSIST: CONTACT GUARD ASSIST
ASSISTIVE DEVICE: FRONT WHEEL WALKER

## 2020-07-08 ASSESSMENT — ACTIVITIES OF DAILY LIVING (ADL)
TOILET_TRANSFER_DESCRIPTION: GRAB BAR;INCREASED TIME;VERBAL CUEING;SUPERVISION FOR SAFETY
BED_CHAIR_WHEELCHAIR_TRANSFER_DESCRIPTION: INCREASED TIME;SET-UP OF EQUIPMENT;SUPERVISION FOR SAFETY;VERBAL CUEING
BED_CHAIR_WHEELCHAIR_TRANSFER_DESCRIPTION: INCREASED TIME;SUPERVISION FOR SAFETY;VERBAL CUEING;ADAPTIVE EQUIPMENT

## 2020-07-08 NOTE — THERAPY
Physical Therapy   Daily Treatment     Patient Name: Carlos Rivera  Age:  74 y.o., Sex:  male  Medical Record #: [id number]  Today's Date: 7/8/2020     Precautions  Precautions: Fall Risk  Comments: Baseline Dementia    Subjective    Patient seated in w/c and agreeable to therapy. Wife late to session for family training however present for majority of session.     Objective       07/08/20 0901   Precautions   Precautions Fall Risk   Sleep/Wake Cycle   Sleep & Rest Awake   Gait Functional Level of Assist    Gait Level Of Assist Contact Guard Assist  (to SBA)   Assistive Device Front Wheel Walker   Distance (Feet) 150   # of Times Distance was Traveled 2   Deviation Shuffled Gait  (festinating gait with anterior LOBs, unsteady)   Transfer Functional Level of Assist   Bed, Chair, Wheelchair Transfer Stand by Assist   Bed Chair Wheelchair Transfer Description Increased time;Supervision for safety;Verbal cueing;Adaptive equipment  (stand step transfer with FWW)   Toilet Transfers Supervised   Toilet Transfer Description Grab bar;Increased time;Verbal cueing;Supervision for safety   Bed Mobility    Supine to Sit Stand by Assist   Sit to Supine Stand by Assist   Sit to Stand Stand by Assist  (to CGA)   Scooting Stand by Assist   Rolling Supervised   Interdisciplinary Plan of Care Collaboration   IDT Collaboration with  ;Family / Caregiver;Physician;Occupational Therapist   Patient Position at End of Therapy Seated;Chair Alarm On;Self Releasing Lap Belt Applied;Call Light within Reach;Tray Table within Reach;Phone within Reach;Family / Friend in Room   Collaboration Comments family training performed with patient's wife; interdisciplinary collaboration regarding d/c plan and requests to see neurologist   PT Total Time Spent   PT Individual Total Time Spent (Mins) 60   PT Charge Group   PT Gait Training 1   PT Therapeutic Activities 3     Extensive education and emotional support regarding discharge  recommendations of CGA-SBA with FWW for mobility as well as supervision for cognitive/safety impairments.    Assessment    Patient tolerated session fairly, attempted family training which primarily focused on verbal discussions regarding discharge plan and recommendations for patient's safety. Also recommended discussing neurology consult with MD due to patient's prolonged cognitive/balance deficits, with patient also disclosing that his sister was diagnosed with Parkinson's Disease. Recommended considering SNF placement to allow for adequate time to set up home and arrange for caregivers.    Strengths: Willingly participates in therapeutic activities  Barriers: Impaired activity tolerance, Impaired insight/denial of deficits, Impaired carryover of learning, Impaired balance(L LE foot pain reported)    Plan    Anticipated d/c tomorrow 7/9/2020, SNF vs. Home with 24 hour supervision.    Physical Therapy Problems     Problem: Mobility     Dates: Start: 06/26/20       Goal: STG-Within one week, patient will ambulate up/down a curb     Dates: Start: 06/26/20       Description: 1) Individualized goal:  150 ft with LRAD and CGA-SBA.  2) Interventions:  PT Group Therapy, PT Gait Training, PT Therapeutic Exercises, PT Neuro Re-Ed/Balance, PT Aquatic Therapy, PT Therapeutic Activity, and PT Evaluation    Note:     Goal Note filed on 06/30/20 1016 by Jose Conley, PT    NT dt focus on other goals                        Problem: PT-Long Term Goals     Dates: Start: 06/26/20       Goal: LTG-By discharge, patient will ambulate     Dates: Start: 06/26/20       Description: 1) Individualized goal:  >150 ft with LRAD and supervision.  2) Interventions:  PT Group Therapy, PT Gait Training, PT Therapeutic Exercises, PT Neuro Re-Ed/Balance, PT Aquatic Therapy, PT Therapeutic Activity, and PT Evaluation              Goal: LTG-By discharge, patient will transfer one surface to another     Dates: Start: 06/26/20        Description: 1) Individualized goal: with LRAD and supervision.  2) Interventions:  PT Group Therapy, PT Gait Training, PT Therapeutic Exercises, PT Neuro Re-Ed/Balance, PT Aquatic Therapy, PT Therapeutic Activity, and PT Evaluation            Goal: LTG-By discharge, patient will ambulate up/down 4-6 stairs     Dates: Start: 06/26/20       Description: 1) Individualized goal: with B rails and supervision.  2) Interventions:  PT Group Therapy, PT Gait Training, PT Therapeutic Exercises, PT Neuro Re-Ed/Balance, PT Aquatic Therapy, PT Therapeutic Activity, and PT Evaluation          Goal: LTG-By discharge, patient will transfer in/out of a car     Dates: Start: 06/26/20       Description: 1) Individualized goal: with LRAD and supervision.  2) Interventions:  PT Group Therapy, PT Gait Training, PT Therapeutic Exercises, PT Neuro Re-Ed/Balance, PT Aquatic Therapy, PT Therapeutic Activity, and PT Evaluation          Goal: LTG-By discharge, patient will     Dates: Start: 06/26/20       Description: 1) Individualized goal: negotiate single curb with LRAD and supervision-SBA.  2) Interventions:  PT Group Therapy, PT Gait Training, PT Therapeutic Exercises, PT Neuro Re-Ed/Balance, PT Aquatic Therapy, PT Therapeutic Activity, and PT Evaluation          Goal: LTG-By discharge, patient will     Dates: Start: 06/26/20       Description: 1) Individualized goal: perform bed mobility mod I.  2) Interventions:  PT Group Therapy, PT Gait Training, PT Therapeutic Exercises, PT Neuro Re-Ed/Balance, PT Aquatic Therapy, PT Therapeutic Activity, and PT Evaluation

## 2020-07-08 NOTE — THERAPY
Occupational Therapy  Daily Treatment     Patient Name: Carlos Rivera  Age:  74 y.o., Sex:  male  Medical Record #: [id number]  Today's Date: 7/8/2020     Precautions  Precautions: (P) Fall Risk  Comments: (P) Dementia    Safety   ADL Safety : Requires Physical Assist for Safety, Requires Cueing for Safety  Bathroom Safety: Requires Cuing for Safety, Requires Physical Assist for Safety    Subjective    Patient lying in bed awake.  Agreeable to OT.  Stated he needed to work on balance, but complains that people help him too much.     Objective       07/08/20 1401   Precautions   Precautions Fall Risk   Comments Dementia   Functional Level of Assist   Bed, Chair, Wheelchair Transfer Stand by Assist   Bed Chair Wheelchair Transfer Description Increased time;Set-up of equipment;Supervision for safety;Verbal cueing   Balance   Standing Balance (Dynamic) Poor +   Comments Mild dynamic balance challenges in parallel bars with light bilateral UE support on bars while stepping over obstacles with SBA/CGA.  No UE support for balloon toss/catch.   Interdisciplinary Plan of Care Collaboration   Patient Position at End of Therapy In Bed;Bed Alarm On;Call Light within Reach;Tray Table within Reach   OT Total Time Spent   OT Individual Total Time Spent (Mins) 30   OT Charge Group   OT Therapy Activity 2       Assessment    Patient did relatively well with balance exercises this session.  No losses of balance and required CGA/SBA.  Patient does not appear to have clear understanding of why staff help him as needed for safety.  Strengths: Motivated for self care and independence, Willingly participates in therapeutic activities, Pleasant and cooperative  Barriers: Generalized weakness, Decreased endurance, Dementia, Difficulty following instructions, Impaired balance, Impaired carryover of learning, Impaired insight/denial of deficits, Impaired functional cognition, Limited mobility    Plan    Attention/sequencing during  ADLs, standing balance (posterior lean), functional household mobility with focus on increasing step length, safety awareness during mobility and standing tasks    Occupational Therapy Goals     Problem: Dressing     Dates: Start: 06/26/20       Goal: STG-Within one week, patient will dress LB     Dates: Start: 06/26/20       Description: 1) Individualized Goal:  with supervision, min verbal cues for safety strategies  2) Interventions:  OT Self Care/ADL, OT Cognitive Skill Dev, OT Neuro Re-Ed/Balance, OT Sensory Int Techniques, OT Therapeutic Activity, OT Evaluation, and OT Therapeutic Exercise                  Problem: OT Long Term Goals     Dates: Start: 06/26/20       Goal: LTG-By discharge, patient will complete basic self care tasks     Dates: Start: 06/26/20       Description: 1) Individualized Goal:  with supervision to  modified independence  2) Interventions:  OT Self Care/ADL, OT Cognitive Skill Dev, OT Neuro Re-Ed/Balance, OT Sensory Int Techniques, OT Therapeutic Activity, OT Evaluation, and OT Therapeutic Exercise            Goal: LTG-By discharge, patient will perform bathroom transfers     Dates: Start: 06/26/20       Description: 1) Individualized Goal:  with supervision to  modified independence  2) Interventions:  OT Self Care/ADL, OT Cognitive Skill Dev, OT Neuro Re-Ed/Balance, OT Sensory Int Techniques, OT Therapeutic Activity, OT Evaluation, and OT Therapeutic Exercise

## 2020-07-08 NOTE — DISCHARGE PLANNING
Per [facility] at [facility], they will pick patient up tomorrow, 7/9 at 1200.  Dr. Wallis is accepting physician.  CM notified.

## 2020-07-08 NOTE — PROGRESS NOTES
"Rehab Progress Note     Encounter Date: 7/8/2020    CC: Decreased memory, poor mobility    Interval Events (Subjective)  Patient sitting up in room. He reports he has some abdominal discomfort. KUB ordered. Patient in a poor mood as he understands that he may be going to SNF. He reports he is frustrated with not going home. Wife would like to speak separately. She reports frustration with his feeling of abandonment. She reports she would be able to take care of him if he could get additional help. Will discuss with CM to see if any caregiver assistance through [facility]/.  Wife would like another opinion about his neurologic diagnosis.     IDT Team Meeting 7/7/2020  DC/Disposition:  7/9/20    Objective:  VITAL SIGNS: /75   Pulse 93   Temp 37.1 °C (98.8 °F) (Temporal)   Resp 18   Ht 1.753 m (5' 9\")   Wt 56 kg (123 lb 8 oz)   SpO2 94%   BMI 18.24 kg/m²    Gen: NAD  Psych: Mood and affect appropriate  CV: RRR, no edema  Resp: CTAB, no upper airway sounds  Abd: NT, mild distention  Neuro: AOx2, following commands    Recent Results (from the past 72 hour(s))   ACCU-CHEK GLUCOSE    Collection Time: 07/05/20  5:12 PM   Result Value Ref Range    Glucose - Accu-Ck 148 (H) 65 - 99 mg/dL   Prothrombin Time    Collection Time: 07/06/20  6:11 AM   Result Value Ref Range    PT 24.5 (H) 12.0 - 14.6 sec    INR 2.13 (H) 0.87 - 1.13   ACCU-CHEK GLUCOSE    Collection Time: 07/06/20  7:13 AM   Result Value Ref Range    Glucose - Accu-Ck 140 (H) 65 - 99 mg/dL   ACCU-CHEK GLUCOSE    Collection Time: 07/06/20 11:21 AM   Result Value Ref Range    Glucose - Accu-Ck 189 (H) 65 - 99 mg/dL   ACCU-CHEK GLUCOSE    Collection Time: 07/06/20  5:10 PM   Result Value Ref Range    Glucose - Accu-Ck 139 (H) 65 - 99 mg/dL   ACCU-CHEK GLUCOSE    Collection Time: 07/06/20  9:06 PM   Result Value Ref Range    Glucose - Accu-Ck 199 (H) 65 - 99 mg/dL   Prothrombin Time    Collection Time: 07/07/20  5:28 AM   Result Value Ref Range    PT 27.8 " (H) 12.0 - 14.6 sec    INR 2.51 (H) 0.87 - 1.13   Basic Metabolic Panel    Collection Time: 07/07/20  5:28 AM   Result Value Ref Range    Sodium 134 (L) 135 - 145 mmol/L    Potassium 4.7 3.6 - 5.5 mmol/L    Chloride 100 96 - 112 mmol/L    Co2 23 20 - 33 mmol/L    Glucose 162 (H) 65 - 99 mg/dL    Bun 31 (H) 8 - 22 mg/dL    Creatinine 1.20 0.50 - 1.40 mg/dL    Calcium 8.8 8.5 - 10.5 mg/dL    Anion Gap 11.0 7.0 - 16.0   MAGNESIUM    Collection Time: 07/07/20  5:28 AM   Result Value Ref Range    Magnesium 1.9 1.5 - 2.5 mg/dL   PHOSPHORUS    Collection Time: 07/07/20  5:28 AM   Result Value Ref Range    Phosphorus 3.1 2.5 - 4.5 mg/dL   ESTIMATED GFR    Collection Time: 07/07/20  5:28 AM   Result Value Ref Range    GFR If African American >60 >60 mL/min/1.73 m 2    GFR If Non African American 59 (A) >60 mL/min/1.73 m 2   ACCU-CHEK GLUCOSE    Collection Time: 07/07/20  8:10 AM   Result Value Ref Range    Glucose - Accu-Ck 128 (H) 65 - 99 mg/dL   ACCU-CHEK GLUCOSE    Collection Time: 07/07/20 11:35 AM   Result Value Ref Range    Glucose - Accu-Ck 160 (H) 65 - 99 mg/dL   ACCU-CHEK GLUCOSE    Collection Time: 07/07/20  5:16 PM   Result Value Ref Range    Glucose - Accu-Ck 168 (H) 65 - 99 mg/dL   ACCU-CHEK GLUCOSE    Collection Time: 07/07/20  8:38 PM   Result Value Ref Range    Glucose - Accu-Ck 153 (H) 65 - 99 mg/dL   Prothrombin Time    Collection Time: 07/08/20  5:19 AM   Result Value Ref Range    PT 35.9 (H) 12.0 - 14.6 sec    INR 3.46 (H) 0.87 - 1.13   ACCU-CHEK GLUCOSE    Collection Time: 07/08/20  7:07 AM   Result Value Ref Range    Glucose - Accu-Ck 145 (H) 65 - 99 mg/dL   ACCU-CHEK GLUCOSE    Collection Time: 07/08/20 11:06 AM   Result Value Ref Range    Glucose - Accu-Ck 259 (H) 65 - 99 mg/dL       Current Facility-Administered Medications   Medication Frequency   • warfarin (COUMADIN) tablet 4 mg ONCE AT 1800   • insulin glargine (Lantus) injection QAM INSULIN   • metFORMIN (GLUCOPHAGE) tablet 750 mg BID WITH MEALS    • senna-docusate (PERICOLACE or SENOKOT S) 8.6-50 MG per tablet 2 Tab BID PRN    And   • polyethylene glycol/lytes (MIRALAX) PACKET 1 Packet QDAY PRN    And   • magnesium hydroxide (MILK OF MAGNESIA) suspension 30 mL QDAY PRN    And   • bisacodyl (DULCOLAX) suppository 10 mg QDAY PRN   • QUEtiapine (SEROQUEL) tablet 50 mg DAILY   • insulin regular (HUMULIN R) injection 2-12 Units TID AC    And   • glucose 4 g chewable tablet 16 g Q15 MIN PRN    And   • dextrose 50% (D50W) injection 50 mL Q15 MIN PRN   • vitamin D (cholecalciferol) tablet 1,000 Units DAILY   • Respiratory Therapy Consult Continuous RT   • tramadol (ULTRAM) 50 MG tablet 50 mg Q4HRS PRN   • hydrALAZINE (APRESOLINE) tablet 25 mg Q8HRS PRN   • acetaminophen (TYLENOL) tablet 650 mg Q4HRS PRN   • artificial tears ophthalmic solution 1 Drop PRN   • benzocaine-menthol (CEPACOL) lozenge 1 Lozenge Q2HRS PRN   • mag hydrox-al hydrox-simeth (MAALOX PLUS ES or MYLANTA DS) suspension 20 mL Q2HRS PRN   • ondansetron (ZOFRAN ODT) dispertab 4 mg 4X/DAY PRN    Or   • ondansetron (ZOFRAN) syringe/vial injection 4 mg 4X/DAY PRN   • traZODone (DESYREL) tablet 50 mg QHS PRN   • sodium chloride (OCEAN) 0.65 % nasal spray 2 Spray PRN   • atorvastatin (LIPITOR) tablet 80 mg Q EVENING   • citalopram (CELEXA) tablet 20 mg Q EVENING   • lisinopril (PRINIVIL) tablet 20 mg Q DAY   • QUEtiapine (SEROQUEL) tablet 50 mg Q EVENING   • MD Alert...Warfarin per Pharmacy PHARMACY TO DOSE       Orders Placed This Encounter   Procedures   • Diet Order Diabetic     Standing Status:   Standing     Number of Occurrences:   1     Order Specific Question:   Diet:     Answer:   Diabetic [3]       Assessment:  Active Hospital Problems    Diagnosis   • *Acute on Chronic Encephalopathy   • Hypertension   • Coronary artery disease involving coronary bypass graft   • History of mitral valve replacement with mechanical valve [Z95.2]   • Type II diabetes mellitus (HCC)   • TIA (transient ischemic  attack)   • Thrombocytopenia (HCC)   • Vitamin D deficiency   • Depression   • Macrocytosis without anemia   • Azotemia   • Diabetic neuropathy (HCC)   • Obstructive sleep apnea syndrome   • Shuffling gait   • Dementia (HCC)   • Hyperlipidemia   • Mild cognitive impairment   • Anxiety       Medical Decision Making and Plan:  Encephalopathy vs delirium vs TIA - Patient with aphasia and weakness with negative work-up for stroke but with hypertensive emergency on admission concerning for hypertensive encephalopathy. Patient started on statin for concern for CVA vs TIA.   -Worsening confusion - per wife history of Gabapentin causing worse. Discontinue Gabapentin, some improvements     DM - Patient on Lantus 15U QPM and SSI. Will continue to monitor   Blood sugars into 300s, consult hospitalist. Lantus at 12 U  -Hospitalist to consult. Started on Metformin and increased to 750 mg BID     Dementia - Patient on Seroquel 50 mg QPM for agitation and sleep. MRI consistent with probable vascular dementia.   -Sundowning earlier in the evening. Start Seroquel at 1800 as well as 2100. Ongoing hallucinations. UA negative. Move up seroquel to 1700     Mood/Neuropathy - Patient on Celexa 20 mg QPM. Patient with burning into bilateral feet. Trial of Gabapentin 300 mg TID. Discontinue as worsening confusion.     HTN - Patient on Lisinopril 20 mg on transfer. Previously came in with SBP > 180s  -Hospitalist to consult    Bradycardia - Patient into low 40s, check EKG. Only change was Seroquel will check QT  -Sinus Bradycardia. Per Hospitalist recommending TTE, TTE with EF of 55%    Elevated BUN - encouraged fluid intake. Will monitor     HLD - Patient on Atorvastatin 80 mg QPM.      GI Ppx - Patient on Prilosec on transfer. Constipation on KUB on 7/8/20, schedule Miralax daily. Had large BM on 7/8 after KUB     Vitamin D Deficiency -  27 on admission. Started on 1000 U     DVT ppx - Patient on Coumadin     Dispo - Wife unable to care  for patient 24/7 due to agitation, confusion and additional help needed. SNF referrals placed. CM to look into benefits through [facility]     Total time:  36 minutes.  I spent greater than 50% of the time for patient care, counseling, and coordination on this date, including unit/floor time, and face-to-face time with the patient as per interval events and assessment and plan above. Topics discussed included discharge planning, referral to SNF, constipation on KUB, miralax and ongoing discussion with wife.     Wood Rodriguez M.D.    Additional Time: 1300 to 1340, reviewed neurology recommendations in the past with wife counseled on unclear diagnosis which shares multiple features with PD, vascular dementia. She is interested in SNF, bed available today. She is in agreement for transfer today. Discussed about neurology follow-up, she would like second opinion if possible at [facility] and will look into [facility] benefits and [facility] at [facility]. SNF transfer at 4

## 2020-07-08 NOTE — ASSESSMENT & PLAN NOTE
KUB +constipation  Defer to Physiatry   Initiate Treatment: Low-dose Naltrexone once daily at night Detail Level: Zone

## 2020-07-08 NOTE — THERAPY
"Occupational Therapy  Daily Treatment     Patient Name: Carlos Rivera  Age:  74 y.o., Sex:  male  Medical Record #: [id number]  Today's Date: 7/8/2020     Precautions  Precautions: (P) Fall Risk  Comments: Baseline Dementia    Safety   ADL Safety : Requires Physical Assist for Safety, Requires Cueing for Safety  Bathroom Safety: Requires Cuing for Safety, Requires Physical Assist for Safety    Subjective    \" I feel everyone is against me.\"     Objective       07/08/20 1001   Precautions   Precautions Fall Risk   Interdisciplinary Plan of Care Collaboration   IDT Collaboration with  Family / Caregiver   Patient Position at End of Therapy Seated;Chair Alarm On;Self Releasing Lap Belt Applied   Collaboration Comments spouse Comfort present for training     JARED Klein in at start of tx providing patient with blunt reasoning as to why he is not prepared to d/c home with out some sort of supervision .      educated spouse regarding patient needing 24 hr supervision  due to cognition , decreased balance and safety awareness.  she verbalized understanding  .She verbalized that she does not want to take  Mike home at this point and feels that continued  therapy at a skilled facility would help better prepare him for return to home.  patient verbalizing he feels everyone is against him.  took time to explain to patient  why staff feels continued  tx at SNF level would benefit him  1. due to DME needs not yet inplace at home.  2  spouse is uncomfortable taking him home due to  relationship dynamics and patients reluctance to allow spouse to provide supervision.      patient more open to the idea  of  skilled placement  but with many questions better answered by CM  at this time.     OT Total Time Spent   OT Individual Total Time Spent (Mins) 30   OT Charge Group   OT Therapy Activity 2       Assessment     would benefit from continued therapy  In SNF setting due to decreased cognition ( memory , problem solving  Safety " awareness )  Strength/endurance and balance     Spouse does not want patient to return home at this level of care.     Strengths: Motivated for self care and independence, Willingly participates in therapeutic activities, Pleasant and cooperative  Barriers: Generalized weakness, Decreased endurance, Dementia, Difficulty following instructions, Impaired balance, Impaired carryover of learning, Impaired insight/denial of deficits, Impaired functional cognition, Limited mobility    Plan   continue ADL  , related mobility and cognition , strength/endurance and balance activity     Occupational Therapy Goals     Problem: Dressing     Dates: Start: 06/26/20       Goal: STG-Within one week, patient will dress LB     Dates: Start: 06/26/20       Description: 1) Individualized Goal:  with supervision, min verbal cues for safety strategies  2) Interventions:  OT Self Care/ADL, OT Cognitive Skill Dev, OT Neuro Re-Ed/Balance, OT Sensory Int Techniques, OT Therapeutic Activity, OT Evaluation, and OT Therapeutic Exercise                  Problem: OT Long Term Goals     Dates: Start: 06/26/20       Goal: LTG-By discharge, patient will complete basic self care tasks     Dates: Start: 06/26/20       Description: 1) Individualized Goal:  with supervision to  modified independence  2) Interventions:  OT Self Care/ADL, OT Cognitive Skill Dev, OT Neuro Re-Ed/Balance, OT Sensory Int Techniques, OT Therapeutic Activity, OT Evaluation, and OT Therapeutic Exercise            Goal: LTG-By discharge, patient will perform bathroom transfers     Dates: Start: 06/26/20       Description: 1) Individualized Goal:  with supervision to  modified independence  2) Interventions:  OT Self Care/ADL, OT Cognitive Skill Dev, OT Neuro Re-Ed/Balance, OT Sensory Int Techniques, OT Therapeutic Activity, OT Evaluation, and OT Therapeutic Exercise
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
## 2020-07-08 NOTE — THERAPY
Speech Language Pathology  Daily Treatment     Patient Name: Carlos Rivera  Age:  74 y.o., Sex:  male  Medical Record #: [id number]  Today's Date: 7/8/2020     Precautions  Precautions: Fall Risk  Comments: Baseline Dementia    Subjective    Family training completed with pt and pt's wife.       Objective       07/08/20 1031   Interdisciplinary Plan of Care Collaboration   IDT Collaboration with  Family / Caregiver   Patient Position at End of Therapy Seated;Family / Friend in Room   Collaboration Comments Family training completed with pt's wife    SLP Total Time Spent   SLP Individual Total Time Spent (Mins) 30   Charge Group   SLP Cognitive Skill Development First 15 Minutes 1   SLP Cognitive Skill Development Additional 15 Minutes 1       Assessment    Family training completed.  Pt's wife immediately began voicing her concerns about taking pt home reporting that because of pt's confusion, balance and angry outbursts towards her she does not think she will be able to care for him.  Reviewed pt's recent SCCAN scores showing moderate overall cognitive deficit with severe deficits with memory and attention.  Discussed pt's poor problem solving abilities and the need for 24 hour spv, assistance with medication management and no driving.  Pt and pt's wife verbalized agreement of recommendations and also voiced their desire to see a neurologist.      Strengths: Able to follow instructions, Alert and oriented, Effective communication skills, Motivated for self care and independence, Pleasant and cooperative, Supportive family, Willingly participates in therapeutic activities  Barriers: Confused, Dementia, Impaired carryover of learning, Impaired insight/denial of deficits, Impaired functional cognition, Impulsive    Plan    Pt may discharge to a SNF vs home as pt's wife voiced concern about being able to care for pt at this level.  Continue targeting memory, attention and functional problem solving.      Speech  Therapy Problems     Problem: Memory STGs     Dates: Start: 06/26/20       Goal: STG-Within one week, patient will     Dates: Start: 06/26/20       Description: 1) Individualized goal:  Recall new information related to pt's hospitalization with mod A required for use of functional memory strategies (I.e. memory notebook).    2) Interventions:  SLP Self Care / ADL Training , SLP Cognitive Skill Development, and SLP Group Treatment        Note:     Goal Note filed on 07/07/20 0825 by Kavon Dent MS,CCC-SLP    Mod-max A required for pt to recall new information                         Problem: Problem Solving STGs     Dates: Start: 06/26/20       Goal: STG-Within one week, patient will     Dates: Start: 06/26/20       Description: 1) Individualized goal:  Complete simple attention tasks with min A required to achieve 80% accuracy.    2) Interventions:  SLP Self Care / ADL Training , SLP Cognitive Skill Development, and SLP Group Treatment        Note:     Goal Note filed on 07/07/20 0825 by Kavon Dent MS,CCC-SLP    Mod A required for pt to complete simple attention tasks                         Problem: Speech/Swallowing LTGs     Dates: Start: 06/26/20       Goal: LTG-By discharge, patient will solve basic problems     Dates: Start: 06/26/20       Description: 1) Individualized goal:  With spv for a safe discharge home   2) Interventions:  SLP Self Care / ADL Training , SLP Cognitive Skill Development, and SLP Group Treatment

## 2020-07-08 NOTE — THERAPY
"Speech Language Pathology  Daily Treatment     Patient Name: Carlos Rivera  Age:  74 y.o., Sex:  male  Medical Record #: [id number]  Today's Date: 7/8/2020     Precautions  Precautions: Fall Risk  Comments: Baseline Dementia    Subjective    Pt was willing to participate in this ST session     Objective       07/08/20 1331   SLP Total Time Spent   SLP Individual Total Time Spent (Mins) 30   Charge Group   SLP Cognitive Skill Development First 15 Minutes 1   SLP Cognitive Skill Development Additional 15 Minutes 1       Assessment    Pt reported feeling tired this afternoon, but was willing to participate in this therapy session.  Pt completed an organization task requiring him to sort a deck of \"Blink\" cards into 6 piles by shape.  Pt initially required min cues as he began sorting them by color, but was quickly able to sort the rest of the deck by shape independently to achieve 100% accuracy.      Strengths: Able to follow instructions, Alert and oriented, Effective communication skills, Motivated for self care and independence, Pleasant and cooperative, Supportive family, Willingly participates in therapeutic activities  Barriers: Confused, Dementia, Impaired carryover of learning, Impaired insight/denial of deficits, Impaired functional cognition, Impulsive    Plan    Pt possibly discharging to a SNF, continue targeting memory and simple attention if pt does not discharge tomorrow     Speech Therapy Problems     Problem: Memory STGs     Dates: Start: 06/26/20       Goal: STG-Within one week, patient will     Dates: Start: 06/26/20       Description: 1) Individualized goal:  Recall new information related to pt's hospitalization with mod A required for use of functional memory strategies (I.e. memory notebook).    2) Interventions:  SLP Self Care / ADL Training , SLP Cognitive Skill Development, and SLP Group Treatment        Note:     Goal Note filed on 07/07/20 0825 by Kavon Dent MS,CCC-SLP    Mod-max A " required for pt to recall new information                         Problem: Problem Solving STGs     Dates: Start: 06/26/20       Goal: STG-Within one week, patient will     Dates: Start: 06/26/20       Description: 1) Individualized goal:  Complete simple attention tasks with min A required to achieve 80% accuracy.    2) Interventions:  SLP Self Care / ADL Training , SLP Cognitive Skill Development, and SLP Group Treatment        Note:     Goal Note filed on 07/07/20 0825 by Kavon Dent MS,CCC-SLP    Mod A required for pt to complete simple attention tasks                         Problem: Speech/Swallowing LTGs     Dates: Start: 06/26/20       Goal: LTG-By discharge, patient will solve basic problems     Dates: Start: 06/26/20       Description: 1) Individualized goal:  With spv for a safe discharge home   2) Interventions:  SLP Self Care / ADL Training , SLP Cognitive Skill Development, and SLP Group Treatment

## 2020-07-08 NOTE — DISCHARGE SUMMARY
Rehab Discharge Summary    Admission Date: 6/25/2020    Discharge Date: 7/9/2020    Attending Provider: Wood Rodriguez MD/PhD    Admission Diagnosis:   Active Hospital Problems    Diagnosis   • *Acute on Chronic Encephalopathy   • Hypertension   • Coronary artery disease involving coronary bypass graft   • History of mitral valve replacement with mechanical valve [Z95.2]   • Type II diabetes mellitus (HCC)   • TIA (transient ischemic attack)   • Vitamin D deficiency   • Depression   • Macrocytosis without anemia   • Diabetic neuropathy (HCC)   • Obstructive sleep apnea syndrome   • Shuffling gait   • Dementia (HCC)   • Hyperlipidemia   • Mild cognitive impairment   • Anxiety       Discharge Diagnosis:  Active Hospital Problems    Diagnosis   • *Acute on Chronic Encephalopathy   • Hypertension   • Coronary artery disease involving coronary bypass graft   • History of mitral valve replacement with mechanical valve [Z95.2]   • Type II diabetes mellitus (HCC)   • TIA (transient ischemic attack)   • Vitamin D deficiency   • Depression   • Macrocytosis without anemia   • Diabetic neuropathy (HCC)   • Obstructive sleep apnea syndrome   • Shuffling gait   • Dementia (HCC)   • Hyperlipidemia   • Mild cognitive impairment   • Anxiety       HPI per H&P:  The patient is a 74 y.o. right hand dominant male with a past medical history of HTN, HLD, DM2, Dementia, MVR with mechanical valve replacement (Coumadin);  who presented on 6/23/2020  6:26 AM with , difficulty with speech and word finding when he woke up. CT head without acute abnormality. CTA without large vessel occlusion. Neurology was consulted and he was not a candidate for tPA.  Per neurology they were concerned for acute stroke vs hypertensive encephalopathy as his blood pressure was > 190s on admission. 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  Patient's aphasia improved but continued to have decreased mobility and memory concerning for encephalopathy vs TIA.      Patient was admitted to [facility] on 6/25/2020.     Hospital Course by Problem List:  Encephalopathy vs delirium vs TIA - Patient with aphasia and weakness with negative work-up for stroke but with hypertensive emergency on admission concerning for hypertensive encephalopathy. Patient started on statin for concern for CVA vs TIA. Worsening confusion - per wife history of Gabapentin causing worse. Discontinue Gabapentin, minimal improvements. Patient underwent acute inpatient rehabilitation from 6/25/20 to 7/9/20 with some improvement in cognition and mobility. Patient at this time needs additional time for recovery and for wife to look into [facility] benefits for help at home. Patient continues to have unclear diagnosis, he has severe neuropathy, memory deficits, decreased balance, and severe festinating gait. Transfer to SNF  -Follow-up with Neurology, difficult to assess as patient/wife are very hesitant for medications. Does have family member with PD     DM - Patient on Lantus 15U QPM and SSI. Will continue to monitor. Blood sugars into 300s, consult hospitalist. Hospitalist to consult. Started on Metformin and increased to 1000 mg BID     Dementia - Patient on Seroquel 50 mg QPM for agitation and sleep. MRI consistent with probable vascular dementia.   -Sundowning earlier in the evening. Start Seroquel at 1700 as well as 2100. Ongoing hallucinations. UA negative.      Mood/Neuropathy - Patient on Celexa 20 mg QPM. Patient with burning into bilateral feet. Trial of Gabapentin 300 mg TID. Discontinue as worsening confusion.     HTN - Patient on Lisinopril 20 mg on transfer. Previously came in with SBP > 180s  -Hospitalist to consult, maintained only on Lisinopril 20 mg     Bradycardia - Patient into low 40s, check EKG. Only change was Seroquel will check QT  -Sinus  Bradycardia. Per Hospitalist recommending TTE, TTE with EF of 55%     Elevated BUN - encouraged fluid intake. Will monitor     HLD - Patient on Atorvastatin 80 mg QPM.      GI Ppx - Patient on Prilosec on transfer. Constipation on KUB on 7/8/20, schedule Miralax daily. Had large BM on 7/8 after KUB     Vitamin D Deficiency -  27 on admission. Started on 1000 U      DVT ppx - Patient on Coumadin , 5 mg at baseline. 4 mg at discharge      Dispo - Wife unable to care for patient 24/7 due to agitation, confusion and additional help needed. SNF referrals placed. CM to look into benefits through [facility]/     Functional Status at Discharge  Eating:  Modified Independent  Eating Description:  Increased time  Grooming:  Standing, Supervision  Grooming Description:  Standing at sink, Supervision for safety  Bathing:  Supervision  Bathing Description:  Grab bar, Hand held shower, Tub bench, Supervision for safety, Verbal cueing  Upper Body Dressing:  Supervision  Upper Body Dressing Description:  Supervision for safety, Verbal cueing(completed in standing)  Lower Body Dressing:  Minimal Assist  Lower Body Dressing Description:  Verbal cueing, Supervision for safety, Increased time(assist to tie shoes)     Walk:  Contact Guard Assist(to SBA)  Distance Walked:  150  Number of Times Distance Was Traveled:  2  Assistive Device:  Front Wheel Walker  Gait Deviation:  Shuffled Gait(festinating gait with anterior LOBs, unsteady)  Wheelchair:  Refused  Distance Propelled:  200   Wheelchair Description:  (bilateral UE, verbal cues for steering)  Stairs Contact Guard Assist  Stairs Description Extra time, Hand rails, Limited by fatigue, Safety concerns, Requires incidental assist, Verbal cueing  Level of Supervision Required Upon Discharge: Twenty Four Hour Supervision  Recommended Equipment for Discharge: Front-Wheeled Walker  Comprehension:  Supervision  Comprehension Description:  Glasses, Verbal cues  Expression:  Modified  Independent  Expression Description:  Verbal cueing  Social Interaction:  Modified Independent  Social Interaction Description:  Increased time, Verbal cues  Problem Solving:  Moderate Assist  Problem Solving Description:  Seat belt, Therapy schedule, Verbal cueing, Increased time, Bed/chair alarm  Memory:  Moderate Assist  Memory Description:  Bed/chair alarm, Increased time, Seat belt, Therapy schedule, Verbal cueing       Wood BENJAMIN M.D., personally performed a complete drug regimen review and no potential clinically significant medication issues were identified.   Discharge Medication:     Medication List      START taking these medications      Instructions   Cholecalciferol 1000 UNIT Tabs  Start taking on:  July 9, 2020  Commonly known as:  VITAMIND D3   Take 1 Tab by mouth every day.  Dose:  1,000 Units     insulin regular 100 Unit/mL Soln  Commonly known as:  HUMULIN R   Inject 2-12 Units as instructed 3 times a day before meals.  Dose:  2-12 Units        CHANGE how you take these medications      Instructions   quetiapine 50 MG tablet  What changed:  when to take this  Commonly known as:  SEROQUEL   Doctor's comments:  1700 and 2100 for sundowning.  Take 1 Tab by mouth 2 times a day.  Dose:  50 mg     warfarin 4 MG Tabs  What changed:    · medication strength  · how much to take  Commonly known as:  COUMADIN   Take 1 Tab by mouth every day at 6 PM.  Dose:  4 mg        CONTINUE taking these medications      Instructions   atorvastatin 80 MG tablet  Commonly known as:  LIPITOR   Take 1 Tab by mouth every evening.  Dose:  80 mg     citalopram 20 MG Tabs  Commonly known as:  CELEXA   Take 20 mg by mouth every evening.  Dose:  20 mg     insulin glargine 100 UNIT/ML Soln  Commonly known as:  Lantus   Inject 15 Units as instructed every evening.  Dose:  15 Units     lisinopril 20 MG Tabs  Commonly known as:  PRINIVIL   Take 20 mg by mouth every evening.  Dose:  20 mg     metFORMIN 500 MG  Tabs  Commonly known as:  GLUCOPHAGE   Take 1,000 mg by mouth 2 times a day, with meals.  Dose:  1,000 mg            Discharge Diet:  Diabetic    Discharge Activity:  Per SNF     Disposition:  Patient to discharge to SNF     Equipment:  Per SNF    Follow-up & Discharge Instructions:  Follow up with your primary care provider (PCP) within 7-10 days of discharge to review your medications and take over your care.     If you develop chest pain, fever, chills, change in neurologic function (weakness, sensation changes, vision changes), or other concerning sxs, seek immediate medical attention or call 911.      Condition on Discharge:  Fair    Total time:  34 minutes was spent on discharging this patient, including face-to-face time, prescription management, and the dictation of this note.    Wood Rodriguez M.D.    7/9/2020

## 2020-07-08 NOTE — PROGRESS NOTES
Pharmacy Warfarin Consult   7/8/2020     74 y.o.   male     Indication for anticoagulation: Mechanical Mitral Valve Replacement    Goal INR = 2.5 - 3.5    Recent Labs     07/06/20  0611 07/07/20  0528 07/08/20  0519   INR 2.13* 2.51* 3.46*       Pertinent Drug/Drug Interactions:  PPI, Statin, SSRI  Outpatient Warfarin Regimen:  Warfarin 5 mg daily  Recent Warfarin Dosing:    Dose from last 7 days     Date/Time Dose (mg)    07/08/20 0519  4    07/07/20 0528  5    07/06/20 0611  6    07/05/20 1700  5    07/04/20 1700  3    07/03/20 0514  1    07/02/20 0539  4          Bridge Therapy: No        1.  Warfarin 4 mg tonight for INR = 3.46        Armen Marks [facility]

## 2020-07-08 NOTE — CARE PLAN
Problem: Safety  Goal: Will remain free from injury  Outcome: PROGRESSING AS EXPECTED  Note: Call light with in reach. Redirection to use call light for assistance. Non skid socks. Upper siderails up x2 while in bed. Bed alarm for safety awareness.     Problem: Acute Care of the Diabetic Patient  Goal: Optimal Outcome for the Diabetic Patient  Outcome: PROGRESSING AS EXPECTED  Note: HS FS-153. HS snacks given. Will monitor.

## 2020-07-08 NOTE — PROGRESS NOTES
Hospital Medicine Daily Progress Note      Chief Complaint  Hypertension  Diabetes    Interval Problem Update  Pt c/o abd pain.  No chest pain or shortness of breath.  KUB ordered.    Review of Systems  Review of Systems   Constitutional: Negative for chills and fever.   HENT: Negative.    Eyes: Negative.    Respiratory: Negative for cough and shortness of breath.    Cardiovascular: Negative for chest pain and palpitations.   Gastrointestinal: Positive for abdominal pain. Negative for nausea and vomiting.   Musculoskeletal: Negative.    Skin: Negative for itching and rash.   Endo/Heme/Allergies: Negative for polydipsia. Does not bruise/bleed easily.   Psychiatric/Behavioral: Positive for memory loss.        Physical Exam  Temp:  [36.6 °C (97.9 °F)-37.1 °C (98.8 °F)] 37.1 °C (98.8 °F)  Pulse:  [60-93] 93  Resp:  [18] 18  BP: (118-142)/(70-80) 118/75  SpO2:  [94 %-95 %] 94 %    Physical Exam  Vitals signs reviewed.   Constitutional:       General: He is not in acute distress.     Appearance: Normal appearance. He is not ill-appearing.   HENT:      Head: Normocephalic and atraumatic.      Right Ear: External ear normal.      Left Ear: External ear normal.      Nose: Nose normal.      Mouth/Throat:      Pharynx: Oropharynx is clear.   Eyes:      General:         Right eye: No discharge.         Left eye: No discharge.      Extraocular Movements: Extraocular movements intact.      Conjunctiva/sclera: Conjunctivae normal.   Neck:      Musculoskeletal: Normal range of motion and neck supple.   Cardiovascular:      Rate and Rhythm: Normal rate and regular rhythm.   Pulmonary:      Effort: No respiratory distress.      Breath sounds: No wheezing.      Comments: Decreased BS   Abdominal:      General: Bowel sounds are normal. There is no distension.      Palpations: Abdomen is soft.      Tenderness: There is no abdominal tenderness.   Musculoskeletal:      Right lower leg: No edema.      Left lower leg: No edema.   Skin:      "General: Skin is warm and dry.   Neurological:      Mental Status: He is alert and oriented to person, place, and time.         Fluids    Intake/Output Summary (Last 24 hours) at 7/8/2020 1455  Last data filed at 7/7/2020 2039  Gross per 24 hour   Intake 120 ml   Output --   Net 120 ml       Laboratory      Recent Labs     07/07/20  0528   SODIUM 134*   POTASSIUM 4.7   CHLORIDE 100   CO2 23   GLUCOSE 162*   BUN 31*   CREATININE 1.20   CALCIUM 8.8     Recent Labs     07/06/20  0611 07/07/20  0528 07/08/20  0519   INR 2.13* 2.51* 3.46*               Assessment/Plan  Hypertension- (present on admission)  Assessment & Plan  Blood pressure controlled on Lisinopril    History of mitral valve replacement with mechanical valve [Z95.2]- (present on admission)  Assessment & Plan  Anticoagulated on Coumadin    Type II diabetes mellitus (HCC)- (present on admission)  Assessment & Plan  HbA1c 6.7  Increased Lantus to minimize SSI requirements  Continue Metformin  Outpt meds include Lantus 15 units and Metformin 1000 mg bid    Bradycardia  Assessment & Plan  Asymptomatic and improving  Outpt Cardiology F/U    Abdominal pain  Assessment & Plan  KUB +constipation  Defer to Physiatry    Azotemia  Assessment & Plan  Encourage PO fluids  Follow renal function    Macrocytosis without anemia  Assessment & Plan  Vit B12, Folate, and TSH levels all normal  Follow H/H    Vitamin D deficiency  Assessment & Plan  Vit D level 27  On supplementation    TIA (transient ischemic attack)  Assessment & Plan  Pt presented w/ dysarthria, now resolved  Neuroimaging negative acute  On Coumadin and Lipitor    Mild cognitive impairment- (present on admission)  Assessment & Plan  Pt reports \"forgetfulness\" that's been ongoing for the past 2 yrs    Anxiety- (present on admission)  Assessment & Plan  On Celexa and Seroquel     Full Code    Discussed w/ pt, wife, and Dr. Rodriguez  "

## 2020-07-09 VITALS
HEART RATE: 65 BPM | RESPIRATION RATE: 16 BRPM | SYSTOLIC BLOOD PRESSURE: 140 MMHG | TEMPERATURE: 97.6 F | BODY MASS INDEX: 18.29 KG/M2 | WEIGHT: 123.5 LBS | OXYGEN SATURATION: 96 % | DIASTOLIC BLOOD PRESSURE: 74 MMHG | HEIGHT: 69 IN

## 2020-07-09 LAB
GLUCOSE BLD-MCNC: 128 MG/DL (ref 65–99)
GLUCOSE BLD-MCNC: 137 MG/DL (ref 65–99)
INR PPP: 4.42 (ref 0.87–1.13)
PROTHROMBIN TIME: 43.5 SEC (ref 12–14.6)

## 2020-07-09 PROCEDURE — 85610 PROTHROMBIN TIME: CPT

## 2020-07-09 PROCEDURE — 99232 SBSQ HOSP IP/OBS MODERATE 35: CPT | Performed by: HOSPITALIST

## 2020-07-09 PROCEDURE — 99239 HOSP IP/OBS DSCHRG MGMT >30: CPT | Performed by: PHYSICAL MEDICINE & REHABILITATION

## 2020-07-09 PROCEDURE — 36415 COLL VENOUS BLD VENIPUNCTURE: CPT

## 2020-07-09 PROCEDURE — A9270 NON-COVERED ITEM OR SERVICE: HCPCS | Performed by: HOSPITALIST

## 2020-07-09 PROCEDURE — 700102 HCHG RX REV CODE 250 W/ 637 OVERRIDE(OP): Performed by: PHYSICAL MEDICINE & REHABILITATION

## 2020-07-09 PROCEDURE — 82962 GLUCOSE BLOOD TEST: CPT

## 2020-07-09 PROCEDURE — A9270 NON-COVERED ITEM OR SERVICE: HCPCS | Performed by: PHYSICAL MEDICINE & REHABILITATION

## 2020-07-09 PROCEDURE — 700102 HCHG RX REV CODE 250 W/ 637 OVERRIDE(OP): Performed by: HOSPITALIST

## 2020-07-09 RX ADMIN — LISINOPRIL 20 MG: 20 TABLET ORAL at 05:05

## 2020-07-09 RX ADMIN — MELATONIN 1000 UNITS: at 08:19

## 2020-07-09 RX ADMIN — METFORMIN HYDROCHLORIDE 750 MG: 500 TABLET ORAL at 08:18

## 2020-07-09 RX ADMIN — STANDARDIZED SENNA CONCENTRATE AND DOCUSATE SODIUM 2 TABLET: 8.6; 5 TABLET ORAL at 08:24

## 2020-07-09 RX ADMIN — INSULIN GLARGINE 14 UNITS: 100 INJECTION, SOLUTION SUBCUTANEOUS at 08:19

## 2020-07-09 ASSESSMENT — ENCOUNTER SYMPTOMS
CHILLS: 0
BRUISES/BLEEDS EASILY: 0
POLYDIPSIA: 0
SHORTNESS OF BREATH: 0
FEVER: 0
MUSCULOSKELETAL NEGATIVE: 1
PALPITATIONS: 0
COUGH: 0
VOMITING: 0
EYES NEGATIVE: 1
NAUSEA: 0
ABDOMINAL PAIN: 0
MEMORY LOSS: 1

## 2020-07-09 ASSESSMENT — ACTIVITIES OF DAILY LIVING (ADL)
TOILETING_LEVEL_OF_ASSIST: REQUIRES SUPERVISION WITH TOILETING
SHOWER_TRANSFER_LEVEL_OF_ASSIST: REQUIRES SUPERVISION WITH SHOWER TRANSFER
TOILET_TRANSFER_LEVEL_OF_ASSIST: REQUIRES SUPERVISION WITH TOILET TRANSFER

## 2020-07-09 NOTE — CARE PLAN
Problem: Speech/Swallowing LTGs  Goal: LTG-By discharge, patient will solve basic problems  Description: 1) Individualized goal:  With spv for a safe discharge home   2) Interventions:  SLP Self Care / ADL Training , SLP Cognitive Skill Development, and SLP Group Treatment      Outcome: DISCHARGED-GOAL NOT MET     Problem: Problem Solving STGs  Goal: STG-Within one week, patient will  Description: 1) Individualized goal:  Complete simple attention tasks with min A required to achieve 80% accuracy.    2) Interventions:  SLP Self Care / ADL Training , SLP Cognitive Skill Development, and SLP Group Treatment      Outcome: DISCHARGED-GOAL NOT MET     Problem: Memory STGs  Goal: STG-Within one week, patient will  Description: 1) Individualized goal:  Recall new information related to pt's hospitalization with mod A required for use of functional memory strategies (I.e. memory notebook).    2) Interventions:  SLP Self Care / ADL Training , SLP Cognitive Skill Development, and SLP Group Treatment      Outcome: DISCHARGED-GOAL NOT MET

## 2020-07-09 NOTE — DISCHARGE PLANNING
"Met w/ patient & wife, Comfort, at bedside to review IDT recommendations for 24/7 supervision & physical care needs. Wife states \"I can't take care of him. He won't listen to me. He always fights me when I say things.\" Observed patient getting up from wheelchair with wife attempting to assist on my entrance into room. Brakes on wheelchair were locked. Wife assisted patient to stand from side of chair, no FWW in front of chair for stability or balance. Patient unsteady on feet, took two small shuffling steps to reach for fruit bowl on bedside tray & requested to sit down again. High fall risk, poor safety awareness. Patient agitated w/ wife's attempts to assist, short verbal statements to wife about helping. Wife states \"see? He doesn't want my help. It's always a parham.\" Wife tearful. Direct conversation w/ patient & wife regarding safety concerns & fall risk, 24/7 supervision needs, decreased strength & endurance, poor carryover education & strategies, argumentative, rigid behaviors w/ therapy. Reviewed importance of continuing therapy at lower level of care for ongoing progress toward strength, endurance & safety goals with plan to return home w/ wife. [facility]. Wife requests SNF referral to [facility] for ongoing therapy & care needs. Patient poor eye contact, closes eyes to conversation, mumbling responses \"everyone is always telling me what to do & how to do it & pushing me to do things.\" Discussed slower progression of therapies at skilled level, hopefully offering improved ability to participate & reach goals. Questions answered. Emotional support provided.  "

## 2020-07-09 NOTE — PROGRESS NOTES
Hospital Medicine Daily Progress Note      Chief Complaint  Hypertension  Diabetes    Interval Problem Update  No more abd pain today after +BM.    Review of Systems  Review of Systems   Constitutional: Negative for chills and fever.   HENT: Negative.    Eyes: Negative.    Respiratory: Negative for cough and shortness of breath.    Cardiovascular: Negative for chest pain and palpitations.   Gastrointestinal: Negative for abdominal pain, nausea and vomiting.   Musculoskeletal: Negative.    Skin: Negative for itching and rash.   Endo/Heme/Allergies: Negative for polydipsia. Does not bruise/bleed easily.   Psychiatric/Behavioral: Positive for memory loss.        Physical Exam  Temp:  [36.3 °C (97.4 °F)-37.1 °C (98.8 °F)] 36.4 °C (97.6 °F)  Pulse:  [61-93] 65  Resp:  [16-18] 16  BP: (110-140)/(62-75) 140/74  SpO2:  [94 %-96 %] 96 %    Physical Exam  Vitals signs reviewed.   Constitutional:       General: He is not in acute distress.     Appearance: Normal appearance. He is not ill-appearing.   HENT:      Head: Normocephalic and atraumatic.      Right Ear: External ear normal.      Left Ear: External ear normal.      Nose: Nose normal.      Mouth/Throat:      Pharynx: Oropharynx is clear.   Eyes:      General:         Right eye: No discharge.         Left eye: No discharge.      Extraocular Movements: Extraocular movements intact.      Conjunctiva/sclera: Conjunctivae normal.   Neck:      Musculoskeletal: Normal range of motion and neck supple.   Cardiovascular:      Rate and Rhythm: Normal rate and regular rhythm.   Pulmonary:      Effort: No respiratory distress.      Breath sounds: No wheezing.      Comments: Decreased BS   Abdominal:      General: Bowel sounds are normal. There is no distension.      Palpations: Abdomen is soft.      Tenderness: There is no abdominal tenderness.   Musculoskeletal:      Right lower leg: No edema.      Left lower leg: No edema.   Skin:     General: Skin is warm and dry.   Neurological:  "     Mental Status: He is alert and oriented to person, place, and time.         Fluids    Intake/Output Summary (Last 24 hours) at 7/9/2020 1030  Last data filed at 7/9/2020 0900  Gross per 24 hour   Intake 480 ml   Output --   Net 480 ml       Laboratory      Recent Labs     07/07/20  0528   SODIUM 134*   POTASSIUM 4.7   CHLORIDE 100   CO2 23   GLUCOSE 162*   BUN 31*   CREATININE 1.20   CALCIUM 8.8     Recent Labs     07/07/20  0528 07/08/20  0519 07/09/20  0513   INR 2.51* 3.46* 4.42*               Assessment/Plan  Hypertension- (present on admission)  Assessment & Plan  Blood pressure controlled on Lisinopril    History of mitral valve replacement with mechanical valve [Z95.2]- (present on admission)  Assessment & Plan  Anticoagulated on Coumadin    Type II diabetes mellitus (HCC)- (present on admission)  Assessment & Plan  HbA1c 6.7  Continue Metformin and Lantus  Will not need SSI on discharge  Outpt meds include Lantus 15 units and Metformin 1000 mg bid    Macrocytosis without anemia  Assessment & Plan  Vit B12, Folate, and TSH levels all normal  Follow H/H    Vitamin D deficiency  Assessment & Plan  Vit D level 27  On supplementation    TIA (transient ischemic attack)  Assessment & Plan  Pt presented w/ dysarthria, now resolved  Neuroimaging negative acute  On Coumadin and Lipitor    Mild cognitive impairment- (present on admission)  Assessment & Plan  Pt reports \"forgetfulness\" that's been ongoing for the past 2 yrs    Anxiety- (present on admission)  Assessment & Plan  On Celexa and Seroquel     Abdominal pain  Assessment & Plan  KUB +constipation  Constipation resolved overnight w/ bowel regimen     Azotemia  Assessment & Plan  Encourage PO fluids  Follow renal function    Full Code    "

## 2020-07-09 NOTE — CARE PLAN
Problem: Communication  Goal: The ability to communicate needs accurately and effectively will improve  Outcome: PROGRESSING AS EXPECTED  Note: Patient is oriented to self and hospital.  Disoriented to time and situation.       Problem: Safety  Goal: Will remain free from injury  Outcome: PROGRESSING AS EXPECTED  Note: Patient is impulsive, confused at times and aggressive at times.  He does not use his call light.  Bed and chair alarms in place.

## 2020-07-09 NOTE — CARE PLAN
Problem: Communication  Goal: The ability to communicate needs accurately and effectively will improve  Outcome: MET     Problem: Infection  Goal: Will remain free from infection  Outcome: MET     Problem: Venous Thromboembolism (VTW)/Deep Vein Thrombosis (DVT) Prevention:  Goal: Patient will participate in Venous Thrombosis (VTE)/Deep Vein Thrombosis (DVT)Prevention Measures  Outcome: MET     Problem: Bowel/Gastric:  Goal: Normal bowel function is maintained or improved  Outcome: MET  Goal: Will not experience complications related to bowel motility  Outcome: MET     Problem: Knowledge Deficit  Goal: Knowledge of disease process/condition, treatment plan, diagnostic tests, and medications will improve  Outcome: MET  Goal: Knowledge of the prescribed therapeutic regimen will improve  Outcome: MET     Problem: Pain Management  Goal: Pain level will decrease to patient's comfort goal  Outcome: MET

## 2020-07-09 NOTE — DISCHARGE PLANNING
Case Management Discharge Instructions        Discharge Location: Skilled Nursing Facility     Agency Name / Address / Phone: [facility]         [address] [place] [address] [phone]     Comments: [facility] will  for transport at 12:00pm      Follow-up Information:    Mi Loza M.D. Primary Care Office [phone]    [address] [facility] [id number]   Wednesday 7.22.2020, check in at 10:45am for 11:00am appointment    Please wear mask.     [facility] Madelyn Feldman APRGIOVANNA  [phone]    [address] [place] [id number]   Thursday 8.6.2020, appointment at 1:40pm.    Please wear mask.

## 2020-07-09 NOTE — PROGRESS NOTES
I spoke with the patient's wife Comfort, she states that she is concerned because she was looking at MyChart and can see that Seroquel has been ordered for the patient and that he is very sensitive to all medications .     She states she is also  concerned because he refused to eat his meals today when she was here.    Patient did refuse his HS medications, HS FSBS  tonight and evening snack.      2350 Patient woke up and called his wife.  After this, he agreed to take his Lipitor and Celexa.  At this time he did drink some Milk.  Still refuses FSBS.

## 2020-07-09 NOTE — DISCHARGE INSTRUCTIONS
[facility] NURSING DISCHARGE INSTRUCTIONS    Blood Pressure : 110/62  Weight: 56 kg (123 lb 8 oz)  Nursing recommendations for Mike Rivera at time of discharge are as follows:  Client and Family Member verbalized understanding of all discharge instructions and prescriptions.     Review all your home medications and newly ordered medications with your doctor and/or pharmacist. Follow medication instructions as directed by your doctor and/or pharmacist.    Pain Management:   Discharge Pain Medication Instructions:  Comfort Goal: Sleep Comfortably, Stay Alert, Perform Activity, Comfort with Movement  Notify your primary care provider if pain is unrelieved with these measures, if the pain is new, or increased in intensity.    Discharge Skin Characteristics: Warm, Dry  Discharge Skin Exam: Clear     Skin / Wound Care Instructions: Please contact your primary care physician for any change in skin integrity.     If You Have Surgical Incisions / Wounds:  Monitor surgical site(s) for signs of increased swelling, redness or symptoms of drainage from the site or fever as this could indicate signs and symptoms of infection. If these symptoms are noted, notifiy your primary care provider.      Discharge Safety Instructions: Should Have ADULT SUPERVISION     Discharge Safety Concerns: Unsteady Gait, Impaired Judgement  The interdisciplinary team has made recommendation that you should have adult supervision in the house due to impaired judgment and unsteady gait  Anti-embolic stockings are not required to increase circulation to the lower extremities.    Discharge Diet: ADA     Discharge Liquids: thin  Discharge Bowel Function: Continent  Please contact your primary care physician for any changes in bowel habits.  Discharge Bowel Program:    Discharge Bladder Function: Continent  Discharge Urinary Devices: None      Nursing Discharge Plan:        Case Management Discharge Instructions:    Discharge Location: Skilled Nursing Facility  Agency Name/Address/Phone:    Home Health:    Outpatient Services:    DME Provider/Phone:    Medical Equipment Ordered:    Prescription Faxed to:        Discharge Medication Instructions:  Below are the medications your physician expects you to take upon discharge:            Physical Therapy Discharge Instructions for Mike Rivera    7/8/2020    Level of Assist Required for Ambulation: Assist for Balance on Flat Surfaces, Assist for Balance on Curbs, Assist for Balance on Stairs  Distance Patient May Ambulate: as much as tolerated safely  Device Recommended for Ambulation: Front-Wheeled Walker  Level of Assist Required to Propel Wheelchair: Intermittent Physical Assist  Level of Assist Required for Transfers: Supervision(occasional Min A to stand)  Device Recommended for Transfers: Front-Wheeled Walker  It was great working with you, Mike! Best of luck in the future :) -Lian PT        Diabetes and Exercise  Diabetes mellitus is a common, chronic disease, in which the pancreas is unable to adequately control blood glucose (sugar) levels. There are 2 types of diabetes. Type 1 diabetes patients are unable to produce insulin, a hormone that causes sugar in the blood to be stored in the body. People with type 1 diabetes may compensate by giving themselves injections of insulin. Type 2 diabetes involves not producing adequate amounts of insulin to control blood glucose levels. People with type 2 diabetes control their blood glucose by monitoring their food intake or by taking medicine. Exercise is an important part of diabetes treatment. During exercise, the muscles use a greater amount of glucose from the blood for energy. This lowers your blood glucose, which is the same effect you would get from taking insulin. It has been shown that endurance athletes are more sensitive to insulin than inactive people.  SYMPTOMS   Many people with a mild case of diabetes  have no symptoms. However, if left uncontrolled, diabetes can lead to several complications that could be prevented with treatment of the disease.  General symptoms of diabetes include:   · Frequent urination (polyuria).  · Frequent thirst and drinking (polydipsia).  · Increased food consumption (polyphagia).  · Fatigue.  · Poor exercise performance.  · Blurred vision.  · Inflammation of the vagina (vaginitis) caused by fungal infections.  · Skin infections (uncommon).  · Numbness in the feet, caused by nerve injury.  · Kidney disease.  CAUSES   The cause of most cases of diabetes is unknown. In children, diabetes is often due to an autoimmune response to the cells in the pancreas that make insulin. It is also linked with other diseases, such as cystic fibrosis. Diabetes may have a genetic link.  PREVENTION  · Athletes should strive to begin exercise with blood glucose in a well-controlled state.  · Feet should always be kept clean and dry.  · Activities in which low blood sugar levels cannot be treated easily (scuba diving, rock climbing, swimming) should be avoided.  · Anticipate alterations in diet or training to avoid low blood sugar (hypoglycemia) and high blood sugar (hyperglycemia).  · Athletes should try to increase sugar consumption after strenuous exercise to avoid hypoglycemia.  · Short-acting insulin should not be injected into an actively exercising muscle. The athlete should rest the injection site for about 1 hour after exercise.  · Patients with diabetes should get routine checkups of the feet to prevent complications.  PROGNOSIS   Exercise provides many benefits to the person with diabetes:   · Reduced body fat.  · Lower blood pressure.  · Often, reduced need for medicines.  · Improved exercise tolerance.  · Lower insulin levels.  · Weight loss.  · Improved lipid profile (decreased cholesterol and low-density lipoproteins).  RELATED COMPLICATIONS   If performed incorrectly, exercise can result in  complications of diabetes:   · Poor control of blood sugar, when exercise is performed at the wrong time.  · Increase in renal disease, from loss of body fluids (dehydration).  · Increased risk of nerve injury (neuropathy) when performing exercises that increase foot injury.  · Increased risk of eye problems when performing activities that involve breath holding or lowering or jarring the head.  · Increased risk of sudden death from exercise in patients with heart disease.  · Worsening of hypertension with heavy lifting (more than 10 lb/4.5 kg). Altered blood glucose and insulin dose as a result of mild illness that produces loss of appetite.  · Altered uptake of insulin after injection when insulin injection site is changed.  NOTE: Exercise can lower blood glucose effectively, but the effects are short-lasting (no more than a couple of days). Exercise has been shown to improve your sensitivity to insulin. This may alter how your body responds to a given dose of injected insulin. It is important for every patient with diabetes to know how his or her body may react to exercise, and to adjust insulin dosages accordingly.  TREATMENT  · Eat about 1 to 3 hours before exercise.  · Check blood glucose immediately before and after exercise.  · Stop exercise if blood glucose is more than 250 mg/dL.  · Stop exercise if blood glucose is less than 100 mg/dL.  · Do not exercise within 1 hour of an insulin injection.  · Be prepared to treat low blood glucose while exercising. Keep some sugar product with you, such as a candy bar.  · For prolonged exercise, use a sports drink to maintain your glucose level.  · Replace used-up glucose in the body after exercise.  · Consume fluids during and after exercise to avoid dehydration.  SEEK MEDICAL CARE IF:  · You have vision changes after a run.  · You notice a loss of sensation in your feet after exercise.  · You have increased numbness, tingling, or pins and needles sensations after  "exercise.  · You have chest pain during or after exercise.  · You have a fast, irregular heartbeat (palpitations) during or after exercise.  · Your exercise tolerance gets worse.  · You have fainting or dizzy spells for brief periods during or after exercise.     This information is not intended to replace advice given to you by your health care provider. Make sure you discuss any questions you have with your health care provider.     Document Released: 12/18/2006 Document Revised: 01/08/2016 Document Reviewed: 02/16/2016  [employer] Interactive Patient Education ©2016 [employer]       Prevent Falls in Your Home    \"Falling once doubles your chance of falling again\"        -Center for Disease Control and Prevention    Falls in the home can lead to serious injury (fractures, brain injuries), hospitalizations, increased medical costs, and could even be fatal.  The good news is, there are many precautions you can take to avoid falls in your home and help keep you safe:     · If prescribed an assistive device (walker, crutches), use as instructed by the healthcare provider\"   · Remove any tripping hazards from your home, including loose cords, throw rugs and clutter  · Keep a nightlight on in dark (hallways, bathrooms, etc)   · Get up slowly, to make sure you feel okay before getting up  · Be aware of any side effects of your medications: some medications may make you dizzy  · Place a non-skid rubber mat in your shower or tub-consider a shower bench or chair if unsteady on your feet  · Wear supportive shoes or non-skid socks when moving around  · Start an exercise program once approved by your provider.  If you are feeling weak following a hospital stay, talk to your doctor about home health or outpatient therapy programs designed to help rebuild your strength and endurance    Occupational Therapy Discharge Instructions for Carlos Rivera    7/9/2020    Level of Assist Required for Eating: Requires Supervision with " Eating  Level of Assist Required for Grooming: Requires Supervision with Grooming  Level of Assist Required for Dressing: Requires Physical Assist with Dressing  Level of Assist Required for Toileting: Requires Supervision with Toileting  Level of Assist Required for Toilet Transfer: Requires Supervision with Toilet Transfer  Level of Assist Required for Bathing: Requires Supervision with Bathing  Equipment for Bathing: Grab Bars in Tub / Shower, Shower Chair  Level of Assist Required for Shower Transfer: Requires Supervision with Shower Transfer  Level of Assist Required for Home Mgmt: Requires Supervision with Home Management  Level of Assist Required for Meal Prep: Requires Supervision with Meal Preparation  Driving: May not Drive, Please Contact Physician for Further Information

## 2020-07-09 NOTE — DISCHARGE PLANNING
Patient discharged to [facility] via w/c van.   PCP & neurology f/u appts in place.  Message left for David, admissions at [facility] yesterday afternoon to inquire on [facility] & [facility]. [facility] not rec'd [facility].

## 2020-07-10 ENCOUNTER — ANTICOAGULATION MONITORING (OUTPATIENT)
Dept: VASCULAR LAB | Facility: MEDICAL CENTER | Age: 75
End: 2020-07-10

## 2020-07-10 DIAGNOSIS — Z79.01 CHRONIC ANTICOAGULATION: ICD-10-CM

## 2020-07-10 DIAGNOSIS — Z95.2 HISTORY OF MITRAL VALVE REPLACEMENT WITH MECHANICAL VALVE: ICD-10-CM

## 2020-07-10 NOTE — PROGRESS NOTES
Pt DC'ed to [facility].   They will manage his anticoagulation while admitted.     Mi Miles, BethanyD

## 2020-08-07 ENCOUNTER — ANTICOAGULATION MONITORING (OUTPATIENT)
Dept: MEDICAL GROUP | Facility: PHYSICIAN GROUP | Age: 75
End: 2020-08-07

## 2020-08-07 DIAGNOSIS — Z79.01 CHRONIC ANTICOAGULATION: ICD-10-CM

## 2020-08-07 DIAGNOSIS — Z95.2 HISTORY OF MITRAL VALVE REPLACEMENT WITH MECHANICAL VALVE: ICD-10-CM

## 2020-08-07 LAB — INR PPP: 1.2 (ref 2–3.5)

## 2020-08-07 NOTE — PROGRESS NOTES
OP   Telephone Anticoagulation Service Note      Anticoagulation Summary  As of 2020    INR goal:  2.5-3.5   TTR:  31.3 % (3.1 y)   INR used for dosin.20 (2020)   Warfarin maintenance plan:  7.5 mg (2.5 mg x 3) every Mon; 5 mg (2.5 mg x 2) all other days   Weekly warfarin total:  37.5 mg   Plan last modified:  Raf Webb, PharmD (2020)   Next INR check:  8/10/2020   Target end date:  Indefinite    Indications    Chronic anticoagulation [Z79.01]  History of mitral valve replacement with mechanical valve [Z95.2] [Z95.2]             Anticoagulation Episode Summary     INR check location:      Preferred lab:      Send INR reminders to:      Comments:        Anticoagulation Care Providers     Provider Role Specialty Phone number    Declan Eaton M.D. Referring Internal Medicine [phone]    [facility] Responsible  [phone]        Anticoagulation Patient Findings  Patient Findings     Negatives:  Signs/symptoms of thrombosis, Signs/symptoms of bleeding, Laboratory test error suspected, Change in health, Change in alcohol use, Change in activity, Upcoming invasive procedure, Emergency department visit, Upcoming dental procedure, Missed doses, Extra doses, Change in medications, Change in diet/appetite, Hospital admission, Bruising, Other complaints         Spoke with the patient's wife on the phone today, reporting a SUB-therapeutic INR of 1.2. Patient was just released from SNF on Tuesday.  Confirmed the current warfarin dosing regimen and patient compliance. Wife reports he was discharged on 4mg daily. Patient denies any missed doses.   Patient denies any interval changes to diet and/or medications. Patient denies any signs/symptoms of bleeding or clotting.  Discussed with wife the use of Lovenox during this time due to his super low reading and mechanical valve. She just threw out his left over lovenox she had on hand, so will call in a new rx for Lovenox 80mg QD. He  will use one injection along with bolus doses of warfarin of 7.5mg QD x 3 days. Patient will follow up in clinic again on Monday and further dosing adjustments can be made at that time.     Kandy SimsD

## 2020-08-10 ENCOUNTER — ANTICOAGULATION VISIT (OUTPATIENT)
Dept: MEDICAL GROUP | Facility: PHYSICIAN GROUP | Age: 75
End: 2020-08-10
Payer: MEDICARE

## 2020-08-10 VITALS — DIASTOLIC BLOOD PRESSURE: 70 MMHG | SYSTOLIC BLOOD PRESSURE: 126 MMHG | HEART RATE: 58 BPM

## 2020-08-10 DIAGNOSIS — Z79.01 CHRONIC ANTICOAGULATION: Primary | ICD-10-CM

## 2020-08-10 DIAGNOSIS — Z95.2 HISTORY OF MITRAL VALVE REPLACEMENT WITH MECHANICAL VALVE: ICD-10-CM

## 2020-08-10 LAB — INR PPP: 2.6 (ref 2–3.5)

## 2020-08-10 PROCEDURE — 85610 PROTHROMBIN TIME: CPT | Performed by: INTERNAL MEDICINE

## 2020-08-10 PROCEDURE — 93793 ANTICOAG MGMT PT WARFARIN: CPT | Performed by: INTERNAL MEDICINE

## 2020-08-10 NOTE — PROGRESS NOTES
Anticoagulation Summary  As of 8/10/2020    INR goal:  2.5-3.5   TTR:  31.2 % (3.1 y)   INR used for dosin.60 (8/10/2020)   Warfarin maintenance plan:  7.5 mg (2.5 mg x 3) every Wed, Sat; 5 mg (2.5 mg x 2) all other days   Weekly warfarin total:  40 mg   Plan last modified:  Casey Birmingham, PharmD (8/10/2020)   Next INR check:  2020   Target end date:  Indefinite    Indications    Chronic anticoagulation [Z79.01]  History of mitral valve replacement with mechanical valve [Z95.2] [Z95.2]             Anticoagulation Episode Summary     INR check location:      Preferred lab:      Send INR reminders to:      Comments:        Anticoagulation Care Providers     Provider Role Specialty Phone number    Declan Eaton M.D. Referring Internal Medicine [phone]    [facility] Anticoagulation Services Responsible  [phone]        Anticoagulation Patient Findings  Patient Findings     Negatives:  Signs/symptoms of thrombosis, Signs/symptoms of bleeding, Laboratory test error suspected, Change in health, Change in alcohol use, Change in activity, Upcoming invasive procedure, Emergency department visit, Upcoming dental procedure, Missed doses, Extra doses, Change in medications, Change in diet/appetite, Hospital admission, Bruising, Other complaints              HPI:   Mike Wyman was seen in clinic today, on anticoagulation therapy with Warfarin for stroke prevention due to history of mitral valve replacement with mechanical valve.    Patient's previous INR was subtherapeutic at 1.2 on 20, at which time patient was instructed to bolus 3 doses of Warfarin and use Lovenox 80 mg QD.  He returns to clinic today to recheck INR to ensure it is therapeutic and thus preventing possible clotting and/or bleeding/bruising complications.    CHADS-VASc = n/a  (unadjusted ischemic stroke risk/year:  n/a)    Does patient have any changes to current medical/health status since last appt (Y/N):  NO  Does patient have any  signs/symptoms of bleeding and/or thrombosis since the last appt (Y/N):  NO  Does patient have any interval changes to diet or medications since last appt (Y/N):  NO  Are there any complications or cost restrictions with current therapy (Y/N):  NNO     Does patient have [facility] PCP? Dr. Rufino Shine (If not, please document discussion that patient must be seen at [facility])       Vitals:  see below     Vitals:    08/10/20 1512   BP: 126/70   BP Location: Left arm   Patient Position: Sitting   BP Cuff Size: Adult   Pulse: (!) 58        Asssessment:      INR therapeutic at 2.6, therefore decreasing risk of strokes and or bleeding.    Reason(s) for out of range INR today:  n/a      Plan:  Instructed patient to begin weekly Warfarin regimen as detailed above.  Instructed patient to discontinue Lovenox injections as INR greater than 2.0.     Follow up:  Because warfarin is a high risk medication and current CHEST guidelines recommend regular monitoring intervals (few days up to 12 weeks), will have patient return to clinic in 1 weeks to recheck INR.    Darryn Sanabria, PharmD candidate  Casey Birmingham, PharmD, BCACP

## 2020-08-17 ENCOUNTER — OFFICE VISIT (OUTPATIENT)
Dept: MEDICAL GROUP | Facility: PHYSICIAN GROUP | Age: 75
End: 2020-08-17
Payer: MEDICARE

## 2020-08-17 ENCOUNTER — ANTICOAGULATION VISIT (OUTPATIENT)
Dept: MEDICAL GROUP | Facility: PHYSICIAN GROUP | Age: 75
End: 2020-08-17
Payer: MEDICARE

## 2020-08-17 VITALS
WEIGHT: 131 LBS | OXYGEN SATURATION: 95 % | SYSTOLIC BLOOD PRESSURE: 126 MMHG | BODY MASS INDEX: 19.85 KG/M2 | RESPIRATION RATE: 14 BRPM | DIASTOLIC BLOOD PRESSURE: 78 MMHG | HEART RATE: 52 BPM | TEMPERATURE: 98.5 F | HEIGHT: 68 IN

## 2020-08-17 DIAGNOSIS — Z95.2 HISTORY OF MITRAL VALVE REPLACEMENT WITH MECHANICAL VALVE: Chronic | ICD-10-CM

## 2020-08-17 DIAGNOSIS — E11.42 DIABETIC POLYNEUROPATHY ASSOCIATED WITH TYPE 2 DIABETES MELLITUS (HCC): Chronic | ICD-10-CM

## 2020-08-17 DIAGNOSIS — R45.4 ANGER: ICD-10-CM

## 2020-08-17 DIAGNOSIS — F32.0 MILD SINGLE CURRENT EPISODE OF MAJOR DEPRESSIVE DISORDER (HCC): Chronic | ICD-10-CM

## 2020-08-17 DIAGNOSIS — Z79.4 TYPE 2 DIABETES MELLITUS WITH HYPEROSMOLARITY WITHOUT COMA, WITH LONG-TERM CURRENT USE OF INSULIN (HCC): Chronic | ICD-10-CM

## 2020-08-17 DIAGNOSIS — Z95.2 HISTORY OF MITRAL VALVE REPLACEMENT WITH MECHANICAL VALVE: ICD-10-CM

## 2020-08-17 DIAGNOSIS — Z79.01 CHRONIC ANTICOAGULATION: Primary | ICD-10-CM

## 2020-08-17 DIAGNOSIS — F03.91 DEMENTIA WITH BEHAVIORAL DISTURBANCE, UNSPECIFIED DEMENTIA TYPE: Chronic | ICD-10-CM

## 2020-08-17 DIAGNOSIS — I25.700 CORONARY ARTERY DISEASE INVOLVING CORONARY BYPASS GRAFT OF NATIVE HEART WITH UNSTABLE ANGINA PECTORIS (HCC): Chronic | ICD-10-CM

## 2020-08-17 DIAGNOSIS — Z79.01 CHRONIC ANTICOAGULATION: ICD-10-CM

## 2020-08-17 DIAGNOSIS — I10 ESSENTIAL HYPERTENSION: Chronic | ICD-10-CM

## 2020-08-17 DIAGNOSIS — E11.00 TYPE 2 DIABETES MELLITUS WITH HYPEROSMOLARITY WITHOUT COMA, WITH LONG-TERM CURRENT USE OF INSULIN (HCC): Chronic | ICD-10-CM

## 2020-08-17 PROBLEM — D75.89 MACROCYTOSIS WITHOUT ANEMIA: Status: RESOLVED | Noted: 2020-06-26 | Resolved: 2020-08-17

## 2020-08-17 PROBLEM — Z63.6 CAREGIVER STRESS: Status: RESOLVED | Noted: 2019-10-29 | Resolved: 2020-08-17

## 2020-08-17 PROBLEM — F03.918 DEMENTIA WITH BEHAVIORAL DISTURBANCE (HCC): Chronic | Status: ACTIVE | Noted: 2020-05-15

## 2020-08-17 LAB — INR PPP: 6.8 (ref 2–3.5)

## 2020-08-17 PROCEDURE — 99214 OFFICE O/P EST MOD 30 MIN: CPT | Performed by: INTERNAL MEDICINE

## 2020-08-17 PROCEDURE — 85610 PROTHROMBIN TIME: CPT | Performed by: INTERNAL MEDICINE

## 2020-08-17 RX ORDER — QUETIAPINE FUMARATE 50 MG/1
50 TABLET, FILM COATED ORAL 2 TIMES DAILY
Qty: 60 TAB | Refills: 1 | Status: SHIPPED | OUTPATIENT
Start: 2020-08-17 | End: 2021-11-19 | Stop reason: SDUPTHER

## 2020-08-17 RX ORDER — LISINOPRIL 20 MG/1
20 TABLET ORAL EVERY EVENING
Qty: 90 TAB | Refills: 3 | Status: SHIPPED | OUTPATIENT
Start: 2020-08-17 | End: 2022-01-27

## 2020-08-17 RX ORDER — CITALOPRAM 20 MG/1
20 TABLET ORAL DAILY
Qty: 90 TAB | Refills: 3 | Status: SHIPPED | OUTPATIENT
Start: 2020-08-17 | End: 2021-09-30

## 2020-08-17 RX ORDER — ATORVASTATIN CALCIUM 80 MG/1
80 TABLET, FILM COATED ORAL EVERY EVENING
Qty: 90 TAB | Refills: 3 | Status: SHIPPED | OUTPATIENT
Start: 2020-08-17 | End: 2020-08-19

## 2020-08-17 SDOH — HEALTH STABILITY: MENTAL HEALTH: HOW OFTEN DO YOU HAVE A DRINK CONTAINING ALCOHOL?: MONTHLY OR LESS

## 2020-08-17 ASSESSMENT — FIBROSIS 4 INDEX: FIB4 SCORE: 1.81

## 2020-08-17 NOTE — ASSESSMENT & PLAN NOTE
This is a chronic condition.  The patient has tried a gabapentin and pregabalin without improvement.  He was seen previously by neurology.  Patient requests referral to see podiatry and pain specialist.  Referrals submitted.

## 2020-08-17 NOTE — PROGRESS NOTES
Anticoagulation Summary  As of 2020    INR goal:  2.5-3.5   TTR:  31.1 % (3.2 y)   INR used for dosin.80 (2020)   Warfarin maintenance plan:  7.5 mg (2.5 mg x 3) every Wed, Sat; 5 mg (2.5 mg x 2) all other days   Weekly warfarin total:  40 mg   Plan last modified:  Casey Birmingham, PharmD (8/10/2020)   Next INR check:  2020   Target end date:  Indefinite    Indications    Chronic anticoagulation [Z79.01]  History of mitral valve replacement with mechanical valve [Z95.2] [Z95.2]             Anticoagulation Episode Summary     INR check location:      Preferred lab:      Send INR reminders to:      Comments:        Anticoagulation Care Providers     Provider Role Specialty Phone number    Rufino Shine M.D. Referring Internal Medicine [phone]    [facility] Anticoagulation Services Responsible  [phone]        Anticoagulation Patient Findings  Patient Findings     Negatives:  Signs/symptoms of thrombosis, Signs/symptoms of bleeding, Laboratory test error suspected, Change in health, Change in alcohol use, Change in activity, Upcoming invasive procedure, Emergency department visit, Upcoming dental procedure, Missed doses, Extra doses, Change in medications, Change in diet/appetite, Hospital admission, Bruising, Other complaints         HPI:   Mike Wyman was seen in clinic today, on anticoagulation therapy with warfarin for stroke prevention due to history of mitral valve replacement with mechanical valve.    Patient's previous INR was therapeutic at 2.6 on 8-10-20, at which time patient was instructed to increase weekly regimen.  He returns to clinic today to recheck INR to ensure it is therapeutic and thus preventing possible clotting and/or bleeding/bruising complications.    CHADS-VASc = n/a  (unadjusted ischemic stroke risk/year:  n/a)    Does patient have any changes to current medical/health status since last appt (Y/N):  No  Does patient have any signs/symptoms of bleeding and/or  thrombosis since the last appt (Y/N):  No  Does patient have any interval changes to diet or medications since last appt (Y/N):  No  Are there any complications or cost restrictions with current therapy (Y/N):  No     Does patient have [facility] PCP? Yes, Rufino Shine (If not, please document discussion that patient must be seen at [facility])       Vitals:  declined by patient today.    There were no vitals filed for this visit.     Asssessment:      INR supratherapeutic at 6.8 (first INR test showed >8), therefore increasing patient's risk of bleeding.   Reason(s) for out of range INR today:  Dose too high. History of labile INRs      Plan:  patient was told to hold X 2 doses and decrease weekly regimen until next visit.     Follow up:  Because warfarin is a high risk medication and current CHEST guidelines recommend regular monitoring intervals (few days up to 12 weeks), will have patient return to clinic in 4 days to recheck INR.    Darryn Sanabria, Pharmacy intern  Casey Birmingham, PharmD, BCACP

## 2020-08-17 NOTE — ASSESSMENT & PLAN NOTE
This is a chronic condition.  The patient was admitted to the hospital in June 2020.  He has been taking Seroquel since that time.  Patient is well requested referral to see psychiatrist to establish care.  Referral submitted.

## 2020-08-17 NOTE — PROGRESS NOTES
CC: Follow-up hypertension and diabetes  Requests referral to podiatry and psychiatry      HPI: 74 y.o. Patient presents to discuss the following:     History of mitral valve replacement with mechanical valve [Z95.2]  This is a chronic condition.  The patient is status post surgery and had mechanical valve in 2000.    Patient is currently on warfarin and is followed by Coumadin clinic patient denies any history of bleeding.  He also sees cardiology on a regular basis.    Coronary artery disease involving coronary bypass graft  Chronic condition.  The patient sees cardiology on a regular basis.  Presently the patient denies any chest pain shortness of breath palpitation or dizziness.    Hypertension  Chronic stable condition.  Blood pressure today 1/26/1978    Diabetic neuropathy (HCC)  This is a chronic condition.  The patient has tried a gabapentin and pregabalin without improvement.  He was seen previously by neurology.  Patient requests referral to see podiatry and pain specialist.  Referrals submitted.    Dementia with behavioral disturbance (HCC)  This is a chronic condition.  The patient was admitted to the hospital in June 2020.  He has been taking Seroquel since that time.  Patient is well requested referral to see psychiatrist to establish care.  Referral submitted.    Mild single current episode of major depressive disorder (HCC)  Chronic stable condition.  The patient is now taking Celexa.  Patient requesting refill.  Patient denies suicidal ideation.  Also requests referral to see psychiatry to establish care.  Referral submitted.          REVIEW OF SYSTEMS:     Constitutional:  no fever / chills   Neurologic: no headaches, no numbness/tingling  Eyes: no changes in vision  ENT: no sore throat, no hearing loss  CV:  no chest pain, no palpitations  Pulmonary: no SOB, no cough    GI: no nausea / vomiting, no diarrhea, no constipation  :  no dysuria, no hematuria   Skin: no rash  Hematologic: no  bleeding      Allergies: Okra, Risperidone, and Hydrocodone-acetaminophen    Current Outpatient Medications Ordered in Epic   Medication Sig Dispense Refill   • atorvastatin (LIPITOR) 80 MG tablet Take 1 Tab by mouth every evening. 90 Tab 3   • citalopram (CELEXA) 20 MG Tab Take 1 Tab by mouth every day. 90 Tab 3   • lisinopril (PRINIVIL) 20 MG Tab Take 1 Tab by mouth every evening. 90 Tab 3   • metFORMIN (GLUCOPHAGE) 500 MG Tab Take 2 Tabs by mouth 2 times a day, with meals. 180 Tab 3   • quetiapine (SEROQUEL) 50 MG tablet Take 1 Tab by mouth 2 times a day. 60 Tab 1   • enoxaparin (LOVENOX) 80 MG/0.8ML Solution inj Inject 80 mg as instructed every day. 5 Each 1   • insulin regular (HUMULIN R) 100 Unit/mL Solution Inject 2-12 Units as instructed 3 times a day before meals. 10 mL 2   • vitamin D (VITAMIND D3) 1000 UNIT Tab Take 1 Tab by mouth every day. 60 Tab    • warfarin (COUMADIN) 4 MG Tab Take 1 Tab by mouth every day at 6 PM.     • insulin glargine (LANTUS) 100 UNIT/ML Solution Inject 15 Units as instructed every evening. 10 mL      No current Epic-ordered facility-administered medications on file.        Past Medical History:   Diagnosis Date   • Chronic anticoagulation 2/23/2017   • History of mitral valve replacement with mechanical valve [Z95.2] 3/10/2017   • Hyperlipidemia    • Type II diabetes mellitus (HCC) 2/23/2017        Past Surgical History:   Procedure Laterality Date   • PB COLONOSCOPY,DIAGNOSTIC N/A 5/9/2020    Procedure: COLONOSCOPY;  Surgeon: Jatinder Madera M.D.;  Location: SURGERY [facility];  Service: Gastroenterology   • MITRAL VALVE REPLACEMENT  1999   • INGUINAL HERNIA REPAIR Bilateral 1984   • TONSILLECTOMY          Family History   Problem Relation Age of Onset   • Heart Attack Father 58   • Diabetes Father    • Heart Attack Paternal Uncle    • No Known Problems Sister    • No Known Problems Brother    • No Known Problems Maternal Grandmother    • No Known Problems Maternal  Grandfather    • No Known Problems Paternal Grandmother    • Heart Attack Paternal Grandfather    • No Known Problems Sister    • No Known Problems Brother         Social History     Tobacco Use   Smoking Status Never Smoker   Smokeless Tobacco Never Used          Social History     Substance and Sexual Activity   Alcohol Use Yes   • Alcohol/week: 0.0 oz   • Frequency: Monthly or less        ---------------------------------------------------------------------     PHYSICAL EXAM:   Vitals:    08/17/20 1353   BP: 126/78   Pulse: (!) 52   Resp: 14   Temp: 36.9 °C (98.5 °F)   SpO2: 95%      Body mass index is 19.92 kg/m².        Constitutional: no acute distress  Eyes: PERRL, EOMI  Ears/nose/mouth: OP no exudates  Neck: supple, no JVD  CV: heart RRR  Resp: normal effort, no wheezing or rales.  GI: abdomen soft, no obvious mass, no tenderness  Neuro: CN 2-12 grossly intact  Skin: no obvious rash noted  Feet: Skin integrity intact.  Peripheral pulses reduced at DP and PT.   sensory deficit noted with monofilament testing.     ---------------------------------------------------------------------     ASSESSMENT and PLAN:  1. History of mitral valve replacement with mechanical valve [Z95.2]  Chronic condition.  Advised the patient continue follow-up with cardiology.  He is presently being treated with warfarin and is now followed by Coumadin clinic.  No history of bleeding noted.      3. Dementia with behavioral disturbance, unspecified dementia type (HCC)  Chronic condition.  Advised the patient continue with present management Seroquel.  Refer the patient to psychiatry per patient and wife requests    4. Essential hypertension  Chronic stable condition.  Continue with current management.  - ALANINE AMINO-TRANS; Future  - CBC WITH DIFFERENTIAL; Future  - Lipid Profile; Future  - TSH; Future  - MICROALBUMIN CREAT RATIO URINE; Future    5. Type 2 diabetes mellitus with hyperosmolarity without coma, with long-term current use  of insulin (HCC)  Chronic condition.  Control is unclear lab tests ordered for follow-up.  A1c goal of 7% discussed w pt.  Advised lifestyle modification.  Stressed the importance of low sweet /low carb diet, high lean protein, and avoid unhealthy foods.  Encourage pt to start/continue with regular exercises/walking.   - HEMOGLOBIN A1C; Future  - Basic Metabolic Panel; Future    6. Mild single current episode of major depressive disorder (HCC)  Chronic condition.  Continue with Celexa per referral to psychiatry.  - REFERRAL TO PSYCHIATRY    7. Anger  Chronic condition.  - REFERRAL TO PSYCHIATRY    8. Diabetic polyneuropathy associated with type 2 diabetes mellitus (HCC)  Chronic condition, uncontrolled pain.  Refer to pain and podiatry clinic  - REFERRAL TO PAIN CLINIC  - REFERRAL TO PODIATRY                  Return in about 4 months (around 12/17/2020) for routine followup.       PATIENT EDUCATION:  -If any problems should arise, patient was advised to contact our office or go to ER to be evaluated.  -Advised pt to follow a healthy diet and regular aerobic exercise regimen. Advised pt to avoid alcohol and tobacco use.    Please note that this dictation was created using voice recognition software. I have made every reasonable attempt to correct obvious errors, but it is possible there are errors of grammar and possibly content that I did not discover before finalizing the note.

## 2020-08-17 NOTE — ASSESSMENT & PLAN NOTE
This is a chronic condition.  The patient is status post surgery and had mechanical valve in 2000.    Patient is currently on warfarin and is followed by Coumadin clinic patient denies any history of bleeding.  He also sees cardiology on a regular basis.

## 2020-08-17 NOTE — ASSESSMENT & PLAN NOTE
Chronic condition.  The patient sees cardiology on a regular basis.  Presently the patient denies any chest pain shortness of breath palpitation or dizziness.

## 2020-08-17 NOTE — ASSESSMENT & PLAN NOTE
Chronic stable condition.  The patient is now taking Celexa.  Patient requesting refill.  Patient denies suicidal ideation.  Also requests referral to see psychiatry to establish care.  Referral submitted.

## 2020-08-19 ENCOUNTER — TELEPHONE (OUTPATIENT)
Dept: VASCULAR LAB | Facility: MEDICAL CENTER | Age: 75
End: 2020-08-19

## 2020-08-19 ENCOUNTER — OFFICE VISIT (OUTPATIENT)
Dept: NEUROLOGY | Facility: MEDICAL CENTER | Age: 75
End: 2020-08-19
Payer: MEDICARE

## 2020-08-19 VITALS
HEART RATE: 51 BPM | HEIGHT: 68 IN | DIASTOLIC BLOOD PRESSURE: 60 MMHG | BODY MASS INDEX: 19.81 KG/M2 | OXYGEN SATURATION: 97 % | SYSTOLIC BLOOD PRESSURE: 122 MMHG | TEMPERATURE: 98 F | RESPIRATION RATE: 14 BRPM | WEIGHT: 130.73 LBS

## 2020-08-19 DIAGNOSIS — R29.818 TRANSIENT NEUROLOGIC DEFICIT: ICD-10-CM

## 2020-08-19 DIAGNOSIS — F03.91 DEMENTIA WITH BEHAVIORAL DISTURBANCE, UNSPECIFIED DEMENTIA TYPE: Chronic | ICD-10-CM

## 2020-08-19 DIAGNOSIS — I10 ESSENTIAL HYPERTENSION: Chronic | ICD-10-CM

## 2020-08-19 DIAGNOSIS — E11.42 DIABETIC POLYNEUROPATHY ASSOCIATED WITH TYPE 2 DIABETES MELLITUS (HCC): Chronic | ICD-10-CM

## 2020-08-19 PROCEDURE — 99215 OFFICE O/P EST HI 40 MIN: CPT | Performed by: NURSE PRACTITIONER

## 2020-08-19 RX ORDER — ATORVASTATIN CALCIUM 40 MG/1
40 TABLET, FILM COATED ORAL
Qty: 90 TAB | Refills: 3 | Status: SHIPPED | OUTPATIENT
Start: 2020-08-19 | End: 2021-08-19

## 2020-08-19 RX ORDER — UBIDECARENONE 75 MG
100 CAPSULE ORAL DAILY
COMMUNITY
End: 2021-11-29

## 2020-08-19 ASSESSMENT — ENCOUNTER SYMPTOMS
HEARTBURN: 0
SPEECH CHANGE: 1
FALLS: 1
PALPITATIONS: 0
SHORTNESS OF BREATH: 0
SENSORY CHANGE: 1
BLURRED VISION: 0
DOUBLE VISION: 0
TINGLING: 1
NAUSEA: 0
WEAKNESS: 1
DEPRESSION: 0
FEVER: 0
COUGH: 0
BRUISES/BLEEDS EASILY: 1
NERVOUS/ANXIOUS: 0
MYALGIAS: 1
CHILLS: 0
VOMITING: 0
BACK PAIN: 1
MEMORY LOSS: 1

## 2020-08-19 ASSESSMENT — FIBROSIS 4 INDEX: FIB4 SCORE: 1.81

## 2020-08-19 NOTE — PATIENT INSTRUCTIONS
"Transient Ischemic Attack    A transient ischemic attack (TIA) is a \"warning stroke\" that causes stroke-like symptoms that go away quickly. A TIA does not cause lasting damage to the brain. But having a TIA is a sign that you may be at risk for a stroke. Lifestyle changes and medical treatments can help prevent a stroke.  It is important to know the symptoms of a TIA and what to do. Get help right away, even if your symptoms go away. The symptoms of a TIA are the same as those of a stroke. They can happen fast, and they usually go away within minutes or hours. They can include:  · Weakness or loss of feeling in your face, arm, or leg. This often happens on one side of your body.  · Trouble walking.  · Trouble moving your arms or legs.  · Trouble talking or understanding what people are saying.  · Trouble seeing.  · Seeing two of one object (double vision).  · Feeling dizzy.  · Feeling confused.  · Loss of balance or coordination.  · Feeling sick to your stomach (nauseous) and throwing up (vomiting).  · A very bad headache for no reason.  What increases the risk?  Certain things may make you more likely to have a TIA. Some of these are things that you can change, such as:  · Being very overweight (obese).  · Using products that contain nicotine or tobacco, such as cigarettes and e-cigarettes.  · Taking birth control pills.  · Not being active.  · Drinking too much alcohol.  · Using drugs.  Other risk factors include:  · Having an irregular heartbeat (atrial fibrillation).  · Being  or .  · Having had blood clots, stroke, TIA, or heart attack in the past.  · Being a woman with a history of high blood pressure in pregnancy (preeclampsia).  · Being over the age of 60.  · Being male.  · Having family history of stroke.  · Having the following diseases or conditions:  ? High blood pressure.  ? High cholesterol.  ? Diabetes.  ? Heart disease.  ? Sickle cell disease.  ? Sleep apnea.  ? Migraine " "headache.  ? Long-term (chronic) diseases that cause soreness and swelling (inflammation).  ? Disorders that affect how your blood clots.  Follow these instructions at home:  Medicines    · Take over-the-counter and prescription medicines only as told by your doctor.  · If you were told to take aspirin or another medicine to thin your blood, take it exactly as told by your doctor.  ? Taking too much of the medicine can cause bleeding.  ? Taking too little of the medicine may not work to treat the problem.  Eating and drinking    · Eat 5 or more servings of fruits and vegetables each day.  · Follow instructions from your doctor about your diet. You may need to follow a certain diet to help lower your risk of having a stroke. You may need to:  ? Eat a diet that is low in fat and salt.  ? Eat foods that contain a lot of fiber.  ? Limit the amount of carbohydrates and sugar in your diet.  · Limit alcohol intake to 1 drink a day for nonpregnant women and 2 drinks a day for men. One drink equals 12 oz of beer, 5 oz of wine, or 1½ oz of hard liquor.  General instructions  · Keep a healthy weight.  · Stay active. Try to get at least 30 minutes of activity on all or most days.  · Find out if you have a condition called sleep apnea. Get treatment if needed.  · Do not use any products that contain nicotine or tobacco, such as cigarettes and e-cigarettes. If you need help quitting, ask your doctor.  · Do not abuse drugs.  · Keep all follow-up visits as told by your doctor. This is important.  Get help right away if:  · You have any signs of stroke. \"BE FAST\" is an easy way to remember the main warning signs:  ? B - Balance. Signs are dizziness, sudden trouble walking, or loss of balance.  ? E - Eyes. Signs are trouble seeing or a sudden change in how you see.  ? F - Face. Signs are sudden weakness or loss of feeling of the face, or the face or eyelid drooping on one side.  ? A - Arms. Signs are weakness or loss of feeling in an " "arm. This happens suddenly and usually on one side of the body.  ? S - Speech. Signs are sudden trouble speaking, slurred speech, or trouble understanding what people say.  ? T - Time. Time to call emergency services. Write down what time symptoms started.  · You have other signs of stroke, such as:  ? A sudden, very bad headache with no known cause.  ? Feeling sick to your stomach (nausea).  ? Throwing up (vomiting).  ? Jerky movements that you cannot control (seizure).  These symptoms may be an emergency. Do not wait to see if the symptoms will go away. Get medical help right away. Call your local emergency services (911 in the [place] Do not drive yourself to the hospital.  Summary  · A transient ischemic attack (TIA) is a \"warning stroke\" that causes stroke-like symptoms that go away quickly.  · A TIA is a medical emergency. Get help right away, even if your symptoms go away.  · A TIA does not cause lasting damage to the brain.  · Having a TIA is a sign that you may be at risk for a stroke. Lifestyle changes and medical treatments can help prevent a stroke.  This information is not intended to replace advice given to you by your health care provider. Make sure you discuss any questions you have with your health care provider.  Document Released: 09/26/2009 Document Revised: 09/13/2019 Document Reviewed: 03/21/2018  Elsevier Patient Education © 2020 [employer]    "

## 2020-08-19 NOTE — TELEPHONE ENCOUNTER
Following message received from patients PCP:    This message is being sent by Comfort Rivera on behalf of Carlos Rivera.     Please have Casey Birmingham from the Coumadin clinic call me.  I have a question. [phone]    LM with patient to call back and discuss any questions.  Casey Birmingham, PharmD, BCACP      Patient's wife Comfort called back.    She is inquiring whether ok to take iron supplement in conjunction with warfarin.  Advised that there is no DDI to worry about and continue to dose both medications as directed.  Casey Birmingham, BethanyD, BCACP

## 2020-08-19 NOTE — PROGRESS NOTES
Subjective:    BATSHEVA Wyman is a 74 y.o.  RH handed male who presents to [facility] for evaluation of transient speech difficulty  He  presented to [facility] on 6/23/2020 with complaints of  difficulty finding words upon awakening that morning.  In ER there were no focal deficits noted, he was noted to be dysarthric.    tPA was not given as his is already on warfarin for mechanical valve.   Blood pressure on arrival 194/77.  His symptoms improved with improvement of blood pressure.    PMH includes Mechanical valve w/chronic anti-coagulation, DMII, HTN, HLD and dementia and polyneuropathy.    He was previously seen by Dr. Vallejo for neuropathy and dementia.  His wife states that he has agitation with violence at times.      He was diagnosed with hypertensive encephalopathy.    Review of Systems   Constitutional: Negative for chills and fever.   HENT: Positive for hearing loss and tinnitus.    Eyes: Negative for blurred vision and double vision.   Respiratory: Negative for cough and shortness of breath.    Cardiovascular: Negative for chest pain and palpitations.   Gastrointestinal: Negative for heartburn, nausea and vomiting.   Genitourinary: Negative for dysuria.   Musculoskeletal: Positive for back pain, falls and myalgias.   Skin: Negative for rash.   Neurological: Positive for tingling, sensory change, speech change and weakness.   Endo/Heme/Allergies: Bruises/bleeds easily.   Psychiatric/Behavioral: Positive for memory loss. Negative for depression. The patient is not nervous/anxious.        Past Medical History:   Diagnosis Date   • Chronic anticoagulation 2/23/2017   • History of mitral valve replacement with mechanical valve [Z95.2] 3/10/2017   • Hyperlipidemia    • Type II diabetes mellitus (HCC) 2/23/2017     Current Outpatient Medications on File Prior to Visit   Medication Sig Dispense Refill   • cyanocobalamin (VITAMIN B-12) 100 MCG Tab Take 100 mcg by  mouth every day.     • atorvastatin (LIPITOR) 80 MG tablet Take 1 Tab by mouth every evening. 90 Tab 3   • citalopram (CELEXA) 20 MG Tab Take 1 Tab by mouth every day. 90 Tab 3   • lisinopril (PRINIVIL) 20 MG Tab Take 1 Tab by mouth every evening. 90 Tab 3   • metFORMIN (GLUCOPHAGE) 500 MG Tab Take 2 Tabs by mouth 2 times a day, with meals. 180 Tab 3   • quetiapine (SEROQUEL) 50 MG tablet Take 1 Tab by mouth 2 times a day. 60 Tab 1   • insulin regular (HUMULIN R) 100 Unit/mL Solution Inject 2-12 Units as instructed 3 times a day before meals. 10 mL 2   • vitamin D (VITAMIND D3) 1000 UNIT Tab Take 1 Tab by mouth every day. 60 Tab    • warfarin (COUMADIN) 4 MG Tab Take 1 Tab by mouth every day at 6 PM.     • insulin glargine (LANTUS) 100 UNIT/ML Solution Inject 15 Units as instructed every evening. 10 mL      No current facility-administered medications on file prior to visit.         Objective:     I personally reviewed imaging below and agree with the findings    MRI brain  1 Moderate cerebral atrophy.  2.  Multiple areas of old cerebral infarction in both cerebral hemispheres. The old left parietal infarct is associated with minimal hemosiderin deposition indicating a component of hemorrhagic infarction.  3.  Old lacunar size infarct right thalamus.  4.  Old lacunar infarction left frontal corona radiata.  5.  Advanced supratentorial white matter disease most consistent with microvascular ischemic change.  6.  Mild pontine ischemic gliosis.  7.  Old lacunar infarcts x2 in the right cerebellar hemisphere, no change from prior exam.  8.  No evidence of acute infarction, acute hemorrhage, or mass lesion   CTA head and neck unremarkable.  TTE:  LVEF 60% basal and mid inferior and inferolateral hypokinesis.   LA size WNL, MEGHA 21.    Stroke Labs:   Cr.  1.20,  TSH 1.46, B12 1102, A1C 6.7,  LDL 47    Encounter Vitals  Standard Vitals  Vitals  Blood Pressure : 122/60  Temperature: 36.7 °C (98 °F)  Temp src:  "Temporal  Pulse: (!) 51  Respiration: 14  Pulse Oximetry: 97 %  Height: 172.7 cm (5' 8\")  Weight: 59.3 kg (130 lb 11.7 oz)  Encounter Vitals  Temperature: 36.7 °C (98 °F)  Temp src: Temporal  Blood Pressure : 122/60  Pulse: (!) 51  Respiration: 14  Pulse Oximetry: 97 %  Weight: 59.3 kg (130 lb 11.7 oz)  Height: 172.7 cm (5' 8\")  BMI (Calculated): 19.88      Physical Exam    Constitutional:  Alert, no apparent distress,  Psych:   mood and affect WNL  Neuro:  Oriented X 4, speech fluent, naming and memory intact    CN II: Visual fields are full to confrontation. Fundoscopic exam is normal with sharp discs and no vascular changes. Pupils are 3 mm and briskly reactive to light.   CN III, IV, VI  EOMs intact, no ptosis  CN V: Facial sensation is intact to pinprick in all 3 divisions bilaterally. Corneal responses are intact.  CN VII: Face is symmetric with normal eye closure and smile.  CN VII: Hearing is normal to rubbing fingers  CN IX, X: Palate elevates symmetrically. Phonation is normal.  CN XI: Head turning and shoulder shrug are intact  CN XII: Tongue is midline with normal movements and no atrophy.              Strength 5/5 BUE/BLE, no drift                 Sensation to PP equal bilaterally                 No limb ataxia with finger to nose and heel to shin                 Ambulates with cane                              Biceps,brachioradialis, tricep, patellar and ankle reflex all 2+     Cardiovascular:    S1S2, no abnormal rhythm auscultated, no peripheral edema  Neck:                     No carotid bruits noted   Pulmonary:            Respirations easy, lungs clear to auscultation all fields.     Skin:                     No obvious rashes.        Current NIHSS    1a. LOC: 0  1b. LOC Questions: 1  1c. LOC Commands: 0  2. Best Gaze:0  3. Visual Fields: 0  4. Facial Paresis: 0  5a. Motor arm left: 0  5b. Motor arm right:0   6a. Motor leg left: 0  6b. Motor leg right: 0  7. Sensory: 0  8. Best Language: 0  9. " Limb Ataxia: 0  10. Dysarthria: 0  11. Extinction/Inattention: 0    Total Score Current  1          Current mRS 0           Assessment/Plan:   1. Transient neurologic deficit  Transient neurological Deficits, likely represent hypertensive encephalopathy as symptoms improved with improved blood pressure    LDL goal less than 70, last 47, decrease Atorvastatin to 40mg    2. Essential hypertension    Blood pressure goal less than 130/80, currently at goal    3. Dementia with behavioral disturbance, unspecified dementia type (HCC)  Refer to neurology with cognitive specialist    4. Diabetic polyneuropathy associated with type 2 diabetes mellitus (HCC)  Refer to neurology      I have counseled patient on stroke prevention strategies, stroke symptoms and mimics.  Diet and exercise modifications.  We discussed medication side effects and instructions.         Follow up PRN

## 2020-08-20 ENCOUNTER — PATIENT MESSAGE (OUTPATIENT)
Dept: MEDICAL GROUP | Facility: PHYSICIAN GROUP | Age: 75
End: 2020-08-20

## 2020-08-20 ENCOUNTER — TELEPHONE (OUTPATIENT)
Dept: MEDICAL GROUP | Facility: PHYSICIAN GROUP | Age: 75
End: 2020-08-20

## 2020-08-20 RX ORDER — METFORMIN HYDROCHLORIDE 500 MG/1
500 TABLET, EXTENDED RELEASE ORAL 2 TIMES DAILY
Qty: 180 TAB | Refills: 3 | Status: SHIPPED | OUTPATIENT
Start: 2020-08-20 | End: 2020-12-17

## 2020-08-20 NOTE — TELEPHONE ENCOUNTER
[facility] - [place], [place] - [facility] [facility]  [address] [address]  Phone: [phone] Fax: [phone]    Pharmacy received rx for metformin hcl 500 mg.  States pt has a history of metformin hcl  mg.  They are asking for confirmation on how to fill

## 2020-08-21 ENCOUNTER — ANTICOAGULATION VISIT (OUTPATIENT)
Dept: MEDICAL GROUP | Facility: PHYSICIAN GROUP | Age: 75
End: 2020-08-21
Payer: MEDICARE

## 2020-08-21 VITALS — SYSTOLIC BLOOD PRESSURE: 133 MMHG | HEART RATE: 46 BPM | DIASTOLIC BLOOD PRESSURE: 73 MMHG

## 2020-08-21 DIAGNOSIS — Z79.01 CHRONIC ANTICOAGULATION: Primary | ICD-10-CM

## 2020-08-21 DIAGNOSIS — Z95.2 HISTORY OF MITRAL VALVE REPLACEMENT WITH MECHANICAL VALVE: ICD-10-CM

## 2020-08-21 LAB — INR PPP: 1.9 (ref 2–3.5)

## 2020-08-21 PROCEDURE — 99211 OFF/OP EST MAY X REQ PHY/QHP: CPT | Performed by: FAMILY MEDICINE

## 2020-08-21 PROCEDURE — 85610 PROTHROMBIN TIME: CPT | Performed by: FAMILY MEDICINE

## 2020-08-21 NOTE — PROGRESS NOTES
Anticoagulation Summary  As of 2020    INR goal:  2.5-3.5   TTR:  31.1 % (3.2 y)   INR used for dosin.90 (2020)   Warfarin maintenance plan:  7.5 mg (2.5 mg x 3) every Wed, Sat; 5 mg (2.5 mg x 2) all other days   Weekly warfarin total:  40 mg   Plan last modified:  Casey Birmingham, PharmD (8/10/2020)   Next INR check:  2020   Target end date:  Indefinite    Indications    Chronic anticoagulation [Z79.01]  History of mitral valve replacement with mechanical valve [Z95.2] [Z95.2]             Anticoagulation Episode Summary     INR check location:      Preferred lab:      Send INR reminders to:      Comments:        Anticoagulation Care Providers     Provider Role Specialty Phone number    Rufino Shine M.D. Referring Internal Medicine [phone]    [facility] Responsible  [phone]        Anticoagulation Patient Findings  Patient Findings     Negatives:  Signs/symptoms of thrombosis, Signs/symptoms of bleeding, Laboratory test error suspected, Change in health, Change in alcohol use, Change in activity, Upcoming invasive procedure, Emergency department visit, Upcoming dental procedure, Missed doses, Extra doses, Change in medications, Change in diet/appetite, Hospital admission, Bruising, Other complaints        HPI:   Mike Mooreis seen in clinic today, on anticoagulation therapy with warfarin for stroke prevention due to history of mechanical MVR.    Patient's previous INR was supratherapeutic at 6.8 on 20, at which time patient was instructed to hold two doses, then decrease weekly warfarin regimen.  He returns to clinic today to recheck INR to ensure it is therapeutic and thus preventing possible clotting and/or bleeding/bruising complications.    CHADS-VASc = n/a  (unadjusted ischemic stroke risk/year:  n/a)    Does patient have any changes to current medical/health status since last appt (Y/N):  No  Does patient have any signs/symptoms of bleeding and/or  thrombosis since the last appt (Y/N):  No  Does patient have any interval changes to diet or medications since last appt (Y/N):  No  Are there any complications or cost restrictions with current therapy (Y/N):  No     Does patient have [facility] PCP? Yes, Dr Rufino Shine (If not, please document discussion that patient must be seen at [facility])       Vitals:  see below    Vitals:    08/21/20 0856   BP: 133/73   Pulse: (!) 46        Asssessment:      INR subtherapeutic at 1.9, therefore increasing patient's risk of blood clots and stroke.   Reason(s) for out of range INR today:  Lowered weekly dose possibly.      Plan:  Pt is to continue with current warfarin dosing regimen in order to trend INR back to range.     Follow up:  Because warfarin is a high risk medication and current CHEST guidelines recommend regular monitoring intervals (few days up to 12 weeks), will have patient return to clinic in 3 days to recheck INR.    Darryn Sanabria, Pharmacy intern  Casey Birmingham, PharmD, BCACP

## 2020-08-24 ENCOUNTER — ANTICOAGULATION VISIT (OUTPATIENT)
Dept: MEDICAL GROUP | Facility: PHYSICIAN GROUP | Age: 75
End: 2020-08-24
Payer: MEDICARE

## 2020-08-24 DIAGNOSIS — Z79.01 CHRONIC ANTICOAGULATION: Primary | ICD-10-CM

## 2020-08-24 DIAGNOSIS — Z95.2 HISTORY OF MITRAL VALVE REPLACEMENT WITH MECHANICAL VALVE: ICD-10-CM

## 2020-08-24 LAB — INR PPP: 1.3 (ref 2–3.5)

## 2020-08-24 PROCEDURE — 99211 OFF/OP EST MAY X REQ PHY/QHP: CPT | Performed by: INTERNAL MEDICINE

## 2020-08-24 PROCEDURE — 85610 PROTHROMBIN TIME: CPT | Performed by: INTERNAL MEDICINE

## 2020-08-24 NOTE — PROGRESS NOTES
Anticoagulation Summary  As of 2020    INR goal:  2.5-3.5   TTR:  31.0 % (3.2 y)   INR used for dosin.30 (2020)   Warfarin maintenance plan:  7.5 mg (2.5 mg x 3) every Wed, Sat; 5 mg (2.5 mg x 2) all other days   Weekly warfarin total:  40 mg   Plan last modified:  Casey Birmingham, PharmD (8/10/2020)   Next INR check:  2020   Target end date:  Indefinite    Indications    Chronic anticoagulation [Z79.01]  History of mitral valve replacement with mechanical valve [Z95.2] [Z95.2]             Anticoagulation Episode Summary     INR check location:      Preferred lab:      Send INR reminders to:      Comments:        Anticoagulation Care Providers     Provider Role Specialty Phone number    Rufino Shine M.D. Referring Internal Medicine [phone]    [facility] Responsible  [phone]        Anticoagulation Patient Findings  Patient Findings     Positives:  Change in diet/appetite    Negatives:  Signs/symptoms of thrombosis, Signs/symptoms of bleeding, Laboratory test error suspected, Change in health, Change in alcohol use, Change in activity, Upcoming invasive procedure, Emergency department visit, Upcoming dental procedure, Missed doses, Extra doses, Change in medications, Hospital admission, Bruising, Other complaints    Comments:  Increased vitamin K intake        HPI:   Mike Rivera seen in clinic today, on anticoagulation therapy with warfarin for stroke prevention due to history of mechanical MVR.    Patient's previous INR was subtherapeutic at 1.9 on 20, at which time patient was instructed to resume current warfarin regimen.  He returns to clinic today to recheck INR to ensure it is therapeutic and thus preventing possible clotting and/or bleeding/bruising complications.    CHADS-VASc = n/a  (unadjusted ischemic stroke risk/year:  n/a)    Does patient have any changes to current medical/health status since last appt (Y/N):  NO  Does patient have any  signs/symptoms of bleeding and/or thrombosis since the last appt (Y/N):  NO  Does patient have any interval changes to diet or medications since last appt (Y/N):  NO  Are there any complications or cost restrictions with current therapy (Y/N):  NO     Does patient have [facility] PCP? Yes, Dr Rufino Shine (If not, please document discussion that patient must be seen at [facility])       Vitals:  declined by patient at today's visit.    There were no vitals filed for this visit.     Asssessment:      INR subtherapeutic at 1.3, therefore increasing patient's risk of blood clots and strokes.    Reason(s) for out of range INR today:  Possibly high intake of vitamin K      Plan:  patient was told to bolus X 2 and continue on with the weekly regimen.     Follow up:  Because warfarin is a high risk medication and current CHEST guidelines recommend regular monitoring intervals (few days up to 12 weeks), will have patient return to clinic in 4 days to recheck INR.    Darryn Sanabria, Pharmacy intern  Casey Birmingham, PharmD, BCACP

## 2020-08-25 ENCOUNTER — OFFICE VISIT (OUTPATIENT)
Dept: CARDIOLOGY | Facility: MEDICAL CENTER | Age: 75
End: 2020-08-25
Payer: MEDICARE

## 2020-08-25 VITALS
DIASTOLIC BLOOD PRESSURE: 70 MMHG | SYSTOLIC BLOOD PRESSURE: 110 MMHG | HEART RATE: 68 BPM | OXYGEN SATURATION: 96 % | WEIGHT: 129 LBS | BODY MASS INDEX: 19.55 KG/M2 | HEIGHT: 68 IN

## 2020-08-25 DIAGNOSIS — I25.700 CORONARY ARTERY DISEASE INVOLVING CORONARY BYPASS GRAFT OF NATIVE HEART WITH UNSTABLE ANGINA PECTORIS (HCC): Chronic | ICD-10-CM

## 2020-08-25 DIAGNOSIS — F32.0 MILD SINGLE CURRENT EPISODE OF MAJOR DEPRESSIVE DISORDER (HCC): Chronic | ICD-10-CM

## 2020-08-25 DIAGNOSIS — Z95.2 HISTORY OF MITRAL VALVE REPLACEMENT WITH MECHANICAL VALVE: Chronic | ICD-10-CM

## 2020-08-25 DIAGNOSIS — Z86.73 HISTORY OF STROKE: ICD-10-CM

## 2020-08-25 DIAGNOSIS — G45.9 TIA (TRANSIENT ISCHEMIC ATTACK): ICD-10-CM

## 2020-08-25 DIAGNOSIS — E11.42 DIABETIC POLYNEUROPATHY ASSOCIATED WITH TYPE 2 DIABETES MELLITUS (HCC): Chronic | ICD-10-CM

## 2020-08-25 DIAGNOSIS — G47.33 OBSTRUCTIVE SLEEP APNEA SYNDROME: ICD-10-CM

## 2020-08-25 DIAGNOSIS — F03.91 DEMENTIA WITH BEHAVIORAL DISTURBANCE, UNSPECIFIED DEMENTIA TYPE: Chronic | ICD-10-CM

## 2020-08-25 PROCEDURE — 99215 OFFICE O/P EST HI 40 MIN: CPT | Performed by: INTERNAL MEDICINE

## 2020-08-25 ASSESSMENT — ENCOUNTER SYMPTOMS
MEMORY LOSS: 1
FALLS: 1
DIZZINESS: 1

## 2020-08-25 ASSESSMENT — FIBROSIS 4 INDEX: FIB4 SCORE: 1.81

## 2020-08-25 NOTE — PROGRESS NOTES
Cardiology Follow-up Consultation Note    Date of note:    8/25/2020  Primary Care Provider: Declan Eaton M.D.    Patient Name: Carlos Rivera     YOB: 1945  MRN:              [id number]      CC: mechanical mitral valve.     History of Present Illness: Carlos Rivera is a 74 y.o.-year-old male whose current medical problems include type II diabetes, and mechanical mitral valve placed 1999 on coumadin who is here for follow-up.     At our initial visit, 10/6/2017:    Fall of 1998 was found to have murmur on exam and mitral valve prolapse with severe mitral regurgitation via Dr. Oscar.  Had valve replaced in 9/1999. Has not needed any replacement, has had no endocarditis, and no bleeding complications.  No previous TIA/CVA. He reports no history of arrhythmias.     Moved from [place] 1 year ago. Lost 10 pounds in the last year, lost appetite.      Does not walk to exercise due to bone spur on foot.    Stopped bike riding due to back and neck pain, was doing long distance bike rides up until a year ago.     At our visit, 6/7/2019:  In terms of diabetes, not well controlled.     Seeing Dr. Cartagena for memory difficulties and imbalance. Told not to ride his bike which he hates.     In terms of MVR, no dyspnea and no bleeding issues.     Interim Events:  Hospitalization and rehab summarized below:  The patient is a 74 y.o. right hand dominant male with a past medical history of HTN, HLD, DM2, Dementia, MVR with mechanical valve replacement (Coumadin);  who presented on 6/23/2020  6:26 AM with , difficulty with speech and word finding when he woke up. CT head without acute abnormality. CTA without large vessel occlusion. Neurology was consulted and he was not a candidate for tPA.  Per neurology they were concerned for acute stroke vs hypertensive encephalopathy as his blood pressure was > 190s on admission. They recommended MRI brain which showed multiple old cerebral infarcts as well as  lacunar, cerebellar and parietal infarcts but no acute infarcts or hemorrhages.  Patient's aphasia improved but continued to have decreased mobility and memory concerning for encephalopathy vs TIA.      Encephalopathy vs delirium vs TIA - Patient with aphasia and weakness with negative work-up for stroke but with hypertensive emergency on admission concerning for hypertensive encephalopathy. Patient started on statin for concern for CVA vs TIA. Worsening confusion - per wife history of Gabapentin causing worse. Discontinue Gabapentin, minimal improvements. Patient underwent acute inpatient rehabilitation from 6/25/20 to 7/9/20 with some improvement in cognition and mobility. Patient at this time needs additional time for recovery and for wife to look into [facility] benefits for help at home. Patient continues to have unclear diagnosis, he has severe neuropathy, memory deficits, decreased balance, and severe festinating gait. Transfer to SNF  -Follow-up with Neurology, difficult to assess as patient/wife are very hesitant for medications. Does have family member with PD    Of note, INR was 2.5 at time of neurologic symptoms.     Also had a hospitalization 5/2020 for GI bleed secondary to diffuse diverticulosis.     In terms of GI bleed, no further bleeding. Very labile INR on his coumadin.     His wife reports since around 2015 he has had progressive and worsening vertigo. Initially bad after he broke his neck, and worsened after his ears were cleaned at a [facility]. Currently also has severe fatigue. They are asking for titration of his psychotropic medications.     Since he got out of a nursing facility, he has refused to do exercises per his wife and blames his symptoms on his medications.     Review of Systems   Constitution: Positive for malaise/fatigue.   Musculoskeletal: Positive for falls.   Neurological: Positive for dizziness.   Psychiatric/Behavioral: Positive for memory loss.      All other systems reviewed  and discussed using a comprehensive questionnaire and are negative.       Past Medical History:   Diagnosis Date   • Chronic anticoagulation 2/23/2017   • History of mitral valve replacement with mechanical valve [Z95.2] 3/10/2017   • Hyperlipidemia    • Type II diabetes mellitus (HCC) 2/23/2017         Past Surgical History:   Procedure Laterality Date   • PB COLONOSCOPY,DIAGNOSTIC N/A 5/9/2020    Procedure: COLONOSCOPY;  Surgeon: Jatinder Madera M.D.;  Location: SURGERY [facility];  Service: Gastroenterology   • MITRAL VALVE REPLACEMENT  1999   • INGUINAL HERNIA REPAIR Bilateral 1984   • TONSILLECTOMY           Current Outpatient Medications   Medication Sig Dispense Refill   • metFORMIN ER (GLUCOPHAGE XR) 500 MG TABLET SR 24 HR Take 1 Tab by mouth 2 times a day. (Patient taking differently: Take 1,000 mg by mouth 2 times a day.) 180 Tab 3   • cyanocobalamin (VITAMIN B-12) 100 MCG Tab Take 100 mcg by mouth every day.     • atorvastatin (LIPITOR) 40 MG Tab Take 1 Tab by mouth every bedtime. 90 Tab 3   • citalopram (CELEXA) 20 MG Tab Take 1 Tab by mouth every day. 90 Tab 3   • lisinopril (PRINIVIL) 20 MG Tab Take 1 Tab by mouth every evening. 90 Tab 3   • quetiapine (SEROQUEL) 50 MG tablet Take 1 Tab by mouth 2 times a day. 60 Tab 1   • insulin regular (HUMULIN R) 100 Unit/mL Solution Inject 2-12 Units as instructed 3 times a day before meals. 10 mL 2   • vitamin D (VITAMIND D3) 1000 UNIT Tab Take 1 Tab by mouth every day. 60 Tab    • warfarin (COUMADIN) 4 MG Tab Take 1 Tab by mouth every day at 6 PM.     • insulin glargine (LANTUS) 100 UNIT/ML Solution Inject 15 Units as instructed every evening. 10 mL      No current facility-administered medications for this visit.          Allergies   Allergen Reactions   • Okra Vomiting   • Risperidone      Dystonia, altered mental status   • Hydrocodone-Acetaminophen Vomiting         Family History   Problem Relation Age of Onset   • Heart Attack Father 58   • Diabetes  "Father    • Heart Attack Paternal Uncle    • No Known Problems Sister    • No Known Problems Brother    • No Known Problems Maternal Grandmother    • No Known Problems Maternal Grandfather    • No Known Problems Paternal Grandmother    • Heart Attack Paternal Grandfather    • No Known Problems Sister    • No Known Problems Brother          Social History     Socioeconomic History   • Marital status:      Spouse name: Not on file   • Number of children: Not on file   • Years of education: Not on file   • Highest education level: Not on file   Occupational History   • Not on file   Social Needs   • Financial resource strain: Not on file   • Food insecurity     Worry: Not on file     Inability: Not on file   • Transportation needs     Medical: Not on file     Non-medical: Not on file   Tobacco Use   • Smoking status: Never Smoker   • Smokeless tobacco: Never Used   Substance and Sexual Activity   • Alcohol use: Yes     Alcohol/week: 0.0 oz     Frequency: Monthly or less   • Drug use: No   • Sexual activity: Yes     Comment:    Lifestyle   • Physical activity     Days per week: Not on file     Minutes per session: Not on file   • Stress: Not on file   Relationships   • Social connections     Talks on phone: Not on file     Gets together: Not on file     Attends Tenriism service: Not on file     Active member of club or organization: Not on file     Attends meetings of clubs or organizations: Not on file     Relationship status: Not on file   • Intimate partner violence     Fear of current or ex partner: Not on file     Emotionally abused: Not on file     Physically abused: Not on file     Forced sexual activity: Not on file   Other Topics Concern   • Not on file   Social History Narrative    Retired from [employer]          Physical Exam:  Ambulatory Vitals  /70 (BP Location: Left arm, Patient Position: Sitting, BP Cuff Size: Adult)   Pulse 68   Ht 1.727 m (5' 8\")   Wt 58.5 kg (129 lb)   SpO2 96%  "   Oxygen Therapy:  Pulse Oximetry: 96 %  BP Readings from Last 4 Encounters:   08/25/20 110/70   08/21/20 133/73   08/19/20 122/60   08/17/20 126/78       Weight/BMI: Body mass index is 19.61 kg/m².  Wt Readings from Last 4 Encounters:   08/25/20 58.5 kg (129 lb)   08/19/20 59.3 kg (130 lb 11.7 oz)   08/17/20 59.4 kg (131 lb)   07/05/20 56 kg (123 lb 8 oz)     General: No apparent distress  Eyes: nl conjunctiva  ENT: OP covered by mask  Neck: JVP 4-5 cm H2O, no carotid bruits  Lungs: normal respiratory effort, CTAB  Heart: RRR, Mechanical S1, no murmurs, no rubs or gallops, no edema bilateral lower extremities. No LV/RV heave on cardiac palpatation. 2+ bilateral radial pulses.  2+ bilateral dp pulses. Well healed sternotomy scar.   Abdomen: soft, non tender, non distended, no masses, normal bowel sounds.  No HSM.  Extremities/MSK: no clubbing, no cyanosis  Neurological: No focal sensory deficits  Psychiatric: Appropriate affect, A/O x 3  Skin: Warm extremities    Exam repeated in full and unchanged except for as noted above.        Lab Data Review:  Lab Results   Component Value Date/Time    CHOLSTRLTOT 119 06/24/2020 07:25 AM    LDL 47 06/24/2020 07:25 AM    HDL 46 06/24/2020 07:25 AM    TRIGLYCERIDE 129 06/24/2020 07:25 AM       Lab Results   Component Value Date/Time    SODIUM 134 (L) 07/07/2020 05:28 AM    POTASSIUM 4.7 07/07/2020 05:28 AM    CHLORIDE 100 07/07/2020 05:28 AM    CO2 23 07/07/2020 05:28 AM    GLUCOSE 162 (H) 07/07/2020 05:28 AM    BUN 31 (H) 07/07/2020 05:28 AM    CREATININE 1.20 07/07/2020 05:28 AM     CrCl cannot be calculated (Patient's most recent lab result is older than the maximum 7 days allowed.).  Lab Results   Component Value Date/Time    ALKPHOSPHAT 96 07/02/2020 05:39 AM    ASTSGOT 23 07/02/2020 05:39 AM    ALTSGPT 40 07/02/2020 05:39 AM    TBILIRUBIN 0.2 07/02/2020 05:39 AM      Lab Results   Component Value Date/Time    WBC 8.5 07/02/2020 05:39 AM     Lab Results   Component Value  Date/Time    HBA1C 6.7 (H) 2020 06:37 AM     No components found for: TROP      Cardiac Imaging and Procedures Review:    EKG dated 10/6/2017: My personal interpretation is NSR, non-specific IVCD, LAD, non-specific TW changes in inferior and lateral leads.    TTE 7/3/2020:  CONCLUSIONS  Compared to the images of the prior study done 20, no change.  Left ventricular ejection fraction is visually estimated to be 55%.  Known mitral valve mechanical type which is functioning normally with   appropriate transvalvular gradient. Mean transvalvular gradient is 1    mmHg at a heart rate of 64 BPM. No mitral regurgitation.      Radiology test review   MRI 2018:  1.  Small areas of encephalomalacia in the RIGHT ventrolateral thalamus and RIGHT frontal parietal cortex compatible with remote ischemic insults  2.  No evidence of acute intracranial hemorrhage, mass or acute ischemia  3.  Mild atrophy without disproportionate enlargement of the ventricular system  4.  Moderate white matter changes    MRI brain 2020:  IMPRESSION:     1.  Moderate cerebral atrophy.  2.  Multiple areas of old cerebral infarction in both cerebral hemispheres. The old left parietal infarct is associated with minimal hemosiderin deposition indicating a component of hemorrhagic infarction.  3.  Old lacunar size infarct right thalamus.  4.  Old lacunar infarction left frontal corona radiata.  5.  Advanced supratentorial white matter disease most consistent with microvascular ischemic change.  6.  Mild pontine ischemic gliosis.  7.  Old lacunar infarcts x2 in the right cerebellar hemisphere, no change from prior exam.  8.  No evidence of acute infarction, acute hemorrhage, or mass lesion on today's exam.    Medical Decision Makin. History of mitral valve replacement  Currently asymptomatic. .   -endocarditis ppx, Rx'd amoxicillin.   -ED precautions discussed  -continue coumadin, goal INR 2.5-3.5, preferably 2.5-3  -off aspirin  as he has had multiple severe GI bleeds due to his anticoagulation. Last hospitalization for GI bleeds was 5/2020.     2. Hyperlipidemia, unspecified hyperlipidemia type  -continue lipitor, LDL at goal    3. Type 2 diabetes mellitus with hyperosmolarity without coma, without long-term current use of insulin (CMS-[facility])  Well controlled.   -f/u with PCP. Goal HgbA1c at least <8    4. Atrial flutter - noted by his previous cardiologist Dr. Prosper Fierro. S/p cardioversion 2/2012.   -continue anticoagulation for mechanical mitral valve.     5. History of myocardial infarction - per previous cardiologists notes. No known details. He reports he did have a SVG bypass. -continue lipitor 40mg PO daily.     6. History of remote CVA - based on MRI results. Followed by .     7. History of GI bleed - last 5/2020 due to diffuse diverticulosis. No intervention during colonoscopy. If he has recurrent bleed, may have to consider colon resection vs risks of stopping or lowering anticoagulation threshold despite his mechanical mitral valve.      8. Cognitive difficulties/neurologic symptoms - Since 2016 head trauma, no worsening. I do suspect he has underlying dementia and possibly waxing and waning delirium even at home based on his wife's description. 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  -f/u with neurology  -advised he abstain from any heavy physical work, or significant head exposure.   -f/u with psychiatry to titrate medications given his fatigue.     Return in about 6 months (around 2/25/2021).      Stephon Ramos MD  [facility]  [facility], [address]  [address] [address]  Phone: [phone]  Fax: [phone]

## 2020-08-27 RX ORDER — LANCETS 30 GAUGE
EACH MISCELLANEOUS
Qty: 100 EACH | Refills: 3 | Status: SHIPPED | OUTPATIENT
Start: 2020-08-27 | End: 2021-03-02

## 2020-08-27 RX ORDER — GLUCOSAMINE HCL/CHONDROITIN SU 500-400 MG
CAPSULE ORAL
Qty: 100 EACH | Refills: 3 | Status: SHIPPED | OUTPATIENT
Start: 2020-08-27 | End: 2021-03-02

## 2020-08-28 ENCOUNTER — ANTICOAGULATION VISIT (OUTPATIENT)
Dept: MEDICAL GROUP | Facility: PHYSICIAN GROUP | Age: 75
End: 2020-08-28
Payer: MEDICARE

## 2020-08-28 DIAGNOSIS — Z79.01 CHRONIC ANTICOAGULATION: Primary | ICD-10-CM

## 2020-08-28 DIAGNOSIS — Z95.2 HISTORY OF MITRAL VALVE REPLACEMENT WITH MECHANICAL VALVE: ICD-10-CM

## 2020-08-28 LAB — INR PPP: 7.9 (ref 2–3.5)

## 2020-08-28 PROCEDURE — 99211 OFF/OP EST MAY X REQ PHY/QHP: CPT | Performed by: INTERNAL MEDICINE

## 2020-08-28 PROCEDURE — 85610 PROTHROMBIN TIME: CPT | Performed by: INTERNAL MEDICINE

## 2020-08-28 NOTE — PROGRESS NOTES
Anticoagulation Summary  As of 2020    INR goal:  2.5-3.5   TTR:  31.0 % (3.2 y)   INR used for dosin.90 (2020)   Warfarin maintenance plan:  7.5 mg (2.5 mg x 3) every Wed, Sat; 5 mg (2.5 mg x 2) all other days   Weekly warfarin total:  40 mg   Plan last modified:  Casey Birmingham, PharmD (8/10/2020)   Next INR check:  2020   Target end date:  Indefinite    Indications    Chronic anticoagulation [Z79.01]  History of mitral valve replacement with mechanical valve [Z95.2] [Z95.2]             Anticoagulation Episode Summary     INR check location:      Preferred lab:      Send INR reminders to:      Comments:        Anticoagulation Care Providers     Provider Role Specialty Phone number    Rufino Shine M.D. Referring Internal Medicine [phone]    [facility] Responsible  [phone]        Anticoagulation Patient Findings  Patient Findings     Negatives:  Signs/symptoms of thrombosis, Signs/symptoms of bleeding, Laboratory test error suspected, Change in health, Change in alcohol use, Change in activity, Upcoming invasive procedure, Emergency department visit, Upcoming dental procedure, Missed doses, Extra doses, Change in medications, Change in diet/appetite, Hospital admission, Bruising, Other complaints              HPI:   Mike Beltrán in clinic today, on anticoagulation therapy with warfarin for stroke prevention due to history of mitral valve replacement.    Patient's previous INR was subtherapeutic at 1.3 on 20, at which time patient was instructed to bolus 7.5 mg X 2 and continue on with current regimen.  He returns to clinic today to recheck INR to ensure it is therapeutic and thus preventing possible clotting and/or bleeding/bruising complications.    CHADS-VASc = n/a  (unadjusted ischemic stroke risk/year:  n/a)    Does patient have any changes to current medical/health status since last appt (Y/N):  No  Does patient have any signs/symptoms of bleeding  "and/or thrombosis since the last appt (Y/N):  No  Does patient have any interval changes to diet or medications since last appt (Y/N):  NO  Are there any complications or cost restrictions with current therapy (Y/N):  No     Does patient have [facility] PCP? Yes, Dr. Rufino Shine (If not, please document discussion that patient must be seen at [facility])       Vitals:  declined by patient today.    There were no vitals filed for this visit.     Asssessment:      INR supratherapeutic at 7.9, therefore increasing patient's risk of bleeding.   Reason(s) for out of range INR today:  Unknown       Plan:  patient is to hold X 2 doses and continue on with current regimen.     While in office, patient became increasing lethargic and balance/gait worsened during the 20+ minutes in building.  Assisted patient to his vehicle where his wife wished to \"get him home\".  Strongly encouraged a blood glucose check upon arriving home and have him evaluated by emergency or urgent care should symptoms worsen.   Explained to wife that I will be reaching out by phone in a short period to check on them.    Follow up:  Because warfarin is a high risk medication and current CHEST guidelines recommend regular monitoring intervals (few days up to 12 weeks), will have patient return to clinic in 3 days to recheck INR.    Darryn Sanabria, Pharmacy intern  Casey Birmingham, PharmD, BCACP    "

## 2020-08-31 ENCOUNTER — ANTICOAGULATION VISIT (OUTPATIENT)
Dept: MEDICAL GROUP | Facility: PHYSICIAN GROUP | Age: 75
End: 2020-08-31
Payer: MEDICARE

## 2020-08-31 ENCOUNTER — HOSPITAL ENCOUNTER (OUTPATIENT)
Dept: LAB | Facility: MEDICAL CENTER | Age: 75
End: 2020-08-31
Attending: NURSE PRACTITIONER
Payer: MEDICARE

## 2020-08-31 ENCOUNTER — ANTICOAGULATION MONITORING (OUTPATIENT)
Dept: VASCULAR LAB | Facility: MEDICAL CENTER | Age: 75
End: 2020-08-31

## 2020-08-31 DIAGNOSIS — Z79.01 CHRONIC ANTICOAGULATION: ICD-10-CM

## 2020-08-31 DIAGNOSIS — Z95.2 HISTORY OF MITRAL VALVE REPLACEMENT WITH MECHANICAL VALVE: ICD-10-CM

## 2020-08-31 DIAGNOSIS — Z79.01 CHRONIC ANTICOAGULATION: Primary | ICD-10-CM

## 2020-08-31 LAB
INR PPP: 6.29 (ref 0.87–1.13)
INR PPP: 8 (ref 2–3.5)
PROTHROMBIN TIME: 57.5 SEC (ref 12–14.6)

## 2020-08-31 PROCEDURE — 85610 PROTHROMBIN TIME: CPT

## 2020-08-31 PROCEDURE — 99211 OFF/OP EST MAY X REQ PHY/QHP: CPT | Performed by: INTERNAL MEDICINE

## 2020-08-31 PROCEDURE — 36415 COLL VENOUS BLD VENIPUNCTURE: CPT

## 2020-08-31 PROCEDURE — 85610 PROTHROMBIN TIME: CPT | Performed by: INTERNAL MEDICINE

## 2020-08-31 NOTE — PROGRESS NOTES
Anticoagulation Summary  As of 2020    INR goal:  2.5-3.5   TTR:  30.9 % (3.2 y)   INR used for dosin.29 (2020)   Warfarin maintenance plan:  7.5 mg (2.5 mg x 3) every Wed, Sat; 5 mg (2.5 mg x 2) all other days   Weekly warfarin total:  40 mg   Plan last modified:  Casey Birmingham PharmD (8/10/2020)   Next INR check:  9/3/2020   Target end date:  Indefinite    Indications    Chronic anticoagulation [Z79.01]  History of mitral valve replacement with mechanical valve [Z95.2] [Z95.2]             Anticoagulation Episode Summary     INR check location:      Preferred lab:      Send INR reminders to:      Comments:        Anticoagulation Care Providers     Provider Role Specialty Phone number    Rufino Shine M.D. Referring Internal Medicine [phone]    [facility] Responsible  [phone]        Anticoagulation Patient Findings     Confirmatory INR. Called pt and notified them of the new result. Pt to hold x 3 doses and then re-draw.    Bethany BoogieD

## 2020-09-01 ENCOUNTER — TELEPHONE (OUTPATIENT)
Dept: VASCULAR LAB | Facility: MEDICAL CENTER | Age: 75
End: 2020-09-01

## 2020-09-01 NOTE — TELEPHONE ENCOUNTER
Spoke with Mrs. Wyman to discuss dosing.  PT to HOLD warfarin x3 days, and recheck INR 09- at [facility].  Appt was already made  Marsha Samano, Clinical Pharmacist, CDE, CACP

## 2020-09-02 ENCOUNTER — OFFICE VISIT (OUTPATIENT)
Dept: URGENT CARE | Facility: PHYSICIAN GROUP | Age: 75
End: 2020-09-02
Payer: MEDICARE

## 2020-09-02 VITALS
TEMPERATURE: 97.6 F | DIASTOLIC BLOOD PRESSURE: 60 MMHG | HEART RATE: 56 BPM | RESPIRATION RATE: 14 BRPM | SYSTOLIC BLOOD PRESSURE: 100 MMHG | OXYGEN SATURATION: 99 %

## 2020-09-02 DIAGNOSIS — R73.9 STEROID-INDUCED HYPERGLYCEMIA: ICD-10-CM

## 2020-09-02 DIAGNOSIS — T38.0X5A STEROID-INDUCED HYPERGLYCEMIA: ICD-10-CM

## 2020-09-02 PROCEDURE — 99214 OFFICE O/P EST MOD 30 MIN: CPT | Performed by: PHYSICIAN ASSISTANT

## 2020-09-02 ASSESSMENT — ENCOUNTER SYMPTOMS
EYE PAIN: 0
VOMITING: 0
SHORTNESS OF BREATH: 0
COUGH: 0
DIARRHEA: 0
CONSTIPATION: 0
HEADACHES: 0
ABDOMINAL PAIN: 0
NAUSEA: 0
CHILLS: 0
SORE THROAT: 0
FEVER: 0
MYALGIAS: 0

## 2020-09-02 NOTE — TELEPHONE ENCOUNTER
----- Message from Comfort Rivera on behalf of Carlos Rivera sent at 9/2/2020  1:46 PM PDT -----  Regarding: Non-Urgent Medical Question  Contact: [phone]  This message is being sent by Comfort Rivera on behalf of Carlos Rivera.    Please send a prescription to [facility] for insulin and syringes. Thank you

## 2020-09-02 NOTE — PROGRESS NOTES
Subjective:   Carlos Rivera is a 74 y.o. male who presents for Fatigue (x 2 days)      This is a 74-year-old male who presents with his wife complaining of increased blood sugar and fatigue over the last 3 days.  Patient and his wife report that he had a cortisone injection in his foot due to some nerve pain last week and the blood sugar escalation started shortly thereafter.  The patient's is on 12 units of long-acting Lantus nightly as well as a sliding scale insulin throughout the day.  Apparently he does not watch his diet very well and eats only cereal says his wife.  His wife became concerned because the patient's blood sugars have been 4 and 500s over the last day or 2 despite the higher doses of insulin therapy.  He has some baseline mental status changes which they say is unchanged and he has not have any other constitutional symptoms like fevers or chills chest pain or shortness of breath      Review of Systems   Constitutional: Positive for malaise/fatigue. Negative for chills and fever.   HENT: Negative for congestion, ear pain and sore throat.    Eyes: Negative for pain.   Respiratory: Negative for cough and shortness of breath.    Cardiovascular: Negative for chest pain.   Gastrointestinal: Negative for abdominal pain, constipation, diarrhea, nausea and vomiting.   Genitourinary: Negative for dysuria.   Musculoskeletal: Negative for myalgias.   Skin: Negative for rash.   Neurological: Negative for headaches.       Medications:    • Alcohol Swabs  • atorvastatin Tabs  • Blood Glucose Meter Kit  • Blood Glucose Test Strips  • citalopram Tabs  • cyanocobalamin Tabs  • insulin glargine Soln  • insulin regular Soln  • Lancets  • lisinopril Tabs  • metFORMIN ER Tb24  • quetiapine  • vitamin D Tabs  • warfarin Tabs    Allergies: [place], Risperidone, and Hydrocodone-acetaminophen    Problem List: Carlos Rivera has Hyperlipidemia; Anxiety; Chronic anticoagulation; History of mitral valve  replacement with mechanical valve [Z95.2]; Mild single current episode of major depressive disorder (HCC); History of stroke; Shuffling gait; Coronary artery disease involving coronary bypass graft; Acute on Chronic Encephalopathy; Dementia with behavioral disturbance (HCC); Hypertension; Anger; Diabetic neuropathy (HCC); At risk for falls; Obstructive sleep apnea syndrome; Polypharmacy; Proteinuria; TIA (transient ischemic attack); Vitamin D deficiency; and Transient neurologic deficit on their problem list.    Surgical History:  Past Surgical History:   Procedure Laterality Date   • PB COLONOSCOPY,DIAGNOSTIC N/A 5/9/2020    Procedure: COLONOSCOPY;  Surgeon: Jatinder Madera M.D.;  Location: SURGERY [facility];  Service: Gastroenterology   • MITRAL VALVE REPLACEMENT  1999   • INGUINAL HERNIA REPAIR Bilateral 1984   • TONSILLECTOMY         Past Social Hx: Carlos Rivera  reports that he has never smoked. He has never used smokeless tobacco. He reports current alcohol use. He reports that he does not use drugs.     Past Family Hx:  Carlos Rivera family history includes Diabetes in his father; Heart Attack in his paternal grandfather and paternal uncle; Heart Attack (age of onset: 58) in his father; No Known Problems in his brother, brother, maternal grandfather, maternal grandmother, paternal grandmother, sister, and sister.     Problem list, medications, and allergies reviewed by myself today in Epic.     Objective:     /60 (BP Location: Right arm, Patient Position: Sitting, BP Cuff Size: Adult)   Pulse (!) 56   Temp 36.4 °C (97.6 °F) (Temporal)   Resp 14   SpO2 99%     Physical Exam  Vitals signs reviewed.   Constitutional:       Appearance: Normal appearance.      Comments: Thin but not cachectic, ambulatory with a cane   HENT:      Head: Normocephalic and atraumatic.      Right Ear: External ear normal.      Left Ear: External ear normal.      Nose: Nose normal.       Mouth/Throat:      Mouth: Mucous membranes are moist.   Eyes:      Conjunctiva/sclera: Conjunctivae normal.   Cardiovascular:      Rate and Rhythm: Normal rate and regular rhythm.   Pulmonary:      Effort: Pulmonary effort is normal.      Breath sounds: Normal breath sounds.   Skin:     General: Skin is warm and dry.      Capillary Refill: Capillary refill takes less than 2 seconds.   Neurological:      Mental Status: He is alert and oriented to person, place, and time.         Assessment/Plan:     Diagnosis and associated orders:     1. Steroid-induced hyperglycemia        Comments/MDM:     • This patient is experiencing high blood sugars after cortisone injection.  His blood sugar was around 430 minutes prior to arrival says his wife.  She is requesting direction on how to alter his blood sugar regimen.  I provided with the patient and wife reassurance and recommended leniency during this time any to be tolerant of blood sugars in the 200s and low 300s.  I emphasized the importance of a low sugar low-carb diet so that his blood sugar does not spike and I recommended continuing her current insulin regimen.  I recommended checking the blood sugar 30 minutes before eating as well as 2 hours after eating he continues to have blood sugars above 400 to 502 hours after eating I recommended 2 to 4 unit dose of short acting insulin.  I recommended they follow-up with the primary care provider soon as possible for follow-up           Differential diagnosis, natural history, supportive care, and indications for immediate follow-up discussed.    Advised the patient to follow-up with the primary care physician for recheck, reevaluation, and consideration of further management.    Please note that this dictation was created using voice recognition software. I have made a reasonable attempt to correct obvious errors, but I expect that there are errors of grammar and possibly content that I did not discover before finalizing the  note.    This note was electronically signed by Truong Santos PA-C

## 2020-09-03 ENCOUNTER — ANTICOAGULATION VISIT (OUTPATIENT)
Dept: VASCULAR LAB | Facility: MEDICAL CENTER | Age: 75
End: 2020-09-03
Attending: INTERNAL MEDICINE
Payer: MEDICARE

## 2020-09-03 DIAGNOSIS — Z79.01 CHRONIC ANTICOAGULATION: ICD-10-CM

## 2020-09-03 DIAGNOSIS — Z95.2 HISTORY OF MITRAL VALVE REPLACEMENT WITH MECHANICAL VALVE: ICD-10-CM

## 2020-09-03 LAB — INR PPP: 7.8 (ref 2–3.5)

## 2020-09-03 PROCEDURE — 85610 PROTHROMBIN TIME: CPT

## 2020-09-03 PROCEDURE — 99212 OFFICE O/P EST SF 10 MIN: CPT

## 2020-09-03 RX ORDER — INSULIN GLARGINE 100 [IU]/ML
15 INJECTION, SOLUTION SUBCUTANEOUS EVERY EVENING
Qty: 10 ML | Refills: 6 | Status: SHIPPED | OUTPATIENT
Start: 2020-09-03 | End: 2021-07-08 | Stop reason: SDUPTHER

## 2020-09-03 NOTE — TELEPHONE ENCOUNTER
----- Message from Comfort Rivera on behalf of Carlos KATHERINEPipo Godfrey Armen sent at 9/3/2020 12:46 PM PDT -----  Regarding: RE: Procedure Question  Contact: [phone]  This message is being sent by Comfort Rivera on behalf of Carlos Rivera.    Hi, could you please send a prescription for insulin, syringes and Lantus to [facility]. Thank you     ----- Message -----  From: Arleen Garcia, Med Ass't  Sent: 9/2/20 9:27 AM  To: Carlos Rivera  Subject: RE: Procedure Question    Dear Carlos,    Martha Shine M.D. out of the office today.    Please go to the Emergency Room or Urgent Care for further evaluation.    Take Care      ----- Message -----       From:Carlos Rivera       Sent:9/1/2020  5:54 PM PDT         To:Rufino Shine M.D.    Subject:Procedure Question    This message is being sent by Comfort  Armen on behalf of Carlos [address].    Mike's BS starting going up about 10 a.m at 3:30 he had some watered down o.j. at 4 was 4.00. Gave 12 units insulin per sheet. 5:30 ,pm 387. What do I do. [phone]

## 2020-09-03 NOTE — PROGRESS NOTES
Anticoagulation Summary  As of 9/3/2020    INR goal:  2.5-3.5   TTR:  30.8 % (3.2 y)   INR used for dosin.80 (9/3/2020)   Warfarin maintenance plan:  7.5 mg (2.5 mg x 3) every Wed, Sat; 5 mg (2.5 mg x 2) all other days   Weekly warfarin total:  40 mg   Plan last modified:  Casey Birmingham PharmD (8/10/2020)   Next INR check:  2020   Target end date:  Indefinite    Indications    Chronic anticoagulation [Z79.01]  History of mitral valve replacement with mechanical valve [Z95.2] [Z95.2]             Anticoagulation Episode Summary     INR check location:      Preferred lab:      Send INR reminders to:      Comments:        Anticoagulation Care Providers     Provider Role Specialty Phone number    Rufino Shine M.D. Referring Internal Medicine [phone]    [facility] Anticoagulation Services Responsible  [phone]        Anticoagulation Patient Findings      HPI:  Carlos Rivera seen in clinic today, on anticoagulation therapy with warfarin for Hx of mechanical MVR  Changes to current medical/health status since last appt: None  Denies signs/symptoms of bleeding and/or thrombosis since the last appt.    Denies any interval changes to diet.  Denies any complications or cost restrictions with current therapy.   BP declined.    A/P   INR  SUPRA-therapeutic.   Pt received two cortisone injection in each foot on Tuesday.  Instructed pt to HOLD warfarin today and then come back tomorrow for another INR draw to ensure that his INR is not climbing any higher.  Counseled pt extensively on bleed risk and to be mindful of s/s of bleeding.    Follow up appointment in 1 day.    Doug Kelly PharmD

## 2020-09-03 NOTE — TELEPHONE ENCOUNTER
Spouse is also asking for syringes.          Received request via: Patient    Was the patient seen in the last year in this department? Yes    Does the patient have an active prescription (recently filled or refills available) for medication(s) requested? No

## 2020-09-04 ENCOUNTER — ANTICOAGULATION VISIT (OUTPATIENT)
Dept: VASCULAR LAB | Facility: MEDICAL CENTER | Age: 75
End: 2020-09-04
Attending: INTERNAL MEDICINE
Payer: MEDICARE

## 2020-09-04 DIAGNOSIS — Z79.01 CHRONIC ANTICOAGULATION: ICD-10-CM

## 2020-09-04 DIAGNOSIS — Z95.2 HISTORY OF MITRAL VALVE REPLACEMENT WITH MECHANICAL VALVE: ICD-10-CM

## 2020-09-04 LAB
INR BLD: 7.4 (ref 0.9–1.2)
INR BLD: 7.8 (ref 0.9–1.2)
INR PPP: 7.4 (ref 2–3.5)

## 2020-09-04 PROCEDURE — 85610 PROTHROMBIN TIME: CPT

## 2020-09-04 PROCEDURE — 99212 OFFICE O/P EST SF 10 MIN: CPT

## 2020-09-04 NOTE — PROGRESS NOTES
Anticoagulation Summary  As of 2020    INR goal:  2.5-3.5   TTR:  30.8 % (3.2 y)   INR used for dosin.40 (2020)   Warfarin maintenance plan:  7.5 mg (2.5 mg x 3) every Wed, Sat; 5 mg (2.5 mg x 2) all other days   Weekly warfarin total:  40 mg   Plan last modified:  Casey Birmingham PharmD (8/10/2020)   Next INR check:  2020   Target end date:  Indefinite    Indications    Chronic anticoagulation [Z79.01]  History of mitral valve replacement with mechanical valve [Z95.2] [Z95.2]             Anticoagulation Episode Summary     INR check location:      Preferred lab:      Send INR reminders to:      Comments:        Anticoagulation Care Providers     Provider Role Specialty Phone number    Rufino Shine M.D. Referring Internal Medicine [phone]    [facility] Responsible  [phone]            Anticoagulation Patient Findings      HPI:  Carlos Mooreis seen in clinic today, on anticoagulation therapy with warfarin for history of mitral valve replacement.  Changes to current medical/health status since last appt: n/a  Denies signs/symptoms of bleeding and/or thrombosis since the last appt.    Denies any interval changes to diet  Denies any interval changes to medications since last appt.   Denies any complications or cost restrictions with current therapy.   Verified current warfarin dosing schedule.   BP recorded in vitals. n/a      A/P   INR  supra-therapeutic.   Advised pt to hold dose today and through the weekend, and take 1 tablet on Monday.   Instructed pt to seek medical help if there are any signs and symptoms of bleeding.     Follow up appointment in 4 days (Earliest pt can return to clinic)    Natasha Briscoe, Adriane

## 2020-09-08 ENCOUNTER — ANTICOAGULATION VISIT (OUTPATIENT)
Dept: VASCULAR LAB | Facility: MEDICAL CENTER | Age: 75
End: 2020-09-08
Attending: INTERNAL MEDICINE
Payer: MEDICARE

## 2020-09-08 DIAGNOSIS — Z95.2 HISTORY OF MITRAL VALVE REPLACEMENT WITH MECHANICAL VALVE: ICD-10-CM

## 2020-09-08 DIAGNOSIS — Z79.01 CHRONIC ANTICOAGULATION: ICD-10-CM

## 2020-09-08 LAB — INR PPP: 1.3 (ref 2–3.5)

## 2020-09-08 PROCEDURE — 85610 PROTHROMBIN TIME: CPT

## 2020-09-08 PROCEDURE — 99212 OFFICE O/P EST SF 10 MIN: CPT

## 2020-09-08 NOTE — PROGRESS NOTES
Anticoagulation Summary  As of 2020    INR goal:  2.5-3.5   TTR:  30.8 % (3.2 y)   INR used for dosin.30 (2020)   Warfarin maintenance plan:  7.5 mg (2.5 mg x 3) every Wed, Sat; 5 mg (2.5 mg x 2) all other days   Weekly warfarin total:  40 mg   Plan last modified:  Casey Birmingham, PharmD (8/10/2020)   Next INR check:  2020   Target end date:  Indefinite    Indications    Chronic anticoagulation [Z79.01]  History of mitral valve replacement with mechanical valve [Z95.2] [Z95.2]             Anticoagulation Episode Summary     INR check location:      Preferred lab:      Send INR reminders to:      Comments:        Anticoagulation Care Providers     Provider Role Specialty Phone number    Rufino Shine M.D. Referring Internal Medicine [phone]    [facility] Anticoagulation Services Responsible  [phone]                Anticoagulation Patient Findings  Patient Findings     Negatives:  Signs/symptoms of thrombosis, Signs/symptoms of bleeding, Laboratory test error suspected, Change in health, Change in alcohol use, Change in activity, Upcoming invasive procedure, Emergency department visit, Upcoming dental procedure, Missed doses, Extra doses, Change in medications, Change in diet/appetite, Hospital admission, Bruising, Other complaints          HPI:  Carlos Rivera seen in clinic today, on anticoagulation therapy with warfarin for mechanical MVR  Changes to current medical/health status since last appt: none  Denies signs/symptoms of bleeding and/or thrombosis since the last appt.    Denies any interval changes to diet  Denies any interval changes to medications since last appt.   Denies any complications or cost restrictions with current therapy.   Verified current warfarin dosing schedule.   BP declined      A/P   INR  sub-therapeutic.   Restart warfarin - will titrate pt conservatively (5 mg today, 2.5 mg AOD) as he has been critically supratherapeutic the past 2 weeks.    Follow up  appointment in 3 days @ [place].    Estela Murrell, PharmD

## 2020-09-09 LAB — INR BLD: 1.3 (ref 0.9–1.2)

## 2020-09-11 ENCOUNTER — TELEPHONE (OUTPATIENT)
Dept: MEDICAL GROUP | Facility: PHYSICIAN GROUP | Age: 75
End: 2020-09-11

## 2020-09-11 ENCOUNTER — ANTICOAGULATION VISIT (OUTPATIENT)
Dept: MEDICAL GROUP | Facility: PHYSICIAN GROUP | Age: 75
End: 2020-09-11
Payer: MEDICARE

## 2020-09-11 DIAGNOSIS — Z95.2 HISTORY OF MITRAL VALVE REPLACEMENT WITH MECHANICAL VALVE: ICD-10-CM

## 2020-09-11 DIAGNOSIS — Z79.01 CHRONIC ANTICOAGULATION: Primary | ICD-10-CM

## 2020-09-11 DIAGNOSIS — E11.42 DIABETIC POLYNEUROPATHY ASSOCIATED WITH TYPE 2 DIABETES MELLITUS (HCC): Chronic | ICD-10-CM

## 2020-09-11 LAB — INR PPP: 1.5 (ref 2–3.5)

## 2020-09-11 PROCEDURE — 85610 PROTHROMBIN TIME: CPT | Performed by: INTERNAL MEDICINE

## 2020-09-11 PROCEDURE — 99211 OFF/OP EST MAY X REQ PHY/QHP: CPT | Performed by: INTERNAL MEDICINE

## 2020-09-11 RX ORDER — WARFARIN SODIUM 2.5 MG/1
2.5 TABLET ORAL DAILY
COMMUNITY
End: 2020-12-21

## 2020-09-11 NOTE — TELEPHONE ENCOUNTER
Spoke with pt wife per requested by Dr. Shine.    I told pt wife per Dr. Shine to have pt continue on 6 units of lantus and then have pt go into have labs completed so Dr. Shine can adjust lantus properly for pt.     Pt wife sounded pleased to be called and she stated she understood the instructions and pt wife stated that they will have labs completed.     Pt wife also reminded me of preferred phone number to call for pt is [phone].

## 2020-09-11 NOTE — TELEPHONE ENCOUNTER
"----- Message from Rufino Shine M.D. sent at 9/11/2020  2:06 PM PDT -----  Regarding: FW: Medical Advice  Contact: [phone]  Pls call pt and wife    According to the medical record. Lantus dosage was ALREADY at 15units when I first met the pt on 6.15.20.  I suspect  the change was made by pts prior pcp> then I renewed it for pt  ----- Message -----  From: Harlan Jim Ass't  Sent: 9/11/2020  12:50 PM PDT  To: Rufino Shine M.D.  Subject: FW: Medical Advice                               Please advise.   ----- Message -----  From: Carlos Rivera  Sent: 9/11/2020  12:36 PM PDT  To: Harlan Jim Ass't  Subject: RE: Medical Advice                               This message is being sent by Comfort Rivera on behalf of Carlos Rivera.    Who and when was the Lantus changed! No notice just showed up on the paperwork! I've checked [phone] no messages there. I have asked many times to not leave any messages there! He doesn't answer. CALL [phone] ONLY. I check Mychart several times a day.  Go to INR today and will wait for dosage directions then.  We are frustrated       ----- Message -----  From: Harlan Jim Ass't  Sent: 9/11/20 8:44 AM  To: Carlos Rivera  Subject: Medical Advice    Yves Mckeon,    Please review medical advice from TIEN Oconnor M.D.  [facility] 25 minutes ago (8:16 AM)     Pls call pt /wife     Chart reviewed   As per anticoagulation clinic. Recent INR low   \"...   INR  sub-therapeutic.   Restart warfarin - will titrate pt conservatively (5 mg today, 2.5 mg AOD) as he has been critically supratherapeutic the past 2 weeks.\">> Rec pt to contact warfarin clinic to confirm     Regarding Diabetes condition. Pls contiue w lantus 15u at bed time. Pt should check sugar level at home 3-4x a day to make sure sugar levels are not running too high or too low   Pt to get fasting blood test in approx 7d to check glucose and A1c   Further " decision after blood tests done        Thanks,  Daisy MORTON

## 2020-09-11 NOTE — TELEPHONE ENCOUNTER
1st Attempt:    Left voicemail message for patient to call the office back at [phone]    Sent pt my chart message.

## 2020-09-11 NOTE — PROGRESS NOTES
Anticoagulation Summary  As of 2020    INR goal:  2.5-3.5   TTR:  30.7 % (3.2 y)   INR used for dosin.50 (2020)   Warfarin maintenance plan:  7.5 mg (2.5 mg x 3) every Wed, Sat; 5 mg (2.5 mg x 2) all other days   Weekly warfarin total:  40 mg   Plan last modified:  Casey Birmingham, PharmD (8/10/2020)   Next INR check:  2020   Target end date:  Indefinite    Indications    Chronic anticoagulation [Z79.01]  History of mitral valve replacement with mechanical valve [Z95.2] [Z95.2]             Anticoagulation Episode Summary     INR check location:      Preferred lab:      Send INR reminders to:      Comments:        Anticoagulation Care Providers     Provider Role Specialty Phone number    Rufino Shine M.D. Referring Internal Medicine [phone]    [facility] Anticoagulation Services Responsible  [phone]        Anticoagulation Patient Findings  Patient Findings     Negatives:  Signs/symptoms of thrombosis, Signs/symptoms of bleeding, Laboratory test error suspected, Change in health, Change in alcohol use, Change in activity, Upcoming invasive procedure, Emergency department visit, Upcoming dental procedure, Missed doses, Extra doses, Change in medications, Change in diet/appetite, Hospital admission, Bruising, Other complaints              HPI:   Mike Wyman was seen in clinic today, on anticoagulation therapy with warfarin for stroke prevention due to history of mechanical mitral valve replacement.    Patient's previous INR was subtherapeutic at 1.3 on 20, at which time patient was instructed to take 2.5 mg on Wed and .  He returns to clinic today to recheck INR to ensure it is therapeutic and thus preventing possible clotting and/or bleeding/bruising complications.    CHADS-VASc = n/a  (unadjusted ischemic stroke risk/year:  n/a)    Does patient have any changes to current medical/health status since last appt (Y/N):  No  Does patient have any signs/symptoms of bleeding and/or  thrombosis since the last appt (Y/N):  No  Does patient have any interval changes to diet or medications since last appt (Y/N):  No  Are there any complications or cost restrictions with current therapy (Y/N):  No     Does patient have [facility] PCP? Yes, Rufino Shine (If not, please document discussion that patient must be seen at [facility])       Vitals:  declined today    There were no vitals filed for this visit.     Asssessment:      INR subtherapeutic at 1.5, therefore increasing patient's risk of clotting and strokes.   Reason(s) for out of range INR today:  Dose too low    Plan:  patient is to take 5 mg on Saturday and continue on with the weekly regimen given that the INR is trending upward.      Follow up:  Because warfarin is a high risk medication and current CHEST guidelines recommend regular monitoring intervals (few days up to 12 weeks), will have patient return to clinic in 3 days to recheck INR.    Darryn Sanabria, Pharmacy intern  Casey Birmingham, PharmD, BCACP

## 2020-09-11 NOTE — TELEPHONE ENCOUNTER
"----- Message from Rufino Shine M.D. sent at 9/11/2020  8:13 AM PDT -----  Regarding: FW: Procedure Question  Contact: [phone]  Pls call pt /wife    Chart reviewed  As per anticoagulation clinic. Recent INR low  \"...  INR  sub-therapeutic.   Restart warfarin - will titrate pt conservatively (5 mg today, 2.5 mg AOD) as he has been critically supratherapeutic the past 2 weeks.\">> Rec pt to contact warfarin clinic to confirm    Regarding Diabetes condition. Pls contiue w lantus 15u at bed time. Pt should check sugar level at home 3-4x a day to make sure sugar levels are not running too high or too low  Pt to get fasting blood test in approx 7d to check glucose and A1c  Further decision after blood tests done  ----- Message -----  From: Arleen Garcia Med Ass't  Sent: 9/11/2020   8:05 AM PDT  To: Rufino Shine M.D.  Subject: FW: Procedure Question                           Please advise  ----- Message -----  From: Carlos Rivera  Sent: 9/11/2020   7:55 AM PDT  To: [place]  Subject: RE: Procedure Question                           This message is being sent by Comfort Rivera on behalf of Carlos Rivera.    Checking on a couple of things. Lantus changed from 6 units to 15, is the correct? Also, on the list of meds  and their dose amounts it reads Coumadin 4 but on the back page of the info it reads Coumadin 2.5. Please clarify.  Thank you.    ----- Message -----  From: Arleen Garcia Med Ass't  Sent: 9/2/20 9:27 AM  To: Carlos Rivera  Subject: RE: Procedure Question    Dear Martha Gonzalez M.D. out of the office today.    Please go to the Emergency Room or Urgent Care for further evaluation.    Take Care      ----- Message -----       From:Carlos Rivera       Sent:9/1/2020  5:54 PM PDT         To:Rufino Shine M.D.    Subject:Procedure Question    This message is being sent by Comfort Rivera on behalf of Carlos Rivera.    Mike's " BS starting going up about 10 a.m at 3:30 he had some watered down o.j. at 4 was 4.00. Gave 12 units insulin per sheet. 5:30 ,pm 387. What do I do. [phone]

## 2020-09-23 ENCOUNTER — OFFICE VISIT (OUTPATIENT)
Dept: NEUROLOGY | Facility: MEDICAL CENTER | Age: 75
End: 2020-09-23
Payer: MEDICARE

## 2020-09-23 VITALS
SYSTOLIC BLOOD PRESSURE: 108 MMHG | OXYGEN SATURATION: 94 % | BODY MASS INDEX: 19.65 KG/M2 | DIASTOLIC BLOOD PRESSURE: 60 MMHG | TEMPERATURE: 97.5 F | RESPIRATION RATE: 14 BRPM | WEIGHT: 129.63 LBS | HEART RATE: 91 BPM | HEIGHT: 68 IN

## 2020-09-23 DIAGNOSIS — R26.9 GAIT DISTURBANCE: ICD-10-CM

## 2020-09-23 DIAGNOSIS — E11.42 DIABETIC POLYNEUROPATHY ASSOCIATED WITH TYPE 2 DIABETES MELLITUS (HCC): Chronic | ICD-10-CM

## 2020-09-23 DIAGNOSIS — Z79.01 CHRONIC ANTICOAGULATION: ICD-10-CM

## 2020-09-23 DIAGNOSIS — Z79.4 TYPE 2 DIABETES MELLITUS WITH DIABETIC POLYNEUROPATHY, WITH LONG-TERM CURRENT USE OF INSULIN (HCC): ICD-10-CM

## 2020-09-23 DIAGNOSIS — F03.91 DEMENTIA WITH BEHAVIORAL DISTURBANCE, UNSPECIFIED DEMENTIA TYPE: Chronic | ICD-10-CM

## 2020-09-23 DIAGNOSIS — I10 ESSENTIAL HYPERTENSION: Chronic | ICD-10-CM

## 2020-09-23 DIAGNOSIS — R29.818 TRANSIENT NEUROLOGIC DEFICIT: ICD-10-CM

## 2020-09-23 DIAGNOSIS — E11.42 TYPE 2 DIABETES MELLITUS WITH DIABETIC POLYNEUROPATHY, WITH LONG-TERM CURRENT USE OF INSULIN (HCC): ICD-10-CM

## 2020-09-23 PROCEDURE — 99214 OFFICE O/P EST MOD 30 MIN: CPT | Performed by: NURSE PRACTITIONER

## 2020-09-23 ASSESSMENT — ENCOUNTER SYMPTOMS
TINGLING: 1
HEARTBURN: 0
SHORTNESS OF BREATH: 0
HEADACHES: 0
FEVER: 0
PALPITATIONS: 0
BACK PAIN: 1
CHILLS: 0
NERVOUS/ANXIOUS: 0
BLURRED VISION: 0
DEPRESSION: 0
SENSORY CHANGE: 1
VOMITING: 0
NECK PAIN: 0
COUGH: 0
BRUISES/BLEEDS EASILY: 1
DOUBLE VISION: 0
MEMORY LOSS: 1
NAUSEA: 0

## 2020-09-23 ASSESSMENT — FIBROSIS 4 INDEX: FIB4 SCORE: 1.81

## 2020-09-23 NOTE — PROGRESS NOTES
Subjective:     BATSHEVA Wyman is a 74 y.o.  RH handed male who presents to [facility] for follow up.    I last saw him on 8/19/2020    His wife brings him in today due to gait disturbance and painful feet. He was told that he had diabetic neuropathy years ago and recently saw a podiatrist who told him he did not have diabetic neuropathy, he gave him some cortisone injections which increased her blood glucose to over 500mg/dl.  He also saw PT who asked that he come in to review MRI as his gait is poor.        He  presented to [facility] on 6/23/2020 with complaints of  difficulty finding words upon awakening that morning.  In ER there were no focal deficits noted, he was noted to be dysarthric.    tPA was not given as his is already on warfarin for mechanical valve.   Blood pressure on arrival 194/77.  His symptoms improved with improvement of blood pressure.     PMH includes Mechanical valve w/chronic anti-coagulation, DMII, HTN, HLD and dementia and polyneuropathy.     He was previously seen by Dr. Vallejo for neuropathy and dementia.  His wife states that he has agitation with violence at times.        He was diagnosed with hypertensive encephalopathy.     Review of Systems   Constitutional: Negative for chills and fever.   HENT: Positive for hearing loss. Negative for tinnitus.    Eyes: Negative for blurred vision and double vision.   Respiratory: Negative for cough and shortness of breath.    Cardiovascular: Negative for chest pain and palpitations.   Gastrointestinal: Negative for heartburn, nausea and vomiting.   Genitourinary: Negative for dysuria.   Musculoskeletal: Positive for back pain and joint pain. Negative for neck pain.        Foot pain   Skin: Negative for rash.   Neurological: Positive for tingling and sensory change. Negative for headaches.   Endo/Heme/Allergies: Bruises/bleeds easily.   Psychiatric/Behavioral: Positive for memory loss. Negative for  depression. The patient is not nervous/anxious.        Past Medical History:   Diagnosis Date   • Chronic anticoagulation 2/23/2017   • History of mitral valve replacement with mechanical valve [Z95.2] 3/10/2017   • Hyperlipidemia    • Type II diabetes mellitus (HCC) 2/23/2017     Current Outpatient Medications on File Prior to Visit   Medication Sig Dispense Refill   • cyanocobalamin (VITAMIN B-12) 100 MCG Tab Take 100 mcg by mouth every day.     • atorvastatin (LIPITOR) 40 MG Tab Take 1 Tab by mouth every bedtime. 90 Tab 3   • citalopram (CELEXA) 20 MG Tab Take 1 Tab by mouth every day. 90 Tab 3   • lisinopril (PRINIVIL) 20 MG Tab Take 1 Tab by mouth every evening. 90 Tab 3   • warfarin (COUMADIN) 2.5 MG Tab Take 2.5 mg by mouth every day. Take one to two tablets by mouth daily as directed by anticoagulation clinic     • insulin regular (HUMULIN R) 100 Unit/mL Solution Inject 2-12 Units as instructed 3 times a day before meals. 10 mL 2   • insulin glargine (LANTUS) 100 UNIT/ML Solution Inject 15 Units as instructed every evening. 10 mL 6   • Blood Glucose Meter Kit Per insurance coverage. Check blood sugar up to 2-3x per day and prn ssx of high or low sugar 1 Kit 0   • Blood Glucose Test Strips Per insurance coverage. Check blood sugar up to 2-3x per day and prn ssx of high or low sugar 100 Each 3   • Lancets Per insurance coverage. Check blood sugar up to 2-3x per day and prn ssx of high or low sugar 100 Each 3   • Alcohol Swabs Per insurance coverage. Wipe site with prep pad prior to injection 100 Each 3   • metFORMIN ER (GLUCOPHAGE XR) 500 MG TABLET SR 24 HR Take 1 Tab by mouth 2 times a day. 180 Tab 3   • quetiapine (SEROQUEL) 50 MG tablet Take 1 Tab by mouth 2 times a day. 60 Tab 1   • vitamin D (VITAMIND D3) 1000 UNIT Tab Take 1 Tab by mouth every day. 60 Tab      No current facility-administered medications on file prior to visit.           Objective:   I personally reviewed imaging below and agree with the  "findings     MRI brain 6/25/2020  1 Moderate cerebral atrophy.  2.  Multiple areas of old cerebral infarction in both cerebral hemispheres. The old left parietal infarct is associated with minimal hemosiderin deposition indicating a component of hemorrhagic infarction.  3.  Old lacunar size infarct right thalamus.  4.  Old lacunar infarction left frontal corona radiata.  5.  Advanced supratentorial white matter disease most consistent with microvascular ischemic change.  6.  Mild pontine ischemic gliosis.  7.  Old lacunar infarcts x2 in the right cerebellar hemisphere, no change from prior exam.  8.  No evidence of acute infarction, acute hemorrhage, or mass lesion   CTA head and neck unremarkable.  TTE:  LVEF 60% basal and mid inferior and inferolateral hypokinesis.   LA size WNL, MEGHA 21.    Stroke Labs:   Cr.  1.20,  TSH 1.46, B12 1102, A1C 6.7,  LDL 47      Encounter Vitals  Standard Vitals  Vitals  Blood Pressure : 108/60  Temperature: 36.4 °C (97.5 °F)  Temp src: Temporal  Pulse: 91  Respiration: 14  Pulse Oximetry: 94 %  Height: 172.7 cm (5' 8\")  Weight: 58.8 kg (129 lb 10.1 oz)  Encounter Vitals  Temperature: 36.4 °C (97.5 °F)  Temp src: Temporal  Blood Pressure : 108/60  Pulse: 91  Respiration: 14  Pulse Oximetry: 94 %  Weight: 58.8 kg (129 lb 10.1 oz)  Height: 172.7 cm (5' 8\")  BMI (Calculated): 19.71      Physical Exam       Constitutional:  Alert, no apparent distress,  Psych:   mood and affect WNL  Neuro:  Oriented to month, disoriented to day of month and year, he is oriented to place, age and day of the week. speech fluent, naming and memory intact     CN II: Visual fields are full to confrontation. Fundoscopic exam is normal with sharp discs and no vascular changes. Pupils are 3 mm and briskly reactive to light.   CN III, IV, VI  EOMs intact, no ptosis  CN V: Facial sensation is intact to pinprick in all 3 divisions bilaterally. Corneal responses are intact.  CN VII: Face is symmetric with normal eye " closure and smile.  CN VII: Hearing is normal to rubbing fingers  CN IX, X: Palate elevates symmetrically. Phonation is normal.  CN XI: Head turning and shoulder shrug are intact  CN XII: Tongue is midline with normal movements and no atrophy.              Strength 5/5 BUE/BLE, no drift                  Sensation to PP equal bilaterally                 No limb ataxia with finger to nose and heel to shin                 Ambulates with cane, shuffling gait, initiation of movement slow    No tremors noted.                               Bilateral feet with decreased vibratory sense,  Less than 4 seconds on right, about 5 seconds on the left.   Position sense decreased both feet.  Sensation to PP is heightened on both plantar and dorsal surfaces of bilateral feet.  Patellar and ankle reflex 1+    Cardiovascular:    S1S2, no abnormal rhythm auscultated, no peripheral edema  Neck:                     No carotid bruits noted   Pulmonary:            Respirations easy, lungs clear to auscultation all fields.     Skin:                     No obvious rashes.           Current NIHSS     1a. LOC: 0  1b. LOC Questions: 0  1c. LOC Commands: 0  2. Best Gaze:0  3. Visual Fields: 0  4. Facial Paresis: 0  5a. Motor arm left: 0  5b. Motor arm right:0   6a. Motor leg left: 0  6b. Motor leg right: 0  7. Sensory: 0  8. Best Language: 0  9. Limb Ataxia: 0  10. Dysarthria: 0  11. Extinction/Inattention: 0     Total Score Current  0             Current mRS 3        Assessment/Plan:   1. Transient neurologic deficit  Transient neurological Deficits, likely represent hypertensive encephalopathy as symptoms improved with improved blood pressure     LDL goal less than 70, last 47, continue atorva 40mg   2. Essential hypertension     Blood pressure goal less than 130/80, currently at goal     3. Dementia with behavioral disturbance, unspecified dementia type (HCC)  Refer to neurology with cognitive specialist       4. Type 2 diabetes mellitus with  "diabetic polyneuropathy, with long-term current use of insulin ([facility])    Continue f/u with primary care.    5  Chronic anti-coagulation     On warfarin for mechanical heart valve.     I have counseled patient on stroke prevention strategies, stroke symptoms and mimics.  Diet and exercise modifications.  We discussed medication side effects and instructions.          6. Diabetic polyneuropathy associated with type 2 diabetes mellitus (HCC)     Recommend supportive shoes, continue therapy     Discussed diabetic foot care.      He has been on gabapentin in the past, per wife \"he was a zombie\" when he took it.    7. Gait disturbance     Possibly secondary to diabetic neuropathy, remote fracture of neck, and history of cerebellar strokes.  Recommend use of assistive device for ambulation, continue follow up with PT.   Avoid throw rugs at home    His gait is shuffling and initiation of movement is slow with some lunging forward on initiation of first few steps, he also had had cognitive problems, will refer for evaluation in movement disorders.     Follow up PRN         "

## 2020-09-25 ENCOUNTER — ANTICOAGULATION VISIT (OUTPATIENT)
Dept: MEDICAL GROUP | Facility: PHYSICIAN GROUP | Age: 75
End: 2020-09-25
Payer: MEDICARE

## 2020-09-25 DIAGNOSIS — Z95.2 HISTORY OF MITRAL VALVE REPLACEMENT WITH MECHANICAL VALVE: ICD-10-CM

## 2020-09-25 DIAGNOSIS — Z79.01 CHRONIC ANTICOAGULATION: Primary | ICD-10-CM

## 2020-09-25 LAB — INR PPP: 1.1 (ref 2–3.5)

## 2020-09-25 PROCEDURE — 99211 OFF/OP EST MAY X REQ PHY/QHP: CPT | Performed by: FAMILY MEDICINE

## 2020-09-25 PROCEDURE — 85610 PROTHROMBIN TIME: CPT | Performed by: FAMILY MEDICINE

## 2020-09-25 NOTE — PROGRESS NOTES
Anticoagulation Summary  As of 2020    INR goal:  2.5-3.5   TTR:  30.3 % (3.3 y)   INR used for dosin.10 (2020)   Warfarin maintenance plan:  7.5 mg (2.5 mg x 3) every Wed, Sat; 5 mg (2.5 mg x 2) all other days   Weekly warfarin total:  40 mg   Plan last modified:  Casey Birmingham, PharmD (8/10/2020)   Next INR check:  2020   Target end date:  Indefinite    Indications    Chronic anticoagulation [Z79.01]  History of mitral valve replacement with mechanical valve [Z95.2] [Z95.2]             Anticoagulation Episode Summary     INR check location:      Preferred lab:      Send INR reminders to:      Comments:        Anticoagulation Care Providers     Provider Role Specialty Phone number    Rufino Shine M.D. Referring Internal Medicine [phone]    [facility] Anticoagulation Services Responsible  [phone]        Anticoagulation Patient Findings  Patient Findings     Positives:  Missed doses    Negatives:  Signs/symptoms of thrombosis, Signs/symptoms of bleeding, Laboratory test error suspected, Change in health, Change in alcohol use, Change in activity, Upcoming invasive procedure, Emergency department visit, Upcoming dental procedure, Extra doses, Change in medications, Change in diet/appetite, Hospital admission, Bruising, Other complaints              HPI:   Mike Rivera seen in clinic today, on anticoagulation therapy with warfarin for stroke prevention due to history of mechanical MVR.    Patient's previous INR was subtherapeutic at 1.5 on 20, at which time patient was instructed to increase weekly warfarin regimen.  He returns to clinic today to recheck INR to ensure it is therapeutic and thus preventing possible clotting and/or bleeding/bruising complications.    CHADS-VASc = n/a  (unadjusted ischemic stroke risk/year:  n/a)    Does patient have any changes to current medical/health status since last appt (Y/N):  NO  Does patient have any signs/symptoms of bleeding and/or thrombosis  since the last appt (Y/N):  NO  Does patient have any interval changes to diet or medications since last appt (Y/N):  Has been taking much lower dose than instructed at next visit.  Are there any complications or cost restrictions with current therapy (Y/N):  NO     Does patient have [facility] PCP? Yes, Dr Rufino Shine (If not, please document discussion that patient must be seen at [facility])       Vitals:  declined by patient today.    There were no vitals filed for this visit.     Asssessment:      INR remains subtherapeutic at 1.1, therefore increasing stroke risk.   Reason(s) for out of range INR today:  Taking much lower dose than documented.      Plan:  Instructed patient to bolus with two doses, then increase weekly warfarin regimen to that currently documented.     Follow up:  Because warfarin is a high risk medication and current CHEST guidelines recommend regular monitoring intervals (few days up to 12 weeks), will have patient return to clinic in 3 days to recheck INR.    Casey Birmingham, PharmD, BCACP

## 2020-09-28 ENCOUNTER — ANTICOAGULATION VISIT (OUTPATIENT)
Dept: MEDICAL GROUP | Facility: PHYSICIAN GROUP | Age: 75
End: 2020-09-28
Payer: MEDICARE

## 2020-09-28 DIAGNOSIS — Z79.01 CHRONIC ANTICOAGULATION: Primary | ICD-10-CM

## 2020-09-28 DIAGNOSIS — Z95.2 HISTORY OF MITRAL VALVE REPLACEMENT WITH MECHANICAL VALVE: ICD-10-CM

## 2020-09-28 LAB — INR PPP: 3.1 (ref 2–3.5)

## 2020-09-28 PROCEDURE — 93793 ANTICOAG MGMT PT WARFARIN: CPT | Performed by: INTERNAL MEDICINE

## 2020-09-28 PROCEDURE — 85610 PROTHROMBIN TIME: CPT | Performed by: PHYSICIAN ASSISTANT

## 2020-09-28 PROCEDURE — 99999 PR NO CHARGE: CPT | Performed by: INTERNAL MEDICINE

## 2020-09-28 NOTE — PROGRESS NOTES
Anticoagulation Summary  As of 9/28/2020    INR goal:  2.5-3.5   TTR:  30.3 % (3.3 y)   INR used for dosing:  3.10 (9/28/2020)   Warfarin maintenance plan:  2.5 mg (2.5 mg x 1) every Mon, Fri; 5 mg (2.5 mg x 2) all other days   Weekly warfarin total:  30 mg   Plan last modified:  Casey Birmingham, PharmD (9/28/2020)   Next INR check:  10/2/2020   Target end date:  Indefinite    Indications    Chronic anticoagulation [Z79.01]  History of mitral valve replacement with mechanical valve [Z95.2] [Z95.2]             Anticoagulation Episode Summary     INR check location:      Preferred lab:      Send INR reminders to:      Comments:        Anticoagulation Care Providers     Provider Role Specialty Phone number    Rufino Shine M.D. Referring Internal Medicine [phone]    [facility] Anticoagulation Services Responsible  [phone]        Anticoagulation Patient Findings  Patient Findings     Negatives:  Signs/symptoms of thrombosis, Signs/symptoms of bleeding, Laboratory test error suspected, Change in health, Change in alcohol use, Change in activity, Upcoming invasive procedure, Emergency department visit, Upcoming dental procedure, Missed doses, Extra doses, Change in medications, Change in diet/appetite, Hospital admission, Bruising, Other complaints              HPI:   Mike was seen in clinic today, on anticoagulation therapy with Warfarin for VTE prophylaxis due to history of mechanical MVR    Patient's previous INR was subtherapeutic at 1.1 on 9/25/2020, at which time patient was instructed to bolus with 7.5 mg for 2 days and then take 5 mg until next visit.  He returns to clinic today to recheck INR to ensure it is therapeutic and thus preventing possible clotting and/or bleeding/bruising complications.    CHADS-VASc = n/a  (unadjusted ischemic stroke risk/year:  n/a)    Does patient have any changes to current medical/health status since last appt (Y/N):  no  Does patient have any signs/symptoms of bleeding  and/or thrombosis since the last appt (Y/N):  no  Does patient have any interval changes to diet or medications since last appt (Y/N):  no  Are there any complications or cost restrictions with current therapy (Y/N):  no     Does patient have [facility] PCP? Yes, Dr Rufino Shine (If not, please document discussion that patient must be seen at [facility])       Vitals:  declined by patient     There were no vitals filed for this visit.     Asssessment:      INR therapeutic at 3.1, therefore decreasing patient's risk of bleeding and clotting   Reason(s) for out of range INR today:  n/a      Plan:  Instructed patient to decrease weekly warfarin regimen as detailed above     Follow up:  Because warfarin is a high risk medication and current CHEST guidelines recommend regular monitoring intervals (few days up to 12 weeks), will have patient return to clinic in 4 days to recheck INR.    Ariana Jordan, Pharmacy intern   Casey Birmingham, PharmD, BCACP

## 2020-09-29 ENCOUNTER — TELEPHONE (OUTPATIENT)
Dept: MEDICAL GROUP | Facility: PHYSICIAN GROUP | Age: 75
End: 2020-09-29

## 2020-09-29 NOTE — TELEPHONE ENCOUNTER
1st Attempt:    Left voicemail message for patient to call the office back at [phone]    Sent pt my chart message.

## 2020-09-29 NOTE — TELEPHONE ENCOUNTER
----- Message from Rufino Shine M.D. sent at 9/29/2020 10:06 AM PDT -----  Regarding: FW: Medical Advice  Contact: [phone]  Pls call pts wife  Regarding the quetiapine morning, I'd rec to TAPER the morning dose as followed:    Seroquel 50mg every other morning for 3 wks then change to   Seroquel 50mg every 3rd day for 3 wks then stop the morning dose after that    Continue to give the evening dose of seroquel every day.  ----- Message -----  From: Daisy Collins, Med Ass't  Sent: 9/29/2020   9:21 AM PDT  To: Rufino Shine M.D.  Subject: FW: Medical Advice                               Please advise.  ----- Message -----  From: Amanda Coello Med Ass't  Sent: 9/29/2020   8:30 AM PDT  To: [facility]  Subject: FW: Medical Advice                                 ----- Message -----  From: Carlos Rivera  Sent: 9/28/2020   6:35 PM PDT  To: [facility]  Subject: RE: Medical Advice                               This message is being sent by Comfort Rivera on behalf of Carlos Rivera.    Have you received my message from last Friday?    ----- Message -----  From: Arleen Garcia Med Ass't  Sent: 9/11/20 2:23 PM  To: Carlos Rivera  Subject: RE: Medical Advice    Dear Carlos,    Please review your message from Rufino Shine M.D.    Take Care     According to the medical record. Lantus dosage was ALREADY at 15 units when I first met the Carlos on 6.15.20.     I suspect  the change was made by your prior primary care provider> then I renewed it for you.    Rufino Shine M.D.          ----- Message -----       From:Carlos Rivera       Sent:9/11/2020 12:36 PM PDT         To:Daisy Collins, Med Ass't    Subject:RE: Medical Advice    This message is being sent by Comfort Rivera on behalf of Carlos Rivera.    Who and when was the Lantus changed! No notice just showed up on the paperwork! I've checked [phone] no messages there. I have asked many times to not leave any  "messages there! He doesn't answer. CALL [phone] ONLY. I check Mychart several times a day.  Go to INR today and will wait for dosage directions then.  We are frustrated       ----- Message -----       From:Harlan Jim Ass't       Sent:9/11/2020  8:44 AM PDT         To:Carlos Rivera    Subject:Medical Advice    Hi Mike,    Please review medical advice from Rufino Shine M.D.     Rufino Shine M.D.  [facility] 25 minutes ago (8:16 AM)     Pls call pt /wife     Chart reviewed   As per anticoagulation clinic. Recent INR low   \"...   INR  sub-therapeutic.   Restart warfarin - will titrate pt conservatively (5 mg today, 2.5 mg AOD) as he has been critically supratherapeutic the past 2 weeks.\">> Rec pt to contact warfarin clinic to confirm     Regarding Diabetes condition. Pls contiue w lantus 15u at bed time. Pt should check sugar level at home 3-4x a day to make sure sugar levels are not running too high or too low   Pt to get fasting blood test in approx 7d to check glucose and A1c   Further decision after blood tests done        Thanks,  Daisy MORTON          "

## 2020-09-29 NOTE — TELEPHONE ENCOUNTER
----- Message from Rufino Shine M.D. sent at 9/29/2020 10:06 AM PDT -----  Regarding: FW: Medical Advice  Contact: [phone]  Pls call pts wife  Regarding the quetiapine morning, I'd rec to TAPER the morning dose as followed:    Seroquel 50mg every other morning for 3 wks then change to   Seroquel 50mg every 3rd day for 3 wks then stop the morning dose after that    Continue to give the evening dose of seroquel every day.  ----- Message -----  From: Daisy Collins, Med Ass't  Sent: 9/29/2020   9:21 AM PDT  To: Rufino Shine M.D.  Subject: FW: Medical Advice                               Please advise.  ----- Message -----  From: Amanda Coello Med Ass't  Sent: 9/29/2020   8:30 AM PDT  To: [facility]  Subject: FW: Medical Advice                                 ----- Message -----  From: Carlos Rivera  Sent: 9/28/2020   6:35 PM PDT  To: [facility]  Subject: RE: Medical Advice                               This message is being sent by Comfort Rivera on behalf of Carlos Rivera.    Have you received my message from last Friday?    ----- Message -----  From: Arleen Garcia Med Ass't  Sent: 9/11/20 2:23 PM  To: Carlos Rivera  Subject: RE: Medical Advice    Dear Carlos,    Please review your message from Rufino Shine M.D.    Take Care     According to the medical record. Lantus dosage was ALREADY at 15 units when I first met the Carlos on 6.15.20.     I suspect  the change was made by your prior primary care provider> then I renewed it for you.    Rufino Shine M.D.          ----- Message -----       From:Carlos Rivera       Sent:9/11/2020 12:36 PM PDT         To:Daisy Collins, Med Ass't    Subject:RE: Medical Advice    This message is being sent by Comfort Rivera on behalf of Carlos Rivera.    Who and when was the Lantus changed! No notice just showed up on the paperwork! I've checked [phone] no messages there. I have asked many times to not leave any  "messages there! He doesn't answer. CALL [phone] ONLY. I check Mychart several times a day.  Go to INR today and will wait for dosage directions then.  We are frustrated       ----- Message -----       From:Harlan Jim Ass't       Sent:9/11/2020  8:44 AM PDT         To:Carlos Rivera    Subject:Medical Advice    Hi Mike,    Please review medical advice from Rufino Shine M.D.     Rufino Shine M.D.  [facility] 25 minutes ago (8:16 AM)     Pls call pt /wife     Chart reviewed   As per anticoagulation clinic. Recent INR low   \"...   INR  sub-therapeutic.   Restart warfarin - will titrate pt conservatively (5 mg today, 2.5 mg AOD) as he has been critically supratherapeutic the past 2 weeks.\">> Rec pt to contact warfarin clinic to confirm     Regarding Diabetes condition. Pls contiue w lantus 15u at bed time. Pt should check sugar level at home 3-4x a day to make sure sugar levels are not running too high or too low   Pt to get fasting blood test in approx 7d to check glucose and A1c   Further decision after blood tests done        Thanks,  Daisy MORTON          "

## 2020-10-02 ENCOUNTER — ANTICOAGULATION VISIT (OUTPATIENT)
Dept: MEDICAL GROUP | Facility: PHYSICIAN GROUP | Age: 75
End: 2020-10-02
Payer: MEDICARE

## 2020-10-02 DIAGNOSIS — Z95.2 HISTORY OF MITRAL VALVE REPLACEMENT WITH MECHANICAL VALVE: ICD-10-CM

## 2020-10-02 DIAGNOSIS — Z79.01 CHRONIC ANTICOAGULATION: Primary | ICD-10-CM

## 2020-10-02 LAB — INR PPP: 2.7 (ref 2–3.5)

## 2020-10-02 PROCEDURE — 99999 PR NO CHARGE: CPT | Performed by: FAMILY MEDICINE

## 2020-10-02 PROCEDURE — 93793 ANTICOAG MGMT PT WARFARIN: CPT | Performed by: FAMILY MEDICINE

## 2020-10-02 PROCEDURE — 85610 PROTHROMBIN TIME: CPT | Performed by: INTERNAL MEDICINE

## 2020-10-02 NOTE — PROGRESS NOTES
Anticoagulation Summary  As of 10/2/2020    INR goal:  2.5-3.5   TTR:  30.5 % (3.3 y)   INR used for dosin.70 (10/2/2020)   Warfarin maintenance plan:  2.5 mg (2.5 mg x 1) every Mon, Fri; 5 mg (2.5 mg x 2) all other days   Weekly warfarin total:  30 mg   Plan last modified:  Casey Birmingham, PharmD (2020)   Next INR check:  10/9/2020   Target end date:  Indefinite    Indications    Chronic anticoagulation [Z79.01]  History of mitral valve replacement with mechanical valve [Z95.2] [Z95.2]             Anticoagulation Episode Summary     INR check location:      Preferred lab:      Send INR reminders to:      Comments:        Anticoagulation Care Providers     Provider Role Specialty Phone number    Rufino Shine M.D. Referring Internal Medicine [phone]    [facility] Anticoagulation Services Responsible  [phone]        Anticoagulation Patient Findings  Patient Findings     Negatives:  Signs/symptoms of thrombosis, Signs/symptoms of bleeding, Laboratory test error suspected, Change in health, Change in alcohol use, Change in activity, Upcoming invasive procedure, Emergency department visit, Upcoming dental procedure, Missed doses, Extra doses, Change in medications, Change in diet/appetite, Hospital admission, Bruising, Other complaints              HPI:   Mike Rivera seen in clinic today, on anticoagulation therapy with warfarin for VTE prophylaxis due to history of mechanical MVR    Patient's previous INR was therapeutic at 3.1 on 2020, at which time patient was instructed to continue current dosing regimen .  He returns to clinic today to recheck INR to ensure it is therapeutic and thus preventing possible clotting and/or bleeding/bruising complications.    CHADS-VASc = n/a  (unadjusted ischemic stroke risk/year:  n/a)    Does patient have any changes to current medical/health status since last appt (Y/N):  No  Does patient have any signs/symptoms of bleeding and/or thrombosis since the last  appt (Y/N):  No  Does patient have any interval changes to diet or medications since last appt (Y/N):  No  Are there any complications or cost restrictions with current therapy (Y/N):  NO     Does patient have [facility] PCP? Yes, Dr Rufino Shine (If not, please document discussion that patient must be seen at [facility])       Vitals:  declined by patient     There were no vitals filed for this visit.     Medication reconciliation completed today.      Asssessment:      INR therapeutic at 2.7, therefore decreasing patient's risk of bleeding and clotting    Reason(s) for out of range INR today:  n/a      Plan:  Pt is to continue with current warfarin dosing regimen.     Follow up:  Because warfarin is a high risk medication and current CHEST guidelines recommend regular monitoring intervals (few days up to 12 weeks), will have patient return to clinic in 1 weeks to recheck INR.    Ariana Jordan, Pharmacy intern  Casey Birmingham, PharmD, BCACP

## 2020-10-09 ENCOUNTER — ANTICOAGULATION VISIT (OUTPATIENT)
Dept: MEDICAL GROUP | Facility: PHYSICIAN GROUP | Age: 75
End: 2020-10-09
Payer: MEDICARE

## 2020-10-09 VITALS — SYSTOLIC BLOOD PRESSURE: 114 MMHG | DIASTOLIC BLOOD PRESSURE: 84 MMHG | HEART RATE: 56 BPM

## 2020-10-09 DIAGNOSIS — Z79.01 CHRONIC ANTICOAGULATION: Primary | ICD-10-CM

## 2020-10-09 DIAGNOSIS — Z95.2 HISTORY OF MITRAL VALVE REPLACEMENT WITH MECHANICAL VALVE: ICD-10-CM

## 2020-10-09 LAB — INR PPP: 1.1 (ref 2–3.5)

## 2020-10-09 PROCEDURE — 85610 PROTHROMBIN TIME: CPT | Performed by: INTERNAL MEDICINE

## 2020-10-09 PROCEDURE — 99211 OFF/OP EST MAY X REQ PHY/QHP: CPT | Performed by: INTERNAL MEDICINE

## 2020-10-09 NOTE — PROGRESS NOTES
Anticoagulation Summary  As of 10/9/2020    INR goal:  2.5-3.5   TTR:  30.4 % (3.3 y)   INR used for dosin.10 (10/9/2020)   Warfarin maintenance plan:  2.5 mg (2.5 mg x 1) every Mon, Fri; 5 mg (2.5 mg x 2) all other days   Weekly warfarin total:  30 mg   Plan last modified:  Casey Birmingham, PharmD (2020)   Next INR check:  10/12/2020   Target end date:  Indefinite    Indications    Chronic anticoagulation [Z79.01]  History of mitral valve replacement with mechanical valve [Z95.2] [Z95.2]             Anticoagulation Episode Summary     INR check location:      Preferred lab:      Send INR reminders to:      Comments:        Anticoagulation Care Providers     Provider Role Specialty Phone number    Rufino Shine M.D. Referring Internal Medicine [phone]    [facility] Anticoagulation Services Responsible  [phone]        Anticoagulation Patient Findings  Patient Findings     Positives:  Missed doses    Negatives:  Signs/symptoms of thrombosis, Signs/symptoms of bleeding, Laboratory test error suspected, Change in health, Change in alcohol use, Change in activity, Upcoming invasive procedure, Emergency department visit, Upcoming dental procedure, Extra doses, Change in medications, Change in diet/appetite, Hospital admission, Bruising, Other complaints         HPI:   Mike Mooreis seen in clinic today, on anticoagulation therapy with warfarin for stroke prevention due to history of mechanical MVR.    Patient's previous INR was therapeutic at 2.7 on 10-2-20, at which time patient was instructed to continue with current warfarin regimen.  He returns to clinic today to recheck INR to ensure it is therapeutic and thus preventing possible clotting and/or bleeding/bruising complications.    CHADS-VASc = n/a  (unadjusted ischemic stroke risk/year:  n/a)    Does patient have any changes to current medical/health status since last appt (Y/N):  NO  Does patient have any signs/symptoms of bleeding and/or thrombosis  since the last appt (Y/N):  NO  Does patient have any interval changes to diet or medications since last appt (Y/N):  Missed dose this week.  Are there any complications or cost restrictions with current therapy (Y/N):  NO     Does patient have [facility] PCP? Yes, Dr Rufino Shine (If not, please document discussion that patient must be seen at [facility])       Vitals:  see below    There were no vitals filed for this visit.     Medication reconciliation completed today.    Asssessment:      INR subtherapeutic at 1.1, therefore increasing patients stroke risk.   Reason(s) for out of range INR today:  Possibly due to missed dose.      Plan:  Instructed patient to bolus with 7.5mg X 2, then resume current warfarin regimen in order to bring to therapeutic range.     Follow up:  Because warfarin is a high risk medication and current CHEST guidelines recommend regular monitoring intervals (few days up to 12 weeks), will have patient return to clinic in 3 days to recheck INR.    Casey Birmingham, PharmD, BCACP

## 2020-10-12 ENCOUNTER — ANTICOAGULATION VISIT (OUTPATIENT)
Dept: MEDICAL GROUP | Facility: PHYSICIAN GROUP | Age: 75
End: 2020-10-12
Payer: MEDICARE

## 2020-10-12 DIAGNOSIS — Z79.01 CHRONIC ANTICOAGULATION: Primary | ICD-10-CM

## 2020-10-12 DIAGNOSIS — Z95.2 HISTORY OF MITRAL VALVE REPLACEMENT WITH MECHANICAL VALVE: ICD-10-CM

## 2020-10-12 LAB — INR PPP: 1.8 (ref 2–3.5)

## 2020-10-12 PROCEDURE — 85610 PROTHROMBIN TIME: CPT | Performed by: INTERNAL MEDICINE

## 2020-10-12 PROCEDURE — 99211 OFF/OP EST MAY X REQ PHY/QHP: CPT | Performed by: INTERNAL MEDICINE

## 2020-10-12 NOTE — PROGRESS NOTES
Anticoagulation Summary  As of 10/12/2020    INR goal:  2.5-3.5   TTR:  30.3 % (3.3 y)   INR used for dosin.80 (10/12/2020)   Warfarin maintenance plan:  2.5 mg (2.5 mg x 1) every Mon, Fri; 5 mg (2.5 mg x 2) all other days   Weekly warfarin total:  30 mg   Plan last modified:  Casey Birmingham, PharmD (2020)   Next INR check:  10/16/2020   Target end date:  Indefinite    Indications    Chronic anticoagulation [Z79.01]  History of mitral valve replacement with mechanical valve [Z95.2] [Z95.2]             Anticoagulation Episode Summary     INR check location:      Preferred lab:      Send INR reminders to:      Comments:        Anticoagulation Care Providers     Provider Role Specialty Phone number    Rufino Shine M.D. Referring Internal Medicine [phone]    [facility] Anticoagulation Services Responsible  [phone]        Anticoagulation Patient Findings  Patient Findings     Negatives:  Signs/symptoms of thrombosis, Signs/symptoms of bleeding, Laboratory test error suspected, Change in health, Change in alcohol use, Change in activity, Upcoming invasive procedure, Emergency department visit, Upcoming dental procedure, Missed doses, Extra doses, Change in medications, Change in diet/appetite, Hospital admission, Bruising, Other complaints              HPI:   Mike Aguilar seen in clinic today, on anticoagulation therapy with warfarin for stroke prevention due to history of mechanical MVR    Patient's previous INR was subtherapeutic at 1.1 on 10/9/2020, at which time patient was instructed to bolus with 7.5mg x 2 and 5mg on .  He returns to clinic today to recheck INR to ensure it is therapeutic and thus preventing possible clotting and/or bleeding/bruising complications.    CHADS-VASc = n/a  (unadjusted ischemic stroke risk/year:  n/a)    Does patient have any changes to current medical/health status since last appt (Y/N):  NO  Does patient have any signs/symptoms of bleeding and/or thrombosis  since the last appt (Y/N):  No  Does patient have any interval changes to diet or medications since last appt (Y/N):  NO  Are there any complications or cost restrictions with current therapy (Y/N):  No     Does patient have [facility] PCP? Yes, Dr Rufino Shine  (If not, please document discussion that patient must be seen at [facility])       Vitals:  declined by patient     There were no vitals filed for this visit.     Medication reconciliation completed today.    Asssessment:      INR subtherapeutic at 1.8, therefore increasing patient's risk of clotting   Reason(s) for out of range INR today:  Dose too low, history of labile INRs      Plan:  Instructed patient to bolus with 7.5mg x 2 and then continue current dosing regimen     Follow up:  Because warfarin is a high risk medication and current CHEST guidelines recommend regular monitoring intervals (few days up to 12 weeks), will have patient return to clinic in 4 days to recheck INR.    Ariana Jordan, Pharmacy Intern   Casey Birmingham, PharmD, BCACP

## 2020-10-14 NOTE — PROGRESS NOTES
Continue home pantoprazole 40 mg      Hospital Medicine Daily Progress Note      Chief Complaint:  Hypertension  Diabetes    Interval History:  No significant events or changes since last visit    Review of Systems  Review of Systems   Constitutional: Negative for fever.   Eyes: Negative for blurred vision.   Respiratory: Negative for shortness of breath.    Cardiovascular: Negative for palpitations.   Gastrointestinal: Negative for nausea and vomiting.   Neurological: Negative for dizziness and headaches.   Psychiatric/Behavioral: Negative for hallucinations.        Physical Exam  Temp:  [36.4 °C (97.6 °F)-37.2 °C (98.9 °F)] 36.4 °C (97.6 °F)  Pulse:  [45-72] 72  Resp:  [12-18] 18  BP: (103-127)/(44-72) 103/72  SpO2:  [94 %-97 %] 94 %    Physical Exam  Vitals signs and nursing note reviewed.   Constitutional:       General: He is not in acute distress.  HENT:      Mouth/Throat:      Mouth: Mucous membranes are moist.      Pharynx: Oropharynx is clear.   Eyes:      General: No scleral icterus.  Neck:      Musculoskeletal: No neck rigidity.   Cardiovascular:      Rate and Rhythm: Normal rate and regular rhythm.   Pulmonary:      Effort: Pulmonary effort is normal.      Breath sounds: No wheezing or rales.   Abdominal:      General: Bowel sounds are normal.      Palpations: Abdomen is soft.   Musculoskeletal:      Right lower leg: No edema.      Left lower leg: No edema.   Skin:     General: Skin is warm and dry.   Neurological:      Mental Status: He is alert and oriented to person, place, and time.      Comments: Has some memory loss   Psychiatric:         Mood and Affect: Mood normal.         Behavior: Behavior normal.         Fluids    Intake/Output Summary (Last 24 hours) at 7/4/2020 0886  Last data filed at 7/4/2020 0500  Gross per 24 hour   Intake 300 ml   Output 550 ml   Net -250 ml       Laboratory  Recent Labs     07/02/20  0539   WBC 8.5   RBC 4.52*   HEMOGLOBIN 15.4   HEMATOCRIT 46.0   .8*   MCH 34.1*   MCHC 33.5*   RDW 47.2  "  PLATELETCT 153*   MPV 11.7     Recent Labs     07/02/20  0539   SODIUM 139   POTASSIUM 4.4   CHLORIDE 105   CO2 22   GLUCOSE 132*   BUN 27*   CREATININE 1.04   CALCIUM 9.0     Recent Labs     07/02/20  0539 07/03/20  0514 07/04/20  0535   INR 3.30* 3.74* 3.22*               Assessment/Plan  Hypertension- (present on admission)  Assessment & Plan  BP ok  On Lisinopril: 20 mg daily  Monitor    History of mitral valve replacement with mechanical valve [Z95.2]- (present on admission)  Assessment & Plan  On Coumadin    Type II diabetes mellitus (HCC)- (present on admission)  Assessment & Plan  Hba1c: 6.7 (6/26)  BS rising up again: 133-263  Will restart Lantus: 12 units qam  On Metformin 500 mg bid (7/1)  On accuchecks without night time SS coverage (6/30)  Note: home meds include Lantus 15 units and Metformin 1000 mg bid  Cont to monitor    Bradycardia  Assessment & Plan  HR labile: 45-72  Asymptomatic  Denies CP  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), consider inferior infarct and anterior MI  TropI: 108 (6/23) --> 147 (7/1) --> 164 (7/2)  BNP: 359  TSH: wnl (6/26)  Echo (7/3): no change from prior; EF 55%, RVSP 30  Note: needs outpatient f/u with Cardio  Monitor    Depression  Assessment & Plan  On Celexa    Vitamin D deficiency  Assessment & Plan  Vit D: 27  On supplements    TIA (transient ischemic attack)  Assessment & Plan  Pt with dysarthria, now resolving  Neuroimaging negative acute  On Coumadin  On Lipitor    Mild cognitive impairment- (present on admission)  Assessment & Plan  Pt reports \"forgetfulness\" that's been ongoing for the past 2 yrs    Anxiety- (present on admission)  Assessment & Plan  On Celexa and Seroquel    "

## 2020-10-16 ENCOUNTER — TELEPHONE (OUTPATIENT)
Dept: VASCULAR LAB | Facility: MEDICAL CENTER | Age: 75
End: 2020-10-16

## 2020-10-16 NOTE — TELEPHONE ENCOUNTER
Pt had questions about dosing, however VM is full  Marsha Samano, Clinical Pharmacist, CDE, CACP

## 2020-10-20 ENCOUNTER — ANTICOAGULATION VISIT (OUTPATIENT)
Dept: MEDICAL GROUP | Facility: PHYSICIAN GROUP | Age: 75
End: 2020-10-20
Payer: MEDICARE

## 2020-10-20 DIAGNOSIS — Z79.01 CHRONIC ANTICOAGULATION: Primary | ICD-10-CM

## 2020-10-20 DIAGNOSIS — Z95.2 HISTORY OF MITRAL VALVE REPLACEMENT WITH MECHANICAL VALVE: ICD-10-CM

## 2020-10-20 LAB — INR PPP: 3.6 (ref 2–3.5)

## 2020-10-20 PROCEDURE — 99211 OFF/OP EST MAY X REQ PHY/QHP: CPT | Performed by: FAMILY MEDICINE

## 2020-10-20 PROCEDURE — 85610 PROTHROMBIN TIME: CPT | Performed by: FAMILY MEDICINE

## 2020-10-20 NOTE — PROGRESS NOTES
Anticoagulation Summary  As of 10/20/2020    INR goal:  2.5-3.5   TTR:  30.5 % (3.3 y)   INR used for dosing:  3.60 (10/20/2020)   Warfarin maintenance plan:  7.5 mg (2.5 mg x 3) every Mon, Fri; 5 mg (2.5 mg x 2) all other days   Weekly warfarin total:  40 mg   Plan last modified:  Casey Birmingham, PharmD (10/20/2020)   Next INR check:  10/26/2020   Target end date:  Indefinite    Indications    Chronic anticoagulation [Z79.01]  History of mitral valve replacement with mechanical valve [Z95.2] [Z95.2]             Anticoagulation Episode Summary     INR check location:      Preferred lab:      Send INR reminders to:      Comments:        Anticoagulation Care Providers     Provider Role Specialty Phone number    Rufino Shine M.D. Referring Internal Medicine [phone]    [facility] Anticoagulation Services Responsible  [phone]        Anticoagulation Patient Findings  Patient Findings     Negatives:  Signs/symptoms of thrombosis, Signs/symptoms of bleeding, Laboratory test error suspected, Change in health, Change in alcohol use, Change in activity, Upcoming invasive procedure, Emergency department visit, Upcoming dental procedure, Missed doses, Extra doses, Change in medications, Change in diet/appetite, Hospital admission, Bruising, Other complaints        HPI:   Mike Mooreis seen in clinic today, on anticoagulation therapy with warfarin for stroke prophylaxis due to history of mechanical MVR.    Patient's previous INR was subtherapeutic at 1.8 on 10-12-20, at which time patient was instructed to bolus with two doses, then resume current warfarin regimen.  He returns to clinic today to recheck INR to ensure it is therapeutic and thus preventing possible clotting and/or bleeding/bruising complications.    CHADS-VASc = n/a  (unadjusted ischemic stroke risk/year:  n/a)    Does patient have any changes to current medical/health status since last appt (Y/N):  NO  Does patient have any signs/symptoms of bleeding  "and/or thrombosis since the last appt (Y/N):  NO  Does patient have any interval changes to diet or medications since last appt (Y/N):  Patient states she was given instructions for dosing by \"him\" this past week, no documentation, but she is able to confirm dosing pattern.  Are there any complications or cost restrictions with current therapy (Y/N):  NO     Does patient have [facility] PCP? Yes, Dr Rufino Shine (If not, please document discussion that patient must be seen at [facility])       Vitals:  declined by patient today.    There were no vitals filed for this visit.     Asssessment:      INR slighlty supratherapeutic at 3.6, therefore increasing patient's bleeding risk.  Reason(s) for out of range INR today:  Likely due to bolus and increased dosing.      Plan:  Instructed patient to begin new weekly warfarin regimen based on dosing from the past two weeks in order to decrease INR back to therapeutic range.     Follow up:  Because warfarin is a high risk medication and current CHEST guidelines recommend regular monitoring intervals (few days up to 12 weeks), will have patient return to clinic in 1 weeks to recheck INR.    Casey Birmingham, PharmD, BCACP    "

## 2020-10-26 ENCOUNTER — ANTICOAGULATION VISIT (OUTPATIENT)
Dept: MEDICAL GROUP | Facility: PHYSICIAN GROUP | Age: 75
End: 2020-10-26
Payer: MEDICARE

## 2020-10-26 DIAGNOSIS — Z95.2 HISTORY OF MITRAL VALVE REPLACEMENT WITH MECHANICAL VALVE: ICD-10-CM

## 2020-10-26 DIAGNOSIS — Z79.01 CHRONIC ANTICOAGULATION: Primary | ICD-10-CM

## 2020-10-26 LAB — INR PPP: 3.9 (ref 2–3.5)

## 2020-10-26 PROCEDURE — 99211 OFF/OP EST MAY X REQ PHY/QHP: CPT | Performed by: INTERNAL MEDICINE

## 2020-10-26 PROCEDURE — 85610 PROTHROMBIN TIME: CPT | Mod: QW | Performed by: INTERNAL MEDICINE

## 2020-10-26 NOTE — PROGRESS NOTES
Anticoagulation Summary  As of 10/26/2020    INR goal:  2.5-3.5   TTR:  30.4 % (3.4 y)   INR used for dosing:  3.90 (10/26/2020)   Warfarin maintenance plan:  7.5 mg (2.5 mg x 3) every Fri; 5 mg (2.5 mg x 2) all other days   Weekly warfarin total:  37.5 mg   Plan last modified:  Casey Birmingham, PharmD (10/26/2020)   Next INR check:  11/2/2020   Target end date:  Indefinite    Indications    Chronic anticoagulation [Z79.01]  History of mitral valve replacement with mechanical valve [Z95.2] [Z95.2]             Anticoagulation Episode Summary     INR check location:      Preferred lab:      Send INR reminders to:      Comments:        Anticoagulation Care Providers     Provider Role Specialty Phone number    Rufino Shine M.D. Referring Internal Medicine [phone]    [facility] Anticoagulation Services Responsible  [phone]        Anticoagulation Patient Findings  Patient Findings     Negatives:  Signs/symptoms of thrombosis, Signs/symptoms of bleeding, Laboratory test error suspected, Change in health, Change in alcohol use, Change in activity, Upcoming invasive procedure, Emergency department visit, Upcoming dental procedure, Missed doses, Extra doses, Change in medications, Change in diet/appetite, Hospital admission, Bruising, Other complaints              HPI:   Mike Aguilar seen in clinic today, on anticoagulation therapy with warfarin for stroke prevention due to history of mechanical MVR    Patient's previous INR was supratherapeutic at 3.6 on 10/20/2020, at which time patient was instructed to continue current dosing regimen.  He returns to clinic today to recheck INR to ensure it is therapeutic and thus preventing possible clotting and/or bleeding/bruising complications.    CHADS-VASc = n/a  (unadjusted ischemic stroke risk/year:  n/a)    Does patient have any changes to current medical/health status since last appt (Y/N):  NO  Does patient have any signs/symptoms of bleeding and/or thrombosis since the  last appt (Y/N):  NO  Does patient have any interval changes to diet or medications since last appt (Y/N):  NO  Are there any complications or cost restrictions with current therapy (Y/N):  NO     Does patient have [facility] PCP? Yes, Dr Rufino Shine (If not, please document discussion that patient must be seen at [facility])       Vitals:  declined by patient    There were no vitals filed for this visit.     Asssessment:      INR SUPRAtherapeutic at 3.9, therefore increasing patient's risk of bleeding    Reason(s) for out of range INR today:  Etiology unknown      Plan:  Instructed patient to decrease his weekly dose as detailed above     Follow up:  Because warfarin is a high risk medication and current CHEST guidelines recommend regular monitoring intervals (few days up to 12 weeks), will have patient return to clinic in 1 weeks to recheck INR.    Ariana Jordan,Pharmacy Intern   Casey Birmingham, PharmD, BCACP

## 2020-11-02 ENCOUNTER — ANTICOAGULATION VISIT (OUTPATIENT)
Dept: MEDICAL GROUP | Facility: PHYSICIAN GROUP | Age: 75
End: 2020-11-02
Payer: MEDICARE

## 2020-11-02 DIAGNOSIS — Z79.01 CHRONIC ANTICOAGULATION: Primary | ICD-10-CM

## 2020-11-02 DIAGNOSIS — Z95.2 HISTORY OF MITRAL VALVE REPLACEMENT WITH MECHANICAL VALVE: ICD-10-CM

## 2020-11-02 LAB — INR PPP: 2 (ref 2–3.5)

## 2020-11-02 PROCEDURE — 99211 OFF/OP EST MAY X REQ PHY/QHP: CPT | Performed by: INTERNAL MEDICINE

## 2020-11-02 PROCEDURE — 85610 PROTHROMBIN TIME: CPT | Mod: QW | Performed by: INTERNAL MEDICINE

## 2020-11-02 NOTE — PROGRESS NOTES
Anticoagulation Summary  As of 2020    INR goal:  2.5-3.5   TTR:  30.5 % (3.4 y)   INR used for dosin.00 (2020)   Warfarin maintenance plan:  7.5 mg (2.5 mg x 3) every Mon, Fri; 5 mg (2.5 mg x 2) all other days   Weekly warfarin total:  40 mg   Plan last modified:  Casey Birmingham, PharmD (2020)   Next INR check:  2020   Target end date:  Indefinite    Indications    Chronic anticoagulation [Z79.01]  History of mitral valve replacement with mechanical valve [Z95.2] [Z95.2]             Anticoagulation Episode Summary     INR check location:      Preferred lab:      Send INR reminders to:      Comments:        Anticoagulation Care Providers     Provider Role Specialty Phone number    Rufino Shine M.D. Referring Internal Medicine [phone]    [facility] Anticoagulation Services Responsible  [phone]        Anticoagulation Patient Findings  Patient Findings     Negatives:  Signs/symptoms of thrombosis, Signs/symptoms of bleeding, Laboratory test error suspected, Change in health, Change in alcohol use, Change in activity, Upcoming invasive procedure, Emergency department visit, Upcoming dental procedure, Missed doses, Extra doses, Change in medications, Change in diet/appetite, Hospital admission, Bruising, Other complaints        HPI:   Carlos Rivera seen in clinic today, on anticoagulation therapy with warfarin for stroke prophylaxis due to history of mechanical MVR.    Patient's previous INR was supratherapeutic at 3.9 on 10-26-20, at which time patient was instructed to decrease weekly warfarin regimen.  He returns to clinic today to recheck INR to ensure it is therapeutic and thus preventing possible clotting and/or bleeding/bruising complications.    CHADS-VASc = n/a  (unadjusted ischemic stroke risk/year:  n/a)    Does patient have any changes to current medical/health status since last appt (Y/N):  NO  Does patient have any signs/symptoms of bleeding and/or thrombosis since the  last appt (Y/N):  NO  Does patient have any interval changes to diet or medications since last appt (Y/N):  NO  Are there any complications or cost restrictions with current therapy (Y/N):  NO     Does patient have [facility] PCP? Yes, Dr Rufino Shine (If not, please document discussion that patient must be seen at [facility])       Vitals:  declined by patient today, could not get large sweater off.    There were no vitals filed for this visit.     Medication reconciliation completed today.    Asssessment:      INR subtherapeutic at 2.0, therefore increasing patient's stroke risk.   Reason(s) for out of range INR today:  Dose too low?  Hx of labile INR's.    Plan:  Instructed patient to increase weekly warfarin regimen in order to bring INR back to therapeutic range.     Follow up:  Because warfarin is a high risk medication and current CHEST guidelines recommend regular monitoring intervals (few days up to 12 weeks), will have patient return to clinic in 1 weeks to recheck INR.    Casey Birmingham, PharmD, BCACP

## 2020-11-04 ENCOUNTER — HOSPITAL ENCOUNTER (OUTPATIENT)
Dept: RADIOLOGY | Facility: MEDICAL CENTER | Age: 75
End: 2020-11-04
Attending: PAIN MEDICINE
Payer: MEDICARE

## 2020-11-04 DIAGNOSIS — E13.42 DIABETIC POLYNEUROPATHY ASSOCIATED WITH OTHER SPECIFIED DIABETES MELLITUS (HCC): ICD-10-CM

## 2020-11-04 DIAGNOSIS — E11.69 TYPE 2 DIABETES MELLITUS WITH OTHER SPECIFIED COMPLICATION, UNSPECIFIED WHETHER LONG TERM INSULIN USE (HCC): ICD-10-CM

## 2020-11-04 DIAGNOSIS — Z95.2 MITRAL VALVE REPLACED: ICD-10-CM

## 2020-11-04 DIAGNOSIS — R26.81 GAIT INSTABILITY: ICD-10-CM

## 2020-11-04 DIAGNOSIS — Z87.828 HISTORY OF INJURY: ICD-10-CM

## 2020-11-04 PROCEDURE — 72110 X-RAY EXAM L-2 SPINE 4/>VWS: CPT

## 2020-11-09 ENCOUNTER — ANTICOAGULATION VISIT (OUTPATIENT)
Dept: MEDICAL GROUP | Facility: PHYSICIAN GROUP | Age: 75
End: 2020-11-09
Payer: MEDICARE

## 2020-11-09 DIAGNOSIS — Z79.01 CHRONIC ANTICOAGULATION: ICD-10-CM

## 2020-11-09 DIAGNOSIS — Z95.2 HISTORY OF MITRAL VALVE REPLACEMENT WITH MECHANICAL VALVE: ICD-10-CM

## 2020-11-09 LAB — INR PPP: 1.7 (ref 2–3.5)

## 2020-11-09 PROCEDURE — 85610 PROTHROMBIN TIME: CPT | Mod: QW | Performed by: INTERNAL MEDICINE

## 2020-11-09 PROCEDURE — 99211 OFF/OP EST MAY X REQ PHY/QHP: CPT | Performed by: INTERNAL MEDICINE

## 2020-11-09 NOTE — PROGRESS NOTES
Anticoagulation Summary  As of 2020    INR goal:  2.5-3.5   TTR:  30.3 % (3.4 y)   INR used for dosin.70 (2020)   Warfarin maintenance plan:  7.5 mg (2.5 mg x 3) every Mon, Wed, Fri; 5 mg (2.5 mg x 2) all other days   Weekly warfarin total:  42.5 mg   Plan last modified:  Estela Murrell, PharmD (2020)   Next INR check:  2020   Target end date:  Indefinite    Indications    Chronic anticoagulation [Z79.01]  History of mitral valve replacement with mechanical valve [Z95.2] [Z95.2]             Anticoagulation Episode Summary     INR check location:      Preferred lab:      Send INR reminders to:      Comments:        Anticoagulation Care Providers     Provider Role Specialty Phone number    Rufino Shine M.D. Referring Internal Medicine [phone]    [facility] Anticoagulation Services Responsible  [phone]        Anticoagulation Patient Findings  Patient Findings     Negatives:  Signs/symptoms of thrombosis, Signs/symptoms of bleeding, Laboratory test error suspected, Change in health, Change in alcohol use, Change in activity, Upcoming invasive procedure, Emergency department visit, Upcoming dental procedure, Missed doses, Extra doses, Change in medications, Change in diet/appetite, Hospital admission, Bruising, Other complaints            HPI:   Pt seen in clinic today, on anticoagulation therapy with warfarin for stroke prevention due to history of mMVR    Patient's previous INR was subtherapeutic at 2.0 on 2020, at which time patient was instructed to increase weekly regimen.  He returns to clinic today to recheck INR to ensure it is therapeutic and thus preventing possible clotting and/or bleeding/bruising complications.    CHADS-VASc = n/a    Does patient have any changes to current medical/health status since last appt (Y/N):  no  Does patient have any signs/symptoms of bleeding and/or thrombosis since the last appt (Y/N):  no  Does patient have any interval changes to diet  or medications since last appt (Y/N):  no  Are there any complications or cost restrictions with current therapy (Y/N):  no       Vitals declined today    Asssessment:      INR subtherapeutic at 1.7, therefore pt is at increased risk of VTE   Reason(s) for out of range INR today:  Dose too low? Pt's wife lays out his medications for him and he is unsure if she provides him with the correct dose.      Plan:  Bolus x 1 day, then increase regimen. Pt refuses to use Lovenox despite the risks presented to him     Follow up:  Because warfarin is a high risk medication and current CHEST guidelines recommend regular monitoring intervals (few days up to 12 weeks), will have patient return to clinic in 1 weeks to recheck INR.    Pt's [facility] PCP is Rufino Shine M.D.  Referral due 07/2021    Estela Murrell, BethanyD

## 2020-11-13 ENCOUNTER — PATIENT OUTREACH (OUTPATIENT)
Dept: MEDICAL GROUP | Facility: PHYSICIAN GROUP | Age: 75
End: 2020-11-13

## 2020-11-13 NOTE — PROGRESS NOTES
Nurse CM outreach call to provide information on chronic care management program. No answer, nurse JARED did not leave  at this time.  Will attempt outreach at a later date.

## 2020-11-16 ENCOUNTER — ANTICOAGULATION VISIT (OUTPATIENT)
Dept: MEDICAL GROUP | Facility: PHYSICIAN GROUP | Age: 75
End: 2020-11-16
Payer: MEDICARE

## 2020-11-16 DIAGNOSIS — Z95.2 HISTORY OF MITRAL VALVE REPLACEMENT WITH MECHANICAL VALVE: ICD-10-CM

## 2020-11-16 DIAGNOSIS — Z79.01 CHRONIC ANTICOAGULATION: Primary | ICD-10-CM

## 2020-11-16 LAB — INR PPP: 1.5 (ref 2–3.5)

## 2020-11-16 PROCEDURE — 85610 PROTHROMBIN TIME: CPT | Mod: QW | Performed by: INTERNAL MEDICINE

## 2020-11-16 PROCEDURE — 99211 OFF/OP EST MAY X REQ PHY/QHP: CPT | Performed by: INTERNAL MEDICINE

## 2020-11-16 NOTE — PROGRESS NOTES
Anticoagulation Summary  As of 2020    INR goal:  2.5-3.5   TTR:  30.2 % (3.4 y)   INR used for dosin.50 (2020)   Warfarin maintenance plan:  7.5 mg (2.5 mg x 3) every Mon, Wed, Fri; 5 mg (2.5 mg x 2) all other days   Weekly warfarin total:  42.5 mg   Plan last modified:  Estela Murrell, PharmD (2020)   Next INR check:  2020   Target end date:  Indefinite    Indications    Chronic anticoagulation [Z79.01]  History of mitral valve replacement with mechanical valve [Z95.2] [Z95.2]             Anticoagulation Episode Summary     INR check location:      Preferred lab:      Send INR reminders to:      Comments:        Anticoagulation Care Providers     Provider Role Specialty Phone number    Rufino Shine M.D. Referring Internal Medicine [phone]    [facility]ticoagulation [facility] Responsible  [phone]        Anticoagulation Patient Findings  Patient Findings     Negatives:  Signs/symptoms of thrombosis, Signs/symptoms of bleeding, Laboratory test error suspected, Change in health, Change in alcohol use, Change in activity, Upcoming invasive procedure, Emergency department visit, Upcoming dental procedure, Missed doses, Extra doses, Change in medications, Change in diet/appetite, Hospital admission, Bruising, Other complaints        HPI:   Mike Rivera seen in clinic today, on anticoagulation therapy with warfarin for stroke prophylaxis due to history of mechanical MVR.    Patient's previous INR was subtherapeutic at 1.7 on 20, at which time patient was instructed to bolus with one dose, then increase weekly warfarin regimen.  He returns to clinic today to recheck INR to ensure it is therapeutic and thus preventing possible clotting and/or bleeding/bruising complications.    CHADS-VASc = n/a  (unadjusted ischemic stroke risk/year:  n/a)    Does patient have any changes to current medical/health status since last appt (Y/N):  NO  Does patient have any signs/symptoms of  "bleeding and/or thrombosis since the last appt (Y/N):  NO  Does patient have any interval changes to diet or medications since last appt (Y/N):  Has been drinking Boost intermittently for \"energy\", states he last had one two days ago.  Are there any complications or cost restrictions with current therapy (Y/N):  NO     Does patient have [facility] PCP? Yes, Dr Rufino Shine (If not, please document discussion that patient must be seen at [facility])       Vitals:  declined today due to covid concerns.    There were no vitals filed for this visit.     Medication reconciliation completed today.    Asssessment:      INR remains subtherapeutic at 1.5, therefore increasing patient's stroke risk.   Reason(s) for out of range INR today:  Likely due to Boost drinks.      Plan:  Instructed patient to bolus with 10mg X 1, then resume current warfarin regimen in order to bring INR to therapeutic range.  Discussed lovenox given hx of mechanical MVR, patient declines despite risks.     Follow up:  Because warfarin is a high risk medication and current CHEST guidelines recommend regular monitoring intervals (few days up to 12 weeks), will have patient return to clinic in 1 weeks to recheck INR.    Casey Birmingham, PharmD, BCACP    "

## 2020-11-17 ENCOUNTER — PATIENT MESSAGE (OUTPATIENT)
Dept: MEDICAL GROUP | Facility: PHYSICIAN GROUP | Age: 75
End: 2020-11-17

## 2020-11-17 NOTE — TELEPHONE ENCOUNTER
From: Carlos Rivera  To: Rufino Shine M.D.  Sent: 11/17/2020 5:37 AM PST  Subject: Non-Urgent Medical Question    This message is being sent by Comfort Rivera on behalf of Carlos Rivera.    Please send me the information for [employer] sliding scale for insulin. Thank you. Comfort Rivera

## 2020-11-18 ENCOUNTER — TELEMEDICINE (OUTPATIENT)
Dept: NEUROLOGY | Facility: MEDICAL CENTER | Age: 75
End: 2020-11-18
Payer: MEDICARE

## 2020-11-18 VITALS — HEIGHT: 68 IN | BODY MASS INDEX: 19.7 KG/M2 | WEIGHT: 130 LBS

## 2020-11-18 DIAGNOSIS — F03.91 DEMENTIA WITH BEHAVIORAL DISTURBANCE, UNSPECIFIED DEMENTIA TYPE: ICD-10-CM

## 2020-11-18 PROCEDURE — 99215 OFFICE O/P EST HI 40 MIN: CPT | Mod: 95,CR | Performed by: PSYCHIATRY & NEUROLOGY

## 2020-11-18 ASSESSMENT — FIBROSIS 4 INDEX: FIB4 SCORE: 1.83

## 2020-11-18 NOTE — PROGRESS NOTES
"[facility] NEUROLOGY  FOLLOW-UP VISIT    This evaluation was conducted via Zoom using secure and encrypted videoconferencing technology. The patient was in a private location in the [facility].    The patient's identity was confirmed and verbal consent was obtained for this virtual visit.    CC: dementia    HISTORY OF ILLNESS:  Carlos Rivera is a 75 y.o. man with a history most notable for HTN, HLD, DM, peripheral neuropathy, CAD s/p CABG, BRIEN, stroke, and dementia.  He last saw Dr. Vallejo on 2/6/2020.  At that Carlos received a diagnosis of vascular dementia, and he was prescribed Lyrica 25 mg BID for diabetic neuropathy.   Today, he was accompanied by his wife, and they provided the following history:    2014:  Mike had \"forgetfulness,\" and he was diagnosed with \"cognitive impairment.\"    2016:  Mike had his ears cleaned, and this \"damaged his follicles.\"  His wife suspects he has a \"nasty case of vertigo.\"  Shortly afterward he had a mechanical fall which resulted in a neck fracture.  He feels like he \"scrambled his brain.\"  Since then he has had a bit more difficulty remembering things.    Between late 2016 and 5/2020, Mike continued to have cognitive symptoms, but he was fairly stable.    5/2020:  Mike was hospitalized due to a hemorrhage of a diverticulum.  He was in the hospital for 95 days.  During that time his cognition worsened.  At one point Mike's wife was advised to contact hospice because his condition was so poor.    8/22/2020:  Mike was discharged from the hospital.  Since that time he hasn't driven.  His wife thinks he could probably manage to get back and forth from the grocery store.  She worries that if he were to have difficulty his short temper would worsen the situation.  Mike's wife has always managed the household finances.  She also manages the cooking and cleaning.  Mike does his own laundry and tends to the cat litter boxes.    Mike's has some " "behavioral issues.  He gets \"very belligerent,\" and his wife has difficulty reasoning with him.  This issue apparnetly pre-dates any cognitive issues.  They have done marriage counseling several times, and Mrs. Wyman won't go again.    There is urinary frequency but no incontinence.    Mike is an avid reader.  He is taking Danish lessons.  He plays music often.      Mike tried memantine in the past, but it was stopped because it caused excessive fatigue.  He has not tried donepezil.    MEDICAL AND SURGICAL HISTORY:  Past Medical History:   Diagnosis Date   • Chronic anticoagulation 2/23/2017   • History of mitral valve replacement with mechanical valve [Z95.2] 3/10/2017   • Hyperlipidemia    • Type II diabetes mellitus (HCC) 2/23/2017     Past Surgical History:   Procedure Laterality Date   • PB COLONOSCOPY,DIAGNOSTIC N/A 5/9/2020    Procedure: COLONOSCOPY;  Surgeon: Jatinder Madera M.D.;  Location: SURGERY [facility];  Service: Gastroenterology   • MITRAL VALVE REPLACEMENT  1999   • INGUINAL HERNIA REPAIR Bilateral 1984   • TONSILLECTOMY       MEDICATIONS:  Current Outpatient Medications   Medication Sig   • warfarin (COUMADIN) 2.5 MG Tab Take 2.5 mg by mouth every day. Take one to two tablets by mouth daily as directed by anticoagulation clinic   • insulin regular (HUMULIN R) 100 Unit/mL Solution Inject 2-12 Units as instructed 3 times a day before meals.   • insulin glargine (LANTUS) 100 UNIT/ML Solution Inject 15 Units as instructed every evening.   • Blood Glucose Meter Kit Per insurance coverage. Check blood sugar up to 2-3x per day and prn ssx of high or low sugar   • Blood Glucose Test Strips Per insurance coverage. Check blood sugar up to 2-3x per day and prn ssx of high or low sugar   • Lancets Per insurance coverage. Check blood sugar up to 2-3x per day and prn ssx of high or low sugar   • Alcohol Swabs Per insurance coverage. Wipe site with prep pad prior to injection   • metFORMIN ER " (GLUCOPHAGE XR) 500 MG TABLET SR 24 HR Take 1 Tab by mouth 2 times a day.   • cyanocobalamin (VITAMIN B-12) 100 MCG Tab Take 100 mcg by mouth every day.   • atorvastatin (LIPITOR) 40 MG Tab Take 1 Tab by mouth every bedtime.   • citalopram (CELEXA) 20 MG Tab Take 1 Tab by mouth every day.   • lisinopril (PRINIVIL) 20 MG Tab Take 1 Tab by mouth every evening.   • quetiapine (SEROQUEL) 50 MG tablet Take 1 Tab by mouth 2 times a day.   • vitamin D (VITAMIND D3) 1000 UNIT Tab Take 1 Tab by mouth every day.     SOCIAL HISTORY:  Social History     Tobacco Use   • Smoking status: Never Smoker   • Smokeless tobacco: Never Used   Substance Use Topics   • Alcohol use: Yes     Alcohol/week: 0.0 oz     Frequency: Monthly or less     Social History     Social History Narrative    Retired from [employer]      FAMILY HISTORY:  Family History   Problem Relation Age of Onset   • Heart Attack Father 58   • Diabetes Father    • Heart Attack Paternal Uncle    • No Known Problems Sister    • No Known Problems Brother    • No Known Problems Maternal Grandmother    • No Known Problems Maternal Grandfather    • No Known Problems Paternal Grandmother    • Heart Attack Paternal Grandfather    • No Known Problems Sister    • No Known Problems Brother      REVIEW OF SYSTEMS:  A ROS was completed.  Pertinent positives and negatives were included in the HPI, above.  All other systems were reviewed and are negative.    PHYSICAL EXAM:  General/Medical:  - NAD    Neuro:  MENTAL STATUS: awake and alert; no deficits of speech or language; affect was appropriate to situation but sometimes frustrated    CRANIAL NERVES:    II: acuity was: NT; fields: NT; pupils: NT; discs: NT    III/IV/VI: versions: grossly intact    V: facial sensation: NT    VII: facial expression symmetric    VIII: hearing intact to voice    IX/X: palate: NT    XI: shoulder shrug symmetric    XII: tongue midline    MOTOR:  - bulk: NT  - tone: NT  Upper Extremity Strength  (R/L)    NT  "  Elbow flexion NT   Elbow extension NT   Shoulder abduction NT     Lower Extremity Strength  (R/L)   Hip flexion NT   Knee extension NT   Knee flexion NT   Ankle plantarflexion NT   Ankle dorsiflexion NT     - no abnormal movements    SENSATION:  - light touch: some numbness in the hands  - vibration (R/L, seconds): NT at the great toes  - pinprick: NT  - proprioception: NT  - Romberg: NT    COORDINATION:  - finger to nose: NT  - finger tapping was: NT    REFLEXES:  Reflex Right Left   BR NT NT   Biceps NT NT   Triceps NT NT   Patellae NT NT   Achilles NT NT   Toes NT NT     GAIT:  - narrow base and normal  - heel-raised and toe-raised gait: intact  - tandem gait: intact    Amarjit Cognitive Assessment (MOCA) Version 7.1    Years of Education: bachelor's degree    TOTAL SCORE:     Visuospatial/Executive: 3/5  Trails Test  []  Copy Cube []  Clock:  Contour  [x]  Numbers [x]  Hands  [x]    Namin/3  Lion  [x]  Rhino  [] (hippo)  Camel  [x]    Attention: 3/6  Digits forward  [x]  Digits backward [x]  Letter vigilance []  Serial 7 subtraction (answers) [x] [] [] [] []   Serial 7 subtraction (sub score: 1/3)    Language: 3/3  Repetition [x] [x]  Fluency (11 F-words/min) [x] (check if >10)    Flea, flight, furious, four, fun, fidgety, force, finicky, family, fern, female,     Abstraction: 2/2    Delayed Recall 0/5   No cue:    Face [] Velvet [] Christian [] Sujatha [] Red []  Category:  Face [] Velvet [] Christian [] Sujatha [] Red []  Multiple choice: Face [] Velvet [] Christian [] Sujatha [] Red []    Orientation   Date  []  Month  [x]  Year  [x]   Day  [x]  Place  [x]  City  [x]    REVIEW OF IMAGING STUDIES: I reviewed the following studies:  MRI Brain:  Date: 2020  W/o and w/ contrast?: without  Indication: \"AMS; stroke vs hypertensive encephalopathy\"  Comparison: 2018  Impression:  \"1) Moderate cerebral atrophy.  2) Multiple areas of old cerebral infarction in both cerebral hemispheres. The old left parietal " "infarct is associated with minimal hemosiderin deposition indicating a component of hemorrhagic infarction.  3) Old lacunar size infarct right thalamus.  4) Old lacunar infarction left frontal corona radiata.  5) Advanced supratentorial white matter disease most consistent with microvascular ischemic change.  6) Mild pontine ischemic gliosis.  7) Old lacunar infarcts x2 in the right cerebellar hemisphere, no change from prior exam.  8) No evidence of acute infarction, acute hemorrhage, or mass lesion on today's exam.\"    REVIEW OF LABORATORY STUDIES:  7/4/2020:  - TSH: 1.506  - vitamin B12: 2562    ASSESSMENT:  Carlos Rivera is a 75 y.o. man with dementia and a history otherwise notable for HTN, HLD, DM, peripheral neuropathy, CAD s/p CABG, BRIEN, and stroke.  Mike's condition seems to be fairly stable at home.  His wife complains about his behavior, but this seems to have been an issue his entire life.  He is already being treated with citalopram and quetiapine nightly.  I didn't recommend changing the dosage of either of these medications.  I offered a trial of donepezil 5 mg.  Given the GI side effects, they deferred for now but will discuss this with their other doctors.  We will follow up in approximately 6 months.    PLAN:  Dementia:  - consider trial of donepezil 5 mg (option of increasing to 10 mg after 4-6 weeks if tolerated)    Follow-Up:  - Return in about 6 months (around 5/18/2021).    Signed: Sterling Trejo M.D. at 7:25 AM on 11/18/20  Multiple Sclerosis and Neuroimmunology  Affiliate Neurologist, [facility]  Instructor of Clinical Neurology  [facility]    BILLING DOCUMENTATION:   I spent 60 minutes face-to-face with this patient.  Over 50% of this time was spent on counseling and/or coordination of care wtih the patient and/or family.  Specifically, we discussed possible causes of his dementia and possible strategies for symptoms management.  "

## 2020-11-30 ENCOUNTER — ANTICOAGULATION VISIT (OUTPATIENT)
Dept: MEDICAL GROUP | Facility: PHYSICIAN GROUP | Age: 75
End: 2020-11-30
Payer: MEDICARE

## 2020-11-30 DIAGNOSIS — Z95.2 HISTORY OF MITRAL VALVE REPLACEMENT WITH MECHANICAL VALVE: ICD-10-CM

## 2020-11-30 DIAGNOSIS — Z79.01 CHRONIC ANTICOAGULATION: Primary | ICD-10-CM

## 2020-11-30 LAB — INR PPP: 2.1 (ref 2–3.5)

## 2020-11-30 PROCEDURE — 85610 PROTHROMBIN TIME: CPT | Performed by: INTERNAL MEDICINE

## 2020-11-30 PROCEDURE — 99211 OFF/OP EST MAY X REQ PHY/QHP: CPT | Performed by: INTERNAL MEDICINE

## 2020-11-30 NOTE — PROGRESS NOTES
Anticoagulation Summary  As of 2020    INR goal:  2.5-3.5   TTR:  29.8 % (3.5 y)   INR used for dosin.10 (2020)   Warfarin maintenance plan:  7.5 mg (2.5 mg x 3) every Mon, Fri; 5 mg (2.5 mg x 2) all other days   Weekly warfarin total:  40 mg   Plan last modified:  Casey Birmingham, PharmD (2020)   Next INR check:  2020   Target end date:  Indefinite    Indications    Chronic anticoagulation [Z79.01]  History of mitral valve replacement with mechanical valve [Z95.2] [Z95.2]             Anticoagulation Episode Summary     INR check location:      Preferred lab:      Send INR reminders to:      Comments:        Anticoagulation Care Providers     Provider Role Specialty Phone number    Rufino Shine M.D. Referring Internal Medicine [phone]    [facility] Anticoagulation Services Responsible  [phone]        Anticoagulation Patient Findings  Patient Findings     Negatives:  Signs/symptoms of thrombosis, Signs/symptoms of bleeding, Laboratory test error suspected, Change in health, Change in alcohol use, Change in activity, Upcoming invasive procedure, Emergency department visit, Upcoming dental procedure, Missed doses, Extra doses, Change in medications, Change in diet/appetite, Hospital admission, Bruising, Other complaints        HPI:   Carlos Rivera seen in clinic today, on anticoagulation therapy with warfarin for stroke prophylaxis due to history of mechanical MVR.    Patient's previous INR was subtherapeutic at 1.5 on 20, at which time patient was instructed to bolus with one dose, then increase weekly warfarin regimen.  He returns to clinic today to recheck INR to ensure it is therapeutic and thus preventing possible clotting and/or bleeding/bruising complications.    CHADS-VASc = n/a  (unadjusted ischemic stroke risk/year:  n/a)    Does patient have any changes to current medical/health status since last appt (Y/N):  NO  Does patient have any signs/symptoms of bleeding  and/or thrombosis since the last appt (Y/N):  NO  Does patient have any interval changes to diet or medications since last appt (Y/N):  Patient's wife messaged last week stating he had some stomach upset and had to reschedule appt.  She gave him 5mg daily from that point on.  Are there any complications or cost restrictions with current therapy (Y/N):  NO     [facility]? Yes, Dr Rufino Shine (If not, please document discussion that patient must be seen at [facility])       Vitals:  declined by patient due to covid transmission concerns    There were no vitals filed for this visit.     Medication reconciliation completed today.    Asssessment:      INR subtherapeutic at 2.1, therefore increasing patient's stroke risk.   Reason(s) for out of range INR today:  Dose too low this past week.      Plan:  Instructed patient to increase weekly warfarin regimen from that dosed last week in order to bring INR back to therapeutic range.    [employer] message sent to patient's wife as well.     Follow up:  Because warfarin is a high risk medication and current CHEST guidelines recommend regular monitoring intervals (few days up to 12 weeks), will have patient return to clinic in 1 weeks to recheck INR.    Casey Birmingham, PharmD, BCACP

## 2020-12-06 RX ORDER — DONEPEZIL HYDROCHLORIDE 5 MG/1
5 TABLET, FILM COATED ORAL EVERY EVENING
Qty: 90 TAB | Refills: 0 | Status: SHIPPED | OUTPATIENT
Start: 2020-12-06 | End: 2020-12-15 | Stop reason: SDUPTHER

## 2020-12-07 ENCOUNTER — ANTICOAGULATION VISIT (OUTPATIENT)
Dept: MEDICAL GROUP | Facility: PHYSICIAN GROUP | Age: 75
End: 2020-12-07
Payer: MEDICARE

## 2020-12-07 DIAGNOSIS — Z79.01 CHRONIC ANTICOAGULATION: Primary | ICD-10-CM

## 2020-12-07 DIAGNOSIS — Z95.2 HISTORY OF MITRAL VALVE REPLACEMENT WITH MECHANICAL VALVE: ICD-10-CM

## 2020-12-07 LAB — INR PPP: 1.5 (ref 2–3.5)

## 2020-12-07 PROCEDURE — 85610 PROTHROMBIN TIME: CPT | Performed by: INTERNAL MEDICINE

## 2020-12-07 PROCEDURE — 99211 OFF/OP EST MAY X REQ PHY/QHP: CPT | Performed by: INTERNAL MEDICINE

## 2020-12-07 NOTE — PROGRESS NOTES
Anticoagulation Summary  As of 2020    INR goal:  2.5-3.5   TTR:  29.7 % (3.5 y)   INR used for dosin.50 (2020)   Warfarin maintenance plan:  7.5 mg (2.5 mg x 3) every Mon, Fri; 5 mg (2.5 mg x 2) all other days   Weekly warfarin total:  40 mg   Plan last modified:  Casey Birmingham, PharmD (2020)   Next INR check:  2020   Target end date:  Indefinite    Indications    Chronic anticoagulation [Z79.01]  History of mitral valve replacement with mechanical valve [Z95.2] [Z95.2]             Anticoagulation Episode Summary     INR check location:      Preferred lab:      Send INR reminders to:      Comments:        Anticoagulation Care Providers     Provider Role Specialty Phone number    Rufino Shine M.D. Referring Internal Medicine [phone]    [facility] Anticoagulation Services Responsible  [phone]        Anticoagulation Patient Findings  Patient Findings     Negatives:  Signs/symptoms of thrombosis, Signs/symptoms of bleeding, Laboratory test error suspected, Change in health, Change in alcohol use, Change in activity, Upcoming invasive procedure, Emergency department visit, Upcoming dental procedure, Missed doses, Extra doses, Change in medications, Change in diet/appetite, Hospital admission, Bruising, Other complaints        HPI:   Mike Mooreis seen in clinic today, on anticoagulation therapy with warfarin for stroke prophylaxis due to history of mechanical MVR.    Patient's previous INR was subtherapeutic at 2.1 on 20, at which time patient was instructed to increase weekly warfarin regimen.  He returns to clinic today to recheck INR to ensure it is therapeutic and thus preventing possible clotting and/or bleeding/bruising complications.    CHADS-VASc = n/a  (unadjusted ischemic stroke risk/year:  n/a)    Does patient have any changes to current medical/health status since last appt (Y/N):  NO  Does patient have any signs/symptoms of bleeding and/or thrombosis since the last  appt (Y/N):  NO  Does patient have any interval changes to diet or medications since last appt (Y/N):  NO  Are there any complications or cost restrictions with current therapy (Y/N):  NO     Does patient have [facility] PCP? Yes, Dr Rufino Shine (If not, please document discussion that patient must be seen at [facility])       Vitals:  declined by patient to due covid transmission concerns.    There were no vitals filed for this visit.     Asssessment:      INR remains subtherapeutic at 1.5, therefore increasing patient's stroke risk.   Reason(s) for out of range INR today:  Dose too low?  Possibly incorrect dosing, he is unsure of dosing from the last week.      Plan:  Instructed patient to bolus with 10mg X 1, 7.5mg X 1, then resume current warfarin regimen in order to bring INR back to therapeutic range.     Follow up:  Because warfarin is a high risk medication and current CHEST guidelines recommend regular monitoring intervals (few days up to 12 weeks), will have patient return to clinic in 4 days to recheck INR.    Casey Birmingham, PharmD, BCACP

## 2020-12-08 DIAGNOSIS — Z79.01 CHRONIC ANTICOAGULATION: ICD-10-CM

## 2020-12-08 RX ORDER — WARFARIN SODIUM 2.5 MG/1
TABLET ORAL
Qty: 45 TAB | Refills: 1 | Status: SHIPPED | OUTPATIENT
Start: 2020-12-08 | End: 2020-12-21 | Stop reason: SDUPTHER

## 2020-12-11 ENCOUNTER — ANTICOAGULATION VISIT (OUTPATIENT)
Dept: MEDICAL GROUP | Facility: PHYSICIAN GROUP | Age: 75
End: 2020-12-11
Payer: MEDICARE

## 2020-12-11 DIAGNOSIS — Z79.01 CHRONIC ANTICOAGULATION: Primary | ICD-10-CM

## 2020-12-11 DIAGNOSIS — Z95.2 HISTORY OF MITRAL VALVE REPLACEMENT WITH MECHANICAL VALVE: ICD-10-CM

## 2020-12-11 LAB — INR PPP: 2.6 (ref 2–3.5)

## 2020-12-11 PROCEDURE — 99999 PR NO CHARGE: CPT | Performed by: INTERNAL MEDICINE

## 2020-12-11 PROCEDURE — 93793 ANTICOAG MGMT PT WARFARIN: CPT | Performed by: INTERNAL MEDICINE

## 2020-12-11 PROCEDURE — 85610 PROTHROMBIN TIME: CPT | Performed by: INTERNAL MEDICINE

## 2020-12-11 NOTE — PROGRESS NOTES
Anticoagulation Summary  As of 2020    INR goal:  2.5-3.5   TTR:  29.6 % (3.5 y)   INR used for dosin.60 (2020)   Warfarin maintenance plan:  7.5 mg (2.5 mg x 3) every Mon, Wed, Fri; 5 mg (2.5 mg x 2) all other days   Weekly warfarin total:  42.5 mg   Plan last modified:  Casey Birmingham, PharmD (2020)   Next INR check:  2020   Target end date:  Indefinite    Indications    Chronic anticoagulation [Z79.01]  History of mitral valve replacement with mechanical valve [Z95.2] [Z95.2]             Anticoagulation Episode Summary     INR check location:      Preferred lab:      Send INR reminders to:      Comments:        Anticoagulation Care Providers     Provider Role Specialty Phone number    Rufino Shine M.D. Referring Internal Medicine [phone]    [facility] Responsible  [phone]        Anticoagulation Patient Findings  Patient Findings     Negatives:  Signs/symptoms of thrombosis, Signs/symptoms of bleeding, Laboratory test error suspected, Change in health, Change in alcohol use, Change in activity, Upcoming invasive procedure, Emergency department visit, Upcoming dental procedure, Missed doses, Extra doses, Change in medications, Change in diet/appetite, Hospital admission, Bruising, Other complaints        HPI:   Mike Rivera seen in clinic today, on anticoagulation therapy with warfarin for stroke prophylaxis due to history of mechanical MVR.    Patient's previous INR was subtherapeutic at 1.5 on 20, at which time patient was instructed to bolus with two doses, then resume current warfarin regimen.  He returns to clinic today to recheck INR to ensure it is therapeutic and thus preventing possible clotting and/or bleeding/bruising complications.    CHADS-VASc = n/a  (unadjusted ischemic stroke risk/year:  n/a)    Does patient have any changes to current medical/health status since last appt (Y/N):  NO  Does patient have any signs/symptoms of bleeding  and/or thrombosis since the last appt (Y/N):  NO  Does patient have any interval changes to diet or medications since last appt (Y/N):  NO  Are there any complications or cost restrictions with current therapy (Y/N):  NO     Does patient have [facility] PCP? Yes, Dr Rufino Shine (If not, please document discussion that patient must be seen at [facility])       Vitals:  declined by patient today due to covid transmission concerns.    There were no vitals filed for this visit.     Medication reconciliation completed today.    Asssessment:      INR therapeutic at 2.6, therefore decreasing patient's stroke and/or bleeding risk.   Reason(s) for out of range INR today:  n/a      Plan:  Instructed patient to increase weekly warfarin regimen as detailed above in order to maintain INR in therapeutic range.  Instructions sent to [facility] for wife to review as well.     Follow up:  Because warfarin is a high risk medication and current CHEST guidelines recommend regular monitoring intervals (few days up to 12 weeks), will have patient return to clinic in 1 weeks to recheck INR.    Casey Birmingham, PharmD, BCACP

## 2020-12-15 RX ORDER — DONEPEZIL HYDROCHLORIDE 5 MG/1
5 TABLET, FILM COATED ORAL EVERY EVENING
Qty: 90 TAB | Refills: 2 | Status: SHIPPED | OUTPATIENT
Start: 2020-12-15 | End: 2020-12-17 | Stop reason: SDUPTHER

## 2020-12-17 ENCOUNTER — PATIENT MESSAGE (OUTPATIENT)
Dept: MEDICAL GROUP | Facility: PHYSICIAN GROUP | Age: 75
End: 2020-12-17

## 2020-12-17 ENCOUNTER — TELEPHONE (OUTPATIENT)
Dept: NEUROLOGY | Facility: MEDICAL CENTER | Age: 75
End: 2020-12-17

## 2020-12-17 RX ORDER — METFORMIN HYDROCHLORIDE 500 MG/1
1000 TABLET, EXTENDED RELEASE ORAL 2 TIMES DAILY
Qty: 360 TAB | Refills: 3 | Status: SHIPPED | OUTPATIENT
Start: 2020-12-17 | End: 2022-02-03

## 2020-12-17 RX ORDER — DONEPEZIL HYDROCHLORIDE 5 MG/1
5 TABLET, FILM COATED ORAL EVERY EVENING
Qty: 90 TAB | Refills: 2 | Status: SHIPPED | OUTPATIENT
Start: 2020-12-17 | End: 2021-03-17

## 2020-12-18 ENCOUNTER — PATIENT MESSAGE (OUTPATIENT)
Dept: MEDICAL GROUP | Facility: PHYSICIAN GROUP | Age: 75
End: 2020-12-18

## 2020-12-18 ENCOUNTER — ANTICOAGULATION VISIT (OUTPATIENT)
Dept: MEDICAL GROUP | Facility: PHYSICIAN GROUP | Age: 75
End: 2020-12-18
Payer: MEDICARE

## 2020-12-18 DIAGNOSIS — Z95.2 HISTORY OF MITRAL VALVE REPLACEMENT WITH MECHANICAL VALVE: ICD-10-CM

## 2020-12-18 DIAGNOSIS — Z79.01 CHRONIC ANTICOAGULATION: Primary | ICD-10-CM

## 2020-12-18 LAB — INR PPP: 2.5 (ref 2–3.5)

## 2020-12-18 PROCEDURE — 99999 PR NO CHARGE: CPT | Performed by: FAMILY MEDICINE

## 2020-12-18 PROCEDURE — 93793 ANTICOAG MGMT PT WARFARIN: CPT | Performed by: FAMILY MEDICINE

## 2020-12-18 PROCEDURE — 85610 PROTHROMBIN TIME: CPT | Performed by: FAMILY MEDICINE

## 2020-12-18 RX ORDER — LANCETS 30 GAUGE
EACH MISCELLANEOUS
Qty: 200 EACH | Refills: 6 | Status: SHIPPED | OUTPATIENT
Start: 2020-12-18 | End: 2021-06-16

## 2020-12-18 RX ORDER — GLUCOSAMINE HCL/CHONDROITIN SU 500-400 MG
CAPSULE ORAL
Qty: 200 EACH | Refills: 6 | Status: SHIPPED | OUTPATIENT
Start: 2020-12-18 | End: 2021-05-05

## 2020-12-18 NOTE — PROGRESS NOTES
Anticoagulation Summary  As of 2020    INR goal:  2.5-3.5   TTR:  30.0 % (3.5 y)   INR used for dosin.50 (2020)   Warfarin maintenance plan:  7.5 mg (2.5 mg x 3) every Mon, Wed, Fri; 5 mg (2.5 mg x 2) all other days   Weekly warfarin total:  42.5 mg   Plan last modified:  Casey Birmingham, PharmD (2020)   Next INR check:  2020   Target end date:  Indefinite    Indications    Chronic anticoagulation [Z79.01]  History of mitral valve replacement with mechanical valve [Z95.2] [Z95.2]             Anticoagulation Episode Summary     INR check location:      Preferred lab:      Send INR reminders to:      Comments:        Anticoagulation Care Providers     Provider Role Specialty Phone number    Rufino Shine M.D. Referring Internal Medicine [phone]    [facility] Responsible  [phone]        Anticoagulation Patient Findings  Patient Findings     Positives:  Change in health (GLF, landed on hip, has some pain, no bruising)    Negatives:  Signs/symptoms of thrombosis, Signs/symptoms of bleeding, Laboratory test error suspected, Change in alcohol use, Change in activity, Upcoming invasive procedure, Emergency department visit, Upcoming dental procedure, Missed doses, Extra doses, Change in medications, Change in diet/appetite, Hospital admission, Bruising, Other complaints        HPI:   Mike Rivera seen in clinic today, on anticoagulation therapy with warfarin for stroke prophylaxis due to history of mechanical MVR.    Patient's previous INR was therapeutic at 2.6 on 20, at which time patient was instructed to continue with current warfarin regimen.  He returns to clinic today to recheck INR to ensure it is therapeutic and thus preventing possible clotting and/or bleeding/bruising complications.    CHADS-VASc = n/a  (unadjusted ischemic stroke risk/year:  n/a)    Does patient have any changes to current medical/health status since last appt (Y/N): GLF, landed  on hip, still having some associated pain.  Does patient have any signs/symptoms of bleeding and/or thrombosis since the last appt (Y/N):  NO  Does patient have any interval changes to diet or medications since last appt (Y/N):  NO  Are there any complications or cost restrictions with current therapy (Y/N):  NO     Does patient have [facility] PCP? Yes, Dr Rufino Shine (If not, please document discussion that patient must be seen at [facility])       Vitals:  declined by patient at today's visit.    There were no vitals filed for this visit.     Medication reconciliation completed today.    Asssessment:      INR remains therapeutic at 2.5, therefore decreasing patient's stroke and/or bleeding risk.   Reason(s) for out of range INR today:  n/a      Plan:  Pt is to continue with current warfarin dosing regimen.     Follow up:  Because warfarin is a high risk medication and current CHEST guidelines recommend regular monitoring intervals (few days up to 12 weeks), will have patient return to clinic in 1.5 weeks to recheck INR.    Casey Birmingham, PharmD, BCACP

## 2020-12-20 ENCOUNTER — PATIENT MESSAGE (OUTPATIENT)
Dept: MEDICAL GROUP | Facility: PHYSICIAN GROUP | Age: 75
End: 2020-12-20

## 2020-12-21 DIAGNOSIS — Z79.01 CHRONIC ANTICOAGULATION: ICD-10-CM

## 2020-12-21 RX ORDER — WARFARIN SODIUM 2.5 MG/1
TABLET ORAL
Qty: 270 TAB | Refills: 1 | Status: SHIPPED | OUTPATIENT
Start: 2020-12-21 | End: 2021-10-11

## 2020-12-21 NOTE — TELEPHONE ENCOUNTER
From: Carlos Rivera  To: Daisy [facility]llins [facility]'t  Sent: 12/20/2020 12:06 AM PST  Subject: Non-Urgent Medical Question    This message is being sent by Comfort Godfrey Armen on behalf of Carlos Rivera.    I'm sure you are getting overwhelmed with this but I would like to see where Mike is on the list of when he's eligible to get the Covad-19 vaccine and if I'm included as his caregiver. I honestly do not think he would survive if infected.   THANKS       ----- Message -----   From:Daisy [facility]   Sent:12/18/2020 4:25 PM PST   To:Carlos Rivera   Subject:RE: Non-Urgent Medical Question    Rufino Pearson M.D. went ahead and sent in the request for the Freestyle Glucose Monitor to [facility].    Daisy Mustafa MA      ----- Message -----   From:Carlos Rivera   Sent:12/18/2020 3:51 PM PST   To:Harlan Jim Ass't   Subject:Non-Urgent Medical Question    This message is being sent by Comfort Mooreis on behalf of Carlos Rivera.    Thank you! We would like to see if our insurance will cover the Freestyle glucose monitor and the insulin patch. We would appreciate it if Dr. Shine could please send a prescription so they can process it and let us know if it's covered. Thank you very much@      ----- Message -----   From:Harlan Jim Ass't   Sent:12/17/2020 1:33 PM PST   To:Carlos Rivera   Subject:RE: Non-Urgent Medical Question    Rufino Pearson M.D. updated the Rx for Metformin, Rufino Shine M.D. would like for Carlos to please get his fasting labs done as soon as he can.    Daisy Mustafa MA      ----- Message -----   From:Carlos Rivera   Sent:12/17/2020 5:19 AM PST   To:Rufino Shine M.D.   Subject:Non-Urgent Medical Question    This message is being sent by Comfort Rivera on behalf of Carlos Rivera.    I just talked to [facility] and was trying to order Metformin. But the rx as written is  incorrect and he keeps running out. Could you send [facility] a new prescription for 180 days 4 (500) a day asap. I'm having issues with [facility]. Thank you

## 2020-12-22 DIAGNOSIS — Z79.01 CHRONIC ANTICOAGULATION: ICD-10-CM

## 2020-12-22 RX ORDER — WARFARIN SODIUM 2.5 MG/1
TABLET ORAL
Qty: 90 TAB | Refills: 0 | Status: SHIPPED | OUTPATIENT
Start: 2020-12-22 | End: 2021-03-02

## 2020-12-28 ENCOUNTER — ANTICOAGULATION VISIT (OUTPATIENT)
Dept: MEDICAL GROUP | Facility: PHYSICIAN GROUP | Age: 75
End: 2020-12-28
Payer: MEDICARE

## 2020-12-28 DIAGNOSIS — Z79.01 CHRONIC ANTICOAGULATION: Primary | ICD-10-CM

## 2020-12-28 DIAGNOSIS — Z95.2 HISTORY OF MITRAL VALVE REPLACEMENT WITH MECHANICAL VALVE: ICD-10-CM

## 2020-12-28 LAB — INR PPP: 2.1 (ref 2–3.5)

## 2020-12-28 PROCEDURE — 99211 OFF/OP EST MAY X REQ PHY/QHP: CPT | Performed by: FAMILY MEDICINE

## 2020-12-28 PROCEDURE — 85610 PROTHROMBIN TIME: CPT | Performed by: FAMILY MEDICINE

## 2020-12-28 NOTE — PROGRESS NOTES
Anticoagulation Summary  As of 2020    INR goal:  2.5-3.5   TTR:  29.7 % (3.5 y)   INR used for dosin.10 (2020)   Warfarin maintenance plan:  7.5 mg (2.5 mg x 3) every Mon, Wed, Fri; 5 mg (2.5 mg x 2) all other days   Weekly warfarin total:  42.5 mg   Plan last modified:  Casey Birmingham, PharmD (2020)   Next INR check:  2021   Target end date:  Indefinite    Indications    Chronic anticoagulation [Z79.01]  History of mitral valve replacement with mechanical valve [Z95.2] [Z95.2]             Anticoagulation Episode Summary     INR check location:      Preferred lab:      Send INR reminders to:      Comments:        Anticoagulation Care Providers     Provider Role Specialty Phone number    Rufino Shine M.D. Referring Internal Medicine [phone]    [facility] Responsible  [phone]        Anticoagulation Patient Findings  Patient Findings     Negatives:  Signs/symptoms of thrombosis, Signs/symptoms of bleeding, Laboratory test error suspected, Change in health, Change in alcohol use, Change in activity, Upcoming invasive procedure, Emergency department visit, Upcoming dental procedure, Missed doses, Extra doses, Change in medications, Change in diet/appetite, Hospital admission, Bruising, Other complaints           HPI:   Carlos Rivera seen in clinic today, on anticoagulation therapy with warfarin for stroke prevention due to history of mitral valve replacement with mechanical valve    Patient's previous INR was therapeutic at 2.5 on 2020, at which time patient was instructed to continue with current warfarin regimen.  He returns to clinic today to recheck INR to ensure it is therapeutic and thus preventing possible clotting and/or bleeding/bruising complications.    CHADS-VASc = n/a  (unadjusted ischemic stroke risk/year:)    Does patient have any changes to current medical/health status since last appt (Y/N):  No  Does patient have any signs/symptoms of  bleeding and/or thrombosis since the last appt (Y/N):  No  Does patient have any interval changes to diet or medications since last appt (Y/N):  No  Are there any complications or cost restrictions with current therapy (Y/N):  No     Does patient have [facility] PCP? Yes, Rufino Shine (If not, please document discussion that patient must be seen at [facility])       Vitals:  Not taken d/t covid transmission risk     There were no vitals filed for this visit.     Asssessment:      INR subtherapeutic at 2.1, therefore increasing stroke risk   Reason(s) for out of range INR today:  Etiology unknown, history labile INR      Plan:  instructed patient to bolus with 10mg of warfarin one time, then resume current warfarin regimen     Follow up:  Because warfarin is a high risk medication and current CHEST guidelines recommend regular monitoring intervals (few days up to 12 weeks), will have patient return to clinic in 1.5 weeks to recheck INR.    Brandon Guerrero. Pharmacy Intern  Casey Birmingham, PharmD, BCACP

## 2021-01-08 ENCOUNTER — ANTICOAGULATION VISIT (OUTPATIENT)
Dept: MEDICAL GROUP | Facility: PHYSICIAN GROUP | Age: 76
End: 2021-01-08
Payer: MEDICARE

## 2021-01-08 DIAGNOSIS — Z95.2 HISTORY OF MITRAL VALVE REPLACEMENT WITH MECHANICAL VALVE: ICD-10-CM

## 2021-01-08 DIAGNOSIS — Z79.01 CHRONIC ANTICOAGULATION: Primary | ICD-10-CM

## 2021-01-08 LAB — INR PPP: 1.7 (ref 2–3.5)

## 2021-01-08 PROCEDURE — 85610 PROTHROMBIN TIME: CPT | Performed by: PHYSICIAN ASSISTANT

## 2021-01-08 PROCEDURE — 99211 OFF/OP EST MAY X REQ PHY/QHP: CPT | Performed by: PHYSICIAN ASSISTANT

## 2021-01-08 NOTE — PROGRESS NOTES
Anticoagulation Summary  As of 2021    INR goal:  2.5-3.5   TTR:  29.5 % (3.6 y)   INR used for dosin.70 (2021)   Warfarin maintenance plan:  7.5 mg (2.5 mg x 3) every Mon, Wed, Fri; 5 mg (2.5 mg x 2) all other days   Weekly warfarin total:  42.5 mg   Plan last modified:  Casey Birmingham, PharmD (2020)   Next INR check:  1/15/2021   Target end date:  Indefinite    Indications    Chronic anticoagulation [Z79.01]  History of mitral valve replacement with mechanical valve [Z95.2] [Z95.2]             Anticoagulation Episode Summary     INR check location:      Preferred lab:      Send INR reminders to:      Comments:        Anticoagulation Care Providers     Provider Role Specialty Phone number    Rufino Shine M.D. Referring Internal Medicine [phone]    [facility] Responsible  [phone]        Anticoagulation Patient Findings  Patient Findings     Positives:  Missed doses    Negatives:  Signs/symptoms of thrombosis, Signs/symptoms of bleeding, Laboratory test error suspected, Change in health, Change in alcohol use, Change in activity, Upcoming invasive procedure, Emergency department visit, Upcoming dental procedure, Extra doses, Change in medications, Change in diet/appetite, Hospital admission, Bruising, Other complaints        HPI:   Mike Rivera seen in clinic today, on anticoagulation therapy with warfarin for stroke prophylaxis due to history of mechanical MVR.    Patient's previous INR was subtherapeutic at 2.1 on 20, at which time patient was instructed to bolus with one dose, then resume current warfarin regimen.  He returns to clinic today to recheck INR to ensure it is therapeutic and thus preventing possible clotting and/or bleeding/bruising complications.    CHADS-VASc = n/a  (unadjusted ischemic stroke risk/year:  n/a)    Does patient have any changes to current medical/health status since last appt (Y/N):  NO  Does patient have any signs/symptoms of  bleeding and/or thrombosis since the last appt (Y/N):  NO  Does patient have any interval changes to diet or medications since last appt (Y/N):  Possible missed dose, will confirm with patient's wife.  Are there any complications or cost restrictions with current therapy (Y/N):  NO     Does patient have [facility] PCP? Yes, Dr Rufino Shine (If not, please document discussion that patient must be seen at [facility])       Vitals:  declined at today's visit.    There were no vitals filed for this visit.     Asssessment:      INR remains subtherapeutic at 1.7, therefore increasing patient's stroke risk.   Reason(s) for out of range INR today:  Suspicious of missed dose this week.      Pt NOT on antiplatelet therapy.    Medication reconciliation completed today.    Plan:  Instructed patient to bolus with 10mg X 1, then resume current warfarin regimen.     Follow up:  Because warfarin is a high risk medication and current CHEST guidelines recommend regular monitoring intervals (few days up to 12 weeks), will have patient return to clinic in 1 weeks to recheck INR.    Casey Birmingham, PharmD, BCACP

## 2021-01-11 DIAGNOSIS — Z23 NEED FOR VACCINATION: ICD-10-CM

## 2021-01-15 ENCOUNTER — ANTICOAGULATION VISIT (OUTPATIENT)
Dept: MEDICAL GROUP | Facility: PHYSICIAN GROUP | Age: 76
End: 2021-01-15
Payer: MEDICARE

## 2021-01-15 DIAGNOSIS — Z95.2 HISTORY OF MITRAL VALVE REPLACEMENT WITH MECHANICAL VALVE: ICD-10-CM

## 2021-01-15 DIAGNOSIS — Z79.01 CHRONIC ANTICOAGULATION: Primary | ICD-10-CM

## 2021-01-15 LAB — INR PPP: 5 (ref 2–3.5)

## 2021-01-15 PROCEDURE — 99211 OFF/OP EST MAY X REQ PHY/QHP: CPT | Performed by: FAMILY MEDICINE

## 2021-01-15 PROCEDURE — 85610 PROTHROMBIN TIME: CPT | Performed by: FAMILY MEDICINE

## 2021-01-15 NOTE — PROGRESS NOTES
Anticoagulation Summary  As of 1/15/2021    INR goal:  2.5-3.5   TTR:  29.5 % (3.6 y)   INR used for dosin.00 (1/15/2021)   Warfarin maintenance plan:  7.5 mg (2.5 mg x 3) every Mon, Wed, Fri; 5 mg (2.5 mg x 2) all other days   Weekly warfarin total:  42.5 mg   Plan last modified:  Casey Birmingham, PharmD (2020)   Next INR check:  2021   Target end date:  Indefinite    Indications    Chronic anticoagulation [Z79.01]  History of mitral valve replacement with mechanical valve [Z95.2] [Z95.2]             Anticoagulation Episode Summary     INR check location:      Preferred lab:      Send INR reminders to:      Comments:        Anticoagulation Care Providers     Provider Role Specialty Phone number    Rufino Shine M.D. Referring Internal Medicine [phone]    [facility] Responsible  [phone]        Anticoagulation Patient Findings  Patient Findings     Negatives:  Signs/symptoms of thrombosis, Signs/symptoms of bleeding, Laboratory test error suspected, Change in health, Change in alcohol use, Change in activity, Upcoming invasive procedure, Emergency department visit, Upcoming dental procedure, Missed doses, Extra doses, Change in medications, Change in diet/appetite, Hospital admission, Bruising, Other complaints              HPI:   Carlos Harrington seen in clinic today, on anticoagulation therapy with warfarin for prevention of stroke due to history of Mitral valve replacement.    Patient's previous INR was subtherapeutic at 1.7 on 21, at which time patient was instructed to increase weekly warfarin regimen.  He returns to clinic today to recheck INR to ensure it is therapeutic and thus preventing possible clotting and/or bleeding/bruising complications.    CHADS-VASc = N/A  (unadjusted ischemic stroke risk/year)    Does patient have any changes to current medical/health status since last appt (Y/N):  No  Does patient have any signs/symptoms of bleeding and/or  thrombosis since the last appt (Y/N):  No  Does patient have any interval changes to diet or medications since last appt (Y/N):  No  Are there any complications or cost restrictions with current therapy (Y/N):  No     Does patient have [facility] PCP? Yes, Rufino Shine (If not, please document discussion that patient must be seen at [facility])       Vitals:  Not taken d/t covid transmission risk      Asssessment:      INR supratherapeutic at 5.0, therefore increasing risk for bleeding   Reason(s) for out of range INR today:  Not following the prescribed regimen.      Pt Not on antiplatelet therapy.    Medication reconciliation completed today.    Plan:  instructed patient to hold today's dose and to take 2.5mg tomorrow, then resume current warfarin dose regimen.     Follow up:  Because warfarin is a high risk medication and current CHEST guidelines recommend regular monitoring intervals (few days up to 12 weeks), will have patient return to clinic in 1 week to recheck INR.    Brandon Guerrero, Pharmacy Intern  Casey Birmingham, PharmD, BCACP

## 2021-01-16 NOTE — TELEPHONE ENCOUNTER
I spoke to Comfort (Carlos Wife)    Comfort is very upset/concerned.    Comfort stated that her  is/has been in the hospital for 2+ weeks.    Comfort stated that she is unable to visit/be kept in the loop about Carlos care/follow up plan due to the COVID-19.    Comfort stated that due to the blood loss she does not feel that Carlos is retaining the information that is being provided by the MD at the hospital rounds.    Advised Comfort to be conferenced in via telephone when hospital special does round.    Advised that due HIPAA laws appropriate forms need to be signed/verified in order for her to be included in Carlos care. (It appears that she is included according to demographics)         yes

## 2021-01-22 ENCOUNTER — ANTICOAGULATION VISIT (OUTPATIENT)
Dept: MEDICAL GROUP | Facility: PHYSICIAN GROUP | Age: 76
End: 2021-01-22
Payer: MEDICARE

## 2021-01-22 DIAGNOSIS — Z79.01 CHRONIC ANTICOAGULATION: Primary | ICD-10-CM

## 2021-01-22 DIAGNOSIS — Z95.2 HISTORY OF MITRAL VALVE REPLACEMENT WITH MECHANICAL VALVE: ICD-10-CM

## 2021-01-22 LAB — INR PPP: 6.2 (ref 2–3.5)

## 2021-01-22 PROCEDURE — 99211 OFF/OP EST MAY X REQ PHY/QHP: CPT | Performed by: FAMILY MEDICINE

## 2021-01-22 PROCEDURE — 85610 PROTHROMBIN TIME: CPT | Performed by: FAMILY MEDICINE

## 2021-01-22 NOTE — PROGRESS NOTES
Anticoagulation Summary  As of 2021    INR goal:  2.5-3.5   TTR:  29.3 % (3.6 y)   INR used for dosin.20 (2021)   Warfarin maintenance plan:  7.5 mg (2.5 mg x 3) every Mon, Wed, Fri; 5 mg (2.5 mg x 2) all other days   Weekly warfarin total:  42.5 mg   Plan last modified:  Casey Birmingham, PharmD (2020)   Next INR check:  2021   Target end date:  Indefinite    Indications    Chronic anticoagulation [Z79.01]  History of mitral valve replacement with mechanical valve [Z95.2] [Z95.2]             Anticoagulation Episode Summary     INR check location:      Preferred lab:      Send INR reminders to:      Comments:        Anticoagulation Care Providers     Provider Role Specialty Phone number    Rufino Shine M.D. Referring Internal Medicine [phone]    [facility] Responsible  [phone]        Anticoagulation Patient Findings  Patient Findings     Negatives:  Signs/symptoms of thrombosis, Signs/symptoms of bleeding, Laboratory test error suspected, Change in health, Change in alcohol use, Change in activity, Upcoming invasive procedure, Emergency department visit, Upcoming dental procedure, Missed doses, Extra doses, Change in medications, Change in diet/appetite, Hospital admission, Bruising, Other complaints              HPI:   Carlos Harrington seen in clinic today, on anticoagulation therapy with warfarin for stroke prevention due to history of Mitral valve replacement    Patient's previous INR was supratherapeutic at 5.0 on 1-15-21, at which time patient was instructed to decrease weekly dose.  He returns to clinic today to recheck INR to ensure it is therapeutic and thus preventing possible clotting and/or bleeding/bruising complications.    CHADS-VASc = N/A  (unadjusted ischemic stroke risk/year:)    Does patient have any changes to current medical/health status since last appt (Y/N):  No  Does patient have any signs/symptoms of bleeding and/or thrombosis since the  last appt (Y/N):  No  Does patient have any interval changes to diet or medications since last appt (Y/N):  No  Are there any complications or cost restrictions with current therapy (Y/N):  No     Does patient have [facility] PCP? Yes, Rufino Antunezuyen (If not, please document discussion that patient must be seen at [facility])       Vitals:  Declined by patient today due to Covid-19 transmission concerns.           Asssessment:      INR supratherapeutic at 6.2, therefore increasing risk for bleeding   Reason(s) for out of range INR today:  Dose too high, long history of labile INRs.      Pt NOT on antiplatelet therapy.    Medication reconciliation completed today.    Plan:  Instructed patient to hold dose x2, then resume current warfarin regimen and recheck on Monday.     Follow up:  Because warfarin is a high risk medication and current CHEST guidelines recommend regular monitoring intervals (few days up to 12 weeks), will have patient return to clinic in 3 days to recheck INR.    Brandon Guerrero,Pharmacy Intern  Casey Birmingham, PharmD, BCACP

## 2021-01-25 ENCOUNTER — APPOINTMENT (OUTPATIENT)
Dept: MEDICAL GROUP | Facility: PHYSICIAN GROUP | Age: 76
End: 2021-01-25
Payer: MEDICARE

## 2021-02-01 ENCOUNTER — ANTICOAGULATION VISIT (OUTPATIENT)
Dept: MEDICAL GROUP | Facility: PHYSICIAN GROUP | Age: 76
End: 2021-02-01
Payer: MEDICARE

## 2021-02-01 DIAGNOSIS — Z95.2 HISTORY OF MITRAL VALVE REPLACEMENT WITH MECHANICAL VALVE: ICD-10-CM

## 2021-02-01 DIAGNOSIS — Z79.01 CHRONIC ANTICOAGULATION: Primary | ICD-10-CM

## 2021-02-01 LAB — INR PPP: 3.6 (ref 2–3.5)

## 2021-02-01 PROCEDURE — 85610 PROTHROMBIN TIME: CPT | Performed by: INTERNAL MEDICINE

## 2021-02-01 PROCEDURE — 99211 OFF/OP EST MAY X REQ PHY/QHP: CPT | Performed by: INTERNAL MEDICINE

## 2021-02-01 NOTE — PROGRESS NOTES
Anticoagulation Summary  As of 2/1/2021    INR goal:  2.5-3.5   TTR:  29.1 % (3.6 y)   INR used for dosing:  3.60 (2/1/2021)   Warfarin maintenance plan:  5 mg (2.5 mg x 2) every day   Weekly warfarin total:  35 mg   Plan last modified:  Casey Birmingham, PharmD (2/1/2021)   Next INR check:  2/8/2021   Target end date:  Indefinite    Indications    Chronic anticoagulation [Z79.01]  History of mitral valve replacement with mechanical valve [Z95.2] [Z95.2]             Anticoagulation Episode Summary     INR check location:      Preferred lab:      Send INR reminders to:      Comments:        Anticoagulation Care Providers     Provider Role Specialty Phone number    Rufino Shine M.D. Referring Internal Medicine [phone]    [facility] Responsible  [phone]        Anticoagulation Patient Findings  Patient Findings     Negatives:  Signs/symptoms of thrombosis, Signs/symptoms of bleeding, Laboratory test error suspected, Change in health, Change in alcohol use, Change in activity, Upcoming invasive procedure, Emergency department visit, Upcoming dental procedure, Missed doses, Extra doses, Change in medications, Change in diet/appetite, Hospital admission, Bruising, Other complaints              HPI:   Carlos Harrington seen in clinic today, on anticoagulation therapy with warfarin for stroke prevention due to history of mitral valve replacement.    Patient's previous INR was supratherapeutic at 6.2 on 1-22-21, at which time patient was instructed to decrease weekly dose.  He returns to clinic today to recheck INR to ensure it is therapeutic and thus preventing possible clotting and/or bleeding/bruising complications.    CHADS-VASc = N/A  (unadjusted ischemic stroke risk/year:)    Does patient have any changes to current medical/health status since last appt (Y/N):  No  Does patient have any signs/symptoms of bleeding and/or thrombosis since the last appt (Y/N):  No  Does patient have any  interval changes to diet or medications since last appt (Y/N):  No  Are there any complications or cost restrictions with current therapy (Y/N):  No     Does patient have [facility] PCP? Yes, Rufino Shine (If not, please document discussion that patient must be seen at [facility] clinic)       Vitals:  Declined by patient today due to Covid-19 transmission concerns.          Asssessment:      INR supratherapeutic at 3.6, therefore increasing risk for bleeding.   Reason(s) for out of range INR today:  Dose too high.      Pt NOT on antiplatelet therapy.  Medication reconciliation completed today.    Plan:  Instructed patient to decrease weekly regimen as stated above.     Follow up:  Because warfarin is a high risk medication and current CHEST guidelines recommend regular monitoring intervals (few days up to 12 weeks), will have patient return to clinic in 1 week to recheck INR.    Thom Vogt Intern  Casey Birmingham, PharmD, BCACP

## 2021-02-10 ENCOUNTER — IMMUNIZATION (OUTPATIENT)
Dept: FAMILY PLANNING/WOMEN'S HEALTH CLINIC | Facility: IMMUNIZATION CENTER | Age: 76
End: 2021-02-10
Attending: INTERNAL MEDICINE
Payer: MEDICARE

## 2021-02-10 DIAGNOSIS — Z23 NEED FOR VACCINATION: ICD-10-CM

## 2021-02-10 DIAGNOSIS — Z23 ENCOUNTER FOR VACCINATION: Primary | ICD-10-CM

## 2021-02-10 PROCEDURE — 91300 PFIZER SARS-COV-2 VACCINE: CPT

## 2021-02-10 PROCEDURE — 0001A PFIZER SARS-COV-2 VACCINE: CPT

## 2021-02-12 ENCOUNTER — ANTICOAGULATION VISIT (OUTPATIENT)
Dept: MEDICAL GROUP | Facility: PHYSICIAN GROUP | Age: 76
End: 2021-02-12
Payer: MEDICARE

## 2021-02-12 DIAGNOSIS — Z95.2 HISTORY OF MITRAL VALVE REPLACEMENT WITH MECHANICAL VALVE: ICD-10-CM

## 2021-02-12 DIAGNOSIS — Z79.01 CHRONIC ANTICOAGULATION: Primary | ICD-10-CM

## 2021-02-12 LAB
INR PPP: 2.3 (ref 2–3.5)
INR PPP: 2.3 (ref 2–3.5)

## 2021-02-12 PROCEDURE — 99211 OFF/OP EST MAY X REQ PHY/QHP: CPT | Performed by: FAMILY MEDICINE

## 2021-02-12 PROCEDURE — 85610 PROTHROMBIN TIME: CPT | Performed by: FAMILY MEDICINE

## 2021-02-12 NOTE — PROGRESS NOTES
Anticoagulation Summary  As of 2021    INR goal:  2.5-3.5   TTR:  29.5 % (3.7 y)   INR used for dosin.30 (2021)   Warfarin maintenance plan:  7.5 mg (2.5 mg x 3) every Fri; 5 mg (2.5 mg x 2) all other days   Weekly warfarin total:  37.5 mg   Plan last modified:  Casey Birmingham, PharmD (2021)   Next INR check:  2021   Target end date:  Indefinite    Indications    Chronic anticoagulation [Z79.01]  History of mitral valve replacement with mechanical valve [Z95.2] [Z95.2]             Anticoagulation Episode Summary     INR check location:      Preferred lab:      Send INR reminders to:      Comments:        Anticoagulation Care Providers     Provider Role Specialty Phone number    Rufino Shine M.D. Referring Internal Medicine [phone]    [facility] Responsible  [phone]        Anticoagulation Patient Findings  Patient Findings     Positives:  Change in medications (first covid vaccination this past week)    Negatives:  Signs/symptoms of thrombosis, Signs/symptoms of bleeding, Laboratory test error suspected, Change in health, Change in alcohol use, Change in activity, Upcoming invasive procedure, Emergency department visit, Upcoming dental procedure, Missed doses, Extra doses, Change in diet/appetite, Hospital admission, Bruising, Other complaints        HPI:   Mike Rivera seen in clinic today, on anticoagulation therapy with warfarin for stroke prophylaxis due to history of mechanical MVR.    Patient's previous INR was supratherapeutic at 3.6 on 21, at which time patient was instructed to decrease weekly warfarin regimen.  He returns to clinic today to recheck INR to ensure it is therapeutic and thus preventing possible clotting and/or bleeding/bruising complications.    CHADS-VASc = n/a  (unadjusted ischemic stroke risk/year:  n/a)    Does patient have any changes to current medical/health status since last appt (Y/N):  NO  Does patient have any  signs/symptoms of bleeding and/or thrombosis since the last appt (Y/N):  NO  Does patient have any interval changes to diet or medications since last appt (Y/N):  NO  Are there any complications or cost restrictions with current therapy (Y/N):  NO     Does patient have [facility] PCP? Yes, Dr Rufino Shine (If not, please document discussion that patient must be seen at [facility])       Vitals:  Declined by patient today due to Covid-19 transmission concerns.    There were no vitals filed for this visit.     Asssessment:      INR subtherapeutic at 2.3, therefore increasing patient's stroke risk.   Reason(s) for out of range INR today:  Etiology unknown.      Pt NOT on antiplatelet therapy.    Medication reconciliation completed today.    Plan:  Instructed patient to increase weekly warfarin regimen as detailed above.     Follow up:  Because warfarin is a high risk medication and current CHEST guidelines recommend regular monitoring intervals (few days up to 12 weeks), will have patient return to clinic in 1 weeks to recheck INR.    Casey Birmingham, PharmD, BCACP

## 2021-02-19 ENCOUNTER — ANTICOAGULATION VISIT (OUTPATIENT)
Dept: MEDICAL GROUP | Facility: PHYSICIAN GROUP | Age: 76
End: 2021-02-19
Payer: MEDICARE

## 2021-02-19 DIAGNOSIS — Z95.2 HISTORY OF MITRAL VALVE REPLACEMENT WITH MECHANICAL VALVE: ICD-10-CM

## 2021-02-19 DIAGNOSIS — Z79.01 CHRONIC ANTICOAGULATION: Primary | ICD-10-CM

## 2021-02-19 LAB — INR PPP: 1.5 (ref 2–3.5)

## 2021-02-19 PROCEDURE — 85610 PROTHROMBIN TIME: CPT | Performed by: FAMILY MEDICINE

## 2021-02-19 PROCEDURE — 99211 OFF/OP EST MAY X REQ PHY/QHP: CPT | Performed by: FAMILY MEDICINE

## 2021-02-19 NOTE — PROGRESS NOTES
Anticoagulation Summary  As of 2021    INR goal:  2.5-3.5   TTR:  29.3 % (3.7 y)   INR used for dosin.50 (2021)   Warfarin maintenance plan:  7.5 mg (2.5 mg x 3) every Fri; 5 mg (2.5 mg x 2) all other days   Weekly warfarin total:  37.5 mg   Plan last modified:  Casey Birmingham, PharmD (2021)   Next INR check:  2021   Target end date:  Indefinite    Indications    Chronic anticoagulation [Z79.01]  History of mitral valve replacement with mechanical valve [Z95.2] [Z95.2]             Anticoagulation Episode Summary     INR check location:      Preferred lab:      Send INR reminders to:      Comments:        Anticoagulation Care Providers     Provider Role Specialty Phone number    Rufino Shine M.D. Referring Internal Medicine [phone]    [facility] Responsible  [phone]        Anticoagulation Patient Findings  Patient Findings     Positives:  Missed doses    Negatives:  Signs/symptoms of thrombosis, Signs/symptoms of bleeding, Laboratory test error suspected, Change in health, Change in alcohol use, Change in activity, Upcoming invasive procedure, Emergency department visit, Upcoming dental procedure, Extra doses, Change in medications, Change in diet/appetite, Hospital admission, Bruising, Other complaints        HPI:   Mike Rivera seen in clinic today, on anticoagulation therapy with warfarin for stroke prophylaxis due to history of mechanical MVR.    Patient's previous INR was subtherapeutic at 2.3 on 21, at which time patient was instructed to increase weekly warfarin regimen.  He returns to clinic today to recheck INR to ensure it is therapeutic and thus preventing possible clotting and/or bleeding/bruising complications.    CHADS-VASc = n/a  (unadjusted ischemic stroke risk/year:  n/a)    Does patient have any changes to current medical/health status since last appt (Y/N):  NO  Does patient have any signs/symptoms of bleeding and/or thrombosis since  the last appt (Y/N):  nO  Does patient have any interval changes to diet or medications since last appt (Y/N):  Possible missed dosing.  Are there any complications or cost restrictions with current therapy (Y/N):  NO     Does patient have [facility] PCP? Yes, Dr Rufino Shine (If not, please document discussion that patient must be seen at [facility])       Vitals:  Declined by patient today due to Covid-19 transmission concerns.    There were no vitals filed for this visit.     Asssessment:      INR remains subtherapeutic at 1.5, therefore increasing patient's stroke risk.   Reason(s) for out of range INR today:  Likely due to missed dose(s) and/or lack of proper dosing      Pt NOT on antiplatelet therapy.    Medication reconciliation completed today.    Plan:  Instructed patient to bolus with 10mg X 2, then resume current warfarin regimen as detailed above.     Follow up:  Because warfarin is a high risk medication and current CHEST guidelines recommend regular monitoring intervals (few days up to 12 weeks), will have patient return to clinic in 1 weeks to recheck INR.    Casey Birmingham, PharmD, BCACP

## 2021-02-26 ENCOUNTER — ANTICOAGULATION VISIT (OUTPATIENT)
Dept: MEDICAL GROUP | Facility: PHYSICIAN GROUP | Age: 76
End: 2021-02-26
Payer: MEDICARE

## 2021-02-26 DIAGNOSIS — Z95.2 HISTORY OF MITRAL VALVE REPLACEMENT WITH MECHANICAL VALVE: ICD-10-CM

## 2021-02-26 DIAGNOSIS — Z79.01 CHRONIC ANTICOAGULATION: Primary | ICD-10-CM

## 2021-02-26 LAB — INR PPP: 1.4 (ref 2–3.5)

## 2021-02-26 PROCEDURE — 85610 PROTHROMBIN TIME: CPT | Performed by: FAMILY MEDICINE

## 2021-02-26 PROCEDURE — 99211 OFF/OP EST MAY X REQ PHY/QHP: CPT | Performed by: FAMILY MEDICINE

## 2021-02-26 NOTE — PROGRESS NOTES
Anticoagulation Summary  As of 2021    INR goal:  2.5-3.5   TTR:  29.2 % (3.7 y)   INR used for dosin.40 (2021)   Warfarin maintenance plan:  7.5 mg (2.5 mg x 3) every Mon, Wed, Fri; 5 mg (2.5 mg x 2) all other days   Weekly warfarin total:  42.5 mg   Plan last modified:  Casey Birmingham, PharmD (2021)   Next INR check:  3/5/2021   Target end date:  Indefinite    Indications    Chronic anticoagulation [Z79.01]  History of mitral valve replacement with mechanical valve [Z95.2] [Z95.2]             Anticoagulation Episode Summary     INR check location:      Preferred lab:      Send INR reminders to:      Comments:        Anticoagulation Care Providers     Provider Role Specialty Phone number    Rufino Shine M.D. Referring Internal Medicine [phone]    [facility] Responsible  [phone]        Anticoagulation Patient Findings  Patient Findings     Negatives:  Signs/symptoms of thrombosis, Signs/symptoms of bleeding, Laboratory test error suspected, Change in health, Change in alcohol use, Change in activity, Upcoming invasive procedure, Emergency department visit, Upcoming dental procedure, Missed doses, Extra doses, Change in medications, Change in diet/appetite, Hospital admission, Bruising, Other complaints        HPI:   Mike Rivera seen in clinic today, on anticoagulation therapy with warfarin for stroke prophylaxis  due to history of mechanical MVR.    Patient's previous INR was subtherapeutic at 1.5 on 21, at which time patient was instructed to bolus with two doses, then resume current warfarin regimen.  He returns to clinic today to recheck INR to ensure it is therapeutic and thus preventing possible clotting and/or bleeding/bruising complications.    CHADS-VASc = n/a  (unadjusted ischemic stroke risk/year:  n/a)    Does patient have any changes to current medical/health status since last appt (Y/N):  NO  Does patient have any signs/symptoms of bleeding  and/or thrombosis since the last appt (Y/N):  NO  Does patient have any interval changes to diet or medications since last appt (Y/N):  NO  Are there any complications or cost restrictions with current therapy (Y/N):  NO     Does patient have [facility] PCP? Yes, Dr Rufino Shine (If not, please document discussion that patient must be seen at [facility])       Vitals:  Declined by patient today due to Covid-19 transmission concerns.    There were no vitals filed for this visit.     Asssessment:      INR subtherapeutic at 1.4, therefore increasing patient's stroke risk.   Reason(s) for out of range INR today:  Highly suspicious for missed doses or dosing too low.      Pt NOT on antiplatelet therapy.    Medication reconciliation completed today.    Plan:  Instructed patient to bolus with 10mg x 2, then increase weekly warfarin regimen as detailed above.  Discussed lovenox.  Patient has severe cognitive decline and would not allow wife to administer.  I feel he is a much higher bleeding risk with addition of lovenox given cognitive deficits and low health IQ.  He understands risks.     Follow up:  Because warfarin is a high risk medication and current CHEST guidelines recommend regular monitoring intervals (few days up to 12 weeks), will have patient return to clinic in 1 weeks to recheck INR (per patient availability).    Casey Birmingham, PharmD, BCACP

## 2021-03-02 ENCOUNTER — TELEMEDICINE (OUTPATIENT)
Dept: MEDICAL GROUP | Facility: PHYSICIAN GROUP | Age: 76
End: 2021-03-02
Payer: MEDICARE

## 2021-03-02 VITALS — WEIGHT: 130 LBS | TEMPERATURE: 98.1 F | BODY MASS INDEX: 19.7 KG/M2 | HEIGHT: 68 IN

## 2021-03-02 DIAGNOSIS — Z79.4 TYPE 2 DIABETES MELLITUS WITH DIABETIC POLYNEUROPATHY, WITH LONG-TERM CURRENT USE OF INSULIN (HCC): ICD-10-CM

## 2021-03-02 DIAGNOSIS — R45.4 ANGER: ICD-10-CM

## 2021-03-02 DIAGNOSIS — R41.89 COGNITIVE IMPAIRMENT: ICD-10-CM

## 2021-03-02 DIAGNOSIS — F03.91 DEMENTIA WITH BEHAVIORAL DISTURBANCE, UNSPECIFIED DEMENTIA TYPE: Chronic | ICD-10-CM

## 2021-03-02 DIAGNOSIS — E11.42 TYPE 2 DIABETES MELLITUS WITH DIABETIC POLYNEUROPATHY, WITH LONG-TERM CURRENT USE OF INSULIN (HCC): ICD-10-CM

## 2021-03-02 PROCEDURE — 99213 OFFICE O/P EST LOW 20 MIN: CPT | Performed by: STUDENT IN AN ORGANIZED HEALTH CARE EDUCATION/TRAINING PROGRAM

## 2021-03-02 ASSESSMENT — PATIENT HEALTH QUESTIONNAIRE - PHQ9
8. MOVING OR SPEAKING SO SLOWLY THAT OTHER PEOPLE COULD HAVE NOTICED. OR THE OPPOSITE, BEING SO FIGETY OR RESTLESS THAT YOU HAVE BEEN MOVING AROUND A LOT MORE THAN USUAL: NOT AT ALL
9. THOUGHTS THAT YOU WOULD BE BETTER OFF DEAD, OR OF HURTING YOURSELF: NOT AT ALL
6. FEELING BAD ABOUT YOURSELF - OR THAT YOU ARE A FAILURE OR HAVE LET YOURSELF OR YOUR FAMILY DOWN: MORE THAN HALF THE DAYS
1. LITTLE INTEREST OR PLEASURE IN DOING THINGS: NEARLY EVERY DAY
SUM OF ALL RESPONSES TO PHQ9 QUESTIONS 1 AND 2: 6
2. FEELING DOWN, DEPRESSED, IRRITABLE, OR HOPELESS: NEARLY EVERY DAY
3. TROUBLE FALLING OR STAYING ASLEEP OR SLEEPING TOO MUCH: NEARLY EVERY DAY
5. POOR APPETITE OR OVEREATING: NOT AT ALL
4. FEELING TIRED OR HAVING LITTLE ENERGY: NEARLY EVERY DAY
7. TROUBLE CONCENTRATING ON THINGS, SUCH AS READING THE NEWSPAPER OR WATCHING TELEVISION: MORE THAN HALF THE DAYS
SUM OF ALL RESPONSES TO PHQ QUESTIONS 1-9: 16

## 2021-03-02 ASSESSMENT — FIBROSIS 4 INDEX: FIB4 SCORE: 1.83

## 2021-03-02 NOTE — ASSESSMENT & PLAN NOTE
Patient (and more specifically his wife), note that the patient is having trouble with anger management.  He also has some dementia and forgetfulness, with likely cognitive impairment.    His wife notes that this primarily began after having severe blood loss, from a ruptured diverticulitis, which was then repaired.  However, since that time, he has had and cognitive impairment (first dementia), and has fluctuated and how well he has been functioning (almost cyclical).    He is currently on Seroquel, from prior hospitalization, and subsequently on Celexa as well.  He is also on donepezil, for dementia/cognitive impairment issues.  His wife notes that they have tried psychology/counseling, before, but have felt that it is not of use.  They are requesting psychiatrist evaluation/management.    His wife notes increasing trouble helping to manage his conditions, and they agreed that he should follow with psychiatry, to see if they can help manage this issue.

## 2021-03-02 NOTE — PROGRESS NOTES
Telemedicine Video Visit:   NEW-2-you  Patient   This Remote Face to Face encounter was conducted via Zoom. Given the importance of social distancing and other strategies recommended to reduce the risk of COVID-19 transmission, I am providing medical care to this patient via audio/video visit in place of an in person visit at the request of the patient. Verbal consent to telehealth, risks, benefits, and consequences were discussed. Patient retains the right to withdraw at any time. All existing confidentiality protections apply. The patient has access to all transmitted medical information. No dissemination of any patient images or information to other entities without further written consent.  Subjective:     Chief Complaint   Patient presents with   • Establish Care   • Referral Needed     anger management        Carlos Rivera is a 75 y.o. adult.   (prior PCP: Dr. Shine / [facility]).     The patient was previously noted to have mild dementia/memory problems / cognitive impairments.  Therefore, his wife is with him, during the visit, even though this is a virtual visit.      Type 2 diabetes mellitus with diabetic polyneuropathy, with long-term current use of insulin ([facility])  Diagnosed:  10-20 yrs.   Last A1c was   6.7 (about 6 months ago).   DM Meds:  Lantus 6 QHS,  Regular insulin (adjusted before meals, but uncertain how much).  And metformin 1000 bid.      Patient reports good medication compliance.   Denies symptoms, but has trouble remembering to measure when needed.  Would like a angelica freestyle, or continuous glucose monitor, as they are finding it more difficult to have the patient take his blood sugars on a regular basis.     BP goal <140/<90:  Uncertain (video visit).      On ACE:/ARB:  Yes   LDL goal < 100:  Yes      On Statin:  Atorvastatin    Kidney function:  Slightly low GRF     Microalbumin:   +proteinuria.... (>3000)      Anger  Patient (and more specifically his wife), note that the patient  is having trouble with anger management.  He also has some dementia and forgetfulness, with likely cognitive impairment.    His wife notes that this primarily began after having severe blood loss, from a ruptured diverticulitis, which was then repaired.  However, since that time, he has had and cognitive impairment (first dementia), and has fluctuated and how well he has been functioning (almost cyclical).    He is currently on Seroquel, from prior hospitalization, and subsequently on Celexa as well.  He is also on donepezil, for dementia/cognitive impairment issues.  His wife notes that they have tried psychology/counseling, before, but have felt that it is not of use.  They are requesting psychiatrist evaluation/management.    His wife notes increasing trouble helping to manage his conditions, and they agreed that he should follow with psychiatry, to see if they can help manage this issue.      Cognitive impairment  Patient and wife notes that they have noted more cognitive impairment mentation, since hospital visits in May and June 2020.  The patient states that he feels it is related to exercise, and feels that he should exercise more.  However, given his other issues, this may likely not be enough.    Dementia with behavioral disturbance (HCC)  Patient had previously been hospitalized, for blood loss from ruptured diverticulitis (5/2020), and further worked up for possible stroke versus TIA (6/2020), but the wife insists that this was secondary to changes after the first hospitalization for bleeding.  He had been started on Seroquel at that time, for likely sundowning and possible hallucinations during the hospital stay.  And, since then, he has also been started on Celexa.  And, he is also currently on donepezil, for possible dementia/cognitive impairment changes.  His wife notes that he has been more difficult to manage, and increasingly angry recently.  Requesting evaluation by a psychiatrist.      Review of  Symptoms  GEN/CONST:   Denies fever or significant weight change.   CARDIO:   Denies chest pain, palpitations,  or edema.  RESP:   Denies shortness of breath, wheezing, or coughing.  GI:    Denies nausea, vomiting, diarrhea,  or abdominal pain.   HEME:  + on warfarin.  See HPI / past gi bleed.   MSK:  Denies weakness,   or muscle aches.   NEURO:  Denies numbness  or focal weakness.    ENDO:  Denies polyuria, or polydipsia.  PSYCH:  + see HPI.       Past Medical History:   Diagnosis Date   • Chronic anticoagulation 2/23/2017   • History of mitral valve replacement with mechanical valve [Z95.2] 3/10/2017   • Hyperlipidemia    • Type II diabetes mellitus (HCC) 2/23/2017       Past Surgical History:   Procedure Laterality Date   • PB COLONOSCOPY,DIAGNOSTIC N/A 5/9/2020    Procedure: COLONOSCOPY;  Surgeon: Jatinder Madera M.D.;  Location: SURGERY [facility];  Service: Gastroenterology   • MITRAL VALVE REPLACEMENT  1999   • INGUINAL HERNIA REPAIR Bilateral 1984   • TONSILLECTOMY          Family History   Problem Relation Age of Onset   • Heart Attack Father 58   • Diabetes Father        Allergies   Allergen Reactions   • Okra Vomiting   • Risperidone      Dystonia, altered mental status   • Hydrocodone-Acetaminophen Vomiting       Current medicines (including changes today)  Current Outpatient Medications   Medication Sig Dispense Refill   • Continuous Glucose Monitor Sup Kit 1 Kit one time for 1 dose. (Patient interested in freestyle al continuous monitor, but match to insurance formulary/requirements). 1 Kit 0   • warfarin (COUMADIN) 2.5 MG Tab Take 2-3 tablets by mouth daily or as directed by anticoagulation clinic 270 Tab 1   • Blood Glucose Test Strips FREESTYLE strips . Check blood sugar up to 2-3x per day and prn ssx of high or low sugar 200 Each 6   • Lancets Per insurance coverage. Check blood sugar up to 2-3x per day and prn ssx of high or low sugar 200 Each 6   • Alcohol Swabs Per insurance coverage.  "Wipe site with prep pad prior to injection 200 Each 6   • donepezil (ARICEPT) 5 MG Tab Take 1 Tab by mouth every evening for 90 days. 90 Tab 2   • metFORMIN ER (GLUCOPHAGE XR) 500 MG TABLET SR 24 HR Take 2 Tabs by mouth 2 times a day. 360 Tab 3   • insulin regular (HUMULIN R) 100 Unit/mL Solution Inject 2-12 Units as instructed 3 times a day before meals. 10 mL 2   • insulin glargine (LANTUS) 100 UNIT/ML Solution Inject 15 Units as instructed every evening. (Patient taking differently: Inject 15 Units under the skin every evening. 6 units at night.) 10 mL 6   • Blood Glucose Meter Kit Per insurance coverage. Check blood sugar up to 2-3x per day and prn ssx of high or low sugar 1 Kit 0   • cyanocobalamin (VITAMIN B-12) 100 MCG Tab Take 100 mcg by mouth every day.     • atorvastatin (LIPITOR) 40 MG Tab Take 1 Tab by mouth every bedtime. 90 Tab 3   • citalopram (CELEXA) 20 MG Tab Take 1 Tab by mouth every day. 90 Tab 3   • lisinopril (PRINIVIL) 20 MG Tab Take 1 Tab by mouth every evening. 90 Tab 3   • quetiapine (SEROQUEL) 50 MG tablet Take 1 Tab by mouth 2 times a day. (Patient taking differently: Take 50 mg by mouth every day. Nightly) 60 Tab 1   • vitamin D (VITAMIND D3) 1000 UNIT Tab Take 1 Tab by mouth every day. 60 Tab      No current facility-administered medications for this visit.          Objective:   Vitals obtained by patient:  Temp 36.7 °C (98.1 °F) (Temporal)   Ht 1.727 m (5' 8\")   Wt 59 kg (130 lb)   BMI 19.77 kg/m²   Resp.Rate ~ 14 (on observation)     Physical Exam:    Constitutional: Alert, no distress, well-groomed.     + a bit thin appearing, and bald.   Skin: No rashes in visible areas.  Eye: Round. Conjunctiva clear, lids normal. No icterus.   ENMT: Lips pink without lesions, trachea midline.  Phonation normal.  Neck: No masses, no obvious thyromegaly. Moves freely without pain.  CV: Pulse as reported normal rhythm, by patient  Respiratory: Unlabored respiratory effort, no cough or audible " wheeze  Psych: Appears to have a fairly normal mood.  Somewhat rambling in his answers.    Labs: Reviewed prior/old - CBC, BMP/CMP, TSH/FT4, Vit.D      Assessment and Plan:   The following treatment plan was discussed:       1. Type 2 diabetes mellitus with diabetic polyneuropathy, with long-term current use of insulin (HCC)  Patient has been managing diabetes with metformin 1000 twice daily, Lantus 6 nightly, and regular insulin (adjusted before meals).  They are requesting a continuous glucose monitor (specifically wanting freestyle al continuous; however, would be open to others).  Continues on ACE and statin.  Will order glucose monitor kit.  - Continuous Glucose Monitor Sup Kit; 1 Kit one time for 1 dose. (Patient interested in freestyle al continuous monitor, but match to insurance formulary/requirements).  Dispense: 1 Kit; Refill: 0    2. Anger  3. Cognitive impairment  4. Dementia with behavioral disturbance, unspecified dementia type (HCC)  Patient has been having cognitive impairment and increasing anger, over the past few months.  It appears to be overlapping on top of a pre-existing dementia, but certainly made worse by the other issues.  He is currently on donepezil, as well as trying to be managed by Seroquel and Celexa.  Patient (and more so his wife), note that he is having trouble with anger issues, and would benefit from psychiatric evaluation, and management.  We will place referral.  - REFERRAL TO PSYCHIATRY      Follow-up:   Due to difficulty obtaining information from patient, and extensive medical history, patient left to return, to finish reviewing other conditions (including his history of heart issues, and mechanical valve, as he is on anticoagulation).    The patient is to follow-up in the office, to further evaluate his hypertension (as blood pressure is not available today), and multiple other issues.      Face to Face Video Visit:   I spent ~31 minutes with patient/guardian and I  conducted this visit with audio and video present.    Parts of this note were created with a computerized dictation system.    Despite review, there may be some spelling or grammatical errors.    Cipriano Kerns M.D.  3/2/2021

## 2021-03-02 NOTE — ASSESSMENT & PLAN NOTE
Diagnosed:  10-20 yrs.   Last A1c was   6.7 (about 6 months ago).   DM Meds:  Lantus 6 QHS,  Regular insulin (adjusted before meals, but uncertain how much).  And metformin 1000 bid.      Patient reports good medication compliance.   Denies symptoms, but has trouble remembering to measure when needed.  Would like a angelica freestyle, or continuous glucose monitor, as they are finding it more difficult to have the patient take his blood sugars on a regular basis.     BP goal <140/<90:  Uncertain (video visit).      On ACE:/ARB:  Yes   LDL goal < 100:  Yes      On Statin:  Atorvastatin    Kidney function:  Slightly low GRF     Microalbumin:   +proteinuria.... (>3000)

## 2021-03-03 NOTE — ASSESSMENT & PLAN NOTE
Patient and wife notes that they have noted more cognitive impairment mentation, since hospital visits in May and June 2020.  The patient states that he feels it is related to exercise, and feels that he should exercise more.  However, given his other issues, this may likely not be enough.

## 2021-03-03 NOTE — ASSESSMENT & PLAN NOTE
Patient had previously been hospitalized, for blood loss from ruptured diverticulitis (5/2020), and further worked up for possible stroke versus TIA (6/2020), but the wife insists that this was secondary to changes after the first hospitalization for bleeding.  He had been started on Seroquel at that time, for likely sundowning and possible hallucinations during the hospital stay.  And, since then, he has also been started on Celexa.  And, he is also currently on donepezil, for possible dementia/cognitive impairment changes.  His wife notes that he has been more difficult to manage, and increasingly angry recently.  Requesting evaluation by a psychiatrist.

## 2021-03-05 ENCOUNTER — ANTICOAGULATION VISIT (OUTPATIENT)
Dept: MEDICAL GROUP | Facility: PHYSICIAN GROUP | Age: 76
End: 2021-03-05
Payer: MEDICARE

## 2021-03-05 DIAGNOSIS — Z95.2 HISTORY OF MITRAL VALVE REPLACEMENT WITH MECHANICAL VALVE: ICD-10-CM

## 2021-03-05 DIAGNOSIS — Z79.01 CHRONIC ANTICOAGULATION: Primary | ICD-10-CM

## 2021-03-05 LAB — INR PPP: 4.2 (ref 2–3.5)

## 2021-03-05 PROCEDURE — 85610 PROTHROMBIN TIME: CPT | Performed by: FAMILY MEDICINE

## 2021-03-05 PROCEDURE — 99211 OFF/OP EST MAY X REQ PHY/QHP: CPT | Performed by: FAMILY MEDICINE

## 2021-03-05 NOTE — PROGRESS NOTES
Anticoagulation Summary  As of 3/5/2021    INR goal:  2.5-3.5   TTR:  29.2 % (3.7 y)   INR used for dosin.20 (3/5/2021)   Warfarin maintenance plan:  7.5 mg (2.5 mg x 3) every Mon, Wed, Fri; 5 mg (2.5 mg x 2) all other days   Weekly warfarin total:  42.5 mg   Plan last modified:  Casey Birmingham, PharmD (2021)   Next INR check:  3/12/2021   Target end date:  Indefinite    Indications    Chronic anticoagulation [Z79.01]  History of mitral valve replacement with mechanical valve [Z95.2] [Z95.2]             Anticoagulation Episode Summary     INR check location:      Preferred lab:      Send INR reminders to:      Comments:        Anticoagulation Care Providers     Provider Role Specialty Phone number    Rufino Shine M.D. Referring Internal Medicine [phone]    [facility] Anticoagulation Services Responsible  [phone]        Anticoagulation Patient Findings  Patient Findings     Negatives:  Signs/symptoms of thrombosis, Signs/symptoms of bleeding, Laboratory test error suspected, Change in health, Change in alcohol use, Change in activity, Upcoming invasive procedure, Emergency department visit, Upcoming dental procedure, Missed doses, Extra doses, Change in medications, Change in diet/appetite, Hospital admission, Bruising, Other complaints        HPI:   Carlos Ayers seen in clinic today, on anticoagulation therapy with warfarin for MVR thrombus prophylaxis due to history of MVR    Patient's previous INR was sub-therapeutic at 1.40 on 21, at which time patient was instructed to patient to bolus x 2 and then increased weekly dosing regimen.  He returns to clinic today to recheck INR to ensure it is therapeutic and thus preventing possible clotting and/or bleeding/bruising complications.    CHADS-VASc = n/a    Does patient have any changes to current medical/health status since last appt (Y/N):  no  Does patient have any signs/symptoms of bleeding and/or thrombosis since the last appt (Y/N):   no  Does patient have any interval changes to diet or medications since last appt (Y/N):  no  Are there any complications or cost restrictions with current therapy (Y/N):  no     Does patient have [facility] PCP? Yes, Cipriano Kerns (If not, please document discussion that patient must be seen at [facility])       Vitals:  Declined by patient today due to Covid-19 transmission concerns.    There were no vitals filed for this visit.     Asssessment:      INR supra-therapeutic at 4.20, therefore at increased risk of a bleed.   Reason(s) for out of range INR today:  Due to bolus dose from previous week.     Pt NOT on antiplatelet therapy     Medication reconciliation completed today.    Plan:  Instructed patient to hold tonight's dose and then resume current dosing regimen as noted above.     Follow up:  Because warfarin is a high risk medication and current CHEST guidelines recommend regular monitoring intervals (few days up to 12 weeks), will have patient return to clinic in 1 week to recheck INR.      Jay Hubbard, Pharmacy Intern  Casey Birmingham, PharmD, BCACP

## 2021-03-06 ENCOUNTER — IMMUNIZATION (OUTPATIENT)
Dept: FAMILY PLANNING/WOMEN'S HEALTH CLINIC | Facility: IMMUNIZATION CENTER | Age: 76
End: 2021-03-06
Attending: INTERNAL MEDICINE
Payer: MEDICARE

## 2021-03-06 DIAGNOSIS — Z23 ENCOUNTER FOR VACCINATION: Primary | ICD-10-CM

## 2021-03-06 PROCEDURE — 0002A PFIZER SARS-COV-2 VACCINE: CPT | Performed by: INTERNAL MEDICINE

## 2021-03-06 PROCEDURE — 91300 PFIZER SARS-COV-2 VACCINE: CPT | Performed by: INTERNAL MEDICINE

## 2021-03-19 ENCOUNTER — ANTICOAGULATION VISIT (OUTPATIENT)
Dept: MEDICAL GROUP | Facility: PHYSICIAN GROUP | Age: 76
End: 2021-03-19
Payer: MEDICARE

## 2021-03-19 VITALS — HEART RATE: 44 BPM | DIASTOLIC BLOOD PRESSURE: 71 MMHG | SYSTOLIC BLOOD PRESSURE: 141 MMHG

## 2021-03-19 DIAGNOSIS — Z79.01 CHRONIC ANTICOAGULATION: Primary | ICD-10-CM

## 2021-03-19 DIAGNOSIS — Z95.2 HISTORY OF MITRAL VALVE REPLACEMENT WITH MECHANICAL VALVE: ICD-10-CM

## 2021-03-19 LAB — INR PPP: 3.4 (ref 2–3.5)

## 2021-03-19 PROCEDURE — 99999 PR NO CHARGE: CPT | Performed by: INTERNAL MEDICINE

## 2021-03-19 PROCEDURE — 85610 PROTHROMBIN TIME: CPT | Performed by: INTERNAL MEDICINE

## 2021-03-19 PROCEDURE — 93793 ANTICOAG MGMT PT WARFARIN: CPT | Performed by: INTERNAL MEDICINE

## 2021-03-19 NOTE — PROGRESS NOTES
Anticoagulation Summary  As of 3/19/2021    INR goal:  2.5-3.5   TTR:  29.0 % (3.8 y)   INR used for dosing:  3.40 (3/19/2021)   Warfarin maintenance plan:  7.5 mg (2.5 mg x 3) every Wed; 5 mg (2.5 mg x 2) all other days   Weekly warfarin total:  37.5 mg   Plan last modified:  Casey Birmingham, PharmD (3/19/2021)   Next INR check:  3/29/2021   Target end date:  Indefinite    Indications    Chronic anticoagulation [Z79.01]  History of mitral valve replacement with mechanical valve [Z95.2] [Z95.2]             Anticoagulation Episode Summary     INR check location:      Preferred lab:      Send INR reminders to:      Comments:        Anticoagulation Care Providers     Provider Role Specialty Phone number    Rufino Shine M.D. Referring Internal Medicine [phone]    [facility] Anticoagulation Services Responsible  [phone]        Anticoagulation Patient Findings  Patient Findings     Positives:  Missed doses    Negatives:  Signs/symptoms of thrombosis, Signs/symptoms of bleeding, Laboratory test error suspected, Change in health, Change in alcohol use, Change in activity, Upcoming invasive procedure, Emergency department visit, Upcoming dental procedure, Extra doses, Change in medications, Change in diet/appetite, Hospital admission, Bruising, Other complaints        HPI:   Carlos Ayers seen in clinic today, on anticoagulation therapy with warfarin for valve thrombus prophylaxis due to history of MVR.    Patient's previous INR was Supra-therapeutic at 4.2 on 3/5/21, at which time patient was instructed to HOLD x 1 dose then resume current dosing regimen.  He returns to clinic today to recheck INR to ensure it is therapeutic and thus preventing possible clotting and/or bleeding/bruising complications.    CHADS-VASc = n/a    Does patient have any changes to current medical/health status since last appt (Y/N):  no  Does patient have any signs/symptoms of bleeding and/or thrombosis since the last appt (Y/N):  no  Does  patient have any interval changes to diet or medications since last appt (Y/N):  Missed one dose  Are there any complications or cost restrictions with current therapy (Y/N):  no     Does patient have [facility] PCP? Yes, Cipriano Kerns (If not, please document discussion that patient must be seen at [facility])       Vitals:      Vitals:    03/19/21 0929   BP: 141/71   Pulse: (!) 44        Asssessment:      INR therapeutic at 3.4, therefore decreasing the risk of a valvular thrombus and/or bleed.   Reason(s) for out of range INR today:  n/a      Pt NOT on antiplatelet therapy.    Medication reconciliation completed today.    Plan:  Instructed patient to start new decreased weekly dosing regimen as noted above.     Follow up:  Because warfarin is a high risk medication and current CHEST guidelines recommend regular monitoring intervals (few days up to 12 weeks), will have patient return to clinic in 1 week to recheck INR.      Jay Hubbard, Pharmacy Intern  Casey Birmingham, PharmD

## 2021-04-02 ENCOUNTER — ANTICOAGULATION VISIT (OUTPATIENT)
Dept: MEDICAL GROUP | Facility: PHYSICIAN GROUP | Age: 76
End: 2021-04-02
Payer: MEDICARE

## 2021-04-02 DIAGNOSIS — Z95.2 HISTORY OF MITRAL VALVE REPLACEMENT WITH MECHANICAL VALVE: ICD-10-CM

## 2021-04-02 DIAGNOSIS — Z79.01 CHRONIC ANTICOAGULATION: Primary | ICD-10-CM

## 2021-04-02 LAB — INR PPP: 1.9 (ref 2–3.5)

## 2021-04-02 PROCEDURE — 99211 OFF/OP EST MAY X REQ PHY/QHP: CPT | Performed by: FAMILY MEDICINE

## 2021-04-02 PROCEDURE — 85610 PROTHROMBIN TIME: CPT | Performed by: FAMILY MEDICINE

## 2021-04-02 NOTE — PROGRESS NOTES
Anticoagulation Summary  As of 2021    INR goal:  2.5-3.5   TTR:  29.3 % (3.8 y)   INR used for dosin.90 (2021)   Warfarin maintenance plan:  7.5 mg (2.5 mg x 3) every Wed, Fri; 5 mg (2.5 mg x 2) all other days   Weekly warfarin total:  40 mg   Plan last modified:  Casey Birmnigham, PharmD (2021)   Next INR check:  2021   Target end date:  Indefinite    Indications    Chronic anticoagulation [Z79.01]  History of mitral valve replacement with mechanical valve [Z95.2] [Z95.2]             Anticoagulation Episode Summary     INR check location:      Preferred lab:      Send INR reminders to:      Comments:        Anticoagulation Care Providers     Provider Role Specialty Phone number    Rufino Shine M.D. Referring Internal Medicine [phone]    [facility] Anticoagulation Services Responsible  [phone]        Anticoagulation Patient Findings  Patient Findings     Negatives:  Signs/symptoms of thrombosis, Signs/symptoms of bleeding, Laboratory test error suspected, Change in health, Change in alcohol use, Change in activity, Upcoming invasive procedure, Emergency department visit, Upcoming dental procedure, Missed doses, Extra doses, Change in medications, Change in diet/appetite, Hospital admission, Bruising, Other complaints              HPI:   Carlos Rivera seen in clinic today, on anticoagulation therapy with warfarin for stroke prophylaxis  due to history of mechanical MVR.    Patient's previous INR was therapeutic at 3.4 on 3-19-21, at which time patient was instructed to continue with current warfarin regimen.  He returns to clinic today to recheck INR to ensure it is therapeutic and thus preventing possible clotting and/or bleeding/bruising complications.    CHADS-VASc = n/a  (unadjusted ischemic stroke risk/year:  n/a)    Does patient have any changes to current medical/health status since last appt (Y/N):  NO  Does patient have any signs/symptoms of bleeding and/or thrombosis since the  last appt (Y/N):  NO  Does patient have any interval changes to diet or medications since last appt (Y/N):  NO  Are there any complications or cost restrictions with current therapy (Y/N):  NO     Does patient have [facility] PCP? Yes, Dr Cipriano Kerns (If not, please document discussion that patient must be seen at [facility])       Vitals:  Declined by patient today due to Covid-19 transmission concerns.    There were no vitals filed for this visit.     Asssessment:      INR subtherapeutic at 1.9, therefore increasing patient's stroke risk.   Reason(s) for out of range INR today:  Etiology unknown.      Pt NOT on antiplatelet therapy.    Medication reconciliation completed today.    Plan:  Instructed patient to increase weekly warfarin regimen as detailed above.     Follow up:  Because warfarin is a high risk medication and current CHEST guidelines recommend regular monitoring intervals (few days up to 12 weeks), will have patient return to clinic in 1 weeks to recheck INR.    Casey Birmingham, PharmD, BCACP

## 2021-04-07 ENCOUNTER — PATIENT MESSAGE (OUTPATIENT)
Dept: MEDICAL GROUP | Facility: PHYSICIAN GROUP | Age: 76
End: 2021-04-07

## 2021-04-07 DIAGNOSIS — Z79.4 TYPE 2 DIABETES MELLITUS WITH DIABETIC POLYNEUROPATHY, WITH LONG-TERM CURRENT USE OF INSULIN (HCC): ICD-10-CM

## 2021-04-07 DIAGNOSIS — E11.42 TYPE 2 DIABETES MELLITUS WITH DIABETIC POLYNEUROPATHY, WITH LONG-TERM CURRENT USE OF INSULIN (HCC): ICD-10-CM

## 2021-04-07 NOTE — TELEPHONE ENCOUNTER
Please call the patient and see if they are still using freestyle test strips, or if they have switched to a continuous monitor, as the pharmacy may just be automatically asking for refill of an unused device.  Then, please update or resend Rx request as appropriate.  Thank you.

## 2021-04-07 NOTE — TELEPHONE ENCOUNTER
Received request via: Patient    Was the patient seen in the last year in this department? Yes 3/2/21    Does the patient have an active prescription (recently filled or refills available) for medication(s) requested? No

## 2021-04-08 NOTE — TELEPHONE ENCOUNTER
I spoke to the patient's wife and she has not received the new monitor yet, Mike has not checked his sugars and is not feeling well. Comfort would like this refill till the new monitor arrives.

## 2021-04-09 ENCOUNTER — ANTICOAGULATION VISIT (OUTPATIENT)
Dept: MEDICAL GROUP | Facility: PHYSICIAN GROUP | Age: 76
End: 2021-04-09
Payer: MEDICARE

## 2021-04-09 DIAGNOSIS — Z95.2 HISTORY OF MITRAL VALVE REPLACEMENT WITH MECHANICAL VALVE: ICD-10-CM

## 2021-04-09 DIAGNOSIS — Z79.01 CHRONIC ANTICOAGULATION: Primary | ICD-10-CM

## 2021-04-09 LAB — INR PPP: 6 (ref 2–3.5)

## 2021-04-09 PROCEDURE — 85610 PROTHROMBIN TIME: CPT | Performed by: FAMILY MEDICINE

## 2021-04-09 PROCEDURE — 99211 OFF/OP EST MAY X REQ PHY/QHP: CPT | Performed by: FAMILY MEDICINE

## 2021-04-09 NOTE — PROGRESS NOTES
Anticoagulation Summary  As of 2021    INR goal:  2.5-3.5   TTR:  29.3 % (3.8 y)   INR used for dosin.00 (2021)   Warfarin maintenance plan:  7.5 mg (2.5 mg x 3) every Wed, Fri; 5 mg (2.5 mg x 2) all other days   Weekly warfarin total:  40 mg   Plan last modified:  Casey Birmingham, PharmD (2021)   Next INR check:  2021   Target end date:  Indefinite    Indications    Chronic anticoagulation [Z79.01]  History of mitral valve replacement with mechanical valve [Z95.2] [Z95.2]             Anticoagulation Episode Summary     INR check location:      Preferred lab:      Send INR reminders to:      Comments:        Anticoagulation Care Providers     Provider Role Specialty Phone number    Rufino Shine M.D. Referring Internal Medicine [phone]    [facility] Anticoagulation Services Responsible  [phone]        Anticoagulation Patient Findings  Patient Findings     Negatives:  Signs/symptoms of thrombosis, Signs/symptoms of bleeding, Laboratory test error suspected, Change in health, Change in alcohol use, Change in activity, Upcoming invasive procedure, Emergency department visit, Upcoming dental procedure, Missed doses, Extra doses, Change in medications, Change in diet/appetite, Hospital admission, Bruising, Other complaints              HPI:   Carlos Rivera seen in clinic today, on anticoagulation therapy with warfarin for stroke prophylaxis due to history of mechanical MVR    Patient's previous INR was sub-therapeutic at 1.90 on , at which time patient was instructed to bolus dose that day 7.5 mg and continue normal warfarin regimen on all other days.  He returns to clinic today to recheck INR to ensure it is therapeutic and thus preventing possible clotting and/or bleeding/bruising complications.    CHADS-VASc = n/a    Does patient have any changes to current medical/health status since last appt (Y/N):  N  Does patient have any signs/symptoms of bleeding and/or thrombosis since the last  appt (Y/N):  N  Does patient have any interval changes to diet or medications since last appt (Y/N):  N  Are there any complications or cost restrictions with current therapy (Y/N):  N     Does patient have [facility] PCP? Dr. Cipriano Kerns (If not, please document discussion that patient must be seen at [facility])       Vitals:  Declined by patient today due to Covid-19 transmission concerns.      There were no vitals filed for this visit.     Asssessment:      INR supra-therapeutic at 6.00, therefore increased risk of bleeding   Reason(s) for out of range INR today:  Etiology unknown      Pt on antiplatelet therapy -n/a    Medication reconciliation completed today.    Plan:  Hold today's (4/9) dose and saturday's (4/10) dose and continue normal warfarin regimen on all other days as listed above.       Follow up:  Because warfarin is a high risk medication and current CHEST guidelines recommend regular monitoring intervals (few days up to 12 weeks), will have patient return to clinic in 1 weeks to recheck INR.    Erika Bhatia - Student Pharmacist   Casey Birmingham, PharmD, BCACP

## 2021-04-12 ENCOUNTER — NON-PROVIDER VISIT (OUTPATIENT)
Dept: MEDICAL GROUP | Facility: PHYSICIAN GROUP | Age: 76
End: 2021-04-12
Payer: MEDICARE

## 2021-04-12 DIAGNOSIS — Z79.4 TYPE 2 DIABETES MELLITUS WITH DIABETIC POLYNEUROPATHY, WITH LONG-TERM CURRENT USE OF INSULIN (HCC): ICD-10-CM

## 2021-04-12 DIAGNOSIS — E11.42 TYPE 2 DIABETES MELLITUS WITH DIABETIC POLYNEUROPATHY, WITH LONG-TERM CURRENT USE OF INSULIN (HCC): ICD-10-CM

## 2021-04-12 LAB — GLUCOSE BLD-MCNC: 174 MG/DL (ref 70–100)

## 2021-04-12 PROCEDURE — 82962 GLUCOSE BLOOD TEST: CPT | Performed by: STUDENT IN AN ORGANIZED HEALTH CARE EDUCATION/TRAINING PROGRAM

## 2021-04-12 NOTE — TELEPHONE ENCOUNTER
Spoke to Mikes wife and got everything squared away. I contacted the company that is supplying the Freestyle Ramos meter ([employer] [phone]) that was supposedly delivered to our office in March. They sent out a new meter last week and the patient should be getting the meter in the next couple of days. They are not going to be picking up the test strips due to the kirby. Carlos came down to the clinic today and I did a POCT glucose on him, It was 174

## 2021-04-12 NOTE — PROGRESS NOTES
Mike Rivera is a 75 y.o. adult here for a non-provider visit for blood glucose test     If abnormal was an in office provider notified today (if so, indicate provider)? Yes  Routed to PCP? Yes

## 2021-04-16 ENCOUNTER — PATIENT MESSAGE (OUTPATIENT)
Dept: CARDIOLOGY | Facility: MEDICAL CENTER | Age: 76
End: 2021-04-16

## 2021-04-19 NOTE — PROGRESS NOTES
Per patient request.  Now sending in Rx for test strips ( One Touch Ultra Lite ) to [facility].....  Also notified/replied in MyChart.   1. Type 2 diabetes mellitus with diabetic polyneuropathy, with long-term current use of insulin (HCC)  Blood Glucose Test Strips   Cipriano Kerns M.D., 4/19/2021

## 2021-04-23 ENCOUNTER — ANTICOAGULATION VISIT (OUTPATIENT)
Dept: MEDICAL GROUP | Facility: PHYSICIAN GROUP | Age: 76
End: 2021-04-23
Payer: MEDICARE

## 2021-04-23 DIAGNOSIS — Z79.01 CHRONIC ANTICOAGULATION: Primary | ICD-10-CM

## 2021-04-23 DIAGNOSIS — Z95.2 HISTORY OF MITRAL VALVE REPLACEMENT WITH MECHANICAL VALVE: ICD-10-CM

## 2021-04-23 LAB — INR PPP: 3.3 (ref 2–3.5)

## 2021-04-23 PROCEDURE — 99999 PR NO CHARGE: CPT | Performed by: FAMILY MEDICINE

## 2021-04-23 PROCEDURE — 93793 ANTICOAG MGMT PT WARFARIN: CPT | Performed by: FAMILY MEDICINE

## 2021-04-23 PROCEDURE — 85610 PROTHROMBIN TIME: CPT | Performed by: FAMILY MEDICINE

## 2021-04-23 NOTE — PROGRESS NOTES
Anticoagulation Summary  As of 4/23/2021    INR goal:  2.5-3.5   TTR:  29.3 % (3.8 y)   INR used for dosing:     Warfarin maintenance plan:  7.5 mg (2.5 mg x 3) every Wed, Fri; 5 mg (2.5 mg x 2) all other days   Weekly warfarin total:  40 mg   Plan last modified:  Casey Birmingham, PharmD (4/2/2021)   Next INR check:     Target end date:  Indefinite    Indications    Chronic anticoagulation [Z79.01]  History of mitral valve replacement with mechanical valve [Z95.2] [Z95.2]             Anticoagulation Episode Summary     INR check location:      Preferred lab:      Send INR reminders to:      Comments:        Anticoagulation Care Providers     Provider Role Specialty Phone number    Rufino Shine M.D. Referring Internal Medicine [phone]    [facility] Anticoagulation Services Responsible  [phone]        Anticoagulation Patient Findings    HPI:   Carlos Rivera seen in clinic today, on anticoagulation therapy with warfarin for stroke prophylaxis due to history of mechanical MVR    Patient's previous INR was supra-therapeutic at 6.00 on 4/9, at which time patient was instructed to hold dose for 2 days and then continue normal warfarin regimen.  He returns to clinic today to recheck INR to ensure it is therapeutic and thus preventing possible clotting and/or bleeding/bruising complications.    CHADS-VASc = n/a      Does patient have any changes to current medical/health status since last appt (Y/N):  N  Does patient have any signs/symptoms of bleeding and/or thrombosis since the last appt (Y/N):  N  Does patient have any interval changes to diet or medications since last appt (Y/N):  N  Are there any complications or cost restrictions with current therapy (Y/N):  N     Does patient have [facility] PCP? Yes, Dr. Cipriano Kerns (If not, please document discussion that patient must be seen at [facility])       Vitals:  Declined by patient today due to Covid-19 transmission concerns.      There were no  vitals filed for this visit.     Asssessment:      INR therapeutic at 3.30, therefore decreased risk for blood clotts    Reason(s) for out of range INR today:  n/a      Pt NOT on antiplatelet therapy.    Medication reconciliation completed today.    Plan:  Pt is to continue with current warfarin dosing regimen.       Follow up:  Because warfarin is a high risk medication and current CHEST guidelines recommend regular monitoring intervals (few days up to 12 weeks), will have patient return to clinic in 2 weeks to recheck INR.    Erika Bhatia - Student Pharmacist   Casey Birmingahm, PharmD, BCACP

## 2021-05-05 PROBLEM — L98.9 SKIN LESIONS: Status: ACTIVE | Noted: 2021-05-05

## 2021-05-14 ENCOUNTER — ANTICOAGULATION VISIT (OUTPATIENT)
Dept: MEDICAL GROUP | Facility: PHYSICIAN GROUP | Age: 76
End: 2021-05-14
Payer: MEDICARE

## 2021-05-14 DIAGNOSIS — Z79.01 CHRONIC ANTICOAGULATION: ICD-10-CM

## 2021-05-14 DIAGNOSIS — Z95.2 HISTORY OF MITRAL VALVE REPLACEMENT WITH MECHANICAL VALVE: ICD-10-CM

## 2021-05-14 LAB — INR PPP: 3.5 (ref 2–3.5)

## 2021-05-14 PROCEDURE — 93793 ANTICOAG MGMT PT WARFARIN: CPT | Performed by: INTERNAL MEDICINE

## 2021-05-14 PROCEDURE — 99999 PR NO CHARGE: CPT | Performed by: INTERNAL MEDICINE

## 2021-05-14 PROCEDURE — 85610 PROTHROMBIN TIME: CPT | Performed by: INTERNAL MEDICINE

## 2021-05-14 NOTE — PROGRESS NOTES
Anticoagulation Summary  As of 5/14/2021    INR goal:  2.5-3.5   TTR:  30.1 % (3.9 y)   INR used for dosing:  3.50 (5/14/2021)   Warfarin maintenance plan:  7.5 mg (2.5 mg x 3) every Wed, Fri; 5 mg (2.5 mg x 2) all other days   Weekly warfarin total:  40 mg   Plan last modified:  Casey Birmingham, PharmD (4/2/2021)   Next INR check:  6/11/2021   Target end date:  Indefinite    Indications    Chronic anticoagulation [Z79.01]  History of mitral valve replacement with mechanical valve [Z95.2] [Z95.2]             Anticoagulation Episode Summary     INR check location:      Preferred lab:      Send INR reminders to:      Comments:        Anticoagulation Care Providers     Provider Role Specialty Phone number    Rufino Shine M.D. Referring Internal Medicine [phone]    [facility] Anticoagulation Services Responsible  [phone]              HPI:   Carlos Rivera seen in clinic today, on anticoagulation therapy with warfarin for stroke prophylaxis due to history of mechanical MVR    Patient's previous INR was therapeutic at 3.3 on 4/23/2021, at which time patient was instructed to continue current regimen.  He returns to clinic today to recheck INR to ensure it is therapeutic and thus preventing possible clotting and/or bleeding/bruising complications.    CHADS-VASc = n/a      Does patient have any changes to current medical/health status since last appt (Y/N):  No  Does patient have any signs/symptoms of bleeding and/or thrombosis since the last appt (Y/N):  No  Does patient have any interval changes to diet or medications since last appt (Y/N):  No  Are there any complications or cost restrictions with current therapy (Y/N):  No     Does patient have [facility] PCP? Yes, Dr Cipriano Kerns (If not, please document discussion that patient must be seen at [facility])       Vitals:  Pt declines vitals due to Covid transmission concerns.     Asssessment:      INR therapeutic at 3.5, therefore decreased risk of  blood clots    Pt NOT on antiplatelet therapy   Medication reconciliation completed today.    Plan:  Pt is to continue with current warfarin dosing regimen.       Follow up:  Because warfarin is a high risk medication and current CHEST guidelines recommend regular monitoring intervals (few days up to 12 weeks), will have patient return to clinic in 4 weeks to recheck INR.

## 2021-06-11 ENCOUNTER — ANTICOAGULATION VISIT (OUTPATIENT)
Dept: MEDICAL GROUP | Facility: PHYSICIAN GROUP | Age: 76
End: 2021-06-11
Payer: MEDICARE

## 2021-06-11 VITALS — WEIGHT: 120.6 LBS | BODY MASS INDEX: 18.34 KG/M2

## 2021-06-11 DIAGNOSIS — Z95.2 HISTORY OF MITRAL VALVE REPLACEMENT WITH MECHANICAL VALVE: ICD-10-CM

## 2021-06-11 DIAGNOSIS — Z79.01 CHRONIC ANTICOAGULATION: ICD-10-CM

## 2021-06-11 LAB — INR PPP: 2.1 (ref 2–3.5)

## 2021-06-11 PROCEDURE — 99211 OFF/OP EST MAY X REQ PHY/QHP: CPT | Performed by: INTERNAL MEDICINE

## 2021-06-11 PROCEDURE — 85610 PROTHROMBIN TIME: CPT | Performed by: INTERNAL MEDICINE

## 2021-06-11 ASSESSMENT — FIBROSIS 4 INDEX: FIB4 SCORE: 1.83

## 2021-06-11 NOTE — PROGRESS NOTES
OP Anticoagulation Service Note    Date: 2021    Anticoagulation Summary  As of 2021    INR goal:  2.5-3.5   TTR:  30.9 % (4 y)   INR used for dosin.10 (2021)   Warfarin maintenance plan:  7.5 mg (2.5 mg x 3) every Wed, Fri; 5 mg (2.5 mg x 2) all other days   Weekly warfarin total:  40 mg   Plan last modified:  Casey Birmingham, PharmD (2021)   Next INR check:  2021   Target end date:  Indefinite    Indications    Chronic anticoagulation [Z79.01]  History of mitral valve replacement with mechanical valve [Z95.2] [Z95.2]             Anticoagulation Episode Summary     INR check location:      Preferred lab:      Send INR reminders to:      Comments:        Anticoagulation Care Providers     Provider Role Specialty Phone number    Rufino Shine M.D. Referring Internal Medicine [phone]    [facility] Anticoagulation Services Responsible  [phone]        Anticoagulation Patient Findings      HPI:   Carlos Rivera is in the Anticoagulation Clinic today for a INR check on their anticoagulation therapy.     The reason for today's visit is to prevent morbidity and mortality from a blood clot and/or stroke and to reduce the risk of bleeding while on a anticoagulant.     PCP:  Cipriano Kerns M.D.  [address] [id number] [phone]    3 vitals included with today's appt-unless patient declined:  (BP, HR, weight, ht, RR)   Vitals:    21 1118   Weight: 54.7 kg (120 lb 9.6 oz)       Assessment:   INR  SUB-therapeutic.   Confirmed warfarin dosing regimen: Yes  Interval Changes with foods rich in vitamin K: No  Interval Changes in ETOH or cranberries:   No  Interval Changes in smoking status:  No  Interval Changes in medication:  No   S/S of bleeding or bruising:  No  Signs/symptoms  thrombosis since the last appt:  No  Any upcoming procedures that require stopping warfarin and/or using bridge therapy: None    Pt is not on antiplatelet  therapy.    Plan:  Instructed pt to bolus x 1 dose w/ 10 mg and to then continue on w/ his current regimen.    Follow up:  Follow up appointment in 2 week(s).       Other info:  Pt educated to contact our clinic with any changes in medications or s/s of bleeding or thrombosis.  Education was provided today regarding tips to reduce their bleed risk and dietary constraints while on a anticoagulant.    National Recommendations:  The CHEST guidelines recommends frequent INR monitoring at regular intervals (a few days up to a max of 12 weeks) to ensure patients are on the proper dose of warfarin, and patients are not having any complications from therapy.  INRs can dramatically change over a short time period due to diet, medications, and medical conditions.     Doug Kelly, PharmD    [facility]  Phone [phone] fax [phone]

## 2021-06-15 ENCOUNTER — TELEPHONE (OUTPATIENT)
Dept: MEDICAL GROUP | Facility: PHYSICIAN GROUP | Age: 76
End: 2021-06-15

## 2021-06-15 DIAGNOSIS — Z79.4 TYPE 2 DIABETES MELLITUS WITH DIABETIC POLYNEUROPATHY, WITH LONG-TERM CURRENT USE OF INSULIN (HCC): ICD-10-CM

## 2021-06-15 DIAGNOSIS — E11.42 TYPE 2 DIABETES MELLITUS WITH DIABETIC POLYNEUROPATHY, WITH LONG-TERM CURRENT USE OF INSULIN (HCC): ICD-10-CM

## 2021-06-15 NOTE — TELEPHONE ENCOUNTER
Received request via: Pharmacy  Requires 90 day supply for mail order service and Insurance does not cover One Touch Ultra. Insurance preferred is Abbott Freestyle Lite    Was the patient seen in the last year in this department? Yes    Does the patient have an active prescription (recently filled or refills available) for medication(s) requested? Insurance preferred is Abbott Freestyle Lite.

## 2021-06-15 NOTE — TELEPHONE ENCOUNTER
Please call the patient (or his wife), and verify that this glucometer and test strips are the version that they wanted, ...  and that they wanted it from [facility].     They have previously encountered additional billing/unwanted refills through [facility], for items they are getting through another pharmacy.…  And I believe they already have the initial glucometer that the pharmacy is saying they do not like.    Please call, and clarify what the patient/wife are expecting/requesting.  Thank you.

## 2021-06-15 NOTE — TELEPHONE ENCOUNTER
Please call the patient (or his wife), and verify that this glucometer and test strips are the version that they wanted, ... and if they wanted it from [facility].     They have previously encountered additional billing/unwanted refills through [facility], for items they are getting through another pharmacy.…  And I believe they already have the initial glucometer that the pharmacy is saying they do not like.    Please call, and clarify what the patient/wife are expecting/requesting.  Thank you.

## 2021-06-15 NOTE — TELEPHONE ENCOUNTER
DOCUMENTATION OF PAR STATUS:    1. Name of Medication & Dose: One Touch Ultra Lite       2. Name of Prescription Coverage Company & phone #: [facility]    3. Date Prior Auth Submitted: 06/15/2021    4. What information was given to obtain insurance decision? Dx Code    5. Prior Auth Status? Insurance preferred Glucose monitor and test strips is Free Style Lite and Precision Xtra. Will submit a new Rx for Provider to sign and sent to [facility]    6. Patient Notified: N\A

## 2021-06-16 RX ORDER — LANCETS 30 GAUGE
EACH MISCELLANEOUS
Qty: 300 EACH | Refills: 0 | Status: SHIPPED | OUTPATIENT
Start: 2021-06-16

## 2021-06-16 NOTE — TELEPHONE ENCOUNTER
FINAL PRIOR AUTHORIZATION STATUS:    1.  Name of Medication & Dose: Test strips     2. Prior Auth Status: Cancelled    3. Action Taken: Pharmacy Notified: N\A Patient Notified: yes

## 2021-06-16 NOTE — TELEPHONE ENCOUNTER
Phone Number Called: [phone] (home)     Call outcome: Left detailed message for patient. Informed to call back with any additional questions.    Message: Requested for Comfort to return to verify Pharmacy for refill on Gluco One touch test strips. Pa is needed. Have alternative Glucometer and Kit Freestyle lite. Like to know if Pt is willing to change or wan to stay with current strips.

## 2021-06-16 NOTE — TELEPHONE ENCOUNTER
Phone Number Called: [phone] (home)     Call outcome: Spoke to patient regarding message below.    Message: Comfort stated she didn't care what brand just want it as a back up for the Freestyle Ramos.     Please sign request    Thank you

## 2021-06-18 ENCOUNTER — PATIENT MESSAGE (OUTPATIENT)
Dept: MEDICAL GROUP | Facility: PHYSICIAN GROUP | Age: 76
End: 2021-06-18

## 2021-06-18 DIAGNOSIS — E11.42 TYPE 2 DIABETES MELLITUS WITH DIABETIC POLYNEUROPATHY, WITH LONG-TERM CURRENT USE OF INSULIN (HCC): ICD-10-CM

## 2021-06-18 DIAGNOSIS — Z79.4 TYPE 2 DIABETES MELLITUS WITH DIABETIC POLYNEUROPATHY, WITH LONG-TERM CURRENT USE OF INSULIN (HCC): ICD-10-CM

## 2021-06-22 DIAGNOSIS — Z95.2 HISTORY OF MITRAL VALVE REPLACEMENT WITH MECHANICAL VALVE: ICD-10-CM

## 2021-06-23 NOTE — PROGRESS NOTES
Just received the message request for syringes.   - ordered.  1. Type 2 diabetes mellitus with diabetic polyneuropathy, with long-term current use of insulin ([facility])  Insulin Syringes U-100 0.3 mL   Cipriano Kerns M.D. 6/22/2021

## 2021-06-23 NOTE — TELEPHONE ENCOUNTER
Just received the request.   Based on prior visit, they stated only using it rarely.   Re-ordered....  1. Type 2 diabetes mellitus with diabetic polyneuropathy, with long-term current use of insulin (HCC)  insulin regular (HUMULIN R) 100 Unit/mL Solution   Cipriano Kerns M.D., 6/22/2021

## 2021-06-25 ENCOUNTER — ANTICOAGULATION VISIT (OUTPATIENT)
Dept: MEDICAL GROUP | Facility: PHYSICIAN GROUP | Age: 76
End: 2021-06-25
Payer: MEDICARE

## 2021-06-25 DIAGNOSIS — Z79.01 CHRONIC ANTICOAGULATION: ICD-10-CM

## 2021-06-25 DIAGNOSIS — Z95.2 HISTORY OF MITRAL VALVE REPLACEMENT WITH MECHANICAL VALVE: ICD-10-CM

## 2021-06-25 LAB — INR PPP: 4.2 (ref 2–3.5)

## 2021-06-25 PROCEDURE — 99211 OFF/OP EST MAY X REQ PHY/QHP: CPT | Performed by: FAMILY MEDICINE

## 2021-06-25 PROCEDURE — 85610 PROTHROMBIN TIME: CPT | Performed by: FAMILY MEDICINE

## 2021-06-25 NOTE — PROGRESS NOTES
Anticoagulation Summary  As of 2021    INR goal:  2.5-3.5   TTR:  31.1 % (4 y)   INR used for dosin.20 (2021)   Warfarin maintenance plan:  7.5 mg (2.5 mg x 3) every Wed, Fri; 5 mg (2.5 mg x 2) all other days   Weekly warfarin total:  40 mg   Plan last modified:  Casey Birmingham, PharmD (2021)   Next INR check:  2021   Target end date:  Indefinite    Indications    Chronic anticoagulation [Z79.01]  History of mitral valve replacement with mechanical valve [Z95.2] [Z95.2]             Anticoagulation Episode Summary     INR check location:      Preferred lab:      Send INR reminders to:      Comments:        Anticoagulation Care Providers     Provider Role Specialty Phone number    Rufino Shine M.D. Referring Internal Medicine [phone]    [facility] Anticoagulation Services Responsible  [phone]        Anticoagulation Patient Findings  Patient Findings     Negatives:  Signs/symptoms of thrombosis, Signs/symptoms of bleeding, Laboratory test error suspected, Change in health, Change in alcohol use, Change in activity, Upcoming invasive procedure, Emergency department visit, Upcoming dental procedure, Missed doses, Extra doses, Change in medications, Change in diet/appetite, Hospital admission, Bruising, Other complaints              HPI:   Mike Wyman seen in clinic today, on anticoagulation therapy with warfarin for stroke prophylaxis due to history of mechanical MVR    Patient's previous INR was subtherapeutic at 2.1 on , at which time patient was instructed to bolus x1 .  He returns to clinic today to recheck INR to ensure it is therapeutic and thus preventing possible clotting and/or bleeding/bruising complications.    CHADS-VASc = n/a  (unadjusted ischemic stroke risk/year:  n/a, which is n/a)    Does patient have any changes to current medical/health status since last appt (Y/N):  No  Does patient have any signs/symptoms of bleeding and/or thrombosis since the last appt (Y/N):   No  Does patient have any interval changes to diet or medications since last appt (Y/N):  No  Are there any complications or cost restrictions with current therapy (Y/N):  No     Does patient have [facility] PCP? Cipriano Kerns M.D.   (If not, please document discussion that patient must be seen at [facility])       Vitals:  Declined by patient today due to Covid-19 transmission concerns.      There were no vitals filed for this visit.     Asssessment:      INR supratherapeutic at 4.2, therefore PT is at risk of bleeding   Reason(s) for out of range INR today:  Dose is too high      Pt not antiplatelet therapy     Medication reconciliation completed today.    Plan:  PT is to hold today and then continue with current regimen as detailed above.      Follow up:  Because warfarin is a high risk medication and current CHEST guidelines recommend regular monitoring intervals (few days up to 12 weeks), will have patient return to clinic in 2 weeks to recheck INR.      Ashia Moses student pharmacist    Casey Birmingham, PharmD, BCACP

## 2021-06-29 ENCOUNTER — OFFICE VISIT (OUTPATIENT)
Dept: MEDICAL GROUP | Facility: PHYSICIAN GROUP | Age: 76
End: 2021-06-29
Payer: MEDICARE

## 2021-06-29 VITALS
BODY MASS INDEX: 18.49 KG/M2 | HEART RATE: 65 BPM | TEMPERATURE: 97.2 F | DIASTOLIC BLOOD PRESSURE: 70 MMHG | RESPIRATION RATE: 16 BRPM | OXYGEN SATURATION: 95 % | WEIGHT: 122 LBS | HEIGHT: 68 IN | SYSTOLIC BLOOD PRESSURE: 122 MMHG

## 2021-06-29 DIAGNOSIS — Z79.4 TYPE 2 DIABETES MELLITUS WITH DIABETIC POLYNEUROPATHY, WITH LONG-TERM CURRENT USE OF INSULIN (HCC): ICD-10-CM

## 2021-06-29 DIAGNOSIS — E11.42 TYPE 2 DIABETES MELLITUS WITH DIABETIC POLYNEUROPATHY, WITH LONG-TERM CURRENT USE OF INSULIN (HCC): ICD-10-CM

## 2021-06-29 DIAGNOSIS — J30.2 SEASONAL ALLERGIES: ICD-10-CM

## 2021-06-29 LAB
HBA1C MFR BLD: 9.3 % (ref 0–5.6)
INT CON NEG: NEGATIVE
INT CON POS: POSITIVE

## 2021-06-29 PROCEDURE — 99213 OFFICE O/P EST LOW 20 MIN: CPT | Performed by: STUDENT IN AN ORGANIZED HEALTH CARE EDUCATION/TRAINING PROGRAM

## 2021-06-29 PROCEDURE — 83036 HEMOGLOBIN GLYCOSYLATED A1C: CPT | Performed by: STUDENT IN AN ORGANIZED HEALTH CARE EDUCATION/TRAINING PROGRAM

## 2021-06-29 RX ORDER — SERTRALINE HYDROCHLORIDE 100 MG/1
50 TABLET, FILM COATED ORAL DAILY
COMMUNITY
End: 2021-09-30

## 2021-06-29 RX ORDER — LORATADINE 10 MG/1
10 TABLET ORAL
Qty: 90 TABLET | Refills: 1 | Status: SHIPPED | OUTPATIENT
Start: 2021-06-29

## 2021-06-29 ASSESSMENT — FIBROSIS 4 INDEX: FIB4 SCORE: 1.83

## 2021-06-30 NOTE — ASSESSMENT & PLAN NOTE
Chronic and ongoing.   10-20 yrs.   DM Meds:    - metformin 1000 bid.   - Lantus 6 QHS,    - Regular insulin (usually 0-4 units)   (adjusted before meal)   (notes only using a few times per week).      Patient reports good medication compliance.      Denies symptoms or concerns. ...     ... but then notes podiatrist (?) gave ?-steroid shot   ... and went to hospital with glucose in 500's.   ... but unclear history and timeframe of this......   ... (and not documented in our computer system) ...  Monitoring:     Recently on angelica freestyle (14-day CGM).         (also using back-up of Abbott Freestyle ?)        (for patient preference / reassurance).   Semi-Annual Screens:       Last A1c was   6.7 (6/26/20). ...   ... new POCT A1c - 9.3 (6/29/2021)...!!!  Annual Screens:       Kidney function:  GFR:  59  (similar to before)  (07/07/2020) ...      Microalbumin Ratio:   + proteinuria (3,873),  (06/15/2020)  ....      ... will get new labs, after next visit ....        BP goal <140/<80:  Yes     On ACE/ARB:  Yes       LDL goal < 100:  Yes       On Statin:  Yes          Monofilament test:   Today (2/4, bilat)  (06/29/2021)      Checks feet at home:  Not always....     Retina Scan:  Attempted today, but not completed...   ... patient will get new eye exam, and send records....       PPSV23 given:  done     Management / Plan:     - continue on meds, as listed above, for now.   - write-down (or print-out) glucose levels throughout day.   (given sheet for 4 x/day checks / recording).   - get eye exam.  (and send records).   - follow-up in a few weeks.

## 2021-06-30 NOTE — PATIENT INSTRUCTIONS
Please record the blood sugar values:   - fasting (before breakfast), before lunch, before dinner, and before sleeping.     - some of these can be once-in-a-while, but      Please record all of those numbers, consistently for at least a few days in a row,     And then follow-up in the office, in a few weeks.     Please have his eye doctor check his eyes (retinas) and send us a copy of the note.    Thank you.

## 2021-06-30 NOTE — PROGRESS NOTES
Established Patient     Carlos Rivera is a 75 y.o. adult.    Chief Complaint   Patient presents with   • Diabetes             Problems addressed this visit:     This note uses problem-based documentation.  Subjective HPI is included in Assessment & Plan, at bottom of note.   Problem   Seasonal Allergies   Type 2 Diabetes Mellitus With Diabetic Polyneuropathy, With Long-Term Current Use of Insulin (Hcc)                            Past Medical History:   Diagnosis Date   • Chronic anticoagulation 2/23/2017   • History of mitral valve replacement with mechanical valve [Z95.2] 3/10/2017   • Hyperlipidemia    • Type II diabetes mellitus (HCC) 2/23/2017       Patient Active Problem List   Diagnosis   • Hyperlipidemia   • Anxiety   • Chronic anticoagulation   • History of mitral valve replacement with mechanical valve [Z95.2]   • Mild single current episode of major depressive disorder (HCC)   • History of stroke   • Shuffling gait   • Coronary artery disease involving coronary bypass graft   • Acute on Chronic Encephalopathy   • Dementia with behavioral disturbance (HCC)   • Hypertension   • Anger   • Diabetic neuropathy (HCC)   • At risk for falls   • Obstructive sleep apnea syndrome   • Polypharmacy   • Proteinuria   • TIA (transient ischemic attack)   • Vitamin D deficiency   • Transient neurologic deficit   • Type 2 diabetes mellitus with diabetic polyneuropathy, with long-term current use of insulin (HCC)   • Gait disturbance   • Cognitive impairment   • Skin lesions   • Seasonal allergies       Past Surgical History:   Procedure Laterality Date   • PB COLONOSCOPY,DIAGNOSTIC N/A 5/9/2020    Procedure: COLONOSCOPY;  Surgeon: Jatinder Madera M.D.;  Location: SURGERY [facility];  Service: Gastroenterology   • MITRAL VALVE REPLACEMENT  1999   • INGUINAL HERNIA REPAIR Bilateral 1984   • TONSILLECTOMY         Family History   Problem Relation Age of Onset   • Heart Attack Father 58   • Diabetes Father         Social History     Tobacco Use   • Smoking status: Never Smoker   • Smokeless tobacco: Never Used   Substance Use Topics   • Alcohol use: Yes     Alcohol/week: 0.0 oz       Current medications:  (including new changes):  Current Outpatient Medications   Medication Sig Dispense Refill   • sertraline (ZOLOFT) 100 MG Tab Take 100 mg by mouth every day.     • loratadine (CLARITIN) 10 MG Tab Take 1 tablet by mouth 1 time a day as needed. 90 tablet 1   • insulin regular (HUMULIN R) 100 Unit/mL Solution Inject 2-12 Units under the skin 3 times a day as needed for High Blood Sugar (per sliding scale before meals). 10 mL 0   • Insulin Syringes U-100 0.3 mL Use one syringe to inject insulin three times daily. 100 Each 0   • Blood Glucose Monitoring Suppl Device Meter: Dispense Device of Insurance Preference.Free Style Lite Sig. Use as directed for blood sugar monitoring. #1. NR. 1 Each 0   • Blood Glucose Test Strips Test strips order: Test strips for Abbott Freestyle Lite meter. Sig: use 2-3 times daily and prn ssx high or low sugar 300 Strip 0   • Lancets Lancets order: Lancets for Abbott Freestyle Lite meter. Sig: use 2-3 times Daily and prn ssx high or low sugar. 300 Each 0   • donepezil (ARICEPT) 5 MG Tab Take 5 mg by mouth every evening.     • warfarin (COUMADIN) 2.5 MG Tab Take 2-3 tablets by mouth daily or as directed by anticoagulation clinic 270 Tab 1   • metFORMIN ER (GLUCOPHAGE XR) 500 MG TABLET SR 24 HR Take 2 Tabs by mouth 2 times a day. 360 Tab 3   • insulin glargine (LANTUS) 100 UNIT/ML Solution Inject 15 Units as instructed every evening. (Patient taking differently: Inject 15 Units under the skin every evening. 6 units at night.) 10 mL 6   • Blood Glucose Meter Kit Per insurance coverage. Check blood sugar up to 2-3x per day and prn ssx of high or low sugar 1 Kit 0   • cyanocobalamin (VITAMIN B-12) 100 MCG Tab Take 100 mcg by mouth every day.     • atorvastatin (LIPITOR) 40 MG Tab Take 1 Tab by mouth  "every bedtime. 90 Tab 3   • citalopram (CELEXA) 20 MG Tab Take 1 Tab by mouth every day. 90 Tab 3   • lisinopril (PRINIVIL) 20 MG Tab Take 1 Tab by mouth every evening. 90 Tab 3   • vitamin D (VITAMIND D3) 1000 UNIT Tab Take 1 Tab by mouth every day. 60 Tab    • quetiapine (SEROQUEL) 50 MG tablet Take 1 Tab by mouth 2 times a day. (Patient not taking: Reported on 6/29/2021) 60 Tab 1     No current facility-administered medications for this visit.       Allergies as of 06/29/2021 - Reviewed 06/29/2021   Allergen Reaction Noted   • Okra Vomiting 01/29/2020   • Risperidone  05/11/2020   • Hydrocodone-acetaminophen Vomiting 02/23/2017                 Review of Symptoms   (as noted elsewhere, and .....)   GEN/CONST:   Denies fever, chills, fatigue,   CARDIO:   Denies chest pain, palpitations, or edema.    RESP:   Denies shortness of breath, wheezing, or coughing.  GI:    Denies nausea, vomiting, diarrhea,      MSK:  + occasional \"foot pains at end of day\" (with prior neuropathy).   NEURO:  Denies numbness or focal weakness.        + feels off-balance sometimes.         Physical Exam  /70 (BP Location: Left arm, Patient Position: Sitting, BP Cuff Size: Adult)   Pulse 65   Temp 36.2 °C (97.2 °F) (Temporal)   Resp 16   Ht 1.727 m (5' 8\")   Wt 55.3 kg (122 lb)   SpO2 95%   BMI 18.55 kg/m²   General:  Alert,  No apparent distress.    Lungs: Clear to auscultation bilaterally.  No wheezes or rales.  Cardiovascular: Regular rate and rhythm.  No murmurs,     + (slight click louder in lower left sternum).   Abdomen:  Soft.  Non-tender.  No rebound or guarding.   Extremities:  No pedal edema.  Good general motion of extremities.   Psychological: Appears to have normal mood and affect.       + but repeatedly corrected by wife about history / time of events.       Monofilament testing with a 10 gram force: sensation intact: decreased bilaterally  (2/4, both feet)....    Visual Inspection: Feet without maceration, ulcers, " fissures.  Pedal pulses: intact bilaterally      Labs:  Recent / Prior labs reviewed. - CMP and Lipids   POCT A1c - 9.3.                            Assessment & Plan  (with subjective history and orders):  Of note, the list below is not in a specific nor sortable order.      Problem List Items Addressed This Visit     Seasonal allergies    Relevant Medications    loratadine (CLARITIN) 10 MG Tab    Type 2 diabetes mellitus with diabetic polyneuropathy, with long-term current use of insulin (HCC)     Chronic and ongoing.   10-20 yrs.   DM Meds:    - metformin 1000 bid.   - Lantus 6 QHS,    - Regular insulin (usually 0-4 units)   (adjusted before meal)   (notes only using a few times per week).      Patient reports good medication compliance.      Denies symptoms or concerns. ...     ... but then notes podiatrist (?) gave ?-steroid shot   ... and went to hospital with glucose in 500's.   ... but unclear history and timeframe of this......   ... (and not documented in our computer system) ...  Monitoring:     Recently on TeamDynamixyle (14-day CGM).         (also using back-up of Abbott Freestyle ?)        (for patient preference / reassurance).   Semi-Annual Screens:       Last A1c was   6.7 (6/26/20). ...   ... new POCT A1c - 9.3 (6/29/2021)...!!!  Annual Screens:       Kidney function:  GFR:  59  (similar to before)  (07/07/2020) ...      Microalbumin Ratio:   + proteinuria (3,873),  (06/15/2020)  ....      ... will get new labs, after next visit ....        BP goal <140/<80:  Yes     On ACE/ARB:  Yes       LDL goal < 100:  Yes       On Statin:  Yes          Monofilament test:   Today (2/4, bilat)  (06/29/2021)      Checks feet at home:  Not always....     Retina Scan:  Attempted today, but not completed...   ... patient will get new eye exam, and send records....       PPSV23 given:  done     Management / Plan:     - continue on meds, as listed above, for now.   - write-down (or print-out) glucose levels throughout  day.   (given sheet for 4 x/day checks / recording).   - get eye exam.  (and send records).   - follow-up in a few weeks.          Relevant Medications    sertraline (ZOLOFT) 100 MG Tab    Other Relevant Orders    Diabetic Monofilament LE Exam (Completed)    POCT  A1C (Completed)      Of note, the above list is not in a specific nor sortable order.                  Diagnosis Table, with orders:  1. Type 2 diabetes mellitus with diabetic polyneuropathy, with long-term current use of insulin (HCC)  Diabetic Monofilament LE Exam    POCT  A1C    CANCELED: POCT Retinal Eye Exam  (machine error / not recorded correctly.)    CANCELED: REFERRAL TO ENDOCRINOLOGY  (may consider in future)  (will follow-up here, first).    2. Seasonal allergies  loratadine (CLARITIN) 10 MG Tab                 Follow-up:  3 weeks  (DM-2, glucose-log check, and ordering full labs)              A computerized dictation system may have been used in this note.    Despite review, there may be some errors in spelling or grammar.    Cipriano Kerns M.D.  6/29/2021

## 2021-07-07 ENCOUNTER — PATIENT MESSAGE (OUTPATIENT)
Dept: MEDICAL GROUP | Facility: PHYSICIAN GROUP | Age: 76
End: 2021-07-07

## 2021-07-07 ENCOUNTER — PATIENT MESSAGE (OUTPATIENT)
Dept: CARDIOLOGY | Facility: MEDICAL CENTER | Age: 76
End: 2021-07-07

## 2021-07-07 DIAGNOSIS — E11.42 TYPE 2 DIABETES MELLITUS WITH DIABETIC POLYNEUROPATHY, WITH LONG-TERM CURRENT USE OF INSULIN (HCC): ICD-10-CM

## 2021-07-07 DIAGNOSIS — Z79.4 TYPE 2 DIABETES MELLITUS WITH DIABETIC POLYNEUROPATHY, WITH LONG-TERM CURRENT USE OF INSULIN (HCC): ICD-10-CM

## 2021-07-08 RX ORDER — INSULIN GLARGINE 100 [IU]/ML
6 INJECTION, SOLUTION SUBCUTANEOUS EVERY EVENING
Qty: 6 ML | Refills: 1 | Status: SHIPPED | OUTPATIENT
Start: 2021-07-08 | End: 2021-07-15

## 2021-07-08 RX ORDER — FLASH GLUCOSE SCANNING READER
EACH MISCELLANEOUS
COMMUNITY
End: 2022-04-06 | Stop reason: SDUPTHER

## 2021-07-08 RX ORDER — BLOOD-GLUCOSE METER
KIT MISCELLANEOUS
COMMUNITY
End: 2022-04-06 | Stop reason: CLARIF

## 2021-07-08 RX ORDER — LANCETS 28 GAUGE
EACH MISCELLANEOUS
COMMUNITY
End: 2022-03-31

## 2021-07-09 ENCOUNTER — ANTICOAGULATION VISIT (OUTPATIENT)
Dept: MEDICAL GROUP | Facility: PHYSICIAN GROUP | Age: 76
End: 2021-07-09
Payer: MEDICARE

## 2021-07-09 DIAGNOSIS — Z95.2 HISTORY OF MITRAL VALVE REPLACEMENT WITH MECHANICAL VALVE: ICD-10-CM

## 2021-07-09 DIAGNOSIS — Z79.01 CHRONIC ANTICOAGULATION: Primary | ICD-10-CM

## 2021-07-09 LAB — INR PPP: 6.6 (ref 2–3.5)

## 2021-07-09 PROCEDURE — 85610 PROTHROMBIN TIME: CPT | Performed by: FAMILY MEDICINE

## 2021-07-09 PROCEDURE — 99211 OFF/OP EST MAY X REQ PHY/QHP: CPT | Performed by: FAMILY MEDICINE

## 2021-07-09 NOTE — PROGRESS NOTES
Anticoagulation Summary  As of 2021    INR goal:  2.5-3.5   TTR:  30.8 % (4.1 y)   INR used for dosin.60 (2021)   Warfarin maintenance plan:  7.5 mg (2.5 mg x 3) every Wed, Fri; 5 mg (2.5 mg x 2) all other days   Weekly warfarin total:  40 mg   Plan last modified:  Casey Birmingham, PharmD (2021)   Next INR check:  2021   Target end date:  Indefinite    Indications    Chronic anticoagulation [Z79.01]  History of mitral valve replacement with mechanical valve [Z95.2] [Z95.2]             Anticoagulation Episode Summary     INR check location:      Preferred lab:      Send INR reminders to:      Comments:        Anticoagulation Care Providers     Provider Role Specialty Phone number    Rufino Shine M.D. Referring Internal Medicine [phone]    [facility] Anticoagulation Services Responsible  [phone]        Anticoagulation Patient Findings  Patient Findings     Positives:  Change in alcohol use (moderate), Change in diet/appetite (decreased appetite)    Negatives:  Signs/symptoms of thrombosis, Signs/symptoms of bleeding, Laboratory test error suspected, Change in health, Change in activity, Upcoming invasive procedure, Emergency department visit, Upcoming dental procedure, Missed doses, Extra doses, Change in medications, Hospital admission, Bruising, Other complaints            HPI:   Mike Rivera seen in clinic today, on anticoagulation therapy with warfarin for thrombosis prevention due to history of mechanical valve.    Patient's previous INR was supratherapeutic at 4.2 on , at which time patient was instructed to hold a dose.  He returns to clinic today to recheck INR to ensure it is therapeutic and thus preventing possible clotting and/or bleeding/bruising complications.    CHADS-VASc = NA    Does patient have any changes to current medical/health status since last appt (Y/N):  N  Does patient have any signs/symptoms of bleeding and/or thrombosis since the last appt (Y/N):  N  Does  patient have any interval changes to diet or medications since last appt (Y/N):  Y  Decreased appetite moderate alcohol consumption  Are there any complications or cost restrictions with current therapy (Y/N):  N     Does patient have [facility] PCP? Yes, Cipriano Kerns (If not, please document discussion that patient must be seen at [facility])       Vitals:  declined by patient    There were no vitals filed for this visit.     Asssessment:      INR supratherapeutic at 6.6, therefore patient is an increased risk for bleed.   Reason(s) for out of range INR today:  Lack of appetite, alcohol consumption      Pt not on antiplatelet therapy    Medication reconciliation completed today.    Plan:  Hold dose tonight, hold dose tomorrow, take normal dose on Sunday.      Follow up:  Because warfarin is a high risk medication and current CHEST guidelines recommend regular monitoring intervals (few days up to 12 weeks), will have patient return to clinic in 4 days to recheck INR.    Belen Slade, Pharmacy Intern  Casey Birmingham, PharmD

## 2021-07-12 ENCOUNTER — ANTICOAGULATION VISIT (OUTPATIENT)
Dept: MEDICAL GROUP | Facility: PHYSICIAN GROUP | Age: 76
End: 2021-07-12
Payer: MEDICARE

## 2021-07-12 DIAGNOSIS — Z79.01 CHRONIC ANTICOAGULATION: Primary | ICD-10-CM

## 2021-07-12 DIAGNOSIS — Z95.2 HISTORY OF MITRAL VALVE REPLACEMENT WITH MECHANICAL VALVE: ICD-10-CM

## 2021-07-12 LAB — INR PPP: 1.6 (ref 2–3.5)

## 2021-07-12 PROCEDURE — 85610 PROTHROMBIN TIME: CPT | Performed by: INTERNAL MEDICINE

## 2021-07-12 PROCEDURE — 99211 OFF/OP EST MAY X REQ PHY/QHP: CPT | Performed by: INTERNAL MEDICINE

## 2021-07-12 NOTE — PROGRESS NOTES
Anticoagulation Summary  As of 2021    INR goal:  2.5-3.5   TTR:  30.8 % (4.1 y)   INR used for dosin.60 (2021)   Warfarin maintenance plan:  7.5 mg (2.5 mg x 3) every Mon, Fri; 5 mg (2.5 mg x 2) all other days   Weekly warfarin total:  40 mg   Plan last modified:  Casey Birmingham, PharmD (2021)   Next INR check:  2021   Target end date:  Indefinite    Indications    Chronic anticoagulation [Z79.01]  History of mitral valve replacement with mechanical valve [Z95.2] [Z95.2]             Anticoagulation Episode Summary     INR check location:      Preferred lab:      Send INR reminders to:      Comments:        Anticoagulation Care Providers     Provider Role Specialty Phone number    Rufino Shine M.D. Referring Internal Medicine [phone]    [facility] Anticoagulation Services Responsible  [phone]        Anticoagulation Patient Findings  Patient Findings     Negatives:  Signs/symptoms of thrombosis, Signs/symptoms of bleeding, Laboratory test error suspected, Change in health, Change in alcohol use, Change in activity, Upcoming invasive procedure, Emergency department visit, Upcoming dental procedure, Missed doses, Extra doses, Change in medications, Change in diet/appetite, Hospital admission, Bruising, Other complaints        HPI:   Mike Rivera seen in clinic today, on anticoagulation therapy with warfarin for stroke prophylaxis due to history of mechanical MVR.    Patient's previous INR was supratherapeutic at 6.6 on 21, at which time patient was instructed to hold two doses, then resume current warfarin regimen.  He returns to clinic today to recheck INR to ensure it is therapeutic and thus preventing possible clotting and/or bleeding/bruising complications.    CHADS-VASc = n/a  (unadjusted ischemic stroke risk/year:  n/a)    Does patient have any changes to current medical/health status since last appt (Y/N):  NO  Does patient have any signs/symptoms of bleeding and/or  thrombosis since the last appt (Y/N):  NO  Does patient have any interval changes to diet or medications since last appt (Y/N):  NO  Are there any complications or cost restrictions with current therapy (Y/N):  NO     Does patient have [facility] PCP? Yes, Dr Cipriano Kerns (If not, please document discussion that patient must be seen at [facility])       Vitals:  declined by patient at today's visit.    There were no vitals filed for this visit.     Asssessment:      INR subtherapeutic at 1.6, therefore increasing patient's stroke risk.   Reason(s) for out of range INR today:  Held doses after last visit.      Pt NOT on antiplatelet therapy.    Medication reconciliation completed today.    Plan:  Instructed patient to resume current warfarin regimen as detailed above, given hx of labile INR and critically high reading last week, will forego bolus dose and/or bridging.     Follow up:  Because warfarin is a high risk medication and current CHEST guidelines recommend regular monitoring intervals (few days up to 12 weeks), will have patient return to clinic in 1 weeks to recheck INR.    Casey Birmingham, PharmD, BCACP

## 2021-07-14 ENCOUNTER — PATIENT MESSAGE (OUTPATIENT)
Dept: MEDICAL GROUP | Facility: PHYSICIAN GROUP | Age: 76
End: 2021-07-14

## 2021-07-14 ENCOUNTER — TELEPHONE (OUTPATIENT)
Dept: MEDICAL GROUP | Facility: PHYSICIAN GROUP | Age: 76
End: 2021-07-14

## 2021-07-14 NOTE — TELEPHONE ENCOUNTER
Patient's wife called. She is upset with recent medication changes and is unclear which units of lantus her  should be taking. Can someone follow up with her?

## 2021-07-15 ENCOUNTER — OFFICE VISIT (OUTPATIENT)
Dept: MEDICAL GROUP | Facility: PHYSICIAN GROUP | Age: 76
End: 2021-07-15
Payer: MEDICARE

## 2021-07-15 VITALS
SYSTOLIC BLOOD PRESSURE: 100 MMHG | DIASTOLIC BLOOD PRESSURE: 70 MMHG | TEMPERATURE: 97.5 F | OXYGEN SATURATION: 95 % | BODY MASS INDEX: 18.49 KG/M2 | WEIGHT: 122 LBS | HEIGHT: 68 IN | HEART RATE: 77 BPM

## 2021-07-15 DIAGNOSIS — E11.42 TYPE 2 DIABETES MELLITUS WITH DIABETIC POLYNEUROPATHY, WITH LONG-TERM CURRENT USE OF INSULIN (HCC): ICD-10-CM

## 2021-07-15 DIAGNOSIS — Z79.4 TYPE 2 DIABETES MELLITUS WITH DIABETIC POLYNEUROPATHY, WITH LONG-TERM CURRENT USE OF INSULIN (HCC): ICD-10-CM

## 2021-07-15 DIAGNOSIS — G89.29 CHRONIC RIGHT-SIDED LOW BACK PAIN WITHOUT SCIATICA: ICD-10-CM

## 2021-07-15 DIAGNOSIS — M54.50 CHRONIC RIGHT-SIDED LOW BACK PAIN WITHOUT SCIATICA: ICD-10-CM

## 2021-07-15 DIAGNOSIS — R41.89 COGNITIVE IMPAIRMENT: ICD-10-CM

## 2021-07-15 DIAGNOSIS — Z23 NEED FOR VACCINATION: ICD-10-CM

## 2021-07-15 PROCEDURE — 99214 OFFICE O/P EST MOD 30 MIN: CPT | Performed by: STUDENT IN AN ORGANIZED HEALTH CARE EDUCATION/TRAINING PROGRAM

## 2021-07-15 RX ORDER — CLOSTRIDIUM TETANI TOXOID ANTIGEN (FORMALDEHYDE INACTIVATED), CORYNEBACTERIUM DIPHTHERIAE TOXOID ANTIGEN (FORMALDEHYDE INACTIVATED), BORDETELLA PERTUSSIS TOXOID ANTIGEN (GLUTARALDEHYDE INACTIVATED), BORDETELLA PERTUSSIS FILAMENTOUS HEMAGGLUTININ ANTIGEN (FORMALDEHYDE INACTIVATED), BORDETELLA PERTUSSIS PERTACTIN ANTIGEN, AND BORDETELLA PERTUSSIS FIMBRIAE 2/3 ANTIGEN 5; 2; 2.5; 5; 3; 5 [LF]/.5ML; [LF]/.5ML; UG/.5ML; UG/.5ML; UG/.5ML; UG/.5ML
0.5 INJECTION, SUSPENSION INTRAMUSCULAR ONCE
Qty: 0.5 ML | Refills: 0 | Status: CANCELLED | OUTPATIENT
Start: 2021-07-15 | End: 2021-07-15

## 2021-07-15 ASSESSMENT — FIBROSIS 4 INDEX: FIB4 SCORE: 1.83

## 2021-07-15 NOTE — ASSESSMENT & PLAN NOTE
Chronic and ongoing.   10-20 yrs.   DM Meds:    - metformin 1000 bid.   - Lantus 6 QHS,    - Regular insulin (usually 0-4 units)   (adjusted before meal)   (notes only using a few times per week).   - Wife monitors glucose and insulin doses.     However, now with more confusion about dosing.        (pharmacy sent refill of insulin from prior PCP,          and was unclear on if there was a dosing change).        (appears prior Rx was upper limit, not actual dose).         (Then requested I change the Rx, and upset when          The Rx was change to reflect current usage).   - wife also mentions reluctant to use SSI at night.   Monitoring:     Fasting glucose readings 117-320, mostly 170-190's.        Most daytime sugars - mid 200's.         Inconsistent with SSI use.      Recently on Freestyle Mira (14-day CGM).         (also using back-up of Freestyle Lite glucometer)        (for patient preference / reassurance).   Semi-Annual Screens:       Last A1c was  9.3 (6/29/2021).          (worsened from before). ....  Annual Screens:       Kidney function:  GFR:  59  (similar to before)  (07/07/2020) ...      Microalbumin Ratio:   + proteinuria (3,873),  (06/15/2020)  ....      ... will get new labs, after next visit ....        BP goal <140/<80:  Yes     On ACE/ARB:  Yes       LDL goal < 100:  Yes       On Statin:  Yes          Monofilament test:   (2/4, bilat)  (06/29/2021)      Checks feet at home:  Not always....     Retina Scan:  patient will get new eye exam, ...   .... will then send records....       PPSV23 given:  done     Management / Plan:     - Increase Lantus to 8 units QHS  ... continue metformin and SSI checks...  - continue to write-down  glucose levels throughout day.   (given sheet for 4 x/day checks / recording).   - get eye exam.  (and send records).   - follow-up in a few weeks.   - then transition to geriatrician.

## 2021-07-15 NOTE — TELEPHONE ENCOUNTER
Spoke with wife has a virtual visit schedule for tomorrow 07/15/2021 at 8:30 am    Per wife she is not sure what the correct dosing units are for Lantus She claims it always has been 6 unit in the evening. Previous Rx states 15 unit in the evening. She is really concerned his numbers are all over 137, 314, 187.  So I scheduled a virtual visit in the morning

## 2021-07-15 NOTE — PATIENT INSTRUCTIONS
Please increase to  Lantus 8 units.   Then use the sliding scale, as before.     Please follow-up with the referral to geriatrics.     Please follow-up with the referral(s) to geriatrics .  You will likely receive a phone call or Plutor[employer] message, with information about what office to contact, in order to schedule.  However, if you do not hear from them in the next week, please call [phone], and ask for the referral line, to find out the status of the referral.       Thank you.

## 2021-07-15 NOTE — ASSESSMENT & PLAN NOTE
She does note some occasional right-sided hip and SI pain.... His wife has previously tried some topical diclofenac gel, that she had, and seems to have worked well.  She would like a refill of this for him to use when needed.  Currently, he does not notice much of a problem with this.  - Will prescribe topical diclofenac gel.

## 2021-07-15 NOTE — PROGRESS NOTES
"Received Starline message, from the patient's wife (who has the patient's permission to discuss his healthcare) through the patient's Star[employer] account....    ...  Due to concern for accuracy, I have documented my response....   ................................................    The recent prescription, listing 6 units each evening, was based on your request the other day:  to have that dose listed on it, and for the medication to have my name on it (rather than Dr. Shine's).      As for the prior prescription from Dr. Shine, it appears that this was from a previous order that he placed before, which had a refill attached to the prior order (which is why I placed a new order, so my orders would replace/cancel his future refills with that pharmacy).      Regarding your prior text, I did not reply that either dose was \"correct\".  ...  I refilled the dose (6 units) that you had requested to receive (as this was also the dose that you and your  repeatedly verified him using, since he established with me).  However, as far as his blood sugar being \"all over the place\", this is why I had requested the completion of the blood sugar chart that I gave you before (to document exactly what his blood sugars are at different times of the day, and how much insulin he was receiving, at specific times).  This chart would help to monitor his condition and decide on the correct dosing.  However, I have not yet received this.      We can discuss this further during the appointment.   Thank you.   ................................................  Cipriano Kerns M.D., 7/14/2021     "

## 2021-07-15 NOTE — PROGRESS NOTES
Established Patient     Carlos Rivera is a 75 y.o. adult.    Chief Complaint   Patient presents with   • Diabetes   • Medication Management     Lantus               Problems addressed this visit:     This note uses problem-based documentation.  Subjective HPI is included in Assessment & Plan, at bottom of note.   Problem   Low Back Pain    Cognitive Impairment   Type 2 Diabetes Mellitus With Diabetic Polyneuropathy,   With Long-Term Current Use of Insulin (Hcc)                            Past Medical History:   Diagnosis Date   • Chronic anticoagulation 2/23/2017   • History of mitral valve replacement with mechanical valve [Z95.2] 3/10/2017   • Hyperlipidemia    • Type II diabetes mellitus (HCC) 2/23/2017       Patient Active Problem List   Diagnosis   • Hyperlipidemia   • Anxiety   • Chronic anticoagulation   • History of mitral valve replacement with mechanical valve [Z95.2]   • Mild single current episode of major depressive disorder (HCC)   • History of stroke   • Shuffling gait   • Coronary artery disease involving coronary bypass graft   • Acute on Chronic Encephalopathy   • Dementia with behavioral disturbance ([facility])   • Hypertension   • Anger   • Diabetic neuropathy (HCC)   • At risk for falls   • Obstructive sleep apnea syndrome   • Polypharmacy   • Proteinuria   • TIA (transient ischemic attack)   • Vitamin D deficiency   • Transient neurologic deficit   • Type 2 diabetes mellitus with diabetic polyneuropathy, with long-term current use of insulin ([facility])   • Gait disturbance   • Cognitive impairment   • Skin lesions   • Seasonal allergies   • Chronic right-sided low back pain without sciatica       Past Surgical History:   Procedure Laterality Date   • PB COLONOSCOPY,DIAGNOSTIC N/A 5/9/2020    Procedure: COLONOSCOPY;  Surgeon: Jatinder Madera M.D.;  Location: SURGERY [facility];  Service: Gastroenterology   • MITRAL VALVE REPLACEMENT  1999   • INGUINAL HERNIA REPAIR Bilateral 1984   •  TONSILLECTOMY         Family History   Problem Relation Age of Onset   • Heart Attack Father 58   • Diabetes Father        Social History     Tobacco Use   • Smoking status: Never Smoker   • Smokeless tobacco: Never Used   Substance Use Topics   • Alcohol use: Yes     Alcohol/week: 0.0 oz       Current medications:  (including new changes):  Current Outpatient Medications   Medication Sig Dispense Refill   • diclofenac sodium 1 % Gel Apply 1 gram (about a finger tip coverage) to area of pain, up to 3 times daily, as needed for pain. 50 g 0   • insulin glargine (LANTUS) 100 UNIT/ML Solution Pen-injector injection Inject 8 Units under the skin every evening. Will change to 8 units.   Not listed as the filled Rx, since patient does not want the auto-refill....   Will need to remark the unit dosage, prior to next refill.     • Blood Glucose Monitoring Suppl (FREESTYLE LITE) Device BACKUP     • FreeStyle Lancets Misc BACKUP     • glucose blood strip 1 Each by Other route as needed. BACKUP     • Continuous Blood Gluc  (FREESTYLE CHARI 14 DAY READER) Device MAIN USE     • sertraline (ZOLOFT) 100 MG Tab Take 50 mg by mouth every day.     • loratadine (CLARITIN) 10 MG Tab Take 1 tablet by mouth 1 time a day as needed. 90 tablet 1   • insulin regular (HUMULIN R) 100 Unit/mL Solution Inject 2-12 Units under the skin 3 times a day as needed for High Blood Sugar (per sliding scale before meals). 10 mL 0   • Insulin Syringes U-100 0.3 mL Use one syringe to inject insulin three times daily. 100 Each 0   • Blood Glucose Monitoring Suppl Device Meter: Dispense Device of Insurance Preference.Free Style Lite Sig. Use as directed for blood sugar monitoring. #1. NR. 1 Each 0   • Blood Glucose Test Strips Test strips order: Test strips for Abbott Freestyle Lite meter. Sig: use 2-3 times daily and prn ssx high or low sugar 300 Strip 0   • Lancets Lancets order: Lancets for Abbott Freestyle Lite meter. Sig: use 2-3 times Daily and  "prn ssx high or low sugar. 300 Each 0   • donepezil (ARICEPT) 5 MG Tab Take 5 mg by mouth every evening.     • warfarin (COUMADIN) 2.5 MG Tab Take 2-3 tablets by mouth daily or as directed by anticoagulation clinic 270 Tab 1   • metFORMIN ER (GLUCOPHAGE XR) 500 MG TABLET SR 24 HR Take 2 Tabs by mouth 2 times a day. 360 Tab 3   • Blood Glucose Meter Kit Per insurance coverage. Check blood sugar up to 2-3x per day and prn ssx of high or low sugar 1 Kit 0   • cyanocobalamin (VITAMIN B-12) 100 MCG Tab Take 100 mcg by mouth every day.     • atorvastatin (LIPITOR) 40 MG Tab Take 1 Tab by mouth every bedtime. 90 Tab 3   • citalopram (CELEXA) 20 MG Tab Take 1 Tab by mouth every day. 90 Tab 3   • lisinopril (PRINIVIL) 20 MG Tab Take 1 Tab by mouth every evening. 90 Tab 3   • vitamin D (VITAMIND D3) 1000 UNIT Tab Take 1 Tab by mouth every day. 60 Tab    • quetiapine (SEROQUEL) 50 MG tablet Take 1 Tab by mouth 2 times a day. 60 Tab 1     No current facility-administered medications for this visit.       Allergies as of 07/15/2021 - Reviewed 07/15/2021   Allergen Reaction Noted   • [place] Vomiting 01/29/2020   • Risperidone  05/11/2020   • Hydrocodone-acetaminophen Vomiting 02/23/2017                   Review of Symptoms   (as noted elsewhere, and .....)   GEN/CONST:   Denies fever, chills, fatigue,   CARDIO:   Denies chest pain, palpitations, or edema.    RESP:   Denies shortness of breath, wheezing, or coughing.  GI:    Denies nausea, vomiting, diarrhea,            Physical Exam  /70 (BP Location: Left arm, Patient Position: Sitting, BP Cuff Size: Adult)   Pulse 77   Temp 36.4 °C (97.5 °F) (Temporal)   Ht 1.727 m (5' 8\")   Wt 55.3 kg (122 lb)   SpO2 95%   BMI 18.55 kg/m²   General:  Alert and oriented, No apparent distress.  H/E:  PER.  EOMI.  No scleral icterus.    Neck: Trachea midline, no masses, no obvious thyromegaly.  Lungs: Easy / unlabored breathing, without difficulty.  Good oxygenation.   MSK:  Good " general motion of extremities.        - Slow but normal ambulation.    Psych: Appears to have normal mood and affect.          Labs:  Recent / Prior labs reviewed.    A1c and home glucose readings.                              Assessment & Plan  (with subjective history and orders):  Of note, the list below is not in a specific nor sortable order.      Problem List Items Addressed This Visit     Chronic right-sided low back pain without sciatica     She does note some occasional right-sided hip and SI pain.... His wife has previously tried some topical diclofenac gel, that she had, and seems to have worked well.  She would like a refill of this for him to use when needed.  Currently, he does not notice much of a problem with this.  - Will prescribe topical diclofenac gel.         Relevant Medications    diclofenac sodium 1 % Gel    Cognitive impairment     Patient and wife notes that they have noted more cognitive impairment mentation, since hospital visits in May and June 2020.  The patient states that he feels it is related to exercise, and feels that he should exercise more.  However, he has mentioned this before.  He is also contradicted by wife, on some historical questions....  He also has hx of vascular dementia, and some behavioral changes.  .. Wife also having more difficulty managing/understanding medication dosing and recommendations.  - will refer to Geriatrics, to transfer care.     (discussed with patient and wife, and both agree).          Relevant Orders    REFERRAL TO GERIATRICS    Type 2 diabetes mellitus with diabetic polyneuropathy, with long-term current use of insulin (HCC)     Chronic and ongoing.   10-20 yrs.   DM Meds:    - metformin 1000 bid.   - Lantus 6 QHS,    - Regular insulin (usually 0-4 units)   (adjusted before meal)   (notes only using a few times per week).   - Wife monitors glucose and insulin doses.     However, now with more confusion about dosing.        (pharmacy sent refill  of insulin from prior PCP,          and was unclear on if there was a dosing change).        (appears prior Rx was upper limit, not actual dose).         (Then requested I change the Rx, and upset when          The Rx was change to reflect current usage).   - wife also mentions reluctant to use SSI at night.   Monitoring:     Fasting glucose readings 117-320, mostly 170-190's.        Most daytime sugars - mid 200's.         Inconsistent with SSI use.      Recently on Freestyle Mira (14-day CGM).         (also using back-up of Freestyle Lite glucometer)        (for patient preference / reassurance).   Semi-Annual Screens:       Last A1c was  9.3 (6/29/2021).          (worsened from before). ....  Annual Screens:       Kidney function:  GFR:  59  (similar to before)  (07/07/2020) ...      Microalbumin Ratio:   + proteinuria (3,873),  (06/15/2020)  ....      ... will get new labs, after next visit ....        BP goal <140/<80:  Yes     On ACE/ARB:  Yes       LDL goal < 100:  Yes       On Statin:  Yes          Monofilament test:   (2/4, bilat)  (06/29/2021)      Checks feet at home:  Not always....     Retina Scan:  patient will get new eye exam, ...   .... will then send records....       PPSV23 given:  done     Management / Plan:     - Increase Lantus to 8 units QHS  ... continue metformin and SSI checks...  - continue to write-down  glucose levels throughout day.   (given sheet for 4 x/day checks / recording).   - get eye exam.  (and send records).   - follow-up in a few weeks.   - then transition to geriatrician.          Relevant Medications    insulin glargine (LANTUS) 100 UNIT/ML Solution Pen-injector injection    Other Relevant Orders    REFERRAL TO GERIATRICS      Other Visit Diagnoses     Need for vaccination         - patient again advised to get Tdap and Shingles vaccines,        at the pharmacy (as medicare patient).         Of note, the above list is not in a specific nor sortable order.                   Diagnosis Table, with orders:  1. Type 2 diabetes mellitus with diabetic polyneuropathy, with long-term current use of insulin ([facility])  REFERRAL TO GERIATRICS   2. Chronic right-sided low back pain without sciatica  diclofenac sodium 1 % Gel   3. Cognitive impairment  REFERRAL TO GERIATRICS   4. Need for vaccination                   Follow-up:  2 weeks for diabetes / home glucose checks.   Then transition to geriatrics.....              A computerized dictation system may have been used in this note.    Despite review, there may be some errors in spelling or grammar.    Cipriano Kerns M.D.  7/15/2021

## 2021-07-15 NOTE — ASSESSMENT & PLAN NOTE
Patient and wife notes that they have noted more cognitive impairment mentation, since hospital visits in May and June 2020.  The patient states that he feels it is related to exercise, and feels that he should exercise more.  However, he has mentioned this before.  He is also contradicted by wife, on some historical questions....  He also has hx of vascular dementia, and some behavioral changes.  .. Wife also having more difficulty managing/understanding medication dosing and recommendations.  - will refer to Geriatrics, to transfer care.     (discussed with patient and wife, and both agree).

## 2021-07-19 ENCOUNTER — ANTICOAGULATION VISIT (OUTPATIENT)
Dept: MEDICAL GROUP | Facility: PHYSICIAN GROUP | Age: 76
End: 2021-07-19
Payer: MEDICARE

## 2021-07-19 VITALS — DIASTOLIC BLOOD PRESSURE: 85 MMHG | SYSTOLIC BLOOD PRESSURE: 138 MMHG | HEART RATE: 64 BPM

## 2021-07-19 DIAGNOSIS — Z95.2 HISTORY OF MITRAL VALVE REPLACEMENT WITH MECHANICAL VALVE: ICD-10-CM

## 2021-07-19 DIAGNOSIS — Z79.01 CHRONIC ANTICOAGULATION: Primary | ICD-10-CM

## 2021-07-19 LAB — INR PPP: 3.2 (ref 2–3.5)

## 2021-07-19 PROCEDURE — 85610 PROTHROMBIN TIME: CPT | Performed by: INTERNAL MEDICINE

## 2021-07-19 PROCEDURE — 93793 ANTICOAG MGMT PT WARFARIN: CPT | Performed by: INTERNAL MEDICINE

## 2021-07-19 PROCEDURE — 99999 PR NO CHARGE: CPT | Performed by: INTERNAL MEDICINE

## 2021-07-19 NOTE — PROGRESS NOTES
Anticoagulation Summary  As of 7/19/2021    INR goal:  2.5-3.5   TTR:  30.8 % (4.1 y)   INR used for dosing:  3.20 (7/19/2021)   Warfarin maintenance plan:  7.5 mg (2.5 mg x 3) every Mon, Fri; 5 mg (2.5 mg x 2) all other days   Weekly warfarin total:  40 mg   Plan last modified:  Casey Birmingham, PharmD (7/12/2021)   Next INR check:  7/26/2021   Target end date:  Indefinite    Indications    Chronic anticoagulation [Z79.01]  History of mitral valve replacement with mechanical valve [Z95.2] [Z95.2]             Anticoagulation Episode Summary     INR check location:      Preferred lab:      Send INR reminders to:      Comments:        Anticoagulation Care Providers     Provider Role Specialty Phone number    Rufino Shine M.D. Referring Internal Medicine [phone]    [facility] Responsible  [phone]        Anticoagulation Patient Findings  Patient Findings     Negatives:  Signs/symptoms of thrombosis, Signs/symptoms of bleeding, Laboratory test error suspected, Change in health, Change in alcohol use, Change in activity, Upcoming invasive procedure, Emergency department visit, Upcoming dental procedure, Missed doses, Extra doses, Change in medications, Change in diet/appetite, Hospital admission, Bruising, Other complaints              HPI:   Mike Rivera seen in clinic today, on anticoagulation therapy with warfarin for stroke prophylaxis due to history of mechanical MVR.    Patient's previous INR was subtherapeutic at 1.6 on 7/12, at which time patient was instructed to bolus x1 then continue regimen.  He returns to clinic today to recheck INR to ensure it is therapeutic and thus preventing possible clotting and/or bleeding/bruising complications.    CHADS-VASc = NA    Does patient have any changes to current medical/health status since last appt (Y/N):  N  Does patient have any signs/symptoms of bleeding and/or thrombosis since the last appt (Y/N):  N  Does patient have any interval  changes to diet or medications since last appt (Y/N):  N  Are there any complications or cost restrictions with current therapy (Y/N):  N     Does patient have [facility] PCP? Yes, Cipriano Quintana (If not, please document discussion that patient must be seen at [facility])       Vitals:  see below    Vitals:    07/19/21 1331   BP: 138/85   BP Location: Right arm   Patient Position: Sitting   BP Cuff Size: Adult   Pulse: 64        Asssessment:      INR therapeutic at 3.2, therefore patient at decreased risk of thrombosis and bleed.   Reason(s) for out of range INR today:  NA      Pt not on antiplatelet therapy.    Medication reconciliation completed today.    Plan:  Pt is to continue with current warfarin dosing regimen.    Follow up:  Because warfarin is a high risk medication and current CHEST guidelines recommend regular monitoring intervals (few days up to 12 weeks), will have patient return to clinic in 1 week to recheck INR.    Belen Slade, Pharmacy Intern  Casey Birmingham, PharmD

## 2021-07-23 ENCOUNTER — PATIENT OUTREACH (OUTPATIENT)
Dept: HEALTH INFORMATION MANAGEMENT | Facility: OTHER | Age: 76
End: 2021-07-23

## 2021-07-23 NOTE — PROGRESS NOTES
Outcome: PT DOES NOT HAVE SCP. Gave wife [facility] numbers to try to get her  established with a pcp over there as she stated he needs someone from geriatrics.    Please transfer to Patient Outreach Team at [phone] when patient returns call.    WebIZ Checked & Epic Updated:  no    [facility] Verified: no    Attempt # 1

## 2021-07-30 ENCOUNTER — APPOINTMENT (OUTPATIENT)
Dept: MEDICAL GROUP | Facility: PHYSICIAN GROUP | Age: 76
End: 2021-07-30
Payer: MEDICARE

## 2021-08-06 ENCOUNTER — ANTICOAGULATION VISIT (OUTPATIENT)
Dept: MEDICAL GROUP | Facility: PHYSICIAN GROUP | Age: 76
End: 2021-08-06
Payer: MEDICARE

## 2021-08-06 DIAGNOSIS — Z95.2 HISTORY OF MITRAL VALVE REPLACEMENT WITH MECHANICAL VALVE: ICD-10-CM

## 2021-08-06 DIAGNOSIS — Z79.01 CHRONIC ANTICOAGULATION: Primary | ICD-10-CM

## 2021-08-06 LAB — INR PPP: 6.4 (ref 2–3.5)

## 2021-08-06 PROCEDURE — 99211 OFF/OP EST MAY X REQ PHY/QHP: CPT | Performed by: INTERNAL MEDICINE

## 2021-08-06 PROCEDURE — 85610 PROTHROMBIN TIME: CPT | Performed by: INTERNAL MEDICINE

## 2021-08-06 NOTE — PROGRESS NOTES
Anticoagulation Summary  As of 2021    INR goal:  2.5-3.5   TTR:  30.6 % (4.1 y)   INR used for dosin.40 (2021)   Warfarin maintenance plan:  7.5 mg (2.5 mg x 3) every Mon, Fri; 5 mg (2.5 mg x 2) all other days   Weekly warfarin total:  40 mg   Plan last modified:  Casey Birmingham, PharmD (2021)   Next INR check:  2021   Target end date:  Indefinite    Indications    Chronic anticoagulation [Z79.01]  History of mitral valve replacement with mechanical valve [Z95.2] [Z95.2]             Anticoagulation Episode Summary     INR check location:      Preferred lab:      Send INR reminders to:      Comments:        Anticoagulation Care Providers     Provider Role Specialty Phone number    Rufino Shine M.D. Referring Internal Medicine [phone]    [facility] Responsible  [phone]        Anticoagulation Patient Findings  Patient Findings     Positives:  Change in alcohol use (8/ bottle of beer), Change in diet/appetite (may have eaten less this week)    Negatives:  Signs/symptoms of thrombosis, Signs/symptoms of bleeding, Laboratory test error suspected, Change in health, Change in activity, Upcoming invasive procedure, Emergency department visit, Upcoming dental procedure, Missed doses, Extra doses, Change in medications, Hospital admission, Bruising, Other complaints              HPI:   Mike Rivera seen in clinic today, on anticoagulation therapy with warfarin for thrombosis prophylaxis due to history of mechanical valve replacement.    Patient's previous INR was therapeutic at 3.2 on , at which time patient was instructed to continue regimen.  He returns to clinic today to recheck INR to ensure it is therapeutic and thus preventing possible clotting and/or bleeding/bruising complications.    CHADS-VASc = na  (unadjusted ischemic stroke risk/year:  na)    Does patient have any changes to current medical/health status since last appt (Y/N):  No  Does patient have any  signs/symptoms of bleeding and/or thrombosis since the last appt (Y/N):  No  Does patient have any interval changes to diet or medications since last appt (Y/N):  No  Are there any complications or cost restrictions with current therapy (Y/N):  No     Does patient have [facility] PCP? Yes, Cipriano Kerns (If not, please document discussion that patient must be seen at [facility])       Vitals:  declined by patient    There were no vitals filed for this visit.     Asssessment:      INR supratherapeutic at 6.4, therefore patient at increased risk of bleed   Reason(s) for out of range INR today:  Unknown, alcohol, decrease intake of food      Pt not on antiplatelet therapy    Medication reconciliation completed today.    Plan:  Hold x2 then continue regimen as detailed above.     Follow up:  Because warfarin is a high risk medication and current CHEST guidelines recommend regular monitoring intervals (few days up to 12 weeks), will have patient return to clinic in 3 days to recheck INR.    Belen Slade, Pharmacy Intern    Casey Birmingham, PharmD

## 2021-08-13 ENCOUNTER — ANTICOAGULATION VISIT (OUTPATIENT)
Dept: MEDICAL GROUP | Facility: PHYSICIAN GROUP | Age: 76
End: 2021-08-13
Payer: MEDICARE

## 2021-08-13 DIAGNOSIS — Z95.2 HISTORY OF MITRAL VALVE REPLACEMENT WITH MECHANICAL VALVE: ICD-10-CM

## 2021-08-13 DIAGNOSIS — Z79.01 CHRONIC ANTICOAGULATION: Primary | ICD-10-CM

## 2021-08-13 LAB — INR PPP: 1.2 (ref 2–3.5)

## 2021-08-13 PROCEDURE — 99211 OFF/OP EST MAY X REQ PHY/QHP: CPT | Performed by: INTERNAL MEDICINE

## 2021-08-13 PROCEDURE — 85610 PROTHROMBIN TIME: CPT | Performed by: INTERNAL MEDICINE

## 2021-08-13 NOTE — PROGRESS NOTES
Anticoagulation Summary  As of 2021    INR goal:  2.5-3.5   TTR:  30.5 % (4.2 y)   INR used for dosin.20 (2021)   Warfarin maintenance plan:  7.5 mg (2.5 mg x 3) every Mon, Fri; 5 mg (2.5 mg x 2) all other days   Weekly warfarin total:  40 mg   Plan last modified:  Casey Birmingham, PharmD (2021)   Next INR check:  2021   Target end date:  Indefinite    Indications    Chronic anticoagulation [Z79.01]  History of mitral valve replacement with mechanical valve [Z95.2] [Z95.2]             Anticoagulation Episode Summary     INR check location:      Preferred lab:      Send INR reminders to:      Comments:        Anticoagulation Care Providers     Provider Role Specialty Phone number    Rufino Shine M.D. Referring Internal Medicine [phone]    [facility] Anticoagulation Services Responsible  [phone]        Anticoagulation Patient Findings  Patient Findings     Negatives:  Signs/symptoms of thrombosis, Signs/symptoms of bleeding, Laboratory test error suspected, Change in health, Change in alcohol use, Change in activity, Upcoming invasive procedure, Emergency department visit, Upcoming dental procedure, Missed doses, Extra doses, Change in medications, Change in diet/appetite, Hospital admission, Bruising, Other complaints        HPI:   Carlos Wyman seen in clinic today, on anticoagulation therapy with warfarin for thrombosis prophylaxis due to history of mechanical valve replacement    Patient's previous INR was supratherapeutic at 6.4 on , at which time patient was instructed to hold two doses.  He returns to clinic today to recheck INR to ensure it is therapeutic and thus preventing possible clotting and/or bleeding/bruising complications.    CHADS-VASc = n/a  (unadjusted ischemic stroke risk/year:  n/a)    Does patient have any changes to current medical/health status since last appt (Y/N):  NO  Does patient have any signs/symptoms of bleeding and/or thrombosis since the last  appt (Y/N):  NO  Does patient have any interval changes to diet or medications since last appt (Y/N):  NO  Are there any complications or cost restrictions with current therapy (Y/N):  NO     Does patient have [facility] PCP? Yes, Dr Cipriano Kerns (If not, please document discussion that patient must be seen at [facility])       Vitals:  denied by pt    There were no vitals filed for this visit.     Asssessment:      INR subtherapeutic at 1.2, therefore at risk of a clot   Reason(s) for out of range INR today:  Held doses and Hx of labile INR      Pt NOT on antiplatelet therapy.    Medication reconciliation completed today.    Plan:  instructed patient to bolus with 10mg x 1 then resume current warfarin regimen      Follow up:  Because warfarin is a high risk medication and current CHEST guidelines recommend regular monitoring intervals (few days up to 12 weeks), will have patient return to clinic in 3 days to recheck INR.    Casey Birmingham, PharmD, BCACP    Aristides Spaulding, Pharmacy Intern

## 2021-08-16 ENCOUNTER — ANTICOAGULATION VISIT (OUTPATIENT)
Dept: MEDICAL GROUP | Facility: PHYSICIAN GROUP | Age: 76
End: 2021-08-16
Payer: MEDICARE

## 2021-08-16 DIAGNOSIS — Z95.2 HISTORY OF MITRAL VALVE REPLACEMENT WITH MECHANICAL VALVE: ICD-10-CM

## 2021-08-16 DIAGNOSIS — Z79.01 CHRONIC ANTICOAGULATION: ICD-10-CM

## 2021-08-16 LAB — INR PPP: 1.4 (ref 2–3.5)

## 2021-08-16 PROCEDURE — 99211 OFF/OP EST MAY X REQ PHY/QHP: CPT | Performed by: INTERNAL MEDICINE

## 2021-08-16 PROCEDURE — 85610 PROTHROMBIN TIME: CPT | Performed by: INTERNAL MEDICINE

## 2021-08-16 NOTE — PROGRESS NOTES
Anticoagulation Summary  As of 2021    INR goal:  2.5-3.5   TTR:  30.5 % (4.2 y)   INR used for dosin.40 (2021)   Warfarin maintenance plan:  7.5 mg (2.5 mg x 3) every Mon, Fri; 5 mg (2.5 mg x 2) all other days   Weekly warfarin total:  40 mg   Plan last modified:  Casey Birmingham, PharmD (2021)   Next INR check:  2021   Target end date:  Indefinite    Indications    Chronic anticoagulation [Z79.01]  History of mitral valve replacement with mechanical valve [Z95.2] [Z95.2]             Anticoagulation Episode Summary     INR check location:      Preferred lab:      Send INR reminders to:      Comments:        Anticoagulation Care Providers     Provider Role Specialty Phone number    Rufino Shine M.D. Referring Internal Medicine [phone]    [facility] Anticoagulation Services Responsible  [phone]        Anticoagulation Patient Findings  Patient Findings     Negatives:  Signs/symptoms of thrombosis, Signs/symptoms of bleeding, Laboratory test error suspected, Change in health, Change in alcohol use, Change in activity, Upcoming invasive procedure, Emergency department visit, Upcoming dental procedure, Missed doses, Extra doses, Change in medications, Change in diet/appetite, Hospital admission, Bruising, Other complaints              HPI:   Mike seen in clinic today, on anticoagulation therapy with warfarin for thrombosis prophylaxis due to history of mechanical valve replacement    Patient's previous INR was subtherapeutic at 1.2 on , at which time patient was instructed to take a bolus of 5 mg then continue back to regular schedule.  He returns to clinic today to recheck INR to ensure it is therapeutic and thus preventing possible clotting and/or bleeding/bruising complications.    CHADS-VASc = n/a  (unadjusted ischemic stroke risk/year:  n/a)    Does patient have any changes to current medical/health status since last appt (Y/N):  NO  Does patient have any signs/symptoms of  bleeding and/or thrombosis since the last appt (Y/N):  NO  Does patient have any interval changes to diet or medications since last appt (Y/N):  NO  Are there any complications or cost restrictions with current therapy (Y/N):  NO     Does patient have [facility] PCP? Yes, Dr. PRADEEP Quintana (If not, please document discussion that patient must be seen at [facility])       Vitals:  pt denied     There were no vitals filed for this visit.     Asssessment:      INR Subtherapeutic at 1.4, therefore at risk of clotting  Reason(s) for out of range INR today:  Etiology unknown, patient suffers from dementia      Pt NOT on antiplatelet therapy .    Medication reconciliation completed today.    Plan:  Patient was advised to increase dose for two days as noted above, then continue on regular regimen.     Follow up:  Because warfarin is a high risk medication and current CHEST guidelines recommend regular monitoring intervals (few days up to 12 weeks), will have patient return to clinic in 4 days to recheck INR.    Casey Birmingham, PharmD, BCACP    Aristides Spaulding, Pharmacy Intern

## 2021-08-27 ENCOUNTER — ANTICOAGULATION VISIT (OUTPATIENT)
Dept: MEDICAL GROUP | Facility: PHYSICIAN GROUP | Age: 76
End: 2021-08-27
Payer: MEDICARE

## 2021-08-27 DIAGNOSIS — Z95.2 HISTORY OF MITRAL VALVE REPLACEMENT WITH MECHANICAL VALVE: ICD-10-CM

## 2021-08-27 DIAGNOSIS — Z79.01 CHRONIC ANTICOAGULATION: Primary | ICD-10-CM

## 2021-08-27 LAB — INR PPP: 2.3 (ref 2–3.5)

## 2021-08-27 PROCEDURE — 85610 PROTHROMBIN TIME: CPT | Performed by: INTERNAL MEDICINE

## 2021-08-27 PROCEDURE — 99211 OFF/OP EST MAY X REQ PHY/QHP: CPT | Performed by: INTERNAL MEDICINE

## 2021-08-27 NOTE — PROGRESS NOTES
Anticoagulation Summary  As of 2021    INR goal:  2.5-3.5   TTR:  30.3 % (4.2 y)   INR used for dosin.30 (2021)   Warfarin maintenance plan:  7.5 mg (2.5 mg x 3) every Mon, Fri; 5 mg (2.5 mg x 2) all other days   Weekly warfarin total:  40 mg   Plan last modified:  Casey Birmingham, PharmD (2021)   Next INR check:  9/3/2021   Target end date:  Indefinite    Indications    Chronic anticoagulation [Z79.01]  History of mitral valve replacement with mechanical valve [Z95.2] [Z95.2]             Anticoagulation Episode Summary     INR check location:      Preferred lab:      Send INR reminders to:      Comments:        Anticoagulation Care Providers     Provider Role Specialty Phone number    Rufino Shine M.D. Referring Internal Medicine [phone]    [facility] Anticoagulation Services Responsible  [phone]        Anticoagulation Patient Findings  Patient Findings     Negatives:  Signs/symptoms of thrombosis, Signs/symptoms of bleeding, Laboratory test error suspected, Change in health, Change in alcohol use, Change in activity, Upcoming invasive procedure, Emergency department visit, Upcoming dental procedure, Missed doses, Extra doses, Change in medications, Change in diet/appetite, Hospital admission, Bruising, Other complaints              HPI:   Mike seen in clinic today, on anticoagulation therapy with warfarin for Thrombosis prophylaxis due to history of Mechanical valve replacement    Patient's previous INR was subtherapeutic at 1.4 on , at which time patient was instructed to take a bolus dose of 10 mg on Mon and Tues.  He returns to clinic today to recheck INR to ensure it is therapeutic and thus preventing possible clotting and/or bleeding/bruising complications.    CHADS-VASc = n/a    Does patient have any changes to current medical/health status since last appt (Y/N):  NO  Does patient have any signs/symptoms of bleeding and/or thrombosis since the last appt (Y/N):  NO  Does  patient have any interval changes to diet or medications since last appt (Y/N):  NO  Are there any complications or cost restrictions with current therapy (Y/N):  NO     Does patient have [facility] PCP? Yes, Dr. Kerns (If not, please document discussion that patient must be seen at [facility])       Vitals:  denied by pt    There were no vitals filed for this visit.     Asssessment:      INR subtherapeutic at 2.3, therefore increased risk of thrombosis   Reason(s) for out of range INR today:  Etiology unknown      Pt NOT on antiplatelet therapy    Medication reconciliation completed today.    Plan:  instructed pt to take a bolus dose of 10 mg, then continue with regular regimen thereafter.      Follow up:  Because warfarin is a high risk medication and current CHEST guidelines recommend regular monitoring intervals (few days up to 12 weeks), will have patient return to clinic in 1 week to recheck INR.    Casey Birmingham, PharmD, BCACP    Aristides Spaulding, Pharmacy Intern

## 2021-09-03 ENCOUNTER — ANTICOAGULATION VISIT (OUTPATIENT)
Dept: MEDICAL GROUP | Facility: PHYSICIAN GROUP | Age: 76
End: 2021-09-03
Payer: MEDICARE

## 2021-09-03 DIAGNOSIS — Z79.01 CHRONIC ANTICOAGULATION: Primary | ICD-10-CM

## 2021-09-03 DIAGNOSIS — Z95.2 HISTORY OF MITRAL VALVE REPLACEMENT WITH MECHANICAL VALVE: ICD-10-CM

## 2021-09-03 LAB — INR PPP: 3.6 (ref 2–3.5)

## 2021-09-03 PROCEDURE — 85610 PROTHROMBIN TIME: CPT | Performed by: FAMILY MEDICINE

## 2021-09-03 PROCEDURE — 99211 OFF/OP EST MAY X REQ PHY/QHP: CPT | Performed by: FAMILY MEDICINE

## 2021-09-03 NOTE — PROGRESS NOTES
Anticoagulation Summary  As of 9/3/2021    INR goal:  2.5-3.5   TTR:  30.5 % (4.2 y)   INR used for dosing:  3.60 (9/3/2021)   Warfarin maintenance plan:  7.5 mg (2.5 mg x 3) every Mon, Fri; 5 mg (2.5 mg x 2) all other days   Weekly warfarin total:  40 mg   Plan last modified:  Casey Birmingham, PharmD (7/12/2021)   Next INR check:  9/13/2021   Target end date:  Indefinite    Indications    Chronic anticoagulation [Z79.01]  History of mitral valve replacement with mechanical valve [Z95.2] [Z95.2]             Anticoagulation Episode Summary     INR check location:      Preferred lab:      Send INR reminders to:      Comments:        Anticoagulation Care Providers     Provider Role Specialty Phone number    Rufino Shine M.D. Referring Internal Medicine [phone]    [facility] Anticoagulation Services Responsible  [phone]        Anticoagulation Patient Findings  Patient Findings     Negatives:  Signs/symptoms of thrombosis, Signs/symptoms of bleeding, Laboratory test error suspected, Change in health, Change in alcohol use, Change in activity, Upcoming invasive procedure, Emergency department visit, Upcoming dental procedure, Missed doses, Extra doses, Change in medications, Change in diet/appetite, Hospital admission, Bruising, Other complaints        HPI:   Carlos Rivera seen in clinic today, on anticoagulation therapy with warfarin for stroke prophylaxis due to history of mechanical MVR.    Patient's previous INR was subtherapeutic at 2.3 on 8-27-21, at which time patient was instructed to bolus with one dose, then resume current warfarin regimen.  He returns to clinic today to recheck INR to ensure it is therapeutic and thus preventing possible clotting and/or bleeding/bruising complications.    CHADS-VASc = n/a  (unadjusted ischemic stroke risk/year:  n/a)    Does patient have any changes to current medical/health status since last appt (Y/N):  No  Does patient have any signs/symptoms of bleeding and/or  thrombosis since the last appt (Y/N):  NO  Does patient have any interval changes to diet or medications since last appt (Y/N):  NO  Are there any complications or cost restrictions with current therapy (Y/N):  NO     Does patient have [facility] PCP? Yes, Dr Cipriano Kerns (If not, please document discussion that patient must be seen at [facility])       Vitals:  declined at today's visit.    There were no vitals filed for this visit.     Asssessment:      INR slightly supratherapeutic at 3.6, therefore increasing patient's bleeding risk.   Reason(s) for out of range INR today:  Hx of labile INR.      Pt NOT on antiplatelet therapy.    Medication reconciliation completed today.    Plan:  Instructed patient to continue with current warfarin regimen given hx of labile INR and 0.1 from goal.     Follow up:  Because warfarin is a high risk medication and current CHEST guidelines recommend regular monitoring intervals (few days up to 12 weeks), will have patient return to clinic in 1.5 weeks to recheck INR.    Casey Birmingham, PharmD, BCACP

## 2021-09-07 NOTE — TELEPHONE ENCOUNTER
Please let me know what would you like us to do since the first available is in November. Thank you    What Type Of Note Output Would You Prefer (Optional)?: Standard Output What Is The Reason For Today's Visit?: Full Body Skin Examination What Is The Reason For Today's Visit? (Being Monitored For X): concerning skin lesions on an annual basis

## 2021-09-13 ENCOUNTER — HOSPITAL ENCOUNTER (OUTPATIENT)
Dept: LAB | Facility: MEDICAL CENTER | Age: 76
End: 2021-09-13
Payer: MEDICARE

## 2021-09-13 DIAGNOSIS — Z79.4 TYPE 2 DIABETES MELLITUS WITH DIABETIC POLYNEUROPATHY, WITH LONG-TERM CURRENT USE OF INSULIN (HCC): ICD-10-CM

## 2021-09-13 DIAGNOSIS — E11.42 TYPE 2 DIABETES MELLITUS WITH DIABETIC POLYNEUROPATHY, WITH LONG-TERM CURRENT USE OF INSULIN (HCC): ICD-10-CM

## 2021-09-13 LAB
ALBUMIN SERPL BCP-MCNC: 4.4 G/DL (ref 3.2–4.9)
ALBUMIN/GLOB SERPL: 1.7 G/DL
ALP SERPL-CCNC: 112 U/L
ALT SERPL-CCNC: 56 U/L (ref 2–50)
ANION GAP SERPL CALC-SCNC: 10 MMOL/L (ref 7–16)
APPEARANCE UR: CLEAR
AST SERPL-CCNC: 38 U/L (ref 12–45)
BACTERIA #/AREA URNS HPF: NEGATIVE /HPF
BASOPHILS # BLD AUTO: 0.8 % (ref 0–1.8)
BASOPHILS # BLD: 0.05 K/UL (ref 0–0.12)
BILIRUB SERPL-MCNC: 0.5 MG/DL (ref 0.1–1.5)
BILIRUB UR QL STRIP.AUTO: NEGATIVE
BUN SERPL-MCNC: 33 MG/DL (ref 8–22)
CALCIUM SERPL-MCNC: 9.4 MG/DL (ref 8.5–10.5)
CHLORIDE SERPL-SCNC: 101 MMOL/L (ref 96–112)
CO2 SERPL-SCNC: 27 MMOL/L (ref 20–33)
COLOR UR: YELLOW
CREAT SERPL-MCNC: 1.01 MG/DL (ref 0.5–1.4)
EOSINOPHIL # BLD AUTO: 0.42 K/UL
EOSINOPHIL NFR BLD: 6.7 % (ref 0–6.9)
EPI CELLS #/AREA URNS HPF: NEGATIVE /HPF
ERYTHROCYTE [DISTWIDTH] IN BLOOD BY AUTOMATED COUNT: 47.2 FL (ref 35.9–50)
EST. AVERAGE GLUCOSE BLD GHB EST-MCNC: 249 MG/DL
GLOBULIN SER CALC-MCNC: 2.6 G/DL (ref 1.9–3.5)
GLUCOSE SERPL-MCNC: 198 MG/DL (ref 65–99)
GLUCOSE UR STRIP.AUTO-MCNC: 100 MG/DL
HBA1C MFR BLD: 10.3 % (ref 4–5.6)
HCT VFR BLD AUTO: 48.2 % (ref 42–52)
HGB BLD-MCNC: 16.1 G/DL (ref 14–18)
HYALINE CASTS #/AREA URNS LPF: ABNORMAL /LPF
IMM GRANULOCYTES # BLD AUTO: 0.03 K/UL (ref 0–0.11)
IMM GRANULOCYTES NFR BLD AUTO: 0.5 % (ref 0–0.9)
KETONES UR STRIP.AUTO-MCNC: NEGATIVE MG/DL
LEUKOCYTE ESTERASE UR QL STRIP.AUTO: NEGATIVE
LYMPHOCYTES # BLD AUTO: 1.28 K/UL (ref 1–4.8)
LYMPHOCYTES NFR BLD: 20.4 % (ref 22–41)
MCH RBC QN AUTO: 33.3 PG (ref 27–33)
MCHC RBC AUTO-ENTMCNC: 33.4 G/DL (ref 33.6–35)
MCV RBC AUTO: 99.6 FL (ref 81.4–97.8)
MICRO URNS: ABNORMAL
MONOCYTES # BLD AUTO: 0.49 K/UL (ref 0–0.85)
MONOCYTES NFR BLD AUTO: 7.8 % (ref 0–13.4)
NEUTROPHILS # BLD AUTO: 3.99 K/UL (ref 1.82–7.42)
NEUTROPHILS NFR BLD: 63.8 % (ref 44–72)
NITRITE UR QL STRIP.AUTO: NEGATIVE
NRBC # BLD AUTO: 0 K/UL
NRBC BLD-RTO: 0 /100 WBC
PH UR STRIP.AUTO: 5.5 [PH] (ref 5–8)
PLATELET # BLD AUTO: 186 K/UL (ref 164–446)
PMV BLD AUTO: 10.8 FL (ref 9–12.9)
POTASSIUM SERPL-SCNC: 5 MMOL/L (ref 3.6–5.5)
PROT SERPL-MCNC: 7 G/DL (ref 6–8.2)
PROT UR QL STRIP: 100 MG/DL
RBC # BLD AUTO: 4.84 M/UL (ref 4.7–6.1)
RBC # URNS HPF: ABNORMAL /HPF
RBC UR QL AUTO: NEGATIVE
SODIUM SERPL-SCNC: 138 MMOL/L (ref 135–145)
SP GR UR STRIP.AUTO: 1.02
TSH SERPL DL<=0.005 MIU/L-ACNC: 1.67 UIU/ML (ref 0.38–5.33)
UROBILINOGEN UR STRIP.AUTO-MCNC: 1 MG/DL
VIT B12 SERPL-MCNC: 1426 PG/ML (ref 211–911)
WBC # BLD AUTO: 6.3 K/UL (ref 4.8–10.8)
WBC #/AREA URNS HPF: ABNORMAL /HPF

## 2021-09-13 PROCEDURE — 84443 ASSAY THYROID STIM HORMONE: CPT

## 2021-09-13 PROCEDURE — 80053 COMPREHEN METABOLIC PANEL: CPT

## 2021-09-13 PROCEDURE — 82607 VITAMIN B-12: CPT

## 2021-09-13 PROCEDURE — 36415 COLL VENOUS BLD VENIPUNCTURE: CPT

## 2021-09-13 PROCEDURE — 85025 COMPLETE CBC W/AUTO DIFF WBC: CPT

## 2021-09-13 PROCEDURE — 83036 HEMOGLOBIN GLYCOSYLATED A1C: CPT | Mod: GA

## 2021-09-13 PROCEDURE — 81001 URINALYSIS AUTO W/SCOPE: CPT

## 2021-09-20 PROBLEM — M54.40 CHRONIC RIGHT-SIDED LOW BACK PAIN WITH SCIATICA: Status: ACTIVE | Noted: 2021-07-15

## 2021-10-01 ENCOUNTER — ANTICOAGULATION VISIT (OUTPATIENT)
Dept: MEDICAL GROUP | Facility: PHYSICIAN GROUP | Age: 76
End: 2021-10-01
Payer: MEDICARE

## 2021-10-01 DIAGNOSIS — Z79.01 CHRONIC ANTICOAGULATION: ICD-10-CM

## 2021-10-01 DIAGNOSIS — Z95.2 HISTORY OF MITRAL VALVE REPLACEMENT WITH MECHANICAL VALVE: ICD-10-CM

## 2021-10-01 LAB — INR PPP: 1.9 (ref 2–3.5)

## 2021-10-01 PROCEDURE — 99211 OFF/OP EST MAY X REQ PHY/QHP: CPT | Performed by: INTERNAL MEDICINE

## 2021-10-01 PROCEDURE — 85610 PROTHROMBIN TIME: CPT | Performed by: INTERNAL MEDICINE

## 2021-10-01 NOTE — PROGRESS NOTES
OP Anticoagulation Service Note    Date: 10/1/2021    Anticoagulation Summary  As of 10/1/2021    INR goal:  2.5-3.5   TTR:  31.0 % (4.3 y)   INR used for dosin.90 (10/1/2021)   Warfarin maintenance plan:  7.5 mg (2.5 mg x 3) every Mon, Fri; 5 mg (2.5 mg x 2) all other days   Weekly warfarin total:  40 mg   Plan last modified:  Casey Birmingham, PharmD (2021)   Next INR check:  10/8/2021   Target end date:  Indefinite    Indications    Chronic anticoagulation [Z79.01]  History of mitral valve replacement with mechanical valve [Z95.2] [Z95.2]             Anticoagulation Episode Summary     INR check location:      Preferred lab:      Send INR reminders to:      Comments:        Anticoagulation Care Providers     Provider Role Specialty Phone number    Rufino Shine M.D. Referring Internal Medicine [phone]    [facility] Anticoagulation Services Responsible  [phone]        Anticoagulation Patient Findings  Patient Findings     Negatives:  Signs/symptoms of thrombosis, Signs/symptoms of bleeding, Laboratory test error suspected, Change in health, Change in alcohol use, Change in activity, Upcoming invasive procedure, Emergency department visit, Upcoming dental procedure, Missed doses, Extra doses, Change in medications, Change in diet/appetite, Hospital admission, Bruising, Other complaints          HPI:   Carlos Rivera is in the Anticoagulation Clinic today for a INR check on their anticoagulation therapy.     The reason for today's visit is to prevent morbidity and mortality from a blood clot and/or stroke and to reduce the risk of bleeding while on a anticoagulant.     PCP:  ABIGAIL Cabral  [address] [facility] [id number] [phone]    3 vitals included with today's appt-unless patient declined:  (BP, HR, weight, ht, RR)   There were no vitals filed for this visit.    Assessment:   INR  SUB-therapeutic.   Confirmed warfarin dosing regimen: Yes  Interval Changes with foods rich in  vitamin K: No  Interval Changes in ETOH or cranberries:   No  Interval Changes in smoking status:  No  Interval Changes in medication:  No   S/S of bleeding or bruising:  No  Signs/symptoms  thrombosis since the last appt:  No  Any upcoming procedures that require stopping warfarin and/or using bridge therapy: None    Pt is not on antiplatelet therapy.    Plan:  Instructed pt to bolus x 2 doses w/ 10 mg today and 7.5 mg tomorrow. He will then continue on w/ his current regimen.    Follow up:  Follow up appointment in 1 week(s).       Other info:  Pt educated to contact our clinic with any changes in medications or s/s of bleeding or thrombosis.  Education was provided today regarding tips to reduce their bleed risk and dietary constraints while on a anticoagulant.    National Recommendations:  The CHEST guidelines recommends frequent INR monitoring at regular intervals (a few days up to a max of 12 weeks) to ensure patients are on the proper dose of warfarin, and patients are not having any complications from therapy.  INRs can dramatically change over a short time period due to diet, medications, and medical conditions.     Doug Kelly, PharmD    [facility]  Phone [phone] fax [phone]

## 2021-10-08 ENCOUNTER — ANTICOAGULATION VISIT (OUTPATIENT)
Dept: MEDICAL GROUP | Facility: PHYSICIAN GROUP | Age: 76
End: 2021-10-08
Payer: MEDICARE

## 2021-10-08 DIAGNOSIS — Z79.01 CHRONIC ANTICOAGULATION: Primary | ICD-10-CM

## 2021-10-08 DIAGNOSIS — Z95.2 HISTORY OF MITRAL VALVE REPLACEMENT WITH MECHANICAL VALVE: ICD-10-CM

## 2021-10-08 LAB — INR PPP: 5 (ref 2–3.5)

## 2021-10-08 PROCEDURE — 99211 OFF/OP EST MAY X REQ PHY/QHP: CPT | Performed by: INTERNAL MEDICINE

## 2021-10-08 PROCEDURE — 85610 PROTHROMBIN TIME: CPT | Performed by: INTERNAL MEDICINE

## 2021-10-08 NOTE — PROGRESS NOTES
Anticoagulation Summary  As of 10/8/2021    INR goal:  2.5-3.5   TTR:  31.0 % (4.3 y)   INR used for dosin.00 (10/8/2021)   Warfarin maintenance plan:  7.5 mg (2.5 mg x 3) every Mon, Fri; 5 mg (2.5 mg x 2) all other days   Weekly warfarin total:  40 mg   Plan last modified:  Casey Birmingham, PharmD (2021)   Next INR check:  10/15/2021   Target end date:  Indefinite    Indications    Chronic anticoagulation [Z79.01]  History of mitral valve replacement with mechanical valve [Z95.2] [Z95.2]             Anticoagulation Episode Summary     INR check location:      Preferred lab:      Send INR reminders to:      Comments:        Anticoagulation Care Providers     Provider Role Specialty Phone number    Rufino Shine M.D. Referring Internal Medicine [phone]    [facility] Anticoagulation Services Responsible  [phone]        Anticoagulation Patient Findings  Patient Findings     Negatives:  Signs/symptoms of thrombosis, Signs/symptoms of bleeding, Laboratory test error suspected, Change in health, Change in alcohol use, Change in activity, Upcoming invasive procedure, Emergency department visit, Upcoming dental procedure, Missed doses, Extra doses, Change in medications, Change in diet/appetite, Hospital admission, Bruising, Other complaints        HPI:   Mike Rivera seen in clinic today, on anticoagulation therapy with warfarin for stroke prophylaxis due to history of mechanical MVR.    Patient's previous INR was subtherapeutic at 1.9 on 10-1-21, at which time patient was instructed to bolus with two doses, then resume current warfarin regimen.  He returns to clinic today to recheck INR to ensure it is therapeutic and thus preventing possible clotting and/or bleeding/bruising complications.    CHADS-VASc = n/a  (unadjusted ischemic stroke risk/year:  n/a)    Does patient have any changes to current medical/health status since last appt (Y/N):  NO  Does patient have any signs/symptoms of bleeding and/or  thrombosis since the last appt (Y/N):  NO  Does patient have any interval changes to diet or medications since last appt (Y/N):  NO  Are there any complications or cost restrictions with current therapy (Y/N):  NO     Does patient have [facility] PCP? Yes, Madhu AMARAL (If not, please document discussion that patient must be seen at [facility])       Vitals:  see below    There were no vitals filed for this visit.     Asssessment:      INR supratherapeutic at 5.0, therefore increasing patient's bleeding risk.   Reason(s) for out of range INR today:  Hx of labile INR, bolus doses last week.      Pt is not on antiplatelet therapy    Medication reconciliation completed today.    Plan:  Instructed patient to HOLD X 1, then resume current warfarin regimen as detailed above.     Follow up:  Because warfarin is a high risk medication and current CHEST guidelines recommend regular monitoring intervals (few days up to 12 weeks), will have patient return to clinic in 1 weeks to recheck INR.    Casey Birmingham, PharmD, BCACP

## 2021-10-09 DIAGNOSIS — Z79.01 CHRONIC ANTICOAGULATION: ICD-10-CM

## 2021-10-11 RX ORDER — WARFARIN SODIUM 2.5 MG/1
TABLET ORAL
Qty: 270 TABLET | Refills: 1 | Status: SHIPPED | OUTPATIENT
Start: 2021-10-11 | End: 2022-03-28 | Stop reason: SDUPTHER

## 2021-10-13 NOTE — ASSESSMENT & PLAN NOTE
Carlos is here for follow up on his DM. He forgot to bring his blood sugar log. He is saying he wants to increase his exercise regimen again. Is continuing to self titrate his NPH.    no

## 2021-10-18 ENCOUNTER — ANTICOAGULATION VISIT (OUTPATIENT)
Dept: MEDICAL GROUP | Facility: PHYSICIAN GROUP | Age: 76
End: 2021-10-18
Payer: MEDICARE

## 2021-10-18 DIAGNOSIS — Z79.01 CHRONIC ANTICOAGULATION: ICD-10-CM

## 2021-10-18 DIAGNOSIS — Z95.2 HISTORY OF MITRAL VALVE REPLACEMENT WITH MECHANICAL VALVE: ICD-10-CM

## 2021-10-18 LAB — INR PPP: 5.3 (ref 2–3.5)

## 2021-10-18 PROCEDURE — 99211 OFF/OP EST MAY X REQ PHY/QHP: CPT | Performed by: INTERNAL MEDICINE

## 2021-10-18 PROCEDURE — 85610 PROTHROMBIN TIME: CPT | Performed by: INTERNAL MEDICINE

## 2021-10-18 NOTE — PROGRESS NOTES
OP Anticoagulation Service Note    Date: 10/18/2021    Anticoagulation Summary  As of 10/18/2021    INR goal:  2.5-3.5   TTR:  30.8 % (4.3 y)   INR used for dosin.30 (10/18/2021)   Warfarin maintenance plan:  7.5 mg (2.5 mg x 3) every Mon; 5 mg (2.5 mg x 2) all other days   Weekly warfarin total:  37.5 mg   Plan last modified:  Doug Kelly, PharmD (10/18/2021)   Next INR check:  10/25/2021   Target end date:  Indefinite    Indications    Chronic anticoagulation [Z79.01]  History of mitral valve replacement with mechanical valve [Z95.2] [Z95.2]             Anticoagulation Episode Summary     INR check location:      Preferred lab:      Send INR reminders to:      Comments:        Anticoagulation Care Providers     Provider Role Specialty Phone number    Rufino Shine M.D. Referring Internal Medicine [phone]    [facility] Anticoagulation Services Responsible  [phone]        Anticoagulation Patient Findings      HPI:   Carlos Rivera is in the Anticoagulation Clinic today for a INR check on their anticoagulation therapy.     The reason for today's visit is to prevent morbidity and mortality from a blood clot and/or stroke and to reduce the risk of bleeding while on a anticoagulant.     PCP:  ABIGAIL Cabral  [address] [facility] [id number] [phone]    3 vitals included with today's appt-unless patient declined:  (BP, HR, weight, ht, RR)   There were no vitals filed for this visit.    Assessment:   INR  SUPRA-therapeutic.   Confirmed warfarin dosing regimen: Yes  Interval Changes with foods rich in vitamin K: No  Interval Changes in ETOH or cranberries:   No  Interval Changes in smoking status:  No  Interval Changes in medication:  No   S/S of bleeding or bruising:  No  Signs/symptoms  thrombosis since the last appt:  No  Any upcoming procedures that require stopping warfarin and/or using bridge therapy: None    Pt is not on antiplatelet therapy.    Plan:  Instructed pt to hold x 2 doses  and to then begin newly decreased regimen of 7.5 mg Mon and 5 mg ROW.    Follow up:  Follow up appointment in 1 week(s).       Other info:  Pt educated to contact our clinic with any changes in medications or s/s of bleeding or thrombosis.  Education was provided today regarding tips to reduce their bleed risk and dietary constraints while on a anticoagulant.    National Recommendations:  The CHEST guidelines recommends frequent INR monitoring at regular intervals (a few days up to a max of 12 weeks) to ensure patients are on the proper dose of warfarin, and patients are not having any complications from therapy.  INRs can dramatically change over a short time period due to diet, medications, and medical conditions.     Doug Kelly, PharmD    [facility]  Phone [phone] fax [phone]

## 2021-10-25 ENCOUNTER — ANTICOAGULATION VISIT (OUTPATIENT)
Dept: MEDICAL GROUP | Facility: PHYSICIAN GROUP | Age: 76
End: 2021-10-25
Payer: MEDICARE

## 2021-10-25 DIAGNOSIS — Z95.2 HISTORY OF MITRAL VALVE REPLACEMENT WITH MECHANICAL VALVE: ICD-10-CM

## 2021-10-25 DIAGNOSIS — Z79.01 CHRONIC ANTICOAGULATION: Primary | ICD-10-CM

## 2021-10-25 LAB — INR PPP: 2.9 (ref 2–3.5)

## 2021-10-25 PROCEDURE — 93793 ANTICOAG MGMT PT WARFARIN: CPT | Performed by: INTERNAL MEDICINE

## 2021-10-25 PROCEDURE — 99999 PR NO CHARGE: CPT | Performed by: INTERNAL MEDICINE

## 2021-10-25 PROCEDURE — 85610 PROTHROMBIN TIME: CPT | Performed by: INTERNAL MEDICINE

## 2021-10-25 NOTE — PROGRESS NOTES
Anticoagulation Summary  As of 10/25/2021    INR goal:  2.5-3.5   TTR:  30.8 % (4.4 y)   INR used for dosin.90 (10/25/2021)   Warfarin maintenance plan:  5 mg (2.5 mg x 2) every day   Weekly warfarin total:  35 mg   Plan last modified:  Casey Birmingham, PharmD (10/25/2021)   Next INR check:  2021   Target end date:  Indefinite    Indications    Chronic anticoagulation [Z79.01]  History of mitral valve replacement with mechanical valve [Z95.2] [Z95.2]             Anticoagulation Episode Summary     INR check location:      Preferred lab:      Send INR reminders to:      Comments:        Anticoagulation Care Providers     Provider Role Specialty Phone number    Rufino Shine M.D. Referring Internal Medicine [phone]    [facility] Responsible  [phone]        Anticoagulation Patient Findings  Patient Findings     Negatives:  Signs/symptoms of thrombosis, Signs/symptoms of bleeding, Laboratory test error suspected, Change in health, Change in alcohol use, Change in activity, Upcoming invasive procedure, Emergency department visit, Upcoming dental procedure, Missed doses, Extra doses, Change in medications, Change in diet/appetite, Hospital admission, Bruising, Other complaints        HPI:   Mike Rivera seen in clinic today, on anticoagulation therapy with warfarin for stroke prophylaxis due to history of mechanical MVR.    Patient's previous INR was supratherapeutic at 5.3 on 10-18-21, at which time patient was instructed to hold two dose, then decrease weekly warfarin regimen.  HE returns to clinic today to recheck INR to ensure it is therapeutic and thus preventing possible clotting and/or bleeding/bruising complications.    CHADS-VASc = n/a  (unadjusted ischemic stroke risk/year:  n/a)    Does patient have any changes to current medical/health status since last appt (Y/N):  NO  Does patient have any signs/symptoms of bleeding and/or thrombosis since the last appt (Y/N):   NO  Does patient have any interval changes to diet or medications since last appt (Y/N):  NO  Are there any complications or cost restrictions with current therapy (Y/N):  NO     Does patient have [facility] PCP? Yes, Madhu AMARAL (If not, please document discussion that patient must be seen at [facility])       Vitals:  declined at today's visit.    There were no vitals filed for this visit.     Asssessment:      INR therapeutic at 2.9, therefore decreasing patient's stroke and/or bleeding risk.   Reason(s) for out of range INR today:  n/a      Pt is not on antiplatelet therapy    Medication reconciliation completed today.    Plan:  Instructed patient to decrease weekly warfarin regimen as detailed above.     Follow up:  Because warfarin is a high risk medication and current CHEST guidelines recommend regular monitoring intervals (few days up to 12 weeks), will have patient return to clinic in 1.5 weeks to recheck INR (per patient and clinic availability).    Casey Birmingham, PharmD, BCACP

## 2021-11-12 ENCOUNTER — APPOINTMENT (OUTPATIENT)
Dept: RADIOLOGY | Facility: MEDICAL CENTER | Age: 76
End: 2021-11-12
Attending: EMERGENCY MEDICINE
Payer: MEDICARE

## 2021-11-12 ENCOUNTER — HOSPITAL ENCOUNTER (EMERGENCY)
Facility: MEDICAL CENTER | Age: 76
End: 2021-11-12
Attending: EMERGENCY MEDICINE
Payer: MEDICARE

## 2021-11-12 VITALS
DIASTOLIC BLOOD PRESSURE: 68 MMHG | RESPIRATION RATE: 18 BRPM | WEIGHT: 114.64 LBS | BODY MASS INDEX: 17.37 KG/M2 | HEIGHT: 68 IN | HEART RATE: 63 BPM | TEMPERATURE: 98 F | SYSTOLIC BLOOD PRESSURE: 148 MMHG | OXYGEN SATURATION: 92 %

## 2021-11-12 DIAGNOSIS — M54.42 CHRONIC BILATERAL LOW BACK PAIN WITH BILATERAL SCIATICA: ICD-10-CM

## 2021-11-12 DIAGNOSIS — I48.92 ATRIAL FLUTTER, UNSPECIFIED TYPE (HCC): ICD-10-CM

## 2021-11-12 DIAGNOSIS — M54.41 CHRONIC BILATERAL LOW BACK PAIN WITH BILATERAL SCIATICA: ICD-10-CM

## 2021-11-12 DIAGNOSIS — Z79.01 ANTICOAGULATED: ICD-10-CM

## 2021-11-12 DIAGNOSIS — G89.29 CHRONIC BILATERAL LOW BACK PAIN WITH BILATERAL SCIATICA: ICD-10-CM

## 2021-11-12 DIAGNOSIS — R42 VERTIGO: ICD-10-CM

## 2021-11-12 LAB
ALBUMIN SERPL BCP-MCNC: 4.8 G/DL (ref 3.2–4.9)
ALBUMIN/GLOB SERPL: 1.9 G/DL
ALP SERPL-CCNC: 138 U/L
ALT SERPL-CCNC: 77 U/L (ref 2–50)
ANION GAP SERPL CALC-SCNC: 11 MMOL/L (ref 7–16)
AST SERPL-CCNC: 58 U/L (ref 12–45)
B-OH-BUTYR SERPL-MCNC: 0.27 MMOL/L (ref 0.02–0.27)
BASOPHILS # BLD AUTO: 0.5 % (ref 0–1.8)
BASOPHILS # BLD: 0.04 K/UL (ref 0–0.12)
BILIRUB SERPL-MCNC: 0.5 MG/DL (ref 0.1–1.5)
BUN SERPL-MCNC: 46 MG/DL (ref 8–22)
CALCIUM SERPL-MCNC: 9.5 MG/DL (ref 8.5–10.5)
CHLORIDE SERPL-SCNC: 99 MMOL/L (ref 96–112)
CO2 SERPL-SCNC: 24 MMOL/L (ref 20–33)
CREAT SERPL-MCNC: 1.16 MG/DL (ref 0.5–1.4)
EKG IMPRESSION: NORMAL
EOSINOPHIL # BLD AUTO: 0.24 K/UL
EOSINOPHIL NFR BLD: 3.2 % (ref 0–6.9)
ERYTHROCYTE [DISTWIDTH] IN BLOOD BY AUTOMATED COUNT: 46 FL (ref 35.9–50)
EST. AVERAGE GLUCOSE BLD GHB EST-MCNC: 246 MG/DL
GLOBULIN SER CALC-MCNC: 2.5 G/DL (ref 1.9–3.5)
GLUCOSE BLD-MCNC: 345 MG/DL (ref 65–99)
GLUCOSE SERPL-MCNC: 353 MG/DL (ref 65–99)
HBA1C MFR BLD: 10.2 % (ref 4–5.6)
HCT VFR BLD AUTO: 49.7 % (ref 42–52)
HGB BLD-MCNC: 16.8 G/DL (ref 14–18)
IMM GRANULOCYTES # BLD AUTO: 0.03 K/UL (ref 0–0.11)
IMM GRANULOCYTES NFR BLD AUTO: 0.4 % (ref 0–0.9)
INR PPP: 2.07 (ref 0.87–1.13)
LYMPHOCYTES # BLD AUTO: 0.79 K/UL (ref 1–4.8)
LYMPHOCYTES NFR BLD: 10.5 % (ref 22–41)
MAGNESIUM SERPL-MCNC: 2.1 MG/DL (ref 1.5–2.5)
MCH RBC QN AUTO: 33.3 PG (ref 27–33)
MCHC RBC AUTO-ENTMCNC: 33.8 G/DL (ref 33.6–35)
MCV RBC AUTO: 98.4 FL (ref 81.4–97.8)
MONOCYTES # BLD AUTO: 0.56 K/UL (ref 0–0.85)
MONOCYTES NFR BLD AUTO: 7.4 % (ref 0–13.4)
NEUTROPHILS # BLD AUTO: 5.89 K/UL (ref 1.82–7.42)
NEUTROPHILS NFR BLD: 78 % (ref 44–72)
NRBC # BLD AUTO: 0 K/UL
NRBC BLD-RTO: 0 /100 WBC
PHOSPHATE SERPL-MCNC: 3.6 MG/DL (ref 2.5–4.5)
PLATELET # BLD AUTO: 170 K/UL (ref 164–446)
PMV BLD AUTO: 10.9 FL (ref 9–12.9)
POTASSIUM SERPL-SCNC: 5.5 MMOL/L (ref 3.6–5.5)
PROT SERPL-MCNC: 7.3 G/DL (ref 6–8.2)
PROTHROMBIN TIME: 22.7 SEC (ref 12–14.6)
RBC # BLD AUTO: 5.05 M/UL (ref 4.7–6.1)
SODIUM SERPL-SCNC: 134 MMOL/L (ref 135–145)
TROPONIN T SERPL-MCNC: 52 NG/L (ref 6–19)
TROPONIN T SERPL-MCNC: 66 NG/L (ref 6–19)
WBC # BLD AUTO: 7.6 K/UL (ref 4.8–10.8)

## 2021-11-12 PROCEDURE — 99285 EMERGENCY DEPT VISIT HI MDM: CPT

## 2021-11-12 PROCEDURE — 70450 CT HEAD/BRAIN W/O DYE: CPT | Mod: MG

## 2021-11-12 PROCEDURE — 85610 PROTHROMBIN TIME: CPT

## 2021-11-12 PROCEDURE — 82010 KETONE BODYS QUAN: CPT

## 2021-11-12 PROCEDURE — 302128 INFUSION PUMP: Performed by: EMERGENCY MEDICINE

## 2021-11-12 PROCEDURE — 85025 COMPLETE CBC W/AUTO DIFF WBC: CPT

## 2021-11-12 PROCEDURE — 80053 COMPREHEN METABOLIC PANEL: CPT

## 2021-11-12 PROCEDURE — 700105 HCHG RX REV CODE 258: Performed by: EMERGENCY MEDICINE

## 2021-11-12 PROCEDURE — 82962 GLUCOSE BLOOD TEST: CPT

## 2021-11-12 PROCEDURE — 93005 ELECTROCARDIOGRAM TRACING: CPT | Performed by: EMERGENCY MEDICINE

## 2021-11-12 PROCEDURE — A9270 NON-COVERED ITEM OR SERVICE: HCPCS | Performed by: EMERGENCY MEDICINE

## 2021-11-12 PROCEDURE — 700102 HCHG RX REV CODE 250 W/ 637 OVERRIDE(OP): Performed by: EMERGENCY MEDICINE

## 2021-11-12 PROCEDURE — 83735 ASSAY OF MAGNESIUM: CPT

## 2021-11-12 PROCEDURE — 72131 CT LUMBAR SPINE W/O DYE: CPT | Mod: ME

## 2021-11-12 PROCEDURE — 84484 ASSAY OF TROPONIN QUANT: CPT

## 2021-11-12 PROCEDURE — 83036 HEMOGLOBIN GLYCOSYLATED A1C: CPT

## 2021-11-12 PROCEDURE — 84100 ASSAY OF PHOSPHORUS: CPT

## 2021-11-12 RX ORDER — MECLIZINE HYDROCHLORIDE 25 MG/1
25 TABLET ORAL ONCE
Status: COMPLETED | OUTPATIENT
Start: 2021-11-12 | End: 2021-11-12

## 2021-11-12 RX ORDER — MECLIZINE HYDROCHLORIDE 25 MG/1
25 TABLET ORAL 3 TIMES DAILY PRN
Qty: 30 TABLET | Refills: 0 | Status: SHIPPED | OUTPATIENT
Start: 2021-11-12 | End: 2021-11-29

## 2021-11-12 RX ORDER — SODIUM CHLORIDE, SODIUM LACTATE, POTASSIUM CHLORIDE, CALCIUM CHLORIDE 600; 310; 30; 20 MG/100ML; MG/100ML; MG/100ML; MG/100ML
1000 INJECTION, SOLUTION INTRAVENOUS ONCE
Status: COMPLETED | OUTPATIENT
Start: 2021-11-12 | End: 2021-11-12

## 2021-11-12 RX ADMIN — MECLIZINE HYDROCHLORIDE 25 MG: 25 TABLET ORAL at 10:17

## 2021-11-12 RX ADMIN — SODIUM CHLORIDE, POTASSIUM CHLORIDE, SODIUM LACTATE AND CALCIUM CHLORIDE 1000 ML: 600; 310; 30; 20 INJECTION, SOLUTION INTRAVENOUS at 10:17

## 2021-11-12 ASSESSMENT — FIBROSIS 4 INDEX: FIB4 SCORE: 2.13

## 2021-11-12 NOTE — ED NOTES
Report from YVES Tipton.    Pt resting comfortably, meds administered, fluids infusing.     Pt requesting something to eat.

## 2021-11-12 NOTE — ED NOTES
Chief Complaint   Patient presents with   • Dizziness   • GLF     fell out of bed this morning   • Hyperglycemia     >300 at home     Agree with triage RN. Pt placed on monitor. ERP at bedside.

## 2021-11-12 NOTE — ED NOTES
"Task RN: pt awake/alert in [facility]. Incontinent of urine which pt states is a baseline issue \"I do therapy for it and I have to wear men's diapers\". Pt ambulated steadily using walker to bathroom. Denies dizziness/weakness with position change or ambulation. Clean gown/linens provided.     RN spoke with wife Comfort on pt's cell phone to discuss poc and need for clean clothing. Wife reports she has a doctors appointment she is on her way to and will come to ED immediately after to collect pt. Primary RN made aware.  "

## 2021-11-12 NOTE — ED PROVIDER NOTES
"ED Provider Note    Scribed for Efren Allen M.D. by Kelechi Shrestha. 2021, 9:10 AM.    Primary care provider: ABIGAIL Cabral  Means of arrival: Walk-in  History obtained from: Patient/Wife  History limited by: None    CHIEF COMPLAINT  Chief Complaint   Patient presents with    Dizziness    GLF     fell out of bed this morning    Hyperglycemia     >300 at home       HPI  Carlos Rivera is a 76 y.o. adult with a history of vertigo and diabetes who presents to the Emergency Department for worsening dizziness. Patient states this morning he got up out of bed when he started to feel \"off-balance, like the room was spinning.\" His symptoms caused him to fall to the ground, although he denies any resulting injuries. His blood sugars recently have been trending in the 380's for the last couple days, although his spouse notes their blood sugar device might be broken or his insulin might be  as they have had it for a year. Spouse also raises some concerns about his recently tramadol prescription, which he was started on a week ago for sciatica to both his legs. He has been experiencing sciatica for longer than a year. They state since he started the tramadol he had seemed confused and his vertigo has worsened since. [facility], and they have been attempting to get a referral to a pain management clinic although they have not been successful. They have gotten referalls to physical therapy although his wife states he has refused to go. Patient is currently on coumadin for mitral valve replacement.     REVIEW OF SYSTEMS  As above otherwise all other systems are negative    PAST MEDICAL HISTORY   has a past medical history of Chronic anticoagulation (2017), History of mitral valve replacement with mechanical valve [Z95.2] (3/10/2017), Hyperlipidemia, and Type II diabetes mellitus (HCC) (2017).    SURGICAL HISTORY   has a past surgical history that " includes mitral valve replacement (1999); inguinal hernia repair (Bilateral, 1984); tonsillectomy; and colonoscopy,diagnostic (N/A, 5/9/2020).    SOCIAL HISTORY  Social History     Tobacco Use    Smoking status: Never Smoker    Smokeless tobacco: Never Used   Vaping Use    Vaping Use: Never used   Substance Use Topics    Alcohol use: Yes     Alcohol/week: 0.0 oz    Drug use: No      Social History     Substance and Sexual Activity   Drug Use No       FAMILY HISTORY  Family History   Problem Relation Age of Onset    Heart Attack Father 58    Diabetes Father        CURRENT MEDICATIONS  Home Medications       Reviewed by Cooper Patel R.N. (Registered Nurse) on 11/12/21 at 0837  Med List Status: Partial     Medication Last Dose Status   Blood Glucose Meter Kit  Active   Blood Glucose Monitoring Suppl (FREESTYLE LITE) Device  Active   Blood Glucose Monitoring Suppl Device  Active   Blood Glucose Test Strips  Active   Continuous Blood Gluc  (FREESTYLE CHARI 14 DAY READER) Device  Active   cyanocobalamin (VITAMIN B-12) 100 MCG Tab  Active   diclofenac sodium 1 % Gel  Active   donepezil (ARICEPT) 5 MG Tab  Active   FreeStyle Lancets Misc  Active   glucose blood strip  Active   insulin glargine (LANTUS) 100 UNIT/ML Solution Pen-injector injection  Active   insulin regular (HUMULIN R) 100 Unit/mL Solution  Active   Insulin Syringes U-100 0.3 mL  Active   Lancets  Active   lisinopril (PRINIVIL) 20 MG Tab  Active   loratadine (CLARITIN) 10 MG Tab  Active   metFORMIN ER (GLUCOPHAGE XR) 500 MG TABLET SR 24 HR  Active   quetiapine (SEROQUEL) 50 MG tablet  Active   traMADol (ULTRAM) 50 MG Tab  Active   vitamin D (VITAMIND D3) 1000 UNIT Tab  Active   warfarin (COUMADIN) 2.5 MG Tab  Active                    ALLERGIES  Allergies   Allergen Reactions    Okra Vomiting    Risperidone      Dystonia, altered mental status    Hydrocodone-Acetaminophen Vomiting       PHYSICAL EXAM  VITAL SIGNS: /67   Pulse 68   Temp  "36.3 °C (97.4 °F) (Temporal)   Resp 16   Ht 1.727 m (5' 8\")   Wt 52 kg (114 lb 10.2 oz)   SpO2 94%   BMI 17.43 kg/m²     Constitutional: Elderly, No acute distress, Non-toxic appearance.   HENT: Normocephalic, Atraumatic, Bilateral external ears normal, oropharynx moist, No oral exudates, Nose normal.   Eyes:conjunctiva is normal, there are no signs of exudate.   Neck: Supple, no meningeal signs.  Lymphatic: No lymphadenopathy noted.   Cardiovascular: Regular rate and rhythm patient does have a mid systolic click left sternal border no gallops or rubs heard  Thorax & Lungs: No respiratory distress. Breathing comfortably. Lungs are clear to auscultation bilaterally, there are no wheezes no rales. Chest wall is nontender.  Abdomen: Soft, nontender, nondistended. Bowel sounds are present.   Skin: Warm, Dry, No erythema,   Back: No tenderness, No CVA tenderness.  Musculoskeletal: Negative straight leg raise to both legs, but hamstrings appears tight. Good range of motion in all major joints. No tenderness to palpation or major deformities noted. Intact distal pulses, no clubbing, no cyanosis, no edema,   Neurologic: Negative [place]-Hallpike maneuver bilaterally but is symptomatic bilaterally. Alert & oriented x 3, Moving all extremities. No gross abnormalities.    Psychiatric: Affect normal, Judgment normal, Mood normal.     LABS  Results for orders placed or performed during the hospital encounter of 11/12/21   CBC WITH DIFFERENTIAL   Result Value Ref Range    WBC 7.6 4.8 - 10.8 K/uL    RBC 5.05 4.70 - 6.10 M/uL    Hemoglobin 16.8 14.0 - 18.0 g/dL    Hematocrit 49.7 42.0 - 52.0 %    MCV 98.4 (H) 81.4 - 97.8 fL    MCH 33.3 (H) 27.0 - 33.0 pg    MCHC 33.8 33.6 - 35.0 g/dL    RDW 46.0 35.9 - 50.0 fL    Platelet Count 170 164 - 446 K/uL    MPV 10.9 9.0 - 12.9 fL    Neutrophils-Polys 78.00 (H) 44.00 - 72.00 %    Lymphocytes 10.50 (L) 22.00 - 41.00 %    Monocytes 7.40 0.00 - 13.40 %    Eosinophils 3.20 0.00 - 6.90 %    " Basophils 0.50 0.00 - 1.80 %    Immature Granulocytes 0.40 0.00 - 0.90 %    Nucleated RBC 0.00 /100 WBC    Neutrophils (Absolute) 5.89 1.82 - 7.42 K/uL    Lymphs (Absolute) 0.79 (L) 1.00 - 4.80 K/uL    Monos (Absolute) 0.56 0.00 - 0.85 K/uL    Eos (Absolute) 0.24 K/uL    Baso (Absolute) 0.04 0.00 - 0.12 K/uL    Immature Granulocytes (abs) 0.03 0.00 - 0.11 K/uL    NRBC (Absolute) 0.00 K/uL   COMP METABOLIC PANEL   Result Value Ref Range    Sodium 134 (L) 135 - 145 mmol/L    Potassium 5.5 3.6 - 5.5 mmol/L    Chloride 99 96 - 112 mmol/L    Co2 24 20 - 33 mmol/L    Anion Gap 11.0 7.0 - 16.0    Glucose 353 (H) 65 - 99 mg/dL    Bun 46 (H) 8 - 22 mg/dL    Creatinine 1.16 0.50 - 1.40 mg/dL    Calcium 9.5 8.5 - 10.5 mg/dL    AST(SGOT) 58 (H) 12 - 45 U/L    ALT(SGPT) 77 (H) 2 - 50 U/L    Alkaline Phosphatase 138 U/L    Total Bilirubin 0.5 0.1 - 1.5 mg/dL    Albumin 4.8 3.2 - 4.9 g/dL    Total Protein 7.3 6.0 - 8.2 g/dL    Globulin 2.5 1.9 - 3.5 g/dL    A-G Ratio 1.9 g/dL   BETA-HYDROXYBUTYRIC ACID   Result Value Ref Range    beta-Hydroxybutyric Acid 0.27 0.02 - 0.27 mmol/L   HEMOGLOBIN A1C   Result Value Ref Range    Glycohemoglobin 10.2 (H) 4.0 - 5.6 %    Est Avg Glucose 246 mg/dL   ESTIMATED GFR   Result Value Ref Range    GFR If African American >60 >60 mL/min/1.73 m 2    GFR If Non African American >60 >60 mL/min/1.73 m 2   PT/INR   Result Value Ref Range    PT 22.7 (H) 12.0 - 14.6 sec    INR 2.07 (H) 0.87 - 1.13   MAGNESIUM   Result Value Ref Range    Magnesium 2.1 1.5 - 2.5 mg/dL   PHOSPHORUS   Result Value Ref Range    Phosphorus 3.6 2.5 - 4.5 mg/dL   TROPONIN   Result Value Ref Range    Troponin T 66 (H) 6 - 19 ng/L   TROPONIN   Result Value Ref Range    Troponin T 52 (H) 6 - 19 ng/L   EKG (NOW)   Result Value Ref Range    Report       [facility] Emergency Dept.    Test Date:  2021-11-12  Pt Name:    LESLEY ARROYO             Department: ER  MRN:        [id number]                      Room:        RD 08  Gender:     Male                         Technician: [id number]  :        [id number]                   Requested By:JESSICA RIVAS  Order #:    [id number]                    Reading MD: JESSICA RIVAS MD    Measurements  Intervals                                Axis  Rate:       62                           P:  UT:                                      QRS:        -64  QRSD:       114                          T:          66  QT:         447  QTc:        454    Interpretive Statements  Atrial flutter with predominant 4:1 AV block  Incomplete left bundle branch block  Left ventricular hypertrophy  Inferior infarct, old  Compared to ECG 2020 14:51:35  AV block, advanced (high-grade) now present  Left bundle-branch block now present  Left ventricular hypertrophy now present  Sinus bradycardia no  longer present  Left anterior fascicular block no longer present  Myocardial infarct finding still present  Electronically Signed On 2021 13:20:32 PST by JESSICA RIVAS MD     POCT glucose device results   Result Value Ref Range    Glucose - Accu-Ck 345 (H) 65 - 99 mg/dL     All labs reviewed by me.    EKG  Interpreted by me as indicated above.    RADIOLOGY  CT-LSPINE W/O PLUS RECONS   Final Result      1.  Moderate degenerative change of lumbar spine with grade 1 anterolisthesis of L4-5.   2.  No acute lumbar spine fracture.      CT-HEAD W/O   Final Result      1.  Diffuse atrophy and white matter changes.   2.  No acute intracranial hemorrhage or territorial infarct.   3.  Small chronic LEFT parietal and occipital infarcts.        The radiologist's interpretation of all radiological studies have been reviewed by me.    COURSE & MEDICAL DECISION MAKING  Pertinent Labs & Imaging studies reviewed. (See chart for details)    9:10 AM - Patient seen and examined at bedside. Patient will be treated with Antivert 25 mg. Ordered CT-head w/o, CT-Lspine w/o, PT/INR, Troponin, BHB acid, Phosphorus, Magnesium,  Hemoglobin A1C, CBC w/ diff, CMP, POCT UA, POCT Glucose, and EKG to evaluate his symptoms. The differential diagnoses include but are not limited to: vertigo, DKA, dehydration    1:04 PM - Updated patient on results which were reassuring. He is feeling improved at this time. Encouraged him to continue following up in outpatient setting. Discussed discharge instructions and return precautions with the patient and they were cleared for discharge. Patient was given the opportunity to ask any further questions. Patient is comfortable with discharge at this time.      HYDRATION: Based on the patient's presentation of Dehydration the patient was given IV fluids. IV Hydration was used because oral hydration was not adequate alone. Upon recheck following hydration, the patient was improved.    Decision Making:  Patient presents the ED for evaluation.  Clinically the patient's complaint is that of more vertigo when he sits up he does feel that the room is spinning and when he is not moving it is very stationary.  At this point I do feel is most likely peripheral vertigo.  CT scan of the head was obtained and there is no changes from his prior work-up.  Secondary concerns as he did have some intermittent atrial flutter did catch it on the EKG he is currently anticoagulated so I recommended that he follow-up with his primary care physician for recheck of this.  Currently he is not tachycardic.  I did give him some fluids because he did appear to be dehydrated on clinical evaluation as well as giving him meclizine.  The patient was reevaluated after the meclizine he is feeling improved.  From the standpoint of his continued back pain I do feel the patient should follow-up with a pain specialist he did have an MRI on his back on 2018 but he states that over the last several months has been having increasing pain with radiation down both legs so I did do a CT scan just to see if there is any changes and there is no significant  changes.  Clinically he does not have signs of cauda equina syndrome he does not have urinary retention he has normal perianal sensation.  He has a negative straight leg raise on both sides.  Neurologically the patient seems to have more of a peripheral vertigo for when he moves he tends to get worse but when he sits still he is improved.  I do feel that this is peripheral vertigo.  I will start the patient on meclizine for this.  He does have the continued bilateral low back pain I recommended that he follow-up with his primary care physician for recheck of his hyperglycemia with possible medication adjustment his hemoglobin A1c is elevated as well he is exhibiting no signs of DKA.  At this point I do feel the patient is stable for discharge recommend to follow-up as above.    The patient will return for new or worsening symptoms and is stable at the time of discharge.    The patient is referred to a primary physician for blood pressure management, diabetic screening, and for all other preventative health concerns.    DISPOSITION:  Patient will be discharged home in stable condition.    FOLLOW UP:  ABIGAIL Cabral  [address] [place] [id number]  [phone]    Schedule an appointment as soon as possible for a visit   For further evaluation, Return if any symptoms worsen    OUTPATIENT MEDICATIONS:  New Prescriptions    MECLIZINE (ANTIVERT) 25 MG TAB    Take 1 Tablet by mouth 3 times a day as needed for Vertigo.        FINAL IMPRESSION  1. Vertigo    2. Atrial flutter, unspecified type (HCC)    3. Chronic bilateral low back pain with bilateral sciatica    4. Anticoagulated          IKelechi (Sharee), am scribing for, and in the presence of, Efren Allen M.D..    Electronically signed by: Kelechi Yousif), 11/12/2021    Efren BENJAMIN M.D. personally performed the services described in this documentation, as scribed by Kelechi Shrestha in my presence, and it is both accurate and  complete.    The note accurately reflects work and decisions made by me.  Efren Allen M.D.  11/12/2021  2:51 PM

## 2021-11-13 NOTE — ED NOTES
Discharge teaching and paperwork provided and all questions/concerns answered. VSS, neuro assessment stable and PIV removed. Patient discharged to the care of self/wife and ambulated out of the ED.

## 2021-12-03 ENCOUNTER — ANTICOAGULATION VISIT (OUTPATIENT)
Dept: MEDICAL GROUP | Facility: PHYSICIAN GROUP | Age: 76
End: 2021-12-03
Payer: MEDICARE

## 2021-12-03 DIAGNOSIS — Z79.01 CHRONIC ANTICOAGULATION: Primary | ICD-10-CM

## 2021-12-03 DIAGNOSIS — Z95.2 HISTORY OF MITRAL VALVE REPLACEMENT WITH MECHANICAL VALVE: ICD-10-CM

## 2021-12-03 LAB — INR PPP: 1.1 (ref 2–3.5)

## 2021-12-03 PROCEDURE — 85610 PROTHROMBIN TIME: CPT | Performed by: INTERNAL MEDICINE

## 2021-12-03 PROCEDURE — 99211 OFF/OP EST MAY X REQ PHY/QHP: CPT | Performed by: INTERNAL MEDICINE

## 2021-12-04 NOTE — PROGRESS NOTES
Anticoagulation Summary  As of 12/3/2021    INR goal:  2.5-3.5   TTR:  30.5 % (4.5 y)   INR used for dosin.10 (12/3/2021)   Warfarin maintenance plan:  5 mg (2.5 mg x 2) every day   Weekly warfarin total:  35 mg   Plan last modified:  Casey Birmingham, PharmD (10/25/2021)   Next INR check:  12/10/2021   Target end date:  Indefinite    Indications    Chronic anticoagulation [Z79.01]  History of mitral valve replacement with mechanical valve [Z95.2] [Z95.2]             Anticoagulation Episode Summary     INR check location:      Preferred lab:      Send INR reminders to:      Comments:        Anticoagulation Care Providers     Provider Role Specialty Phone number    Rufino Shine M.D. Referring Internal Medicine [phone]    [facility] Responsible  [phone]        Anticoagulation Patient Findings  Patient Findings     Negatives:  Signs/symptoms of thrombosis, Signs/symptoms of bleeding, Laboratory test error suspected, Change in health, Change in alcohol use, Change in activity, Upcoming invasive procedure, Emergency department visit, Upcoming dental procedure, Missed doses, Extra doses, Change in medications, Change in diet/appetite, Hospital admission, Bruising, Other complaints        HPI:   Mike Rivera seen in clinic today, on anticoagulation therapy with warfarin for stroke prophylaxis due to history of mechanical MVR.    Patient's previous INR was subtherapeutic at 2.07 on 21, at which time patient was instructed to continue with current warfarin regimen.  He returns to clinic today to recheck INR to ensure it is therapeutic and thus preventing possible clotting and/or bleeding/bruising complications.    CHADS-VASc = n/a  (unadjusted ischemic stroke risk/year:  n/a)    Does patient have any changes to current medical/health status since last appt (Y/N):  NO  Does patient have any signs/symptoms of bleeding and/or thrombosis since the last appt (Y/N):  NO  Does patient have  any interval changes to diet or medications since last appt (Y/N):  NO  Are there any complications or cost restrictions with current therapy (Y/N):  NO     Does patient have [facility] PCP? Yes, Madhu AMARAL (If not, please document discussion that patient must be seen at [facility])       Vitals:  declined at today's visit.    There were no vitals filed for this visit.     Asssessment:      INR subtherapeutic at 1.1, therefore increasing patient's stroke risk.   Reason(s) for out of range INR today:  Highly suspicious for missed doses.      Pt is not on antiplatelet therapy    Medication reconciliation completed today.    Plan:  Instructed patient to bolus with 10mg X 1, 7.5mg X 1, then resume current warfarin regimen.     Follow up:  Because warfarin is a high risk medication and current CHEST guidelines recommend regular monitoring intervals (few days up to 12 weeks), will have patient return to clinic in 1 weeks to recheck INR.  Casey Birmingham, PharmD, BCACP

## 2021-12-10 ENCOUNTER — ANTICOAGULATION VISIT (OUTPATIENT)
Dept: MEDICAL GROUP | Facility: PHYSICIAN GROUP | Age: 76
End: 2021-12-10
Payer: MEDICARE

## 2021-12-10 DIAGNOSIS — Z95.2 HISTORY OF MITRAL VALVE REPLACEMENT WITH MECHANICAL VALVE: ICD-10-CM

## 2021-12-10 DIAGNOSIS — Z79.01 CHRONIC ANTICOAGULATION: Primary | ICD-10-CM

## 2021-12-10 LAB — INR PPP: 1.6 (ref 2–3.5)

## 2021-12-10 PROCEDURE — 99211 OFF/OP EST MAY X REQ PHY/QHP: CPT | Performed by: INTERNAL MEDICINE

## 2021-12-10 PROCEDURE — 85610 PROTHROMBIN TIME: CPT | Performed by: FAMILY MEDICINE

## 2021-12-10 NOTE — PROGRESS NOTES
Anticoagulation Summary  As of 12/10/2021    INR goal:  2.5-3.5   TTR:  30.4 % (4.5 y)   INR used for dosin.60 (12/10/2021)   Warfarin maintenance plan:  7.5 mg (2.5 mg x 3) every Mon, Fri; 5 mg (2.5 mg x 2) all other days   Weekly warfarin total:  40 mg   Plan last modified:  Casey Birmingham, PharmD (12/10/2021)   Next INR check:  2021   Target end date:  Indefinite    Indications    Chronic anticoagulation [Z79.01]  History of mitral valve replacement with mechanical valve [Z95.2] [Z95.2]             Anticoagulation Episode Summary     INR check location:      Preferred lab:      Send INR reminders to:      Comments:        Anticoagulation Care Providers     Provider Role Specialty Phone number    Rufino Shine M.D. Referring Internal Medicine [phone]    [facility]  [phone]        Anticoagulation Patient Findings  Patient Findings     Negatives:  Signs/symptoms of thrombosis, Signs/symptoms of bleeding, Laboratory test error suspected, Change in health, Change in alcohol use, Change in activity, Upcoming invasive procedure, Emergency department visit, Upcoming dental procedure, Missed doses, Extra doses, Change in medications, Change in diet/appetite, Hospital admission, Bruising, Other complaints         HPI:   Mike Rivera seen in clinic today, on anticoagulation therapy with warfarin for stroke prophylaxis due to history of mechanical MVR.    Patient's previous INR was subtherapeutic at 1.1 on 12-3-21, at which time patient was instructed to bolus with two doses, then resume current warfarin regimen.  He returns to clinic today to recheck INR to ensure it is therapeutic and thus preventing possible clotting and/or bleeding/bruising complications.    CHADS-VASc = n/a  (unadjusted ischemic stroke risk/year:  n/a)    Does patient have any changes to current medical/health status since last appt (Y/N):  NO  Does patient have any signs/symptoms of bleeding  and/or thrombosis since the last appt (Y/N):  NO  Does patient have any interval changes to diet or medications since last appt (Y/N):  NO  Are there any complications or cost restrictions with current therapy (Y/N):  NO     Does patient have [facility] PCP? Yes, Madhu AMARAL (If not, please document discussion that patient must be seen at [facility])       Vitals:  declined at today's visit.    There were no vitals filed for this visit.     Asssessment:      INR subtherapeutic at 1.6, therefore increasing patient's stroke risk.   Reason(s) for out of range INR today:  Dose too low, hx of labile INR's and missed dosing.      Pt is not on antiplatelet therapy    Medication reconciliation completed today.    Plan:  Instructed patient to increase weekly warfarin regimen as detailed above.  Dosing instructions sent via [employer] as well.     Follow up:  Because warfarin is a high risk medication and current CHEST guidelines recommend regular monitoring intervals (few days up to 12 weeks), will have patient return to clinic in 1 weeks to recheck INR.    Casey Birmingham, PharmD, BCACP

## 2021-12-17 ENCOUNTER — ANTICOAGULATION VISIT (OUTPATIENT)
Dept: MEDICAL GROUP | Facility: PHYSICIAN GROUP | Age: 76
End: 2021-12-17
Payer: MEDICARE

## 2021-12-17 DIAGNOSIS — Z95.2 HISTORY OF MITRAL VALVE REPLACEMENT WITH MECHANICAL VALVE: ICD-10-CM

## 2021-12-17 DIAGNOSIS — Z79.01 CHRONIC ANTICOAGULATION: Primary | ICD-10-CM

## 2021-12-17 LAB — INR PPP: 2.5 (ref 2–3.5)

## 2021-12-17 PROCEDURE — 85610 PROTHROMBIN TIME: CPT | Performed by: FAMILY MEDICINE

## 2021-12-17 PROCEDURE — 93793 ANTICOAG MGMT PT WARFARIN: CPT | Performed by: FAMILY MEDICINE

## 2021-12-17 PROCEDURE — 99999 PR NO CHARGE: CPT | Performed by: INTERNAL MEDICINE

## 2021-12-17 NOTE — PROGRESS NOTES
Anticoagulation Summary  As of 2021    INR goal:  2.5-3.5   TTR:  30.3 % (4.5 y)   INR used for dosin.50 (2021)   Warfarin maintenance plan:  7.5 mg (2.5 mg x 3) every Mon, Fri; 5 mg (2.5 mg x 2) all other days   Weekly warfarin total:  40 mg   Plan last modified:  Casey Birmingham, PharmD (12/10/2021)   Next INR check:  2021   Target end date:  Indefinite    Indications    Chronic anticoagulation [Z79.01]  History of mitral valve replacement with mechanical valve [Z95.2] [Z95.2]             Anticoagulation Episode Summary     INR check location:      Preferred lab:      Send INR reminders to:      Comments:        Anticoagulation Care Providers     Provider Role Specialty Phone number    Rufino Shine M.D. Referring Internal Medicine [phone]    [facility]  [phone]        Anticoagulation Patient Findings  Patient Findings     Negatives:  Signs/symptoms of thrombosis, Signs/symptoms of bleeding, Laboratory test error suspected, Change in health, Change in alcohol use, Change in activity, Upcoming invasive procedure, Emergency department visit, Upcoming dental procedure, Missed doses, Extra doses, Change in medications, Change in diet/appetite, Hospital admission, Bruising, Other complaints        HPI:   Mike Rivera seen in clinic today, on anticoagulation therapy with warfarin for stroke prophylaxis due to history of mechanical MVR.    Patient's previous INR was subtherapeutic at 1.6 on 12-10-21, at which time patient was instructed to increase weekly warfarin regimen.  He returns to clinic today to recheck INR to ensure it is therapeutic and thus preventing possible clotting and/or bleeding/bruising complications.    CHADS-VASc = n/a  (unadjusted ischemic stroke risk/year:  n/a)    Does patient have any changes to current medical/health status since last appt (Y/N):  NO  Does patient have any signs/symptoms of bleeding and/or thrombosis since the  last appt (Y/N):  NO  Does patient have any interval changes to diet or medications since last appt (Y/N):  NO  Are there any complications or cost restrictions with current therapy (Y/N):  NO     Does patient have [facility] PCP? Yes, Madhu AMARAL (If not, please document discussion that patient must be seen at [facility])       Vitals:  declined today.    There were no vitals filed for this visit.     Asssessment:      INR therapeutic at 2.5, therefore decreasing patient's stroke and/or bleeding risk.   Reason(s) for out of range INR today:  n/a      Pt is not on antiplatelet therapy    Medication reconciliation completed today.    Plan:  Pt is to continue with current warfarin dosing regimen.     Follow up:  Because warfarin is a high risk medication and current CHEST guidelines recommend regular monitoring intervals (few days up to 12 weeks), will have patient return to clinic in 1.5 weeks to recheck INR.    Casey Birmingham, PharmD, BCACP

## 2022-01-07 ENCOUNTER — ANTICOAGULATION VISIT (OUTPATIENT)
Dept: MEDICAL GROUP | Facility: PHYSICIAN GROUP | Age: 77
End: 2022-01-07
Payer: MEDICARE

## 2022-01-07 DIAGNOSIS — Z79.01 CHRONIC ANTICOAGULATION: Primary | ICD-10-CM

## 2022-01-07 DIAGNOSIS — Z95.2 HISTORY OF MITRAL VALVE REPLACEMENT WITH MECHANICAL VALVE: ICD-10-CM

## 2022-01-07 LAB — INR PPP: 2.4 (ref 2–3.5)

## 2022-01-07 PROCEDURE — 99211 OFF/OP EST MAY X REQ PHY/QHP: CPT | Performed by: INTERNAL MEDICINE

## 2022-01-07 PROCEDURE — 85610 PROTHROMBIN TIME: CPT | Performed by: INTERNAL MEDICINE

## 2022-01-08 NOTE — PROGRESS NOTES
Anticoagulation Summary  As of 2022    INR goal:  2.5-3.5   TTR:  29.9 % (4.6 y)   INR used for dosin.40 (2022)   Warfarin maintenance plan:  7.5 mg (2.5 mg x 3) every Mon, Fri; 5 mg (2.5 mg x 2) all other days   Weekly warfarin total:  40 mg   Plan last modified:  Casey Birmingham, PharmD (12/10/2021)   Next INR check:  2022   Target end date:  Indefinite    Indications    Chronic anticoagulation [Z79.01]  History of mitral valve replacement with mechanical valve [Z95.2] [Z95.2]             Anticoagulation Episode Summary     INR check location:      Preferred lab:      Send INR reminders to:      Comments:        Anticoagulation Care Providers     Provider Role Specialty Phone number    Rufino Shine M.D. Referring Internal Medicine [phone]    [facility] Responsible  [phone]        Anticoagulation Patient Findings  Patient Findings     Negatives:  Signs/symptoms of thrombosis, Signs/symptoms of bleeding, Laboratory test error suspected, Change in health, Change in alcohol use, Change in activity, Upcoming invasive procedure, Emergency department visit, Upcoming dental procedure, Missed doses, Extra doses, Change in medications, Change in diet/appetite, Hospital admission, Bruising, Other complaints        HPI:   Mike Rivera seen in clinic today, on anticoagulation therapy with warfarin for stroke prophylaxis due to history of mechanical MVR.    Patient's previous INR was therapeutic at 2.5 on 21, at which time patient was instructed to continue with current warfarin regimen.  He returns to clinic today to recheck INR to ensure it is therapeutic and thus preventing possible clotting and/or bleeding/bruising complications.    CHADS-VASc = n/a  (unadjusted ischemic stroke risk/year:  n/a)    Does patient have any changes to current medical/health status since last appt (Y/N):  NO  Does patient have any signs/symptoms of bleeding and/or thrombosis since the last  appt (Y/N):  NO  Does patient have any interval changes to diet or medications since last appt (Y/N):  NO  Are there any complications or cost restrictions with current therapy (Y/N):  NO     Does patient have [facility] PCP? Yes, Madhu AMARAL (If not, please document discussion that patient must be seen at [facility])       Vitals:  declined at today's visit    There were no vitals filed for this visit.     Asssessment:      INR slightly subtherapeutic at 2.4, therefore increasing patient's stroke risk.   Reason(s) for out of range INR today:  Etiology unknown.      Pt is not on antiplatelet therapy    Medication reconciliation completed today.    Plan:  Instructed patient to bolus with 10mg X 1, then resume current warfarin regimen.     Follow up:  Because warfarin is a high risk medication and current CHEST guidelines recommend regular monitoring intervals (few days up to 12 weeks), will have patient return to clinic in 2 weeks to recheck INR.    Casey Birmingham, PharmD, BCACP

## 2022-01-28 ENCOUNTER — ANTICOAGULATION VISIT (OUTPATIENT)
Dept: MEDICAL GROUP | Facility: PHYSICIAN GROUP | Age: 77
End: 2022-01-28
Payer: MEDICARE

## 2022-01-28 DIAGNOSIS — Z79.01 CHRONIC ANTICOAGULATION: Primary | ICD-10-CM

## 2022-01-28 DIAGNOSIS — Z95.2 HISTORY OF MITRAL VALVE REPLACEMENT WITH MECHANICAL VALVE: ICD-10-CM

## 2022-01-28 LAB — INR PPP: 3.7 (ref 2–3.5)

## 2022-01-28 PROCEDURE — 99211 OFF/OP EST MAY X REQ PHY/QHP: CPT | Performed by: INTERNAL MEDICINE

## 2022-01-28 PROCEDURE — 85610 PROTHROMBIN TIME: CPT | Performed by: INTERNAL MEDICINE

## 2022-01-28 NOTE — PROGRESS NOTES
Anticoagulation Summary  As of 1/28/2022    INR goal:  2.5-3.5   TTR:  30.5 % (4.6 y)   INR used for dosing:  3.70 (1/28/2022)   Warfarin maintenance plan:  7.5 mg (2.5 mg x 3) every Mon, Fri; 5 mg (2.5 mg x 2) all other days   Weekly warfarin total:  40 mg   Plan last modified:  Casey Birmingham, PharmD (12/10/2021)   Next INR check:  2/11/2022   Target end date:  Indefinite    Indications    Chronic anticoagulation [Z79.01]  History of mitral valve replacement with mechanical valve [Z95.2] [Z95.2]             Anticoagulation Episode Summary     INR check location:      Preferred lab:      Send INR reminders to:      Comments:        Anticoagulation Care Providers     Provider Role Specialty Phone number    Rufino Shine M.D. Referring Internal Medicine [phone]    [facility] Responsible  [phone]        Anticoagulation Patient Findings  Patient Findings     Negatives:  Signs/symptoms of thrombosis, Signs/symptoms of bleeding, Laboratory test error suspected, Change in health, Change in alcohol use, Change in activity, Upcoming invasive procedure, Emergency department visit, Upcoming dental procedure, Missed doses, Extra doses, Change in medications, Change in diet/appetite, Hospital admission, Bruising, Other complaints        HPI:   Carlos Rivera seen in clinic today, on anticoagulation therapy with warfarin for stroke prophylaxis due to history of mechanical MVR.    Patient's previous INR was subtherapeutic at 2.4 on 1-7-22, at which time patient was instructed to bolus with one dose, then resume current warfarin regimen.  He returns to clinic today to recheck INR to ensure it is therapeutic and thus preventing possible clotting and/or bleeding/bruising complications.    CHADS-VASc = n/a  (unadjusted ischemic stroke risk/year:  n/a)    Does patient have any changes to current medical/health status since last appt (Y/N):  NO  Does patient have any signs/symptoms of bleeding and/or  thrombosis since the last appt (Y/N):  NO  Does patient have any interval changes to diet or medications since last appt (Y/N):  NO  Are there any complications or cost restrictions with current therapy (Y/N):  NO     Does patient have [facility] PCP? Yes, Jason ZHOU (If not, please document discussion that patient must be seen at [facility])       Vitals:      There were no vitals filed for this visit.     Asssessment:      INR supratherapeutic at 3.7, therefore increasing patient's bleeding risk.   Reason(s) for out of range INR today:  Etiology unknown.      Pt is not on antiplatelet therapy    Medication reconciliation completed today.    Plan:  Instructed patient to decrease today's dose to 5mg, then resume current warfarin regimen.  MyChart with instructions sent to patient's wife as well.     Follow up:  Because warfarin is a high risk medication and current CHEST guidelines recommend regular monitoring intervals (few days up to 12 weeks), will have patient return to clinic in 2 weeks to recheck INR.    Casey Birmingham, PharmD, BCACP

## 2022-01-30 PROBLEM — Z74.09 IMPAIRED FUNCTIONAL MOBILITY, BALANCE, GAIT, AND ENDURANCE: Status: ACTIVE | Noted: 2022-01-27

## 2022-01-30 PROBLEM — R29.818 TRANSIENT NEUROLOGIC DEFICIT: Status: RESOLVED | Noted: 2020-08-19 | Resolved: 2022-01-30

## 2022-01-30 PROBLEM — G93.40 ENCEPHALOPATHY: Status: RESOLVED | Noted: 2020-05-10 | Resolved: 2022-01-30

## 2022-01-30 PROBLEM — Z86.73 HISTORY OF STROKE: Chronic | Status: ACTIVE | Noted: 2018-03-29

## 2022-01-30 PROBLEM — E55.9 VITAMIN D DEFICIENCY: Chronic | Status: ACTIVE | Noted: 2020-06-26

## 2022-01-30 PROBLEM — Z74.09 IMPAIRED FUNCTIONAL MOBILITY, BALANCE, GAIT, AND ENDURANCE: Chronic | Status: ACTIVE | Noted: 2022-01-27

## 2022-01-30 PROBLEM — I48.92 ATRIAL FLUTTER, PAROXYSMAL (HCC): Chronic | Status: ACTIVE | Noted: 2022-01-27

## 2022-01-30 PROBLEM — R41.89 COGNITIVE IMPAIRMENT: Status: RESOLVED | Noted: 2021-03-02 | Resolved: 2022-01-30

## 2022-01-30 PROBLEM — I48.92 ATRIAL FLUTTER, PAROXYSMAL (HCC): Status: ACTIVE | Noted: 2022-01-27

## 2022-01-30 PROBLEM — G45.9 TIA (TRANSIENT ISCHEMIC ATTACK): Status: RESOLVED | Noted: 2020-06-26 | Resolved: 2022-01-30

## 2022-01-30 PROBLEM — L98.9 SKIN LESIONS: Status: RESOLVED | Noted: 2021-05-05 | Resolved: 2022-01-30

## 2022-01-30 PROBLEM — Z63.6 CAREGIVER BURDEN: Chronic | Status: ACTIVE | Noted: 2019-10-29

## 2022-02-04 NOTE — PROGRESS NOTES
Hospital Medicine Daily Progress Note      Chief Complaint:  Hypertension  Diabetes    Interval History:  No significant events or changes since last visit    Review of Systems  Review of Systems   Constitutional: Negative for fever.   Eyes: Negative for blurred vision.   Respiratory: Negative for cough.    Cardiovascular: Negative for chest pain.   Gastrointestinal: Negative for diarrhea.   Musculoskeletal: Negative for joint pain.   Neurological: Negative for dizziness.   Psychiatric/Behavioral: The patient is not nervous/anxious.         Physical Exam  Temp:  [36.2 °C (97.2 °F)-36.6 °C (97.8 °F)] 36.2 °C (97.2 °F)  Pulse:  [44-58] 58  Resp:  [18] 18  BP: (123-145)/(61-70) 134/68  SpO2:  [96 %] 96 %    Physical Exam  Vitals signs and nursing note reviewed.   Constitutional:       Appearance: He is not diaphoretic.   HENT:      Mouth/Throat:      Pharynx: No oropharyngeal exudate or posterior oropharyngeal erythema.   Eyes:      Extraocular Movements: Extraocular movements intact.   Neck:      Vascular: No carotid bruit.   Cardiovascular:      Rate and Rhythm: Normal rate and regular rhythm.      Heart sounds: No murmur.   Pulmonary:      Effort: Pulmonary effort is normal.      Breath sounds: Normal breath sounds. No stridor.   Abdominal:      General: Bowel sounds are normal.      Palpations: Abdomen is soft.   Musculoskeletal:      Right lower leg: No edema.      Left lower leg: No edema.   Skin:     General: Skin is warm and dry.      Findings: No rash.   Neurological:      Mental Status: He is alert and oriented to person, place, and time.   Psychiatric:         Mood and Affect: Mood normal.         Behavior: Behavior normal.         Fluids    Intake/Output Summary (Last 24 hours) at 7/2/2020 0844  Last data filed at 7/2/2020 0030  Gross per 24 hour   Intake 960 ml   Output 550 ml   Net 410 ml       Laboratory  Recent Labs     07/02/20  0539   WBC 8.5   RBC 4.52*   HEMOGLOBIN 15.4   HEMATOCRIT 46.0   MCV  "101.8*   MCH 34.1*   MCHC 33.5*   RDW 47.2   PLATELETCT 153*   MPV 11.7     Recent Labs     07/02/20  0539   SODIUM 139   POTASSIUM 4.4   CHLORIDE 105   CO2 22   GLUCOSE 132*   BUN 27*   CREATININE 1.04   CALCIUM 9.0     Recent Labs     06/30/20  0552 07/01/20  0550 07/02/20  0539   INR 1.78* 2.47* 3.30*               Assessment/Plan  Hypertension- (present on admission)  Assessment & Plan  BP ok  On Lisinopril: 20 mg daily  Monitor    History of mitral valve replacement with mechanical valve [Z95.2]- (present on admission)  Assessment & Plan  On Coumadin    Type II diabetes mellitus (HCC)- (present on admission)  Assessment & Plan  Hba1c: 6.7 (6/26)  BS recently 106-107  On Lantus: 16 units qam --> 18 units qam (6/29) --> 12 units qam (7/1) --> will d/c (last dose 7/2)  On Metformin 500 mg bid (7/1)  On accuchecks without night time SS coverage (6/30)  Note: home meds include Lantus 15 units and Metformin 1000 mg bid  Cont to monitor    Bradycardia  Assessment & Plan  HR recently trending lower  HR: 44-58  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), consider inferior infarct and anterior MI  TropI: 108 (6/23) --> 147 (7/1) --> 164 (7/2)  BNP: 359  Will check echo  Consider 2nd to Celexa  Monitor    Depression  Assessment & Plan  On Celexa    Vitamin D deficiency  Assessment & Plan  Vit D: 27  On supplements    TIA (transient ischemic attack)  Assessment & Plan  Pt with dysarthria, now resolving  Neuroimaging negative acute  On Coumadin  On Lipitor    Mild cognitive impairment- (present on admission)  Assessment & Plan  Pt reports \"forgetfulness\" that's been ongoing for the past 2 yrs    Anxiety- (present on admission)  Assessment & Plan  On Celexa and Seroquel    " [Menarche Age ____] : age at menarche was [unfilled] [Total Preg ___] : G[unfilled] [Live Births ___] : P[unfilled]  [Age At Live Birth ___] : Age at live birth: [unfilled] [FreeTextEntry2] : son [FreeTextEntry7] : yes\par IUD [FreeTextEntry8] : 1 month

## 2022-02-18 ENCOUNTER — ANTICOAGULATION VISIT (OUTPATIENT)
Dept: MEDICAL GROUP | Facility: PHYSICIAN GROUP | Age: 77
End: 2022-02-18
Payer: MEDICARE

## 2022-02-18 DIAGNOSIS — Z79.01 CHRONIC ANTICOAGULATION: ICD-10-CM

## 2022-02-18 DIAGNOSIS — Z95.2 HISTORY OF MITRAL VALVE REPLACEMENT WITH MECHANICAL VALVE: ICD-10-CM

## 2022-02-18 LAB — INR PPP: 1.7 (ref 2–3.5)

## 2022-02-18 PROCEDURE — 93793 ANTICOAG MGMT PT WARFARIN: CPT | Performed by: INTERNAL MEDICINE

## 2022-02-18 PROCEDURE — 85610 PROTHROMBIN TIME: CPT | Performed by: INTERNAL MEDICINE

## 2022-02-18 NOTE — PROGRESS NOTES
Anticoagulation Summary  As of 2022    INR goal:  2.5-3.5   TTR:  30.7 % (4.7 y)   INR used for dosin.70 (2022)   Warfarin maintenance plan:  7.5 mg (2.5 mg x 3) every Mon, Fri; 5 mg (2.5 mg x 2) all other days   Weekly warfarin total:  40 mg   Plan last modified:  Casey Birmingham, PharmD (12/10/2021)   Next INR check:  2022   Target end date:  Indefinite    Indications    Chronic anticoagulation [Z79.01]  History of mitral valve replacement with mechanical valve [Z95.2] [Z95.2]             Anticoagulation Episode Summary     INR check location:      Preferred lab:      Send INR reminders to:      Comments:        Anticoagulation Care Providers     Provider Role Specialty Phone number    Rufino Shine M.D. Referring Internal Medicine [phone]    [facility] Responsible  [phone]        Anticoagulation Patient Findings      HPI:  Carlos Rivera seen in clinic today, on anticoagulation therapy with warfarin for mechanical valve.   Reason for today's visit (per our collaborative practice policy) is because their last INR was 3.7 on . Intervention at the last visit:Reduced dose.   Changes to current medical/health status since last appt: noone  No signs/symptoms of bleeding and/or thrombosis since the last appt.    No interval changes to diet or any interval changes to medications since last appt.   No complications or cost restrictions with current therapy.   Declines vitals.   Confirmed dosing regimen.     A/P   INR  SUB-therapeutic.   Possible reason(s) INR is not in range today: Unkown   Bolus x2 days then Pt is to continue with current warfarin dosing regimen.     Follow up appointment in 1 weeks to reduce risk of adverse events related to this high risk medication, Warfarin.    Purpose of next visit:    They are at a increased risk of clots because INR is below goal.    Other info:  Pt educated to contact our clinic with any changes in medications or s/s  of bleeding or thrombosis  CHEST guidelines recommend frequent INR monitoring at regular intervals (a few days up to a max of 12 weeks) to ensure they are on the proper dose of warfarin and not having any complications from therapy. INRs can dramatically change over a short time period due to diet, medications, and medical conditions.     Chase Baldwin, PharmD

## 2022-03-03 ENCOUNTER — TELEPHONE (OUTPATIENT)
Dept: VASCULAR LAB | Facility: MEDICAL CENTER | Age: 77
End: 2022-03-03
Payer: MEDICARE

## 2022-03-03 PROBLEM — E11.42 TYPE 2 DIABETES MELLITUS WITH DIABETIC POLYNEUROPATHY, WITH LONG-TERM CURRENT USE OF INSULIN (HCC): Chronic | Status: ACTIVE | Noted: 2020-09-23

## 2022-03-03 PROBLEM — Z79.4 TYPE 2 DIABETES MELLITUS WITH DIABETIC POLYNEUROPATHY, WITH LONG-TERM CURRENT USE OF INSULIN (HCC): Chronic | Status: ACTIVE | Noted: 2020-09-23

## 2022-03-04 ENCOUNTER — TELEPHONE (OUTPATIENT)
Dept: VASCULAR LAB | Facility: MEDICAL CENTER | Age: 77
End: 2022-03-04
Payer: MEDICARE

## 2022-03-04 NOTE — TELEPHONE ENCOUNTER
Patient's spouse, Comfort, notified.  She states that she was waiting for a response since Friday and just has him on once a day already.       Resolving current episode for case management due to patient unable to reach. Patient has not been reached after repeated calls and letters. Letter sent to patient notifying completion of services due to unable to reach. This writer's contact information and information regarding program services included in materials sent.

## 2022-03-04 NOTE — TELEPHONE ENCOUNTER
[facility]    Pt's wife sent in a message that I wasn't sure how to answer without clarifying.  Called Comfort and left a VM to please call our clinic to discuss her question.    Chase Baldwin, BethanyD

## 2022-03-04 NOTE — TELEPHONE ENCOUNTER
Pt and wife report there is an INR value that the patient needs to be below for a possible lumbar radiofrequency ablation in the future (currently not scheduled).  Left VM with [place] ([facility]) to find out what INR goal they would like the pt at for the procedure and also what date the procedure will be done.      Also requested to know if anticoagulation does not need to be interrupted as it appears this is a minimally invasive procedure.     Pt overdue for next INR. Scheduled next apt for the pt.     [place]: [phone]    Chase Baldwin, PharmD

## 2022-03-07 ENCOUNTER — ANTICOAGULATION VISIT (OUTPATIENT)
Dept: MEDICAL GROUP | Facility: PHYSICIAN GROUP | Age: 77
End: 2022-03-07
Payer: MEDICARE

## 2022-03-07 DIAGNOSIS — Z79.01 CHRONIC ANTICOAGULATION: Primary | ICD-10-CM

## 2022-03-07 DIAGNOSIS — Z95.2 HISTORY OF MITRAL VALVE REPLACEMENT WITH MECHANICAL VALVE: ICD-10-CM

## 2022-03-07 LAB — INR PPP: 4.3 (ref 2–3.5)

## 2022-03-07 PROCEDURE — 93793 ANTICOAG MGMT PT WARFARIN: CPT | Performed by: INTERNAL MEDICINE

## 2022-03-07 PROCEDURE — 85610 PROTHROMBIN TIME: CPT | Performed by: INTERNAL MEDICINE

## 2022-03-08 NOTE — PROGRESS NOTES
Anticoagulation Summary  As of 3/7/2022    INR goal:  2.5-3.5   TTR:  30.8 % (4.7 y)   INR used for dosin.30 (3/7/2022)   Warfarin maintenance plan:  7.5 mg (2.5 mg x 3) every Mon, Fri; 5 mg (2.5 mg x 2) all other days   Weekly warfarin total:  40 mg   Plan last modified:  Casey Birmingham, PharmD (12/10/2021)   Next INR check:  3/21/2022   Target end date:  Indefinite    Indications    Chronic anticoagulation [Z79.01]  History of mitral valve replacement with mechanical valve [Z95.2] [Z95.2]             Anticoagulation Episode Summary     INR check location:      Preferred lab:      Send INR reminders to:      Comments:        Anticoagulation Care Providers     Provider Role Specialty Phone number    Rufino Shine M.D. Referring Internal Medicine [phone]    [facility]  [phone]        Anticoagulation Patient Findings  Patient Findings     Positives:  Upcoming invasive procedure    Negatives:  Signs/symptoms of thrombosis, Signs/symptoms of bleeding, Laboratory test error suspected, Change in health, Change in alcohol use, Change in activity, Emergency department visit, Upcoming dental procedure, Missed doses, Extra doses, Change in medications, Change in diet/appetite, Hospital admission, Bruising, Other complaints        HPI:   Mike Rivera seen in clinic today, on anticoagulation therapy with warfarin for stroke prophylaxis due to history of mechanical MVR.    Patient's previous INR was subtherapeutic at 1.7 on 22, at which time patient was instructed to bolus with two doses, then resume current warfarin regimen.  He returns to clinic today to recheck INR to ensure it is therapeutic and thus preventing possible clotting and/or bleeding/bruising complications.    CHADS-VASc = n/a  (unadjusted ischemic stroke risk/year:  n/a)    Does patient have any changes to current medical/health status since last appt (Y/N):  Upcoming radio frequency ablation, per note from  Chase Dent, PharmD, call has been made to determine date of procedure as they are requesting INR be done day before.  Does patient have any signs/symptoms of bleeding and/or thrombosis since the last appt (Y/N):  NO  Does patient have any interval changes to diet or medications since last appt (Y/N):  nO  Are there any complications or cost restrictions with current therapy (Y/N):  NO     Does patient have [facility] PCP? Yes, Jason ZHOU (If not, please document discussion that patient must be seen at [facility])       Vitals:  declined at today's visit.    There were no vitals filed for this visit.     Asssessment:      INR supratherapeutic at 4.3, therefore increasing patient's bleeding risk.   Reason(s) for out of range INR today:  Etiology unknown, likely d/t bolus dosing at last visit.      Pt is not on antiplatelet therapy    Medication reconciliation completed today.    Plan:  Instructed patient to decrease today's dose to 2.5mg, then resume current warfarin regimen.  [employer] message sent to patient's wife as well.     Follow up:  Because warfarin is a high risk medication and current CHEST guidelines recommend regular monitoring intervals (few days up to 12 weeks), will have patient return to clinic in 2 weeks to recheck INR.    Casey Birmingham, BethanyD, BCACP

## 2022-03-10 PROBLEM — Z53.29 REFUSAL OF CARE BY PATIENT: Status: ACTIVE | Noted: 2022-03-10

## 2022-03-18 ENCOUNTER — TELEPHONE (OUTPATIENT)
Dept: MEDICAL GROUP | Facility: PHYSICIAN GROUP | Age: 77
End: 2022-03-18
Payer: MEDICARE

## 2022-03-18 NOTE — TELEPHONE ENCOUNTER
Called and LVM for pt and Comfort to call [facility] back to coordinate INR checks prior to his upcoming procedures.    Doug Kelly, PharmD, BCACP    CC: Bethany GarciaD, BCACP

## 2022-03-18 NOTE — TELEPHONE ENCOUNTER
Pt c/b - scheduled all pre-op INR's accordingly.    Unclear how low [facility] would like INR to be at this time.    Attempted to call their office (Ph: [phone]) to message the provider conducting the minimally invasive procedure - they are currently closed. Will attempt f/u next week.    Doug Kelly, PharmD, BCACP

## 2022-03-23 ENCOUNTER — ANTICOAGULATION VISIT (OUTPATIENT)
Dept: VASCULAR LAB | Facility: MEDICAL CENTER | Age: 77
End: 2022-03-23
Attending: INTERNAL MEDICINE
Payer: MEDICARE

## 2022-03-23 DIAGNOSIS — Z95.2 HISTORY OF MITRAL VALVE REPLACEMENT WITH MECHANICAL VALVE: ICD-10-CM

## 2022-03-23 DIAGNOSIS — Z79.01 CHRONIC ANTICOAGULATION: ICD-10-CM

## 2022-03-23 LAB
INR BLD: 1.5 (ref 0.9–1.2)
INR PPP: 1.5 (ref 2–3.5)
INR PPP: 2.2 (ref 2–3.5)

## 2022-03-23 PROCEDURE — 85610 PROTHROMBIN TIME: CPT

## 2022-03-23 PROCEDURE — 99212 OFFICE O/P EST SF 10 MIN: CPT | Performed by: PHARMACIST

## 2022-03-23 NOTE — PROGRESS NOTES
Anticoagulation Summary  As of 3/23/2022    INR goal:  2.5-3.5   TTR:  30.8 % (4.8 y)   INR used for dosin.50 (3/23/2022)   Warfarin maintenance plan:  7.5 mg (2.5 mg x 3) every Mon, Fri; 5 mg (2.5 mg x 2) all other days   Weekly warfarin total:  40 mg   Plan last modified:  Casey Birmingham, PharmD (12/10/2021)   Next INR check:  3/31/2022   Target end date:  Indefinite    Indications    Chronic anticoagulation [Z79.01]  History of mitral valve replacement with mechanical valve [Z95.2] [Z95.2]             Anticoagulation Episode Summary     INR check location:      Preferred lab:      Send INR reminders to:      Comments:        Anticoagulation Care Providers     Provider Role Specialty Phone number    Rufino Shine M.D. Referring Internal Medicine [phone]    [facility] Responsible  [phone]                Refer to Patient Findings for HPI:  Patient Findings     Positives:  Upcoming invasive procedure (procedure at [facility] on , inr needs to be <3.0), Change in diet/appetite (drinking good deal of cranberry juice this past week), Hospital admission (inr >10 on , reversed with vitamin k)    Negatives:  Signs/symptoms of thrombosis, Signs/symptoms of bleeding, Laboratory test error suspected, Change in health, Change in alcohol use, Change in activity, Emergency department visit, Upcoming dental procedure, Missed doses, Extra doses, Change in medications, Bruising, Other complaints          There were no vitals filed for this visit.  pt declined vitals    Verified current warfarin dosing schedule.    Medications reconciled  Pt is not on antiplatelet therapy      A/P   INR  sub-therapeutic at 1.5.     Warfarin dosing recommendation: Instructed patient to bolus with 7.5mg X 1, then continue with 5mg daily until next INR.    Pt educated to contact our clinic with any changes in medications or s/s of bleeding or thrombosis. Pt is aware to seek immediate medical  attention for falls, head injury or deep cuts.    Follow up appointment in 1 week(s) as he prefers to test prior to procedure next week.    Casey Birmingham, PharmD, BCACP

## 2022-03-28 ENCOUNTER — DOCUMENTATION (OUTPATIENT)
Dept: VASCULAR LAB | Facility: MEDICAL CENTER | Age: 77
End: 2022-03-28
Payer: MEDICARE

## 2022-03-28 DIAGNOSIS — Z79.01 CHRONIC ANTICOAGULATION: ICD-10-CM

## 2022-03-28 RX ORDER — WARFARIN SODIUM 2.5 MG/1
TABLET ORAL
Qty: 270 TABLET | Refills: 1 | Status: SHIPPED | OUTPATIENT
Start: 2022-03-28

## 2022-03-28 NOTE — PROGRESS NOTES
[facility]    Sent a refill for patient's warfarin as requested.     Josh Castro, BethanyD

## 2022-03-31 ENCOUNTER — ANTICOAGULATION VISIT (OUTPATIENT)
Dept: VASCULAR LAB | Facility: MEDICAL CENTER | Age: 77
End: 2022-03-31
Attending: INTERNAL MEDICINE
Payer: MEDICARE

## 2022-03-31 ENCOUNTER — OFFICE VISIT (OUTPATIENT)
Dept: CARDIOLOGY | Facility: MEDICAL CENTER | Age: 77
End: 2022-03-31
Payer: MEDICARE

## 2022-03-31 VITALS
HEART RATE: 78 BPM | DIASTOLIC BLOOD PRESSURE: 74 MMHG | WEIGHT: 114 LBS | SYSTOLIC BLOOD PRESSURE: 124 MMHG | BODY MASS INDEX: 17.28 KG/M2 | HEIGHT: 68 IN | OXYGEN SATURATION: 95 % | RESPIRATION RATE: 18 BRPM

## 2022-03-31 DIAGNOSIS — I25.700 CORONARY ARTERY DISEASE INVOLVING CORONARY BYPASS GRAFT OF NATIVE HEART WITH UNSTABLE ANGINA PECTORIS (HCC): Chronic | ICD-10-CM

## 2022-03-31 DIAGNOSIS — E78.5 HYPERLIPIDEMIA, UNSPECIFIED HYPERLIPIDEMIA TYPE: Chronic | ICD-10-CM

## 2022-03-31 DIAGNOSIS — Z79.01 CHRONIC ANTICOAGULATION: ICD-10-CM

## 2022-03-31 DIAGNOSIS — Z95.2 HISTORY OF MITRAL VALVE REPLACEMENT WITH MECHANICAL VALVE: Chronic | ICD-10-CM

## 2022-03-31 DIAGNOSIS — Z95.2 HISTORY OF MITRAL VALVE REPLACEMENT WITH MECHANICAL VALVE: ICD-10-CM

## 2022-03-31 DIAGNOSIS — I10 ESSENTIAL HYPERTENSION: Chronic | ICD-10-CM

## 2022-03-31 DIAGNOSIS — I48.92 ATRIAL FLUTTER, PAROXYSMAL (HCC): Chronic | ICD-10-CM

## 2022-03-31 DIAGNOSIS — G47.33 OBSTRUCTIVE SLEEP APNEA SYNDROME: ICD-10-CM

## 2022-03-31 DIAGNOSIS — Z86.73 HISTORY OF STROKE: Chronic | ICD-10-CM

## 2022-03-31 LAB — INR PPP: 2.8 (ref 2–3.5)

## 2022-03-31 PROCEDURE — 99214 OFFICE O/P EST MOD 30 MIN: CPT | Performed by: INTERNAL MEDICINE

## 2022-03-31 PROCEDURE — 85610 PROTHROMBIN TIME: CPT

## 2022-03-31 PROCEDURE — 99211 OFF/OP EST MAY X REQ PHY/QHP: CPT

## 2022-03-31 ASSESSMENT — FIBROSIS 4 INDEX: FIB4 SCORE: 3.03

## 2022-03-31 NOTE — PROGRESS NOTES
Cardiology Follow-up Consultation Note    Date of note: 3/31/2022  Primary Care Provider: Declan Eaton M.D.    Patient Name: Carlos Rivera     YOB: 1945  MRN:              [id number]      CC: mechanical mitral valve.     History of Present Illness: Carlos Rivera is a 76 y.o.-year-old male whose current medical problems include type II diabetes, and mechanical mitral valve placed 1999 on coumadin who is here for follow-up.     At our initial visit, 10/6/2017:  Fall of 1998 was found to have murmur on exam and mitral valve prolapse with severe mitral regurgitation via Dr. Oscar.  Had valve replaced in 9/1999. Has not needed any replacement, has had no endocarditis, and no bleeding complications.  No previous TIA/CVA. He reports no history of arrhythmias.     Moved from [place] 1 year ago. Lost 10 pounds in the last year, lost appetite.      Does not walk to exercise due to bone spur on foot.    Stopped bike riding due to back and neck pain, was doing long distance bike rides up until a year ago.     At our visit, 6/7/2019:  In terms of diabetes, not well controlled.     Seeing Dr. Cartagena for memory difficulties and imbalance. Told not to ride his bike which he hates.     In terms of MVR, no dyspnea and no bleeding issues.     At our visit, 8/25/2020:  Hospitalization and rehab summarized below:  The patient is a 74 y.o. right hand dominant male with a past medical history of HTN, HLD, DM2, Dementia, MVR with mechanical valve replacement (Coumadin);  who presented on 6/23/2020  6:26 AM with , difficulty with speech and word finding when he woke up. CT head without acute abnormality. CTA without large vessel occlusion. Neurology was consulted and he was not a candidate for tPA.  Per neurology they were concerned for acute stroke vs hypertensive encephalopathy as his blood pressure was > 190s on admission. They recommended MRI brain which showed multiple old cerebral infarcts as well as  lacunar, cerebellar and parietal infarcts but no acute infarcts or hemorrhages.  Patient's aphasia improved but continued to have decreased mobility and memory concerning for encephalopathy vs TIA.      Encephalopathy vs delirium vs TIA - Patient with aphasia and weakness with negative work-up for stroke but with hypertensive emergency on admission concerning for hypertensive encephalopathy. Patient started on statin for concern for CVA vs TIA. Worsening confusion - per wife history of Gabapentin causing worse. Discontinue Gabapentin, minimal improvements. Patient underwent acute inpatient rehabilitation from 6/25/20 to 7/9/20 with some improvement in cognition and mobility. Patient at this time needs additional time for recovery and for wife to look into VA benefits for help at home. Patient continues to have unclear diagnosis, he has severe neuropathy, memory deficits, decreased balance, and severe festinating gait. Transfer to SNF  -Follow-up with Neurology, difficult to assess as patient/wife are very hesitant for medications. Does have family member with PD    Of note, INR was 2.5 at time of neurologic symptoms.     Also had a hospitalization 5/2020 for GI bleed secondary to diffuse diverticulosis.     In terms of GI bleed, no further bleeding. Very labile INR on his coumadin.     His wife reports since around 2015 he has had progressive and worsening vertigo. Initially bad after he broke his neck, and worsened after his ears were cleaned at a [facility]. Currently also has severe fatigue. They are asking for titration of his psychotropic medications.     Since he got out of a nursing facility, he has refused to do exercises per his wife and blames his symptoms on his medications.     Interim Events:  Limited by chronic pain, sciatica. Going to see [facility]. Also has severe fatigue, not doing much.     Has some AMS at times, this last time taken to [facility], found to be dehydrated and had a  "severely elevated INR.    Patient denies chest pain, palpitations, dyspnea on exertion, pre-syncope, syncope, lower extremity swelling, orthopnea, PND or recent weight gain.     Exercise limited by MSK pain. In a wheelchair here today.     ROS   Constitution: Negative for chills, fever  HENT: Negative for nosebleeds.    Eyes: Negative for vision loss in left eye and vision loss in right eye.   Respiratory: Negative for hemoptysis.    Gastrointestinal: Negative for hematemesis, hematochezia and melena.   Genitourinary: Negative for hematuria.   Neurological: Negative for focal weakness, numbness and paresthesias.       Past Medical History:   Diagnosis Date   • Acute on Chronic Encephalopathy 5/10/2020    Was admitted to hosp June 2020  \"...Encephalopathy vs delirium vs TIA - Patient with aphasia and weakness with negative work-up for stroke but with hypertensive emergency on admission concerning for hypertensive encephalopathy. Patient started on statin for concern for CVA vs TIA. Worsening confusion - per wife history of Gabapentin causing worse. Discontinue Gabapentin, minimal improvements. [place]   • Chronic anticoagulation 2/23/2017   • History of mitral valve replacement with mechanical valve [Z95.2] 3/10/2017   • Hyperlipidemia    • Skin lesions 5/5/2021   • TIA (transient ischemic attack) 6/26/2020   • Transient neurologic deficit 8/19/2020   • Type II diabetes mellitus (HCC) 2/23/2017         Past Surgical History:   Procedure Laterality Date   • ID COLONOSCOPY,DIAGNOSTIC N/A 5/9/2020    Procedure: COLONOSCOPY;  Surgeon: Jatinder Madera M.D.;  Location: SURGERY [facility];  Service: Gastroenterology   • MITRAL VALVE REPLACEMENT  1999   • INGUINAL HERNIA REPAIR Bilateral 1984   • TONSILLECTOMY           Current Outpatient Medications   Medication Sig Dispense Refill   • warfarin (COUMADIN) 2.5 MG Tab TAKE 2 TO 3 TABLETS DAILY AS DIRECTED BY ANTICOAGULATION CLINIC 270 Tablet 1   • insulin glargine " (LANTUS) 100 UNIT/ML Solution Pen-injector injection Inject 15 Units under the skin every evening. 15 mL 0   • metformin (GLUCOPHAGE) 1000 MG tablet Take 1 Tablet by mouth 2 times a day with meals. 180 Tablet 1   • insulin aspart (NOVOLOG) 100 UNIT/ML injection PEN Inject 10 Units under the skin 3 times a day before meals.     • diclofenac sodium (VOLTAREN) 1 % Gel Apply 4 g topically every 6 hours as needed. For chronic back pain 300 g 11   • traMADol (ULTRAM) 50 MG Tab Take 50 mg by mouth every 8 hours as needed.     • ALPRAZolam (XANAX) 0.25 MG Tab Take 0.25 mg by mouth 2 times a day as needed.     • cyanocobalamin (VITAMIN B-12) 500 MCG Tab Take 500 mcg by mouth every day.     • glucose blood strip 1 Each by Other route as needed. BACKUP       • loratadine (CLARITIN) 10 MG Tab Take 1 tablet by mouth 1 time a day as needed. 90 tablet 1   • Lancets Lancets order: Lancets for Abbott Freestyle Lite meter. Sig: use 2-3 times Daily and prn ssx high or low sugar. 300 Each 0   • vitamin D (VITAMIND D3) 1000 UNIT Tab Take 1 Tab by mouth every day. 60 Tab    • Continuous Blood Gluc Sensor (FREESTYLE CHARI 14 DAY SENSOR) Misc 1 Each every 14 days. 3 Each 0   • divalproex (DEPAKOTE SPRINKLE) 125 MG Capsule Delayed Release Sprinkle Take 1 Capsule by mouth every evening. Hold for day time drowsiness (Patient not taking: Reported on 3/31/2022) 90 Capsule 1     No current facility-administered medications for this visit.         Allergies   Allergen Reactions   • [place] Vomiting   • Risperidone      Dystonia, altered mental status   • Hydrocodone-Acetaminophen Vomiting         Family History   Problem Relation Age of Onset   • Heart Attack Father 58   • Diabetes Father          Social History     Socioeconomic History   • Marital status:      Spouse name: Not on file   • Number of children: Not on file   • Years of education: Not on file   • Highest education level: Not on file   Occupational History   • Not on file  "  Tobacco Use   • Smoking status: Never Smoker   • Smokeless tobacco: Never Used   Vaping Use   • Vaping Use: Never used   Substance and Sexual Activity   • Alcohol use: Yes     Alcohol/week: 1.2 oz     Types: 2 Cans of beer per week     Comment: moderatly   • Drug use: No   • Sexual activity: Yes     Comment:    Other Topics Concern   • Not on file   Social History Narrative    Retired from [employer]      Social Determinants of Health     Financial Resource Strain: Not on file   Food Insecurity: Not on file   Transportation Needs: Not on file   Physical Activity: Not on file   Stress: Not on file   Social Connections: Not on file   Intimate Partner Violence: Not on file   Housing Stability: Not on file       Physical Exam:  Ambulatory Vitals  /74 (BP Location: Left arm, Patient Position: Sitting, BP Cuff Size: Adult)   Pulse 78   Resp 18   Ht 1.715 m (5' 7.5\")   Wt 51.7 kg (114 lb)   SpO2 95%    Oxygen Therapy:     BP Readings from Last 4 Encounters:   03/31/22 124/74   02/03/22 142/88   01/27/22 (!) 89/56   01/10/22 130/80       Weight/BMI: Body mass index is 17.59 kg/m².  Wt Readings from Last 4 Encounters:   03/31/22 51.7 kg (114 lb)   01/27/22 52.6 kg (116 lb)   01/10/22 52.6 kg (116 lb)   11/29/21 53.5 kg (118 lb)     General: No apparent distress  Eyes: nl conjunctiva  ENT: OP covered by mask  Neck: JVP 4<8 cm H2O  Lungs: normal respiratory effort, CTAB  Heart: RRR, Mechanical S1, no murmurs, no rubs or gallops, no edema bilateral lower extremities. No LV/RV heave on cardiac palpatation. 2+ bilateral radial pulses.   Well healed sternotomy scar.   Abdomen: soft, non tender, non distended, no masses, normal bowel sounds.  No HSM.  Extremities/MSK: no clubbing, no cyanosis  Psychiatric: Appropriate affect, A/O x 3  Skin: Warm extremities    Exam repeated in full and unchanged except for as noted above.          Lab Data Review:  Lab Results   Component Value Date/Time    CHOLSTRLTOT 119 06/24/2020 " 07:25 AM    LDL 47 06/24/2020 07:25 AM    HDL 46 06/24/2020 07:25 AM    TRIGLYCERIDE 129 06/24/2020 07:25 AM       Lab Results   Component Value Date/Time    SODIUM 134 (L) 11/12/2021 09:15 AM    POTASSIUM 5.5 11/12/2021 09:15 AM    CHLORIDE 99 11/12/2021 09:15 AM    CO2 24 11/12/2021 09:15 AM    GLUCOSE 353 (H) 11/12/2021 09:15 AM    BUN 46 (H) 11/12/2021 09:15 AM    CREATININE 1.16 11/12/2021 09:15 AM     CrCl cannot be calculated (Patient's most recent lab result is older than the maximum 7 days allowed.).  Lab Results   Component Value Date/Time    ALKPHOSPHAT 138 11/12/2021 09:15 AM    ASTSGOT 58 (H) 11/12/2021 09:15 AM    ALTSGPT 77 (H) 11/12/2021 09:15 AM    TBILIRUBIN 0.5 11/12/2021 09:15 AM      Lab Results   Component Value Date/Time    WBC 7.6 11/12/2021 09:15 AM     Lab Results   Component Value Date/Time    HBA1C 10.2 (H) 11/12/2021 09:15 AM     No components found for: TROP      Cardiac Imaging and Procedures Review:    EKG dated 10/6/2017: My personal interpretation is NSR, non-specific IVCD, LAD, non-specific TW changes in inferior and lateral leads.    TTE 7/3/2020:  CONCLUSIONS  Compared to the images of the prior study done 6/23/20, no change.  Left ventricular ejection fraction is visually estimated to be 55%.  Known mitral valve mechanical type which is functioning normally with   appropriate transvalvular gradient. Mean transvalvular gradient is 1    mmHg at a heart rate of 64 BPM. No mitral regurgitation.      Radiology test review   MRI 5/7/2018:  1.  Small areas of encephalomalacia in the RIGHT ventrolateral thalamus and RIGHT frontal parietal cortex compatible with remote ischemic insults  2.  No evidence of acute intracranial hemorrhage, mass or acute ischemia  3.  Mild atrophy without disproportionate enlargement of the ventricular system  4.  Moderate white matter changes    MRI brain 6/25/2020:  IMPRESSION:     1.  Moderate cerebral atrophy.  2.  Multiple areas of old cerebral  infarction in both cerebral hemispheres. The old left parietal infarct is associated with minimal hemosiderin deposition indicating a component of hemorrhagic infarction.  3.  Old lacunar size infarct right thalamus.  4.  Old lacunar infarction left frontal corona radiata.  5.  Advanced supratentorial white matter disease most consistent with microvascular ischemic change.  6.  Mild pontine ischemic gliosis.  7.  Old lacunar infarcts x2 in the right cerebellar hemisphere, no change from prior exam.  8.  No evidence of acute infarction, acute hemorrhage, or mass lesion on today's exam.    Medical Decision Makin. History of mitral valve replacement  Currently asymptomatic. .   -endocarditis ppx, Rx'd amoxicillin.   -ED precautions discussed  -continue coumadin, goal INR 2.5-3.5, preferably 2.5-3  -off aspirin as he has had multiple severe GI bleeds due to his anticoagulation. Last hospitalization for GI bleeds was 2020.     2. Hyperlipidemia, unspecified hyperlipidemia type  -continue lipitor, LDL at goal    3. Type 2 diabetes mellitus with hyperosmolarity without coma, without long-term current use of insulin (CMS-[facility])  Well controlled.   -f/u with PCP. Goal HgbA1c at least <8    4. Atrial flutter - noted by his previous cardiologist Dr. Prosper Fierro. S/p cardioversion 2012.   -continue anticoagulation for mechanical mitral valve.     5. History of myocardial infarction - per previous cardiologists notes. No known details. He reports he did have a SVG bypass.   -continue lipitor 40mg PO daily.     6. History of remote CVA - based on MRI results. Followed by .     7. History of GI bleed - last 2020 due to diffuse diverticulosis. No intervention during colonoscopy. If he has recurrent bleed, may have to consider colon resection vs risks of stopping or lowering anticoagulation threshold despite his mechanical mitral valve.      8. Cognitive difficulties/neurologic symptoms - Since  head  trauma, no worsening. I do suspect he has underlying dementia and possibly waxing and waning delirium even at home based on his wife's description. 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  -f/u with neurology  -advised he abstain from any heavy physical work, or significant head exposure.   -f/u with psychiatry to titrate medications given his fatigue.     9. Malnutrition - Advised they consider megace and/or marijuana.     10. Advanced Directive - advised they bring in for scanning.     Return in about 1 year (around 3/31/2023).      Stephon Ramos MD  [facility]  [facility]  [address] [address]  Phone: [phone]  Fax: [phone]

## 2022-03-31 NOTE — PROGRESS NOTES
Anticoagulation Summary  As of 3/31/2022    INR goal:  2.5-3.5   TTR:  30.8 % (4.8 y)   INR used for dosin.80 (3/31/2022)   Warfarin maintenance plan:  7.5 mg (2.5 mg x 3) every Mon; 5 mg (2.5 mg x 2) all other days   Weekly warfarin total:  37.5 mg   Plan last modified:  Estela Morrison PharmD (3/31/2022)   Next INR check:  2022   Target end date:  Indefinite    Indications    Chronic anticoagulation [Z79.01]  History of mitral valve replacement with mechanical valve [Z95.2] [Z95.2]             Anticoagulation Episode Summary     INR check location:      Preferred lab:      Send INR reminders to:      Comments:        Anticoagulation Care Providers     Provider Role Specialty Phone number    Rufino Shine M.D. Referring Internal Medicine [phone]    [facility] Anticoagulation Services Responsible  [phone]                Refer to Patient Findings for HPI:  Patient Findings     Positives:  Upcoming invasive procedure (spine ablation tomorrow)    Negatives:  Signs/symptoms of thrombosis, Signs/symptoms of bleeding, Laboratory test error suspected, Change in health, Change in alcohol use, Change in activity, Emergency department visit, Upcoming dental procedure, Missed doses, Extra doses, Change in medications, Change in diet/appetite, Hospital admission, Bruising, Other complaints          There were no vitals filed for this visit.   pt declined vitals    Verified current warfarin dosing schedule.    Medications reconciled       A/P   INR  therapeutic.     Warfarin dosing recommendation: Continue with reduced warfarin regimen to ensure INR remains therapeutic or below 3 for upcoming procedures    Pt educated to contact our clinic with any changes in medications or s/s of bleeding or thrombosis. Pt is aware to seek immediate medical attention for falls, head injury or deep cuts.    Follow up appointment in 1 week(s).    Estela Morrison, BethanyD

## 2022-04-01 LAB — INR BLD: 2.8 (ref 0.9–1.2)

## 2022-04-06 ENCOUNTER — ANTICOAGULATION VISIT (OUTPATIENT)
Dept: VASCULAR LAB | Facility: MEDICAL CENTER | Age: 77
End: 2022-04-06
Attending: INTERNAL MEDICINE
Payer: MEDICARE

## 2022-04-06 DIAGNOSIS — Z95.2 HISTORY OF MITRAL VALVE REPLACEMENT WITH MECHANICAL VALVE: ICD-10-CM

## 2022-04-06 DIAGNOSIS — I48.92 ATRIAL FLUTTER, PAROXYSMAL (HCC): Chronic | ICD-10-CM

## 2022-04-06 DIAGNOSIS — Z87.19 HISTORY OF GI BLEED: ICD-10-CM

## 2022-04-06 DIAGNOSIS — D68.69 SECONDARY HYPERCOAGULABLE STATE (HCC): ICD-10-CM

## 2022-04-06 DIAGNOSIS — Z79.01 CHRONIC ANTICOAGULATION: ICD-10-CM

## 2022-04-06 DIAGNOSIS — Z86.73 HISTORY OF STROKE: Chronic | ICD-10-CM

## 2022-04-06 LAB — INR PPP: 2.4 (ref 2–3.5)

## 2022-04-06 PROCEDURE — 85610 PROTHROMBIN TIME: CPT

## 2022-04-06 PROCEDURE — 99212 OFFICE O/P EST SF 10 MIN: CPT | Performed by: NURSE PRACTITIONER

## 2022-04-06 NOTE — PROGRESS NOTES
Anticoagulation Summary  As of 2022    INR goal:  2.5-3.5   TTR:  31.0 % (4.8 y)   INR used for dosin.40 (2022)   Warfarin maintenance plan:  7.5 mg (2.5 mg x 3) every Sun; 5 mg (2.5 mg x 2) all other days   Weekly warfarin total:  37.5 mg   Plan last modified:  AIDEN AlbertsPBRUCE (2022)   Next INR check:  2022   Target end date:  Indefinite    Indications    Chronic anticoagulation [Z79.01]  History of mitral valve replacement with mechanical valve [Z95.2] [Z95.2]             Anticoagulation Episode Summary     INR check location:      Preferred lab:      Send INR reminders to:      Comments:        Anticoagulation Care Providers     Provider Role Specialty Phone number    Rufino Shine M.D. Referring Internal Medicine [phone]    [facility] Responsible  [phone]                Refer to Patient Findings for HPI:  Patient Findings     Positives:  Upcoming invasive procedure, Missed doses    Comments:  Procedure at [facility] tomorrow. Inr needs to be <3.0. He took 5 mg on Monday instead of 7.5 mg. Pt's wife request that we move his 7.5 mg to  nights because it's easier to remember.          There were no vitals filed for this visit.   pt declined vitals    Verified current warfarin dosing schedule.    Medications reconciled   Pt is not on antiplatelet therapy (per cards for severe GI bleeds)  Atrial flutter controlled on n/a.  Secondary hypercoagulable state due to Atrial Fibrillation/Flutter on warfarin.  Pt requesting home monitor. Application sent to [employer].    A/P   INR 2.4. His INR is <3.0 as requested by his doctor for his procedure tomorrow.    Warfarin dosing recommendation: Take 7.5 mg on Sundays, 5 mg all other days.    Pt educated to contact our clinic with any changes in medications or s/s of bleeding or thrombosis. Pt is aware to seek immediate medical attention for falls, head injury or deep cuts.    Follow up appointment in 1  week(s).    ARLENE Alberts    Cc: [facility].

## 2022-04-08 LAB — INR BLD: 2.4 (ref 0.9–1.2)

## 2022-04-13 LAB — INR PPP: 9 (ref 2–3.5)

## 2022-04-14 ENCOUNTER — ANTICOAGULATION VISIT (OUTPATIENT)
Dept: VASCULAR LAB | Facility: MEDICAL CENTER | Age: 77
End: 2022-04-14
Attending: INTERNAL MEDICINE
Payer: MEDICARE

## 2022-04-14 DIAGNOSIS — Z87.19 HISTORY OF GI BLEED: ICD-10-CM

## 2022-04-14 DIAGNOSIS — Z79.01 CHRONIC ANTICOAGULATION: ICD-10-CM

## 2022-04-14 DIAGNOSIS — Z95.2 HISTORY OF MITRAL VALVE REPLACEMENT WITH MECHANICAL VALVE: ICD-10-CM

## 2022-04-14 DIAGNOSIS — I48.92 ATRIAL FLUTTER, PAROXYSMAL (HCC): ICD-10-CM

## 2022-04-14 DIAGNOSIS — D68.69 SECONDARY HYPERCOAGULABLE STATE (HCC): ICD-10-CM

## 2022-04-14 DIAGNOSIS — Z86.73 HISTORY OF STROKE: ICD-10-CM

## 2022-04-14 LAB — INR PPP: 7.4 (ref 2–3.5)

## 2022-04-14 PROCEDURE — 99212 OFFICE O/P EST SF 10 MIN: CPT

## 2022-04-14 PROCEDURE — 85610 PROTHROMBIN TIME: CPT

## 2022-04-14 NOTE — PROGRESS NOTES
OP Anticoagulation Service Note    Date: 2022  There were no vitals filed for this visit.   pt declined vitals    Anticoagulation Summary  As of 2022    INR goal:  2.5-3.5   TTR:  30.9 % (4.8 y)   INR used for dosin.40 (2022)   Warfarin maintenance plan:  7.5 mg (2.5 mg x 3) every Sun; 5 mg (2.5 mg x 2) all other days   Weekly warfarin total:  37.5 mg   Plan last modified:  ARLENE Alberts (2022)   Next INR check:  2022   Target end date:  Indefinite    Indications    Chronic anticoagulation [Z79.01]  History of mitral valve replacement with mechanical valve [Z95.2] [Z95.2]  History of stroke [Z86.73]  TIA (transient ischemic attack) (Resolved) [G45.9]  Atrial flutter  paroxysmal (HCC) [I48.92]  Secondary hypercoagulable state (HCC) [D68.69]  History of GI bleed [Z87.19]             Anticoagulation Episode Summary     INR check location:      Preferred lab:      Send INR reminders to:      Comments:  INR goal preferably between 2.5-3 (per cards note 3/31/22)      Anticoagulation Care Providers     Provider Role Specialty Phone number    Rufino Shine M.D. Referring Internal Medicine [phone]    [facility] Anticoagulation Services Responsible  [phone]            HPI:   Carlos Rivera seen in clinic today, on anticoagulation therapy with warfarin (a high risk medication) for mechanical heart valve and hx of stroke and TIA      Pt is here today to evaluate anticoagulation therapy    Previous INR was  2.4 on 22    Pt was instructed to resume warfarin    Anticoagulation Patient Findings  Patient Findings     Positives:  Emergency department visit (INR was greater than 9 at [facility] ER yesterday)    Negatives:  Signs/symptoms of thrombosis, Signs/symptoms of bleeding, Laboratory test error suspected, Change in health, Change in alcohol use, Change in activity, Upcoming invasive procedure, Upcoming dental procedure, Missed doses, Extra doses, Change in medications, Change  in diet/appetite, Hospital admission, Bruising, Other complaints          Confirmed warfarin dosing regimen    Pt is not on antiplatelet/NSAID therapy    Falls or accidents since last visit No        A/P   INR  SUPRA-therapeutic today, will require dose adjust ment today to prevent bleeding complications  and closer follow up.    HOLD warfarin, unclear why INR is so elevated.      Pts wife will contact [facility] to let them know INR of 7.4 today, he was suppose to have ablation tomorrow, most likely will need to be cancelled    Pt educated to contact our clinic with any changes in medications or s/s of bleeding or thrombosis. Pt is aware to seek immediate medical attention for falls, head injury or deep cuts    Follow up appointment in 4 days(s) to reduce risk of adverse events from warfarin  Antonia Chavez, PharmD

## 2022-04-29 ENCOUNTER — ANTICOAGULATION MONITORING (OUTPATIENT)
Dept: VASCULAR LAB | Facility: MEDICAL CENTER | Age: 77
End: 2022-04-29
Payer: MEDICARE

## 2022-04-29 DIAGNOSIS — Z87.19 HISTORY OF GI BLEED: ICD-10-CM

## 2022-04-29 DIAGNOSIS — Z79.01 CHRONIC ANTICOAGULATION: ICD-10-CM

## 2022-04-29 DIAGNOSIS — Z86.73 HISTORY OF STROKE: ICD-10-CM

## 2022-04-29 DIAGNOSIS — D68.69 SECONDARY HYPERCOAGULABLE STATE (HCC): ICD-10-CM

## 2022-04-29 DIAGNOSIS — I48.92 ATRIAL FLUTTER, PAROXYSMAL (HCC): ICD-10-CM

## 2022-04-29 DIAGNOSIS — Z95.2 HISTORY OF MITRAL VALVE REPLACEMENT WITH MECHANICAL VALVE: ICD-10-CM

## 2022-04-29 NOTE — Clinical Note
Hi Dr. Ramos,  I just wanted to let you know that Mike passed away over the last weekend. His wife asked that you give her a call as she has some questions for you.  Please let me know if I can assist!  Thanks, Doug

## 2022-04-29 NOTE — PROGRESS NOTES
Called pt regarding missed f/u anticoag appt. S/w pt's wife - she notified me that Mike passed away this last weekend.    DCing from [facility].    Doug Kelly, PharmD, BCACP

## 2022-05-18 ENCOUNTER — TELEPHONE (OUTPATIENT)
Dept: CARDIOLOGY | Facility: MEDICAL CENTER | Age: 77
End: 2022-05-18
Payer: MEDICARE

## 2022-05-19 NOTE — TELEPHONE ENCOUNTER
Discussed with wife sounds like he had an acute cardiac event and presented to [facility] with new onset low LVEF. We discussed this can happen acutely and was likely not occurring at our visit. She also was upset about the ability to get Mike pain medicines when he was in pain, but appreciate of the care he received once he transitioned to hospice. She was also extremely appreciative of our cardiovascular care as well as the care from our coumadin clinic colleagues. May Mike rest in peace.

## 2022-05-19 NOTE — TELEPHONE ENCOUNTER
----- Message from Doug Kelly, Adriane sent at 4/29/2022 10:37 AM PDT -----  Hi Dr. Ramos,    I just wanted to let you know that Mike passed away over the last weekend. His wife asked that you give her a call as she has some questions for you.    Please let me know if I can assist!    Thanks,  Doug

## 2022-05-22 NOTE — TELEPHONE ENCOUNTER
Was the patient seen in the last year in this department? Yes     Does patient have an active prescription for medications requested? No     Received Request Via: Pharmacy      Pt met protocol?: Yes    OV  3/18    PT IS REQUESTING LISINOPRIL 20MG QD    show

## 2023-01-01 NOTE — PROGRESS NOTES
Anticoagulation Summary  As of 2020    INR goal:  2.5-3.5   TTR:  30.9 % (3.2 y)   INR used for dosin.00 (2020)   Warfarin maintenance plan:  7.5 mg (2.5 mg x 3) every Wed, Sat; 5 mg (2.5 mg x 2) all other days   Weekly warfarin total:  40 mg   Plan last modified:  Casey Birmingham, PharmD (8/10/2020)   Next INR check:  9/3/2020   Target end date:  Indefinite    Indications    Chronic anticoagulation [Z79.01]  History of mitral valve replacement with mechanical valve [Z95.2] [Z95.2]             Anticoagulation Episode Summary     INR check location:      Preferred lab:      Send INR reminders to:      Comments:        Anticoagulation Care Providers     Provider Role Specialty Phone number    Rufino Shine M.D. Referring Internal Medicine [phone]    [facility] Responsible  [phone]        Anticoagulation Patient Findings  Patient Findings     Negatives:  Signs/symptoms of thrombosis, Signs/symptoms of bleeding, Laboratory test error suspected, Change in health, Change in alcohol use, Change in activity, Upcoming invasive procedure, Emergency department visit, Upcoming dental procedure, Missed doses, Extra doses, Change in medications, Change in diet/appetite, Hospital admission, Bruising, Other complaints              HPI:   Mike Barreto seen in clinic today, on anticoagulation therapy with warfarin for stroke prevention due to history of mitral valve mechanical replacement.     Patient's previous INR was supratherapeutic 7.9 on 20, at which time patient was instructed to hold X 2 and continue on with current regimen.  He returns to clinic today to recheck INR to ensure it is therapeutic and thus preventing possible clotting and/or bleeding/bruising complications.    CHADS-VASc = n/a  (unadjusted ischemic stroke risk/year:  n/a)    Does patient have any changes to current medical/health status since last appt (Y/N):  No  Does patient have any signs/symptoms of  bleeding and/or thrombosis since the last appt (Y/N):  No  Does patient have any interval changes to diet or medications since last appt (Y/N):  No  Are there any complications or cost restrictions with current therapy (Y/N):  No     Does patient have [facility] PCP? Yes, Dr. Rufino Shine (If not, please document discussion that patient must be seen at [facility])       Vitals:  declined by patient today.    There were no vitals filed for this visit.     Asssessment:      INR supratherapeutic at 8.0, therefore increasing's patient risk of bleeding.   Reason(s) for out of range INR today:  Unknown      Plan:  patient is to hold X 3 doses and follow up.   Patient was sent to lab for confirmatory draw.     Follow up:  Because warfarin is a high risk medication and current CHEST guidelines recommend regular monitoring intervals (few days up to 12 weeks), will have patient return to clinic in 3 days to recheck INR.    Darryn Sanabria, Pharmacy intern  Casey Birmingham, PharmD, BCACP     DISPLAY PLAN FREE TEXT

## 2024-05-29 NOTE — ASSESSMENT & PLAN NOTE
Has a shuffling gait and has appointment to be evaluated by Dr. Cartagena in neurology next week.   
Has been having severe bilateral foot pain since the last 2 years per wife. Worse with ambulation and feels like he's walking on hot coals. Has inserts with podiatry but hasn't had any relief. Was treated with notriptyline and gabapentin for presumed neuropathy but didn't tolerate the side affects so he stopped both of those.   
On Celexa 10 mg daily and seeing behavioral health.   
On coumadin.   
Started on gabapentin 100 mg tid but was falling asleep and self discontinued it with improvement of mentation. Has shuffling gait. Didn't tolerate nortriptyline either. Would rather put up with the pain than deal with the side affects of gabapentin and nortriptyline. Has been having pain in his feet for a year and a half. Describes the pain as walking on hot coals.    
Taking humulin 5u qHS and 2u qAM. Sometimes will take more in the mornings if he eats a bigger breakfast. Was originally started on NPH at [place] about 5-6 years ago. Also taking metformin and glipizide. His fasting blood sugars are up to 140s. Had donuts yesterday and fasting sugar today was in the 200s. After dinner blood sugars are usually 130-135. Has diabetic neuropathy.   
Attending and PA/NP shared services statement (NON-critical care):

## 2025-06-17 NOTE — PROGRESS NOTES
Detail Level: Detailed Per patient request, updated medications, with [facility], so my name is associated with glargine (6 units at night).  Additionally, updated prior backup testing strips and brand names, in case needed in future.     Detail Level: Zone

## (undated) DEVICE — ELECTRODE 850 FOAM ADHESIVE - HYDROGEL RADIOTRNSPRNT (50/PK)

## (undated) DEVICE — SET EXTENSION WITH 2 PORTS (48EA/CA) ***PART #2C8610 IS A SUBSTITUTE*****

## (undated) DEVICE — CANNULA W/ SUPPLY TUBING O2 - (50/CA)

## (undated) DEVICE — TUBING CLEARLINK DUO-VENT - C-FLO (48EA/CA)

## (undated) DEVICE — SENSOR SPO2 NEO LNCS ADHESIVE (20/BX) SEE USER NOTES

## (undated) DEVICE — LACTATED RINGERS INJ 1000 ML - (14EA/CA 60CA/PF)